# Patient Record
Sex: MALE | Race: WHITE | NOT HISPANIC OR LATINO | Employment: OTHER | URBAN - METROPOLITAN AREA
[De-identification: names, ages, dates, MRNs, and addresses within clinical notes are randomized per-mention and may not be internally consistent; named-entity substitution may affect disease eponyms.]

---

## 2017-02-03 ENCOUNTER — TELEPHONE (OUTPATIENT)
Dept: UROLOGY | Facility: CLINIC | Age: 82
End: 2017-02-03

## 2017-02-03 NOTE — TELEPHONE ENCOUNTER
Yosef is calling wanting to let know that he has not heard from [place] yet. He has gone ahead and booked a return appointment with you on 5-3-17 with a psa. He just wanted you to know that [place] has not called him withy an appointment.    CB # [phone]  Or cell # [phone]  Jessica Moore LPN

## 2017-05-09 ENCOUNTER — OFFICE VISIT (OUTPATIENT)
Dept: CARDIOLOGY | Facility: CLINIC | Age: 82
End: 2017-05-09
Attending: INTERNAL MEDICINE
Payer: MEDICARE

## 2017-05-09 VITALS
SYSTOLIC BLOOD PRESSURE: 122 MMHG | HEIGHT: 70 IN | DIASTOLIC BLOOD PRESSURE: 80 MMHG | BODY MASS INDEX: 22.48 KG/M2 | WEIGHT: 157 LBS | HEART RATE: 60 BPM

## 2017-05-09 DIAGNOSIS — I10 BENIGN ESSENTIAL HYPERTENSION: Primary | ICD-10-CM

## 2017-05-09 DIAGNOSIS — I48.21 PERMANENT ATRIAL FIBRILLATION (H): ICD-10-CM

## 2017-05-09 PROCEDURE — 99213 OFFICE O/P EST LOW 20 MIN: CPT | Performed by: INTERNAL MEDICINE

## 2017-05-09 RX ORDER — METOPROLOL SUCCINATE 25 MG/1
25 TABLET, EXTENDED RELEASE ORAL DAILY
COMMUNITY
End: 2017-07-26

## 2017-05-09 NOTE — MR AVS SNAPSHOT
"              After Visit Summary   5/9/2017    Yosef Murry    MRN: [id number]           Patient Information     Date Of Birth          1935        Visit Information        Provider Department      5/9/2017 3:45 PM Henna Murphy MD [facility] [place]        Today's Diagnoses     Benign essential hypertension    -  1    Permanent atrial fibrillation (H)           Follow-ups after your visit        Additional Services     Follow-Up with Cardiac Advanced Practice Provider                 Future tests that were ordered for you today     Open Future Orders        Priority Expected Expires Ordered    Follow-Up with Cardiac Advanced Practice Provider Routine 5/9/2019 9/21/2019 5/9/2017            Who to contact     If you have questions or need follow up information about today's clinic visit or your schedule please contact [facility] [place] directly at [phone].  Normal or non-critical lab and imaging results will be communicated to you by MyChart, letter or phone within 4 business days after the clinic has received the results. If you do not hear from us within 7 days, please contact the clinic through MyChart or phone. If you have a critical or abnormal lab result, we will notify you by phone as soon as possible.  Submit refill requests through [employer] or call your pharmacy and they will forward the refill request to us. Please allow 3 business days for your refill to be completed.          Additional Information About Your Visit        Care EveryWhere ID     This is your Care EveryWhere ID. This could be used by other organizations to access your [place] medical records  [id number]        Your Vitals Were     Pulse Height BMI (Body Mass Index)             60 1.778 m (5' 10\") 22.53 kg/m2          Blood Pressure from Last 3 Encounters:   05/09/17 122/80   11/16/16 116/88   10/11/16 136/88    Weight from Last 3 Encounters: "   05/09/17 71.2 kg (157 lb)   11/16/16 74.8 kg (165 lb)   10/11/16 75.2 kg (165 lb 11.2 oz)              We Performed the Following     Follow-Up with Electrophysiologist        Primary Care Provider Office Phone # Fax #    Roger Sommer [phone]       [facility] [address]  [facility] [address]        Thank you!     Thank you for choosing [facility] [place]  for your care. Our goal is always to provide you with excellent care. Hearing back from our patients is one way we can continue to improve our services. Please take a few minutes to complete the written survey that you may receive in the mail after your visit with us. Thank you!             Your Updated Medication List - Protect others around you: Learn how to safely use, store and throw away your medicines at www.disposemymeds.org.          This list is accurate as of: 5/9/17  4:13 PM.  Always use your most recent med list.                   Brand Name Dispense Instructions for use    amLODIPine 5 MG tablet    NORVASC    90 tablet    Take 1 tablet (5 mg) by mouth daily       CLARITIN 10 MG tablet   Generic drug:  loratadine     30 tablet    Take 1 tablet by mouth daily.       diphenhydrAMINE-acetaminophen  MG tablet    TYLENOL PM     Take 1 tablet by mouth nightly as needed for sleep       famotidine 20 MG tablet    PEPCID    60 tablet    Take 20 mg by mouth daily       losartan 50 MG tablet    COZAAR    30 tablet    Take 1 tablet (50 mg) by mouth daily       PRESERVISION AREDS 2 PO      Take 2 capsules by mouth daily       rivaroxaban ANTICOAGULANT 20 MG Tabs tablet    XARELTO    90 tablet    Take 1 tablet (20 mg) by mouth daily (with dinner)       TOPROL XL 25 MG 24 hr tablet   Generic drug:  metoprolol      Take 25 mg by mouth daily       TUMS ULTRA 1000 1000 MG Chew   Generic drug:  calcium carbonate antacid      Take  by mouth. 1-2 tablets per day       TYLENOL PO       Take 500 mg by mouth

## 2017-05-09 NOTE — LETTER
5/9/2017    Roger Sommer MD  [facility]   [address] [facility] [address]  [facility] [address]    RE: Yosef Murry       Dear Colleague,    It was my pleasure seeing Mr. Yosef Murry today.  He is a delightful 81-year-old gentleman with permanent atrial fibrillation treated with rate control (metoprolol) and anticoagulation (rivaroxaban).  He has history of previous TIA.  He also has hypertension.      When I last saw him in 10/2016, his blood pressure was high.  He confirmed that his blood pressure was high at home as well.  I added losartan.  Because of his blood pressure still being high after a few weeks, amlodipine 5 mg was added.  The patient requested this visit today to discuss his blood pressure.      He spent the winter in [place] and recently returned to the [facility].  He has no chest pain, orthopnea, PND, syncope or other symptoms.  He is unaware of the atrial fibrillation.      PHYSICAL EXAMINATION:   VITAL SIGNS:  Blood pressure 132/80, pulse 60 and irregular, weight 71 kg, height 177 cm.   GENERAL:  He is a pleasant gentleman, alert, oriented, no apparent distress.   NECK:  Supple without bruits.   LUNGS:  Clear.   CARDIOVASCULAR:  Normal JVP, irregularly irregular rhythm.  No gallop, murmur or rub.   EXTREMITIES:  No edema.     Outpatient Encounter Prescriptions as of 5/9/2017   Medication Sig Dispense Refill     metoprolol (TOPROL XL) 25 MG 24 hr tablet Take 25 mg by mouth daily       rivaroxaban ANTICOAGULANT (XARELTO) 20 MG TABS tablet Take 1 tablet (20 mg) by mouth daily (with dinner) 90 tablet 3     Acetaminophen (TYLENOL PO) Take 500 mg by mouth       amLODIPine (NORVASC) 5 MG tablet Take 1 tablet (5 mg) by mouth daily 90 tablet 3     losartan (COZAAR) 50 MG tablet Take 1 tablet (50 mg) by mouth daily 30 tablet 11     diphenhydrAMINE-acetaminophen (TYLENOL PM)  MG tablet Take 1 tablet by mouth nightly as needed for sleep       Multiple Vitamins-Minerals (PRESERVISION AREDS  2 PO) Take 2 capsules by mouth daily        famotidine (PEPCID) 20 MG tablet Take 20 mg by mouth daily  60 tablet 1     loratadine (CLARITIN) 10 MG tablet Take 1 tablet by mouth daily. 30 tablet 1     calcium carbonate antacid (TUMS ULTRA 1000) 1000 MG CHEW Take  by mouth. 1-2 tablets per day       [DISCONTINUED] metoprolol (TOPROL-XL) 25 MG 24 hr tablet Take 0.5 tablets (12.5 mg) by mouth daily (Patient taking differently: Take 25 mg by mouth daily ) 45 tablet 4     No facility-administered encounter medications on file as of 5/9/2017.       IMPRESSION:   1.  Permanent atrial fibrillation.  Continue metoprolol and rivaroxaban.  His [id number] score is 5.   2.  Hypertension.  Much improved on a combination of losartan, amlodipine and metoprolol.  No changes.      RECOMMENDATIONS:  See in follow-up in 2 years.     Again, thank you for allowing me to participate in the care of your patient.      Sincerely,    Henna Murphy MD     [facility]

## 2017-05-09 NOTE — PROGRESS NOTES
HPI and Plan:   See dictation    Orders Placed This Encounter   Procedures     Follow-Up with Cardiac Advanced Practice Provider       Orders Placed This Encounter   Medications     metoprolol (TOPROL XL) 25 MG 24 hr tablet     Sig: Take 25 mg by mouth daily       Medications Discontinued During This Encounter   Medication Reason     metoprolol (TOPROL-XL) 25 MG 24 hr tablet Stopped by Patient         Encounter Diagnoses   Name Primary?     Permanent atrial fibrillation (H)      Benign essential hypertension Yes       CURRENT MEDICATIONS:  Current Outpatient Prescriptions   Medication Sig Dispense Refill     metoprolol (TOPROL XL) 25 MG 24 hr tablet Take 25 mg by mouth daily       rivaroxaban ANTICOAGULANT (XARELTO) 20 MG TABS tablet Take 1 tablet (20 mg) by mouth daily (with dinner) 90 tablet 3     Acetaminophen (TYLENOL PO) Take 500 mg by mouth       amLODIPine (NORVASC) 5 MG tablet Take 1 tablet (5 mg) by mouth daily 90 tablet 3     losartan (COZAAR) 50 MG tablet Take 1 tablet (50 mg) by mouth daily 30 tablet 11     diphenhydrAMINE-acetaminophen (TYLENOL PM)  MG tablet Take 1 tablet by mouth nightly as needed for sleep       Multiple Vitamins-Minerals (PRESERVISION AREDS 2 PO) Take 2 capsules by mouth daily        famotidine (PEPCID) 20 MG tablet Take 20 mg by mouth daily  60 tablet 1     loratadine (CLARITIN) 10 MG tablet Take 1 tablet by mouth daily. 30 tablet 1     calcium carbonate antacid (TUMS ULTRA 1000) 1000 MG CHEW Take  by mouth. 1-2 tablets per day       [DISCONTINUED] metoprolol (TOPROL-XL) 25 MG 24 hr tablet Take 0.5 tablets (12.5 mg) by mouth daily (Patient taking differently: Take 25 mg by mouth daily ) 45 tablet 4       ALLERGIES     Allergies   Allergen Reactions     Other [Seasonal Allergies]        PAST MEDICAL HISTORY:  Past Medical History:   Diagnosis Date     Anal fistula      Atrial flutter (H) 2012     Heartburn      Inguinal hernia, left      Persistent atrial fibrillation (H)  8/21/12     Prostate cancer (H)      TIA (transient ischaemic attack)     2014       PAST SURGICAL HISTORY:  Past Surgical History:   Procedure Laterality Date     COLONOSCOPY       ENT SURGERY       EXAM UNDER ANESTHESIA, FISTULOTOMY RECTUM, COMBINED N/A 6/18/2015    Procedure: COMBINED EXAM UNDER ANESTHESIA, FISTULOTOMY RECTUM;  Surgeon: Candice Carty MD;  Location: [facility]     GENITOURINARY SURGERY      PROSTATECTOMY     HERNIORRHAPHY INGUINAL  7/25/2013    Procedure: HERNIORRHAPHY INGUINAL;  LEFT INGUINAL HERNIA REPAIR WITH MESH;  Surgeon: Filipe Fairchild MD;  Location: [facility]     ORTHOPEDIC SURGERY      WRIST SURGERY - LEFT       FAMILY HISTORY:  Family History   Problem Relation Age of Onset     Ovarian Cancer Mother      OSTEOPOROSIS Father      Unknown/Adopted Sister        SOCIAL HISTORY:  Social History     Social History     Marital status:      Spouse name: N/A     Number of children: N/A     Years of education: N/A     Social History Main Topics     Smoking status: Former Smoker     Years: 16.00     Types: Cigarettes     Quit date: 12/31/1968     Smokeless tobacco: Never Used     Alcohol use 0.5 oz/week     1 Cans of beer per week      Comment: SOCIALLY     Drug use: No     Sexual activity: Not Asked     Other Topics Concern     Caffeine Concern No     occ tea     Sleep Concern No     Stress Concern No     Weight Concern No     Special Diet No     Exercise Yes     walking 3 miles a day     Seat Belt Yes     Social History Narrative       Review of Systems:  Skin:  Negative       Eyes:  Positive for glasses    ENT:  Negative      Respiratory:  Positive for cough     Cardiovascular:  Negative      Gastroenterology: Negative      Genitourinary:  Positive for prostate problem HX of prostate CA   Musculoskeletal:  Negative      Neurologic:  Negative      Psychiatric:  Negative      Heme/Lymph/Imm:  Negative      Endocrine:  Negative        [id number]

## 2017-05-10 NOTE — PROGRESS NOTES
HISTORY OF PRESENT ILLNESS:    It was my pleasure seeing Mr. Yosef Costa today.  He is a delightful 81-year-old gentleman with permanent atrial fibrillation treated with rate control (metoprolol) and anticoagulation (rivaroxaban).  He has history of previous TIA.  He also has hypertension.      When I last saw him in 10/2016, his blood pressure was high.  He confirmed that his blood pressure was high at home as well.  I added losartan.  Because of his blood pressure still being high after a few weeks, amlodipine 5 mg was added.  The patient requested this visit today to discuss his blood pressure.      He spent the winter in [place] and recently returned to the [facility].  He has no chest pain, orthopnea, PND, syncope or other symptoms.  He is unaware of the atrial fibrillation.      PHYSICAL EXAMINATION:   VITAL SIGNS:  Blood pressure 132/80, pulse 60 and irregular, weight 71 kg, height 177 cm.   GENERAL:  He is a pleasant gentleman, alert, oriented, no apparent distress.   NECK:  Supple without bruits.   LUNGS:  Clear.   CARDIOVASCULAR:  Normal JVP, irregularly irregular rhythm.  No gallop, murmur or rub.   EXTREMITIES:  No edema.      IMPRESSION:   1.  Permanent atrial fibrillation.  Continue metoprolol and rivaroxaban.  His [id number] score is 5.   2.  Hypertension.  Much improved on a combination of losartan, amlodipine and metoprolol.  No changes.      RECOMMENDATIONS:  See in follow-up in 2 years.         MIRTA MELARA MD, [facility]         cc:   Roger Sommer MD   [facility]    [address] [place] [address]  [address]          D: 2017 16:12   T: 05/10/2017 13:17   MT: CANDI      Name:     YOSEF COSTA   MRN:      [id number]        Account:      [id number]   :      1935           Service Date: 2017      Document: [id number]

## 2017-07-26 DIAGNOSIS — I48.21 PERMANENT ATRIAL FIBRILLATION (H): Primary | ICD-10-CM

## 2017-07-26 RX ORDER — METOPROLOL SUCCINATE 25 MG/1
25 TABLET, EXTENDED RELEASE ORAL DAILY
Qty: 90 TABLET | Refills: 3 | Status: SHIPPED | OUTPATIENT
Start: 2017-07-26 | End: 2018-11-19

## 2017-07-26 NOTE — TELEPHONE ENCOUNTER
E script down- Rx for Metoprolol printed and faxed to pharmacy.([facility] in [place])  MAGDALENA Harris

## 2017-09-27 ENCOUNTER — TELEPHONE (OUTPATIENT)
Dept: CARDIOLOGY | Facility: CLINIC | Age: 82
End: 2017-09-27

## 2017-09-27 NOTE — TELEPHONE ENCOUNTER
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 Flo Jarvis RN.

## 2017-10-06 ENCOUNTER — TRANSFERRED RECORDS (OUTPATIENT)
Dept: HEALTH INFORMATION MANAGEMENT | Facility: CLINIC | Age: 82
End: 2017-10-06

## 2017-10-11 ENCOUNTER — TRANSFERRED RECORDS (OUTPATIENT)
Dept: HEALTH INFORMATION MANAGEMENT | Facility: CLINIC | Age: 82
End: 2017-10-11

## 2017-10-20 ENCOUNTER — ALLIED HEALTH/NURSE VISIT (OUTPATIENT)
Dept: UROLOGY | Facility: CLINIC | Age: 82
End: 2017-10-20
Payer: MEDICARE

## 2017-10-20 VITALS
WEIGHT: 161 LBS | BODY MASS INDEX: 23.05 KG/M2 | HEIGHT: 70 IN | DIASTOLIC BLOOD PRESSURE: 72 MMHG | SYSTOLIC BLOOD PRESSURE: 122 MMHG | HEART RATE: 66 BPM

## 2017-10-20 DIAGNOSIS — I10 BENIGN ESSENTIAL HYPERTENSION: ICD-10-CM

## 2017-10-20 DIAGNOSIS — C61 PROSTATE CANCER (H): Primary | ICD-10-CM

## 2017-10-20 DIAGNOSIS — I48.91 ATRIAL FIBRILLATION, UNSPECIFIED TYPE (H): ICD-10-CM

## 2017-10-20 PROCEDURE — 96402 CHEMO HORMON ANTINEOPL SQ/IM: CPT | Performed by: UROLOGY

## 2017-10-20 PROCEDURE — 99211 OFF/OP EST MAY X REQ PHY/QHP: CPT | Performed by: UROLOGY

## 2017-10-20 RX ORDER — AMLODIPINE BESYLATE 5 MG/1
5 TABLET ORAL DAILY
Qty: 90 TABLET | Refills: 3 | Status: SHIPPED | OUTPATIENT
Start: 2017-10-20 | End: 2018-10-15

## 2017-10-20 RX ORDER — LOSARTAN POTASSIUM 50 MG/1
50 TABLET ORAL DAILY
Qty: 90 TABLET | Refills: 3 | Status: SHIPPED | OUTPATIENT
Start: 2017-10-20 | End: 2018-10-23

## 2017-10-20 ASSESSMENT — PAIN SCALES - GENERAL: PAINLEVEL: NO PAIN (0)

## 2017-10-20 NOTE — NURSING NOTE
The following medication was given: Eligard    MEDICATION: Eligard 45 mg  ROUTE: SQ  SITE: RUQ - Abd.  DOSE: 45MG  LOT #: [id number]  :  Jc  EXPIRATION DATE:  02/2019  NDC#: 57034-987-50    Yosef Murry comes into clinic today at the request of Dr Senior and Dr Novak  Ordering Provider for Dr Miroslava Novak    This service provided today was under the supervising provider of the day Dr Senior  who was available if needed.    Reason for visit: Prostate Cancer and to get a Eligard injection for his treatment     Naty Duran MA

## 2017-10-20 NOTE — MR AVS SNAPSHOT
"              After Visit Summary   10/20/2017    Yosef Murry    MRN: [id number]           Patient Information     Date Of Birth          1935        Visit Information        Provider Department      10/20/2017 9:45 AM Reddy Senior MD [facility] [place]        Today's Diagnoses     Prostate cancer (H)    -  1       Follow-ups after your visit        Who to contact     If you have questions or need follow up information about today's clinic visit or your schedule please contact [facility] [place] directly at [phone].  Normal or non-critical lab and imaging results will be communicated to you by [employer], letter or phone within 4 business days after the clinic has received the results. If you do not hear from us within 7 days, please contact the clinic through [employer] or phone. If you have a critical or abnormal lab result, we will notify you by phone as soon as possible.  Submit refill requests through [employer] or call your pharmacy and they will forward the refill request to us. Please allow 3 business days for your refill to be completed.          Additional Information About Your Visit        MyChart Information     [employer] lets you send messages to your doctor, view your test results, renew your prescriptions, schedule appointments and more. To sign up, go to [email or web address] . Click on \"Log in\" on the left side of the screen, which will take you to the Welcome page. Then click on \"Sign up Now\" on the right side of the page.     You will be asked to enter the access code listed below, as well as some personal information. Please follow the directions to create your username and password.     Your access code is: [email or web address]  Expires: 2018 10:49 AM     Your access code will  in 90 days. If you need help or a new code, please call your [place] clinic or [phone].        Care EveryWhere ID     This is your Care " "EveryWhere ID. This could be used by other organizations to access your [place] medical records  [id number]        Your Vitals Were     Pulse Height BMI (Body Mass Index)             66 1.778 m (5' 10\") 23.1 kg/m2          Blood Pressure from Last 3 Encounters:   10/20/17 122/72   05/09/17 122/80   11/16/16 116/88    Weight from Last 3 Encounters:   10/20/17 73 kg (161 lb)   05/09/17 71.2 kg (157 lb)   11/16/16 74.8 kg (165 lb)              Today, you had the following     No orders found for display       Primary Care Provider Office Phone # Fax #    Roger Sommer [phone]       [facility] [address] [address]        Equal Access to Services     AMAURI ZARAGOZA : Hadii aad ku hadasho Soomaali, waaxda luqadaha, qaybta kaalmada adeegyada, pratibha scherer hayblaise ott . So [facility] [phone].    ATENCIÓN: Si habla español, tiene a craven disposición servicios gratuitos de asistencia lingüística. Tianna al [phone].    We comply with applicable federal civil rights laws and [place] laws. We do not discriminate on the basis of race, color, national origin, age, disability, sex, sexual orientation, or gender identity.            Thank you!     Thank you for choosing [facility] [place]  for your care. Our goal is always to provide you with excellent care. Hearing back from our patients is one way we can continue to improve our services. Please take a few minutes to complete the written survey that you may receive in the mail after your visit with us. Thank you!             Your Updated Medication List - Protect others around you: Learn how to safely use, store and throw away your medicines at www.disposemymeds.org.          This list is accurate as of: 10/20/17 10:49 AM.  Always use your most recent med list.                   Brand Name Dispense Instructions for use Diagnosis    amLODIPine 5 MG tablet    NORVASC "    90 tablet    Take 1 tablet (5 mg) by mouth daily    Benign essential hypertension       CLARITIN 10 MG tablet   Generic drug:  loratadine     30 tablet    Take 1 tablet by mouth daily.        diphenhydrAMINE-acetaminophen  MG tablet    TYLENOL PM     Take 1 tablet by mouth nightly as needed for sleep        famotidine 20 MG tablet    PEPCID    60 tablet    Take 20 mg by mouth daily        losartan 50 MG tablet    COZAAR    30 tablet    Take 1 tablet (50 mg) by mouth daily    Atrial fibrillation, unspecified type (H)       metoprolol 25 MG 24 hr tablet    TOPROL XL    90 tablet    Take 1 tablet (25 mg) by mouth daily    Permanent atrial fibrillation (H)       PRESERVISION AREDS 2 PO      Take 2 capsules by mouth daily        rivaroxaban ANTICOAGULANT 20 MG Tabs tablet    XARELTO    90 tablet    Take 1 tablet (20 mg) by mouth daily (with dinner)    Atrial fibrillation (H)       TUMS ULTRA 1000 1000 MG Chew   Generic drug:  calcium carbonate antacid      Take  by mouth. 1-2 tablets per day        TYLENOL PO      Take 500 mg by mouth

## 2017-10-20 NOTE — PROGRESS NOTES
Ysoef Murry is an 82-year-old male with a history of grade 3 adenocarcinoma of the prostate. At the time of his radical prostatectomy 20 years ago he had a single positive margin. He had a biochemical recurrence a few years ago and was treated with pelvic radiation. His PSA has become detectable and studies at the [facility] indicate residual disease in the prostatic fossa, no metastatic disease. He now presents for 6 months Eligard injection at the request of Peter Novak M.D.  He will return to see Dr. Novak in 6 months for further studies and may be a candidate for local cryotherapy  Review of systems-no difficulty voiding  Exam: Normal appearance, normal respirations, neuro grossly intact  Assessment: Recurrence of prostate cancer and prostatic fossa  Plan: 6 month Eligard injection today, follow up with Dr. Mccain is scheduled. Patient may require intermittent hormonal therapy in the future

## 2017-11-30 DIAGNOSIS — I48.91 ATRIAL FIBRILLATION (H): ICD-10-CM

## 2018-06-21 ENCOUNTER — OFFICE VISIT (OUTPATIENT)
Dept: FAMILY MEDICINE | Facility: CLINIC | Age: 83
End: 2018-06-21
Payer: MEDICARE

## 2018-06-21 VITALS
TEMPERATURE: 97.1 F | HEART RATE: 60 BPM | DIASTOLIC BLOOD PRESSURE: 95 MMHG | OXYGEN SATURATION: 99 % | BODY MASS INDEX: 23.77 KG/M2 | HEIGHT: 70 IN | SYSTOLIC BLOOD PRESSURE: 157 MMHG | WEIGHT: 166 LBS

## 2018-06-21 DIAGNOSIS — Z00.00 ENCOUNTER FOR ROUTINE ADULT HEALTH EXAMINATION WITHOUT ABNORMAL FINDINGS: Primary | ICD-10-CM

## 2018-06-21 DIAGNOSIS — I48.19 PERSISTENT ATRIAL FIBRILLATION (H): ICD-10-CM

## 2018-06-21 DIAGNOSIS — I48.3 TYPICAL ATRIAL FLUTTER (H): ICD-10-CM

## 2018-06-21 DIAGNOSIS — C61 PROSTATE CANCER (H): ICD-10-CM

## 2018-06-21 DIAGNOSIS — Z13.6 CARDIOVASCULAR SCREENING; LDL GOAL LESS THAN 130: ICD-10-CM

## 2018-06-21 DIAGNOSIS — E55.9 VITAMIN D DEFICIENCY: ICD-10-CM

## 2018-06-21 LAB
ALBUMIN SERPL-MCNC: 3.5 G/DL (ref 3.4–5)
ALBUMIN UR-MCNC: NEGATIVE MG/DL
ALP SERPL-CCNC: 100 U/L (ref 40–150)
ALT SERPL W P-5'-P-CCNC: 24 U/L (ref 0–70)
AMORPH CRY #/AREA URNS HPF: ABNORMAL /HPF
ANION GAP SERPL CALCULATED.3IONS-SCNC: 8 MMOL/L (ref 3–14)
APPEARANCE UR: ABNORMAL
AST SERPL W P-5'-P-CCNC: 23 U/L (ref 0–45)
BACTERIA #/AREA URNS HPF: ABNORMAL /HPF
BILIRUB SERPL-MCNC: 0.6 MG/DL (ref 0.2–1.3)
BILIRUB UR QL STRIP: NEGATIVE
BUN SERPL-MCNC: 17 MG/DL (ref 7–30)
CALCIUM SERPL-MCNC: 9.5 MG/DL (ref 8.5–10.1)
CHLORIDE SERPL-SCNC: 105 MMOL/L (ref 94–109)
CHOLEST SERPL-MCNC: 183 MG/DL
CO2 SERPL-SCNC: 29 MMOL/L (ref 20–32)
COLOR UR AUTO: YELLOW
CREAT SERPL-MCNC: 0.88 MG/DL (ref 0.66–1.25)
ERYTHROCYTE [DISTWIDTH] IN BLOOD BY AUTOMATED COUNT: 13.6 % (ref 10–15)
GFR SERPL CREATININE-BSD FRML MDRD: 84 ML/MIN/1.7M2
GLUCOSE SERPL-MCNC: 89 MG/DL (ref 70–99)
GLUCOSE UR STRIP-MCNC: NEGATIVE MG/DL
HCT VFR BLD AUTO: 50 % (ref 40–53)
HDLC SERPL-MCNC: 43 MG/DL
HGB BLD-MCNC: 16.4 G/DL (ref 13.3–17.7)
HGB UR QL STRIP: ABNORMAL
KETONES UR STRIP-MCNC: NEGATIVE MG/DL
LDLC SERPL CALC-MCNC: 114 MG/DL
LEUKOCYTE ESTERASE UR QL STRIP: NEGATIVE
MCH RBC QN AUTO: 31.5 PG (ref 26.5–33)
MCHC RBC AUTO-ENTMCNC: 32.8 G/DL (ref 31.5–36.5)
MCV RBC AUTO: 96 FL (ref 78–100)
NITRATE UR QL: NEGATIVE
NON-SQ EPI CELLS #/AREA URNS LPF: ABNORMAL /LPF
NONHDLC SERPL-MCNC: 140 MG/DL
PH UR STRIP: 7 PH (ref 5–7)
PLATELET # BLD AUTO: 214 10E9/L (ref 150–450)
POTASSIUM SERPL-SCNC: 4.4 MMOL/L (ref 3.4–5.3)
PROT SERPL-MCNC: 7.5 G/DL (ref 6.8–8.8)
RBC # BLD AUTO: 5.21 10E12/L (ref 4.4–5.9)
RBC #/AREA URNS AUTO: ABNORMAL /HPF
SODIUM SERPL-SCNC: 142 MMOL/L (ref 133–144)
SOURCE: ABNORMAL
SP GR UR STRIP: 1.02 (ref 1–1.03)
TRIGL SERPL-MCNC: 130 MG/DL
UROBILINOGEN UR STRIP-ACNC: 0.2 EU/DL (ref 0.2–1)
WBC # BLD AUTO: 6.5 10E9/L (ref 4–11)
WBC #/AREA URNS AUTO: ABNORMAL /HPF

## 2018-06-21 PROCEDURE — 36415 COLL VENOUS BLD VENIPUNCTURE: CPT | Performed by: INTERNAL MEDICINE

## 2018-06-21 PROCEDURE — G0439 PPPS, SUBSEQ VISIT: HCPCS | Performed by: INTERNAL MEDICINE

## 2018-06-21 PROCEDURE — 80061 LIPID PANEL: CPT | Performed by: INTERNAL MEDICINE

## 2018-06-21 PROCEDURE — 81001 URINALYSIS AUTO W/SCOPE: CPT | Performed by: INTERNAL MEDICINE

## 2018-06-21 PROCEDURE — 82306 VITAMIN D 25 HYDROXY: CPT | Performed by: INTERNAL MEDICINE

## 2018-06-21 PROCEDURE — 85027 COMPLETE CBC AUTOMATED: CPT | Performed by: INTERNAL MEDICINE

## 2018-06-21 PROCEDURE — 80053 COMPREHEN METABOLIC PANEL: CPT | Performed by: INTERNAL MEDICINE

## 2018-06-21 NOTE — MR AVS SNAPSHOT
After Visit Summary   6/21/2018    Yosef Murry    MRN: [id number]           Patient Information     Date Of Birth          1935        Visit Information        Provider Department      6/21/2018 8:30 AM Fred Connell MD [facility]        Today's Diagnoses     Encounter for routine adult health examination without abnormal findings    -  1    Prostate cancer (H)        Persistent atrial fibrillation (H)        Typical atrial flutter (H)        Vitamin D deficiency        CARDIOVASCULAR SCREENING; LDL GOAL LESS THAN 130          Care Instructions      Preventive Health Recommendations:       Male Ages 65 and over    Yearly exam:             See your health care provider every year in order to  o   Review health changes.   o   Discuss preventive care.    o   Review your medicines if your doctor has prescribed any.    Talk with your health care provider about whether you should have a test to screen for prostate cancer (PSA).    Every 3 years, have a diabetes test (fasting glucose). If you are at risk for diabetes, you should have this test more often.    Every 5 years, have a cholesterol test. Have this test more often if you are at risk for high cholesterol or heart disease.     Every 10 years, have a colonoscopy. Or, have a yearly FIT test (stool test). These exams will check for colon cancer.    Talk to with your health care provider about screening for Abdominal Aortic Aneurysm if you have a family history of AAA or have a history of smoking.  Shots:     Get a flu shot each year.     Get a tetanus shot every 10 years.     Talk to your doctor about your pneumonia vaccines. There are now two you should receive - Pneumovax (PPSV 23) and Prevnar (PCV 13).    Talk to your doctor about a shingles vaccine.     Talk to your doctor about the hepatitis B vaccine.  Nutrition:     Eat at least 5 servings of fruits and vegetables each day.     Eat whole-grain bread, whole-wheat pasta and brown  "rice instead of white grains and rice.     Talk to your doctor about Calcium and Vitamin D.   Lifestyle    Exercise for at least 150 minutes a week (30 minutes a day, 5 days a week). This will help you control your weight and prevent disease.     Limit alcohol to one drink per day.     No smoking.     Wear sunscreen to prevent skin cancer.     See your dentist every six months for an exam and cleaning.     See your eye doctor every 1 to 2 years to screen for conditions such as glaucoma, macular degeneration and cataracts.          Follow-ups after your visit        Who to contact     If you have questions or need follow up information about today's clinic visit or your schedule please contact [facility] directly at [phone].  Normal or non-critical lab and imaging results will be communicated to you by MyChart, letter or phone within 4 business days after the clinic has received the results. If you do not hear from us within 7 days, please contact the clinic through MyChart or phone. If you have a critical or abnormal lab result, we will notify you by phone as soon as possible.  Submit refill requests through [employer] or call your pharmacy and they will forward the refill request to us. Please allow 3 business days for your refill to be completed.          Additional Information About Your Visit        Care EveryWhere ID     This is your Care EveryWhere ID. This could be used by other organizations to access your [place] medical records  [id number]        Your Vitals Were     Pulse Temperature Height Pulse Oximetry BMI (Body Mass Index)       60 97.1  F (36.2  C) (Oral) 5' 10\" (1.778 m) 99% 23.82 kg/m2        Blood Pressure from Last 3 Encounters:   06/21/18 (!) 157/95   10/20/17 122/72   05/09/17 122/80    Weight from Last 3 Encounters:   06/21/18 166 lb (75.3 kg)   10/20/17 161 lb (73 kg)   05/09/17 157 lb (71.2 kg)              We Performed the Following     CBC with platelets     Comprehensive " metabolic panel     Lipid panel reflex to direct LDL Fasting     UA reflex to Microscopic and Culture     Urine Microscopic     Vitamin D Deficiency          Today's Medication Changes          These changes are accurate as of 6/21/18 11:59 PM.  If you have any questions, ask your nurse or doctor.               Stop taking these medicines if you haven't already. Please contact your care team if you have questions.     famotidine 20 MG tablet   Commonly known as:  PEPCID   Stopped by:  Fred Connell MD                    Primary Care Provider Office Phone # Fax #    Fred Connell [phone] [address]  [facility] [address]        Equal Access to Services     [facility]: Hadii aad ku hadasho Socatyali, waaxda luqadaha, qaybta kaalmada adebhumiyada, pratibha ott . So [facility] [phone].    ATENCIÓN: Si habla español, tiene a craven disposición servicios gratuitos de asistencia lingüística. Llame al [phone].    We comply with applicable federal civil rights laws and [place] laws. We do not discriminate on the basis of race, color, national origin, age, disability, sex, sexual orientation, or gender identity.            Thank you!     Thank you for choosing [facility]  for your care. Our goal is always to provide you with excellent care. Hearing back from our patients is one way we can continue to improve our services. Please take a few minutes to complete the written survey that you may receive in the mail after your visit with us. Thank you!             Your Updated Medication List - Protect others around you: Learn how to safely use, store and throw away your medicines at www.disposemymeds.org.          This list is accurate as of 6/21/18 11:59 PM.  Always use your most recent med list.                   Brand Name Dispense Instructions for use Diagnosis    amLODIPine 5 MG tablet    NORVASC    90 tablet    Take 1 tablet (5 mg) by mouth  daily    Benign essential hypertension       CLARITIN 10 MG tablet   Generic drug:  loratadine     30 tablet    Take 1 tablet by mouth daily.        diphenhydrAMINE-acetaminophen  MG tablet    TYLENOL PM     Take 1 tablet by mouth nightly as needed for sleep        losartan 50 MG tablet    COZAAR    90 tablet    Take 1 tablet (50 mg) by mouth daily    Atrial fibrillation, unspecified type (H)       metoprolol succinate 25 MG 24 hr tablet    TOPROL XL    90 tablet    Take 1 tablet (25 mg) by mouth daily    Permanent atrial fibrillation (H)       PRESERVISION AREDS 2 PO      Take 2 capsules by mouth daily        rivaroxaban ANTICOAGULANT 20 MG Tabs tablet    XARELTO    90 tablet    Take 1 tablet (20 mg) by mouth daily (with dinner)    Atrial fibrillation (H)       TUMS ULTRA 1000 1000 MG Chew   Generic drug:  calcium carbonate antacid      Take  by mouth. 1-2 tablets per day        TYLENOL PO      Take 500 mg by mouth

## 2018-06-21 NOTE — PROGRESS NOTES
SUBJECTIVE:   Yosef Murry is a 82 year old male who presents for Preventive Visit.    Are you in the first 12 months of your Medicare Part B coverage?  No    Healthy Habits:    Do you get at least three servings of calcium containing foods daily (dairy, green leafy vegetables, etc.)? yes    Amount of exercise or daily activities, outside of work: 5-7 day(s) per week    Problems taking medications regularly No    Medication side effects: No    Have you had an eye exam in the past two years? yes    Do you see a dentist twice per year? yes    Do you have sleep apnea, excessive snoring or daytime drowsiness?no      Ability to successfully perform activities of daily living: Yes, no assistance needed    Home safety:  none identified     Hearing impairment: No    Fall risk:  Fallen 2 or more times in the past year?: No  Any fall with injury in the past year?: No    COGNITIVE SCREEN  1) Repeat 3 items (Banana, Sunrise, Chair)    2) Clock draw: NORMAL  3) 3 item recall: Recalls 3 objects  Results: 3 items recalled: COGNITIVE IMPAIRMENT LESS LIKELY    Mini-CogTM Copyright MARIBEL Kwong. Licensed by the author for use in [place] [employer]; reprinted with permission ([email or web address]). All rights reserved.      The patient presents to the clinic for establishment of care and a wellness exam. The pt has a history of a prostatectomy in 5/1999 for Angélica 8 adenocarcinoma of the prostate. He is being followed by urology at the [facility] watching for recurrent or metastatic disease. His PSA is currently undetectable and choline PET scan revealed complete metabolic response to therapy. He is currently receiving Lupron injections every 6 months.     The patient has a history of atrial fibrillation which he reports staying in the irregular rate and rhythm regularly. He is anticoagulated on Xarelto and is taking metoprolol.     He denies vision changes, neck problems, back problems, headaches, sinus pressure, shortness of breath,  chest pain, chest discomfort, heart palpitations, stomach problems, indigestion, heartburn, hematochezia, diarrhea, constipation, urination problems, nocturia, skin changes, rashes, bone pain, muscle pain, joint pain, and weight changes.      Reviewed and updated as needed this visit by clinical staff  Tobacco  Allergies  Meds  Problems         Reviewed and updated as needed this visit by Provider        Social History   Substance Use Topics     Smoking status: Former Smoker     Years: 16.00     Types: Cigarettes     Quit date: 12/31/1968     Smokeless tobacco: Never Used     Alcohol use 0.5 oz/week     1 Cans of beer per week      Comment: SOCIALLY       If you drink alcohol do you typically have >3 drinks per day or >7 drinks per week? No                        Today's PHQ-2 Score:   PHQ-2 ( 1999 Pfizer) 6/21/2018   Q1: Little interest or pleasure in doing things 0   Q2: Feeling down, depressed or hopeless 0   PHQ-2 Score 0       Do you feel safe in your environment - Yes    Do you have a Health Care Directive?: Yes: Advance Directive has been received and scanned.    Current providers sharing in care for this patient include:   Patient Care Team:  Fred Connell MD as PCP - General (Internal Medicine)    The following health maintenance items are reviewed in Epic and correct as of today:  Health Maintenance   Topic Date Due     PHQ-2 Q1 YR  09/06/1947     TETANUS IMMUNIZATION (SYSTEM ASSIGNED)  09/06/1954     ADVANCE DIRECTIVE PLANNING Q5 YRS  09/06/1991     FALL RISK ASSESSMENT  09/06/2001     PNEUMOCOCCAL (1 of 2 - PCV13) 09/06/2001     INFLUENZA VACCINE (Season Ended) 09/01/2018     Current Outpatient Prescriptions   Medication Sig Dispense Refill     Acetaminophen (TYLENOL PO) Take 500 mg by mouth       amLODIPine (NORVASC) 5 MG tablet Take 1 tablet (5 mg) by mouth daily 90 tablet 3     calcium carbonate antacid (TUMS ULTRA 1000) 1000 MG CHEW Take  by mouth. 1-2 tablets per day        diphenhydrAMINE-acetaminophen (TYLENOL PM)  MG tablet Take 1 tablet by mouth nightly as needed for sleep       loratadine (CLARITIN) 10 MG tablet Take 1 tablet by mouth daily. 30 tablet 1     losartan (COZAAR) 50 MG tablet Take 1 tablet (50 mg) by mouth daily 90 tablet 3     metoprolol (TOPROL XL) 25 MG 24 hr tablet Take 1 tablet (25 mg) by mouth daily 90 tablet 3     Multiple Vitamins-Minerals (PRESERVISION AREDS 2 PO) Take 2 capsules by mouth daily        rivaroxaban ANTICOAGULANT (XARELTO) 20 MG TABS tablet Take 1 tablet (20 mg) by mouth daily (with dinner) 90 tablet 3       Pneumonia Vaccine:Adults age 65+ who have not received previous Pneumovax (PPSV23) or PCV13 as an adult: Should first be given PCV13 AND then should be given PPSV23 6-12 months after PCV13    ROS:  CONSTITUTIONAL: NEGATIVE for fever, chills, change in weight  INTEGUMENTARY/SKIN: NEGATIVE for worrisome rashes, moles or lesions  EYES: NEGATIVE for vision changes or irritation  ENT/MOUTH: NEGATIVE for ear, mouth and throat problems  RESP: NEGATIVE for significant cough or SOB  BREAST: NEGATIVE for masses, tenderness or discharge  CV: NEGATIVE for chest pain, palpitations or peripheral edema  GI: NEGATIVE for nausea, abdominal pain, heartburn, or change in bowel habits  : NEGATIVE for frequency, dysuria, or hematuria  MUSCULOSKELETAL: NEGATIVE for significant arthralgias or myalgia  NEURO: NEGATIVE for weakness, dizziness or paresthesias  ENDOCRINE: NEGATIVE for temperature intolerance, skin/hair changes  HEME: NEGATIVE for bleeding problems  PSYCHIATRIC: NEGATIVE for changes in mood or affect    This document serves as a record of the services and decisions personally performed and made by Fred Connell MD. It was created on his behalf by Aliyah Rowan, a trained medical scribe. The creation of this document is based the provider's statements to the medical scribe. 6/21/2018  OBJECTIVE:   BP (!) 157/95 (BP Location: Left arm,  "Patient Position: Sitting, Cuff Size: Adult Large)  Pulse 60  Temp 97.1  F (36.2  C) (Oral)  Ht 1.778 m (5' 10\")  Wt 75.3 kg (166 lb)  SpO2 99%  BMI 23.82 kg/m2 Estimated body mass index is 23.82 kg/(m^2) as calculated from the following:    Height as of this encounter: 1.778 m (5' 10\").    Weight as of this encounter: 75.3 kg (166 lb).  EXAM:   GENERAL: healthy, alert and no distress, mild to moderate kyphosis  EYES: Eyes grossly normal to inspection, PERRL and conjunctivae and sclerae normal  HENT: ear canals and TM's normal, nose and mouth without ulcers or lesions  NECK: no adenopathy, no asymmetry, masses, or scars and thyroid normal to palpation  RESP: lungs clear to auscultation - no rales, rhonchi or wheezes  BREAST: normal without masses, tenderness or nipple discharge and no palpable axillary masses or adenopathy  CV: regular rate and rhythm, normal S1 S2, no S3 or S4, no murmur, click or rub, no peripheral edema and peripheral pulses strong  ABDOMEN: soft, nontender, no hepatosplenomegaly, no masses and bowel sounds normal   (male): testicles were atrophic, normal male genitalia without lesions or urethral discharge, no hernia  MS: no gross musculoskeletal defects noted, 1+ pretibial edema bilaterally, minor chronic venous stasis changes   SKIN: no suspicious lesions or rashes except sheets of seborrheic keratoses   NEURO: Normal strength and tone, mentation intact and speech normal  PSYCH: mentation appears normal, affect normal/bright  LYMPH: no cervical, supraclavicular, axillary, or inguinal adenopathy    Diagnostic Results:  Pending  ASSESSMENT / PLAN:   1. Encounter for routine adult health examination without abnormal findings  Fasting labs pending.   - UA reflex to Microscopic and Culture  - CBC with platelets  - Comprehensive metabolic panel  - Lipid panel reflex to direct LDL Fasting  - Vitamin D Deficiency    2. Prostate cancer (H)  Followed by urology at [facility], receiving Lupron " "injections every 6 months.  - CBC with platelets  - Comprehensive metabolic panel    3. Persistent atrial fibrillation (H)  Controlled with xarelto and metoprolol.    4. Typical atrial flutter (H)  Controlled with xarelto and metoprolol.    5. Vitamin D deficiency  - Vitamin D Deficiency    6. CARDIOVASCULAR SCREENING; LDL GOAL LESS THAN 130  - Lipid panel reflex to direct LDL Fasting      Follow-up immunizations and other records when available.  End of Life Planning:  Patient currently has an advanced directive: Yes.  Practitioner is supportive of decision.    COUNSELING:  Reviewed preventive health counseling, as reflected in patient instructions       Regular exercise       Healthy diet/nutrition       Vision screening       Hearing screening       Dental care       Prostate cancer screening    BP Screening:   Last 3 BP Readings:    BP Readings from Last 3 Encounters:   06/21/18 (!) 157/95   10/20/17 122/72   05/09/17 122/80       The following was recommended to the patient:  Re-screen BP within a year and recommended lifestyle modifications    Estimated body mass index is 23.82 kg/(m^2) as calculated from the following:    Height as of this encounter: 1.778 m (5' 10\").    Weight as of this encounter: 75.3 kg (166 lb).   reports that he quit smoking about 49 years ago. His smoking use included Cigarettes. He quit after 16.00 years of use. He has never used smokeless tobacco.    Appropriate preventive services were discussed with this patient, including applicable screening as appropriate for cardiovascular disease, diabetes, osteopenia/osteoporosis, and glaucoma.  As appropriate for age/gender, discussed screening for colorectal cancer, prostate cancer, breast cancer, and cervical cancer. Checklist reviewing preventive services available has been given to the patient.    Reviewed patients plan of care and provided an AVS. The Basic Care Plan (routine screening as documented in Health Maintenance) for Yosef" meets the Care Plan requirement. This Care Plan has been established and reviewed with the Patient.    Counseling Resources:  ATP IV Guidelines  Pooled Cohorts Equation Calculator  Breast Cancer Risk Calculator  FRAX Risk Assessment  ICSI Preventive Guidelines  Dietary Guidelines for Americans, 2010  USDA's MyPlate  ASA Prophylaxis  Lung CA Screening    Fred Connell MD  [facility]    The information in this document, created by the medical scribe for me, accurately reflects the services I personally performed and the decisions made by me. I have reviewed and approved this document for accuracy prior to leaving the patient care area.  Fred Connell MD  9:04 AM, 06/21/18

## 2018-06-22 LAB — DEPRECATED CALCIDIOL+CALCIFEROL SERPL-MC: 29 UG/L (ref 20–75)

## 2018-07-19 ENCOUNTER — TELEPHONE (OUTPATIENT)
Dept: FAMILY MEDICINE | Facility: CLINIC | Age: 83
End: 2018-07-19

## 2018-07-19 NOTE — TELEPHONE ENCOUNTER
Reason for Call:  Request for results:    Name of test or procedure: lab tests    Date of test of procedure: 6/21    Location of the test or procedure: [facility]    OK to leave the result message on voice mail or with a family member? YES    Phone number Patient can be reached at:  Home number on file [phone] (home)    Additional comments: patient would like a call back to review results.    Please, also, send a copy of the results to patient at home address.    Call taken on 7/19/2018 at 9:19 AM by Marjan Willard.

## 2018-09-06 ENCOUNTER — TELEPHONE (OUTPATIENT)
Dept: CARDIOLOGY | Facility: CLINIC | Age: 83
End: 2018-09-06

## 2018-09-06 ENCOUNTER — ALLIED HEALTH/NURSE VISIT (OUTPATIENT)
Dept: NURSING | Facility: CLINIC | Age: 83
End: 2018-09-06
Payer: MEDICARE

## 2018-09-06 DIAGNOSIS — Z23 NEED FOR SHINGLES VACCINE: Primary | ICD-10-CM

## 2018-09-06 DIAGNOSIS — Z23 NEED FOR PROPHYLACTIC VACCINATION AND INOCULATION AGAINST INFLUENZA: ICD-10-CM

## 2018-09-06 PROCEDURE — 99207 ZZC NO CHARGE NURSE ONLY: CPT

## 2018-09-06 PROCEDURE — G0008 ADMIN INFLUENZA VIRUS VAC: HCPCS

## 2018-09-06 PROCEDURE — 90662 IIV NO PRSV INCREASED AG IM: CPT

## 2018-09-06 PROCEDURE — 90750 HZV VACC RECOMBINANT IM: CPT

## 2018-09-06 PROCEDURE — 90472 IMMUNIZATION ADMIN EACH ADD: CPT

## 2018-09-06 NOTE — MR AVS SNAPSHOT
After Visit Summary   9/6/2018    Yosef Murry    MRN: [id number]           Patient Information     Date Of Birth          1935        Visit Information        Provider Department      9/6/2018 9:00 AM [facility] [place] NURSE [facility]        Today's Diagnoses     Need for shingles vaccine    -  1    Need for prophylactic vaccination and inoculation against influenza           Follow-ups after your visit        Who to contact     If you have questions or need follow up information about today's clinic visit or your schedule please contact [facility] directly at [phone].  Normal or non-critical lab and imaging results will be communicated to you by MyChart, letter or phone within 4 business days after the clinic has received the results. If you do not hear from us within 7 days, please contact the clinic through MyChart or phone. If you have a critical or abnormal lab result, we will notify you by phone as soon as possible.  Submit refill requests through [employer] or call your pharmacy and they will forward the refill request to us. Please allow 3 business days for your refill to be completed.          Additional Information About Your Visit        Care EveryWhere ID     This is your Care EveryWhere ID. This could be used by other organizations to access your [place] medical records  [id number]         Blood Pressure from Last 3 Encounters:   06/21/18 (!) 157/95   10/20/17 122/72   05/09/17 122/80    Weight from Last 3 Encounters:   06/21/18 166 lb (75.3 kg)   10/20/17 161 lb (73 kg)   05/09/17 157 lb (71.2 kg)              We Performed the Following     FLU VACCINE, INCREASED ANTIGEN, PRESV FREE, AGE 65+ [73650]     Vaccine Administration, Each Additional [00010]     Vaccine Administration, Initial [52833]     ZOSTER VACCINE RECOMBINANT ADJUVANTED IM NJX        Primary Care Provider Office Phone # Fax #    Fred Connell [phone] [address]  [facility] [address]  [facility] [address]        Equal Access to Services     AMAURI ZARAGOZA : Hadii jean-pierre arana hadchloeterence Socatyali, waaxda luqadaha, qaybta kajimibella morrisseylillybella, pratibha wagnermerymorgan ott . So [facility] [phone].    ATENCIÓN: Si habla español, tiene a craven disposición servicios gratuitos de asistencia lingüística. Llame al [phone].    We comply with applicable federal civil rights laws and [place] laws. We do not discriminate on the basis of race, color, national origin, age, disability, sex, sexual orientation, or gender identity.            Thank you!     Thank you for choosing [facility]  for your care. Our goal is always to provide you with excellent care. Hearing back from our patients is one way we can continue to improve our services. Please take a few minutes to complete the written survey that you may receive in the mail after your visit with us. Thank you!             Your Updated Medication List - Protect others around you: Learn how to safely use, store and throw away your medicines at www.disposemymeds.org.          This list is accurate as of 9/6/18  9:21 AM.  Always use your most recent med list.                   Brand Name Dispense Instructions for use Diagnosis    amLODIPine 5 MG tablet    NORVASC    90 tablet    Take 1 tablet (5 mg) by mouth daily    Benign essential hypertension       CLARITIN 10 MG tablet   Generic drug:  loratadine     30 tablet    Take 1 tablet by mouth daily.        diphenhydrAMINE-acetaminophen  MG tablet    TYLENOL PM     Take 1 tablet by mouth nightly as needed for sleep        losartan 50 MG tablet    COZAAR    90 tablet    Take 1 tablet (50 mg) by mouth daily    Atrial fibrillation, unspecified type (H)       metoprolol succinate 25 MG 24 hr tablet    TOPROL XL    90 tablet    Take 1 tablet (25 mg) by mouth daily    Permanent atrial fibrillation (H)       PRESERVISION AREDS 2 PO      Take 2 capsules by mouth daily        rivaroxaban  ANTICOAGULANT 20 MG Tabs tablet    XARELTO    90 tablet    Take 1 tablet (20 mg) by mouth daily (with dinner)    Atrial fibrillation (H)       TUMS ULTRA 1000 1000 MG Chew   Generic drug:  calcium carbonate antacid      Take  by mouth. 1-2 tablets per day        TYLENOL PO      Take 500 mg by mouth

## 2018-09-06 NOTE — PROGRESS NOTES

## 2018-09-06 NOTE — TELEPHONE ENCOUNTER
Pt calling to see if he can hold xarelto 4 - 5 days prior to scheduled procedure. He is on xarelto for afib. His CHADS score is 5 ( TIA, age, and HTN). He may hold Xarelto 3 days prior to manny procedure to remove skin cancer on his nose. Instructed him to restart as soon as possible after procedure.

## 2018-10-15 DIAGNOSIS — I10 BENIGN ESSENTIAL HYPERTENSION: ICD-10-CM

## 2018-10-15 RX ORDER — AMLODIPINE BESYLATE 5 MG/1
5 TABLET ORAL DAILY
Qty: 90 TABLET | Refills: 3 | Status: ON HOLD | OUTPATIENT
Start: 2018-10-15 | End: 2019-08-06

## 2018-10-23 DIAGNOSIS — I48.91 ATRIAL FIBRILLATION, UNSPECIFIED TYPE (H): ICD-10-CM

## 2018-10-23 RX ORDER — LOSARTAN POTASSIUM 50 MG/1
50 TABLET ORAL DAILY
Qty: 90 TABLET | Refills: 3 | Status: ON HOLD | OUTPATIENT
Start: 2018-10-23 | End: 2019-08-06

## 2018-11-08 ENCOUNTER — ALLIED HEALTH/NURSE VISIT (OUTPATIENT)
Dept: NURSING | Facility: CLINIC | Age: 83
End: 2018-11-08
Payer: MEDICARE

## 2018-11-08 DIAGNOSIS — Z23 NEED FOR VACCINATION: Primary | ICD-10-CM

## 2018-11-08 PROCEDURE — 99207 ZZC NO CHARGE NURSE ONLY: CPT

## 2018-11-08 PROCEDURE — 90471 IMMUNIZATION ADMIN: CPT

## 2018-11-08 PROCEDURE — 90750 HZV VACC RECOMBINANT IM: CPT

## 2018-11-08 NOTE — MR AVS SNAPSHOT
After Visit Summary   11/8/2018    Yosef Murry    MRN: [id number]           Patient Information     Date Of Birth          1935        Visit Information        Provider Department      11/8/2018 1:30 PM CS NURSE [facility]        Today's Diagnoses     Need for vaccination    -  1       Follow-ups after your visit        Your next 10 appointments already scheduled     Nov 08, 2018  1:30 PM CST   Nurse Only with CS NURSE   [facility] ([facility])    [address] [place] [address] [phone]              Who to contact     If you have questions or need follow up information about today's clinic visit or your schedule please contact [facility] directly at [phone].  Normal or non-critical lab and imaging results will be communicated to you by MyChart, letter or phone within 4 business days after the clinic has received the results. If you do not hear from us within 7 days, please contact the clinic through MyChart or phone. If you have a critical or abnormal lab result, we will notify you by phone as soon as possible.  Submit refill requests through [employer] or call your pharmacy and they will forward the refill request to us. Please allow 3 business days for your refill to be completed.          Additional Information About Your Visit        Care EveryWhere ID     This is your Care EveryWhere ID. This could be used by other organizations to access your [facility] medical records  [id number]         Blood Pressure from Last 3 Encounters:   06/21/18 (!) 157/95   10/20/17 122/72   05/09/17 122/80    Weight from Last 3 Encounters:   06/21/18 166 lb (75.3 kg)   10/20/17 161 lb (73 kg)   05/09/17 157 lb (71.2 kg)              We Performed the Following     1st  Administration  [21990]     SHINGRIX [[id number]]        Primary Care Provider Office Phone # Fax #    Fred Connell [phone] [address]  [place]  [address]        Equal Access to Services     [facility]: Hadii jean-pierre arana kentrell Billali, wairmada luqalvaha, qaybta kaalmada arminda, pratibha mcdermott. So [facility] [phone].    ATENCIÓN: Si habla español, tiene a craven disposición servicios gratuitos de asistencia lingüística. Tianna al [phone].    We comply with applicable federal civil rights laws and [place] laws. We do not discriminate on the basis of race, color, national origin, age, disability, sex, sexual orientation, or gender identity.            Thank you!     Thank you for choosing [facility]  for your care. Our goal is always to provide you with excellent care. Hearing back from our patients is one way we can continue to improve our services. Please take a few minutes to complete the written survey that you may receive in the mail after your visit with us. Thank you!             Your Updated Medication List - Protect others around you: Learn how to safely use, store and throw away your medicines at www.disposemymeds.org.          This list is accurate as of 11/8/18  1:19 PM.  Always use your most recent med list.                   Brand Name Dispense Instructions for use Diagnosis    amLODIPine 5 MG tablet    NORVASC    90 tablet    Take 1 tablet (5 mg) by mouth daily    Benign essential hypertension       CLARITIN 10 MG tablet   Generic drug:  loratadine     30 tablet    Take 1 tablet by mouth daily.        diphenhydrAMINE-acetaminophen  MG tablet    TYLENOL PM     Take 1 tablet by mouth nightly as needed for sleep        losartan 50 MG tablet    COZAAR    90 tablet    Take 1 tablet (50 mg) by mouth daily    Atrial fibrillation, unspecified type (H)       metoprolol succinate 25 MG 24 hr tablet    TOPROL XL    90 tablet    Take 1 tablet (25 mg) by mouth daily    Permanent atrial fibrillation (H)       PRESERVISION AREDS 2 PO      Take 2 capsules by mouth daily        rivaroxaban ANTICOAGULANT 20 MG Tabs  tablet    XARELTO    90 tablet    Take 1 tablet (20 mg) by mouth daily (with dinner)    Atrial fibrillation (H)       TUMS ULTRA 1000 1000 MG Chew   Generic drug:  calcium carbonate antacid      Take  by mouth. 1-2 tablets per day        TYLENOL PO      Take 500 mg by mouth

## 2018-11-08 NOTE — NURSING NOTE
Prior to injection verified patient identity using patient's name and date of birth.  Due to injection administration, patient instructed to remain in clinic for 15 minutes  afterwards, and to report any adverse reaction to me immediately.    Screening Questionnaire for Adult Immunization    Are you sick today?   No   Do you have allergies to medications, food, a vaccine component or latex?   No   Have you ever had a serious reaction after receiving a vaccination?   No   Do you have a long-term health problem with heart disease, lung disease, asthma, kidney disease, metabolic disease (e.g. diabetes), anemia, or other blood disorder?   No   Do you have cancer, leukemia, HIV/AIDS, or any other immune system problem?   No   In the past 3 months, have you taken medications that affect  your immune system, such as prednisone, other steroids, or anticancer drugs; drugs for the treatment of rheumatoid arthritis, Crohn s disease, or psoriasis; or have you had radiation treatments?   No   Have you had a seizure, or a brain or other nervous system problem?   No   During the past year, have you received a transfusion of blood or blood     products, or been given immune (gamma) globulin or antiviral drug?   No   For women: Are you pregnant or is there a chance you could become        pregnant during the next month?   No   Have you received any vaccinations in the past 4 weeks?   No     Immunization questionnaire answers were all negative.        Per orders of Dr. Connell, injection of Shingrix (2nd dose) given by Zander Benítez. Patient instructed to remain in clinic for 15 minutes afterwards, and to report any adverse reaction to me immediately.       Screening performed by Zander Benítez on 11/8/2018 at 1:20 PM.

## 2018-11-19 DIAGNOSIS — I48.21 PERMANENT ATRIAL FIBRILLATION (H): ICD-10-CM

## 2018-11-19 RX ORDER — METOPROLOL SUCCINATE 25 MG/1
25 TABLET, EXTENDED RELEASE ORAL DAILY
Qty: 90 TABLET | Refills: 2 | Status: SHIPPED | OUTPATIENT
Start: 2018-11-19 | End: 2019-08-12

## 2018-12-31 DIAGNOSIS — I48.91 ATRIAL FIBRILLATION (H): ICD-10-CM

## 2019-05-01 ENCOUNTER — OFFICE VISIT (OUTPATIENT)
Dept: SURGERY | Facility: CLINIC | Age: 84
End: 2019-05-01
Payer: MEDICARE

## 2019-05-01 ENCOUNTER — TELEPHONE (OUTPATIENT)
Dept: SURGERY | Facility: CLINIC | Age: 84
End: 2019-05-01

## 2019-05-01 VITALS
SYSTOLIC BLOOD PRESSURE: 130 MMHG | HEIGHT: 70 IN | BODY MASS INDEX: 23.48 KG/M2 | WEIGHT: 164 LBS | HEART RATE: 71 BPM | DIASTOLIC BLOOD PRESSURE: 70 MMHG

## 2019-05-01 DIAGNOSIS — K40.90 NON-RECURRENT INGUINAL HERNIA OF RIGHT SIDE WITHOUT OBSTRUCTION OR GANGRENE: Primary | ICD-10-CM

## 2019-05-01 PROCEDURE — 99203 OFFICE O/P NEW LOW 30 MIN: CPT | Performed by: SURGERY

## 2019-05-01 ASSESSMENT — MIFFLIN-ST. JEOR: SCORE: 1450.15

## 2019-05-01 NOTE — TELEPHONE ENCOUNTER
Type of surgery: open right inguinal hernia repair with mesh  Location of surgery: [facility]  Date and time of surgery: 06/13/19 1:05pm  Surgeon: Dr Fairchild  Pre-Op Appt Date: pt to schedule  Post-Op Appt Date: pt to schedule   Packet sent out: Yes  Pre-cert/Authorization completed:  Not Applicable  Date: 05/01/19    MAC anesthesia

## 2019-05-01 NOTE — PROGRESS NOTES
HPI: We are asked by Dr. Connell to see Mr. Murry in consultation concerning a groin bulge.  Yosef is a 83 year old male who presents for evaluation of right groin bulge.  Symptoms began  3 months  ago.  This is described as aching. The pain occurs with prolonged standing.  Associated symptoms include none. The patient has noticed a bulge. The patient has not had a previous herniorrhaphy in this location. Employment does not require heavy lifting.    Trauma: no  Cough: No, except with seasonal allergies      Past Medical History:   has a past medical history of Anal fistula, Atrial flutter (H) (), Heartburn, Inguinal hernia, left, Persistent atrial fibrillation (H) (12), Prostate cancer (H), and TIA (transient ischaemic attack).    Past Surgical History:  Past Surgical History:   Procedure Laterality Date     COLONOSCOPY       ENT SURGERY       EXAM UNDER ANESTHESIA, FISTULOTOMY RECTUM, COMBINED N/A 2015    Procedure: COMBINED EXAM UNDER ANESTHESIA, FISTULOTOMY RECTUM;  Surgeon: Candice Carty MD;  Location: [facility]     GENITOURINARY SURGERY      PROSTATECTOMY     HERNIORRHAPHY INGUINAL  2013    Procedure: HERNIORRHAPHY INGUINAL;  LEFT INGUINAL HERNIA REPAIR WITH MESH;  Surgeon: Filipe Fairchild MD;  Location: [facility]     ORTHOPEDIC SURGERY      WRIST SURGERY - LEFT          Social History:  Social History     Socioeconomic History     Marital status:      Spouse name: Not on file     Number of children: Not on file     Years of education: Not on file     Highest education level: Not on file   Occupational History     Not on file   Social Needs     Financial resource strain: Not on file     Food insecurity:     Worry: Not on file     Inability: Not on file     Transportation needs:     Medical: Not on file     Non-medical: Not on file   Tobacco Use     Smoking status: Former Smoker     Years: 16.00     Types: Cigarettes     Last attempt to quit: 1968     Years since quittin.3      Smokeless tobacco: Never Used   Substance and Sexual Activity     Alcohol use: Yes     Alcohol/week: 0.5 oz     Types: 1 Cans of beer per week     Comment: SOCIALLY     Drug use: No     Sexual activity: Not on file   Lifestyle     Physical activity:     Days per week: Not on file     Minutes per session: Not on file     Stress: Not on file   Relationships     Social connections:     Talks on phone: Not on file     Gets together: Not on file     Attends Sabianist service: Not on file     Active member of club or organization: Not on file     Attends meetings of clubs or organizations: Not on file     Relationship status: Not on file     Intimate partner violence:     Fear of current or ex partner: Not on file     Emotionally abused: Not on file     Physically abused: Not on file     Forced sexual activity: Not on file   Other Topics Concern     Parent/sibling w/ CABG, MI or angioplasty before 65F 55M? Not Asked      Service Not Asked     Blood Transfusions Not Asked     Caffeine Concern No     Comment: occ tea     Occupational Exposure Not Asked     Hobby Hazards Not Asked     Sleep Concern No     Stress Concern No     Weight Concern No     Special Diet No     Back Care Not Asked     Exercise Yes     Comment: walking 3 miles a day     Bike Helmet Not Asked     Seat Belt Yes     Self-Exams Not Asked   Social History Narrative     Not on file        Family History:  Family History   Problem Relation Age of Onset     Ovarian Cancer Mother      Osteoporosis Father      Unknown/Adopted Sister          ROS:  The 10 point review of systems is negative other than noted in the HPI and above.    PE:    General- Well-developed, well-nourished, patient able to stand without difficulty.  HEENT- Normocephalic and atraumatic. Pupils equal and round.  Mucous membranes moist.  Sclera are nonicteric.   Respirations- are regular and non labored  Abdomen is abdomen is soft without significant tenderness, masses, organomegaly  or guarding     Umbilicus is non-tender  Hernia- Left inguinal hernia is not present with valsalva              Right inguinal hernia is present with valsalva              The hernia is reducible              Testicles are normal   No inguinal lymphadenopathy is present  Neurologic- I find no inguinal neuropathy                  Assesment: Primary, reducible right inguinal hernia.    Plan:    We have discussed the laparoscopic and open options for hernia repair, the choice of observation, reduction techniques, incarceration and strangulation signs, symptoms and importance as well as need to seek emergency treatment.    He would be better served by an open approach.   We have discussed surgery in detail, including risk, benefits, complications, mesh, infection, nerve and cord damage and their sequelae including chronic pain and testicular loss, lifting and activity limits after surgery. He has been given literature to review. We will schedule surgery at patient's convenience.      Filipe Fairchild MD    Please route or send letter to:  Primary Care Provider (PCP)

## 2019-05-08 ENCOUNTER — OFFICE VISIT (OUTPATIENT)
Dept: CARDIOLOGY | Facility: CLINIC | Age: 84
End: 2019-05-08
Attending: INTERNAL MEDICINE
Payer: MEDICARE

## 2019-05-08 VITALS
BODY MASS INDEX: 23.53 KG/M2 | DIASTOLIC BLOOD PRESSURE: 84 MMHG | SYSTOLIC BLOOD PRESSURE: 138 MMHG | WEIGHT: 164 LBS | HEART RATE: 77 BPM

## 2019-05-08 DIAGNOSIS — I48.21 PERMANENT ATRIAL FIBRILLATION (H): Primary | ICD-10-CM

## 2019-05-08 DIAGNOSIS — I10 BENIGN ESSENTIAL HYPERTENSION: ICD-10-CM

## 2019-05-08 PROCEDURE — 99213 OFFICE O/P EST LOW 20 MIN: CPT | Performed by: NURSE PRACTITIONER

## 2019-05-08 PROCEDURE — 93000 ELECTROCARDIOGRAM COMPLETE: CPT | Performed by: NURSE PRACTITIONER

## 2019-05-08 NOTE — PROGRESS NOTES
Service Date: 05/08/2019      HISTORY OF PRESENT ILLNESS:  Mr. Murry is a delightful 83-year-old gentleman that I am having the pleasure of seeing today in followup.  He is an established patient of Dr. Murphy.      PAST MEDICAL HISTORY:   1.  Permanent atrial fibrillation treated with rate control and Xarelto therapy.   2.  Previous TIA.   3.  Hypertension.   4.  Umbilical hernia, scheduled to be repaired next month.      Last visit with Mr. Murry was back in 2017.  At that time he was doing well.  He spends the winter in [place] and returns to the [facility] in April.  He is unaware of his atrial fibrillation.  He denies chest discomfort, shortness of breath, lightheadedness, dizziness or peripheral edema.      Unfortunately, last year his wife passed away.      A 12-lead EKG today shows atrial fibrillation at 64 beats per minute.  No acute changes are noted.  All other review of systems and past medical history are noted below.      On 05/13/2016, the patient underwent a stress echo that was normal without evidence of stress-induced ischemia.  Ejection fraction was 55%-60%.      ASSESSMENT AND PLAN:  Mr. Murry is a delightful 83-year-old gentleman here today for followup:   1.  Permanent atrial fibrillation.  CHADS-VASc score is 5.  He is on metoprolol and Xarelto.  EKG is stable.  He is scheduled to have a hernia repair next month.  He has been asked to hold his Xarelto 2-3 days prior to the procedure.  There is always an increased risk of stroke or TIA when holding anticoagulation.  However, holding the Xarelto will minimize his bleeding risks as well for the procedure.   2.  Hypertension.  He is normotensive on amlodipine 5 mg daily.   3.  Losartan 50 mg daily.   4.  Metoprolol ER 25 mg daily.      He will follow up with Dr. Murphy in 2 years.  If there are questions or concerns in the interim, he will contact us.  As always, thank you for including us in his care.         KEVIN REYNOSO NP             D:  2019   T: 2019   MT: CANDI      Name:     OLE COSTA   MRN:      [id number]        Account:      [id number]   :      1935           Service Date: 2019      Document: [id number]

## 2019-05-08 NOTE — LETTER
5/8/2019    Fred Connell MD  [address]  [facility] [address]    RE: Yosef Murry       Dear Colleague,    I had the pleasure of seeing Yosef Murry in the [facility].    HPI and Plan: #[id number]  See dictation    Orders Placed This Encounter   Procedures     Follow-Up with Electrophysiologist     EKG 12-lead complete w/read - Clinics (performed today)       No orders of the defined types were placed in this encounter.      There are no discontinued medications.      Encounter Diagnosis   Name Primary?     Permanent atrial fibrillation (H)        CURRENT MEDICATIONS:  Current Outpatient Medications   Medication Sig Dispense Refill     Acetaminophen (TYLENOL PO) Take 500 mg by mouth       amLODIPine (NORVASC) 5 MG tablet Take 1 tablet (5 mg) by mouth daily 90 tablet 3     calcium carbonate antacid (TUMS ULTRA 1000) 1000 MG CHEW Take  by mouth. 1-2 tablets per day       loratadine (CLARITIN) 10 MG tablet Take 1 tablet by mouth daily. 30 tablet 1     losartan (COZAAR) 50 MG tablet Take 1 tablet (50 mg) by mouth daily 90 tablet 3     metoprolol succinate (TOPROL XL) 25 MG 24 hr tablet Take 1 tablet (25 mg) by mouth daily 90 tablet 2     rivaroxaban ANTICOAGULANT (XARELTO) 20 MG TABS tablet Take 1 tablet (20 mg) by mouth daily (with dinner) 90 tablet 1     Multiple Vitamins-Minerals (PRESERVISION AREDS 2 PO) Take 2 capsules by mouth daily          ALLERGIES     Allergies   Allergen Reactions     Other [Seasonal Allergies]        PAST MEDICAL HISTORY:  Past Medical History:   Diagnosis Date     Anal fistula      Atrial flutter (H) 2012     Heartburn      Inguinal hernia, left      Persistent atrial fibrillation (H) 8/21/12     Prostate cancer (H)      TIA (transient ischaemic attack)     2014       PAST SURGICAL HISTORY:  Past Surgical History:   Procedure Laterality Date     COLONOSCOPY       ENT SURGERY       EXAM UNDER ANESTHESIA, FISTULOTOMY RECTUM, COMBINED N/A  2015    Procedure: COMBINED EXAM UNDER ANESTHESIA, FISTULOTOMY RECTUM;  Surgeon: Candice Carty MD;  Location: [facility]     GENITOURINARY SURGERY      PROSTATECTOMY     HERNIORRHAPHY INGUINAL  2013    Procedure: HERNIORRHAPHY INGUINAL;  LEFT INGUINAL HERNIA REPAIR WITH MESH;  Surgeon: Filipe Faicrhild MD;  Location: [facility]     ORTHOPEDIC SURGERY      WRIST SURGERY - LEFT       FAMILY HISTORY:  Family History   Problem Relation Age of Onset     Ovarian Cancer Mother      Osteoporosis Father      Unknown/Adopted Sister        SOCIAL HISTORY:  Social History     Socioeconomic History     Marital status:      Spouse name: None     Number of children: None     Years of education: None     Highest education level: None   Occupational History     None   Social Needs     Financial resource strain: None     Food insecurity:     Worry: None     Inability: None     Transportation needs:     Medical: None     Non-medical: None   Tobacco Use     Smoking status: Former Smoker     Years: 16.00     Types: Cigarettes     Last attempt to quit: 1968     Years since quittin.3     Smokeless tobacco: Never Used   Substance and Sexual Activity     Alcohol use: Yes     Alcohol/week: 0.5 oz     Types: 1 Cans of beer per week     Comment: SOCIALLY     Drug use: No     Sexual activity: None   Lifestyle     Physical activity:     Days per week: None     Minutes per session: None     Stress: None   Relationships     Social connections:     Talks on phone: None     Gets together: None     Attends [employer] service: None     Active member of club or organization: None     Attends meetings of clubs or organizations: None     Relationship status: None     Intimate partner violence:     Fear of current or ex partner: None     Emotionally abused: None     Physically abused: None     Forced sexual activity: None   Other Topics Concern     Parent/sibling w/ CABG, MI or angioplasty before 65F 55M? Not Asked       Service Not Asked     Blood Transfusions Not Asked     Caffeine Concern No     Comment: occ tea     Occupational Exposure Not Asked     Hobby Hazards Not Asked     Sleep Concern No     Stress Concern No     Weight Concern No     Special Diet No     Back Care Not Asked     Exercise Yes     Comment: walking 3 miles a day     Bike Helmet Not Asked     Seat Belt Yes     Self-Exams Not Asked   Social History Narrative     None       Review of Systems:  Skin:  Negative rash;bruising     Eyes:  Positive for glasses    ENT:  Negative      Respiratory:  Positive for cough dry cough   Cardiovascular:  Negative Positive for;palpitations;chest pain;dizziness due to Afib.Occ CP  Gastroenterology: Negative      Genitourinary:  Positive for prostate problem HX of prostate CA   Musculoskeletal:  Negative      Neurologic:  Negative headaches Intermitent HA  Psychiatric:  Negative      Heme/Lymph/Imm:  Negative      Endocrine:  Negative        Physical Exam:  Vitals: /84   Pulse 77   Wt 74.4 kg (164 lb)   BMI 23.53 kg/m       Constitutional:  cooperative, alert and oriented, well developed, well nourished, in no acute distress        Skin:  warm and dry to the touch          Head:  normocephalic        Eyes:  pupils equal and round;conjunctivae and lids unremarkable        Lymph:      ENT:  not assessed this visit        Neck:  no carotid bruit;JVP normal        Respiratory:  clear to auscultation;normal symmetry         Cardiac: no murmurs, gallops or rubs detected;no S3 or S4 irregular rhythm              not assessed this visit                                        GI:  not assessed this visit        Extremities and Muscular Skeletal:  no edema              Neurological:  no gross motor deficits;affect appropriate        Psych:  Alert and Oriented x 3;affect appropriate, oriented to time, person and place        ERICA [facility] MD  [address] [facility] [address]  [place] [id number]                Thank you for  allowing me to participate in the care of your patient.      Sincerely,     Elvi Mckeon, MARLEE, APRN CNP     [facility] [place]    cc:   Henna [facility] MD  [address]  [place] [id number]

## 2019-05-08 NOTE — PROGRESS NOTES
HPI and Plan: #[id number]  See dictation    Orders Placed This Encounter   Procedures     Follow-Up with Electrophysiologist     EKG 12-lead complete w/read - Clinics (performed today)       No orders of the defined types were placed in this encounter.      There are no discontinued medications.      Encounter Diagnosis   Name Primary?     Permanent atrial fibrillation (H)        CURRENT MEDICATIONS:  Current Outpatient Medications   Medication Sig Dispense Refill     Acetaminophen (TYLENOL PO) Take 500 mg by mouth       amLODIPine (NORVASC) 5 MG tablet Take 1 tablet (5 mg) by mouth daily 90 tablet 3     calcium carbonate antacid (TUMS ULTRA 1000) 1000 MG CHEW Take  by mouth. 1-2 tablets per day       loratadine (CLARITIN) 10 MG tablet Take 1 tablet by mouth daily. 30 tablet 1     losartan (COZAAR) 50 MG tablet Take 1 tablet (50 mg) by mouth daily 90 tablet 3     metoprolol succinate (TOPROL XL) 25 MG 24 hr tablet Take 1 tablet (25 mg) by mouth daily 90 tablet 2     rivaroxaban ANTICOAGULANT (XARELTO) 20 MG TABS tablet Take 1 tablet (20 mg) by mouth daily (with dinner) 90 tablet 1     Multiple Vitamins-Minerals (PRESERVISION AREDS 2 PO) Take 2 capsules by mouth daily          ALLERGIES     Allergies   Allergen Reactions     Other [Seasonal Allergies]        PAST MEDICAL HISTORY:  Past Medical History:   Diagnosis Date     Anal fistula      Atrial flutter (H) 2012     Heartburn      Inguinal hernia, left      Persistent atrial fibrillation (H) 8/21/12     Prostate cancer (H)      TIA (transient ischaemic attack)     2014       PAST SURGICAL HISTORY:  Past Surgical History:   Procedure Laterality Date     COLONOSCOPY       ENT SURGERY       EXAM UNDER ANESTHESIA, FISTULOTOMY RECTUM, COMBINED N/A 6/18/2015    Procedure: COMBINED EXAM UNDER ANESTHESIA, FISTULOTOMY RECTUM;  Surgeon: Candice Carty MD;  Location: [facility]     GENITOURINARY SURGERY      PROSTATECTOMY     HERNIORRHAPHY INGUINAL  7/25/2013    Procedure:  HERNIORRHAPHY INGUINAL;  LEFT INGUINAL HERNIA REPAIR WITH MESH;  Surgeon: Filipe Fairchild MD;  Location: [facility]     ORTHOPEDIC SURGERY      WRIST SURGERY - LEFT       FAMILY HISTORY:  Family History   Problem Relation Age of Onset     Ovarian Cancer Mother      Osteoporosis Father      Unknown/Adopted Sister        SOCIAL HISTORY:  Social History     Socioeconomic History     Marital status:      Spouse name: None     Number of children: None     Years of education: None     Highest education level: None   Occupational History     None   Social Needs     Financial resource strain: None     Food insecurity:     Worry: None     Inability: None     Transportation needs:     Medical: None     Non-medical: None   Tobacco Use     Smoking status: Former Smoker     Years: 16.00     Types: Cigarettes     Last attempt to quit: 1968     Years since quittin.3     Smokeless tobacco: Never Used   Substance and Sexual Activity     Alcohol use: Yes     Alcohol/week: 0.5 oz     Types: 1 Cans of beer per week     Comment: SOCIALLY     Drug use: No     Sexual activity: None   Lifestyle     Physical activity:     Days per week: None     Minutes per session: None     Stress: None   Relationships     Social connections:     Talks on phone: None     Gets together: None     Attends [employer] service: None     Active member of club or organization: None     Attends meetings of clubs or organizations: None     Relationship status: None     Intimate partner violence:     Fear of current or ex partner: None     Emotionally abused: None     Physically abused: None     Forced sexual activity: None   Other Topics Concern     Parent/sibling w/ CABG, MI or angioplasty before 65F 55M? Not Asked      Service Not Asked     Blood Transfusions Not Asked     Caffeine Concern No     Comment: occ tea     Occupational Exposure Not Asked     Hobby Hazards Not Asked     Sleep Concern No     Stress Concern No     Weight Concern No      Special Diet No     Back Care Not Asked     Exercise Yes     Comment: walking 3 miles a day     Bike Helmet Not Asked     Seat Belt Yes     Self-Exams Not Asked   Social History Narrative     None       Review of Systems:  Skin:  Negative rash;bruising     Eyes:  Positive for glasses    ENT:  Negative      Respiratory:  Positive for cough dry cough   Cardiovascular:  Negative Positive for;palpitations;chest pain;dizziness due to Afib.Occ CP  Gastroenterology: Negative      Genitourinary:  Positive for prostate problem HX of prostate CA   Musculoskeletal:  Negative      Neurologic:  Negative headaches Intermitent HA  Psychiatric:  Negative      Heme/Lymph/Imm:  Negative      Endocrine:  Negative        Physical Exam:  Vitals: /84   Pulse 77   Wt 74.4 kg (164 lb)   BMI 23.53 kg/m      Constitutional:  cooperative, alert and oriented, well developed, well nourished, in no acute distress        Skin:  warm and dry to the touch          Head:  normocephalic        Eyes:  pupils equal and round;conjunctivae and lids unremarkable        Lymph:      ENT:  not assessed this visit        Neck:  no carotid bruit;JVP normal        Respiratory:  clear to auscultation;normal symmetry         Cardiac: no murmurs, gallops or rubs detected;no S3 or S4 irregular rhythm              not assessed this visit                                        GI:  not assessed this visit        Extremities and Muscular Skeletal:  no edema              Neurological:  no gross motor deficits;affect appropriate        Psych:  Alert and Oriented x 3;affect appropriate, oriented to time, person and place        ERICA Murphy MD  [address]  [place] [id number]

## 2019-05-08 NOTE — LETTER
5/8/2019      Fred Connell MD  [address]  [facility] [address]      RE: Yosef Murry       Dear Colleague,    I had the pleasure of seeing Yosef Murry in the [facility].    Service Date: 05/08/2019      HISTORY OF PRESENT ILLNESS:  Mr. Murry is a delightful 83-year-old gentleman that I am having the pleasure of seeing today in followup.  He is an established patient of Dr. Murphy.      PAST MEDICAL HISTORY:   1.  Permanent atrial fibrillation treated with rate control and Xarelto therapy.   2.  Previous TIA.   3.  Hypertension.   4.  Umbilical hernia, scheduled to be repaired next month.      Last visit with Mr. Murry was back in 2017.  At that time he was doing well.  He spends the winter in [place] and returns to the [facility] in April.  He is unaware of his atrial fibrillation.  He denies chest discomfort, shortness of breath, lightheadedness, dizziness or peripheral edema.      Unfortunately, last year his wife passed away.      A 12-lead EKG today shows atrial fibrillation at 64 beats per minute.  No acute changes are noted.  All other review of systems and past medical history are noted below.      On 05/13/2016, the patient underwent a stress echo that was normal without evidence of stress-induced ischemia.  Ejection fraction was 55%-60%.      ASSESSMENT AND PLAN:  Mr. Murry is a delightful 83-year-old gentleman here today for followup:   1.  Permanent atrial fibrillation.  CHADS-VASc score is 5.  He is on metoprolol and Xarelto.  EKG is stable.  He is scheduled to have a hernia repair next month.  He has been asked to hold his Xarelto 2-3 days prior to the procedure.  There is always an increased risk of stroke or TIA when holding anticoagulation.  However, holding the Xarelto will minimize his bleeding risks as well for the procedure.   2.  Hypertension.  He is normotensive on amlodipine 5 mg daily.   3.  Losartan 50 mg daily.   4.  Metoprolol ER 25 mg  daily.      He will follow up with Dr. Murphy in 2 years.  If there are questions or concerns in the interim, he will contact us.  As always, thank you for including us in his care.         KEVIN MCKEON NP         D: 2019   T: 2019   MT: CANDI      Name:     OLE COSTA   MRN:      [id number]        Account:      [id number]   :      1935           Service Date: 2019      Document: [id number]         Outpatient Encounter Medications as of 2019   Medication Sig Dispense Refill     Acetaminophen (TYLENOL PO) Take 500 mg by mouth       amLODIPine (NORVASC) 5 MG tablet Take 1 tablet (5 mg) by mouth daily 90 tablet 3     calcium carbonate antacid (TUMS ULTRA 1000) 1000 MG CHEW Take  by mouth. 1-2 tablets per day       loratadine (CLARITIN) 10 MG tablet Take 1 tablet by mouth daily. 30 tablet 1     losartan (COZAAR) 50 MG tablet Take 1 tablet (50 mg) by mouth daily 90 tablet 3     metoprolol succinate (TOPROL XL) 25 MG 24 hr tablet Take 1 tablet (25 mg) by mouth daily 90 tablet 2     rivaroxaban ANTICOAGULANT (XARELTO) 20 MG TABS tablet Take 1 tablet (20 mg) by mouth daily (with dinner) 90 tablet 1     Multiple Vitamins-Minerals (PRESERVISION AREDS 2 PO) Take 2 capsules by mouth daily        No facility-administered encounter medications on file as of 2019.      Again, thank you for allowing me to participate in the care of your patient.      Sincerely,    Kevin Mckeon NP, APRN [facility]

## 2019-05-08 NOTE — PATIENT INSTRUCTIONS
Call my nurse with any questions or concerns:  [phone]  *If you have concerns after hours, please call [phone], option 2 to speak with on call Cardiologist.    1. Medication changes from today:  None    See  in 2021

## 2019-06-07 NOTE — PROGRESS NOTES
[place] [address] [facility] [address]  [phone]  Dept: [phone]    PRE-OP EVALUATION:  Today's date: 6/10/2019    Yosef Murry (: 1935) presents for pre-operative evaluation assessment as requested by Dr. Fairchild.  He requires evaluation and anesthesia risk assessment prior to undergoing surgery/procedure for treatment of Inguinal hernia.    Proposed Surgery/ Procedure: OPEN RIGHT INGUINA HERNIA REPAIR WITH MESH  Date of Surgery/ Procedure: 2019  Time of Surgery/ Procedure: 1:15PM  Hospital/Surgical Facility:  OR  Fax number for surgical facility: [phone]  Primary Physician: Fred Connell  Type of Anesthesia Anticipated: General    Patient has a Health Care Directive or Living Will:  YES In EPIC    1. NO - Do you have a history of heart attack, stroke, stent, bypass or surgery on an artery in the head, neck, heart or legs?  2. YES - DO YOU EVER HAVE ANY PAIN OR DISCOMFORT IN YOUR CHEST? X 3 years ago had an episode of chest pain and was hospitalized, since then rarely has occassionally pain/discomfort.  3. NO - Do you have a history of  Heart Failure?  4. NO - Are you troubled by shortness of breath when: walking on the level, up a slight hill or at night?  5. NO - Do you currently have a cold, bronchitis or other respiratory infection?  6. NO - Do you have a cough, shortness of breath or wheezing?  7. NO - Do you sometimes get pains in the calves of your legs when you walk?  8. NO - Do you or anyone in your family have previous history of blood clots?  9. NO - Do you or does anyone in your family have a serious bleeding problem such as prolonged bleeding following surgeries or cuts?  10. NO - Have you ever had problems with anemia or been told to take iron pills?  11. NO - Have you had any abnormal blood loss such as black, tarry or bloody stools, or abnormal vaginal bleeding?  12. NO - Have you ever had a blood transfusion?  13. NO - Have you or any of  your relatives ever had problems with anesthesia?  14. YES - DO YOU HAVE SLEEP APNEA, EXCESSIVE SNORING OR DAYTIME DROWSINESS? Daytime drowsiness- Unsure if has sleep apnea.   15. NO - Do you have any prosthetic heart valves?  16. NO - Do you have prosthetic joints?  17. NO - Is there any chance that you may be pregnant?      HPI:     HPI related to upcoming procedure:   83-year-old white male scheduled for a right inguinal hernia repair.  Past medical history is pertinent for prostate cancer persistent atrial fibrillation and otherwise good health.  See problem list for active medical problems.  Problems all longstanding and stable, except as noted/documented.  See ROS for pertinent symptoms related to these conditions.    A-FIB - Patient has a longstanding history of chronic A-fib currently on rate control. Current treatment regimen includes Rivaroxaban for stroke prevention and denies significant symptoms of lightheadedness, palpitations or dyspnea.       MEDICAL HISTORY:     Patient Active Problem List    Diagnosis Date Noted     CARDIOVASCULAR SCREENING; LDL GOAL LESS THAN 130 06/21/2018     Priority: Medium     Prostate cancer (H) 10/20/2017     Priority: Medium     Persistent atrial fibrillation (H)      Priority: Medium     Arrhythmia      Priority: Medium     Atrial fibrillation (H)      Priority: Medium     Atrial flutter (H)      Priority: Medium      Past Medical History:   Diagnosis Date     Anal fistula      Atrial flutter (H) 2012     Heartburn      Inguinal hernia, left      Persistent atrial fibrillation (H) 8/21/12     Prostate cancer (H)      TIA (transient ischaemic attack)     2014     Past Surgical History:   Procedure Laterality Date     COLONOSCOPY       ENT SURGERY       EXAM UNDER ANESTHESIA, FISTULOTOMY RECTUM, COMBINED N/A 6/18/2015    Procedure: COMBINED EXAM UNDER ANESTHESIA, FISTULOTOMY RECTUM;  Surgeon: Candice Carty MD;  Location: [facility]     GENITOURINARY SURGERY       PROSTATECTOMY     HERNIORRHAPHY INGUINAL  2013    Procedure: HERNIORRHAPHY INGUINAL;  LEFT INGUINAL HERNIA REPAIR WITH MESH;  Surgeon: Filipe Fairchild MD;  Location: [facility]     ORTHOPEDIC SURGERY      WRIST SURGERY - LEFT     Current Outpatient Medications   Medication Sig Dispense Refill     Acetaminophen (TYLENOL PO) Take 500 mg by mouth       amLODIPine (NORVASC) 5 MG tablet Take 1 tablet (5 mg) by mouth daily 90 tablet 3     calcium carbonate antacid (TUMS ULTRA 1000) 1000 MG CHEW Take  by mouth. 1-2 tablets per day       loratadine (CLARITIN) 10 MG tablet Take 1 tablet by mouth daily. 30 tablet 1     losartan (COZAAR) 50 MG tablet Take 1 tablet (50 mg) by mouth daily 90 tablet 3     metoprolol succinate (TOPROL XL) 25 MG 24 hr tablet Take 1 tablet (25 mg) by mouth daily 90 tablet 2     Multiple Vitamins-Minerals (PRESERVISION AREDS 2 PO) Take 2 capsules by mouth daily        rivaroxaban ANTICOAGULANT (XARELTO) 20 MG TABS tablet Take 1 tablet (20 mg) by mouth daily (with dinner) 90 tablet 1     OTC products: metamucil  Allergies   Allergen Reactions     Other [Seasonal Allergies]      Pollen Extract Other (See Comments)     Sneezing      Latex Allergy: NO    Social History     Tobacco Use     Smoking status: Former Smoker     Years: 16.00     Types: Cigarettes     Last attempt to quit: 1968     Years since quittin.4     Smokeless tobacco: Never Used   Substance Use Topics     Alcohol use: Yes     Alcohol/week: 0.5 oz     Types: 1 Cans of beer per week     Frequency: 2-3 times a week     Drinks per session: 1 or 2     Binge frequency: Never     Comment: SOCIALLY     History   Drug Use No       REVIEW OF SYSTEMS:   CONSTITUTIONAL: NEGATIVE for fever, chills, change in weight  INTEGUMENTARY/SKIN: NEGATIVE for worrisome rashes, moles or lesions  EYES: NEGATIVE for vision changes or irritation  ENT/MOUTH: NEGATIVE for ear, mouth and throat problems  RESP: NEGATIVE for significant cough or SOB  CV:  "non-exertional, fleeting Chest pains  GI: NEGATIVE for nausea, abdominal pain, heartburn, or change in bowel habits  : NEGATIVE for frequency, dysuria, or hematuria  MUSCULOSKELETAL: NEGATIVE for significant arthralgias or myalgia  NEURO: NEGATIVE for weakness, dizziness or paresthesias  ENDOCRINE: NEGATIVE for temperature intolerance, skin/hair changes  HEME: NEGATIVE for bleeding problems  PSYCHIATRIC: NEGATIVE for changes in mood or affect    This document serves as a record of the services and decisions personally performed and made by Fred Connell MD. It was created on his behalf by Damián Schneider, a trained medical scribe. The creation of this document is based on the provider's statements to the medical scribe.  Damián Schneider Afshan 10, 2019 9:15 AM      EXAM:   /90 (BP Location: Right arm, Patient Position: Sitting, Cuff Size: Adult Regular)   Pulse 72   Temp 97.2  F (36.2  C) (Oral)   Ht 1.778 m (5' 10\")   Wt 77.1 kg (170 lb)   SpO2 98%   BMI 24.39 kg/m      Neck was supple without adenopathy or thyromegaly his carotids were normal without bruits  Chest clear to auscultation and percussion  Cardiovascular S1 and S2 are irregularly irregular with well controlled ventricular response  are physiologic without murmurs or gallops  Abdomen bowel sounds were normal.  There is no palpable mass or organomegaly  Extremities non tender with 1-2+ pretibial, venostasis bilaterally   Pulses pedal pulses are as described otherwise his pulses are bilaterally symmetrical throughout without bruits  Skin without significant abnormality      DIAGNOSTICS:   No EKG, was done by cardiology on 5/8/19.    Recent Labs     CBC,CMP   IMPRESSION:      Diagnosis/reason for consult: assess preoperative risk     The proposed surgical procedure is considered INTERMEDIATE risk.    REVISED CARDIAC RISK INDEX  The patient has the following serious cardiovascular risks for perioperative complications such as (MI, PE, VFib and 3  AV " Block):  No serious cardiac risks  INTERPRETATION: 1 risks: Class II (low risk - 0.9% complication rate)    The patient has the following additional risks for perioperative complications:  No identified additional risks  The ASCVD Risk score (Mian DC Jr., et al., 2013) failed to calculate for the following reasons:    The 2013 ASCVD risk score is only valid for ages 40 to 79    The patient has a prior MI or stroke diagnosis      ICD-10-CM    1. Preop general physical exam Z01.818 Basic metabolic panel     UA reflex to Microscopic and Culture     CBC with platelets     Comprehensive metabolic panel     Lipid panel reflex to direct LDL Fasting     TSH with free T4 reflex     CANCELED: CBC with platelets   2. Prostate cancer (H) C61    3. Persistent atrial fibrillation (H) I48.1    4. Screening for hypothyroidism Z13.29 TSH with free T4 reflex   5. CARDIOVASCULAR SCREENING; LDL GOAL LESS THAN 130 Z13.6 Lipid panel reflex to direct LDL Fasting   6. Fatigue, unspecified type R53.83 TSH with free T4 reflex       RECOMMENDATIONS:       Cardiovascular Risk        --Patient is to take all scheduled medications on the day of surgery EXCEPT for modifications listed below.    Stopping Xarelato 3 days prior to surgery    Taking metoprolol and 1/2 tablet of amlodipine on day of surgery    All other medications he should hold on day of surgery    APPROVAL GIVEN to proceed with proposed procedure, without further diagnostic evaluation     The information in this document, created by the medical scribe for me, accurately reflects the services I personally performed and the decisions made by me. I have reviewed and approved this document for accuracy prior to leaving the patient care area.  Afshan 10, 2019 9:05 AM      Signed Electronically by: Fred Connell MD    Copy of this evaluation report is provided to requesting physician.    [facility] Preop Guidelines    Revised Cardiac Risk Index

## 2019-06-10 ENCOUNTER — OFFICE VISIT (OUTPATIENT)
Dept: FAMILY MEDICINE | Facility: CLINIC | Age: 84
End: 2019-06-10
Payer: MEDICARE

## 2019-06-10 VITALS
HEART RATE: 72 BPM | HEIGHT: 70 IN | WEIGHT: 170 LBS | TEMPERATURE: 97.2 F | DIASTOLIC BLOOD PRESSURE: 80 MMHG | SYSTOLIC BLOOD PRESSURE: 134 MMHG | BODY MASS INDEX: 24.34 KG/M2 | OXYGEN SATURATION: 98 %

## 2019-06-10 DIAGNOSIS — C61 PROSTATE CANCER (H): ICD-10-CM

## 2019-06-10 DIAGNOSIS — I48.19 PERSISTENT ATRIAL FIBRILLATION (H): ICD-10-CM

## 2019-06-10 DIAGNOSIS — Z01.818 PREOP GENERAL PHYSICAL EXAM: Primary | ICD-10-CM

## 2019-06-10 DIAGNOSIS — Z13.6 CARDIOVASCULAR SCREENING; LDL GOAL LESS THAN 130: ICD-10-CM

## 2019-06-10 DIAGNOSIS — Z13.29 SCREENING FOR HYPOTHYROIDISM: ICD-10-CM

## 2019-06-10 DIAGNOSIS — R53.83 FATIGUE, UNSPECIFIED TYPE: ICD-10-CM

## 2019-06-10 DIAGNOSIS — G47.00 PERSISTENT DISORDER OF INITIATING OR MAINTAINING SLEEP: ICD-10-CM

## 2019-06-10 LAB
ALBUMIN SERPL-MCNC: 3.3 G/DL (ref 3.4–5)
ALBUMIN UR-MCNC: NEGATIVE MG/DL
ALP SERPL-CCNC: 103 U/L (ref 40–150)
ALT SERPL W P-5'-P-CCNC: 16 U/L (ref 0–70)
ANION GAP SERPL CALCULATED.3IONS-SCNC: 6 MMOL/L (ref 3–14)
ANION GAP SERPL CALCULATED.3IONS-SCNC: 7 MMOL/L (ref 3–14)
APPEARANCE UR: CLEAR
AST SERPL W P-5'-P-CCNC: 17 U/L (ref 0–45)
BILIRUB SERPL-MCNC: 0.5 MG/DL (ref 0.2–1.3)
BILIRUB UR QL STRIP: NEGATIVE
BUN SERPL-MCNC: 13 MG/DL (ref 7–30)
BUN SERPL-MCNC: 14 MG/DL (ref 7–30)
CALCIUM SERPL-MCNC: 8.7 MG/DL (ref 8.5–10.1)
CALCIUM SERPL-MCNC: 9.2 MG/DL (ref 8.5–10.1)
CHLORIDE SERPL-SCNC: 106 MMOL/L (ref 94–109)
CHLORIDE SERPL-SCNC: 107 MMOL/L (ref 94–109)
CHOLEST SERPL-MCNC: 162 MG/DL
CO2 SERPL-SCNC: 27 MMOL/L (ref 20–32)
CO2 SERPL-SCNC: 29 MMOL/L (ref 20–32)
COLOR UR AUTO: YELLOW
CREAT SERPL-MCNC: 0.92 MG/DL (ref 0.66–1.25)
CREAT SERPL-MCNC: 0.95 MG/DL (ref 0.66–1.25)
ERYTHROCYTE [DISTWIDTH] IN BLOOD BY AUTOMATED COUNT: 13.9 % (ref 10–15)
GFR SERPL CREATININE-BSD FRML MDRD: 74 ML/MIN/{1.73_M2}
GFR SERPL CREATININE-BSD FRML MDRD: 77 ML/MIN/{1.73_M2}
GLUCOSE SERPL-MCNC: 86 MG/DL (ref 70–99)
GLUCOSE SERPL-MCNC: 88 MG/DL (ref 70–99)
GLUCOSE UR STRIP-MCNC: NEGATIVE MG/DL
HCT VFR BLD AUTO: 48.1 % (ref 40–53)
HDLC SERPL-MCNC: 34 MG/DL
HGB BLD-MCNC: 16.4 G/DL (ref 13.3–17.7)
HGB UR QL STRIP: NEGATIVE
KETONES UR STRIP-MCNC: NEGATIVE MG/DL
LDLC SERPL CALC-MCNC: 97 MG/DL
LEUKOCYTE ESTERASE UR QL STRIP: NEGATIVE
MCH RBC QN AUTO: 32.3 PG (ref 26.5–33)
MCHC RBC AUTO-ENTMCNC: 34.1 G/DL (ref 31.5–36.5)
MCV RBC AUTO: 95 FL (ref 78–100)
NITRATE UR QL: NEGATIVE
NONHDLC SERPL-MCNC: 128 MG/DL
PH UR STRIP: 7 PH (ref 5–7)
PLATELET # BLD AUTO: 209 10E9/L (ref 150–450)
POTASSIUM SERPL-SCNC: 4.4 MMOL/L (ref 3.4–5.3)
POTASSIUM SERPL-SCNC: 4.8 MMOL/L (ref 3.4–5.3)
PROT SERPL-MCNC: 7.3 G/DL (ref 6.8–8.8)
RBC # BLD AUTO: 5.07 10E12/L (ref 4.4–5.9)
SODIUM SERPL-SCNC: 140 MMOL/L (ref 133–144)
SODIUM SERPL-SCNC: 142 MMOL/L (ref 133–144)
SOURCE: NORMAL
SP GR UR STRIP: 1.02 (ref 1–1.03)
T4 FREE SERPL-MCNC: 0.91 NG/DL (ref 0.76–1.46)
TRIGL SERPL-MCNC: 157 MG/DL
TSH SERPL DL<=0.005 MIU/L-ACNC: 4.51 MU/L (ref 0.4–4)
UROBILINOGEN UR STRIP-ACNC: 0.2 EU/DL (ref 0.2–1)
WBC # BLD AUTO: 6.2 10E9/L (ref 4–11)

## 2019-06-10 PROCEDURE — 85027 COMPLETE CBC AUTOMATED: CPT | Performed by: INTERNAL MEDICINE

## 2019-06-10 PROCEDURE — 80061 LIPID PANEL: CPT | Performed by: INTERNAL MEDICINE

## 2019-06-10 PROCEDURE — 81003 URINALYSIS AUTO W/O SCOPE: CPT | Performed by: INTERNAL MEDICINE

## 2019-06-10 PROCEDURE — 84439 ASSAY OF FREE THYROXINE: CPT | Performed by: INTERNAL MEDICINE

## 2019-06-10 PROCEDURE — 80053 COMPREHEN METABOLIC PANEL: CPT | Performed by: INTERNAL MEDICINE

## 2019-06-10 PROCEDURE — 99214 OFFICE O/P EST MOD 30 MIN: CPT | Performed by: INTERNAL MEDICINE

## 2019-06-10 PROCEDURE — 84443 ASSAY THYROID STIM HORMONE: CPT | Performed by: INTERNAL MEDICINE

## 2019-06-10 PROCEDURE — 36415 COLL VENOUS BLD VENIPUNCTURE: CPT | Performed by: INTERNAL MEDICINE

## 2019-06-10 RX ORDER — TRAZODONE HYDROCHLORIDE 100 MG/1
100 TABLET ORAL AT BEDTIME
Qty: 30 TABLET | Refills: 1 | Status: ON HOLD | OUTPATIENT
Start: 2019-06-10 | End: 2019-08-06

## 2019-06-10 SDOH — HEALTH STABILITY: MENTAL HEALTH: HOW MANY STANDARD DRINKS CONTAINING ALCOHOL DO YOU HAVE ON A TYPICAL DAY?: 1 OR 2

## 2019-06-10 SDOH — HEALTH STABILITY: MENTAL HEALTH: HOW OFTEN DO YOU HAVE 6 OR MORE DRINKS ON ONE OCCASION?: NEVER

## 2019-06-10 SDOH — HEALTH STABILITY: MENTAL HEALTH: HOW OFTEN DO YOU HAVE A DRINK CONTAINING ALCOHOL?: 2-3 TIMES A WEEK

## 2019-06-10 ASSESSMENT — MIFFLIN-ST. JEOR: SCORE: 1477.36

## 2019-06-10 NOTE — LETTER
[place] [address] [facility]  [address]  [place]  [address]  Tel: [phone]    July 15, 2019    Yosef Murry  [address] [place] [address]        Dear  Murry,    Enclosed your lab reports from your recent wellness exam with associated comments.    The TSH is a sensitive indicator of thyroid function and as we reviewed the TSH and the free T4 it suggest that your thyroid function tests are normal but in need of serial follow-up.  I recommend rechecking this in September.    The comprehensive metabolic panel showed that your minerals and electrolytes, kidney function and liver function tests were all normal.    The lipid panel showed that your total cholesterol is 162, well within the normal range but it depends on what your good and bad cholesterol reports are.  The triglycerides which are intimately related to inherited tendencies and carbohydrates in your diet was just slightly higher than normal suggesting that you should cut back on the carbohydrates in your diet.  The HDL or good cholesterol was 34, slightly lower than the normal range of 40 and this value was 43 a year ago.  The HDL cholesterol is intimately related to inherited tendencies and regular aerobic activity.  This suggests that you may not be  as active as you were a year ago.  The most important factor is the LDL or bad cholesterol which was 97, better than 114 a year ago and anything less than 100 is good.    Your urinalysis was normal.  The CBC showed that there is no sign of low blood or anemia and all of your blood cell counts were normal.    All in all things are looking good and is a minute of I recommend having you come back to see me in September and we will write function tests.  If you have any questions regarding these reports please let me know.    Sincerely,    Fred Connell MD/SUNDAY          Enclosure: Lab Results  Results for orders placed or performed in visit on 06/10/19   Basic metabolic panel    Result Value Ref Range    Sodium 142 133 - 144 mmol/L    Potassium 4.4 3.4 - 5.3 mmol/L    Chloride 107 94 - 109 mmol/L    Carbon Dioxide 29 20 - 32 mmol/L    Anion Gap 6 3 - 14 mmol/L    Glucose 86 70 - 99 mg/dL    Urea Nitrogen 14 7 - 30 mg/dL    Creatinine 0.92 0.66 - 1.25 mg/dL    GFR Estimate 77 >60 mL/min/[1.73_m2]    GFR Estimate If Black 89 >60 mL/min/[1.73_m2]    Calcium 9.2 8.5 - 10.1 mg/dL   UA reflex to Microscopic and Culture   Result Value Ref Range    Color Urine Yellow     Appearance Urine Clear     Glucose Urine Negative NEG^Negative mg/dL    Bilirubin Urine Negative NEG^Negative    Ketones Urine Negative NEG^Negative mg/dL    Specific Gravity Urine 1.020 1.003 - 1.035    Blood Urine Negative NEG^Negative    pH Urine 7.0 5.0 - 7.0 pH    Protein Albumin Urine Negative NEG^Negative mg/dL    Urobilinogen Urine 0.2 0.2 - 1.0 EU/dL    Nitrite Urine Negative NEG^Negative    Leukocyte Esterase Urine Negative NEG^Negative    Source Midstream Urine    CBC with platelets   Result Value Ref Range    WBC 6.2 4.0 - 11.0 10e9/L    RBC Count 5.07 4.4 - 5.9 10e12/L    Hemoglobin 16.4 13.3 - 17.7 g/dL    Hematocrit 48.1 40.0 - 53.0 %    MCV 95 78 - 100 fl    MCH 32.3 26.5 - 33.0 pg    MCHC 34.1 31.5 - 36.5 g/dL    RDW 13.9 10.0 - 15.0 %    Platelet Count 209 150 - 450 10e9/L   Comprehensive metabolic panel   Result Value Ref Range    Sodium 140 133 - 144 mmol/L    Potassium 4.8 3.4 - 5.3 mmol/L    Chloride 106 94 - 109 mmol/L    Carbon Dioxide 27 20 - 32 mmol/L    Anion Gap 7 3 - 14 mmol/L    Glucose 88 70 - 99 mg/dL    Urea Nitrogen 13 7 - 30 mg/dL    Creatinine 0.95 0.66 - 1.25 mg/dL    GFR Estimate 74 >60 mL/min/[1.73_m2]    GFR Estimate If Black 86 >60 mL/min/[1.73_m2]    Calcium 8.7 8.5 - 10.1 mg/dL    Bilirubin Total 0.5 0.2 - 1.3 mg/dL    Albumin 3.3 (L) 3.4 - 5.0 g/dL    Protein Total 7.3 6.8 - 8.8 g/dL    Alkaline Phosphatase 103 40 - 150 U/L    ALT 16 0 - 70 U/L    AST 17 0 - 45 U/L   Lipid panel reflex  to direct LDL Fasting   Result Value Ref Range    Cholesterol 162 <200 mg/dL    Triglycerides 157 (H) <150 mg/dL    HDL Cholesterol 34 (L) >39 mg/dL    LDL Cholesterol Calculated 97 <100 mg/dL    Non HDL Cholesterol 128 <130 mg/dL   TSH with free T4 reflex   Result Value Ref Range    TSH 4.51 (H) 0.40 - 4.00 mU/L   T4 free   Result Value Ref Range    T4 Free 0.91 0.76 - 1.46 ng/dL

## 2019-06-13 ENCOUNTER — APPOINTMENT (OUTPATIENT)
Dept: SURGERY | Facility: PHYSICIAN GROUP | Age: 84
End: 2019-06-13
Payer: MEDICARE

## 2019-06-13 ENCOUNTER — HOSPITAL ENCOUNTER (OUTPATIENT)
Facility: CLINIC | Age: 84
Discharge: HOME | End: 2019-06-13
Attending: SURGERY | Admitting: SURGERY
Payer: MEDICARE

## 2019-06-13 ENCOUNTER — ANESTHESIA EVENT (OUTPATIENT)
Dept: SURGERY | Facility: CLINIC | Age: 84
End: 2019-06-13
Payer: MEDICARE

## 2019-06-13 ENCOUNTER — ANESTHESIA (OUTPATIENT)
Dept: SURGERY | Facility: CLINIC | Age: 84
End: 2019-06-13
Payer: MEDICARE

## 2019-06-13 VITALS
DIASTOLIC BLOOD PRESSURE: 98 MMHG | BODY MASS INDEX: 24.75 KG/M2 | WEIGHT: 172.9 LBS | HEART RATE: 57 BPM | HEIGHT: 70 IN | TEMPERATURE: 96 F | OXYGEN SATURATION: 98 % | RESPIRATION RATE: 16 BRPM | SYSTOLIC BLOOD PRESSURE: 153 MMHG

## 2019-06-13 DIAGNOSIS — K40.90 RIGHT INGUINAL HERNIA: Primary | ICD-10-CM

## 2019-06-13 PROCEDURE — 36000050 ZZH SURGERY LEVEL 2 1ST 30 MIN: Performed by: SURGERY

## 2019-06-13 PROCEDURE — 37000009 ZZH ANESTHESIA TECHNICAL FEE, EACH ADDTL 15 MIN: Performed by: SURGERY

## 2019-06-13 PROCEDURE — 25800030 ZZH RX IP 258 OP 636: Performed by: NURSE ANESTHETIST, CERTIFIED REGISTERED

## 2019-06-13 PROCEDURE — 25000128 H RX IP 250 OP 636: Performed by: NURSE ANESTHETIST, CERTIFIED REGISTERED

## 2019-06-13 PROCEDURE — 40000170 ZZH STATISTIC PRE-PROCEDURE ASSESSMENT II: Performed by: SURGERY

## 2019-06-13 PROCEDURE — 49505 PRP I/HERN INIT REDUC >5 YR: CPT | Performed by: SURGERY

## 2019-06-13 PROCEDURE — 71000027 ZZH RECOVERY PHASE 2 EACH 15 MINS: Performed by: SURGERY

## 2019-06-13 PROCEDURE — 49505 PRP I/HERN INIT REDUC >5 YR: CPT | Mod: AS | Performed by: PHYSICIAN ASSISTANT

## 2019-06-13 PROCEDURE — 36000052 ZZH SURGERY LEVEL 2 EA 15 ADDTL MIN: Performed by: SURGERY

## 2019-06-13 PROCEDURE — 27210794 ZZH OR GENERAL SUPPLY STERILE: Performed by: SURGERY

## 2019-06-13 PROCEDURE — 25000128 H RX IP 250 OP 636: Performed by: SURGERY

## 2019-06-13 PROCEDURE — 25000125 ZZHC RX 250: Performed by: NURSE ANESTHETIST, CERTIFIED REGISTERED

## 2019-06-13 PROCEDURE — 25000125 ZZHC RX 250: Performed by: SURGERY

## 2019-06-13 PROCEDURE — 71000012 ZZH RECOVERY PHASE 1 LEVEL 1 FIRST HR: Performed by: SURGERY

## 2019-06-13 PROCEDURE — 37000008 ZZH ANESTHESIA TECHNICAL FEE, 1ST 30 MIN: Performed by: SURGERY

## 2019-06-13 PROCEDURE — C1781 MESH (IMPLANTABLE): HCPCS | Performed by: SURGERY

## 2019-06-13 DEVICE — MESH ULTRAPRO 03X06": Type: IMPLANTABLE DEVICE | Site: GROIN | Status: FUNCTIONAL

## 2019-06-13 RX ORDER — LABETALOL 20 MG/4 ML (5 MG/ML) INTRAVENOUS SYRINGE
10
Status: DISCONTINUED | OUTPATIENT
Start: 2019-06-13 | End: 2019-06-13 | Stop reason: HOSPADM

## 2019-06-13 RX ORDER — LIDOCAINE HYDROCHLORIDE 20 MG/ML
INJECTION, SOLUTION INFILTRATION; PERINEURAL PRN
Status: DISCONTINUED | OUTPATIENT
Start: 2019-06-13 | End: 2019-06-13

## 2019-06-13 RX ORDER — FENTANYL CITRATE 50 UG/ML
25-50 INJECTION, SOLUTION INTRAMUSCULAR; INTRAVENOUS
Status: DISCONTINUED | OUTPATIENT
Start: 2019-06-13 | End: 2019-06-13 | Stop reason: HOSPADM

## 2019-06-13 RX ORDER — DEXAMETHASONE SODIUM PHOSPHATE 4 MG/ML
INJECTION, SOLUTION INTRA-ARTICULAR; INTRALESIONAL; INTRAMUSCULAR; INTRAVENOUS; SOFT TISSUE PRN
Status: DISCONTINUED | OUTPATIENT
Start: 2019-06-13 | End: 2019-06-13

## 2019-06-13 RX ORDER — MEPERIDINE HYDROCHLORIDE 25 MG/ML
12.5 INJECTION INTRAMUSCULAR; INTRAVENOUS; SUBCUTANEOUS
Status: DISCONTINUED | OUTPATIENT
Start: 2019-06-13 | End: 2019-06-13 | Stop reason: HOSPADM

## 2019-06-13 RX ORDER — CEFAZOLIN SODIUM 1 G/3ML
1 INJECTION, POWDER, FOR SOLUTION INTRAMUSCULAR; INTRAVENOUS SEE ADMIN INSTRUCTIONS
Status: DISCONTINUED | OUTPATIENT
Start: 2019-06-13 | End: 2019-06-13 | Stop reason: HOSPADM

## 2019-06-13 RX ORDER — NALOXONE HYDROCHLORIDE 0.4 MG/ML
.1-.4 INJECTION, SOLUTION INTRAMUSCULAR; INTRAVENOUS; SUBCUTANEOUS
Status: DISCONTINUED | OUTPATIENT
Start: 2019-06-13 | End: 2019-06-13 | Stop reason: HOSPADM

## 2019-06-13 RX ORDER — FENTANYL CITRATE 50 UG/ML
25-50 INJECTION, SOLUTION INTRAMUSCULAR; INTRAVENOUS EVERY 5 MIN PRN
Status: DISCONTINUED | OUTPATIENT
Start: 2019-06-13 | End: 2019-06-13 | Stop reason: HOSPADM

## 2019-06-13 RX ORDER — MAGNESIUM HYDROXIDE 1200 MG/15ML
LIQUID ORAL PRN
Status: DISCONTINUED | OUTPATIENT
Start: 2019-06-13 | End: 2019-06-13 | Stop reason: HOSPADM

## 2019-06-13 RX ORDER — PROPOFOL 10 MG/ML
INJECTION, EMULSION INTRAVENOUS CONTINUOUS PRN
Status: DISCONTINUED | OUTPATIENT
Start: 2019-06-13 | End: 2019-06-13

## 2019-06-13 RX ORDER — ONDANSETRON 4 MG/1
4 TABLET, ORALLY DISINTEGRATING ORAL EVERY 30 MIN PRN
Status: DISCONTINUED | OUTPATIENT
Start: 2019-06-13 | End: 2019-06-13 | Stop reason: HOSPADM

## 2019-06-13 RX ORDER — SODIUM CHLORIDE, SODIUM LACTATE, POTASSIUM CHLORIDE, CALCIUM CHLORIDE 600; 310; 30; 20 MG/100ML; MG/100ML; MG/100ML; MG/100ML
INJECTION, SOLUTION INTRAVENOUS CONTINUOUS
Status: DISCONTINUED | OUTPATIENT
Start: 2019-06-13 | End: 2019-06-13 | Stop reason: HOSPADM

## 2019-06-13 RX ORDER — LIDOCAINE HYDROCHLORIDE 10 MG/ML
INJECTION, SOLUTION EPIDURAL; INFILTRATION; INTRACAUDAL; PERINEURAL
Status: DISCONTINUED
Start: 2019-06-13 | End: 2019-06-13 | Stop reason: HOSPADM

## 2019-06-13 RX ORDER — ONDANSETRON 2 MG/ML
INJECTION INTRAMUSCULAR; INTRAVENOUS PRN
Status: DISCONTINUED | OUTPATIENT
Start: 2019-06-13 | End: 2019-06-13

## 2019-06-13 RX ORDER — ONDANSETRON 2 MG/ML
4 INJECTION INTRAMUSCULAR; INTRAVENOUS EVERY 30 MIN PRN
Status: DISCONTINUED | OUTPATIENT
Start: 2019-06-13 | End: 2019-06-13 | Stop reason: HOSPADM

## 2019-06-13 RX ORDER — SODIUM CHLORIDE, SODIUM LACTATE, POTASSIUM CHLORIDE, CALCIUM CHLORIDE 600; 310; 30; 20 MG/100ML; MG/100ML; MG/100ML; MG/100ML
INJECTION, SOLUTION INTRAVENOUS CONTINUOUS PRN
Status: DISCONTINUED | OUTPATIENT
Start: 2019-06-13 | End: 2019-06-13

## 2019-06-13 RX ORDER — CEFAZOLIN SODIUM 2 G/100ML
2 INJECTION, SOLUTION INTRAVENOUS
Status: COMPLETED | OUTPATIENT
Start: 2019-06-13 | End: 2019-06-13

## 2019-06-13 RX ORDER — LEUPROLIDE ACETATE 1 MG/0.2ML
KIT SUBCUTANEOUS
COMMUNITY

## 2019-06-13 RX ORDER — BUPIVACAINE HYDROCHLORIDE AND EPINEPHRINE 5; 5 MG/ML; UG/ML
INJECTION, SOLUTION EPIDURAL; INTRACAUDAL; PERINEURAL
Status: DISCONTINUED
Start: 2019-06-13 | End: 2019-06-13 | Stop reason: HOSPADM

## 2019-06-13 RX ORDER — HYDROCODONE BITARTRATE AND ACETAMINOPHEN 5; 325 MG/1; MG/1
1-2 TABLET ORAL EVERY 4 HOURS PRN
Qty: 15 TABLET | Refills: 0 | Status: SHIPPED | OUTPATIENT
Start: 2019-06-13 | End: 2019-08-21

## 2019-06-13 RX ORDER — HYDROMORPHONE HYDROCHLORIDE 1 MG/ML
.3-.5 INJECTION, SOLUTION INTRAMUSCULAR; INTRAVENOUS; SUBCUTANEOUS EVERY 10 MIN PRN
Status: DISCONTINUED | OUTPATIENT
Start: 2019-06-13 | End: 2019-06-13 | Stop reason: HOSPADM

## 2019-06-13 RX ORDER — HYDROCODONE BITARTRATE AND ACETAMINOPHEN 5; 325 MG/1; MG/1
1 TABLET ORAL
Status: DISCONTINUED | OUTPATIENT
Start: 2019-06-13 | End: 2019-06-13 | Stop reason: HOSPADM

## 2019-06-13 RX ADMIN — SODIUM CHLORIDE, POTASSIUM CHLORIDE, SODIUM LACTATE AND CALCIUM CHLORIDE: 600; 310; 30; 20 INJECTION, SOLUTION INTRAVENOUS at 12:09

## 2019-06-13 RX ADMIN — CEFAZOLIN SODIUM 2 G: 2 INJECTION, SOLUTION INTRAVENOUS at 12:17

## 2019-06-13 RX ADMIN — PHENYLEPHRINE HYDROCHLORIDE 100 MCG: 10 INJECTION INTRAVENOUS at 12:54

## 2019-06-13 RX ADMIN — PHENYLEPHRINE HYDROCHLORIDE 100 MCG: 10 INJECTION INTRAVENOUS at 12:56

## 2019-06-13 RX ADMIN — PHENYLEPHRINE HYDROCHLORIDE 100 MCG: 10 INJECTION INTRAVENOUS at 12:51

## 2019-06-13 RX ADMIN — LIDOCAINE HYDROCHLORIDE 40 MG: 20 INJECTION, SOLUTION INFILTRATION; PERINEURAL at 12:13

## 2019-06-13 RX ADMIN — ONDANSETRON 4 MG: 2 INJECTION INTRAMUSCULAR; INTRAVENOUS at 12:16

## 2019-06-13 RX ADMIN — PROPOFOL 100 MCG/KG/MIN: 10 INJECTION, EMULSION INTRAVENOUS at 12:13

## 2019-06-13 RX ADMIN — DEXAMETHASONE SODIUM PHOSPHATE 4 MG: 4 INJECTION, SOLUTION INTRA-ARTICULAR; INTRALESIONAL; INTRAMUSCULAR; INTRAVENOUS; SOFT TISSUE at 12:16

## 2019-06-13 ASSESSMENT — ENCOUNTER SYMPTOMS: DYSRHYTHMIAS: 1

## 2019-06-13 ASSESSMENT — MIFFLIN-ST. JEOR: SCORE: 1490.52

## 2019-06-13 NOTE — ANESTHESIA POSTPROCEDURE EVALUATION
Patient: Yosef Murry    Procedure(s):  OPEN RIGHT INGUINA HERNIA REPAIR WITH MESH    Diagnosis:RIGHT INGUINAL HERNIA  Diagnosis Additional Information: No value filed.    Anesthesia Type:  MAC    Note:  Anesthesia Post Evaluation    Patient location during evaluation: PACU  Patient participation: Able to fully participate in evaluation  Level of consciousness: awake  Pain management: adequate  Airway patency: patent  Cardiovascular status: acceptable  Respiratory status: acceptable  Hydration status: acceptable  PONV: none     Anesthetic complications: None          Last vitals:  Vitals:    06/13/19 1345 06/13/19 1400 06/13/19 1440   BP: 142/88 (!) 133/93 (!) 153/98   Pulse: 61 57    Resp: 11 16 16   Temp:      SpO2: 95% 96% 98%         Electronically Signed By: SANDIE SNEED MD  June 13, 2019  3:36 PM

## 2019-06-13 NOTE — OP NOTE
General Surgery Operative Note    PREOPERATIVE DIAGNOSIS:   INGUINAL HERNIA, right    POSTOPERATIVE DIAGNOSIS:  Same    PROCEDURE:   Procedure(s): Right  HERNIORRHAPHY INGUINAL, Open with mesh    ANESTHESIA:  General.      SURGEON:  Filipe Fairchild MD    ASSISTANT:  Terrance Mcdonough PA-C. The physician assistant was medically necessary for their expertise in , suctioning, and retraction    INDICATIONS:   inguinal hernia    PROCEDURE:  The patient was taken to the operating suite.  The operative area was prepped and draped in a sterile fashion.  Surgeon initiated timeout was acknowledged.  Under intravenous sedation local anesthetic was instilled. A transverse incision was made and each layer was anesthetized and divided. The external oblique was split in the direction of its fibers. The cord was encircled. A large indirect sac was dissected free and reduced. A 7x15cm piece of ultrapro mesh was cut and secured inferiorly with running 2-0 Novofil and superiorly with interrupted 2-0 PDS and Novofil . The mesh was slit laterally and sutured to itself to give a new ring that would not admit a fingertip. Sponge count was correct. External oblique was re-approximated with running 2-0 vicryl. Subcutaneous fascia was closed with 3-0 vicryl.  The skin edges were reapproximated with 4-0 Vicryl and Steri-Strips.  The patient was  awakened and taken to the PACU in stable condition.  At the conclusion of the case, all counts were correct.        INTRAOPERATIVE FINDINGS:  Indirect inguinal hernia    Filipe Fairchild

## 2019-06-13 NOTE — BRIEF OP NOTE
[facility]    Brief Operative Note    Pre-operative diagnosis: RIGHT INGUINAL HERNIA  Post-operative diagnosis Right Inguinal hernia  Procedure: Procedure(s):  OPEN RIGHT INGUINA HERNIA REPAIR WITH MESH  Surgeon: Surgeon(s) and Role:     * Filipe Fairchild MD - Primary     * Marcelo Mcdonough PA-C - Assisting  Anesthesia: Monitor Anesthesia Care   Estimated blood loss: 5 mL  Drains: None  Specimens: * No specimens in log *  Findings:   None.  Complications: None.  Implants:    Implant Name Type Inv. Item Serial No.  Lot No. LRB No. Used   MESH ULTRAPRO [id number]&quot; Mesh MESH ULTRAPRO [id number]&quot;  J&amp;Identia [facility]-   [id number] Right 1      See Operative Report for full details.  No complications noted.

## 2019-06-13 NOTE — ANESTHESIA CARE TRANSFER NOTE
Patient: Yosef Murry    Procedure(s):  OPEN RIGHT INGUINA HERNIA REPAIR WITH MESH    Diagnosis: RIGHT INGUINAL HERNIA  Diagnosis Additional Information: No value filed.    Anesthesia Type:   MAC     Note:  Airway :Face Mask  Patient transferred to:PACU  Comments: At end of procedure, spontaneous respirations, patient alert to voice. Oxygen via facemask at 8 liters per minute to PACU. Oxygen tubing connected to wall O2 in PACU, SpO2, NiBP, and EKG monitors and alarms on and functioning, Osbaldo Hugger warmer connected to patient gown, report on patient's clinical status given to PACU RN, RN questions answered.Handoff Report: Identifed the Patient, Identified the Reponsible Provider, Reviewed the pertinent medical history, Discussed the surgical course, Reviewed Intra-OP anesthesia mangement and issues during anesthesia, Set expectations for post-procedure period and Allowed opportunity for questions and acknowledgement of understanding      Vitals: (Last set prior to Anesthesia Care Transfer)    CRNA VITALS  6/13/2019 1248 - 6/13/2019 1323      6/13/2019             Pulse:  64    SpO2:  99 %    Resp Rate (set):  10                Electronically Signed By: JOHNSON Wilson CRNA  June 13, 2019  1:23 PM

## 2019-06-13 NOTE — DISCHARGE INSTRUCTIONS
Restart Xarelto TOMORROW.    [facility]  Discharge Instructions: Post-Operative Open Inguinal Hernia    ACTIVITY    Take frequent, short walks and increase your activity gradually.      Avoid strenuous physical activity or heavy lifting greater than 15 lbs. for 3-4 weeks.  You may climb stairs.    You may drive without restrictions when you are not using any prescription pain medication and feel comfortable in a car.    You may return to work/school when you are comfortable without any prescription pain medication.    WOUND CARE    You may remove your bandage and shower 48 hours after the surgery.  Pat your incisions dry and leave it open to air.  Re-apply dressing (Band-Aids or gauze/tape) as needed for comfort or drainage.    You may have steri-strips (looks like white tape) on your incisions.  You may peel off the steri-strips 2 weeks after your surgery if they have not peeled off on their own.     Do not soak your incisions in a tub or pool for 2 weeks.     Do not apply any lotions, creams, or ointments to your incisions.    A ridge under your incisions is normal and will gradually resolve.    DIET    Start with liquids, then gradually resume your regular diet as tolerated.     Drink plenty of fluids to stay hydrated.    PAIN    Expect some tenderness and discomfort at the incision site(s).  Use the prescribed pain medication at your discretion.  Expect gradual resolution of your pain over several days.    You may take ibuprofen with food (unless you have been told not to) instead of or in addition to your prescribed pain medication.  If you are taking Norco or Percocet, do not take any additional acetaminophen/APAP/Tylenol.    Do not drink alcohol or drive while you are taking pain medications.    You may apply ice to your incisions in 20 minute intervals as needed for the next 48 hours.  After that time, consider switching to heat if you prefer.    EXPECTATIONS    You  can expect some swelling and bruising involving the scrotum and/or penis as well as numbness.  These symptoms are expected and should gradually resolve in the next few days.  You may use ice to help with the swelling and try placing a rolled hand towel below your scrotum to help alleviate scrotal discomfort.  If you develop significant testicular or penile pain, please call our office and speak with a nurse.    Pain medications can cause constipation.  Limit use when possible.  Take over the counter stool softener/stimulant, such as Colace or Senna, 1-2 times a day with plenty of water.  You may take a mild over the counter laxative, such as Miralax or a suppository, as needed.  You may take 1 oz. (2 tablespoons) Milk of Magnesia the evening following surgery to encourage bowel movement.  You may discontinue these medications once you are having regular bowel movements and/or are no longer taking your narcotic pain medication    FOLLOW UP    Our office will contact you approximately 2 weeks to check on your progress and answer any questions you may have.  If you are doing well, you will not need to return for a follow up appointment.  If any concerns are identified over the phone, we will help you make an appointment to see a provider.     If you have not received a phone call, have any questions or concerns, or would like to be seen, please call us at [phone] and ask to speak with our nurse.  We are located at [address] [place] [address].    CALL OUR OFFICE -[phone] IF YOU HAVE:     Chills or fever above 101 F.    Increased redness, warmth, or drainage at your incisions.    Significant bleeding.    Pain not relieved by your pain medication or rest.    Increasing pain after the first 48 hours.    Any other concerns or questions.    Revised January 2018    Same Day Surgery Discharge Instructions for  Sedation and General Anesthesia       It's not unusual to feel dizzy, light-headed  or faint for up to 24 hours after surgery or while taking pain medication.  If you have these symptoms: sit for a few minutes before standing and have someone assist you when you get up to walk or use the bathroom.      You should rest and relax for the next 24 hours. We recommend you make arrangements to have an adult stay with you for at least 24 hours after your discharge.  Avoid hazardous and strenuous activity.      DO NOT DRIVE any vehicle or operate mechanical equipment for 24 hours following the end of your surgery.  Even though you may feel normal, your reactions may be affected by the medication you have received.      Do not drink alcoholic beverages for 24 hours following surgery.       Slowly progress to your regular diet as you feel able. It's not unusual to feel nauseated and/or vomit after receiving anesthesia.  If you develop these symptoms, drink clear liquids (apple juice, ginger ale, broth, 7-up, etc. ) until you feel better.  If your nausea and vomiting persists for 24 hours, please notify your surgeon.        All narcotic pain medications, along with inactivity and anesthesia, can cause constipation. Drinking plenty of liquids and increasing fiber intake will help.      For any questions of a medical nature, call your surgeon.      Do not make important decisions for 24 hours.      If you had general anesthesia, you may have a sore throat for a couple of days related to the breathing tube used during surgery.  You may use Cepacol lozenges to help with this discomfort.  If it worsens or if you develop a fever, contact your surgeon.       If you feel your pain is not well managed with the pain medications prescribed by your surgeon, please contact your surgeon's office to let them know so they can address your concerns.       **If you have questions or concerns about your procedure,   call Dr Fairchild at  [phone]**

## 2019-06-13 NOTE — ANESTHESIA PREPROCEDURE EVALUATION
Anesthesia Pre-Procedure Evaluation    Patient: Yosef Murry   MRN: [id number] : 1935          Preoperative Diagnosis: RIGHT INGUINAL HERNIA    Procedure(s):  OPEN RIGHT INGUINA HERNIA REPAIR WITH MESH    Past Medical History:   Diagnosis Date     Anal fistula      Antiplatelet or antithrombotic long-term use      Arrhythmia      Atrial flutter (H)      Heartburn      Inguinal hernia, left      Persistent atrial fibrillation (H) 12     Prostate cancer (H)      TIA (transient ischaemic attack)          Past Surgical History:   Procedure Laterality Date     COLONOSCOPY       ENT SURGERY      tonsllectomy     EXAM UNDER ANESTHESIA, FISTULOTOMY RECTUM, COMBINED N/A 2015    Procedure: COMBINED EXAM UNDER ANESTHESIA, FISTULOTOMY RECTUM;  Surgeon: Candice Carty MD;  Location: [facility]     GENITOURINARY SURGERY      PROSTATECTOMY     HERNIORRHAPHY INGUINAL  2013    Procedure: HERNIORRHAPHY INGUINAL;  LEFT INGUINAL HERNIA REPAIR WITH MESH;  Surgeon: Filipe Fairchild MD;  Location: [facility]     ORTHOPEDIC SURGERY      WRIST SURGERY - LEFT       Anesthesia Evaluation     .             ROS/MED HX    ENT/Pulmonary:      (-) sleep apnea   Neurologic:     (+)TIA date:      Cardiovascular:     (+) ----. Taking blood thinners : . . . :. dysrhythmias a-fib and a-flutter, .       METS/Exercise Tolerance:     Hematologic:         Musculoskeletal:         GI/Hepatic:     (+) GERD       Renal/Genitourinary:         Endo:         Psychiatric:         Infectious Disease:         Malignancy:   (+) Malignancy History of Prostate          Other:                          Physical Exam  Normal systems: cardiovascular, pulmonary and dental    Airway   Mallampati: II  TM distance: >3 FB  Neck ROM: full    Dental     Cardiovascular       Pulmonary             Lab Results   Component Value Date    WBC 6.2 06/10/2019    HGB 16.4 06/10/2019    HCT 48.1 06/10/2019     06/10/2019     06/10/2019     " 06/10/2019    POTASSIUM 4.4 06/10/2019    POTASSIUM 4.8 06/10/2019    CHLORIDE 107 06/10/2019    CHLORIDE 106 06/10/2019    CO2 29 06/10/2019    CO2 27 06/10/2019    BUN 14 06/10/2019    BUN 13 06/10/2019    CR 0.92 06/10/2019    CR 0.95 06/10/2019    GLC 86 06/10/2019    GLC 88 06/10/2019    BELLO 9.2 06/10/2019    BELLO 8.7 06/10/2019    ALBUMIN 3.3 (L) 06/10/2019    PROTTOTAL 7.3 06/10/2019    ALT 16 06/10/2019    AST 17 06/10/2019    ALKPHOS 103 06/10/2019    BILITOTAL 0.5 06/10/2019    TSH 4.51 (H) 06/10/2019    T4 0.91 06/10/2019       Preop Vitals  BP Readings from Last 3 Encounters:   06/10/19 134/80   05/08/19 138/84   05/01/19 130/70    Pulse Readings from Last 3 Encounters:   06/10/19 72   05/08/19 77   05/01/19 71      Resp Readings from Last 3 Encounters:   05/14/16 16   06/18/15 16   07/25/13 16    SpO2 Readings from Last 3 Encounters:   06/10/19 98%   06/21/18 99%   05/14/16 94%      Temp Readings from Last 1 Encounters:   06/10/19 36.2  C (97.2  F) (Oral)    Ht Readings from Last 1 Encounters:   06/10/19 1.778 m (5' 10\")      Wt Readings from Last 1 Encounters:   06/10/19 77.1 kg (170 lb)    Estimated body mass index is 24.39 kg/m  as calculated from the following:    Height as of 6/10/19: 1.778 m (5' 10\").    Weight as of 6/10/19: 77.1 kg (170 lb).       Anesthesia Plan      History & Physical Review  History and physical reviewed and following examination; no interval change.    ASA Status:  3 .    NPO Status:  > 8 hours    Plan for MAC   PONV prophylaxis:  Ondansetron (or other 5HT-3) and Dexamethasone or Solumedrol       Postoperative Care      Consents  Anesthetic plan, risks, benefits and alternatives discussed with:  Patient..                 SANDIE SNEED MD  "

## 2019-06-27 ENCOUNTER — OFFICE VISIT (OUTPATIENT)
Dept: FAMILY MEDICINE | Facility: CLINIC | Age: 84
End: 2019-06-27
Payer: MEDICARE

## 2019-06-27 ENCOUNTER — TELEPHONE (OUTPATIENT)
Dept: SURGERY | Facility: CLINIC | Age: 84
End: 2019-06-27

## 2019-06-27 DIAGNOSIS — G47.00 PERSISTENT DISORDER OF INITIATING OR MAINTAINING SLEEP: ICD-10-CM

## 2019-06-27 DIAGNOSIS — Z13.29 SCREENING FOR HYPOTHYROIDISM: ICD-10-CM

## 2019-06-27 DIAGNOSIS — E55.9 VITAMIN D DEFICIENCY: ICD-10-CM

## 2019-06-27 DIAGNOSIS — Z00.00 ENCOUNTER FOR ROUTINE ADULT HEALTH EXAMINATION WITHOUT ABNORMAL FINDINGS: Primary | ICD-10-CM

## 2019-06-27 DIAGNOSIS — Z13.6 CARDIOVASCULAR SCREENING; LDL GOAL LESS THAN 130: ICD-10-CM

## 2019-06-27 DIAGNOSIS — C61 PROSTATE CANCER (H): ICD-10-CM

## 2019-06-27 DIAGNOSIS — I48.19 PERSISTENT ATRIAL FIBRILLATION (H): ICD-10-CM

## 2019-06-27 PROCEDURE — G0439 PPPS, SUBSEQ VISIT: HCPCS | Performed by: INTERNAL MEDICINE

## 2019-06-27 ASSESSMENT — ACTIVITIES OF DAILY LIVING (ADL): CURRENT_FUNCTION: NO ASSISTANCE NEEDED

## 2019-06-27 ASSESSMENT — MIFFLIN-ST. JEOR: SCORE: 1502.77

## 2019-06-27 NOTE — PROGRESS NOTES
"SUBJECTIVE:   Yosef Murry is a 83 year old male who presents for Preventive Visit.      Are you in the first 12 months of your Medicare coverage?  No    Healthy Habits:    In general, how would you rate your overall health?  Good    Frequency of exercise:  4-5 days/week    Duration of exercise:  45-60 minutes    Do you usually eat at least 4 servings of fruit and vegetables a day, include whole grains    & fiber and avoid regularly eating high fat or \"junk\" foods?  Yes    Taking medications regularly:  Yes    Barriers to taking medications:  None    Medication side effects:  None    Ability to successfully perform activities of daily living:  No assistance needed    Home Safety:  No safety concerns identified    Hearing Impairment:  No hearing concerns    In the past 6 months, have you been bothered by leaking of urine? Yes    In general, how would you rate your overall mental or emotional health?  Good      PHQ-2 Total Score:    Additional concerns today:  No    Do you feel safe in your environment? Yes    Do you have a Health Care Directive? Yes: Patient states has Advance Directive and will bring in a copy to clinic.      Fall risk       Cognitive Screening   1) Repeat 3 items (Leader, Season, Table)    2) Clock draw: NORMAL  3) 3 item recall: Recalls 3 objects  Results: 3 items recalled: COGNITIVE IMPAIRMENT LESS LIKELY    Mini-CogTM Copyright MARIBEL Kwong. Licensed by the author for use in [facility]; reprinted with permission (soblaze@.[facility]). All rights reserved.      Do you have sleep apnea, excessive snoring or daytime drowsiness?: no    Reviewed and updated as needed this visit by clinical staff         Reviewed and updated as needed this visit by Provider        Social History     Tobacco Use     Smoking status: Former Smoker     Years: 16.00     Types: Cigarettes     Last attempt to quit: 1968     Years since quittin.5     Smokeless tobacco: Never Used   Substance Use Topics     " Alcohol use: Yes     Alcohol/week: 0.5 oz     Types: 1 Cans of beer per week     Frequency: 2-3 times a week     Drinks per session: 1 or 2     Binge frequency: Never     Comment: SOCIALLY         No flowsheet data found.            Current providers sharing in care for this patient include:   Patient Care Team:  Fred Connell MD as PCP - General (Internal Medicine)  Fred Connell MD as Assigned PCP    The following health maintenance items are reviewed in Epic and correct as of today:  Health Maintenance   Topic Date Due     ADVANCE CARE PLANNING  1935     DTAP/TDAP/TD IMMUNIZATION (1 - Tdap) 09/06/1961     FALL RISK ASSESSMENT  06/21/2019     MEDICARE ANNUAL WELLNESS VISIT  06/21/2019     PHQ-2  Completed     INFLUENZA VACCINE  Completed     ZOSTER IMMUNIZATION  Completed     IPV IMMUNIZATION  Aged Out     MENINGITIS IMMUNIZATION  Aged Out     Current Outpatient Medications   Medication Sig Dispense Refill     amLODIPine (NORVASC) 5 MG tablet Take 1 tablet (5 mg) by mouth daily 90 tablet 3     calcium carbonate antacid (TUMS ULTRA 1000) 1000 MG CHEW Take  by mouth. 1-2 tablets per day       diphenhydrAMINE-acetaminophen (TYLENOL PM)  MG tablet Take 2 tablets by mouth nightly as needed for sleep       HYDROcodone-acetaminophen (NORCO) 5-325 MG tablet Take 1-2 tablets by mouth every 4 hours as needed for moderate to severe pain 15 tablet 0     leuprolide acetate (LUPRON) 1 MG/0.2ML kit Inject Subcutaneous daily Unknown dose.  Takes every 6 months.       loratadine (CLARITIN) 10 MG tablet Take 1 tablet by mouth daily. 30 tablet 1     losartan (COZAAR) 50 MG tablet Take 1 tablet (50 mg) by mouth daily 90 tablet 3     metoprolol succinate (TOPROL XL) 25 MG 24 hr tablet Take 1 tablet (25 mg) by mouth daily 90 tablet 2     Multiple Vitamins-Minerals (PRESERVISION AREDS 2 PO) Take 2 capsules by mouth daily        rivaroxaban ANTICOAGULANT (XARELTO) 20 MG TABS tablet Take 1 tablet (20 mg) by mouth daily  "(with dinner) 90 tablet 1     traZODone (DESYREL) 100 MG tablet Take 1 tablet (100 mg) by mouth At Bedtime 30 tablet 1     Allergies   Allergen Reactions     Other [Seasonal Allergies]          Review of Systems  CONSTITUTIONAL: NEGATIVE for fever, chills, change in weight  INTEGUMENTARY/SKIN: NEGATIVE for worrisome rashes, moles or lesions  EYES: NEGATIVE for vision changes or irritation  ENT/MOUTH: NEGATIVE for ear, mouth and throat problems  Spring allergies  RESP: NEGATIVE for significant cough or SOB    CV: NEGATIVE for chest pain, palpitations or peripheral edema  Walking approximately 7-1/2 miles daily  GI: NEGATIVE for nausea, abdominal pain, heartburn, or change in bowel habits  : NEGATIVE for frequency, dysuria, or hematuria  Nocturia x3  MUSCULOSKELETAL: NEGATIVE for significant arthralgias or myalgias except in his right wrist  NEURO: NEGATIVE for weakness, dizziness or paresthesias  ENDOCRINE: NEGATIVE for temperature intolerance, skin/hair changes  HEME: NEGATIVE for bleeding problems  PSYCHIATRIC: NEGATIVE for changes in mood or affect  Back history of degenerative changes  OBJECTIVE:   There were no vitals taken for this visit. Estimated body mass index is 24.81 kg/m  as calculated from the following:    Height as of 6/13/19: 1.778 m (5' 10\").    Weight as of 6/13/19: 78.4 kg (172 lb 14.4 oz).  Physical Exam BP (!) 134/93 (BP Location: Left arm, Patient Position: Sitting, Cuff Size: Adult Regular)   Pulse 61   Temp 97.5  F (36.4  C) (Tympanic)   Ht 1.778 m (5' 10\")   Wt 79.7 kg (175 lb 9.6 oz)   SpO2 94%   BMI 25.20 kg/m      GENERAL: healthy, alert and no distress  EYES: Eyes grossly normal to inspection, PERRL and conjunctivae and sclerae normal  HENT: ear canals and TM's normal, nose and mouth without ulcers or lesions  NECK: no adenopathy, no asymmetry, masses, or scars and thyroid normal to palpation  RESP: lungs clear to auscultation - no rales, rhonchi or wheezes  CV:  irregularly " "irregular rhythm, normal S1 S2, no S3 or S4, no murmur, click or rub, no peripheral edema and peripheral pulses strong  ABDOMEN: soft, nontender, no hepatosplenomegaly, no masses and bowel sounds normal   no inguinal adenopathy  postoperative changes and associated swelling from his herniorrhaphy, testicles are atrophic bilaterally  Rectal exam deferred  MS: no gross musculoskeletal defects noted, no edema  SKIN: no suspicious lesions or rashes  NEURO: Normal strength and tone, mentation intact and speech normal  PSYCH: mentation appears normal, affect normal/bright    Laboratory reports reviewed    ASSESSMENT / PLAN:   1. Encounter for routine adult health examination without abnormal findings  Follow-up laboratory studies    2. Persistent atrial fibrillation (H)  Continue with same medications  3. Prostate cancer (H)  On anti-hormonal    4. CARDIOVASCULAR SCREENING; LDL GOAL LESS THAN 130      5. Vitamin D deficiency      6. Persistent disorder of initiating or maintaining sleep      7. Screening for hypothyroidism        End of Life Planning:  Patient currently has an advanced directive:     COUNSELING:      Estimated body mass index is 24.81 kg/m  as calculated from the following:    Height as of 6/13/19: 1.778 m (5' 10\").    Weight as of 6/13/19: 78.4 kg (172 lb 14.4 oz).         reports that he quit smoking about 50 years ago. His smoking use included cigarettes. He quit after 16.00 years of use. He has never used smokeless tobacco.      Appropriate preventive services were discussed with this patient, including applicable screening as appropriate for cardiovascular disease, diabetes, osteopenia/osteoporosis, and glaucoma.  As appropriate for age/gender, discussed screening for colorectal cancer, prostate cancer, breast cancer, and cervical cancer. Checklist reviewing preventive services available has been given to the patient.    Reviewed patients plan of care and provided an AVS. The  nithin Navas meets the " Care Plan requirement. This Care Plan has been established and reviewed with the .    Counseling Resources:  ATP IV Guidelines  Pooled Cohorts Equation Calculator  Breast Cancer Risk Calculator  FRAX Risk Assessment  ICSI Preventive Guidelines  Dietary Guidelines for Americans, 2010  USDA's MyPlate  ASA Prophylaxis  Lung CA Screening    Fred Connell MD  [facility]    Identified Health Risks:

## 2019-06-27 NOTE — TELEPHONE ENCOUNTER
[facility]  Post op call note     Yosef Murry was called for an update regarding his recovery.  He underwent a Right Inguinal Hernia Repair with mesh by Dr. Fairchild on June / 13 / 2019.  Today he tells me he is doing well and denies any complaints.  He is eating a normal diet and his bowels are regular.  He states his wounds are healing well and denies any erythema or drainage at his wounds.       He was instructed to gradually resume all normal activities. The patient states all of his questions were answered and he agrees to follow up in clinic as needed.    Terrance Mcdonough PA-C        Please route or send letter to:  Primary Care Provider (PCP)

## 2019-06-30 VITALS
SYSTOLIC BLOOD PRESSURE: 130 MMHG | TEMPERATURE: 97.5 F | OXYGEN SATURATION: 94 % | HEIGHT: 70 IN | WEIGHT: 175.6 LBS | DIASTOLIC BLOOD PRESSURE: 86 MMHG | BODY MASS INDEX: 25.14 KG/M2 | HEART RATE: 61 BPM

## 2019-07-02 DIAGNOSIS — G47.00 PERSISTENT DISORDER OF INITIATING OR MAINTAINING SLEEP: ICD-10-CM

## 2019-07-02 NOTE — TELEPHONE ENCOUNTER
"Last Written Prescription Date:  6/10/19  Last Fill Quantity: 30 tablet,  # refills: 1   Last office visit: 6/27/2019 with prescribing provider:  Becki   Future Office Visit:      Requested Prescriptions   Pending Prescriptions Disp Refills     traZODone (DESYREL) 100 MG tablet [Pharmacy Med Name: TRAZODONE 100 MG TABLET] 30 tablet 1     Sig: TAKE 1 TABLET BY MOUTH AT BEDTIME       Serotonin Modulators Passed - 7/2/2019 11:37 AM        Passed - Recent (12 mo) or future (30 days) visit within the authorizing provider's specialty     Patient had office visit in the last 12 months or has a visit in the next 30 days with authorizing provider or within the authorizing provider's specialty.  See \"Patient Info\" tab in inbasket, or \"Choose Columns\" in Meds & Orders section of the refill encounter.              Passed - Medication is active on med list        Passed - Patient is age 18 or older          "

## 2019-07-03 RX ORDER — TRAZODONE HYDROCHLORIDE 100 MG/1
TABLET ORAL
Qty: 30 TABLET | Refills: 1 | OUTPATIENT
Start: 2019-07-03

## 2019-07-03 NOTE — TELEPHONE ENCOUNTER
Rx was sent 6/10/19 #30 with 1 refill - should have refills good through August     Rx refused. Duplicate/early request. Pharmacy notified.  Priti JC RN

## 2019-07-29 ENCOUNTER — APPOINTMENT (OUTPATIENT)
Dept: GENERAL RADIOLOGY | Facility: CLINIC | Age: 84
DRG: 864 | End: 2019-07-29
Attending: EMERGENCY MEDICINE
Payer: MEDICARE

## 2019-07-29 ENCOUNTER — HOSPITAL ENCOUNTER (INPATIENT)
Facility: CLINIC | Age: 84
LOS: 6 days | Discharge: HOME HEALTH | DRG: 864 | End: 2019-08-06
Attending: EMERGENCY MEDICINE | Admitting: STUDENT IN AN ORGANIZED HEALTH CARE EDUCATION/TRAINING PROGRAM
Payer: MEDICARE

## 2019-07-29 ENCOUNTER — APPOINTMENT (OUTPATIENT)
Dept: ULTRASOUND IMAGING | Facility: CLINIC | Age: 84
DRG: 864 | End: 2019-07-29
Attending: EMERGENCY MEDICINE
Payer: MEDICARE

## 2019-07-29 DIAGNOSIS — R22.43 LOCALIZED SWELLING OF BOTH LOWER LEGS: ICD-10-CM

## 2019-07-29 DIAGNOSIS — M54.2 NECK PAIN: ICD-10-CM

## 2019-07-29 DIAGNOSIS — I48.19 PERSISTENT ATRIAL FIBRILLATION (H): ICD-10-CM

## 2019-07-29 DIAGNOSIS — R53.1 GENERALIZED WEAKNESS: ICD-10-CM

## 2019-07-29 DIAGNOSIS — M79.89 SWELLING OF RIGHT UPPER EXTREMITY: ICD-10-CM

## 2019-07-29 DIAGNOSIS — R50.9 FEVER OF UNKNOWN ORIGIN: ICD-10-CM

## 2019-07-29 DIAGNOSIS — I63.9 INFARCTION OF RIGHT BASAL GANGLIA (H): Primary | ICD-10-CM

## 2019-07-29 DIAGNOSIS — R53.1 WEAKNESS: ICD-10-CM

## 2019-07-29 LAB
ALBUMIN SERPL-MCNC: 3.4 G/DL (ref 3.4–5)
ALBUMIN UR-MCNC: 30 MG/DL
ALP SERPL-CCNC: 100 U/L (ref 40–150)
ALT SERPL W P-5'-P-CCNC: 14 U/L (ref 0–70)
ANION GAP SERPL CALCULATED.3IONS-SCNC: 5 MMOL/L (ref 3–14)
APPEARANCE UR: CLEAR
AST SERPL W P-5'-P-CCNC: 13 U/L (ref 0–45)
BASOPHILS # BLD AUTO: 0 10E9/L (ref 0–0.2)
BASOPHILS NFR BLD AUTO: 0.2 %
BILIRUB SERPL-MCNC: 1 MG/DL (ref 0.2–1.3)
BILIRUB UR QL STRIP: NEGATIVE
BUN SERPL-MCNC: 18 MG/DL (ref 7–30)
CALCIUM SERPL-MCNC: 9.1 MG/DL (ref 8.5–10.1)
CHLORIDE SERPL-SCNC: 103 MMOL/L (ref 94–109)
CO2 SERPL-SCNC: 29 MMOL/L (ref 20–32)
COLOR UR AUTO: YELLOW
CREAT SERPL-MCNC: 0.94 MG/DL (ref 0.66–1.25)
CRP SERPL-MCNC: 61.9 MG/L (ref 0–8)
DIFFERENTIAL METHOD BLD: NORMAL
EOSINOPHIL # BLD AUTO: 0 10E9/L (ref 0–0.7)
EOSINOPHIL NFR BLD AUTO: 0.1 %
ERYTHROCYTE [DISTWIDTH] IN BLOOD BY AUTOMATED COUNT: 13.7 % (ref 10–15)
ERYTHROCYTE [SEDIMENTATION RATE] IN BLOOD BY WESTERGREN METHOD: 11 MM/H (ref 0–20)
GFR SERPL CREATININE-BSD FRML MDRD: 75 ML/MIN/{1.73_M2}
GLUCOSE SERPL-MCNC: 115 MG/DL (ref 70–99)
GLUCOSE UR STRIP-MCNC: NEGATIVE MG/DL
HCT VFR BLD AUTO: 47.9 % (ref 40–53)
HGB BLD-MCNC: 16.1 G/DL (ref 13.3–17.7)
HGB UR QL STRIP: ABNORMAL
IMM GRANULOCYTES # BLD: 0 10E9/L (ref 0–0.4)
IMM GRANULOCYTES NFR BLD: 0.2 %
KETONES UR STRIP-MCNC: NEGATIVE MG/DL
LEUKOCYTE ESTERASE UR QL STRIP: NEGATIVE
LYMPHOCYTES # BLD AUTO: 1.6 10E9/L (ref 0.8–5.3)
LYMPHOCYTES NFR BLD AUTO: 14.9 %
MCH RBC QN AUTO: 31.5 PG (ref 26.5–33)
MCHC RBC AUTO-ENTMCNC: 33.6 G/DL (ref 31.5–36.5)
MCV RBC AUTO: 94 FL (ref 78–100)
MONOCYTES # BLD AUTO: 1.1 10E9/L (ref 0–1.3)
MONOCYTES NFR BLD AUTO: 9.8 %
MUCOUS THREADS #/AREA URNS LPF: PRESENT /LPF
NEUTROPHILS # BLD AUTO: 8.2 10E9/L (ref 1.6–8.3)
NEUTROPHILS NFR BLD AUTO: 74.8 %
NITRATE UR QL: NEGATIVE
NRBC # BLD AUTO: 0 10*3/UL
NRBC BLD AUTO-RTO: 0 /100
NT-PROBNP SERPL-MCNC: 2063 PG/ML (ref 0–1800)
PH UR STRIP: 5.5 PH (ref 5–7)
PLATELET # BLD AUTO: 243 10E9/L (ref 150–450)
POTASSIUM SERPL-SCNC: 4.1 MMOL/L (ref 3.4–5.3)
PROT SERPL-MCNC: 8 G/DL (ref 6.8–8.8)
RBC # BLD AUTO: 5.11 10E12/L (ref 4.4–5.9)
RBC #/AREA URNS AUTO: 10 /HPF (ref 0–2)
SODIUM SERPL-SCNC: 137 MMOL/L (ref 133–144)
SOURCE: ABNORMAL
SP GR UR STRIP: 1.02 (ref 1–1.03)
SQUAMOUS #/AREA URNS AUTO: <1 /HPF (ref 0–1)
T4 FREE SERPL-MCNC: 0.94 NG/DL (ref 0.76–1.46)
TROPONIN I SERPL-MCNC: 0.02 UG/L (ref 0–0.04)
TSH SERPL DL<=0.005 MIU/L-ACNC: 4.68 MU/L (ref 0.4–4)
URATE SERPL-MCNC: 3.2 MG/DL (ref 3.5–7.2)
UROBILINOGEN UR STRIP-MCNC: NORMAL MG/DL (ref 0–2)
WBC # BLD AUTO: 11 10E9/L (ref 4–11)
WBC #/AREA URNS AUTO: 1 /HPF (ref 0–5)

## 2019-07-29 PROCEDURE — 99285 EMERGENCY DEPT VISIT HI MDM: CPT | Mod: 25

## 2019-07-29 PROCEDURE — 99220 ZZC INITIAL OBSERVATION CARE,LEVL III: CPT | Performed by: STUDENT IN AN ORGANIZED HEALTH CARE EDUCATION/TRAINING PROGRAM

## 2019-07-29 PROCEDURE — 84550 ASSAY OF BLOOD/URIC ACID: CPT | Performed by: EMERGENCY MEDICINE

## 2019-07-29 PROCEDURE — 84443 ASSAY THYROID STIM HORMONE: CPT | Performed by: EMERGENCY MEDICINE

## 2019-07-29 PROCEDURE — 93971 EXTREMITY STUDY: CPT | Mod: RT

## 2019-07-29 PROCEDURE — 25000132 ZZH RX MED GY IP 250 OP 250 PS 637: Mod: GY

## 2019-07-29 PROCEDURE — 86140 C-REACTIVE PROTEIN: CPT | Performed by: EMERGENCY MEDICINE

## 2019-07-29 PROCEDURE — 71046 X-RAY EXAM CHEST 2 VIEWS: CPT

## 2019-07-29 PROCEDURE — 85652 RBC SED RATE AUTOMATED: CPT | Performed by: EMERGENCY MEDICINE

## 2019-07-29 PROCEDURE — 84484 ASSAY OF TROPONIN QUANT: CPT | Performed by: EMERGENCY MEDICINE

## 2019-07-29 PROCEDURE — 84439 ASSAY OF FREE THYROXINE: CPT | Performed by: EMERGENCY MEDICINE

## 2019-07-29 PROCEDURE — 80053 COMPREHEN METABOLIC PANEL: CPT | Performed by: EMERGENCY MEDICINE

## 2019-07-29 PROCEDURE — 85025 COMPLETE CBC W/AUTO DIFF WBC: CPT | Performed by: EMERGENCY MEDICINE

## 2019-07-29 PROCEDURE — 83880 ASSAY OF NATRIURETIC PEPTIDE: CPT | Performed by: EMERGENCY MEDICINE

## 2019-07-29 PROCEDURE — 81001 URINALYSIS AUTO W/SCOPE: CPT | Performed by: EMERGENCY MEDICINE

## 2019-07-29 PROCEDURE — G0378 HOSPITAL OBSERVATION PER HR: HCPCS

## 2019-07-29 PROCEDURE — 93005 ELECTROCARDIOGRAM TRACING: CPT

## 2019-07-29 RX ORDER — ACETAMINOPHEN 325 MG/1
TABLET ORAL
Status: COMPLETED
Start: 2019-07-29 | End: 2019-07-29

## 2019-07-29 RX ORDER — CALCIUM CARBONATE 500 MG/1
1 TABLET, CHEWABLE ORAL
COMMUNITY

## 2019-07-29 RX ORDER — ONDANSETRON 2 MG/ML
4 INJECTION INTRAMUSCULAR; INTRAVENOUS EVERY 6 HOURS PRN
Status: DISCONTINUED | OUTPATIENT
Start: 2019-07-29 | End: 2019-08-06 | Stop reason: HOSPADM

## 2019-07-29 RX ORDER — OXYCODONE HYDROCHLORIDE 5 MG/1
5 TABLET ORAL EVERY 4 HOURS PRN
Status: DISCONTINUED | OUTPATIENT
Start: 2019-07-29 | End: 2019-08-06 | Stop reason: HOSPADM

## 2019-07-29 RX ORDER — LOSARTAN POTASSIUM 50 MG/1
50 TABLET ORAL DAILY
Status: DISCONTINUED | OUTPATIENT
Start: 2019-07-30 | End: 2019-07-31

## 2019-07-29 RX ORDER — METOPROLOL SUCCINATE 25 MG/1
25 TABLET, EXTENDED RELEASE ORAL DAILY
Status: DISCONTINUED | OUTPATIENT
Start: 2019-07-30 | End: 2019-07-31

## 2019-07-29 RX ORDER — TRAZODONE HYDROCHLORIDE 50 MG/1
100 TABLET, FILM COATED ORAL AT BEDTIME
Status: DISCONTINUED | OUTPATIENT
Start: 2019-07-30 | End: 2019-07-29

## 2019-07-29 RX ORDER — ACETAMINOPHEN 325 MG/1
650 TABLET ORAL EVERY 4 HOURS PRN
Status: DISCONTINUED | OUTPATIENT
Start: 2019-07-29 | End: 2019-08-06 | Stop reason: HOSPADM

## 2019-07-29 RX ORDER — AMLODIPINE BESYLATE 5 MG/1
5 TABLET ORAL DAILY
Status: DISCONTINUED | OUTPATIENT
Start: 2019-07-30 | End: 2019-07-31

## 2019-07-29 RX ORDER — ONDANSETRON 4 MG/1
4 TABLET, ORALLY DISINTEGRATING ORAL EVERY 6 HOURS PRN
Status: DISCONTINUED | OUTPATIENT
Start: 2019-07-29 | End: 2019-08-06 | Stop reason: HOSPADM

## 2019-07-29 RX ORDER — LIDOCAINE 40 MG/G
CREAM TOPICAL
Status: DISCONTINUED | OUTPATIENT
Start: 2019-07-29 | End: 2019-08-06 | Stop reason: HOSPADM

## 2019-07-29 RX ORDER — NALOXONE HYDROCHLORIDE 0.4 MG/ML
.1-.4 INJECTION, SOLUTION INTRAMUSCULAR; INTRAVENOUS; SUBCUTANEOUS
Status: DISCONTINUED | OUTPATIENT
Start: 2019-07-29 | End: 2019-08-06 | Stop reason: HOSPADM

## 2019-07-29 RX ADMIN — ACETAMINOPHEN 650 MG: 325 TABLET, FILM COATED ORAL at 22:32

## 2019-07-29 ASSESSMENT — ENCOUNTER SYMPTOMS
ACTIVITY CHANGE: 1
TREMORS: 1
CONFUSION: 0
NAUSEA: 0
VOMITING: 0
NECK PAIN: 1
NECK STIFFNESS: 1
JOINT SWELLING: 1
ABDOMINAL PAIN: 0
FEVER: 0

## 2019-07-29 ASSESSMENT — MIFFLIN-ST. JEOR
SCORE: 1468.29
SCORE: 1492.33

## 2019-07-30 ENCOUNTER — APPOINTMENT (OUTPATIENT)
Dept: GENERAL RADIOLOGY | Facility: CLINIC | Age: 84
DRG: 864 | End: 2019-07-30
Attending: ORTHOPAEDIC SURGERY
Payer: MEDICARE

## 2019-07-30 ENCOUNTER — APPOINTMENT (OUTPATIENT)
Dept: CARDIOLOGY | Facility: CLINIC | Age: 84
DRG: 864 | End: 2019-07-30
Attending: STUDENT IN AN ORGANIZED HEALTH CARE EDUCATION/TRAINING PROGRAM
Payer: MEDICARE

## 2019-07-30 ENCOUNTER — APPOINTMENT (OUTPATIENT)
Dept: GENERAL RADIOLOGY | Facility: CLINIC | Age: 84
DRG: 864 | End: 2019-07-30
Attending: PHYSICIAN ASSISTANT
Payer: MEDICARE

## 2019-07-30 ENCOUNTER — APPOINTMENT (OUTPATIENT)
Dept: CT IMAGING | Facility: CLINIC | Age: 84
DRG: 864 | End: 2019-07-30
Attending: STUDENT IN AN ORGANIZED HEALTH CARE EDUCATION/TRAINING PROGRAM
Payer: MEDICARE

## 2019-07-30 LAB
ANION GAP SERPL CALCULATED.3IONS-SCNC: 3 MMOL/L (ref 3–14)
BUN SERPL-MCNC: 19 MG/DL (ref 7–30)
CALCIUM SERPL-MCNC: 8.6 MG/DL (ref 8.5–10.1)
CHLORIDE SERPL-SCNC: 107 MMOL/L (ref 94–109)
CO2 SERPL-SCNC: 30 MMOL/L (ref 20–32)
CREAT SERPL-MCNC: 0.86 MG/DL (ref 0.66–1.25)
CRYSTALS SNV MICRO: NORMAL
ERYTHROCYTE [DISTWIDTH] IN BLOOD BY AUTOMATED COUNT: 13.7 % (ref 10–15)
GFR SERPL CREATININE-BSD FRML MDRD: 80 ML/MIN/{1.73_M2}
GLUCOSE SERPL-MCNC: 98 MG/DL (ref 70–99)
HCT VFR BLD AUTO: 42.5 % (ref 40–53)
HGB BLD-MCNC: 14.3 G/DL (ref 13.3–17.7)
MCH RBC QN AUTO: 31.4 PG (ref 26.5–33)
MCHC RBC AUTO-ENTMCNC: 33.6 G/DL (ref 31.5–36.5)
MCV RBC AUTO: 93 FL (ref 78–100)
PLATELET # BLD AUTO: 211 10E9/L (ref 150–450)
POTASSIUM SERPL-SCNC: 4.3 MMOL/L (ref 3.4–5.3)
RBC # BLD AUTO: 4.56 10E12/L (ref 4.4–5.9)
SODIUM SERPL-SCNC: 140 MMOL/L (ref 133–144)
SPECIMEN SOURCE SNV: NORMAL
WBC # BLD AUTO: 8.7 10E9/L (ref 4–11)

## 2019-07-30 PROCEDURE — 0R9N3ZX DRAINAGE OF RIGHT WRIST JOINT, PERCUTANEOUS APPROACH, DIAGNOSTIC: ICD-10-PCS | Performed by: PHYSICIAN ASSISTANT

## 2019-07-30 PROCEDURE — 93306 TTE W/DOPPLER COMPLETE: CPT

## 2019-07-30 PROCEDURE — G0378 HOSPITAL OBSERVATION PER HR: HCPCS

## 2019-07-30 PROCEDURE — 89060 EXAM SYNOVIAL FLUID CRYSTALS: CPT | Performed by: PHYSICIAN ASSISTANT

## 2019-07-30 PROCEDURE — 72125 CT NECK SPINE W/O DYE: CPT

## 2019-07-30 PROCEDURE — 99232 SBSQ HOSP IP/OBS MODERATE 35: CPT | Performed by: STUDENT IN AN ORGANIZED HEALTH CARE EDUCATION/TRAINING PROGRAM

## 2019-07-30 PROCEDURE — 73110 X-RAY EXAM OF WRIST: CPT | Mod: RT

## 2019-07-30 PROCEDURE — 36415 COLL VENOUS BLD VENIPUNCTURE: CPT | Performed by: STUDENT IN AN ORGANIZED HEALTH CARE EDUCATION/TRAINING PROGRAM

## 2019-07-30 PROCEDURE — 87205 SMEAR GRAM STAIN: CPT | Performed by: PHYSICIAN ASSISTANT

## 2019-07-30 PROCEDURE — 25000125 ZZHC RX 250: Performed by: STUDENT IN AN ORGANIZED HEALTH CARE EDUCATION/TRAINING PROGRAM

## 2019-07-30 PROCEDURE — 85027 COMPLETE CBC AUTOMATED: CPT | Performed by: STUDENT IN AN ORGANIZED HEALTH CARE EDUCATION/TRAINING PROGRAM

## 2019-07-30 PROCEDURE — 20605 DRAIN/INJ JOINT/BURSA W/O US: CPT | Mod: RT

## 2019-07-30 PROCEDURE — 80048 BASIC METABOLIC PNL TOTAL CA: CPT | Performed by: STUDENT IN AN ORGANIZED HEALTH CARE EDUCATION/TRAINING PROGRAM

## 2019-07-30 PROCEDURE — 87070 CULTURE OTHR SPECIMN AEROBIC: CPT | Performed by: PHYSICIAN ASSISTANT

## 2019-07-30 PROCEDURE — 25000132 ZZH RX MED GY IP 250 OP 250 PS 637: Mod: GY | Performed by: STUDENT IN AN ORGANIZED HEALTH CARE EDUCATION/TRAINING PROGRAM

## 2019-07-30 PROCEDURE — 87040 BLOOD CULTURE FOR BACTERIA: CPT | Performed by: STUDENT IN AN ORGANIZED HEALTH CARE EDUCATION/TRAINING PROGRAM

## 2019-07-30 PROCEDURE — 93306 TTE W/DOPPLER COMPLETE: CPT | Mod: 26 | Performed by: INTERNAL MEDICINE

## 2019-07-30 PROCEDURE — 25000132 ZZH RX MED GY IP 250 OP 250 PS 637: Mod: GY

## 2019-07-30 RX ORDER — LIDOCAINE HYDROCHLORIDE 10 MG/ML
30 INJECTION, SOLUTION EPIDURAL; INFILTRATION; INTRACAUDAL; PERINEURAL ONCE
Status: COMPLETED | OUTPATIENT
Start: 2019-07-30 | End: 2019-07-30

## 2019-07-30 RX ADMIN — OXYCODONE HYDROCHLORIDE 5 MG: 5 TABLET ORAL at 23:36

## 2019-07-30 RX ADMIN — ACETAMINOPHEN 650 MG: 325 TABLET, FILM COATED ORAL at 23:35

## 2019-07-30 RX ADMIN — METOPROLOL SUCCINATE 25 MG: 25 TABLET, EXTENDED RELEASE ORAL at 08:21

## 2019-07-30 RX ADMIN — ACETAMINOPHEN 650 MG: 325 TABLET, FILM COATED ORAL at 13:55

## 2019-07-30 RX ADMIN — LOSARTAN POTASSIUM 50 MG: 50 TABLET ORAL at 08:21

## 2019-07-30 RX ADMIN — ACETAMINOPHEN 650 MG: 325 TABLET, FILM COATED ORAL at 05:12

## 2019-07-30 RX ADMIN — OXYCODONE HYDROCHLORIDE 5 MG: 5 TABLET ORAL at 08:24

## 2019-07-30 RX ADMIN — AMLODIPINE BESYLATE 5 MG: 5 TABLET ORAL at 08:21

## 2019-07-30 RX ADMIN — LIDOCAINE HYDROCHLORIDE 3 ML: 10 INJECTION, SOLUTION EPIDURAL; INFILTRATION; INTRACAUDAL; PERINEURAL at 14:37

## 2019-07-30 RX ADMIN — OXYCODONE HYDROCHLORIDE 5 MG: 5 TABLET ORAL at 00:00

## 2019-07-30 RX ADMIN — OXYCODONE HYDROCHLORIDE 5 MG: 5 TABLET ORAL at 13:55

## 2019-07-30 RX ADMIN — RIVAROXABAN 20 MG: 20 TABLET, FILM COATED ORAL at 17:05

## 2019-07-30 NOTE — PLAN OF CARE
PRIMARY DIAGNOSIS: GENERALIZED WEAKNESS    OUTPATIENT/OBSERVATION GOALS TO BE MET BEFORE DISCHARGE  1. Orthostatic performed: N/A    2. Tolerating PO medications: Yes    3. Return to near baseline physical activity: Yes    4. Cleared for discharge by consultants (if involved): No, ortho consult    5.  Echo: Not complete    Discharge Planner Nurse   Safe discharge environment identified: No  Barriers to discharge: No       Entered by: Freda Banks 07/30/2019 12:01 PM     Please review provider order for any additional goals.   Nurse to notify provider when observation goals have been met and patient is ready for discharge.

## 2019-07-30 NOTE — H&P
[facility]    History and Physical - Hospitalist Service       Date of Admission:  7/29/2019    Assessment & Plan      Yosef Murry is a 83 year old male admitted on 7/29/2019. He presents with generalized weakness.     Generalized Weakness  Right wrist pain/swelling  Assessment: Presents with 3 to 4-day history of progressive generalized weakness.  At this point unclear etiology, work-up is been largely unrevealing.  WBC is within normal limits, patient is afebrile.  However CRP is elevated at 61.9.  Physical exam is pertinent for right wrist pain/swelling/warmth, and in setting of systemic symptoms of weakness and elevated CRP, certainly does raise suspicion for possible septic joint.  UA otherwise is not suggestive of infection, chest x-ray showed no acute airspace consolidation of pneumonia.  Otherwise mild CHF?  Plan:  -Admit to observation  -Orthopedic surgery consult to evaluate wrist  -Follow vitals/temp  -Check echo      Atrial fibrillation (H)    Persistent atrial fibrillation (H)    Assessment/Plan: Resume Xarelto tomorrow, will hold in case procedure needed for wrist in a.m.         Diet: Cardiac Diet  DVT Prophylaxis: Xarelto  [place]wood Catheter: not present  Code Status: DNR/DNI    Disposition Plan   Expected discharge: Tomorrow, recommended to prior living arrangement once work-up completed.  Entered: Caleb Rodriguez MD 07/29/2019, 9:45 PM     The patient's care was discussed with the Patient, Patient's Family and ED Provider, Dr. Mata.    Caleb Rodriguez MD  [facility]    ______________________________________________________________________    Chief Complaint     Generalized Weakness    History is obtained from the patient    History of Present Illness   Yosef Murry is a 83 year old male with past medical history of atrial fibrillation on Xarelto presents for evaluation of generalized weakness.    Patient son-in-law reports that patient was recently started on trazodone 2  weeks prior to admission,  with subsequent loss of urinary control, this was stopped.  This medication was recently restarted 3 to 4 days prior to admission.  Patient was of reports at baseline he is able to walk 3 to 4 miles without any dyspnea/chest pain.  However the past 3 to 4 days he is noticed progressive generalized weakness, bilateral neck pain, and worsening right wrist swelling/pain.  He is also noticed more tremors in his right upper extremity.  He also endorses neck stiffness, but no radiation of neck pain or stiffness.  He endorses that his baseline knee pain from arthritis is unchanged.  He denies any nausea/vomiting or abdominal pain.  He denies any slurred speech, fevers, confusion.  He denies any recent trauma to his hand/wrist.  He has not had previous history of gout, carpal tunnel or arthritis in his wrist.  He denies any chest pain or shortness breath.  He reports that he does have lower extremity edema bilaterally at baseline, though it may have worsened slightly over the past 3 days as well.  He otherwise reports that his p.o. intake has been at baseline.  Denies any recent lacerations, rashes.  He otherwise denies any other complaints at this time.    Review of Systems    The 10 point Review of Systems is negative other than noted in the HPI or here.     Past Medical History    I have reviewed this patient's medical history and updated it with pertinent information if needed.   Past Medical History:   Diagnosis Date     Anal fistula      Antiplatelet or antithrombotic long-term use      Arrhythmia      Atrial flutter (H) 2012     Cerebral infarction (H)     TIA     Heartburn      Hypertension      Inguinal hernia, left      Persistent atrial fibrillation (H) 8/21/12     Prostate cancer (H)      TIA (transient ischaemic attack)     2014       Past Surgical History   I have reviewed this patient's surgical history and updated it with pertinent information if needed.  Past Surgical History:    Procedure Laterality Date     COLONOSCOPY       ENT SURGERY      tonsllectomy     EXAM UNDER ANESTHESIA, FISTULOTOMY RECTUM, COMBINED N/A 2015    Procedure: COMBINED EXAM UNDER ANESTHESIA, FISTULOTOMY RECTUM;  Surgeon: Candice Carty MD;  Location: [facility]     GENITOURINARY SURGERY      PROSTATECTOMY     HERNIORRHAPHY INGUINAL  2013    Procedure: HERNIORRHAPHY INGUINAL;  LEFT INGUINAL HERNIA REPAIR WITH MESH;  Surgeon: Filipe Fairchild MD;  Location: [facility]     HERNIORRHAPHY INGUINAL Right 2019    Procedure: OPEN RIGHT INGUINA HERNIA REPAIR WITH MESH;  Surgeon: Filipe Fairchild MD;  Location:  OR     ORTHOPEDIC SURGERY      WRIST SURGERY - LEFT       Social History   I have reviewed this patient's social history and updated it with pertinent information if needed.  Social History     Tobacco Use     Smoking status: Former Smoker     Years: 16.00     Types: Cigarettes     Last attempt to quit: 1968     Years since quittin.6     Smokeless tobacco: Never Used   Substance Use Topics     Alcohol use: Yes     Alcohol/week: 0.5 oz     Types: 1 Cans of beer per week     Frequency: 2-3 times a week     Drinks per session: 1 or 2     Binge frequency: Never     Comment: SOCIALLY     Drug use: No       Family History   I have reviewed this patient's family history and updated it with pertinent information if needed.   Family History   Problem Relation Age of Onset     Ovarian Cancer Mother      Osteoporosis Father      Unknown/Adopted Sister      Prior to Admission Medications   Prior to Admission Medications   Prescriptions Last Dose Informant Patient Reported? Taking?   HYDROcodone-acetaminophen (NORCO) 5-325 MG tablet   No No   Sig: Take 1-2 tablets by mouth every 4 hours as needed for moderate to severe pain   Multiple Vitamins-Minerals (PRESERVISION AREDS 2 PO)   Yes No   Sig: Take 2 capsules by mouth daily    amLODIPine (NORVASC) 5 MG tablet   No No   Sig: Take 1 tablet (5 mg) by  mouth daily   calcium carbonate antacid (TUMS ULTRA 1000) 1000 MG CHEW   Yes No   Sig: Take  by mouth. 1-2 tablets per day   diphenhydrAMINE-acetaminophen (TYLENOL PM)  MG tablet   Yes No   Sig: Take 2 tablets by mouth nightly as needed for sleep   leuprolide acetate (LUPRON) 1 MG/0.2ML kit   Yes No   Sig: Inject Subcutaneous daily Unknown dose.  Takes every 6 months.   loratadine (CLARITIN) 10 MG tablet   Yes No   Sig: Take 1 tablet by mouth daily.   losartan (COZAAR) 50 MG tablet   No No   Sig: Take 1 tablet (50 mg) by mouth daily   metoprolol succinate (TOPROL XL) 25 MG 24 hr tablet   No No   Sig: Take 1 tablet (25 mg) by mouth daily   rivaroxaban ANTICOAGULANT (XARELTO) 20 MG TABS tablet   No No   Sig: Take 1 tablet (20 mg) by mouth daily (with dinner)   traZODone (DESYREL) 100 MG tablet   No No   Sig: Take 1 tablet (100 mg) by mouth At Bedtime      Facility-Administered Medications: None     Allergies   Allergies   Allergen Reactions     Other [Seasonal Allergies]        Physical Exam   Vital Signs: Temp: 98.4  F (36.9  C) Temp src: Oral BP: (!) 163/93   Heart Rate: 90 Resp: 20 SpO2: 94 % O2 Device: None (Room air)    Weight: 168 lbs 0 oz    Constitutional: no apparent distress  Eyes: Lids and lashes normal, pupils equal, round and reactive to light, extra ocular muscles intact, sclera clear, conjunctiva normal  ENT: Normocephalic, without obvious abnormality, atraumatic, sinuses nontender on palpation, external ears without lesions  Neck: There is paraspinal muscle tenderness bilaterally.  Neck range of motion is slightly diminished, but no obvious signs of meningismus.  Hematologic / Lymphatic: no cervical lymphadenopathy  Respiratory: CTABL  Cardiovascular: RRR with no m/r/g  GI: NT, ND, BS+  Skin: normal skin color, texture, turgor  Musculoskeletal: There is tenderness to palpation of the patient's right wrist with concomitant swelling/warmth.  Radial pulses palpable, otherwise full range of motion  noted.  Motor strength is 5 out of 5 all extremities bilaterally.  Tone is normal.  Neurologic: Awake, alert, oriented to name, place and time.  Cranial nerves II-XII are grossly intact.  Motor is 5 out of 5 bilaterally.   Sensory is intact.   Neuropsychiatric: normal mood and affect    Data   Data reviewed today: I reviewed all medications, new labs and imaging results over the last 24 hours. I personally reviewed the EKG tracing showing Afib with CVR and the chest x-ray image(s) showing no acute airspace disease.    Most Recent 3 CBC's:  Recent Labs   Lab Test 07/29/19  1952 06/10/19  0949 06/21/18  0941   WBC 11.0 6.2 6.5   HGB 16.1 16.4 16.4   MCV 94 95 96    209 214     Most Recent 3 BMP's:  Recent Labs   Lab Test 07/29/19  1952 06/10/19  0949 06/21/18  0941    140  142 142   POTASSIUM 4.1 4.8  4.4 4.4   CHLORIDE 103 106  107 105   CO2 29 27  29 29   BUN 18 13  14 17   CR 0.94 0.95  0.92 0.88   ANIONGAP 5 7  6 8   BELLO 9.1 8.7  9.2 9.5   * 88  86 89     Most Recent Urinalysis:  Recent Labs   Lab Test 06/10/19  0949 06/21/18  0940   COLOR Yellow Yellow   APPEARANCE Clear Slightly Cloudy   URINEGLC Negative Negative   URINEBILI Negative Negative   URINEKETONE Negative Negative   SG 1.020 1.020   UBLD Negative Trace*   URINEPH 7.0 7.0   PROTEIN Negative Negative   UROBILINOGEN 0.2 0.2   NITRITE Negative Negative   LEUKEST Negative Negative   RBCU  --  2-5*   WBCU  --  0 - 5     Recent Results (from the past 24 hour(s))   US Upper Extremity Venous Duplex Right    Narrative    US UPPER EXTREMITY VENOUS DUPLEX RIGHT 7/29/2019 8:52 PM     HISTORY: right upper extremity swelling    TECHNIQUE: Venous Doppler US of the upper extremity.? Color flow and  spectral Doppler with waveform analysis performed.    COMPARISON: None.    FINDINGS: Ultrasound of the right upper extremity demonstrates no deep  vein thrombus in the subclavian, axillary or brachial veins. No  thrombus seen in the cephalic or  basilic veins or visualized portions  or internal jugular vein.      Impression    IMPRESSION: No DVT identified right upper extremity.    SUNDAR MANN MD   XR Chest 2 Views    Narrative    XR CHEST 2 VW   7/29/2019 9:18 PM     HISTORY: weakness, concern for right upper lung mass with new RUE  swelling    COMPARISON: Film dated 5/13/2016    FINDINGS: The heart is negative.  The lungs are clear. The pulmonary  vasculature is normal.  Healed left rib fractures are again noted..      Impression    IMPRESSION: No active infiltrates. No right upper lobe mass is  identified.        TIARRA AUSTIN MD

## 2019-07-30 NOTE — CONSULTS
[facility]    Orthopedic Consultation    Yosef Murry MRN# [id number]   Age: 83 year old YOB: 1935     Date of Admission: 7/29/2019    Reason for consult: Right wrist pain/swelling       Requesting physician: Caleb Rodriguez MD       Level of consult: Consult, follow and place orders           Assessment and Plan:   Assessment:   Right wrist pain/swelling, possible gouty flare        Plan:   Discussed plan of care with Dr Carey.    IR aspirate of right wrist will be ordered to evaluate for crystals/organisms.  XR negative for fracture  Elevate RUE above chest height when at rest  Wrist splint for comfort  If not appearing septic, low dose steroids would be beneficial  ROM, WBAT RUE           Chief Complaint:   Right wrist and neck pain         History of Present Illness:   Yosef Murry is a 83 year old male with history of TIA, prostate cancer, A-fib, and arrhythmia who presented to the ED last evening with generalized weakness. The patient's son-in-law states the patient started trazodone 2 weeks prior, resulting in loss of urinary control, subsequently leading to stopping the medication. The patient's daughter states the patient started the medication back up 3-4 days ago and has resulted in generalized weakness, bilateral neck pain, right hand swelling, and tremors. The patient further admits to neck stiffness.    Orthopedics consulted to evaluate right wrist.  Patient denies history of injury or gout. His wrist pain is slightly improved vs yesterday but remains painful and swollen.            Past Medical History:     Past Medical History:   Diagnosis Date     Anal fistula      Antiplatelet or antithrombotic long-term use      Arrhythmia      Atrial flutter (H) 2012     Cerebral infarction (H)     TIA     Heartburn      Hypertension      Inguinal hernia, left      Persistent atrial fibrillation (H) 8/21/12     Prostate cancer (H)      TIA (transient ischaemic attack)     2014              Past Surgical History:     Past Surgical History:   Procedure Laterality Date     COLONOSCOPY       ENT SURGERY      tonsllectomy     EXAM UNDER ANESTHESIA, FISTULOTOMY RECTUM, COMBINED N/A 2015    Procedure: COMBINED EXAM UNDER ANESTHESIA, FISTULOTOMY RECTUM;  Surgeon: Candice Carty MD;  Location: [facility]     GENITOURINARY SURGERY      PROSTATECTOMY     HERNIORRHAPHY INGUINAL  2013    Procedure: HERNIORRHAPHY INGUINAL;  LEFT INGUINAL HERNIA REPAIR WITH MESH;  Surgeon: Filipe Fairchild MD;  Location: [facility]     HERNIORRHAPHY INGUINAL Right 2019    Procedure: OPEN RIGHT INGUINA HERNIA REPAIR WITH MESH;  Surgeon: Filipe Fairchild MD;  Location:  OR     ORTHOPEDIC SURGERY      WRIST SURGERY - LEFT             Social History:     Social History     Tobacco Use     Smoking status: Former Smoker     Years: 16.00     Types: Cigarettes     Last attempt to quit: 1968     Years since quittin.6     Smokeless tobacco: Never Used   Substance Use Topics     Alcohol use: Yes     Alcohol/week: 0.5 oz     Types: 1 Cans of beer per week     Frequency: 2-3 times a week     Drinks per session: 1 or 2     Binge frequency: Never     Comment: SOCIALLY             Family History:     Family History   Problem Relation Age of Onset     Ovarian Cancer Mother      Osteoporosis Father      Unknown/Adopted Sister              Immunizations:     VACCINE/DOSE   Diptheria   DPT   DTAP   HBIG   Hepatitis A   Hepatitis B   HIB   Influenza   Measles   Meningococcal   MMR   Mumps   Pneumococcal   Polio   Rubella   Small Pox   TDAP   Varicella   Zoster             Allergies:     Allergies   Allergen Reactions     Other [Seasonal Allergies]              Medications:     Current Facility-Administered Medications   Medication     acetaminophen (TYLENOL) tablet 650 mg     amLODIPine (NORVASC) tablet 5 mg     lidocaine (LMX4) cream     lidocaine 1 % 0.1-1 mL     losartan (COZAAR) tablet 50 mg     melatonin tablet 1  "mg     metoprolol succinate ER (TOPROL-XL) 24 hr tablet 25 mg     naloxone (NARCAN) injection 0.1-0.4 mg     ondansetron (ZOFRAN-ODT) ODT tab 4 mg    Or     ondansetron (ZOFRAN) injection 4 mg     oxyCODONE (ROXICODONE) tablet 5 mg     rivaroxaban ANTICOAGULANT (XARELTO) tablet 20 mg     sodium chloride (PF) 0.9% PF flush 3 mL     sodium chloride (PF) 0.9% PF flush 3 mL             Review of Systems:   CV: NEGATIVE for chest pain, palpitations or peripheral edema  C: NEGATIVE for fever, chills, change in weight  E/M: NEGATIVE for ear, mouth and throat problems  R: NEGATIVE for significant cough or SOB          Physical Exam:   All vitals have been reviewed  Patient Vitals for the past 24 hrs:   BP Temp Temp src Heart Rate Resp SpO2 Height Weight   07/30/19 0855 -- -- -- -- 16 -- -- --   07/30/19 0824 -- -- -- -- 18 -- -- --   07/30/19 0750 115/70 98.2  F (36.8  C) Oral 75 16 96 % -- --   07/30/19 0500 129/89 99.8  F (37.7  C) Oral 64 16 95 % -- --   07/30/19 0003 117/68 97.8  F (36.6  C) Oral -- -- -- -- --   07/29/19 2222 (!) 160/97 101.7  F (38.7  C) Oral 91 18 94 % 1.778 m (5' 10\") 78.6 kg (173 lb 4.8 oz)   07/29/19 2207 (!) 157/99 -- -- 83 18 96 % -- --   07/29/19 1935 (!) 163/93 98.4  F (36.9  C) Oral 90 20 94 % 1.778 m (5' 10\") 76.2 kg (168 lb)       Intake/Output Summary (Last 24 hours) at 7/30/2019 0914  Last data filed at 7/30/2019 0700  Gross per 24 hour   Intake 280 ml   Output --   Net 280 ml     On exam:  Right wrist has obvious effusion.  Global tenderness to palpation surrounding the wrist.  Decreased AROM in all planes due to pain and swelling.  NVI with equal pulses and sensation.  Denies pain with elbow ROM.  Able to resist right wrist extension and flexion.           Data:   All laboratory data reviewed  Results for orders placed or performed during the hospital encounter of 07/29/19   US Upper Extremity Venous Duplex Right    Narrative    US UPPER EXTREMITY VENOUS DUPLEX RIGHT 7/29/2019 8:52 PM "     HISTORY: right upper extremity swelling    TECHNIQUE: Venous Doppler US of the upper extremity.? Color flow and  spectral Doppler with waveform analysis performed.    COMPARISON: None.    FINDINGS: Ultrasound of the right upper extremity demonstrates no deep  vein thrombus in the subclavian, axillary or brachial veins. No  thrombus seen in the cephalic or basilic veins or visualized portions  or internal jugular vein.      Impression    IMPRESSION: No DVT identified right upper extremity.    SUNDAR MANN MD   XR Chest 2 Views    Narrative    XR CHEST 2 VW   7/29/2019 9:18 PM     HISTORY: weakness, concern for right upper lung mass with new RUE  swelling    COMPARISON: Film dated 5/13/2016    FINDINGS: The heart is negative.  The lungs are clear. The pulmonary  vasculature is normal.  Healed left rib fractures are again noted..      Impression    IMPRESSION: No active infiltrates. No right upper lobe mass is  identified.        TIARRA AUSTIN MD   CBC with platelets differential   Result Value Ref Range    WBC 11.0 4.0 - 11.0 10e9/L    RBC Count 5.11 4.4 - 5.9 10e12/L    Hemoglobin 16.1 13.3 - 17.7 g/dL    Hematocrit 47.9 40.0 - 53.0 %    MCV 94 78 - 100 fl    MCH 31.5 26.5 - 33.0 pg    MCHC 33.6 31.5 - 36.5 g/dL    RDW 13.7 10.0 - 15.0 %    Platelet Count 243 150 - 450 10e9/L    Diff Method Automated Method     % Neutrophils 74.8 %    % Lymphocytes 14.9 %    % Monocytes 9.8 %    % Eosinophils 0.1 %    % Basophils 0.2 %    % Immature Granulocytes 0.2 %    Nucleated RBCs 0 0 /100    Absolute Neutrophil 8.2 1.6 - 8.3 10e9/L    Absolute Lymphocytes 1.6 0.8 - 5.3 10e9/L    Absolute Monocytes 1.1 0.0 - 1.3 10e9/L    Absolute Eosinophils 0.0 0.0 - 0.7 10e9/L    Absolute Basophils 0.0 0.0 - 0.2 10e9/L    Abs Immature Granulocytes 0.0 0 - 0.4 10e9/L    Absolute Nucleated RBC 0.0    Comprehensive metabolic panel   Result Value Ref Range    Sodium 137 133 - 144 mmol/L    Potassium 4.1 3.4 - 5.3 mmol/L    Chloride 103 94 -  109 mmol/L    Carbon Dioxide 29 20 - 32 mmol/L    Anion Gap 5 3 - 14 mmol/L    Glucose 115 (H) 70 - 99 mg/dL    Urea Nitrogen 18 7 - 30 mg/dL    Creatinine 0.94 0.66 - 1.25 mg/dL    GFR Estimate 75 >60 mL/min/[1.73_m2]    GFR Estimate If Black 87 >60 mL/min/[1.73_m2]    Calcium 9.1 8.5 - 10.1 mg/dL    Bilirubin Total 1.0 0.2 - 1.3 mg/dL    Albumin 3.4 3.4 - 5.0 g/dL    Protein Total 8.0 6.8 - 8.8 g/dL    Alkaline Phosphatase 100 40 - 150 U/L    ALT 14 0 - 70 U/L    AST 13 0 - 45 U/L   Troponin I   Result Value Ref Range    Troponin I ES 0.022 0.000 - 0.045 ug/L   TSH with free T4 reflex   Result Value Ref Range    TSH 4.68 (H) 0.40 - 4.00 mU/L   UA with Microscopic   Result Value Ref Range    Color Urine Yellow     Appearance Urine Clear     Glucose Urine Negative NEG^Negative mg/dL    Bilirubin Urine Negative NEG^Negative    Ketones Urine Negative NEG^Negative mg/dL    Specific Gravity Urine 1.021 1.003 - 1.035    Blood Urine Trace (A) NEG^Negative    pH Urine 5.5 5.0 - 7.0 pH    Protein Albumin Urine 30 (A) NEG^Negative mg/dL    Urobilinogen mg/dL Normal 0.0 - 2.0 mg/dL    Nitrite Urine Negative NEG^Negative    Leukocyte Esterase Urine Negative NEG^Negative    Source Midstream Urine     WBC Urine 1 0 - 5 /HPF    RBC Urine 10 (H) 0 - 2 /HPF    Squamous Epithelial /HPF Urine <1 0 - 1 /HPF    Mucous Urine Present (A) NEG^Negative /LPF   Nt probnp inpatient (BNP)   Result Value Ref Range    N-Terminal Pro BNP Inpatient 2,063 (H) 0 - 1,800 pg/mL   T4 free   Result Value Ref Range    T4 Free 0.94 0.76 - 1.46 ng/dL   CRP inflammation   Result Value Ref Range    CRP Inflammation 61.9 (H) 0.0 - 8.0 mg/L   Uric acid   Result Value Ref Range    Uric Acid 3.2 (L) 3.5 - 7.2 mg/dL   Erythrocyte sedimentation rate auto   Result Value Ref Range    Sed Rate 11 0 - 20 mm/h   Basic metabolic panel   Result Value Ref Range    Sodium 140 133 - 144 mmol/L    Potassium 4.3 3.4 - 5.3 mmol/L    Chloride 107 94 - 109 mmol/L    Carbon  Dioxide 30 20 - 32 mmol/L    Anion Gap 3 3 - 14 mmol/L    Glucose 98 70 - 99 mg/dL    Urea Nitrogen 19 7 - 30 mg/dL    Creatinine 0.86 0.66 - 1.25 mg/dL    GFR Estimate 80 >60 mL/min/[1.73_m2]    GFR Estimate If Black >90 >60 mL/min/[1.73_m2]    Calcium 8.6 8.5 - 10.1 mg/dL   CBC with platelets   Result Value Ref Range    WBC 8.7 4.0 - 11.0 10e9/L    RBC Count 4.56 4.4 - 5.9 10e12/L    Hemoglobin 14.3 13.3 - 17.7 g/dL    Hematocrit 42.5 40.0 - 53.0 %    MCV 93 78 - 100 fl    MCH 31.4 26.5 - 33.0 pg    MCHC 33.6 31.5 - 36.5 g/dL    RDW 13.7 10.0 - 15.0 %    Platelet Count 211 150 - 450 10e9/L          Attestation:  I have reviewed today's vital signs, notes, medications, labs and imaging with Dr. Carey.  Amount of time performed on this consult: 30 minutes.    Nimo Wilder PA-C

## 2019-07-30 NOTE — ED PROVIDER NOTES
"  History     Chief Complaint:  Generalized Weakness    HPI   Yosef Murry is a 83 year old male with history of TIA, prostate cancer, A-fib, and arrhythmia who presents with generalized weakness. The patient's son-in-law states the patient started trazodone 2 weeks prior, resulting in loss of urinary control, subsequently leading to stopping the medication. The patient's daughter states the patient started the medication back up 3-4 days ago and has resulted in generalized weakness, bilateral neck pain, right hand swelling, and tremors. The patient further admits to neck stiffness. The patient denies nausea, vomiting, confusion, and fever.    Allergies:  No known drug allergies    Medications:    Norvasc  Norco  Lupron  Cozaar  Toprol  Xarelto  Desyrel  Trazodone    Past Medical History:    Anal fistula  Antiplatelet  Arrhythmia  Atrial flutter  Cerebral infarction  Heartburn  HTN  Inguinal hernia -left  Atrial fibrillation  Prostate cancer  TIA    Past Surgical History:    Tonsillectomy  Fistulotomy rectum  Prostatectomy  Herniorrhaphy inguinal - bilateral  Wrist surgery - left    Family History:    Ovarian cancer  Osteoporosis    Social History:  Smoking status: Former - 12/31/1968  Alcohol use: Yes  The patient presents to the emergency department with family.  Marital Status:   [2]    Review of Systems   Constitutional: Positive for activity change. Negative for fever.   Gastrointestinal: Negative for abdominal pain, nausea and vomiting.   Musculoskeletal: Positive for joint swelling, neck pain and neck stiffness.   Neurological: Positive for tremors.   Psychiatric/Behavioral: Negative for confusion.   All other systems reviewed and are negative.    Physical Exam   Patient Vitals for the past 24 hrs:   BP Temp Temp src Heart Rate Resp SpO2 Height Weight   07/29/19 1935 (!) 163/93 98.4  F (36.9  C) Oral 90 20 94 % 1.778 m (5' 10\") 76.2 kg (168 lb)     Physical Exam  General: Alert, appears elderly, " otherwise well-developed and well-nourished. Cooperative.     In mild distress  HEENT:  Head:  Atraumatic  Ears:  External ears are normal  Mouth/Throat:  Oropharynx is without erythema or exudate and mucous membranes are moist.   Eyes:   Conjunctivae normal and EOM are normal. No scleral icterus.    Pupils are equal, round, and reactive to light.   Neck:   Decreased range of motion due to paraspinal mm tenderness.  No midline C spine tenderness. Neck supple.  CV:  Irregular rate, irregular rhythm, normal heart sounds and radial pulses are 2+ and symmetric.  No murmur.  Resp:  Breath sounds are clear bilaterally    Non-labored, no retractions or accessory muscle use  GI:  Abdomen is soft, no distension, no tenderness. No rebound or guarding.  No CVA tenderness bilaterally  :  Well healed incision to pubis and right groin. Normal age appropriate genitalia.   MS:  Normal range of motion. RUE with trace non pitting edema.  BLE with 1+ pitting edema.    Mild tenderness to flexion/extension of right knee and right wrist.     Back atraumatic.    No midline cervical, thoracic, or lumbar tenderness  Skin:  Warm and dry.  No rash or lesions noted.  Neuro: Alert. Globally weak.  Sensation intact in all 4 extremities. GCS: 15    Cranial nerves 2-12 intact.  Psych:  Normal mood and affect.  Lymph: No anterior or posterior cervical lymphadenopathy noted.    Emergency Department Course   ECG (20:08:36):  Rate 85 bpm. KS interval *. QRS duration 92. QT/QTc 366/435. P-R-T axes * -17 14. Atrial fibrillation. RSR in V1, non diagnostic. Abnormal ECG. No significant change compared to EKG dated 05/08/19. Interpreted at 2015 by Jordin Mata MD.    Imaging:  Radiographic findings were communicated with the patient who voiced understanding of the findings.    US Upper Extremity Venous Duplex Right  IMPRESSION: No DVT identified right upper extremity.    SUNDAR MANN MD  As read by Radiology.    XR Chest 2 Views  IMPRESSION: No  active infiltrates. No right upper lobe mass is  Identified.    As read by Radiology.    Laboratory:  BNP 2068 (H)  TSH 4.68 (H)  1952 Troponin I 0.022  TSH 4.68 (H)    CMP: Glucose 115 (H), o/w WNL (Creatinine 0.94)  CBC: WNL (WBC 11.0, HGB 16.1, )    Emergency Department Course:  Past medical records, nursing notes, and vitals reviewed.  2010: I performed an exam of the patient and obtained history, as documented above.    IV inserted and blood drawn.    The patient was sent for a upper extremity US and chest x-ray while in the emergency department, findings above.    2123: I rechecked the patient. Explained findings to patient.    Findings and plan explained to the Patient who consents to admission.     2129: Discussed the patient with Dr. Ryan, who will admit the patient to a observation bed for further monitoring, evaluation, and treatment.     Impression & Plan    Medical Decision Making:  Patient is 83-year-old male with a history of TIA, prostate cancer, atrial fibrillation on chronic anticoagulation who presents with generalized weakness last 24 hours.  Patient has had a recent medication change with trazodone approximately 2 weeks ago.  Patient ultimately stopped the medication but restarted it in the last 3 to 4 days, concern this may be relating to the increasing weakness today.  Patient does have some neck stiffness but the tenderness appears to be on the paraspinal muscles of the cervical spine and no midline tenderness.  Ultimately patient is afebrile here blood work is reassuring.  He has no focal neurologic deficits and low concern for stroke because of his presentation.  Urinalysis has not been able to be obtained at this time but certainly UTI could potentially be the patient's weakness presentation.  Patient's BNP is mildly elevated and question of whether this may represent a potential heart failure presentation.  He does have swelling to the lower extremities as well as nonpitting  edema to the right upper extremity of unclear significance.  Reassuringly ultrasound of the right upper extremity shows no evidence of DVT.  Chest x-ray unremarkable and no significant mass found of the right upper lobe.  Patient's TSH is elevated although free T4 within normal limits.  EKG shows atrial fibrillation consistent with prior EKGs and no concerning ischemic changes.  Troponin within reference range and low concern for ACS as the cause of his presentation with no active chest pain.  Unclear to the exact etiology of presentation, may be early infectious presentation, spoke with Dr. Wheeler of the hospitalist service who agreed to observation admission for further evaluation and work-up of the patient's generalized weakness.    Diagnosis:    ICD-10-CM    1. Weakness R53.1 UA with Microscopic     CRP inflammation     CRP inflammation     Uric acid     Uric acid     Erythrocyte sedimentation rate auto     Erythrocyte sedimentation rate auto     CANCELED: Erythrocyte sedimentation rate auto     CANCELED: CRP inflammation     CANCELED: Uric acid   2. Swelling of right upper extremity M79.89    3. Localized swelling of both lower legs R22.43      Disposition:  Admitted to observation.     Lyla Oconnor  7/29/2019    EMERGENCY DEPARTMENT  Scribe Disclosure:  I, Lyla Oconnor, am serving as a scribe at 8:10 PM on 7/29/2019 to document services personally performed by Jordin Mata MD based on my observations and the provider's statements to me.        Jordin Mata MD  07/29/19 3645

## 2019-07-30 NOTE — PROGRESS NOTES
PRIMARY DIAGNOSIS: GENERALIZED WEAKNESS    OUTPATIENT/OBSERVATION GOALS TO BE MET BEFORE DISCHARGE  1. Orthostatic performed: No    2. Tolerating PO medications: Yes    3. Return to near baseline physical activity: Yes    4. Cleared for discharge by consultants (if involved): No    Discharge Planner Nurse   Safe discharge environment identified: Yes  Barriers to discharge: No       Entered by: Milind Figueroa 07/30/2019 5:18 AM     Please review provider order for any additional goals.   Nurse to notify provider when observation goals have been met and patient is ready for discharge.

## 2019-07-30 NOTE — PROGRESS NOTES
PRIMARY DIAGNOSIS: GENERALIZED WEAKNESS    OUTPATIENT/OBSERVATION GOALS TO BE MET BEFORE DISCHARGE  1. Orthostatic performed: No    2. Tolerating PO medications: Yes    3. Return to near baseline physical activity: Yes    4. Cleared for discharge by consultants (if involved): No    Discharge Planner Nurse   Safe discharge environment identified: Yes  Barriers to discharge: No       Entered by: Milind Figueroa 07/30/2019 5:19 AM     Please review provider order for any additional goals.   Nurse to notify provider when observation goals have been met and patient is ready for discharge.

## 2019-07-30 NOTE — PLAN OF CARE
PRIMARY DIAGNOSIS: GENERALIZED WEAKNESS    OUTPATIENT/OBSERVATION GOALS TO BE MET BEFORE DISCHARGE  1. Orthostatic performed: N/A    2. Tolerating PO medications: Yes    3. Return to near baseline physical activity: Yes    4. Cleared for discharge by consultants (if involved): No, ortho consult    5.  Echo: Not complete    Discharge Planner Nurse   Safe discharge environment identified: No  Barriers to discharge: No       Entered by: Freda Banks 07/30/2019 8:54 AM     Please review provider order for any additional goals.   Nurse to notify provider when observation goals have been met and patient is ready for discharge.

## 2019-07-30 NOTE — PROGRESS NOTES
RECEIVING UNIT ED HANDOFF REVIEW    ED Nurse Handoff Report was reviewed by: Milind Figueroa on July 29, 2019 at 10:02 PM

## 2019-07-30 NOTE — ED TRIAGE NOTES
Pt started trazadone 2 weeks ago. Pt reports feeling shaky, and weak. Family noted he gait is unsteady. Family thinks pt is confused. Pt A/O x4

## 2019-07-30 NOTE — PLAN OF CARE
Pt A&O x4. VSS on RA, ex fever, Tylenol administered. Pt complaining of neck, R arm, and R leg pain, Oxycodone 5 mg available. Up with SBA in room. Voiding adequately. Tolerating regular diet. Plan for echo and ortho surgery consult. Continue to monitor.

## 2019-07-30 NOTE — PROGRESS NOTES
RADIOLOGY PROCEDURE NOTE  Patient name: Yosef Murry  MRN: [id number]  : 1935    Pre-procedure diagnosis: right wrist pain and swelling, rule out gout vs septic arthritis  Post-procedure diagnosis: Same    Procedure Date/Time: 2019  3:14 PM  Procedure: right wrist aspiration   Estimated blood loss: None  Specimen(s) collected with description: 0.5mL clear pink fluid  The patient tolerated the procedure well with no immediate complications.  Significant findings: none    See imaging dictation for procedural details.    Provider name: Myriam Fernandes  Assistant(s):None

## 2019-07-30 NOTE — PHARMACY-ADMISSION MEDICATION HISTORY
Admission medication history interview status for the 7/29/2019  admission is complete. See EPIC admission navigator for prior to admission medications     Medication history source reliability:Good    Actions taken by pharmacist (provider contacted, etc):None     Additional medication history information not noted on PTA med list :None    Medication reconciliation/reorder completed by provider prior to medication history? No    Time spent in this activity: 15 minutes     Prior to Admission medications    Medication Sig Last Dose Taking? Auth Provider   amLODIPine (NORVASC) 5 MG tablet Take 1 tablet (5 mg) by mouth daily 7/29/2019 at noon Yes Henna Murphy MD   calcium carbonate (TUMS) 500 MG chewable tablet Take 1 chew tab by mouth At Bedtime 7/28/2019 at pm Yes Unknown, Entered By History   HYDROcodone-acetaminophen (NORCO) 5-325 MG tablet Take 1-2 tablets by mouth every 4 hours as needed for moderate to severe pain 7/28/2019 at pm Yes Marcelo Mcdonough PA-C   loratadine (CLARITIN) 10 MG tablet Take 1 tablet by mouth daily. 7/28/2019 at pm Yes Jordin Calix DPM   losartan (COZAAR) 50 MG tablet Take 1 tablet (50 mg) by mouth daily 7/28/2019 at noon Yes Henna Murphy MD   metoprolol succinate (TOPROL XL) 25 MG 24 hr tablet Take 1 tablet (25 mg) by mouth daily 7/29/2019 at am Yes Henna Murphy MD   Multiple Vitamins-Minerals (PRESERVISION AREDS 2 PO) Take 1 capsule by mouth 2 times daily  7/28/2019 at pm Yes Reported, Patient   rivaroxaban ANTICOAGULANT (XARELTO) 20 MG TABS tablet Take 1 tablet (20 mg) by mouth daily (with dinner) 7/28/2019 at pm Yes Henna Murphy MD   traZODone (DESYREL) 100 MG tablet Take 1 tablet (100 mg) by mouth At Bedtime.  7/28/2019 at pm (50 mg) Yes Fred Connell MD   diphenhydrAMINE-acetaminophen (TYLENOL PM)  MG tablet Take 2 tablets by mouth nightly as needed for sleep 7/25/2019 at pm  Reported, Patient    leuprolide acetate (LUPRON) 1 MG/0.2ML kit Inject Subcutaneous every 6 months He gets this at [facility]. Last given on 4/2019. More than a month at Unknown time  Reported, Patient

## 2019-07-30 NOTE — PROGRESS NOTES
A/O. VSS except low grade fever. C/O pain in neck and right wrist. Improved with PRN Tylenol, Oxycodone and wrist splint. Imaging complete. Waiting for labs from right wrist aspiration. Up SBA. RUE elevated

## 2019-07-30 NOTE — PROVIDER NOTIFICATION
MD Notification    Notified Person: MD    Notified Person Name: Dr. Rodriguez    Notification Date/Time: 7/30 @ 4645    Notification Interaction: Web text page    Purpose of Notification: CT, Echo and aspiration of wrist complete. Temp 100.5    Orders Received: Pending    Comments:

## 2019-07-30 NOTE — PROGRESS NOTES
[facility]    Medicine Progress Note - Hospitalist Service       Date of Admission:  7/29/2019  Date of Service: 07/30/2019    Assessment & Plan       Yosef Murry is a 83 year old male admitted on 7/29/2019. He presents with generalized weakness.     Generalized Weakness  Right wrist pain/swelling  Assessment: Presents with 3 to 4-day history of progressive generalized weakness.  At this point unclear etiology, work-up is been largely unrevealing.  WBC is within normal limits, patient is afebrile.  However CRP is elevated at 61.9.  Physical exam is pertinent for right wrist pain/swelling/warmth, and in setting of systemic symptoms of weakness and elevated CRP, certainly does raise suspicion for possible septic joint. Otherwise could be gout affecting wrist.  UA otherwise is not suggestive of infection, chest x-ray showed no acute airspace consolidation of pneumonia.   Plan:  -Orthopedic surgery would like IR to aspirate wrist.  -Follow vitals/temp  -Echo pending    Neck Pain  Assessment: unclear etiology exam certainly appears to be of MSK etiology, there is no radicular sxs. But pain quite debilitating for patient.   Plan:  - CT Cervical WO  - Treat supportively otherwise      Atrial fibrillation (H)    Persistent atrial fibrillation (H)    Assessment/Plan: Resume Xarelto          Diet: Regular Diet Adult    DVT Prophylaxis: Ambulate every shift  [place] Catheter: not present  Code Status: DNR/DNI      Disposition Plan   Expected discharge: Tomorrow, recommended to prior living arrangement once adequate pain management/ tolerating PO medications.  Entered: Caleb Rodriguez MD 07/30/2019, 2:05 PM       The patient's care was discussed with the Bedside Nurse and Patient.    Caleb Rodriguez MD  Hospitalist Service  [facility]    ______________________________________________________________________    Interval History     No acute events overnight  Wrist pain/swelling better  But neck pain  still very significant, no new weakness/numbness of extremities.   No fevers or chills  No nausea/vomiting or abdominal pain  No new complaints otherwies    Data reviewed today: I reviewed all medications, new labs and imaging results over the last 24 hours. I personally reviewed no images or EKG's today.    Physical Exam   Vital Signs: Temp: 97.6  F (36.4  C) Temp src: Oral BP: (!) 121/94   Heart Rate: 68 Resp: 20 SpO2: 95 % O2 Device: None (Room air)    Weight: 173 lbs 4.8 oz    Constitutional: no apparent distress  Neck: There is paraspinal muscle tenderness bilaterally.  Neck range of motion is reduced, but no obvious signs of meningismus.  Respiratory: CTABL  Cardiovascular: RRR with no m/r/g  GI: NT, ND, BS+  Musculoskeletal: There is tenderness to palpation of the patient's right wrist with concomitant swelling/warmth that has improved.   Neurologi: A/Ox3. Cranial nerves II-XII are grossly intact.  Motor is 5 out of 5 bilaterally.   Sensory is intact.   Neuropsychiatric: normal mood and affect    Data   Recent Labs   Lab 07/30/19  0615 07/29/19 1952   WBC 8.7 11.0   HGB 14.3 16.1   MCV 93 94    243    137   POTASSIUM 4.3 4.1   CHLORIDE 107 103   CO2 30 29   BUN 19 18   CR 0.86 0.94   ANIONGAP 3 5   BELLO 8.6 9.1   GLC 98 115*   ALBUMIN  --  3.4   PROTTOTAL  --  8.0   BILITOTAL  --  1.0   ALKPHOS  --  100   ALT  --  14   AST  --  13   TROPI  --  0.022     Recent Results (from the past 24 hour(s))   US Upper Extremity Venous Duplex Right    Narrative    US UPPER EXTREMITY VENOUS DUPLEX RIGHT 7/29/2019 8:52 PM     HISTORY: right upper extremity swelling    TECHNIQUE: Venous Doppler US of the upper extremity.? Color flow and  spectral Doppler with waveform analysis performed.    COMPARISON: None.    FINDINGS: Ultrasound of the right upper extremity demonstrates no deep  vein thrombus in the subclavian, axillary or brachial veins. No  thrombus seen in the cephalic or basilic veins or visualized  portions  or internal jugular vein.      Impression    IMPRESSION: No DVT identified right upper extremity.    SUNDAR MANN MD   XR Chest 2 Views    Narrative    XR CHEST 2 VW   7/29/2019 9:18 PM     HISTORY: weakness, concern for right upper lung mass with new RUE  swelling    COMPARISON: Film dated 5/13/2016    FINDINGS: The heart is negative.  The lungs are clear. The pulmonary  vasculature is normal.  Healed left rib fractures are again noted..      Impression    IMPRESSION: No active infiltrates. No right upper lobe mass is  identified.        TIARRA AUSTIN MD   XR Wrist Right G/E 3 Views    Narrative    RIGHT WRIST THREE VIEWS July 30, 2019 8:53 AM    HISTORY: Right wrist pain.    COMPARISON: None.      Impression    IMPRESSION: No fracture or osseous lesion is demonstrated. There are  mild degenerative changes of the first carpometacarpal joint. There  are also mild degenerative changes of the distal radioulnar joint.  Both of these spaces demonstrate mild joint space narrowing and  marginal osteophyte formation. The remaining joint spaces appear to be  well-preserved. No soft tissue pathology is demonstrated.     EVA JUAREZ MD     Medications       amLODIPine  5 mg Oral Daily     lidocaine (PF)  30 mL EPIDURAL Once     losartan  50 mg Oral Daily     metoprolol succinate ER  25 mg Oral Daily     rivaroxaban ANTICOAGULANT  20 mg Oral Daily with supper     sodium chloride (PF)  20 mL EPIDURAL Once     sodium chloride (PF)  3 mL Intracatheter Q8H

## 2019-07-30 NOTE — ED NOTES
"[facility]  ED Nurse Handoff Report    ED Chief complaint: Generalized Weakness      ED Diagnosis:   Final diagnoses:   Weakness   Swelling of right upper extremity   Localized swelling of both lower legs   Acute congestive heart failure, unspecified heart failure type (H)       Code Status: Full Code    Allergies:   Allergies   Allergen Reactions     Other [Seasonal Allergies]        Activity level - Baseline/Home:  Independent  Activity Level - Current:   Stand with Assist    Patient's Preferred language: english   Needed?: No    Isolation: No  Infection: Not Applicable  Bariatric?: No    Vital Signs:   Vitals:    07/29/19 1935   BP: (!) 163/93   Resp: 20   Temp: 98.4  F (36.9  C)   TempSrc: Oral   SpO2: 94%   Weight: 76.2 kg (168 lb)   Height: 1.778 m (5' 10\")       Cardiac Rhythm: ,   Cardiac  Cardiac Rhythm: Atrial fibrillation    Pain level: 0-10 Pain Scale: 8    Is this patient confused?: No   Does this patient have a guardian?  No         If yes, is there guardianship documents in the Epic \"Code/ACP\" activity?  N/A         Guardian Notified?  N/A  [place] - Suicide Severity Rating Scale Completed?  Yes  If yes, what color did the patient score?  White    Patient Report: Initial Complaint: Generalized weakness  Focused Assessment: Pt presents to the ED with increasing weakness over the past few days. Per family, they went to dinner 2 nights ago and was able to join them but \"seemed off\". Today family noted that patient was having a hard time dressing himself and getting around. Normally independent. Pt notes unexplained swelling in right hand and bilateral neck pain, worse with movement. Denies CP/SOB. A&Ox4.  Tests Performed: Labs, EKG, US RUE, CXR  Abnormal Results:   Labs Ordered and Resulted from Time of ED Arrival Up to the Time of Departure from the ED   COMPREHENSIVE METABOLIC PANEL - Abnormal; Notable for the following components:       Result Value    Glucose 115 (*)     " All other components within normal limits   TSH WITH FREE T4 REFLEX - Abnormal; Notable for the following components:    TSH 4.68 (*)     All other components within normal limits   NT PROBNP INPATIENT - Abnormal; Notable for the following components:    N-Terminal Pro BNP Inpatient 2,063 (*)     All other components within normal limits   CBC WITH PLATELETS DIFFERENTIAL   TROPONIN I   T4 FREE   T4 FREE   ROUTINE UA WITH MICROSCOPIC   CARDIAC CONTINUOUS MONITORING       Treatments provided: n/a    Family Comments: daughter and son in law at bedside.    OBS brochure/video discussed/provided to patient/family: Yes              Name of person given brochure if not patient: n/a              Relationship to patient: n/a    ED Medications: Medications - No data to display    Drips infusing?:  No    For the majority of the shift this patient was Green.   Interventions performed were n/a.    Severe Sepsis OR Septic Shock Diagnosis Present: No    To be done/followed up on inpatient unit:  Collect urine for UA if unable to go in ED.    ED NURSE PHONE NUMBER: *[phone]

## 2019-07-30 NOTE — PLAN OF CARE
PRIMARY DIAGNOSIS: GENERALIZED WEAKNESS    OUTPATIENT/OBSERVATION GOALS TO BE MET BEFORE DISCHARGE  1. Orthostatic performed: N/A    2. Tolerating PO medications: Yes    3. Return to near baseline physical activity: Yes    4. Cleared for discharge by consultants (if involved): Yes    5.  Echo: Complete    Discharge Planner Nurse   Safe discharge environment identified: No  Barriers to discharge: No       Entered by: Freda Banks 07/30/2019 4:00 PM     Please review provider order for any additional goals.   Nurse to notify provider when observation goals have been met and patient is ready for discharge.

## 2019-07-31 ENCOUNTER — APPOINTMENT (OUTPATIENT)
Dept: MRI IMAGING | Facility: CLINIC | Age: 84
DRG: 864 | End: 2019-07-31
Attending: NURSE PRACTITIONER
Payer: MEDICARE

## 2019-07-31 ENCOUNTER — APPOINTMENT (OUTPATIENT)
Dept: CT IMAGING | Facility: CLINIC | Age: 84
DRG: 864 | End: 2019-07-31
Attending: NURSE PRACTITIONER
Payer: MEDICARE

## 2019-07-31 PROBLEM — R50.9 FEVER OF UNKNOWN ORIGIN: Status: ACTIVE | Noted: 2019-07-31

## 2019-07-31 LAB
CHOLEST SERPL-MCNC: 116 MG/DL
GLUCOSE BLDC GLUCOMTR-MCNC: 120 MG/DL (ref 70–99)
GRAM STN SPEC: NORMAL
HBA1C MFR BLD: 5.6 % (ref 0–5.6)
HDLC SERPL-MCNC: 37 MG/DL
LDLC SERPL CALC-MCNC: 62 MG/DL
NONHDLC SERPL-MCNC: 79 MG/DL
SPECIMEN SOURCE: NORMAL
TRIGL SERPL-MCNC: 84 MG/DL
TROPONIN I SERPL-MCNC: <0.015 UG/L (ref 0–0.04)

## 2019-07-31 PROCEDURE — 99225 ZZC SUBSEQUENT OBSERVATION CARE,LEVEL II: CPT | Performed by: PHYSICIAN ASSISTANT

## 2019-07-31 PROCEDURE — 12000000 ZZH R&B MED SURG/OB

## 2019-07-31 PROCEDURE — 80061 LIPID PANEL: CPT | Performed by: NURSE PRACTITIONER

## 2019-07-31 PROCEDURE — 70553 MRI BRAIN STEM W/O & W/DYE: CPT

## 2019-07-31 PROCEDURE — A9585 GADOBUTROL INJECTION: HCPCS | Performed by: STUDENT IN AN ORGANIZED HEALTH CARE EDUCATION/TRAINING PROGRAM

## 2019-07-31 PROCEDURE — 84484 ASSAY OF TROPONIN QUANT: CPT | Performed by: NURSE PRACTITIONER

## 2019-07-31 PROCEDURE — 99291 CRITICAL CARE FIRST HOUR: CPT | Performed by: NURSE PRACTITIONER

## 2019-07-31 PROCEDURE — 83036 HEMOGLOBIN GLYCOSYLATED A1C: CPT | Performed by: NURSE PRACTITIONER

## 2019-07-31 PROCEDURE — 36415 COLL VENOUS BLD VENIPUNCTURE: CPT | Performed by: NURSE PRACTITIONER

## 2019-07-31 PROCEDURE — 25500064 ZZH RX 255 OP 636: Performed by: STUDENT IN AN ORGANIZED HEALTH CARE EDUCATION/TRAINING PROGRAM

## 2019-07-31 PROCEDURE — 25000132 ZZH RX MED GY IP 250 OP 250 PS 637: Mod: GY | Performed by: STUDENT IN AN ORGANIZED HEALTH CARE EDUCATION/TRAINING PROGRAM

## 2019-07-31 PROCEDURE — 00000146 ZZHCL STATISTIC GLUCOSE BY METER IP

## 2019-07-31 PROCEDURE — 70450 CT HEAD/BRAIN W/O DYE: CPT

## 2019-07-31 PROCEDURE — G0378 HOSPITAL OBSERVATION PER HR: HCPCS

## 2019-07-31 RX ORDER — GADOBUTROL 604.72 MG/ML
7 INJECTION INTRAVENOUS ONCE
Status: COMPLETED | OUTPATIENT
Start: 2019-07-31 | End: 2019-07-31

## 2019-07-31 RX ADMIN — LOSARTAN POTASSIUM 50 MG: 50 TABLET ORAL at 07:58

## 2019-07-31 RX ADMIN — OXYCODONE HYDROCHLORIDE 5 MG: 5 TABLET ORAL at 18:22

## 2019-07-31 RX ADMIN — GADOBUTROL 7 ML: 604.72 INJECTION INTRAVENOUS at 21:39

## 2019-07-31 RX ADMIN — RIVAROXABAN 20 MG: 20 TABLET, FILM COATED ORAL at 18:22

## 2019-07-31 RX ADMIN — OXYCODONE HYDROCHLORIDE 5 MG: 5 TABLET ORAL at 23:35

## 2019-07-31 RX ADMIN — ACETAMINOPHEN 650 MG: 325 TABLET, FILM COATED ORAL at 21:12

## 2019-07-31 RX ADMIN — OXYCODONE HYDROCHLORIDE 5 MG: 5 TABLET ORAL at 10:42

## 2019-07-31 RX ADMIN — AMLODIPINE BESYLATE 5 MG: 5 TABLET ORAL at 07:58

## 2019-07-31 RX ADMIN — METOPROLOL SUCCINATE 25 MG: 25 TABLET, EXTENDED RELEASE ORAL at 07:58

## 2019-07-31 RX ADMIN — OXYCODONE HYDROCHLORIDE 5 MG: 5 TABLET ORAL at 06:01

## 2019-07-31 ASSESSMENT — ACTIVITIES OF DAILY LIVING (ADL): ADLS_ACUITY_SCORE: 13

## 2019-07-31 NOTE — PLAN OF CARE
PRIMARY DIAGNOSIS: ACUTE PAIN  OUTPATIENT/OBSERVATION GOALS TO BE MET BEFORE DISCHARGE:  1. Pain Status: Improved-controlled with oral pain medications.    2. Return to near baseline physical activity: Yes    3. Cleared for discharge by consultants (if involved): Yes, waiting for family to arrive to discuss plan    Discharge Planner Nurse   Safe discharge environment identified: Yes  Barriers to discharge: No       Entered by: Freda Banks 07/31/2019 12:03 PM     Please review provider order for any additional goals.   Nurse to notify provider when observation goals have been met and patient is ready for discharge.

## 2019-07-31 NOTE — PROGRESS NOTES
Went into patient room to discharge patient home with daughter.  Patient struggling to stand. Now up with heavy assist of 2. See provider notification note.

## 2019-07-31 NOTE — PROGRESS NOTES
Ortho hand  HD#2 wrist swelling  Better this morning.  Over 50% improved from admission  Some neck pain only    Exam:  Full digital ROM  Minimal wrist swelling  Non tender volar wrist, mild dorsal tenderness  ROM 50/50 flexion/extension without pain    Labs CRP elevated on admission, normal WBC  Aspirate:  Clear fluid, Few WBCs , no crystals. Cultures pending    Impression:  Resolving wrist synovitis, etiology unclear    Plan.   With improvement, could discharge from ortho perspective.    Wrist splint at night and for activities for 2-4 wks until symptoms fully resolve  F/u [facility] office as needed.  Call [facility]  for f/u appt if needed.    Jeanine Carey  [phone]

## 2019-07-31 NOTE — PROGRESS NOTES
Care Coordination:    Noted pt had hospital followup scheduled with Patricia Pacheco APRN CNP for 8/1/19, however discharge 7/31/19 was cancelled.    Rescheduled appointment with Patricia Pacheco APRN CNP for 8/2/19 at 1:00PM.      Pt's regular PCP, Dr. Connell, not available for >1 week.     Priti Isabel RN, BSN  Critical access hospital Care Coordinator   Mobile Phone: [phone]

## 2019-07-31 NOTE — PLAN OF CARE
PRIMARY DIAGNOSIS: ACUTE PAIN  OUTPATIENT/OBSERVATION GOALS TO BE MET BEFORE DISCHARGE:  1. Pain Status: Improved-controlled with oral pain medications.    2. Return to near baseline physical activity: Yes    3. Cleared for discharge by consultants (if involved): No, ID consult    Discharge Planner Nurse   Safe discharge environment identified: Yes  Barriers to discharge: Yes, still spiking fevers       Entered by: Freda Banks 07/31/2019 8:52 AM     Please review provider order for any additional goals.   Nurse to notify provider when observation goals have been met and patient is ready for discharge.

## 2019-07-31 NOTE — PROGRESS NOTES
[facility]    Medicine Progress Note - Hospitalist Service       Date of Admission:  7/29/2019  Assessment & Plan     Right wrist pain/swelling: Presented with generalized weakness and wrist pain and additionally developed FOU  - Appreciate Ortho consult  - Under wrist aspirate 7/30. Gram stain negative. No crystals  - Cleared by Ortho and wrist pain improving    Fever of Unknown Origin: No localizing symptoms, other than wrist pain. No leucocytosis. CXR and UA clear. CRP elevated  - Persistent fever with max past 24 hrs 101  - BC pending from 7/30 at 2200 and negative to date  - Appreciate ID consult. Recommend observing off antibiotics and continue hospitalization until cultures negative for 24 hrs  - Repeat CBC in am  - Follow fever curve. If recurrent fever or worsening mental status would have low threshold to consider LP to rule out meninginitis.     Generalized Weakness: C/o of generalized weakness on admission but was ambulatory without assistance but increasing generalized weakness today requiring assist of 2  - Suspect secondary to underlying illness. Treat FOU as above  - Supportive Cares  - PT and SW     Neck Pain: Exam certainly appears to be of MSK etiology, there is no radicular sxs. Has diminished ROM but no obvious meningeal symptoms.  Plan:  - CT Cervical WO negative  - Treat supportively      Atrial fibrillation (H)    Persistent atrial fibrillation (H)    Assessment/Plan: Resume Xarelto       Diet: Regular Diet Adult  Diet    DVT Prophylaxis: Xarelto  [place]wood Catheter: not present  Code Status: DNR/DNI      Disposition Plan   Expected discharge: Had actually planned for discharge today despite ID recs for ongoing observation. His daughter and JONATHAN are both MDs and were going to watch him closely but when getting ready for discharge was requiring assist of 2 from nursing when he was independent this am. D/c cancelled. Continue work-up for FUO  Entered: Grace Araiza PA-C  07/31/2019, 1:43 PM       The patient's care was discussed with the Patient, Patient's Family and ID Consultant.    Grace Araiza PA-C  Hospitalist Service  [facility]    ______________________________________________________________________    Interval History   Evaluated this am and was feeling well. Wrist pain resolved. Continued to have some mild neck pain. Had actually planned for discharge but now with worsening weakness.    Data reviewed today: I reviewed all medications, new labs and imaging results over the last 24 hours. I personally reviewed no images or EKG's today.    Physical Exam   Vital Signs: Temp: 95.9  F (35.5  C) Temp src: Oral BP: 133/72   Heart Rate: 95 Resp: 16 SpO2: 95 % O2 Device: None (Room air)    Weight: 173 lbs 4.8 oz  Constitutional: Alert, resting comfortably in NAD  HEENT No nuchal rigidity   Respiratory: Normal effort, symmetric expansion, no crackles or wheezing  Cardiovascular: RRR no murmurs   GI: Non distended, normal bowels sounds, no tenderness or guarding  MSK: LE without edema. Dorsalis pedis pulse palpated bilaterally. Mild swelling and warmth to right wrist. Tender to palpation but patient notes it has improved  Skin/Integumen: Clear  Neuro: CN II-XII grossly intact  Psych:  Alert and oriented x 3. Normal affect      Data   Recent Labs   Lab 07/30/19  0615 07/29/19 1952   WBC 8.7 11.0   HGB 14.3 16.1   MCV 93 94    243    137   POTASSIUM 4.3 4.1   CHLORIDE 107 103   CO2 30 29   BUN 19 18   CR 0.86 0.94   ANIONGAP 3 5   BELLO 8.6 9.1   GLC 98 115*   ALBUMIN  --  3.4   PROTTOTAL  --  8.0   BILITOTAL  --  1.0   ALKPHOS  --  100   ALT  --  14   AST  --  13   TROPI  --  0.022     Recent Results (from the past 24 hour(s))   XR Joint Aspiration Intermed Right    Narrative    EXAM: XR JOINT ASPIRATION INTERMED RIGHT  7/30/2019 3:22 PM       History:  right wrist pain/swelling. Rule out gout versus septic  arthritis     PROCEDURE: The risks  (including bleeding, infection, and allergy to  contrast and medications) and benefits of the procedure were explained  to the patient and consent was obtained.  The skin was prepped and  draped in the usual sterile fashion and 1% Lidocaine was used for  local anesthesia. Using sterile technique and fluoroscopic guidance, a  #20 gauge needle was placed into the right wrist joint using a dorsal  approach. Approximately  0.5 mL clear pink fluid was obtained and sent  to the lab for analysis. Even after repositioning the needle multiple  times, no additional fluid was able to be obtained. The patient  tolerated the procedure well and there were no immediate  complications.      Fluoroscopy time: 0.9 minutes  Number of Images: 2  Medications used: 3 mL Local Lidocaine, less than 1 mL of synovial  fluid collected from right wrist      Impression    IMPRESSION:  Technically successful right wrist joint  aspiration.    JAMIL SNIDER PA-C   CT Cervical Spine w/o Contrast    Narrative    CT OF THE CERVICAL SPINE WITHOUT CONTRAST   7/30/2019 3:33 PM     COMPARISON: None.    HISTORY: Neck pain, initial exam.     TECHNIQUE: Axial images of the cervical spine were acquired without  intravenous contrast. Multiplanar reformations were created.      FINDINGS: There is normal alignment of the cervical vertebrae.  Vertebral body heights of the cervical spine are normal.  Craniocervical alignment is normal. There are no fractures of the  cervical spine. There is degenerative endplate spurring at the C5-C6  and C6-C7 levels. There is mild-moderate facet arthropathy in the  right at the C2-C3, C3-C4, C4-C5 and C6-C7 levels. There is mild facet  arthropathy on the left at the C4-C5, C5-C6 and C6-C7 levels. There is  a minimal posterior disc bulge at the C3-C4 level. There is no  significant degenerative spinal canal narrowing of the cervical spine.  There is mild-moderate bony neural foraminal narrowing on the right at  the C3-C4 level,  on the right at the C4-C5 level and bilaterally at  the C5-C6 level.      Impression    IMPRESSION: Degenerative changes of the cervical spine as described  above. No evidence for fracture or traumatic malalignment.      Radiation dose for this scan was reduced using automated exposure  control, adjustment of the mA and/or kV according to patient size, or  iterative reconstruction technique    EMERY BERNAL MD

## 2019-07-31 NOTE — PROVIDER NOTIFICATION
Provider Notification    Notified Person: PA    Notified Person Name: DEDRA Neal    Notification Date/Time: 7/31 @ 1300    Notification Interaction: Telephone    Purpose of Notification: Patient now up with a heavy assist of 2. Patient had been getting up with SBA earlier today. Not safe for patient to discharge. Need PA to speak with patient and convince him to stay tonight.    Comments: Grace to come speak with patient

## 2019-07-31 NOTE — CONSULTS
Consult Date:  07/31/2019      INFECTIOUS DISEASE CONSULTATION      ATTENDING PHYSICIAN:  Hospitalist Service.      REASON FOR CONSULTATION:  I was asked to assess fevers.      IMPRESSION:   1. An 83-year-old male with history of carcinoma of the prostate.   2. Atrial fibrillation.   3. Admitted on this occasion with 3-4 day history of progressive generalized weakness accompanied by some increase in chronic neck pain as well as new right wrist pain.  The patient was unaware of a fever, but has been febrile to over 101 degrees since his hospital admission.  A CRP was elevated to 61.9 degrees.  The source of the patient's fever is not clear.  White blood count is normal.  Urinalysis is benign.  Chest x-ray showed no acute infiltrate.  CT scan of the cervical spine showed significant degenerative changes but no evidence of infection.  Right wrist aspiration was negative for crystals and is culture negative at 1 day.  Blood cultures are negative at 1 day.  It is possible that the patient may have a viral syndrome as he has also noted some sore throat and myalgias.      PAST MEDICAL HISTORY:   1. Carcinoma of the prostate.   2. Hypertension.   3. Atrial fibrillation/flutter.   4. Left inguinal hernia.   5. History of TIA.   6. History of rectal fistula.   7. Tonsillectomy.   8. Prostatectomy.   9. Left inguinal herniorrhaphy.      FAMILY HISTORY:  Mother with history of ovarian cancer.      ALLERGIES:  NONE KNOWN TO MEDICATIONS.      REVIEW OF SYSTEMS:  No foreign travel or pet exposure.  No recent travel out of the [facility] area.  No known tick bites.      PHYSICAL EXAMINATION:   VITAL SIGNS:  Temperature 97.5, blood pressure 117/75, heart rate 68 and irregularly irregular.   GENERAL:  Well-developed, well-nourished, alert and oriented, does not look acutely ill.   SKIN:  No rashes or nodules.   HEENT:  Eyes:  No subconjunctival hemorrhages or scleral icterus.  Oropharynx without erythema or exudate.   NECK:  The  patient is not able to flex his neck.  No cervical or axillary lymphadenopathy.   LUNGS:  Clear to auscultation, no use of accessory muscles of respiration.   COR:  S1, S2 irregularly irregular.  No S3, S4 or murmur.   ABDOMEN:  Soft, nontender, no mass or hepatosplenomegaly.   EXTREMITIES:  No significant right wrist swelling and no erythema, no other obvious swelling or erythema of joints.  No calf tenderness or pedal edema.      RECOMMENDATIONS:   1. Would hold on antibiotic therapy unless and until a more clear evidence of bacterial infection.   2. Await further results of blood and wrist cultures.   3. I told the patient that I would favor monitoring his fever further in the hospital, off Tylenol, until we are convinced that his fever is resolving or is otherwise explained.  He is eager to go home.  His daughter and son-in-law are physicians and thus, he would likely have close monitoring at home if it was elected to discharge him.      For further details of the patient's history, please refer to the chart.  Thank you very much for this consultation.      Added addendum #[id number]  19            MANOLO MERINO MD             D: 2019   T: 2019   MT: CORIE      Name:     OLE COSTA   MRN:      [id number]        Account:       [id number]   :      1935           Consult Date:  2019      Document: [id number]       cc: Fred Connell MD

## 2019-07-31 NOTE — PROGRESS NOTES
Observation Goals:  -diagnostic tests and consults completed and resulted-Met   -vital signs normal or at patient baseline-Partially Met -Temp slightly elevated  -returns to baseline functional status-Met   -safe disposition plan has been identified- Met  Nurse to notify provider when observation goals have been met and patient is ready for discharge.

## 2019-07-31 NOTE — PROGRESS NOTES
VSS. No fevers on day shift. More confused throughout day. Was up with SBA earlier in day but when attempting to get patient into wheelchair for discharge patient was unable to transfer (see provider notification note). PT consulted. Oxycodone PRN for neck/right wrist pain. Right wrist brace for comfort/night.

## 2019-07-31 NOTE — PLAN OF CARE
Observation Goals:  -diagnostic tests and consults completed and resulted-Met   -vital signs normal or at patient baseline-Partially Met -Temp slightly elevated  -returns to baseline functional status-Met   -safe disposition plan has been identified- Met  Nurse to notify provider when observation goals have been met and patient is ready for discharge.    Pt is A&Ox4. VSS except low grade fever (101.0) provider aware.  Complaint of pain in neck and right wrist. Pain controlled with  PRN Tylenol, and Oxycodone and wrist splint. RUE elevated. Imaging completed results have been negative. Waiting for labs from right wrist aspiration. Up SBA in room. Voiding adequately. Tolerating regular diet. Discharge plan pending pt progress. Possible infection control consult depending on labs (wrist aspiration). Will continue to observe. Infection Control Consult place.

## 2019-07-31 NOTE — PROVIDER NOTIFICATION
Provider Notification    Notified Person: PA    Notified Person Name: Grace DEDRA Araiza    Notification Date/Time: 7/31 @ 6561    Notification Interaction: Web text page    Purpose of Notification: Patient more confused. Unable to follow commands like this morning. Attempting to get patient out of wheelchair into bed and was not able to move legs whether this is physical vs mental. Strong 2 assist with pivot to bed.    Orders Received: pending

## 2019-08-01 ENCOUNTER — APPOINTMENT (OUTPATIENT)
Dept: OCCUPATIONAL THERAPY | Facility: CLINIC | Age: 84
DRG: 864 | End: 2019-08-01
Attending: NURSE PRACTITIONER
Payer: MEDICARE

## 2019-08-01 ENCOUNTER — APPOINTMENT (OUTPATIENT)
Dept: PHYSICAL THERAPY | Facility: CLINIC | Age: 84
DRG: 864 | End: 2019-08-01
Attending: PHYSICIAN ASSISTANT
Payer: MEDICARE

## 2019-08-01 LAB
ERYTHROCYTE [DISTWIDTH] IN BLOOD BY AUTOMATED COUNT: 13.8 % (ref 10–15)
GLUCOSE BLDC GLUCOMTR-MCNC: 103 MG/DL (ref 70–99)
GLUCOSE BLDC GLUCOMTR-MCNC: 142 MG/DL (ref 70–99)
HCT VFR BLD AUTO: 43 % (ref 40–53)
HGB BLD-MCNC: 14.6 G/DL (ref 13.3–17.7)
INTERPRETATION ECG - MUSE: NORMAL
MCH RBC QN AUTO: 31.8 PG (ref 26.5–33)
MCHC RBC AUTO-ENTMCNC: 34 G/DL (ref 31.5–36.5)
MCV RBC AUTO: 94 FL (ref 78–100)
PLATELET # BLD AUTO: 208 10E9/L (ref 150–450)
PROCALCITONIN SERPL-MCNC: 0.08 NG/ML
RBC # BLD AUTO: 4.59 10E12/L (ref 4.4–5.9)
TROPONIN I SERPL-MCNC: 0.02 UG/L (ref 0–0.04)
WBC # BLD AUTO: 8.2 10E9/L (ref 4–11)

## 2019-08-01 PROCEDURE — 00000146 ZZHCL STATISTIC GLUCOSE BY METER IP

## 2019-08-01 PROCEDURE — 97530 THERAPEUTIC ACTIVITIES: CPT | Mod: GO

## 2019-08-01 PROCEDURE — 93005 ELECTROCARDIOGRAM TRACING: CPT

## 2019-08-01 PROCEDURE — 99233 SBSQ HOSP IP/OBS HIGH 50: CPT | Performed by: HOSPITALIST

## 2019-08-01 PROCEDURE — 25000132 ZZH RX MED GY IP 250 OP 250 PS 637: Mod: GY | Performed by: STUDENT IN AN ORGANIZED HEALTH CARE EDUCATION/TRAINING PROGRAM

## 2019-08-01 PROCEDURE — 36415 COLL VENOUS BLD VENIPUNCTURE: CPT | Performed by: PHYSICIAN ASSISTANT

## 2019-08-01 PROCEDURE — 97162 PT EVAL MOD COMPLEX 30 MIN: CPT | Mod: GP

## 2019-08-01 PROCEDURE — 97535 SELF CARE MNGMENT TRAINING: CPT | Mod: GO

## 2019-08-01 PROCEDURE — 95819 EEG AWAKE AND ASLEEP: CPT

## 2019-08-01 PROCEDURE — 85027 COMPLETE CBC AUTOMATED: CPT | Performed by: PHYSICIAN ASSISTANT

## 2019-08-01 PROCEDURE — 36415 COLL VENOUS BLD VENIPUNCTURE: CPT | Performed by: NURSE PRACTITIONER

## 2019-08-01 PROCEDURE — 97165 OT EVAL LOW COMPLEX 30 MIN: CPT | Mod: GO

## 2019-08-01 PROCEDURE — 40000061 ZZH STATISTIC EEG TIME EA 10 MIN

## 2019-08-01 PROCEDURE — 36415 COLL VENOUS BLD VENIPUNCTURE: CPT | Performed by: INTERNAL MEDICINE

## 2019-08-01 PROCEDURE — 84484 ASSAY OF TROPONIN QUANT: CPT | Performed by: NURSE PRACTITIONER

## 2019-08-01 PROCEDURE — 93010 ELECTROCARDIOGRAM REPORT: CPT | Performed by: INTERNAL MEDICINE

## 2019-08-01 PROCEDURE — 25000132 ZZH RX MED GY IP 250 OP 250 PS 637: Mod: GY | Performed by: NURSE PRACTITIONER

## 2019-08-01 PROCEDURE — 97530 THERAPEUTIC ACTIVITIES: CPT | Mod: GP

## 2019-08-01 PROCEDURE — 84145 PROCALCITONIN (PCT): CPT | Performed by: INTERNAL MEDICINE

## 2019-08-01 PROCEDURE — 12000000 ZZH R&B MED SURG/OB

## 2019-08-01 RX ORDER — ATORVASTATIN CALCIUM 10 MG/1
10 TABLET, FILM COATED ORAL EVERY EVENING
Status: DISCONTINUED | OUTPATIENT
Start: 2019-08-01 | End: 2019-08-06 | Stop reason: HOSPADM

## 2019-08-01 RX ADMIN — OXYCODONE HYDROCHLORIDE 5 MG: 5 TABLET ORAL at 17:52

## 2019-08-01 RX ADMIN — OXYCODONE HYDROCHLORIDE 5 MG: 5 TABLET ORAL at 21:49

## 2019-08-01 RX ADMIN — RIVAROXABAN 20 MG: 20 TABLET, FILM COATED ORAL at 17:36

## 2019-08-01 RX ADMIN — OXYCODONE HYDROCHLORIDE 5 MG: 5 TABLET ORAL at 04:27

## 2019-08-01 RX ADMIN — OXYCODONE HYDROCHLORIDE 5 MG: 5 TABLET ORAL at 10:47

## 2019-08-01 RX ADMIN — ATORVASTATIN CALCIUM 10 MG: 10 TABLET, FILM COATED ORAL at 21:28

## 2019-08-01 ASSESSMENT — ACTIVITIES OF DAILY LIVING (ADL)
ADLS_ACUITY_SCORE: 14
PREVIOUS_RESPONSIBILITIES: MEAL PREP;HOUSEKEEPING;LAUNDRY;SHOPPING;MEDICATION MANAGEMENT;FINANCES;DRIVING

## 2019-08-01 NOTE — PLAN OF CARE
OT: Evaluation and treatment initiated. Pt lives independently in a [facility] home with a lower level. Prior pt independent in all I/ADLs.   Discharge Planner OT   Patient plan for discharge: Home  Current status: Pt went from supine to sit EOB With moderate assist of 2. Pt ambulated to the bathroom with walker and minimum A. Pt transferred to/from the toilet with minimum A. Pt required minimum A for clothing management during toileting tasks. During session, observed difficulty attending to the left side. Pt at times with difficulty with attention to task at hand.   Barriers to return to prior living situation: lives alone, current level of A, decreased independence for I/ADLs  Recommendations for discharge: At this time recommend ARU  Rationale for recommendations: Pt is below baseline for I/ADLs and functional mobility. Pt will benefit from skilled therapy to address safety and independence in I/ADLs. Anticipate pt will tolerate 3 hours of therapy.        Entered by: Carmelina España 08/01/2019 9:49 AM

## 2019-08-01 NOTE — PLAN OF CARE
Patient alert x 4, forgetful, slight left droop is baseline per family, has always had crooked smile. BLE strength 4/5, mod hand grasp bilateral, up with 1-2 assist GB/W, tele afib/cvr, patient will have DANNY tomorrow at 0945, npo after midnight, still needs CTA and CT chest, abd,pelvis today. Patient has neck pain and is being treated with oxycodone. Discharge plan pending.

## 2019-08-01 NOTE — PLAN OF CARE
Discharge Planner SLP   Patient plan for discharge: Did not discuss  Current status: Per chart review and conversation with RN pt has passed swallow screen, previously documented facial droop is baseline per family. RN denies any changes from baseline regarding speech/language and cognition.     Will defer evaluation at this time, please reconsult if needed      Barriers to return to prior living situation: None per SLP  Recommendations for discharge: Defer to medical team   Rationale for recommendations: No SLP evaluation warranted, no services upon discharge         Entered by: Ailin Garrido 08/01/2019 1:46 PM

## 2019-08-01 NOTE — CONSULTS
[facility]    Stroke Consult Note    Reason for Consult:  Stroke     Chief Complaint: Generalized Weakness       HPI  Yosef Murry is a 83 year old male who presented on 7/29/19 with generalized weakness. He had right wrist pain/swelling and there was concern for septic joint. He developed fever of unknown origin, and has had negative infectious workup so far. In the last 48 hours has had episodes of unresponsiveness. RRT was called the evening of 7/31/19 due to one of these episodes. CT head was obtained and identified age indeterminate lacunar infarction in right basal ganglia and internal capsule. MRI brain obtained, infarct is late subacute.  On exam today, patient is nearly back to baseline. Strength is improved. His only complaint currently is neck pain. He reports his wrist pain is resolved. No source of infection has been found related to fevers. Old cortical strokes on MRI, patient was unaware of these. Further evaluation pending. Discussed with patient and family.    Impression  Ischemic Stroke due to undetermined etiology      Recommendations  Acute Ischemic Stroke (without tPA) Recommendations  - Statin: start atorvastatin 10 mg daily, recommend recheck in 6 weeks due to LDL 61  - Telemetry, EKG  - Bedside Glucose Monitoring  - PT/OT/SLP  - Stroke Education  - Euthermia, Euglycemia  - continue xarelto for now  ----- may benefit from switch to eliquis  - CTA today for further evaluation; previous strokes all on the right side as well  - EEG for possible seizures related to cortical infarcts  - DANNY due to fevers and stroke, evaluate for evidence of endocarditis    Patient Follow-up    - final recommendation pending work-up    Thank you for this consult. We will continue to follow.     Text Page    Milena Maldonado, MSN, FNP-BC, RN CNRN SCRN    _____________________________________________________    Past Medical History   Past Medical History:   Diagnosis Date     Anal fistula       Antiplatelet or antithrombotic long-term use      Arrhythmia      Atrial flutter (H) 2012     Cerebral infarction (H)     TIA     Heartburn      Hypertension      Inguinal hernia, left      Persistent atrial fibrillation (H) 8/21/12     Prostate cancer (H)      TIA (transient ischaemic attack)     2014     Past Surgical History   Past Surgical History:   Procedure Laterality Date     COLONOSCOPY       ENT SURGERY      tonsllectomy     EXAM UNDER ANESTHESIA, FISTULOTOMY RECTUM, COMBINED N/A 6/18/2015    Procedure: COMBINED EXAM UNDER ANESTHESIA, FISTULOTOMY RECTUM;  Surgeon: Candice Carty MD;  Location: [facility]     GENITOURINARY SURGERY  1999    PROSTATECTOMY     HERNIORRHAPHY INGUINAL  7/25/2013    Procedure: HERNIORRHAPHY INGUINAL;  LEFT INGUINAL HERNIA REPAIR WITH MESH;  Surgeon: Filipe Fairchild MD;  Location: [facility]     HERNIORRHAPHY INGUINAL Right 6/13/2019    Procedure: OPEN RIGHT INGUINA HERNIA REPAIR WITH MESH;  Surgeon: Filipe Fairchild MD;  Location:  OR     ORTHOPEDIC SURGERY      WRIST SURGERY - LEFT     Medications   Home Meds  Prior to Admission medications    Medication Sig Start Date End Date Taking? Authorizing Provider   amLODIPine (NORVASC) 5 MG tablet Take 1 tablet (5 mg) by mouth daily 10/15/18  Yes Henna Murphy MD   calcium carbonate (TUMS) 500 MG chewable tablet Take 1 chew tab by mouth At Bedtime   Yes Unknown, Entered By History   HYDROcodone-acetaminophen (NORCO) 5-325 MG tablet Take 1-2 tablets by mouth every 4 hours as needed for moderate to severe pain 6/13/19  Yes Marcelo Mcdonough PA-C   loratadine (CLARITIN) 10 MG tablet Take 1 tablet by mouth daily. 7/17/13  Yes Jordin Calix DPM   losartan (COZAAR) 50 MG tablet Take 1 tablet (50 mg) by mouth daily 10/23/18  Yes Henna Murphy MD   metoprolol succinate (TOPROL XL) 25 MG 24 hr tablet Take 1 tablet (25 mg) by mouth daily 11/19/18  Yes Henna Murphy MD   Multiple  Vitamins-Minerals (PRESERVISION AREDS 2 PO) Take 1 capsule by mouth 2 times daily    Yes Reported, Patient   rivaroxaban ANTICOAGULANT (XARELTO) 20 MG TABS tablet Take 1 tablet (20 mg) by mouth daily (with dinner) 18  Yes Henna Murphy MD   traZODone (DESYREL) 100 MG tablet Take 1 tablet (100 mg) by mouth At Bedtime 6/10/19  Yes Fred Connell MD   diphenhydrAMINE-acetaminophen (TYLENOL PM)  MG tablet Take 2 tablets by mouth nightly as needed for sleep    Reported, Patient   leuprolide acetate (LUPRON) 1 MG/0.2ML kit Inject Subcutaneous every 6 months He gets this at [facility]. Last given on 2019.    Reported, Patient       Scheduled Meds    rivaroxaban ANTICOAGULANT  20 mg Oral Daily with supper     sodium chloride (PF)  20 mL EPIDURAL Once       Infusion Meds    - MEDICATION INSTRUCTIONS -       - MEDICATION INSTRUCTIONS -         PRN Meds  acetaminophen, lidocaine 4%, lidocaine (buffered or not buffered), - MEDICATION INSTRUCTIONS -, melatonin, naloxone, ondansetron **OR** ondansetron, oxyCODONE, - MEDICATION INSTRUCTIONS -    Allergies   Allergies   Allergen Reactions     Other [Seasonal Allergies]      Family History   Family History   Problem Relation Age of Onset     Ovarian Cancer Mother      Osteoporosis Father      Unknown/Adopted Sister      Social History   Social History     Tobacco Use     Smoking status: Former Smoker     Years: 16.00     Types: Cigarettes     Last attempt to quit: 1968     Years since quittin.6     Smokeless tobacco: Never Used   Substance Use Topics     Alcohol use: Yes     Alcohol/week: 0.5 oz     Types: 1 Cans of beer per week     Frequency: 2-3 times a week     Drinks per session: 1 or 2     Binge frequency: Never     Comment: SOCIALLY     Drug use: No       Review of Systems   The 10 point Review of Systems is negative other than noted in the HPI or here.        PHYSICAL EXAMINATION   Temp:  [95.9  F (35.5  C)-101.6  F (38.7  C)] 99.2   F (37.3  C)  Heart Rate:  [54-99] 68  Resp:  [14-20] 16  BP: (104-153)/(62-88) 119/70  SpO2:  [93 %-100 %] 98 %    Neuro:       Mental Status Exam:   Awake, alert, oriented X3. Speech and language are intact. Mental status is normal       Cranial Nerves:  Pupils 3 mm, reactive. EOMI. Face sensation is normal. Mild left facial droop (baseline). Tongue and uvula are midline. Other CN are normal           Motor:  5/5 X 4. Tone and bulk are normal           Reflexes:  Normal DTR. Toes downgoing.        Sensory:  Normal to light touch               Coordination:   Intact finger-to-nose        Gait:  No significant difficulties    Dysphagia Screen  Passed screening, no dysarthria - Regular Diet with thin liquids  7/31/19     Stroke Scales  National Institutes of Health Stroke Scale  Exam Interval: 24 hours post onset of symptom +/- 20 minutes   Score    Level of consciousness: (0)   Alert, keenly responsive    LOC questions: (0)   Answers both questions correctly    LOC commands: (0)   Performs both tasks correctly    Best gaze: (0)   Normal    Visual: (0)   No visual loss    Facial palsy: (1)   Minor paralysis (flat nasolabial fold, smile asymmetry)    Motor arm (left): (0)   No drift    Motor arm (right): (0)   No drift    Motor leg (left): (0)   No drift    Motor leg (right): (0)   No drift    Limb ataxia: (0)   Absent    Sensory: (0)   Normal- no sensory loss    Best language: (0)   Normal- no aphasia    Dysarthria: (0)   Normal    Extinction and inattention: (0)   No abnormality        Total Score:  1         Imaging  I personally reviewed all imaging; relevant findings per HPI.    CT head 7/31/19:  1. Age indeterminate lacunar infarction in the basal ganglia and internal capsule on the right side. MRI may be beneficial to evaluate for acuity.  2. Chronic small cortical infarctions in the right frontal lobe and small chronic lacunar infarctions in the cerebellum on the left.    MRI brain 7/31/19:  Diffuse cerebral  volume loss and cerebral white matter changes consistent with chronic small vessel ischemic disease. Probable small chronic ischemic infarcts at the lateral and posterolateral aspects of the right frontal lobe. Probable late subacute ischemic infarct in the right basal ganglia. No evidence for acute intracranial pathology.    Labs Data   CBC  Recent Labs   Lab 08/01/19 0428 07/30/19 0615 07/29/19 1952   WBC 8.2 8.7 11.0   RBC 4.59 4.56 5.11   HGB 14.6 14.3 16.1   HCT 43.0 42.5 47.9    211 243     Basic Metabolic Panel   Recent Labs   Lab 07/30/19 0615 07/29/19 1952    137   POTASSIUM 4.3 4.1   CHLORIDE 107 103   CO2 30 29   BUN 19 18   CR 0.86 0.94   GLC 98 115*   BELLO 8.6 9.1     Liver Panel  Recent Labs   Lab Test 07/29/19  1952 06/10/19  0949 06/21/18  0941   PROTTOTAL 8.0 7.3 7.5   ALBUMIN 3.4 3.3* 3.5   BILITOTAL 1.0 0.5 0.6   ALKPHOS 100 103 100   AST 13 17 23   ALT 14 16 24     Lipid Profile  Recent Labs   Lab Test 07/31/19  2106 06/10/19  0949 06/21/18  0941   CHOL 116 162 183   HDL 37* 34* 43   LDL 62 97 114*   TRIG 84 157* 130     A1C  Recent Labs   Lab Test 07/31/19 2106   A1C 5.6     Troponin I  Recent Labs   Lab 08/01/19 0428 07/31/19 2106 07/29/19 1952   TROPI 0.023 <0.015 0.022          Stroke Code / Stroke Consult Data Data This was a non-emergent, non-tele stroke consult.    I have personally spent a total of 60 minutes providing care and consulting with this patient's medical providers today, with more than 50% of this time spent in consultation, coordination of care, and discussion with the patient and/or family regarding diagnostic results, prognosis, symptom management, risks and benefits of management options, and development of plan of care.

## 2019-08-01 NOTE — CODE/RAPID RESPONSE
"[facility]    House KRISS RRT Note  7/31/2019   Time Called: 1912    RRT called for: Delayed responsiveness     Assessment & Plan     Receptive aphasia 2/2 indeterminate lacunar infarction of basal ganglia and internal capsule on R, chronic small cortical infarctions in R frontal lobe and small chronic lacunar infarctions in cerebellum on L in setting of persistent a-fib on chronic anticoagulation.  Fever of unknown origin.  Differential diagnoses considered meningitis, endocarditis, acute CVA   - Upon arrival, pt sitting up in bed, awake, alert, in no apparent distress.  Per nursing and pt's family present at bedside (Dr. Jimenez' father-in-law), pt has been having episodes of \"unresponsiveness\".  Nursing notes pt recently arrived from observation and while nursing was asking pt questions, pt was initially answering questions appropriately and would acutely stop responding without loosing consciousness.  These episodes would last for ~ 45 sec or less.  Pt would appear to be \"starring\" at these times.  NIHSS 6 (unable to hold BLE off bed, aphasia, mild L facial droop).  Pt received Xarelto tonight at ~ 1830.  Pt's VS noting a-fib HR 80s-90s, SBP low 100s-120s, RR 10s, O2 sats > 92% on RA, febrile.      INTERVENTIONS:  - BG - 120  - Neuro stroke discussion with Dr. Conway; given that family reports pt has been having these \"episodes\" of delayed response for > 48 hours, pt not in the window for tPA.  Pt also received Xarelto this evening.  Does not appear that pt has a LVO CVA at this time.  Will not activate CODE STROKE at this time; however, will order stat CT head without  - Dr. Conway called re: pt results.  Given subacute CVAs noted on imaging, will proceed with MRI brain with/without  - Will formally consult neuro stroke - greatly appreciate Dr. Conway's expertise and recommendations  - Acute CVA orders placed including telemetry, Q4H VS and neuro checks (confirmed with Dr. Conway)  - Pt had TTE " completed this admission, will defer to neuro stroke if DANNY or a limited bubble study would be of assistance  - Will discontinue PTA norvasc, losartan and metoprolol at this time to allow for permissive hypertension.  Will defer to rounding hospitalist/neuro stroke if ok to resume in AM  - Pt remains on Xarelto at this time    At the end of the RRT pt remains hemodynamically stable, MRI pending, to be transferred to neuro floor    Discussed with and defer further cares to nursing and Dr. Conway, neuro stroke on call.    Interval History     Yosef Murry is a 83 year old male who was admitted on 7/29/2019 for generalized weakness.    Medical history significant for: Persistent a-fib on chronic anticoagulation, TIAs, HTN, prostate cancer    Code Status: DNR/DNI    Allergies   Allergies   Allergen Reactions     Other [Seasonal Allergies]      Physical Exam   Vital Signs with Ranges:  Temp:  [95.9  F (35.5  C)-101  F (38.3  C)] 99  F (37.2  C)  Heart Rate:  [73-97] 97  Resp:  [16-20] 20  BP: (104-147)/(64-93) 104/64  SpO2:  [94 %-98 %] 94 %  I/O last 3 completed shifts:  In: 480 [P.O.:480]  Out: -     Constitutional: Pt lying in bed, awake, alert, in no apparent distress  Neck: Stiff, minimal ROM noted  Pulmonary: In no apparent respiratory distress  Cardiovascular: Appears well perfused  GI: Soft  Skin/Integumen: Warm, dry  Neuro: Awake, alert, clear speech, PERRL, RUE grasp 4/5, no BUE drift noted, appears to have L sided mild facial droop at corner of mouth (family reports baseline)  Psych:  Calm  Extremities: Moves all extremities, BLE weakness noted    Data     Troponin:    Recent Labs   Lab Test 07/31/19  2106   TROPI <0.015     IMAGING: (X-ray/CT/MRI)   Recent Results (from the past 24 hour(s))   CT Head w/o Contrast    Narrative    CT HEAD WITHOUT CONTRAST 7/31/2019 8:06 PM    CLINICAL HISTORY: Altered level of consciousness (LOC), unexplained.  On Xarelto, worsening receptive aphasia.    TECHNIQUE: Noncontrast  axial CT images of the head were obtained. This  CT Scan used dose modulation, iterative reconstruction, and/or weight  based dosing when appropriate to reduce radiation dose to as low as  reasonably achievable.    COMPARISON:  None.     FINDINGS: There is focal decreased attenuation in the putamen and  internal capsule on the right side. This is compatible with an age  indeterminate lacunar infarction. There is chronic cortical infarction  in the mid right frontal lobe and in the posterior right frontal lobe.  There are small chronic appearing lacunar infarcts in the cerebellum  on the left. No intracranial hemorrhage. No mass lesion is identified.  The paranasal sinuses and mastoid air cells are clear.      Impression    IMPRESSION:  1. Age indeterminate lacunar infarction in the basal ganglia and  internal capsule on the right side. MRI may be beneficial to evaluate  for acuity.  2. Chronic small cortical infarctions in the right frontal lobe and  small chronic lacunar infarctions in the cerebellum on the left.    ZACARIAS HAHN MD     Time Spent on this Encounter   I spent 30 minutes of critical care time on the unit/floor managing the care of Yosef Murry. Upon evaluation, this patient had a high probability of imminent or life-threatening deterioration due to altered mental status, which required my direct attention, intervention, and personal management. 100% of my time was spent at the bedside counseling the patient and/or coordinating care regarding services listed in this note.    JOHNSON Friedman [facility] KR[facility]

## 2019-08-01 NOTE — PROGRESS NOTES
[facility]    Hospitalist Progress Note    Assessment & Plan   Yosef Murry is a 83 year old male who was admitted on 7/29/2019. He presented with generalized weakness and pain/swellingi in his right wrist. After admission, he developed a fever without a clear etiology. He also had brief episodes of 'unresponsiveness.' An MRI revealed a subacute right basal ganglia infarct.    Subacute ischemic stroke of the right basal ganglia  Had an RRT on the evening of 7/31/19 due to episodes of 'unresponsiveness.' There was concern for a stroke. Neurology was consulted. CT head showed an age indeterminate lacunar infarction in the right basal ganglia and internal capsule. MRI brain revealed a subacute ischemic infarction of the right basal ganglia.  - Outside window for tPA.  - Neurology consulted, appreciate their assistance.  - PT/OT/SLP consulted.  - TTE results reviewed. Neurology ordered DANNY for tomorrow morning.  - EEG obtained today, results pending.  - CTA head/neck ordered.  - Continue Xarelto and atorvastatin.    Fever of Unknown Origin  No localizing symptoms, other than wrist pain. No leucocytosis. CXR and UA clear. CRP elevated.  - Tmax 101.6 in past 24 hours.  - WBC within normal limits. Procalcitonin 0.08 this morning.  - Blood cultures from 7/30/19 are negative to date.  - ID consulted and assisting with management.  - Continue to observe off of antibiotics.  - CT chest/abdomen/pelvis ordered today per ID recommendations.  - Repeat labs in AM. Will also add on WESLEY, RF, anti-CCP, LDH.    Right wrist pain/swelling  Presented with generalized weakness and wrist pain and additionally developed FUO.  - Etiology unclear.  - Orthopedics consulted, appreciate their assistance.  - IR aspirated wrist on 7/30. Gram stain negative. No crystals seen. Culture negative to date.  - Cleared by Ortho and wrist pain improving.     Generalized Weakness  Complained of generalized weakness on admission but was  ambulatory without assistance. Then while in hospital developed increasing generalized weakness requiring assist of 2.  - Suspect secondary to underlying illness. Treat FUO as above.  - Supportive Cares  - PT/OT consulted.     Neck Pain  - Appears to be musculoskeletal in nature.  - CT cervical spine on 7/30/19 showed chronic degenerative changes, no acute findings.  - Continue symptomatic management.     Persistent atrial fibrillation (H)  - Continue PTA Xarelto.  - Not on any chronic medications for rate control. Heart rate OK.    DVT Prophylaxis: Xarelto  Code Status: DNR/DNI  Expected discharge: 2 - 3 days, recommended to unclear pending progress with therapies once subacute stroke appropriately treated and fever of unknown origin sufficiently evaluated.    Ayush Chand MD  Text Page  (7am - 6pm, M-F)    Interval History   Yosef Murry had an RRT last night due to brief periods of 'unresponsiveness.' Imaging was consistent with an acute ischemic stroke involving the right basal ganglia. This morning, he feels OK. Complains of some neck pain, not able to tell me if it is better or worse than yesterday or when it started. Denies chest pain, shortness of breath, nausea, abdominal pain. His son was in the room with him. Plan of care discussed. Questions invited and answered. Offered to come back later when other family returns.    -Data reviewed today: I reviewed all new labs and imaging results over the last 24 hours. I personally reviewed the EKG tracing showing atrial fibrillation.    Physical Exam   Temp: 98.7  F (37.1  C) Temp src: Oral BP: 122/65   Heart Rate: 65 Resp: 16 SpO2: 97 % O2 Device: None (Room air)    Vitals:    07/29/19 1935 07/29/19 2222   Weight: 76.2 kg (168 lb) 78.6 kg (173 lb 4.8 oz)     Vital Signs with Ranges  Temp:  [98.1  F (36.7  C)-101.6  F (38.7  C)] 98.7  F (37.1  C)  Heart Rate:  [54-99] 65  Resp:  [14-20] 16  BP: (104-153)/(62-88) 122/65  SpO2:  [93 %-100 %] 97 %  I/O last 3  completed shifts:  In: 480 [P.O.:480]  Out: -     Constitutional: Awake, alert, cooperative, no apparent distress, sitting up in a chair, had just finished eating lunch  Respiratory: Clear to auscultation bilaterally, no crackles or wheezing  Cardiovascular: Normal rate, irregular rhythm, normal S1 and S2, and no murmur noted  GI: Normal bowel sounds, soft, non-distended, non-tender  Skin/Integument: No rashes, no cyanosis, no edema  Neuro: alert, oriented to person, knew the name of the state but couldn't name the city or hospital, thought the year was 2015, knew that Darryl Lawson is president; mild left facial droop, strength 5/5 in bilateral upper and lower extremities    Medications     - MEDICATION INSTRUCTIONS -       - MEDICATION INSTRUCTIONS -         atorvastatin  10 mg Oral QPM     rivaroxaban ANTICOAGULANT  20 mg Oral Daily with supper     sodium chloride (PF)  20 mL EPIDURAL Once     Data   Recent Labs   Lab 08/01/19  0428 07/31/19  2106 07/30/19  0615 07/29/19  1952   WBC 8.2  --  8.7 11.0   HGB 14.6  --  14.3 16.1   MCV 94  --  93 94     --  211 243   NA  --   --  140 137   POTASSIUM  --   --  4.3 4.1   CHLORIDE  --   --  107 103   CO2  --   --  30 29   BUN  --   --  19 18   CR  --   --  0.86 0.94   ANIONGAP  --   --  3 5   BELLO  --   --  8.6 9.1   GLC  --   --  98 115*   ALBUMIN  --   --   --  3.4   PROTTOTAL  --   --   --  8.0   BILITOTAL  --   --   --  1.0   ALKPHOS  --   --   --  100   ALT  --   --   --  14   AST  --   --   --  13   TROPI 0.023 <0.015  --  0.022      Recent Results (from the past 24 hour(s))   CT Head w/o Contrast    Narrative    CT HEAD WITHOUT CONTRAST 7/31/2019 8:06 PM    CLINICAL HISTORY: Altered level of consciousness (LOC), unexplained.  On Xarelto, worsening receptive aphasia.    TECHNIQUE: Noncontrast axial CT images of the head were obtained. This  CT Scan used dose modulation, iterative reconstruction, and/or weight  based dosing when appropriate to reduce radiation  dose to as low as  reasonably achievable.    COMPARISON:  None.     FINDINGS: There is focal decreased attenuation in the putamen and  internal capsule on the right side. This is compatible with an age  indeterminate lacunar infarction. There is chronic cortical infarction  in the mid right frontal lobe and in the posterior right frontal lobe.  There are small chronic appearing lacunar infarcts in the cerebellum  on the left. No intracranial hemorrhage. No mass lesion is identified.  The paranasal sinuses and mastoid air cells are clear.      Impression    IMPRESSION:  1. Age indeterminate lacunar infarction in the basal ganglia and  internal capsule on the right side. MRI may be beneficial to evaluate  for acuity.  2. Chronic small cortical infarctions in the right frontal lobe and  small chronic lacunar infarctions in the cerebellum on the left.    ZACARIAS HAHN MD   MR Brain w/o & w Contrast    Narrative    MRI OF THE BRAIN WITHOUT AND WITH CONTRAST July 31, 2019 10:00 PM     HISTORY: Focal neuro deficit greater than six hours, stroke suspected.  Altered level of consciousness (LOC), unexplained. Receptive aphasia.     TECHNIQUE: Axial diffusion-weighted with ADC map, axial T2-weighted  with fat saturation, axial T1-weighted, axial turboFLAIR and coronal  T1-weighted images of the brain were acquired without intravenous  contrast. Following intravenous administration of gadolinium (7 mL  Gadavist), axial T1-weighted images of the brain were acquired.     COMPARISON: Head CT 7/31/2019.    FINDINGS: There is mild diffuse cerebral volume loss. There are  numerous scattered focal and patchy periventricular areas of abnormal  T2 signal hyperintensity in the cerebral white matter bilaterally that  are consistent with sequela of chronic small vessel ischemic disease.  There are small areas of chronic encephalomalacia involving the cortex  at the lateral and posterolateral aspects of the right frontal lobe  consistent with  chronic ischemic infarct. There is a more recent  appearing ischemic infarct in the right basal ganglia involving the  right caudate head, anterior limb of the internal capsule and anterior  aspect of the right putamen that is likely late subacute in age that  demonstrates T2 signal hyperintensity and patchy abnormal contrast  enhancement. There is no other abnormal contrast enhancement in the  brain or its coverings.    The ventricles and basal cisterns are within normal limits in  configuration given the degree of cerebral volume loss. There is no  midline shift.  There are no extra-axial fluid collections. There is  no evidence for acute intracranial hemorrhage.     There is no sinusitis or mastoiditis.      Impression    IMPRESSION: Diffuse cerebral volume loss and cerebral white matter  changes consistent with chronic small vessel ischemic disease.  Probable small chronic ischemic infarcts at the lateral and  posterolateral aspects of the right frontal lobe. Probable late  subacute ischemic infarct in the right basal ganglia. No evidence for  acute intracranial pathology.

## 2019-08-01 NOTE — PROGRESS NOTES
08/01/19 0842   Quick Adds   Type of Visit Initial Occupational Therapy Evaluation   Living Environment   Lives With alone   Living Arrangements house  (one level town home with a lower level )   Home Accessibility stairs to enter home;stairs within home   Transportation Anticipated family or friend will provide  (Pt typically drives )   Living Environment Comment Daughter and Son can assist as needed. Daughter is retired.    Self-Care   Usual Activity Tolerance good   Current Activity Tolerance moderate   Regular Exercise Yes   Activity/Exercise Type walking   Exercise Amount/Frequency daily  (2-3 miles )   Equipment Currently Used at Home none   Functional Level   Ambulation 0-->independent   Transferring 0-->independent   Toileting 0-->independent   Bathing 0-->independent   Dressing 0-->independent   Eating 0-->independent   Fall history within last six months no   General Information   Onset of Illness/Injury or Date of Surgery - Date 07/29/19   Referring Physician Cabrera Gomez APRN CNP   Patient/Family Goals Statement Home    Additional Occupational Profile Info/Pertinent History of Current Problem Per chart: Yosef Murry is a 83 year old male admitted on 7/29/2019. He presents with generalized weakness. 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   Precautions/Limitations fall precautions   Weight-Bearing Status - RUE weight-bearing as tolerated  (Wrist splint for comfort per ortho note 7/30)   Cognitive Status Examination   Orientation orientation to person, place and time   Level of Consciousness alert   Visual Perception   Visual Perception Wears glasses  (for distances)   Visual Perception Comments Pt demonstrated left side inattention   Sensory Examination   Sensory Quick Adds  No deficits were identified   Pain Assessment   Patient Currently in Pain Yes, see Vital Sign flowsheet  (Pt reporting neck pain )   Range of Motion (ROM)   ROM Quick Adds Shoulder, Left;Shoulder, Right;Elbow/Forearm, Left;Elbow/Forearm, Right;Wrist, Left;Wrist, Right   Left Shoulder Flexion ROM   (Degrees) - Left Shoulder Flexion AROM 91   Left Shoulder ABduction ROM   (Degrees) - Left Shoulder ABduction AROM, 91   Right Shoulder Flexion ROM   (Degrees) - Right Shoulder Flexion AROM 87   Right Shoulder ABduction ROM   (Degrees) - Right Shoulder ABduction AROM, 85   Strength   Manual Muscle Testing Quick Adds MMT: Shoulder;MMT: Elbow/Forewarm;MMT: Wrist   MMT: Shoulder   Left Flexion (2+/5) poor plus, left   Left ABduction (2+/5) poor plus, left   Right Flexion (2+/5) poor plus, right   Right ABduction (2+/5) poor plus, right   Mobility   Bed Mobility Bed mobility skill: Sit to supine;Bed mobility skill: Supine to sit   Bed Mobility Skill: Supine to Sit   Level of [place]: Supine/Sit moderate assist (50% patients effort)   Physical Assist/Nonphysical Assist: Supine/Sit 2 persons   Transfer Skills   Transfer Transfer Skill: Stand to Sit;Transfer Safety Analysis Bed/Chair;Transfer Safety Analysis Sit/Stand   Transfer Skill: Bed to Chair/Chair to Bed   Level of [place]: Bed to Chair minimum assist (75% patients effort)   Physical Assist/Nonphysical Assist: Bed to Chair 1 person assist   Transfer Skill: Sit to Stand   Level of [place]: Sit/Stand minimum assist (75% patients effort)   Physical Assist/Nonphysical Assist: Sit/Stand 1 person assist   Toilet Transfer   Toilet Transfer Toilet Transfer Skill;Toilet Transfer Safety Analysis   Transfer Skill: Toilet Transfer   Level of [place]: Toilet minimum assist (75% patients effort)   Physical Assist/Nonphysical Assist: Toilet 1 person assist   Instrumental Activities of Daily Living (IADL)   Previous Responsibilities meal  "prep;housekeeping;laundry;shopping;medication management;finances;driving   General Therapy Interventions   Planned Therapy Interventions ADL retraining;IADL retraining;cognition;transfer training   Clinical Impression   Criteria for Skilled Therapeutic Interventions Met yes, treatment indicated   OT Diagnosis Decreased independnece for I/ADLs   Influenced by the following impairments Decreased independnece for I/ADLs   Assessment of Occupational Performance 1-3 Performance Deficits   Identified Performance Deficits Decreased independnece for I/ADLs (dressing, bathing, toileting)   Clinical Decision Making (Complexity) Low complexity   Therapy Frequency Daily   Predicted Duration of Therapy Intervention (days/wks) 6 days   Anticipated Discharge Disposition Acute Rehabilitation Facility   Risks and Benefits of Treatment have been explained. Yes   Patient, Family & other staff in agreement with plan of care Yes   [facility] TM \"6 Clicks\"   2016, Trustees of [facility], under license to [employer].  All rights reserved.   6 Clicks Short Forms Daily Activity Inpatient Short Form   [facility]  \"6 Clicks\" Daily Activity Inpatient Short Form   1. Putting on and taking off regular lower body clothing? 2 - A Lot   2. Bathing (including washing, rinsing, drying)? 2 - A Lot   3. Toileting, which includes using toilet, bedpan or urinal? 2 - A Lot   4. Putting on and taking off regular upper body clothing? 3 - A Little   5. Taking care of personal grooming such as brushing teeth? 3 - A Little   6. Eating meals? 4 - None   Daily Activity Raw Score (Score out of 24.Lower scores equate to lower levels of function) 16   Total Evaluation Time   Total Evaluation Time (Minutes) 8     "

## 2019-08-01 NOTE — PROGRESS NOTES
"   08/01/19 1500   Quick Adds   Type of Visit Initial PT Evaluation   Living Environment   Lives With alone   Living Arrangements house  (townhouse)   Home Accessibility stairs to enter home;stairs within home   Number of Stairs, Main Entrance 2  (2 front entrance or ramp back entrance)   Stair Railings, Main Entrance   (no rail front steps, R railing back ramp (wall L))   Number of Stairs, Within Home, Primary   (flight to  (has treadmill in basement))   Stair Railings, Within Home, Primary   (1 railing )   Transportation Anticipated family or friend will provide  (normally drives self)   Living Environment Comment Son present, assists with info, son lives 26 miles away   Self-Care   Usual Activity Tolerance good   Regular Exercise Yes   Activity/Exercise Type walking  (outside and inside)   Exercise Amount/Frequency daily  (about 3 miles)   Functional Level Prior   Ambulation 0-->independent   Transferring 0-->independent   Toileting 0-->independent   Bathing 0-->independent   Fall history within last six months no   Which of the above functional risks had a recent onset or change? ambulation;transferring;toileting;bathing   General Information   Onset of Illness/Injury or Date of Surgery - Date 07/29/19   Referring Physician Cabrera Gomez, JOHNSON CNP   Patient/Family Goals Statement did not state   Pertinent History of Current Problem (include personal factors and/or comorbidities that impact the POC) Pt is 83 y.o. M presented with generalized weakness, during stay pt with episodes of delayed responsiveness, imaging revealed subacute lacunar infarct in R basal ganglia and internal capsule.   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   Strength   Strength Comments Strength not formally assessed secondary to impaired command following. Pt demonstrates functional LE weakness with transfers and bed mobility   Bed Mobility   Bed Mobility Comments Sit>supine with ModA x2   Transfer Skills   Transfer Comments Attempted sit>stand with MaxA and FWW- not able to clear seat of chair.   Gait   Gait Comments Able to lift L LE, not able to lift R LE to clear floor standing with FWW and Boom x2 support. Not able to ambulate at this time.   Balance   Balance Comments Fair minus balance with FWW   Sensory Examination   Sensory Perception Comments Denies numbness/ tingling   Coordination   Coordination Comments Difficulty assessing coordination due to impaired command following; finger-nose appears intact, pt appears to have some inattention to L throughout session   Modality Interventions   Planned Modality Interventions Cryotherapy   General Therapy Interventions   Planned Therapy Interventions balance training;bed mobility training;gait training;neuromuscular re-education;ROM;strengthening;stretching;transfer training;home program guidelines;progressive activity/exercise   Clinical Impression   Criteria for Skilled Therapeutic Intervention yes, treatment indicated   PT Diagnosis difficulty ambulating   Influenced by the following impairments pain, decreased strength, confusion, decreased activity tolerance   Functional limitations due to impairments difficulty with ed mobility, transfers, ambulation and stairs   Clinical Presentation Evolving/Changing  (changing functional mobility and cognitive status)   Clinical Presentation Rationale clinical judgement  "  Clinical Decision Making (Complexity) Moderate complexity  (changing status, lives alone)   Therapy Frequency Daily   Predicted Duration of Therapy Intervention (days/wks) 4 days   Anticipated Equipment Needs at Discharge   (none if dc to rehab)   Anticipated Discharge Disposition Acute Rehabilitation Facility   Risk & Benefits of therapy have been explained Yes   Patient, Family & other staff in agreement with plan of care Yes   Clinical Impression Comments Patient previously independent with mobility and quite active, ambulating 3 miles per day. Currently, pt needing Ax2 with mobility. Anticipate patient will tolerate 3 hrs of therapy per day.   [facility] [employer] TM \"6 Clicks\"   2016, Trustees of [facility], under license to [employer].  All rights reserved.   6 Clicks Short Forms Basic Mobility Inpatient Short Form   [facility]  \"6 Clicks\" V.2 Basic Mobility Inpatient Short Form   1. Turning from your back to your side while in a flat bed without using bedrails? 2 - A Lot   2. Moving from lying on your back to sitting on the side of a flat bed without using bedrails? 2 - A Lot   3. Moving to and from a bed to a chair (including a wheelchair)? 2 - A Lot   4. Standing up from a chair using your arms (e.g., wheelchair, or bedside chair)? 2 - A Lot   5. To walk in hospital room? 1 - Total   6. Climbing 3-5 steps with a railing? 1 - Total   Basic Mobility Raw Score (Score out of 24.Lower scores equate to lower levels of function) 10   Total Evaluation Time   Total Evaluation Time (Minutes) 12     "

## 2019-08-01 NOTE — PLAN OF CARE
Pt here with episodes of unresponsiveness. A&Ox4. Neuros intact except for slight L facial droop. VSS. Tele A-fib CVR. Regular diet, thin liquids. Takes pills whole with water. Up with A2/GB/W. Voiding adequately. Complains of 4/10 neck pain decreased with oxycodone. Waiting on MRI result. Continue to monitor.

## 2019-08-01 NOTE — PLAN OF CARE
"Discharge Planner PT   Patient plan for discharge: \"I don't know\"  Current status: PT orders received, eval completed, treatment initiated. Pt is 83 y.o. M presented with generalized weakness, during stay pt with episodes of delayed responsiveness, imaging revealed subacute lacunar infarct in R basal ganglia and internal capsule. Pt lives alone in 1 level house with 2 steps vs ramp to enter, flight of stairs to LL -all needs met main level. Pt previously ind with mobility and walks 3 miles/day.   Currently, pt received seated in chair in room, agreeable to PT. Pt's son present for session and supportive. 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 Pt with delayed response/ impaired command following throughout session, often responding \"sure, sure\" but not following through with command. Attempted sit>stand x2 reps from chair with MaxA, not able to clear seat. Performed weight shifting forward and back, and laterally in chair- pt not able to shift weight L despite mirroring, verbal and tactile cues. Scooted forward in chair with MaxAx1 and weight shifting strategy. Sit>stand with ModAx2 and FWW. Pt with B knee flexion standing. MinAx2 to maintain standing. MaxA x2 to turn toward bed, needing Max step-by-step verbal and tactile cues to progress LEs R and L foot minimally. Pt with difficulty backing up, bed moved close to patient for stand>sit at bed. Pt reluctant to sit at bed, needing ModAx2 to slowly sit at EOB. ModAx2 for sit>supine. Pt supine in bedupon departure of PT, all needs in reach. RN updated on pt's decreased functional mobility and delayed responses this session.  Barriers to return to prior living situation: lives alone, currently Ax2, confusion  Recommendations for discharge: consider ARU  Rationale for recommendations: Patient previously independent with mobility and " quite active, ambulating 3 miles per day. Currently, pt needing Ax2 with mobility. Anticipate patient will tolerate 3 hrs of therapy per day.        Entered by: Johanna Blanc 08/01/2019 3:47 PM

## 2019-08-01 NOTE — PROGRESS NOTES
"[facility]  Infectious Disease Progress Note          Assessment and Plan:   IMPRESSION:   1. An 83-year-old male with history of carcinoma of the prostate.   2. Atrial fibrillation.   3. Admitted on this occasion with 3-4 day history of progressive generalized weakness accompanied by some increase in chronic neck pain as well as new right wrist pain.  The patient was unaware of a fever, but has been febrile to over 101 degrees since his hospital admission.  A CRP was elevated to 61.9 degrees.  The source of the patient's fever is not clear.  White blood count is normal.  Urinalysis is benign.  Chest x-ray showed no acute infiltrate.  CT scan of the cervical spine showed significant degenerative changes but no evidence of infection.    4. Ongoing fever, still no clear explanation.  Blood and wrist cxs neg.  MRI brain without acute changes.    RECOMMENDATIONS:   1. Would hold on antibiotic therapy unless and until a more clear evidence of bacterial infection.   2. Would begin to approach this as FUO.  Would start by recommending CT scans of chest, abd/pelvis today.  3. Discussed with Dr. Jimenez (pt's son-in-law)        Interval History:   Fever to 101.6 last night.  Reportedly episode of unresponsiveness and facial droop.  MRI brain w/o acute changes.  Blood and wrist cxs neg.  No new c/o.  Lucid this am.              Medications:       rivaroxaban ANTICOAGULANT  20 mg Oral Daily with supper     sodium chloride (PF)  20 mL EPIDURAL Once                  Physical Exam:   Blood pressure 139/78, temperature 99.2  F (37.3  C), temperature source Oral, resp. rate 16, height 1.778 m (5' 10\"), weight 78.6 kg (173 lb 4.8 oz), SpO2 98 %.  [unfilled]  Vital Signs with Ranges  Temp:  [95.9  F (35.5  C)-101.6  F (38.7  C)] 99.2  F (37.3  C)  Heart Rate:  [54-99] 92  Resp:  [14-20] 16  BP: (104-153)/(62-88) 139/78  SpO2:  [93 %-100 %] 98 %    Constitutional: Awake, alert, cooperative, no apparent distress "   Lungs: Clear to auscultation bilaterally, no crackles or wheezing   Cardiovascular: Regular rate and rhythm, afib, and no murmur noted   Abdomen: Normal bowel sounds, soft, non-distended, non-tender   Skin: No rashes, no cyanosis, no edema   Other:           Data:   All microbiology laboratory data reviewed.  Recent Labs   Lab Test 08/01/19  0428 07/30/19  0615 07/29/19 1952   WBC 8.2 8.7 11.0   HGB 14.6 14.3 16.1   HCT 43.0 42.5 47.9   MCV 94 93 94    211 243     Recent Labs   Lab Test 07/30/19  0615 07/29/19  1952 06/10/19  0949   CR 0.86 0.94 0.95  0.92     Recent Labs   Lab Test 07/29/19 1952   SED 11

## 2019-08-01 NOTE — PROCEDURES
Procedure Date: 2019        EEG #:  [id number].          TYPE OF STUDY:  Inpatient portable EEG.          SOURCE FILE DURATION:  20 minutes, 11 seconds.         HISTORY:  Inpatient portable EEG performed on Yosef Costa, an 83-year-old who presents with spells of altered mental status.  He has a history of multiple medical problems.  He is being treated with multiple medications including Norco.        FINDINGS:  While clearly awake, interacting with staff, etc., a 9 Hz posterior dominant rhythm is seen.  Irregular diffuse theta is noted, more than normal.  There are a few irregular delta waves as well.  The patient is awake for only several minutes in this recording.  The patient is asleep during the majority of the study.  Vertex waves and sleep spindles are seen.  There is a moderate persistence of alpha in the range of 6 Hz.  There is a persistence of diffuse theta and some alpha, more often than is typically seen in sleep.      Hyperventilation and photic stimulation were not performed      OTHER INTERICTAL ABNORMALITIES:  No epileptiform discharges.      ICTAL ABNORMALITIES:  No electrographic seizures.      IMPRESSION:  Mildly abnormal because of an excess of irregular theta activity while clearly awake.  This is consistent with a mild diffuse encephalopathy.  These findings are nonspecific and can be seen in a variety of etiologies including toxic, metabolic and degenerative among others.  Epileptiform discharges or seizures were not seen in this study.         SALMA KELLY MD             D: 2019   T: 2019   MT:       Name:     YOSEF COSTA   MRN:      [id number]        Account:        [id number]   :      1935           Procedure Date: 2019      Document: [id number]

## 2019-08-01 NOTE — PLAN OF CARE
"DATE & TIME: 07/31/2019, 18:00-23:30   Cognitive Concerns/ Orientation :  A & O times four.   BEHAVIOR & AGGRESSION TOOL COLOR: Green  CIWA SCORE: N/A  ABNL VS/O2:  Fever 101.6 orally, after PRN Tylenol 100 oral  MOBILITY: Assist of two  PAIN MANAGMENT: Neck pain, oxycodone   DIET: regular  BOWEL/BLADDER: continent  ABNL LAB/BG:   DRAIN/DEVICES: N/A  TELEMETRY RHYTHM: A-fib with CVR  SKIN: intact, ex  TESTS/PROCEDURES: CT, MRI  D/C DAY/GOALS/PLACE: Pending based on pt's status  OTHER IMPORTANT INFO: He transferred from Observation unit at 1800. At first assessment, he was A & O times four, follow all command, and able to answer all profile questions appropriately. However, while doing further assessment noticed that some delayed response to question and not following command for brief period of time intermittently. Neuro check was performed, RRT was called. He has left facial droop, but per family report this might be baseline. Generalized weakness, and report neck pain. VSS, except he had a temp of 101.6 orally. Bedside swallow pass. PRN tylenol was given. After PRN tylenol temp 100 orally. Tele a-fib with CVR. He has bilateral lower ext edema. CMS WDL. Both CT scan and MRI was performed. CT scan result \"Age indeterminate lacunar infarction in the basal ganglia and internal capsule on the right side.\" MRI result is pending. Report was given to station 73 RN, he will be transferring there. Continue to monitor.       "

## 2019-08-01 NOTE — PROGRESS NOTES
Routine EEG  completed at patient's bedside. Procedure explained to patient prior to testing.   Ordered by Milena Counters

## 2019-08-01 NOTE — PROGRESS NOTES
EEG CLINICAL NEUROPHYSIOLOGY PRELIMINARY REPORT    Portable EEG obtained earlier this afternoon reviewed. Findings consistent with mild diffuse encephalopathy. This is a nonspecific finding and can be associated with multiple etiologies including toxic, metabolic, and degenerative among others. No focal features, epileptiform discharges or seizures noted. Full report to follow.    Dann Alexander MD  Pager [phone]

## 2019-08-02 ENCOUNTER — APPOINTMENT (OUTPATIENT)
Dept: OCCUPATIONAL THERAPY | Facility: CLINIC | Age: 84
DRG: 864 | End: 2019-08-02
Payer: MEDICARE

## 2019-08-02 ENCOUNTER — ANESTHESIA EVENT (OUTPATIENT)
Dept: GASTROENTEROLOGY | Facility: CLINIC | Age: 84
DRG: 864 | End: 2019-08-02
Payer: MEDICARE

## 2019-08-02 ENCOUNTER — APPOINTMENT (OUTPATIENT)
Dept: CT IMAGING | Facility: CLINIC | Age: 84
DRG: 864 | End: 2019-08-02
Attending: HOSPITALIST
Payer: MEDICARE

## 2019-08-02 ENCOUNTER — ANESTHESIA (OUTPATIENT)
Dept: GASTROENTEROLOGY | Facility: CLINIC | Age: 84
DRG: 864 | End: 2019-08-02
Payer: MEDICARE

## 2019-08-02 ENCOUNTER — APPOINTMENT (OUTPATIENT)
Dept: CARDIOLOGY | Facility: CLINIC | Age: 84
DRG: 864 | End: 2019-08-02
Attending: NURSE PRACTITIONER
Payer: MEDICARE

## 2019-08-02 LAB
ANION GAP SERPL CALCULATED.3IONS-SCNC: 5 MMOL/L (ref 3–14)
BUN SERPL-MCNC: 25 MG/DL (ref 7–30)
CALCIUM SERPL-MCNC: 9 MG/DL (ref 8.5–10.1)
CHLORIDE SERPL-SCNC: 105 MMOL/L (ref 94–109)
CO2 SERPL-SCNC: 28 MMOL/L (ref 20–32)
CREAT SERPL-MCNC: 0.9 MG/DL (ref 0.66–1.25)
ERYTHROCYTE [DISTWIDTH] IN BLOOD BY AUTOMATED COUNT: 14 % (ref 10–15)
GFR SERPL CREATININE-BSD FRML MDRD: 78 ML/MIN/{1.73_M2}
GLUCOSE SERPL-MCNC: 115 MG/DL (ref 70–99)
HCT VFR BLD AUTO: 43.6 % (ref 40–53)
HGB BLD-MCNC: 14.3 G/DL (ref 13.3–17.7)
INTERPRETATION ECG - MUSE: NORMAL
LDH SERPL L TO P-CCNC: 155 U/L (ref 85–227)
MCH RBC QN AUTO: 31 PG (ref 26.5–33)
MCHC RBC AUTO-ENTMCNC: 32.8 G/DL (ref 31.5–36.5)
MCV RBC AUTO: 95 FL (ref 78–100)
PLATELET # BLD AUTO: 246 10E9/L (ref 150–450)
POTASSIUM SERPL-SCNC: 4.8 MMOL/L (ref 3.4–5.3)
RBC # BLD AUTO: 4.61 10E12/L (ref 4.4–5.9)
RHEUMATOID FACT SER NEPH-ACNC: <20 IU/ML (ref 0–20)
SODIUM SERPL-SCNC: 138 MMOL/L (ref 133–144)
UPPER GI ENDOSCOPY: NORMAL
WBC # BLD AUTO: 10 10E9/L (ref 4–11)

## 2019-08-02 PROCEDURE — 25000128 H RX IP 250 OP 636: Performed by: NURSE ANESTHETIST, CERTIFIED REGISTERED

## 2019-08-02 PROCEDURE — 80048 BASIC METABOLIC PNL TOTAL CA: CPT | Performed by: HOSPITALIST

## 2019-08-02 PROCEDURE — 12000000 ZZH R&B MED SURG/OB

## 2019-08-02 PROCEDURE — 37000009 ZZH ANESTHESIA TECHNICAL FEE, EACH ADDTL 15 MIN: Performed by: INTERNAL MEDICINE

## 2019-08-02 PROCEDURE — 86200 CCP ANTIBODY: CPT | Performed by: HOSPITALIST

## 2019-08-02 PROCEDURE — 85027 COMPLETE CBC AUTOMATED: CPT | Performed by: HOSPITALIST

## 2019-08-02 PROCEDURE — 25000132 ZZH RX MED GY IP 250 OP 250 PS 637: Mod: GY | Performed by: NURSE PRACTITIONER

## 2019-08-02 PROCEDURE — 99232 SBSQ HOSP IP/OBS MODERATE 35: CPT | Performed by: PSYCHIATRY & NEUROLOGY

## 2019-08-02 PROCEDURE — 74177 CT ABD & PELVIS W/CONTRAST: CPT

## 2019-08-02 PROCEDURE — 0DJ08ZZ INSPECTION OF UPPER INTESTINAL TRACT, VIA NATURAL OR ARTIFICIAL OPENING ENDOSCOPIC: ICD-10-PCS | Performed by: INTERNAL MEDICINE

## 2019-08-02 PROCEDURE — 25000125 ZZHC RX 250: Performed by: NURSE ANESTHETIST, CERTIFIED REGISTERED

## 2019-08-02 PROCEDURE — 93325 DOPPLER ECHO COLOR FLOW MAPG: CPT

## 2019-08-02 PROCEDURE — 25800030 ZZH RX IP 258 OP 636: Performed by: INTERNAL MEDICINE

## 2019-08-02 PROCEDURE — 25000125 ZZHC RX 250: Performed by: INTERNAL MEDICINE

## 2019-08-02 PROCEDURE — 71260 CT THORAX DX C+: CPT

## 2019-08-02 PROCEDURE — 99232 SBSQ HOSP IP/OBS MODERATE 35: CPT | Performed by: HOSPITALIST

## 2019-08-02 PROCEDURE — 37000008 ZZH ANESTHESIA TECHNICAL FEE, 1ST 30 MIN: Performed by: INTERNAL MEDICINE

## 2019-08-02 PROCEDURE — 25000132 ZZH RX MED GY IP 250 OP 250 PS 637: Mod: GY | Performed by: STUDENT IN AN ORGANIZED HEALTH CARE EDUCATION/TRAINING PROGRAM

## 2019-08-02 PROCEDURE — 86431 RHEUMATOID FACTOR QUANT: CPT | Performed by: HOSPITALIST

## 2019-08-02 PROCEDURE — 83615 LACTATE (LD) (LDH) ENZYME: CPT | Performed by: HOSPITALIST

## 2019-08-02 PROCEDURE — 40000235 ZZH STATISTIC TELEMETRY

## 2019-08-02 PROCEDURE — 25800030 ZZH RX IP 258 OP 636: Performed by: NURSE ANESTHETIST, CERTIFIED REGISTERED

## 2019-08-02 PROCEDURE — 97535 SELF CARE MNGMENT TRAINING: CPT | Mod: GO | Performed by: OCCUPATIONAL THERAPIST

## 2019-08-02 PROCEDURE — 40000010 ZZH STATISTIC ANES STAT CODE-CRNA PER MINUTE: Performed by: INTERNAL MEDICINE

## 2019-08-02 PROCEDURE — 43248 EGD GUIDE WIRE INSERTION: CPT | Performed by: INTERNAL MEDICINE

## 2019-08-02 PROCEDURE — 86038 ANTINUCLEAR ANTIBODIES: CPT | Performed by: HOSPITALIST

## 2019-08-02 PROCEDURE — 36415 COLL VENOUS BLD VENIPUNCTURE: CPT | Performed by: HOSPITALIST

## 2019-08-02 PROCEDURE — 25000128 H RX IP 250 OP 636: Performed by: STUDENT IN AN ORGANIZED HEALTH CARE EDUCATION/TRAINING PROGRAM

## 2019-08-02 PROCEDURE — 25000125 ZZHC RX 250: Performed by: STUDENT IN AN ORGANIZED HEALTH CARE EDUCATION/TRAINING PROGRAM

## 2019-08-02 PROCEDURE — 40000857 ZZH STATISTIC TEE INCLUDES SEDATION

## 2019-08-02 PROCEDURE — 25000128 H RX IP 250 OP 636: Performed by: INTERNAL MEDICINE

## 2019-08-02 PROCEDURE — 70498 CT ANGIOGRAPHY NECK: CPT

## 2019-08-02 RX ORDER — PROPOFOL 10 MG/ML
INJECTION, EMULSION INTRAVENOUS PRN
Status: DISCONTINUED | OUTPATIENT
Start: 2019-08-02 | End: 2019-08-02

## 2019-08-02 RX ORDER — FENTANYL CITRATE 50 UG/ML
25 INJECTION, SOLUTION INTRAMUSCULAR; INTRAVENOUS
Status: DISCONTINUED | OUTPATIENT
Start: 2019-08-02 | End: 2019-08-02

## 2019-08-02 RX ORDER — SODIUM CHLORIDE, SODIUM LACTATE, POTASSIUM CHLORIDE, CALCIUM CHLORIDE 600; 310; 30; 20 MG/100ML; MG/100ML; MG/100ML; MG/100ML
INJECTION, SOLUTION INTRAVENOUS CONTINUOUS
Status: CANCELLED | OUTPATIENT
Start: 2019-08-02

## 2019-08-02 RX ORDER — ONDANSETRON 4 MG/1
4 TABLET, ORALLY DISINTEGRATING ORAL EVERY 30 MIN PRN
Status: CANCELLED | OUTPATIENT
Start: 2019-08-02

## 2019-08-02 RX ORDER — PROPOFOL 10 MG/ML
INJECTION, EMULSION INTRAVENOUS CONTINUOUS PRN
Status: DISCONTINUED | OUTPATIENT
Start: 2019-08-02 | End: 2019-08-02

## 2019-08-02 RX ORDER — FENTANYL CITRATE 50 UG/ML
INJECTION, SOLUTION INTRAMUSCULAR; INTRAVENOUS PRN
Status: DISCONTINUED | OUTPATIENT
Start: 2019-08-02 | End: 2019-08-02

## 2019-08-02 RX ORDER — ONDANSETRON 2 MG/ML
4 INJECTION INTRAMUSCULAR; INTRAVENOUS EVERY 30 MIN PRN
Status: CANCELLED | OUTPATIENT
Start: 2019-08-02

## 2019-08-02 RX ORDER — HYDROMORPHONE HYDROCHLORIDE 1 MG/ML
.3-.5 INJECTION, SOLUTION INTRAMUSCULAR; INTRAVENOUS; SUBCUTANEOUS EVERY 10 MIN PRN
Status: CANCELLED | OUTPATIENT
Start: 2019-08-02

## 2019-08-02 RX ORDER — LIDOCAINE 40 MG/G
CREAM TOPICAL
Status: CANCELLED | OUTPATIENT
Start: 2019-08-02

## 2019-08-02 RX ORDER — SODIUM CHLORIDE 9 MG/ML
INJECTION, SOLUTION INTRAVENOUS CONTINUOUS PRN
Status: DISCONTINUED | OUTPATIENT
Start: 2019-08-02 | End: 2019-08-02

## 2019-08-02 RX ORDER — MEPERIDINE HYDROCHLORIDE 25 MG/ML
12.5 INJECTION INTRAMUSCULAR; INTRAVENOUS; SUBCUTANEOUS
Status: CANCELLED | OUTPATIENT
Start: 2019-08-02

## 2019-08-02 RX ORDER — NALOXONE HYDROCHLORIDE 0.4 MG/ML
.1-.4 INJECTION, SOLUTION INTRAMUSCULAR; INTRAVENOUS; SUBCUTANEOUS
Status: DISCONTINUED | OUTPATIENT
Start: 2019-08-02 | End: 2019-08-02 | Stop reason: HOSPADM

## 2019-08-02 RX ORDER — FENTANYL CITRATE 50 UG/ML
25-50 INJECTION, SOLUTION INTRAMUSCULAR; INTRAVENOUS
Status: CANCELLED | OUTPATIENT
Start: 2019-08-02

## 2019-08-02 RX ORDER — GLYCOPYRROLATE 0.2 MG/ML
0.1 INJECTION, SOLUTION INTRAMUSCULAR; INTRAVENOUS ONCE
Status: COMPLETED | OUTPATIENT
Start: 2019-08-02 | End: 2019-08-02

## 2019-08-02 RX ORDER — LIDOCAINE HYDROCHLORIDE 20 MG/ML
INJECTION, SOLUTION INFILTRATION; PERINEURAL PRN
Status: DISCONTINUED | OUTPATIENT
Start: 2019-08-02 | End: 2019-08-02

## 2019-08-02 RX ORDER — LIDOCAINE HYDROCHLORIDE 40 MG/ML
1.5 SOLUTION TOPICAL ONCE
Status: COMPLETED | OUTPATIENT
Start: 2019-08-02 | End: 2019-08-02

## 2019-08-02 RX ORDER — IOPAMIDOL 755 MG/ML
85 INJECTION, SOLUTION INTRAVASCULAR ONCE
Status: COMPLETED | OUTPATIENT
Start: 2019-08-02 | End: 2019-08-02

## 2019-08-02 RX ORDER — FENTANYL CITRATE 50 UG/ML
25 INJECTION, SOLUTION INTRAMUSCULAR; INTRAVENOUS ONCE
Status: DISCONTINUED | OUTPATIENT
Start: 2019-08-02 | End: 2019-08-02

## 2019-08-02 RX ORDER — LIDOCAINE 40 MG/G
CREAM TOPICAL
Status: DISCONTINUED | OUTPATIENT
Start: 2019-08-02 | End: 2019-08-02

## 2019-08-02 RX ORDER — METHOCARBAMOL 500 MG/1
500 TABLET, FILM COATED ORAL 4 TIMES DAILY PRN
Status: DISCONTINUED | OUTPATIENT
Start: 2019-08-02 | End: 2019-08-06 | Stop reason: HOSPADM

## 2019-08-02 RX ORDER — NALOXONE HYDROCHLORIDE 0.4 MG/ML
.1-.4 INJECTION, SOLUTION INTRAMUSCULAR; INTRAVENOUS; SUBCUTANEOUS
Status: CANCELLED | OUTPATIENT
Start: 2019-08-02 | End: 2019-08-03

## 2019-08-02 RX ORDER — SODIUM CHLORIDE, SODIUM LACTATE, POTASSIUM CHLORIDE, CALCIUM CHLORIDE 600; 310; 30; 20 MG/100ML; MG/100ML; MG/100ML; MG/100ML
INJECTION, SOLUTION INTRAVENOUS CONTINUOUS PRN
Status: DISCONTINUED | OUTPATIENT
Start: 2019-08-02 | End: 2019-08-02

## 2019-08-02 RX ORDER — FLUMAZENIL 0.1 MG/ML
0.2 INJECTION, SOLUTION INTRAVENOUS
Status: DISCONTINUED | OUTPATIENT
Start: 2019-08-02 | End: 2019-08-02 | Stop reason: HOSPADM

## 2019-08-02 RX ADMIN — FENTANYL CITRATE 25 MCG: 50 INJECTION INTRAMUSCULAR; INTRAVENOUS at 09:57

## 2019-08-02 RX ADMIN — PROPOFOL 20 MG: 10 INJECTION, EMULSION INTRAVENOUS at 14:29

## 2019-08-02 RX ADMIN — SODIUM CHLORIDE 90 ML: 9 INJECTION, SOLUTION INTRAVENOUS at 17:13

## 2019-08-02 RX ADMIN — OXYCODONE HYDROCHLORIDE 5 MG: 5 TABLET ORAL at 16:03

## 2019-08-02 RX ADMIN — FENTANYL CITRATE 25 MCG: 50 INJECTION, SOLUTION INTRAMUSCULAR; INTRAVENOUS at 14:40

## 2019-08-02 RX ADMIN — MIDAZOLAM HYDROCHLORIDE 0.5 MG: 1 INJECTION, SOLUTION INTRAMUSCULAR; INTRAVENOUS at 09:54

## 2019-08-02 RX ADMIN — METHOCARBAMOL 500 MG: 500 TABLET, FILM COATED ORAL at 17:43

## 2019-08-02 RX ADMIN — FENTANYL CITRATE 25 MCG: 50 INJECTION, SOLUTION INTRAMUSCULAR; INTRAVENOUS at 14:31

## 2019-08-02 RX ADMIN — PROPOFOL 10 MG: 10 INJECTION, EMULSION INTRAVENOUS at 14:32

## 2019-08-02 RX ADMIN — IOPAMIDOL 85 ML: 755 INJECTION, SOLUTION INTRAVENOUS at 17:13

## 2019-08-02 RX ADMIN — DEXMEDETOMIDINE HYDROCHLORIDE 8 MCG: 100 INJECTION, SOLUTION INTRAVENOUS at 14:20

## 2019-08-02 RX ADMIN — FENTANYL CITRATE 25 MCG: 50 INJECTION INTRAMUSCULAR; INTRAVENOUS at 09:55

## 2019-08-02 RX ADMIN — OXYCODONE HYDROCHLORIDE 5 MG: 5 TABLET ORAL at 20:22

## 2019-08-02 RX ADMIN — ATORVASTATIN CALCIUM 10 MG: 10 TABLET, FILM COATED ORAL at 20:22

## 2019-08-02 RX ADMIN — SODIUM CHLORIDE: 9 INJECTION, SOLUTION INTRAVENOUS at 09:22

## 2019-08-02 RX ADMIN — LIDOCAINE HYDROCHLORIDE 1.5 ML: 40 SOLUTION TOPICAL at 09:25

## 2019-08-02 RX ADMIN — DEXMEDETOMIDINE HYDROCHLORIDE 8 MCG: 100 INJECTION, SOLUTION INTRAVENOUS at 14:28

## 2019-08-02 RX ADMIN — MIDAZOLAM HYDROCHLORIDE 0.5 MG: 1 INJECTION, SOLUTION INTRAMUSCULAR; INTRAVENOUS at 09:59

## 2019-08-02 RX ADMIN — GLYCOPYRROLATE 0.1 MG: 0.2 INJECTION, SOLUTION INTRAMUSCULAR; INTRAVENOUS at 09:23

## 2019-08-02 RX ADMIN — SODIUM CHLORIDE, POTASSIUM CHLORIDE, SODIUM LACTATE AND CALCIUM CHLORIDE: 600; 310; 30; 20 INJECTION, SOLUTION INTRAVENOUS at 14:19

## 2019-08-02 RX ADMIN — APIXABAN 5 MG: 5 TABLET, FILM COATED ORAL at 16:03

## 2019-08-02 RX ADMIN — FENTANYL CITRATE 50 MCG: 50 INJECTION, SOLUTION INTRAMUSCULAR; INTRAVENOUS at 14:25

## 2019-08-02 RX ADMIN — PROPOFOL 20 MG: 10 INJECTION, EMULSION INTRAVENOUS at 14:40

## 2019-08-02 RX ADMIN — PROPOFOL 100 MCG/KG/MIN: 10 INJECTION, EMULSION INTRAVENOUS at 14:29

## 2019-08-02 RX ADMIN — LIDOCAINE HYDROCHLORIDE 50 MG: 20 INJECTION, SOLUTION INFILTRATION; PERINEURAL at 14:23

## 2019-08-02 RX ADMIN — TOPICAL ANESTHETIC 0.5 ML: 200 SPRAY DENTAL; PERIODONTAL at 09:47

## 2019-08-02 RX ADMIN — DEXMEDETOMIDINE HYDROCHLORIDE 4 MCG: 100 INJECTION, SOLUTION INTRAVENOUS at 14:34

## 2019-08-02 RX ADMIN — MIDAZOLAM HYDROCHLORIDE 1 MG: 1 INJECTION, SOLUTION INTRAMUSCULAR; INTRAVENOUS at 09:57

## 2019-08-02 ASSESSMENT — LIFESTYLE VARIABLES: TOBACCO_USE: 1

## 2019-08-02 ASSESSMENT — ACTIVITIES OF DAILY LIVING (ADL)
ADLS_ACUITY_SCORE: 14
ADLS_ACUITY_SCORE: 15
ADLS_ACUITY_SCORE: 14

## 2019-08-02 ASSESSMENT — ENCOUNTER SYMPTOMS: DYSRHYTHMIAS: 1

## 2019-08-02 NOTE — SEDATION DOCUMENTATION
Pt awake and denies pain. VSS. coughin some. o2 still at 2l/min. Report called to trae rn floor rn. Pt will go back to floor with NA on cart back to 713.

## 2019-08-02 NOTE — PROCEDURES
[facility]    Procedure: Transesophageal Echocardiogram  Date/Time: 8/2/2019 10:14 AM  Performed by: Omkar Jaime MD  Authorized by: Milena Maldonado APRN [facility]     UNIVERSAL PROTOCOL   Site Marked: NA  Prior Images Obtained and Reviewed:  Yes  Required items: Required blood products, implants, devices and special equipment available    Patient identity confirmed:  Verbally with patient, arm band and provided demographic data  Patient was reevaluated immediately before administering moderate or deep sedation or anesthesia  Confirmation Checklist:  Patient's identity using two indicators, relevant allergies, procedure was appropriate and matched the consent or emergent situation and correct equipment/implants were available  Time out: Immediately prior to the procedure a time out was called      SEDATION    Patient Sedated: Yes    Sedation Type:  Moderate (conscious) sedation  Sedation:  Fentanyl and midazolam  Vital signs: Vital signs monitored during sedation    PROCEDURE   Patient Tolerance:  Patient tolerated the procedure well with no immediate complications  Describe Procedure: DANNY probe could not passed, resistance felt at 18-20 cm and probe was not pushed further.   No complication. To be noted patient also gives prior h/o dysphagia to solids like meat.  Recommend further GI evaluation for dysphagia.  Time of Sedation in Minutes by Physician:  10

## 2019-08-02 NOTE — PRE-PROCEDURE
GENERAL PRE-PROCEDURE:     Risks and benefits: Risks, benefits and alternatives were discussed    Consent given by:  Patient  Patient states understanding of procedure being performed: Yes    Patient's understanding of procedure matches consent: Yes    Procedure consent matches procedure scheduled: Yes    Expected level of sedation:  Moderate  Appropriately NPO:  Yes  ASA Class:  Class 3- Severe systemic disease, definite functional limitations  Mallampati  :  Grade 3- soft palate visible, posterior pharyngeal wall not visible  Lungs:  Lungs clear with good breath sounds bilaterally  Heart:  Normal heart sounds and rate  History & Physical reviewed:  History and physical reviewed and no updates needed  Statement of review:  I have reviewed the lab findings, diagnostic data, medications, and the plan for sedation

## 2019-08-02 NOTE — ANESTHESIA PREPROCEDURE EVALUATION
Anesthesia Pre-Procedure Evaluation    Patient: Yosef Murry   MRN: [id number] : 1935          Preoperative Diagnosis: Dysphagia    Procedure(s):  ESOPHAGOGASTRODUODENOSCOPY (EGD)    Past Medical History:   Diagnosis Date     Anal fistula      Antiplatelet or antithrombotic long-term use      Arrhythmia      Atrial flutter (H)      Cerebral infarction (H)     TIA     Heartburn      Hypertension      Inguinal hernia, left      Persistent atrial fibrillation (H) 12     Prostate cancer (H)      TIA (transient ischaemic attack)          Past Surgical History:   Procedure Laterality Date     COLONOSCOPY       ENT SURGERY      tonsllectomy     EXAM UNDER ANESTHESIA, FISTULOTOMY RECTUM, COMBINED N/A 2015    Procedure: COMBINED EXAM UNDER ANESTHESIA, FISTULOTOMY RECTUM;  Surgeon: Candice Carty MD;  Location: [facility]     GENITOURINARY SURGERY      PROSTATECTOMY     HERNIORRHAPHY INGUINAL  2013    Procedure: HERNIORRHAPHY INGUINAL;  LEFT INGUINAL HERNIA REPAIR WITH MESH;  Surgeon: Filipe Fairchild MD;  Location: [facility]     HERNIORRHAPHY INGUINAL Right 2019    Procedure: OPEN RIGHT INGUINA HERNIA REPAIR WITH MESH;  Surgeon: Filipe Fairchild MD;  Location:  OR     ORTHOPEDIC SURGERY      WRIST SURGERY - LEFT       Anesthesia Evaluation     . Pt has had prior anesthetic. Type: General    No history of anesthetic complications          ROS/MED HX    ENT/Pulmonary:     (+)allergic rhinitis, tobacco use, Past use , . .   (-) sleep apnea   Neurologic:     (+)CVA TIA date:      Cardiovascular:     (+) Dyslipidemia, hypertension----. Taking blood thinners : . . . :. dysrhythmias a-fib and a-flutter, . Previous cardiac testing Echodate:19results:1. Normal left ventricular size and systolic function. LVEF 60-65%.  2. No regional wall motion abnormalities.  3. Normal right ventricular size and systolic function.  4. No hemodynamically significant valve disease.date: results:ECG  "reviewed date:8/1/19 results:A-fib w/ RBBB date: results:          METS/Exercise Tolerance:  3 - Able to walk 1-2 blocks without stopping   Hematologic:         Musculoskeletal:         GI/Hepatic:     (+) GERD       Renal/Genitourinary:         Endo:         Psychiatric:         Infectious Disease:   (+) Recent Fever,       Malignancy:   (+) Malignancy History of Prostate          Other:                          Physical Exam  Normal systems: cardiovascular, pulmonary and dental    Airway   Mallampati: II  TM distance: >3 FB  Neck ROM: full    Dental     Cardiovascular       Pulmonary             Lab Results   Component Value Date    WBC 10.0 08/02/2019    HGB 14.3 08/02/2019    HCT 43.6 08/02/2019     08/02/2019    CRP 61.9 (H) 07/29/2019    SED 11 07/29/2019     08/02/2019    POTASSIUM 4.8 08/02/2019    CHLORIDE 105 08/02/2019    CO2 28 08/02/2019    BUN 25 08/02/2019    CR 0.90 08/02/2019     (H) 08/02/2019    BELLO 9.0 08/02/2019    ALBUMIN 3.4 07/29/2019    PROTTOTAL 8.0 07/29/2019    ALT 14 07/29/2019    AST 13 07/29/2019    ALKPHOS 100 07/29/2019    BILITOTAL 1.0 07/29/2019    TSH 4.68 (H) 07/29/2019    T4 0.94 07/29/2019       Preop Vitals  BP Readings from Last 3 Encounters:   08/02/19 (!) 138/90   06/27/19 130/86   06/13/19 (!) 153/98    Pulse Readings from Last 3 Encounters:   06/27/19 61   06/13/19 57   06/10/19 72      Resp Readings from Last 3 Encounters:   08/02/19 18   06/13/19 16   05/14/16 16    SpO2 Readings from Last 3 Encounters:   08/02/19 99%   06/27/19 94%   06/13/19 98%      Temp Readings from Last 1 Encounters:   08/02/19 37.1  C (98.7  F) (Oral)    Ht Readings from Last 1 Encounters:   07/29/19 1.778 m (5' 10\")      Wt Readings from Last 1 Encounters:   07/29/19 78.6 kg (173 lb 4.8 oz)    Estimated body mass index is 24.87 kg/m  as calculated from the following:    Height as of this encounter: 1.778 m (5' 10\").    Weight as of this encounter: 78.6 kg (173 lb 4.8 oz). "       Anesthesia Plan      History & Physical Review  History and physical reviewed and following examination; no interval change.    ASA Status:  3 .    NPO Status:  > 8 hours    Plan for MAC Reason for MAC:  Deep or markedly invasive procedure (G8)         Postoperative Care  Postoperative pain management:  IV analgesics, Oral pain medications and Multi-modal analgesia.      Consents  Anesthetic plan, risks, benefits and alternatives discussed with:  Patient..                 Joseph Lowry MD

## 2019-08-02 NOTE — PROGRESS NOTES
GI Brief Note    EGD completed.    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  Difficult to intubate the scope into the esophagus due to this and the large tongue.  Accomplished with Anesthesia staff providing a forward jaw thrust on the patient  - Esophagus was mostly normal, partial Schatkzi's ring at the GE junction but this was widely patent, 17mm (51 Fr) savary dilator passed with no resistance    A/P:  I think it was the Zenker's and large tongue that made passage of the DANNY scope difficult.  His esophagus is otherwise normal, he does have a non-obstructing Schatzki's ring which was dilated. However, this is 38cm from the incisors and likely lower than the DANNY scope would go.  Very likely the Zenker's causing dysphagia as well.    The area of the Zenkers could likely be better visualized by ENT if guidance needed.    We will not actively follow, please call if GI questions arise and we would be happy to see the patient again.    Patricia Kumar MD  [facility]  Cell/Pager: [phone]  After 5pm please call [phone]

## 2019-08-02 NOTE — SEDATION DOCUMENTATION
Pt was given total of 2 mg versed and 50 mcg fentanyl iv for sedation for jacklyn as directed by dr gallegos. Dr gallegos unable to pass probe. Procedure cancelled by dr gallegos. Pt sleepy, VSS. O2 2l/min.

## 2019-08-02 NOTE — PROGRESS NOTES
"[facility]  Infectious Disease Progress Note          Assessment and Plan:   IMPRESSION:   1. An 83-year-old male with history of carcinoma of the prostate.   2. Atrial fibrillation.   3. Admitted on this occasion with 3-4 day history of progressive generalized weakness accompanied by some increase in chronic neck pain as well as new right wrist pain.  The patient was unaware of a fever, but has been febrile to over 101 degrees since his hospital admission.  A CRP was elevated to 61.9 degrees.  The source of the patient's fever is not clear.  White blood count is normal.  Urinalysis is benign.  Chest x-ray showed no acute infiltrate.  CT scan of the cervical spine showed significant degenerative changes but no evidence of infection.    4. Ongoing fever, still no clear explanation.  Blood and wrist cxs neg.  MRI brain without acute changes.    RECOMMENDATIONS:   1. Would hold on antibiotic therapy unless and until a more clear evidence of bacterial infection.   2. As part of FUO w/u will get CT scans of chest/abd/pelvis and also scheduled for DANNY.  3.  Temp ? Trending downward.  Cont to monitor.        Interval History:   Feels better in general.  Neck pain better.  Temp lower, Tmax 99.7.  Blood cx neg.  procalcitonin 0.08.              Medications:       atorvastatin  10 mg Oral QPM     rivaroxaban ANTICOAGULANT  20 mg Oral Daily with supper     sodium chloride (PF)  20 mL EPIDURAL Once     sodium chloride (PF)  3 mL Intravenous Q8H     sodium chloride (PF)  3 mL Intracatheter Q8H                  Physical Exam:   Blood pressure 107/67, temperature 98.4  F (36.9  C), temperature source Oral, resp. rate 16, height 1.778 m (5' 10\"), weight 78.6 kg (173 lb 4.8 oz), SpO2 97 %.  [unfilled]  Vital Signs with Ranges  Temp:  [97.5  F (36.4  C)-99.9  F (37.7  C)] 98.4  F (36.9  C)  Heart Rate:  [] 102  Resp:  [14-16] 16  BP: (107-130)/(65-77) 107/67  SpO2:  [89 %-98 %] 97 %    Constitutional: " Awake, alert, cooperative, no apparent distress   Lungs: Clear to auscultation bilaterally, no crackles or wheezing   Cardiovascular: Regular rate and rhythm, afib, and no murmur noted   Abdomen: Normal bowel sounds, soft, non-distended, non-tender   Skin: No rashes, no cyanosis, no edema   Other:           Data:   All microbiology laboratory data reviewed.  Recent Labs   Lab Test 08/01/19  0428 07/30/19  0615 07/29/19 1952   WBC 8.2 8.7 11.0   HGB 14.6 14.3 16.1   HCT 43.0 42.5 47.9   MCV 94 93 94    211 243     Recent Labs   Lab Test 07/30/19  0615 07/29/19  1952 06/10/19  0949   CR 0.86 0.94 0.95  0.92     Recent Labs   Lab Test 07/29/19 1952   SED 11

## 2019-08-02 NOTE — PROCEDURES
PRE-PROCEDURE NOTE    CHIEF COMPLAINT / REASON FOR PROCEDURE:  Cardiologist unable to pass DANNY scope    History and Physical Reviewed:  yes    PRE-SEDATION ASSESSMENT:    Lung Exam:  normal  Heart Exam:  normal  Mallampati Airway Classification:  3   Previous reaction to anesthesia/sedation:   no  Sedation plan based on assessment:  MAC  Comment(s):  Risks explained, consent from daughter Mela  ASA Classification:  3    IMPRESSION:  Rule out esophageal obstruction    PLAN:  egd    Patricia Kumar MD

## 2019-08-02 NOTE — ANESTHESIA CARE TRANSFER NOTE
Patient: Yosef Murry    Procedure(s):  ESOPHAGOGASTRODUODENOSCOPY, WITH DILATION USING OCTAVIO-RICKI DILATOR OVER GUIDEWIRE    Diagnosis: Dysphagia  Diagnosis Additional Information: No value filed.    Anesthesia Type:   MAC     Note:  Airway :Face Mask  Patient transferred to:PACU  Comments: Pt exhibits spont resps, all monitors and alarms on in pacu, report given to RN, vss.Handoff Report: Identifed the Patient, Identified the Reponsible Provider, Reviewed the pertinent medical history, Discussed the surgical course, Reviewed Intra-OP anesthesia mangement and issues during anesthesia, Set expectations for post-procedure period and Allowed opportunity for questions and acknowledgement of understanding      Vitals: (Last set prior to Anesthesia Care Transfer)    CRNA VITALS  8/2/2019 1424 - 8/2/2019 1454      8/2/2019             Resp Rate (set):  10                Electronically Signed By: JOHNSON Torres CRNA  August 2, 2019  2:54 PM

## 2019-08-02 NOTE — PLAN OF CARE
Discharge Planner OT   Patient plan for discharge: home  Current status: OT: Pt up in chair, willing to participate.  Pt states he wears glasses for distance, able to see all forms at table top level.  Did not have glasses here.  Went through visual screening test.  Inconsistent tracking an object, especially to his left and lower left.  Also needed some recueing at times as he seemed to stop mid activity at times.  Had difficulty with figure ground subtests and double letter cancellation subtest.  Educated on the importance of turning his head and scanning to avoid falls and bumping into things.  Barriers to return to prior living situation: lives alone, current level of A, decreased independence for I/ADLs  Recommendations for discharge: ARU  Rationale for recommendations: Pt is below baseline for I/ADLs and functional mobility, and vision skills. Pt will benefit from skilled therapy to address safety and independence in I/ADLs. Anticipate pt will tolerate 3 hours of therapy.        Entered by: Neville Park 08/02/2019 8:23 AM

## 2019-08-02 NOTE — CONSULTS
GASTROENTEROLOGY CONSULTATION     Yosef Murry   [address] [place] [address]   83 year old male   Admission Date/Time: 7/29/2019   Encounter Date: 8/2/2019  Primary Care Provider: Fred Connell     Referring / Attending Physician: Milena Maldonado NP   We were asked to see the patient in consultation by Milena Maldonado NP for evaluation of dysphagia.     HPI: Yosef Murry is a 83 year old male with a past medical history significant for Afib on Xarelto who presented to the ED 5 days ago with weakness. He was found to have a subacute stroke on admission. Cardiology was consulted. Echocardiogram showed a moderately dilated left atrium so DANNY was planned today to evaluate for endocarditis but the scope could not be passed. GI was asked to see the pt for further evaluation.  The patient states that he has had trouble swallowing intermittently for several years. Usually solid food feels like it could become stuck in the middle of his neck. Usually he can cough or clear his throat and it will improve. He denies any odynophagia. He denies loss or appetite or weight loss.     Past Medical History  Past Medical History:   Diagnosis Date     Anal fistula      Antiplatelet or antithrombotic long-term use      Arrhythmia      Atrial flutter (H) 2012     Cerebral infarction (H)     TIA     Heartburn      Hypertension      Inguinal hernia, left      Persistent atrial fibrillation (H) 8/21/12     Prostate cancer (H)      TIA (transient ischaemic attack)     2014       Past Surgical History  Past Surgical History:   Procedure Laterality Date     COLONOSCOPY       ENT SURGERY      tonsllectomy     EXAM UNDER ANESTHESIA, FISTULOTOMY RECTUM, COMBINED N/A 6/18/2015    Procedure: COMBINED EXAM UNDER ANESTHESIA, FISTULOTOMY RECTUM;  Surgeon: Candice Carty MD;  Location: [facility]     GENITOURINARY SURGERY  1999    PROSTATECTOMY     HERNIORRHAPHY INGUINAL  7/25/2013    Procedure: HERNIORRHAPHY INGUINAL;  LEFT  INGUINAL HERNIA REPAIR WITH MESH;  Surgeon: Filipe Fairchild MD;  [facility]     HERNIORRHAPHY INGUINAL Right 2019    Procedure: OPEN RIGHT INGUINA HERNIA REPAIR WITH MESH;  Surgeon: Filipe Fairchild MD;  Location:  [facility]     ORTHOPEDIC SURGERY      WRIST SURGERY - LEFT       Family History  Family History   Problem Relation Age of Onset     Ovarian Cancer Mother      Osteoporosis Father      Unknown/Adopted Sister        Social History  Social History     Socioeconomic History     Marital status:      Spouse name: Not on file     Number of children: Not on file     Years of education: Not on file     Highest education level: Not on file   Occupational History     Not on file   Social Needs     Financial resource strain: Not on file     Food insecurity:     Worry: Not on file     Inability: Not on file     Transportation needs:     Medical: Not on file     Non-medical: Not on file   Tobacco Use     Smoking status: Former Smoker     Years: 16.00     Types: Cigarettes     Last attempt to quit: 1968     Years since quittin.6     Smokeless tobacco: Never Used   Substance and Sexual Activity     Alcohol use: Yes     Alcohol/week: 0.5 oz     Types: 1 Cans of beer per week     Frequency: 2-3 times a week     Drinks per session: 1 or 2     Binge frequency: Never     Comment: SOCIALLY     Drug use: No     Sexual activity: Not Currently     Partners: Female   Lifestyle     Physical activity:     Days per week: Not on file     Minutes per session: Not on file     Stress: Not on file   Relationships     Social connections:     Talks on phone: Not on file     Gets together: Not on file     Attends [employer] service: Not on file     Active member of club or organization: Not on file     Attends meetings of clubs or organizations: Not on file     Relationship status: Not on file     Intimate partner violence:     Fear of current or ex partner: Not on file     Emotionally abused: Not on file      Physically abused: Not on file     Forced sexual activity: Not on file   Other Topics Concern     Parent/sibling w/ CABG, MI or angioplasty before 65F 55M? Not Asked      Service Not Asked     Blood Transfusions Not Asked     Caffeine Concern No     Comment: occ tea     Occupational Exposure Not Asked     Hobby Hazards Not Asked     Sleep Concern No     Stress Concern No     Weight Concern No     Special Diet No     Back Care Not Asked     Exercise Yes     Comment: walking 3 miles a day     Bike Helmet Not Asked     Seat Belt Yes     Self-Exams Not Asked   Social History Narrative     Not on file       Medications  Prior to Admission medications    Medication Sig Start Date End Date Taking? Authorizing Provider   amLODIPine (NORVASC) 5 MG tablet Take 1 tablet (5 mg) by mouth daily 10/15/18  Yes Henna Murphy MD   calcium carbonate (TUMS) 500 MG chewable tablet Take 1 chew tab by mouth At Bedtime   Yes Unknown, Entered By History   HYDROcodone-acetaminophen (NORCO) 5-325 MG tablet Take 1-2 tablets by mouth every 4 hours as needed for moderate to severe pain 6/13/19  Yes Marcelo Mcdonough PA-C   loratadine (CLARITIN) 10 MG tablet Take 1 tablet by mouth daily. 7/17/13  Yes Jordin Calix DPM   losartan (COZAAR) 50 MG tablet Take 1 tablet (50 mg) by mouth daily 10/23/18  Yes Henna Murphy MD   metoprolol succinate (TOPROL XL) 25 MG 24 hr tablet Take 1 tablet (25 mg) by mouth daily 11/19/18  Yes Henna Murphy MD   Multiple Vitamins-Minerals (PRESERVISION AREDS 2 PO) Take 1 capsule by mouth 2 times daily    Yes Reported, Patient   rivaroxaban ANTICOAGULANT (XARELTO) 20 MG TABS tablet Take 1 tablet (20 mg) by mouth daily (with dinner) 12/31/18  Yes Henna Murphy MD   traZODone (DESYREL) 100 MG tablet Take 1 tablet (100 mg) by mouth At Bedtime 6/10/19  Yes Fred Connell MD   diphenhydrAMINE-acetaminophen (TYLENOL PM)  MG tablet Take 2  "tablets by mouth nightly as needed for sleep    Reported, Patient   leuprolide acetate (LUPRON) 1 MG/0.2ML kit Inject Subcutaneous every 6 months [facility]. Last given on 4/2019.    Reported, Patient       Allergies:  Other [seasonal allergies]    ROS: A ten point review of systems was obtained and negative other than the symptoms noted above in the HPI.     Physical Exam:   BP (!) 138/90 (BP Location: Left arm)   Temp 98.7  F (37.1  C) (Oral)   Resp 18   Ht 1.778 m (5' 10\")   Wt 78.6 kg (173 lb 4.8 oz)   SpO2 99%   BMI 24.87 kg/m     Constitutional: age appropriate, alert, no acute distress  Cardiovascular: regular rate and rhythm, no murmurs,rubs or gallops  Respiratory: clear to auscultation bilaterally  Psychiatric: normal pleasant affect  Head: atraumatic, normocephalic  Neck: supple, no thyromegaly  ENT: mucous membranes are moist, no oral lesions are noted  Abdomen: soft, non-tender, non-distended, normally active bowel sound. No masses or hepatosplenomegaly is appreciated. No rebound tenderness or guarding  Neuro: Neurologically intact grossly  Skin: warm, dry, no rashes are noted    ADDITIONAL COMMENTS:   I reviewed the patient's new clinical lab test results.   Recent Labs   Lab Test 08/02/19  0839 08/01/19  0428 07/30/19  0615   WBC 10.0 8.2 8.7   HGB 14.3 14.6 14.3   MCV 95 94 93    208 211      Recent Labs   Lab Test 08/02/19  0839 07/30/19  0615 07/29/19 1952    140 137   POTASSIUM 4.8 4.3 4.1   CHLORIDE 105 107 103   CO2 28 30 29   BUN 25 19 18   CR 0.90 0.86 0.94   ANIONGAP 5 3 5   BLELO 9.0 8.6 9.1      Recent Labs   Lab Test 07/29/19  2203 07/29/19  1952 06/10/19  0949 06/21/18  0941 06/21/18  0940   ALBUMIN  --  3.4 3.3* 3.5  --    BILITOTAL  --  1.0 0.5 0.6  --    ALT  --  14 16 24  --    AST  --  13 17 23  --    ALKPHOS  --  100 103 100  --    PROTEIN 30*  --  Negative  --  Negative      I reviewed the patient's new imaging results.     Assessment:  83 year " old male currently admitted with a subacute stroke noted to have dysphagia and difficulty passing a DANNY scope today. Differential includes esophageal stricture, web, GERD, eosinophilic esophagitis and mass. Dysphagia could be oropharyngeal in nature as well    Plan:   -NPO  -EGD today with MAC  -May need swallow study depending on EGD  -Further recommendations to follow    I discussed the patient's findings and plan with Dr. Patricia Kumar who will also independently see and examine the patient.     Miguelangel Saucedo PA-C  [facility]  Cell:  [phone] Monday through Friday [id number]  Office: [phone]

## 2019-08-02 NOTE — PROGRESS NOTES
[facility]  Neurology Daily Note      Admission Date:7/29/2019   Date of service: 08/02/2019   Hospital Day: 5      Assessment & Plan   _______________________________  #. (I63.9) Infarction of right basal ganglia (H)  --subacute right basal ganglia stroke on MRI  --CTA pending  --echo with moderately enlarge left atrium, known Afib  ------attempted DANNY today to evaluate for possible endocarditis - could not pass scope  ---------GI & speech path consulted for evaluation  --EEG negative for evidence of seizures  #. (I48.1) Persistent atrial fibrillation (H)  --on xarelto PTA  ----will switch to eliquis due to stroke on xarelto  #. (R50.9) Fever of unknown origin  --infectious workup negative thus far  #. PT/OT/Speech  --continue evaluations  #. Nutrition  --Per speech therapy evaluation   #. DVT Prophylaxis  --Mechanical + anticoagulation     Code Status: DNR/DNI    Disposition: pending     Interval History   _______________________________  Patient presented on 7/29/19 with generalized weakness. He had right wrist pain/swelling and there was concern for septic joint. He developed fever of unknown origin, and has had negative infectious workup so far. He had spells of decreased responsiveness and RRT was called. CT head identified age indeterminate lacunar infarction in right basal ganglia and internal capsule. MRI brain obtained, infarct is late subacute. On exam 8/1/19 he was nearly back to baseline. Fevers still with unknown cause. Further workup pending.  Unable to pass scope for DANNY. Further evaluation for dysphagia ordered.    Review of Systems   _______________________________  The Review of Systems is negative other than noted in the HPI  Physical Exam   _______________________________  Vitals: Temp: 98.4  F (36.9  C) Temp src: Oral BP: (!) 145/94   Heart Rate: 102 Resp: 18 SpO2: 98 % O2 Device: None (Room air) Oxygen Delivery: 3 LPM  Vital Signs with Ranges: Temp:  [97.5  F (36.4  C)-99.9  F  (37.7  C)] 98.4  F (36.9  C)  Heart Rate:  [] 102  Resp:  [14-18] 18  BP: (107-145)/(65-94) 145/94  SpO2:  [89 %-100 %] 98 %    General Appearance:  No acute distress  Neuro:       Mental Status Exam:   Awake, alert, oriented X3. Speech and language are intact. Mental status is normal       Cranial Nerves:  Pupils 3 mm, reactive. EOMI. Face sensation is normal. Mild left facial droop (baseline). Tongue and uvula are midline. Other CN are normal           Motor:  5/5 X 4. Tone and bulk are normal. Mild BUE resting tremor noted on exam, no rigidity noted.          Reflexes:  Normal DTR. Toes downgoing.        Sensory:  Normal to light touch               Coordination:   Intact finger-to-nose        Gait:  No significant difficulties  Abdomen: Soft, not tender, not distended  Extremities: No clubbing, no cyanosis, no edema    Medications   _______________________________    - MEDICATION INSTRUCTIONS -       - MEDICATION INSTRUCTIONS -       - MEDICATION INSTRUCTIONS -       - MEDICATION INSTRUCTIONS -       sodium chloride 30 mL/hr at 08/02/19 0922       atorvastatin  10 mg Oral QPM     benzocaine 20%  1-4 spray Mouth/Throat Once     fentaNYL  25 mcg Intravenous Once     midazolam  0.5 mg Intravenous Once     rivaroxaban ANTICOAGULANT  20 mg Oral Daily with supper     sodium chloride (PF)  20 mL EPIDURAL Once     sodium chloride (PF)  3 mL Intracatheter Q8H     sodium chloride (PF)  3 mL Intravenous Q8H     sodium chloride (PF)  3 mL Intracatheter Q8H       Data   _______________________________      Lab Data:   All data was reviewed by me personally  CBC RESULTS:  Recent Labs   Lab Test 08/02/19  0839 08/01/19  0428 07/30/19  0615   WBC 10.0 8.2 8.7   RBC 4.61 4.59 4.56   HGB 14.3 14.6 14.3   HCT 43.6 43.0 42.5    208 211     Basic Metabolic Panel:  Recent Labs   Lab Test 08/02/19  0839 07/30/19  0615 07/29/19  1952    140 137   POTASSIUM 4.8 4.3 4.1   CHLORIDE 105 107 103   CO2 28 30 29   BUN 25 19  18   CR 0.90 0.86 0.94   * 98 115*   BELLO 9.0 8.6 9.1     Liver panel:  Recent Labs   Lab Test 07/29/19  1952 06/10/19  0949 06/21/18  0941   PROTTOTAL 8.0 7.3 7.5   ALBUMIN 3.4 3.3* 3.5   BILITOTAL 1.0 0.5 0.6   ALKPHOS 100 103 100   AST 13 17 23   ALT 14 16 24     Lipid Profile:  Recent Labs   Lab Test 07/31/19  2106 06/10/19  0949   CHOL 116 162   HDL 37* 34*   LDL 62 97   TRIG 84 157*     Thyroid Panel:  Recent Labs   Lab Test 07/29/19  1952 06/10/19  0949   TSH 4.68* 4.51*   T4 0.94 0.91      Vitamin D level:   Recent Labs   Lab Test 06/21/18  0941   VITDT 29     A1C:   Recent Labs   Lab Test 07/31/19 2106   A1C 5.6     Troponin I:   Recent Labs   Lab Test 08/01/19  0428 07/31/19 2106 07/29/19 1952 05/14/16  0510 05/13/16  2330 05/13/16  1915   TROPI 0.023 <0.015 0.022 0.020 0.015 0.020     CRP inflammation:   Recent Labs   Lab Test 07/29/19 1952   CRP 61.9*     ESR:   Recent Labs   Lab Test 07/29/19 1952   SED 11       UA Results:  Recent Labs   Lab Test 07/29/19  2203 06/10/19  0949   COLOR Yellow Yellow   APPEARANCE Clear Clear   URINEGLC Negative Negative   URINEBILI Negative Negative   URINEKETONE Negative Negative   SG 1.021 1.020   UBLD Trace* Negative   URINEPH 5.5 7.0   PROTEIN 30* Negative   UROBILINOGEN  --  0.2   NITRITE Negative Negative   LEUKEST Negative Negative   RBCU 10*  --    WBCU 1  --         Cardiac US:   TTE 7/30/19:  1. Normal left ventricular size and systolic function. LVEF 60-65%.  2. No regional wall motion abnormalities.  3. Normal right ventricular size and systolic function.  4. No hemodynamically significant valve disease.  5. The left atrium is moderately dilated.     DANNY:  Pending       Neurophysiology:   EEG 8/1/19:  Mildly abnormal because of an excess of irregular theta activity while clearly awake.  This is consistent with a mild diffuse encephalopathy.  These findings are nonspecific and can be seen in a variety of etiologies including toxic, metabolic and  degenerative among others.  Epileptiform discharges or seizures were not seen in this study.        Imaging:   All imaging studies were reviewed personally  CT head 7/31/19:  1. Age indeterminate lacunar infarction in the basal ganglia and internal capsule on the right side. MRI may be beneficial to evaluate for acuity.  2. Chronic small cortical infarctions in the right frontal lobe and small chronic lacunar infarctions in the cerebellum on the left.     MRI brain 7/31/19:  Diffuse cerebral volume loss and cerebral white matter changes consistent with chronic small vessel ischemic disease. Probable small chronic ischemic infarcts at the lateral and posterolateral aspects of the right frontal lobe. Probable late subacute ischemic infarct in the right basal ganglia. No evidence for acute intracranial pathology.      Text Page    Milena Maldonado, MSN, FNP-BC, RN CNRN SCRN

## 2019-08-02 NOTE — PLAN OF CARE
Pt here with subacute CVA. A&O x4. Baseline L side droop, and 4/5 weakness to BLE. Able to follow commands, but requires repetitive prompting by saying name. Other neuros intact. Tmax= 99.4, other VSS. Tele A.Fib w/ RVR. Regular diet, NPO since midnight. Up with assist  w/ GBW. Reports pain to posterior neck, decrease with PRN oxycodone. Plan for DANNY at 0945, CT and CTA. Discharge plan pending, continue monitoring.

## 2019-08-02 NOTE — PROGRESS NOTES
[facility]    Hospitalist Progress Note    Assessment & Plan   Yosef Murry is a 83 year old male who was admitted on 7/29/2019. He presented with generalized weakness and pain/swellingi in his right wrist. After admission, he developed a fever without a clear etiology. He also had brief episodes of 'unresponsiveness.' An MRI revealed a subacute right basal ganglia infarct.    Subacute ischemic stroke of the right basal ganglia  Had an RRT on the evening of 7/31/19 due to episodes of 'unresponsiveness.' There was concern for a stroke. Neurology was consulted. CT head showed an age indeterminate lacunar infarction in the right basal ganglia and internal capsule. MRI brain revealed a subacute ischemic infarction of the right basal ganglia.  - Outside window for tPA.  - Neurology consulted, appreciate their assistance.  - PT/OT/SLP consulted.  - TTE results reviewed. Neurology ordered DANNY, planned for this morning.  - EEG obtained on 8/1/19, consistent with mild diffuse encephalopathy, no epileptiform discharges.  - CTA head/neck ordered, will be done this morning.  - Continue Xarelto and atorvastatin.    Fever of Unknown Origin  No localizing symptoms, other than wrist pain. No leucocytosis. CXR and UA clear. CRP elevated.  - Tmax 99.9 in past 24 hours.  - WBC within normal limits. Procalcitonin 0.08 on 8/1/19.  - Blood cultures from 7/30/19 are negative to date.  - ID consulted and assisting with management.  - Continue to observe off of antibiotics.  - CT chest/abdomen/pelvis ordered per ID recommendations, will be done this morning.  - AM labs pending, follow-up on results. WESLEY, RF, anti-CCP, LDH ordered.    Right wrist pain/swelling  Presented with generalized weakness and wrist pain and additionally developed FUO.  - Etiology unclear.  - Orthopedics consulted, appreciate their assistance.  - IR aspirated wrist on 7/30. Gram stain negative. No crystals seen. Culture negative to date.  - Cleared  by Ortho and wrist pain continues to improve.     Generalized Weakness  Complained of generalized weakness on admission but was ambulatory without assistance. Then while in hospital developed increasing generalized weakness requiring assist of 2.  - Suspect secondary to underlying illness. Treat FUO as above.  - Supportive Cares  - PT/OT consulted.     Neck Pain  - Appears to be musculoskeletal in nature.  - CT cervical spine on 7/30/19 showed chronic degenerative changes, no acute findings.  - Continue symptomatic management.  - Much improved today.     Persistent atrial fibrillation (H)  - Continue PTA Xarelto.  - Not on any chronic medications for rate control. Heart rate OK.    DVT Prophylaxis: Xarelto  Code Status: DNR/DNI  Expected discharge: 1-2 days, recommended to acute rehab once results of DANNY and CT scans available and appropriate discharge plan in place.    Ayush Chand MD  Text Page  (7am - 6pm, M-F)    Interval History   Yosef Murry feels better this morning. States his neck pain has improved. No other complaints/concerns. Slept well last night. Denies any fevers/chills/sweats. Denies chest pain, shortness of breath, nausea, abdominal pain. Having some loose stools. No family in the room, offered to come back later when family arrives.    -Data reviewed today: I reviewed all new labs and imaging results over the last 24 hours. I personally reviewed no images or EKG's today.    Physical Exam   Temp: 98.4  F (36.9  C) Temp src: Oral BP: 107/67   Heart Rate: 102 Resp: 16 SpO2: 97 % O2 Device: None (Room air) Oxygen Delivery: 3 LPM  Vitals:    07/29/19 1935 07/29/19 2222   Weight: 76.2 kg (168 lb) 78.6 kg (173 lb 4.8 oz)     Vital Signs with Ranges  Temp:  [97.5  F (36.4  C)-99.9  F (37.7  C)] 98.4  F (36.9  C)  Heart Rate:  [] 102  Resp:  [14-16] 16  BP: (107-130)/(65-77) 107/67  SpO2:  [89 %-98 %] 97 %  I/O last 3 completed shifts:  In: 420 [P.O.:420]  Out: -     Constitutional: Awake,  alert, cooperative, no apparent distress, sitting up in a chair  Respiratory: Clear to auscultation bilaterally, no crackles or wheezing  Cardiovascular: Normal rate, irregular rhythm, normal S1 and S2, no murmur noted, 1-2+ bilateral lower extremity edema  GI: Normal bowel sounds, soft, non-distended, non-tender  Skin/Integument: No rashes, no cyanosis  Neuro: Alert, oriented to person, place and time this morning, ?mild left facial droop, strength 5/5 in bilateral upper and lower extremities    Medications     - MEDICATION INSTRUCTIONS -       - MEDICATION INSTRUCTIONS -       - MEDICATION INSTRUCTIONS -       - MEDICATION INSTRUCTIONS -       sodium chloride         atorvastatin  10 mg Oral QPM     benzocaine 20%  1-4 spray Mouth/Throat Once     fentaNYL  25 mcg Intravenous Once     glycopyrrolate  0.1 mg Intravenous Once     lidocaine  1.5 mL Topical Once     midazolam  0.5 mg Intravenous Once     rivaroxaban ANTICOAGULANT  20 mg Oral Daily with supper     sodium chloride (PF)  20 mL EPIDURAL Once     sodium chloride (PF)  3 mL Intracatheter Q8H     sodium chloride (PF)  3 mL Intravenous Q8H     sodium chloride (PF)  3 mL Intracatheter Q8H     Data   Recent Labs   Lab 08/01/19  0428 07/31/19  2106 07/30/19  0615 07/29/19  1952   WBC 8.2  --  8.7 11.0   HGB 14.6  --  14.3 16.1   MCV 94  --  93 94     --  211 243   NA  --   --  140 137   POTASSIUM  --   --  4.3 4.1   CHLORIDE  --   --  107 103   CO2  --   --  30 29   BUN  --   --  19 18   CR  --   --  0.86 0.94   ANIONGAP  --   --  3 5   BELLO  --   --  8.6 9.1   GLC  --   --  98 115*   ALBUMIN  --   --   --  3.4   PROTTOTAL  --   --   --  8.0   BILITOTAL  --   --   --  1.0   ALKPHOS  --   --   --  100   ALT  --   --   --  14   AST  --   --   --  13   TROPI 0.023 <0.015  --  0.022      No results found for this or any previous visit (from the past 24 hour(s)).

## 2019-08-02 NOTE — ANESTHESIA POSTPROCEDURE EVALUATION
Patient: Yosef Murry    Procedure(s):  ESOPHAGOGASTRODUODENOSCOPY, WITH DILATION USING OCTAVIO-RICKI DILATOR OVER GUIDEWIRE    Diagnosis:Dysphagia  Diagnosis Additional Information: No value filed.    Anesthesia Type:  MAC    Note:  Anesthesia Post Evaluation    Patient location during evaluation: Endoscopy Recovery  Patient participation: Able to fully participate in evaluation  Level of consciousness: awake  Pain management: adequate  Airway patency: patent  Cardiovascular status: acceptable  Respiratory status: acceptable  Hydration status: acceptable  PONV: none             Last vitals:  Vitals:    08/02/19 1500 08/02/19 1510 08/02/19 1536   BP: 109/78 116/77 132/83   Pulse: 111 86    Resp: 17 10 14   Temp:   37.1  C (98.8  F)   SpO2: 96% 98% 98%         Electronically Signed By: Joseph Lowry MD  August 2, 2019  3:37 PM

## 2019-08-02 NOTE — PLAN OF CARE
SLP: ST orders received.Chart reviewed and discussed with RN. Pt getting EGD currently. Will hold SLP evaluation pending GI recommendations.

## 2019-08-02 NOTE — SEDATION DOCUMENTATION
Benedicto explained. Vss. Heart tones normal. Lungs clear. Pain in neck at 4. Does have some trouble swallowing meat. No loose teeth or dentures. Npo since last night. No sleep apnea. etoh only socially. A fib.

## 2019-08-03 ENCOUNTER — APPOINTMENT (OUTPATIENT)
Dept: PHYSICAL THERAPY | Facility: CLINIC | Age: 84
DRG: 864 | End: 2019-08-03
Payer: MEDICARE

## 2019-08-03 ENCOUNTER — APPOINTMENT (OUTPATIENT)
Dept: OCCUPATIONAL THERAPY | Facility: CLINIC | Age: 84
DRG: 864 | End: 2019-08-03
Payer: MEDICARE

## 2019-08-03 ENCOUNTER — APPOINTMENT (OUTPATIENT)
Dept: SPEECH THERAPY | Facility: CLINIC | Age: 84
DRG: 864 | End: 2019-08-03
Attending: NURSE PRACTITIONER
Payer: MEDICARE

## 2019-08-03 PROCEDURE — 25000132 ZZH RX MED GY IP 250 OP 250 PS 637: Mod: GY | Performed by: NURSE PRACTITIONER

## 2019-08-03 PROCEDURE — 99232 SBSQ HOSP IP/OBS MODERATE 35: CPT | Performed by: PSYCHIATRY & NEUROLOGY

## 2019-08-03 PROCEDURE — 97530 THERAPEUTIC ACTIVITIES: CPT | Mod: GO | Performed by: OCCUPATIONAL THERAPIST

## 2019-08-03 PROCEDURE — 25000125 ZZHC RX 250: Performed by: HOSPITALIST

## 2019-08-03 PROCEDURE — 92526 ORAL FUNCTION THERAPY: CPT | Mod: GN

## 2019-08-03 PROCEDURE — 86618 LYME DISEASE ANTIBODY: CPT | Performed by: INTERNAL MEDICINE

## 2019-08-03 PROCEDURE — 92610 EVALUATE SWALLOWING FUNCTION: CPT | Mod: GN

## 2019-08-03 PROCEDURE — 36415 COLL VENOUS BLD VENIPUNCTURE: CPT | Performed by: INTERNAL MEDICINE

## 2019-08-03 PROCEDURE — 97110 THERAPEUTIC EXERCISES: CPT | Mod: GO | Performed by: OCCUPATIONAL THERAPIST

## 2019-08-03 PROCEDURE — 99232 SBSQ HOSP IP/OBS MODERATE 35: CPT | Performed by: HOSPITALIST

## 2019-08-03 PROCEDURE — 97530 THERAPEUTIC ACTIVITIES: CPT | Mod: GP

## 2019-08-03 PROCEDURE — 25000132 ZZH RX MED GY IP 250 OP 250 PS 637: Mod: GY | Performed by: STUDENT IN AN ORGANIZED HEALTH CARE EDUCATION/TRAINING PROGRAM

## 2019-08-03 PROCEDURE — 12000000 ZZH R&B MED SURG/OB

## 2019-08-03 RX ORDER — METOPROLOL TARTRATE 1 MG/ML
2.5 INJECTION, SOLUTION INTRAVENOUS EVERY 4 HOURS PRN
Status: DISCONTINUED | OUTPATIENT
Start: 2019-08-03 | End: 2019-08-06 | Stop reason: HOSPADM

## 2019-08-03 RX ADMIN — OXYCODONE HYDROCHLORIDE 5 MG: 5 TABLET ORAL at 04:54

## 2019-08-03 RX ADMIN — METHOCARBAMOL 500 MG: 500 TABLET, FILM COATED ORAL at 08:31

## 2019-08-03 RX ADMIN — METOPROLOL TARTRATE 2.5 MG: 5 INJECTION, SOLUTION INTRAVENOUS at 16:47

## 2019-08-03 RX ADMIN — ATORVASTATIN CALCIUM 10 MG: 10 TABLET, FILM COATED ORAL at 20:12

## 2019-08-03 RX ADMIN — METHOCARBAMOL 500 MG: 500 TABLET, FILM COATED ORAL at 00:16

## 2019-08-03 RX ADMIN — APIXABAN 5 MG: 5 TABLET, FILM COATED ORAL at 20:12

## 2019-08-03 RX ADMIN — METHOCARBAMOL 500 MG: 500 TABLET, FILM COATED ORAL at 20:17

## 2019-08-03 RX ADMIN — APIXABAN 5 MG: 5 TABLET, FILM COATED ORAL at 08:31

## 2019-08-03 ASSESSMENT — ACTIVITIES OF DAILY LIVING (ADL)
ADLS_ACUITY_SCORE: 15
ADLS_ACUITY_SCORE: 15
ADLS_ACUITY_SCORE: 14
ADLS_ACUITY_SCORE: 15

## 2019-08-03 NOTE — PROGRESS NOTES
"BEDSIDE SWALLOW EVAL      08/03/19 0912   General Information   Onset Date 07/29/19   Start of Care Date 08/03/19   Referring Physician Dr. Jagruti MAHAJAN    Patient Profile Review/OT: Additional Occupational Profile Info See Profile for full history and prior level of function   Patient/Family Goals Statement   (Pt wanting to order breakfast )   Swallowing Evaluation Bedside swallow evaluation   Behaviorial Observations WFL (within functional limits)   Mode of current nutrition Oral diet   Type of oral diet Regular;Thin liquid   Respiratory Status Room air   Comments Per MD:\"Yosef Murry is a 83 year old male who was admitted on 7/29/2019. He presented with generalized weakness and pain/swellingi in his right wrist. After admission, he developed a fever without a clear etiology. He also had brief episodes of 'unresponsiveness.' An MRI revealed a subacute right basal ganglia infarct\"   Clinical Swallow Evaluation   Oral Musculature generally intact  (generalizexd weakness with oral motor coordinatin )   Structural Abnormalities none present   Dentition present and adequate   Mucosal Quality good   Mandibular Strength and Mobility intact   Oral Labial Strength and Mobility impaired pursing;impaired coordination   Lingual Strength and Mobility WFL   Velar Elevation intact   Buccal Strength and Mobility intact   Laryngeal Function Cough;Throat clear;Swallow;Voicing initiated;Dry swallow palpated   Additional Documentation Yes   Clinical Swallow Eval: Thin Liquid Texture Trial   Mode of Presentation, Thin Liquids cup;straw;self-fed   Volume of Liquid or Food Presented 5 oz    Oral Phase of Swallow WFL   Pharyngeal Phase of Swallow intact   Diagnostic Statement No overt s/sx of aspiration, changes in vocal quality nor respiration noted    Clinical Swallow Eval: Puree Solid Texture Trial   Mode of Presentation, Puree spoon;self-fed   Volume of Puree Presented 4 oz    Oral Phase, Puree WFL   Pharyngeal Phase, Puree intact "   Diagnostic Statement No overt s/sx of aspiration, changes in vocal quality nor respiration noted    Clinical Swallow Eval: Solid Food Texture Trial   Mode of Presentation, Solid self-fed   Volume of Solid Food Presented 2 guy crackers    Oral Phase, Solid WFL;Poor AP movement;other (see comments)  (slightly prolonged oral manipulation)   Pharyngeal Phase, Solid intact   Diagnostic Statement No overt s/sx of aspiration, changes in vocal quality nor respiration noted    Esophageal Phase of Swallow   Patient reports or presents with symptoms of esophageal dysphagia Other (Comments)  (Per GI, suspect large Zenker's diverticulum )   General Therapy Interventions   Planned Therapy Interventions Dysphagia Treatment   Dysphagia treatment Modified diet education;Instruction of safe swallow strategies   Swallow Eval: Clinical Impressions   Skilled Criteria for Therapy Intervention Skilled criteria met.  Treatment indicated.   Functional Assessment Scale (FAS) 5   Treatment Diagnosis mild oropharyngeal dysphagia    Diet texture recommendations Regular diet;Thin liquids   Recommended Feeding/Eating Techniques alternate between small bites and sips of food/liquid;maintain upright posture during/after eating for 30 mins;small sips/bites   Demonstrates Need for Referral to Another Service occupational therapy;physical therapy   Therapy Frequency 3x/week   Predicted Duration of Therapy Intervention (days/wks) 1 week    Risks and Benefits of Treatment have been explained. Yes   Patient, family and/or staff in agreement with Plan of Care Yes   Clinical Impression Comments Bedside swallow eval completed. Per GI, pt likely presents with a large Zenker's diverticulum impacting swallow function. Pt reported intermittent, minimal difficulty with swallowing of meds with no hx of modified diet. Pt presents with mild oropharyngeal dysphagia characterized by reduced oral motor coordination, A/P movement and slightly prolonged oral  manipulation. Pt tolerated pureed and regular solids with thin liquids by cup/straw with no overt s/sx of aspiration, changes in vocal quality nor respiratory status. Pt able to self-feed given sufficient time. Recommend continuation of regular solids with thin liquids at this time. Recommend pt sitting upright, small bites/sips, alternate liquids/solids, liquid wash to ensure clearance. SLP discussed potential impact of Zenker's on swallow function and provided strategies. Pt verbalized agreement and understanding.    Total Evaluation Time   Total Evaluation Time (Minutes) 30

## 2019-08-03 NOTE — PLAN OF CARE
Pt alert and oriented x4. VSS on RA. Tele afib with RVR/CVR. Neuros with slight L facial droop, otherwise intact. +2/3 edema in BLEs. Generalized weakness. Ambulating with asst of 1 GB and walker. Had sedation for DANNY, but unable to complete exam. Endoscopy completed later in the afternoon. Regular diet. C/o neck pain, decreased slightly with prn oxycodone. Reports pain improved after receiving prn Robaxin. CT results pending.

## 2019-08-03 NOTE — PLAN OF CARE
Pt alert and oriented x4. VSS. Max T 99.7. Tele afib with CVR. Neuros with baseline L facial droop. C/o of L wrist pain today - wrist appears red and a little swollen on medial aspect. Pt having more difficulty today initiating getting out of chair or bed, able to ambulate with asst of 1 GB and walker, but requiring asst of 2 to get up. Used randall steady for some transfers. Complaining of neck and L wrist pain. Neck pain improved some with prn Robaxin, declined oxycodone. Family at the bedside, supportive of pt. Additional labs ordered, plan for possible LP on 8/5.

## 2019-08-03 NOTE — PLAN OF CARE
"Discharge Planner OT   Patient plan for discharge: Not discussed  Current status: When OT arrived, pt with a pillow mostly covering his face-it appeared that it had fallen from behind his head to then cover his face. He did not initiate moving it away and needed therapist to do so. Alert and oriented x 4. He is answering questions, joking appropriately, but has extremely slow processing/response time and not initiating movements when prompted. He required much increased time for all activity. Max A supine to EOB. Unable to stand with A of 1. Needed Max A of 2 and Randall Steady to stand. Once standing in Randall Steady, stood with CGA. Then needed >1 min to come to seated position due to problems initiating movement. Pt completed minimal AROM of BUE, c/o pain with any and all BUE movement, especially at L wrist, R elbow, neck. Slow and deliberate finger, wrist, elbow, shoulder flexion and extension, OT removed tight watch on L wrist and placed in shoe in pt belongings bag. Up in chair at end of session with call light and chair alarm. See vitals flow sheet. Alerted NA he would like to use commode; recommended use of pt lift or randall steady.  Barriers to return to prior living situation: Medical status, joint pain \"all over\", level of assist with mobility and ADL, impaired processing/rigidity with movement. Not safe for discharge home at this time.  Recommendations for discharge: ARU  Rationale for recommendations: Pt is far from his baseline and would benefit from daily, intensive inpatient rehab. Anticipate he will tolerate 3+ hours of therapy daily, as he is very motivated and agreeable to participate.        Entered by: Sherry Acosta 08/03/2019 10:40 AM       "

## 2019-08-03 NOTE — PLAN OF CARE
Pt here due to neck pain and R CVA. A&O x4. Neuros: L droop and intermittently slow to follow commands. Speed with ambulatory transitions improving. VSS. Tele A. Fib w/ CVR. Regular diet. Up with assist x2 w/ GBW at start of shift, improved to assist x1 w/ GB, becoming more stable on feet. Reports neck pain, improving with alternating PRN oxycodone and Robaxin. Discharge plan pending results of chest CT. Neuro suggesting LP if negative d/t fever/stroke. Continue with current POC

## 2019-08-03 NOTE — PLAN OF CARE
Discharge Planner PT   Patient plan for discharge: not stated  Current status: Pt received supine in bed, agreeable to PT, reports neck pain and R wrist pain. Roll R with Mod A. Sidelying>sit with ModA with HOB elevated. Able to sit EOB with SBA. Sit>stand with ModA and FWW from bed on second attempt, needs MaxA to stand from chair. Able to take a few steps to turn and back up for bed>chair and chair<>commode with frequent cues for stepping and Boom with FWW. Pt very slow moving with ModA Stand>sit, taking 2-3 full minutes to complete stand>sit with facilitation at hips, needing Hopi for arm placement and for safety due to excessive forward lean. Pt sitting up in chair upon departure of PT, all needs in reach, alarm on.    Barriers to return to prior living situation: lives alone, currently Ax2, confusion  Recommendations for discharge: consider ARU  Rationale for recommendations: Patient previously independent with mobility and quite active, ambulating 3 miles per day. Currently, pt needing Ax2 with mobility. Anticipate patient will tolerate 3 hrs of therapy per day.        Entered by: Johanna Blanc 08/03/2019 3:17 PM

## 2019-08-03 NOTE — PROGRESS NOTES
[facility]    Hospitalist Progress Note    Assessment & Plan   Yosef Murry is a 83 year old male who was admitted on 7/29/2019. He presented with generalized weakness and pain/swellingi in his right wrist. After admission, he developed a fever without a clear etiology. He also had brief episodes of 'unresponsiveness.' An MRI revealed a subacute right basal ganglia infarct.    Subacute ischemic stroke of the right basal ganglia  Had an RRT on the evening of 7/31/19 due to episodes of 'unresponsiveness.' There was concern for a stroke. Neurology was consulted. CT head showed an age indeterminate lacunar infarction in the right basal ganglia and internal capsule. MRI brain revealed a subacute ischemic infarction of the right basal ganglia.  - Outside window for tPA.  - Neurology consulted, appreciate their assistance.  - PT/OT/SLP consulted.  - TTE results reviewed. Neurology ordered DANNY, unable to be done due to difficulty passing the scope.  - EEG obtained on 8/1/19, consistent with mild diffuse encephalopathy, no epileptiform discharges.  - CTA head/neck results reviewed, no significant abnormalities.  - Continue Xarelto and atorvastatin.    Fever of Unknown Origin  No localizing symptoms, other than wrist pain. No leucocytosis. CXR and UA clear. CRP elevated.  - Tmax 99.9 in past 24 hours.  - WBC within normal limits. Procalcitonin 0.08 on 8/1/19.  - Blood cultures from 7/30/19 are negative to date.  - ID consulted and assisting with management.  - Continue to observe off of antibiotics.  - CT chest/abdomen/pelvis did not reveal an obvious source of fever.  - WESLEY, RF, anti-CCP, LDH ordered.  - Lyme panel ordered.    Dysphagia  Zenker's diverticulum  Schatzki's ring  - GI consulted due to difficulty passing the DANNY scope.  - Had EGD on 8/2/19 revealing a Zenker's diverticulum.  - Had a Schatzki's ring which was dilated during the EGD.  - SLP following.    Right wrist pain/swelling  Left wrist  pain  Presented with generalized weakness and wrist pain and additionally developed FUO.  - Etiology unclear.  - Orthopedics consulted, appreciate their assistance.  - IR aspirated wrist on 7/30. Gram stain negative. No crystals seen. Culture negative to date.  - Cleared by Ortho and wrist pain continues to improve.  - Has developed left wrist pain now.  - Continue symptomatic management.     Generalized Weakness  Complained of generalized weakness on admission but was ambulatory without assistance. Then while in hospital developed increasing generalized weakness requiring assist of 2.  - Suspect secondary to underlying illness. Treat FUO as above.  - Supportive Cares  - PT/OT consulted.     Neck Pain  - Appears to be musculoskeletal in nature.  - CT cervical spine on 7/30/19 showed chronic degenerative changes, no acute findings.  - Continue symptomatic management.  - Improved overall.     Persistent atrial fibrillation (H)  - Continue PTA Xarelto.  - Not on any chronic medications for rate control. Heart rate OK.    DVT Prophylaxis: Xarelto  Code Status: DNR/DNI  Expected discharge: 1-2 days, recommended to acute rehab once results of DANNY and CT scans available and appropriate discharge plan in place.    Ayush Chand MD  Text Page  (7am - 6pm, M-F)    Interval History   Yosef Murry was seen this afternoon. Continues to have neck pain, fairly well controlled with current medications. Feels tired and fatigued. Fever improved. Denies chest pain, shortness of breath, nausea, abdominal pain. Briefly discussed plan of care with his family.    -Data reviewed today: I reviewed all new labs and imaging results over the last 24 hours. I personally reviewed no images or EKG's today.    Physical Exam   Temp: 99.9  F (37.7  C) Temp src: Oral BP: 104/70 Pulse: 103 Heart Rate: 62 Resp: 14 SpO2: 97 % O2 Device: None (Room air) Oxygen Delivery: 1 LPM  Vitals:    07/29/19 1935 07/29/19 2222   Weight: 76.2 kg (168 lb) 78.6  kg (173 lb 4.8 oz)     Vital Signs with Ranges  Temp:  [98.2  F (36.8  C)-99.9  F (37.7  C)] 99.9  F (37.7  C)  Pulse:  [103] 103  Heart Rate:  [] 62  Resp:  [14-16] 14  BP: (104-135)/(70-89) 104/70  SpO2:  [94 %-99 %] 97 %  No intake/output data recorded.    Constitutional: Awake, alert, cooperative, no apparent distress, sitting up in a chair  Respiratory: Clear to auscultation bilaterally, no crackles or wheezing  Cardiovascular: Normal rate, irregular rhythm, normal S1 and S2, no murmur noted, 1-2+ bilateral lower extremity edema  GI: Normal bowel sounds, soft, non-distended, non-tender  Skin/Integument: No rashes, no cyanosis  Neuro: Alert, oriented    Medications     - MEDICATION INSTRUCTIONS -       - MEDICATION INSTRUCTIONS -         apixaban ANTICOAGULANT  5 mg Oral BID     atorvastatin  10 mg Oral QPM     sodium chloride (PF)  20 mL EPIDURAL Once     sodium chloride (PF)  3 mL Intracatheter Q8H     Data   Recent Labs   Lab 08/02/19  0839 08/01/19  0428 07/31/19  2106 07/30/19  0615 07/29/19  1952   WBC 10.0 8.2  --  8.7 11.0   HGB 14.3 14.6  --  14.3 16.1   MCV 95 94  --  93 94    208  --  211 243     --   --  140 137   POTASSIUM 4.8  --   --  4.3 4.1   CHLORIDE 105  --   --  107 103   CO2 28  --   --  30 29   BUN 25  --   --  19 18   CR 0.90  --   --  0.86 0.94   ANIONGAP 5  --   --  3 5   BELLO 9.0  --   --  8.6 9.1   *  --   --  98 115*   ALBUMIN  --   --   --   --  3.4   PROTTOTAL  --   --   --   --  8.0   BILITOTAL  --   --   --   --  1.0   ALKPHOS  --   --   --   --  100   ALT  --   --   --   --  14   AST  --   --   --   --  13   TROPI  --  0.023 <0.015  --  0.022      Recent Results (from the past 24 hour(s))   CT Chest/Abdomen/Pelvis w Contrast    Narrative    CT CHEST/ABDOMEN/PELVIS WITH CONTRAST August 2, 2019 5:26 PM    HISTORY: Fever of unknown origin, blood cultures negative, ID  recommending CT chest/abdomen/pelvis.    TECHNIQUE: CT scan obtained of the chest, abdomen,  and pelvis with  oral and IV contrast. 85 mL Isovue-370 IV injected. Radiation dose for  this scan was reduced using automated exposure control, adjustment of  the mA and/or kV according to patient size, or iterative  reconstruction technique.    COMPARISON: None available.    FINDINGS:  Chest: The visualized portions of the thyroid gland are unremarkable.    No supraclavicular, axillary, mediastinal or hilar lymphadenopathy is  seen.    The heart is enlarged. No pericardial effusion is seen. Multivessel  coronary artery calcifications are present. Scattered atherosclerotic  calcifications seen in the thoracic aorta. Pulmonary trunk is not  enlarged.    Biapical fibrotic changes seen in the lungs. Scarring/subsegmental  atelectasis is seen in the dependent portions of the bilateral lower  lobes. No focal consolidation is present. Several small bilateral  pulmonary nodules are present, measuring up to 4 mm in the right upper  lobe (series 10, image 84).    Abdomen/pelvis: No focal hepatic lesion is seen. No intrahepatic or  extrahepatic biliary ductal dilatation is present. Small stones are  noted within the gallbladder.    This spleen and right adrenal gland are unremarkable. Mild thickening  with surrounding fat stranding noted in the left adrenal gland.  Pancreas is unremarkable. Kidneys enhance symmetrically. A 2.8 cm cyst  is noted along the inferior pole of the left kidney. No hydronephrosis  is seen in the kidneys. Mild bilateral, nonspecific perinephric  stranding noted, left greater than right.    The stomach is underdistended. Small and large bowel loops are  nondilated. Colonic diverticulosis without evidence of diverticulitis.    Scattered atherosclerotic calcifications seen in the abdominal aorta.  IVC is of normal course and caliber. No retroperitoneal or mesenteric  lymphadenopathy seen.    Urinary bladder is moderately distended. Uterus is surgically absent.    Diffuse osteopenia is seen in the bones.  Multilevel degenerative  changes are present in the spine.      Impression    IMPRESSION: No focal fluid collections identified in the chest,  abdomen or pelvis.  1.  Mild, nonspecific thickening with surrounding fat stranding seen  in the left adrenal gland with minimal, nonspecific left greater than  right perinephric stranding. No wedge-shaped areas of reduced  enhancement seen in the kidneys to suggest pyelonephritis. Correlate  with urinalysis.  2.  Cardiomegaly with multivessel coronary artery calcifications.  3.  Multiple pulmonary nodules seen bilaterally measuring up to 4 mm  in the right upper lobe.     4.  Recommendations for one or multiple incidental lung nodules < 6mm  :    Low risk patients: No routine follow-up.    High risk patients: Optional follow-up CT at 12 months; if  unchanged, no further follow-up.    *Low Risk: Minimal or absent history of smoking or other known risk  factors.  *Nonsolid (ground glass) or partly solid nodules may require longer  follow-up to exclude indolent adenocarcinoma.  *Recommendations based on Guidelines for the Management of Incidental  Pulmonary Nodules Detected at CT: From the Fleischner Society 2017,  Radiology 2017.    WILLIAM HUDSON MD   CTA Head Neck with Contrast    Narrative    CTA  HEAD NECK WITH CONTRAST  8/2/2019 6:15 PM     HISTORY: stroke    TECHNIQUE:  Precontrast localizing scans were followed by CT  angiography with an injection of 85 mL Isovue-370 IV contrast with  scans through the head and neck.  Images were transferred to a  separate 3-D workstation where multiplanar reformations and 3-D images  were created.  Estimates of carotid stenoses are made relative to the  distal internal carotid artery diameters except as noted. Radiation  dose for this scan was reduced using automated exposure control,  adjustment of the mA and/or kV according to patient size, or iterative  reconstruction technique.    COMPARISON: None.    FINDINGS: Estimates of  stenosis at the carotid bifurcations are  relative to the distal internal carotids.    ARCH: Calcified atherosclerotic plaque is seen in the arch of the  aorta.    NECK CTA:  Right Carotid:  The right common carotid artery is normal.  The right  carotid bifurcation appears normal.  The right internal carotid artery  is negative.     Left Carotid:  The left common carotid artery is unremarkable.   Atherosclerotic plaque is seen at the left carotid bifurcation. There  is a shallow ulceration of the atherosclerotic plaque. No stenosis..   The left internal carotid is negative.      Vertebrals:  The vertebral arteries are normal.    HEAD CTA:  Anterior Circulation: No occluded vessels are seen. .    Posterior Circulation:  The basilar artery and its branches appear  normal. There is a fetal origin of the left posterior cerebral. There  are areas of mild-moderate stenosis in the left posterior cerebral.    MISC:: Fibrotic changes seen in both lungs.      Impression    IMPRESSION:   1. No significant stenosis is seen at either carotid bifurcation.  There is a shallow ulceration of the atherosclerotic plaque at the  left carotid bifurcation  2. No arterial dissection is identified.  3. No high-grade intracranial vascular stenosis is identified.  Mild-moderate atherosclerotic plaque is seen in the left posterior  cerebral artery.  4. Venous sinuses appear patent  5. Fibrotic changes are seen in both lungs.        TIARRA AUSTIN MD

## 2019-08-03 NOTE — PLAN OF CARE
Discharge Planner SLP   Patient plan for discharge: Not stated this date.   Current status: Bedside swallow eval completed. Per GI, pt likely presents with a large Zenker's diverticulum impacting swallow function. Pt reported intermittent, minimal difficulty with swallowing of meds with no hx of modified diet. Pt presents with mild oropharyngeal dysphagia characterized by reduced oral motor coordination, A/P movement and slightly prolonged oral manipulation. Pt tolerated pureed and regular solids with thin liquids by cup/straw with no overt s/sx of aspiration, changes in vocal quality nor respiratory status. Pt able to self-feed given sufficient time. Recommend continuation of regular solids with thin liquids at this time. Recommend pt sitting upright, small bites/sips, alternate liquids/solids, liquid wash to ensure clearance. SLP discussed potential impact of Zenker's on swallow function and provided strategies. Pt verbalized agreement and understanding.   Barriers to return to prior living situation: weakness   Recommendations for discharge: Defer to PT/OT  Rationale for recommendations: Anticipate pt will meet swallow goals as IP. Recommend follow up with ENT re: suspected Zenker's as indicated.        Entered by: Shameka Falk 08/03/2019 9:07 AM

## 2019-08-03 NOTE — PROGRESS NOTES
"[facility]  Infectious Disease Progress Note          Assessment and Plan:   IMPRESSION:   1. An 83-year-old male with history of carcinoma of the prostate.   2. Atrial fibrillation.   3. Admitted on this occasion with 3-4 day history of progressive generalized weakness accompanied by some increase in chronic neck pain as well as new right wrist pain and few days into hospitalization left wrist pain. The patient was unaware of a fever, but has been febrile to over 101 degrees since his hospital admission.  A CRP was elevated to 61.9 degrees.  The source of the patient's fever is not clear.  White blood count is normal.  Urinalysis is benign.  Chest x-ray showed no acute infiltrate.  CT scan of the cervical spine showed significant degenerative changes but no evidence of infection.    4. Ongoing fever, still no clear explanation.  Blood and wrist cxs neg.  MRI brain without acute changes.  5. Last visit to a tick endemic area was 15 years ago.     RECOMMENDATIONS:   1. Would hold on antibiotic therapy unless and until a more clear evidence of bacterial infection.   2. Now the left wrist is painful, red and swollen.   3. Will check Lyme serology.         Interval History:   Feels better in general.  Neck pain better.  Temp lower, Tmax 99.7.  Blood cx neg.  procalcitonin 0.08. The left wrist is now swollen red and painful               Medications:       apixaban ANTICOAGULANT  5 mg Oral BID     atorvastatin  10 mg Oral QPM     sodium chloride (PF)  20 mL EPIDURAL Once     sodium chloride (PF)  3 mL Intracatheter Q8H                  Physical Exam:   Blood pressure 126/77, pulse 103, temperature 98.2  F (36.8  C), temperature source Oral, resp. rate 16, height 1.778 m (5' 10\"), weight 78.6 kg (173 lb 4.8 oz), SpO2 99 %.  [unfilled]  Vital Signs with Ranges  Temp:  [98.2  F (36.8  C)-99.7  F (37.6  C)] 98.2  F (36.8  C)  Pulse:  [] 103  Heart Rate:  [] 62  Resp:  [10-17] 16  BP: " ()/(57-91) 126/77  SpO2:  [94 %-99 %] 99 %    Constitutional: Awake, alert, cooperative, no apparent distress   Lungs: Clear to auscultation bilaterally, no crackles or wheezing   Cardiovascular: Regular rate and rhythm, afib, and no murmur noted   Abdomen: Normal bowel sounds, soft, non-distended, non-tender   Skin: No rashes, no cyanosis, no edema   Other:           Data:   All microbiology laboratory data reviewed.  Recent Labs   Lab Test 08/02/19  0839 08/01/19  0428 07/30/19  0615   WBC 10.0 8.2 8.7   HGB 14.3 14.6 14.3   HCT 43.6 43.0 42.5   MCV 95 94 93    208 211     Recent Labs   Lab Test 08/02/19  0839 07/30/19  0615 07/29/19 1952   CR 0.90 0.86 0.94     Recent Labs   Lab Test 07/29/19 1952   SED 11

## 2019-08-04 ENCOUNTER — APPOINTMENT (OUTPATIENT)
Dept: PHYSICAL THERAPY | Facility: CLINIC | Age: 84
DRG: 864 | End: 2019-08-04
Payer: MEDICARE

## 2019-08-04 ENCOUNTER — APPOINTMENT (OUTPATIENT)
Dept: SPEECH THERAPY | Facility: CLINIC | Age: 84
DRG: 864 | End: 2019-08-04
Payer: MEDICARE

## 2019-08-04 ENCOUNTER — APPOINTMENT (OUTPATIENT)
Dept: OCCUPATIONAL THERAPY | Facility: CLINIC | Age: 84
DRG: 864 | End: 2019-08-04
Payer: MEDICARE

## 2019-08-04 LAB
ANION GAP SERPL CALCULATED.3IONS-SCNC: 5 MMOL/L (ref 3–14)
BACTERIA SPEC CULT: NO GROWTH
BUN SERPL-MCNC: 29 MG/DL (ref 7–30)
CALCIUM SERPL-MCNC: 9.3 MG/DL (ref 8.5–10.1)
CHLORIDE SERPL-SCNC: 105 MMOL/L (ref 94–109)
CO2 SERPL-SCNC: 28 MMOL/L (ref 20–32)
CREAT SERPL-MCNC: 0.83 MG/DL (ref 0.66–1.25)
ERYTHROCYTE [DISTWIDTH] IN BLOOD BY AUTOMATED COUNT: 13.6 % (ref 10–15)
GFR SERPL CREATININE-BSD FRML MDRD: 81 ML/MIN/{1.73_M2}
GLUCOSE SERPL-MCNC: 108 MG/DL (ref 70–99)
HCT VFR BLD AUTO: 42.5 % (ref 40–53)
HGB BLD-MCNC: 14.1 G/DL (ref 13.3–17.7)
MAGNESIUM SERPL-MCNC: 2.5 MG/DL (ref 1.6–2.3)
MCH RBC QN AUTO: 31.3 PG (ref 26.5–33)
MCHC RBC AUTO-ENTMCNC: 33.2 G/DL (ref 31.5–36.5)
MCV RBC AUTO: 94 FL (ref 78–100)
PLATELET # BLD AUTO: 284 10E9/L (ref 150–450)
POTASSIUM SERPL-SCNC: 4 MMOL/L (ref 3.4–5.3)
RBC # BLD AUTO: 4.51 10E12/L (ref 4.4–5.9)
SODIUM SERPL-SCNC: 138 MMOL/L (ref 133–144)
SPECIMEN SOURCE: NORMAL
WBC # BLD AUTO: 9.2 10E9/L (ref 4–11)

## 2019-08-04 PROCEDURE — 36415 COLL VENOUS BLD VENIPUNCTURE: CPT | Performed by: HOSPITALIST

## 2019-08-04 PROCEDURE — 80048 BASIC METABOLIC PNL TOTAL CA: CPT | Performed by: HOSPITALIST

## 2019-08-04 PROCEDURE — 25000132 ZZH RX MED GY IP 250 OP 250 PS 637: Mod: GY | Performed by: STUDENT IN AN ORGANIZED HEALTH CARE EDUCATION/TRAINING PROGRAM

## 2019-08-04 PROCEDURE — 25000132 ZZH RX MED GY IP 250 OP 250 PS 637: Mod: GY | Performed by: NURSE PRACTITIONER

## 2019-08-04 PROCEDURE — 97530 THERAPEUTIC ACTIVITIES: CPT | Mod: GP

## 2019-08-04 PROCEDURE — 25000132 ZZH RX MED GY IP 250 OP 250 PS 637: Mod: GY | Performed by: HOSPITALIST

## 2019-08-04 PROCEDURE — 12000000 ZZH R&B MED SURG/OB

## 2019-08-04 PROCEDURE — 99231 SBSQ HOSP IP/OBS SF/LOW 25: CPT | Performed by: PSYCHIATRY & NEUROLOGY

## 2019-08-04 PROCEDURE — 97116 GAIT TRAINING THERAPY: CPT | Mod: GP

## 2019-08-04 PROCEDURE — 85027 COMPLETE CBC AUTOMATED: CPT | Performed by: HOSPITALIST

## 2019-08-04 PROCEDURE — 92526 ORAL FUNCTION THERAPY: CPT | Mod: GN

## 2019-08-04 PROCEDURE — 97535 SELF CARE MNGMENT TRAINING: CPT | Mod: GO | Performed by: OCCUPATIONAL THERAPIST

## 2019-08-04 PROCEDURE — 83735 ASSAY OF MAGNESIUM: CPT | Performed by: HOSPITALIST

## 2019-08-04 PROCEDURE — 97110 THERAPEUTIC EXERCISES: CPT | Mod: GP

## 2019-08-04 PROCEDURE — 99232 SBSQ HOSP IP/OBS MODERATE 35: CPT | Performed by: HOSPITALIST

## 2019-08-04 RX ORDER — AMOXICILLIN 250 MG
2 CAPSULE ORAL 2 TIMES DAILY PRN
Status: DISCONTINUED | OUTPATIENT
Start: 2019-08-04 | End: 2019-08-06 | Stop reason: HOSPADM

## 2019-08-04 RX ORDER — POLYETHYLENE GLYCOL 3350 17 G/17G
17 POWDER, FOR SOLUTION ORAL DAILY PRN
Status: DISCONTINUED | OUTPATIENT
Start: 2019-08-04 | End: 2019-08-06 | Stop reason: HOSPADM

## 2019-08-04 RX ADMIN — SENNOSIDES AND DOCUSATE SODIUM 2 TABLET: 8.6; 5 TABLET ORAL at 12:34

## 2019-08-04 RX ADMIN — METHOCARBAMOL 500 MG: 500 TABLET, FILM COATED ORAL at 03:15

## 2019-08-04 RX ADMIN — SENNOSIDES AND DOCUSATE SODIUM 2 TABLET: 8.6; 5 TABLET ORAL at 21:33

## 2019-08-04 RX ADMIN — ATORVASTATIN CALCIUM 10 MG: 10 TABLET, FILM COATED ORAL at 20:23

## 2019-08-04 RX ADMIN — OXYCODONE HYDROCHLORIDE 5 MG: 5 TABLET ORAL at 04:29

## 2019-08-04 RX ADMIN — ACETAMINOPHEN 650 MG: 325 TABLET, FILM COATED ORAL at 00:41

## 2019-08-04 RX ADMIN — OXYCODONE HYDROCHLORIDE 5 MG: 5 TABLET ORAL at 12:34

## 2019-08-04 RX ADMIN — OXYCODONE HYDROCHLORIDE 5 MG: 5 TABLET ORAL at 21:33

## 2019-08-04 ASSESSMENT — ACTIVITIES OF DAILY LIVING (ADL)
ADLS_ACUITY_SCORE: 14
ADLS_ACUITY_SCORE: 14
ADLS_ACUITY_SCORE: 16
ADLS_ACUITY_SCORE: 14
ADLS_ACUITY_SCORE: 17
ADLS_ACUITY_SCORE: 15

## 2019-08-04 NOTE — PLAN OF CARE
Alert, oriented x2-3, reorients well. Reports bilateral wrist pain, more so on the left and left wrist is swollen, took robaxin x1. VSS aside from tachycardia, PRN metoprolol given with good results. Tele afib with cvr after metoprolol.  Neuros with left facial droop, otherwise intact, generalized weakness. Ax2/GB/W.  Discharge plan pending

## 2019-08-04 NOTE — PROGRESS NOTES
Vascular Neurology Progress Note    ____________________________________________________________    Admission Summary:  Yosef Murry is a 83 year old male admitted for fever and confusion or 1 week. Improved but not back to himself.    Exam shows slow cognition but no focal findings. Neck stiffness and pain with any movement but not tender.     MRI brain shows right caudate ischemic stroke. CTA H/N some atherosclerosis but no etiology.     Impression:    Right caudate ischemics stroke ?etiology  Fever  Afib    Recommendations:     -Will hold AC for LP next week    Stroke Education provided including signs/symptoms of a stroke and the importance of timely treatment.    Please contact the stroke service with any questions: Feel free to call my cellphone.    Abdiel Prieto MD  Vascular Neurology  August 3, 2019    I personally reviewed all relevant labs and neuroimaging.  I spent 35 minutes reviewing labs, diagnostic studies, neuroimaging, and evaluating the patient.    ____________________________________________________________________        Medications:      Current Facility-Administered Medications:      acetaminophen (TYLENOL) tablet 650 mg, 650 mg, Oral, Q4H PRN, Caleb Rodriguez MD, 650 mg at 08/04/19 0041     apixaban ANTICOAGULANT (ELIQUIS) tablet 5 mg, 5 mg, Oral, BID, Counters, JOHNSON Meier CNP, 5 mg at 08/03/19 2012     atorvastatin (LIPITOR) tablet 10 mg, 10 mg, Oral, QPM, Counters, JOHNSON Meier CNP, 10 mg at 08/03/19 2012     lidocaine (LMX4) cream, , Topical, Q1H PRN, Caleb Rodriguez MD     lidocaine 1 % 0.1-1 mL, 0.1-1 mL, Other, Q1H PRN, Caleb Rodriguez MD     Medication Instruction, , Does not apply, Continuous PRN, Cabrera Gomez APRN CNP     melatonin tablet 1 mg, 1 mg, Oral, At Bedtime PRN, Caleb Rodriguez MD     methocarbamol (ROBAXIN) tablet 500 mg, 500 mg, Oral, 4x Daily PRN, Counters, JOHNSON Meier CNP, 500 mg at 08/03/19 2017     metoprolol (LOPRESSOR) injection 2.5 mg, 2.5 mg, Intravenous, Q4H PRN,  "Ayush Chand MD, 2.5 mg at 08/03/19 1647     naloxone (NARCAN) injection 0.1-0.4 mg, 0.1-0.4 mg, Intravenous, Q2 Min PRN, Caleb Rodriguez MD     ondansetron (ZOFRAN-ODT) ODT tab 4 mg, 4 mg, Oral, Q6H PRN **OR** ondansetron (ZOFRAN) injection 4 mg, 4 mg, Intravenous, Q6H PRN, Caleb Rodriguez MD     oxyCODONE (ROXICODONE) tablet 5 mg, 5 mg, Oral, Q4H PRN, Caleb Rodriguez MD, 5 mg at 08/03/19 0454     Patient is already receiving anticoagulation with heparin, enoxaparin (LOVENOX), warfarin (COUMADIN)  or other anticoagulant medication, , Does not apply, Continuous PRN, Cabrera Gomez, APRN CNP     sodium chloride (PF) 0.9% PF flush 20 mL, 20 mL, EPIDURAL, Once, Caleb Rodriguez MD     sodium chloride (PF) 0.9% PF flush 3 mL, 3 mL, Intracatheter, q1 min prn, Ayush Chand MD     sodium chloride (PF) 0.9% PF flush 3 mL, 3 mL, Intracatheter, Q8H, Ayush Chand MD, 3 mL at 08/03/19 2012    Vital Signs:  B/P: 137/76, T: 98.9, P: 103, R: 16    /76 (BP Location: Left arm)   Pulse 103   Temp 98.9  F (37.2  C) (Oral)   Resp 16   Ht 1.778 m (5' 10\")   Wt 78.6 kg (173 lb 4.8 oz)   SpO2 96%   BMI 24.87 kg/m    General Appearance:  No acute distress  Neuro:       Mental Status Exam:   Awake, alert, oriented X3. Speech and language are intact. Mental status is normal       Cranial Nerves:  Pupils 3 mm, reactive. EOMI. Face sensation is normal. Mild left facial droop (baseline). Tongue and uvula are midline. Other CN are normal           Motor:  5/5 X 4. Tone and bulk are normal. Mild BUE resting tremor noted on exam, no rigidity noted.          Reflexes:  Normal DTR. Toes downgoing.        Sensory:  Normal to light touch               Coordination:   Intact finger-to-nose        Gait:  No significant difficulties  Abdomen: Soft, not tender, not distended  Extremities: No clubbing, no cyanosis, no edema    Labs/Studies:  CBC:     Recent Labs   Lab 08/02/19  0839 08/01/19  0428 07/30/19  0615   WBC " 10.0 8.2 8.7   RBC 4.61 4.59 4.56   HGB 14.3 14.6 14.3   HCT 43.6 43.0 42.5    208 211     Basic Metabolic Panel:   Recent Labs   Lab Test 08/02/19  0839 07/30/19  0615 07/29/19 1952    140 137   POTASSIUM 4.8 4.3 4.1   CHLORIDE 105 107 103   CO2 28 30 29   BUN 25 19 18   CR 0.90 0.86 0.94   * 98 115*   BELLO 9.0 8.6 9.1     Liver panel:  Recent Labs   Lab Test 07/29/19  1952 06/10/19  0949 06/21/18  0941   PROTTOTAL 8.0 7.3 7.5   ALBUMIN 3.4 3.3* 3.5   BILITOTAL 1.0 0.5 0.6   ALKPHOS 100 103 100   AST 13 17 23   ALT 14 16 24     INR:No lab results found.   Lipid Profile:  Recent Labs   Lab Test 07/31/19  2106 06/10/19  0949 06/21/18  0941   CHOL 116 162 183   HDL 37* 34* 43   LDL 62 97 114*   TRIG 84 157* 130     A1C:   Recent Labs   Lab Test 07/31/19 2106   A1C 5.6     Troponin I:   Recent Labs   Lab Test 08/01/19  0428 07/31/19 2106 07/29/19 1952 05/14/16  0510 05/13/16  2330 05/13/16  1915   TROPI 0.023 <0.015 0.022 0.020 0.015 0.020         Imaging:  Relevant findings as per the Impression above.

## 2019-08-04 NOTE — PLAN OF CARE
Discharge Planner SLP   Patient plan for discharge: Not discussed this date.   Current status: Swallow Tx provided during lunch meal with family members x2 present. Pt tolerated regular solids (sandwich) and thin liquids by cup/straw with no overt s/sx of aspiration, changes in respiration nor vocal quality. Prolonged oral manipulation and transit noted during consumption of regular solids. SLP provided verbal education re: small bites and use of liquid wash after each bite to ensure oral clearance. Pt demonstrated use of safe swallow strategies given mod verbal reminders. Recommend continuation of regular solids with thin liquids. Recommend pt sitting upright, small single bites/sips, alternate liquids/solids, liquid wash after each bite to ensure oral clearance.   Barriers to return to prior living situation: weakness   Recommendations for discharge: Defer to PT/OT/Medical Team  Rationale for recommendations: Anticipate pt will meet Swallow goals as IP. Recommend follow-up with ENT as indicated per GI findings of Zenker's Diverticulum.        Entered by: Shameka Falk 08/04/2019 2:15 PM

## 2019-08-04 NOTE — PLAN OF CARE
Pt here with Neck pain and subacute stroke. A&O x3-4, forgetful. Neuros L droop at baseline, LE edema. VSS. C/o 5/10 neck pain managed with Robaxin, x1, Tylenol x1, Oxycodone x1. Rash noted on buttocks, back of thighs and groin area. Tele A-fib/A-flutter CVR. Tolerating regular diet, thin liquids. Takes pills whole. Up with Ax2. Discharge pending, continue to monitor.

## 2019-08-04 NOTE — PLAN OF CARE
Discharge Planner PT   Patient plan for discharge: not stated  Current status:  PT demonstrates significantly improved mobility today. Performed supine>sit with log roll technique and Boom with increased neck pain, Sit>stand with FWW and Boom for stability initially, progressing to SBA with cues for arm placement. Ambulated 220' with FWW and SBA with good pace, improved step length; cues for upright posture. Ambulated 20' with no AD with CGA with some unsteadiness. Engaged in standing exercises with FWW for support and SBA-CGA. Pt seated in chair upon departure of PT, all needs in reach.  Barriers to return to prior living situation: lives alone, current level of assist, confusion  Recommendations for discharge: consider ARU  Rationale for recommendations: Patient previously independent with mobility and quite active, ambulating 3 miles per day. Currently, pt needing assist and use of walker with mobility. Patient will benefit from therapy in intensive interdisciplinary environment to progress in order to return to independent and active lifestyle.  Anticipate patient will tolerate 3 hrs of therapy per day.       Entered by: Johanna Blanc 08/04/2019 11:26 AM

## 2019-08-04 NOTE — PLAN OF CARE
Discharge Planner OT   Patient plan for discharge: Rehab vs home  Current status: Pt doing remarkably better today. Some c/o pain at L wrist, but able to transfer from chair with CGA, SBA from high commode. Stood at sink for standing hygienes x 5 min with SBA, no LOB. Completed toilet task with commode overlay with SBA. Fed self (I)ly, wrote out check for a bill and balanced checkbook (I)ly with increased time. No overt cognitive issues noted today other than still needing some increased processing time.   Barriers to return to prior living situation: falls risk, medical status  Recommendations for discharge: TCU. Pt doing remarkably better today with overall SBA-CGA for basic ADL such as dressing, toileting, hygienes.   Rationale for recommendations: May be able to discharge home with home cares and family assist pending further improvement. Will continue to assess. Previous recommendations were for ARU, but pt is doing so much better that his length of stay at ARU likely would be quite short. Again, will continue to assess, as his functional status was drastically different yesterday.       Entered by: Sherry Acosta 08/04/2019 2:54 PM

## 2019-08-04 NOTE — PLAN OF CARE
Pt alert and oriented x4. Afebrile, VS within ordered parameters. Tele afib with CVR, RVR with activity. Neuros unchanged with baseline L facial droop. Continues to c/o of neck and L wrist pain, decreased with prn oxycodone. L wrist red and swollen. Lung sounds clear. +BS and passing flatus, bowel meds given for constipation. Tolerating regular diet. Red, irregular rash present on buttock, hips, groin and diffuse areas on head and chest. Ambulating better today, up with 1 GB and walker. Plan for LP tomorrow.    1900 update: no change.

## 2019-08-04 NOTE — PROGRESS NOTES
"[facility]  Infectious Disease Progress Note          Assessment and Plan:   IMPRESSION:   1. An 83-year-old male with history of carcinoma of the prostate.   2. Atrial fibrillation.   3. Admitted on this occasion with 3-4 day history of progressive generalized weakness accompanied by some increase in chronic neck pain as well as new right wrist pain and few days into hospitalization left wrist pain. The patient was unaware of a fever, but has been febrile to over 101 degrees since his hospital admission.  A CRP was elevated to 61.9 degrees.  The source of the patient's fever is not clear.  White blood count is normal.  Urinalysis is benign.  Chest x-ray showed no acute infiltrate.  CT scan of the cervical spine showed significant degenerative changes but no evidence of infection.    4. Ongoing fever, still no clear explanation.  Blood and wrist cxs neg.  MRI brain without acute changes.  5. Last visit to a tick endemic area was 15 years ago.     RECOMMENDATIONS:   1. Would hold on antibiotic therapy unless and until a more clear evidence of bacterial infection.   2. Now the left wrist is painful, red and swollen.   3. Pending Lyme serology.         Interval History:   Feels better in general.  Neck pain better.  Temp lower, Tmax 99.7.  Blood cx neg.  procalcitonin 0.08. The left wrist is now swollen red and painful A little more alert and awake today               Medications:       atorvastatin  10 mg Oral QPM     sodium chloride (PF)  20 mL EPIDURAL Once     sodium chloride (PF)  3 mL Intracatheter Q8H                  Physical Exam:   Blood pressure 112/74, pulse 101, temperature 97.3  F (36.3  C), temperature source Oral, resp. rate 14, height 1.778 m (5' 10\"), weight 78.6 kg (173 lb 4.8 oz), SpO2 95 %.  [unfilled]  Vital Signs with Ranges  Temp:  [97.3  F (36.3  C)-99.9  F (37.7  C)] 97.3  F (36.3  C)  Pulse:  [101] 101  Heart Rate:  [] 91  Resp:  [14-16] 14  BP: (104-137)/() " 112/74  SpO2:  [95 %-97 %] 95 %    Constitutional: Awake, alert, cooperative, no apparent distress   Lungs: Clear to auscultation bilaterally, no crackles or wheezing   Cardiovascular: Regular rate and rhythm, afib, and no murmur noted   Abdomen: Normal bowel sounds, soft, non-distended, non-tender   Skin: No rashes, no cyanosis, no edema   Other:           Data:   All microbiology laboratory data reviewed.  Recent Labs   Lab Test 08/04/19  0808 08/02/19  0839 08/01/19  0428   WBC 9.2 10.0 8.2   HGB 14.1 14.3 14.6   HCT 42.5 43.6 43.0   MCV 94 95 94    246 208     Recent Labs   Lab Test 08/04/19  0808 08/02/19  0839 07/30/19  0615   CR 0.83 0.90 0.86     Recent Labs   Lab Test 07/29/19  1952   SED 11

## 2019-08-04 NOTE — PROGRESS NOTES
[facility]    Hospitalist Progress Note    Assessment & Plan   Yosef Murry is a 83 year old male who was admitted on 7/29/2019. He presented with generalized weakness and pain/swellingi in his right wrist. After admission, he developed a fever without a clear etiology. He also had brief episodes of 'unresponsiveness.' An MRI revealed a subacute right basal ganglia infarct.    Subacute ischemic stroke of the right basal ganglia  Had an RRT on the evening of 7/31/19 due to episodes of 'unresponsiveness.' There was concern for a stroke. Neurology was consulted. CT head showed an age indeterminate lacunar infarction in the right basal ganglia and internal capsule. MRI brain revealed a subacute ischemic infarction of the right basal ganglia.  - Outside window for tPA.  - Neurology consulted, appreciate their assistance.  - PT/OT/SLP consulted.  - TTE results reviewed. Neurology ordered DANNY, unable to be done due to difficulty passing the scope.  - EEG obtained on 8/1/19, consistent with mild diffuse encephalopathy, no epileptiform discharges.  - CTA head/neck results reviewed, no significant abnormalities.  - Continue atorvastatin.  - Transitioned from PTA Xarelto to Eliquis per Neurology recommendations. Eliquis on hold now per neurology recommendations in anticipation of lumber puncture tomorrow.    Fever of Unknown Origin  No localizing symptoms, other than wrist pain. No leucocytosis. CXR and UA clear. CRP elevated.  - Tmax 99.9 in past 24 hours.  - WBC within normal limits. Procalcitonin 0.08 on 8/1/19.  - Blood cultures from 7/30/19 are negative to date.  - ID consulted and assisting with management.  - Continue to observe off of antibiotics.  - CT chest/abdomen/pelvis did not reveal an obvious source of fever.  - WESLEY and anti-CCP pending.  - RF and LDH within normal limits.  - Lyme panel ordered.  - Neurology planning lumbar puncture tomorrow, anticoagulation on hold for  now.    Dysphagia  Zenker's diverticulum  Schatzki's ring  - GI consulted due to difficulty passing the DANNY scope.  - Had EGD on 8/2/19 revealing a Zenker's diverticulum.  - Had a Schatzki's ring which was dilated during the EGD.  - SLP following.    Right wrist pain/swelling  Left wrist pain/swelling  Presented with generalized weakness and wrist pain and additionally developed FUO.  - Etiology unclear.  - Orthopedics consulted, appreciate their assistance.  - IR aspirated right wrist on 7/30. Gram stain negative. No crystals seen. Culture negative to date.  - Cleared by Ortho and right wrist pain has resolved.  - Developed left wrist pain/swelling on 8/3/19. Pain fairly well controlled with current medications.  - Continue symptomatic management.     Generalized Weakness  Complained of generalized weakness on admission but was ambulatory without assistance. Then while in hospital developed increasing generalized weakness requiring assist of 2.  - Suspect secondary to underlying illness. Treat FUO as above.  - Supportive Cares.  - PT/OT consulted.     Neck Pain  - Appears to be musculoskeletal in nature.  - CT cervical spine on 7/30/19 showed chronic degenerative changes, no acute findings.  - Continue symptomatic management.  - Much improved.     Persistent atrial fibrillation (H)  - PTA Xarelto discontinued ands Eliquis started as noted above.  - Not on any chronic medications for rate control.  - Had elevated heart rate yesterday, received a PRN dose of IV metoprolol with improvement in heart rate. Heart rate OK this morning.    DVT Prophylaxis: Xarelto  Code Status: DNR/DNI  Expected discharge: 1-2 days, recommended to acute rehab once further work-up of FUO.    Ayush Chand MD  Text Page  (7am - 6pm, M-F)    Interval History   Yosef Murry feels OK this morning. Neck pain improved. Has swelling/pain in his left wrist now. No symptoms in his right wrist. Denies fevers, chest pain, shortness of breath,  nausea, abdominal pain. Has developed a rash on his buttocks. No family present this morning.    -Data reviewed today: I reviewed all new labs and imaging results over the last 24 hours. I personally reviewed no images or EKG's today.    Physical Exam   Temp: 97.9  F (36.6  C) Temp src: Oral BP: 113/74   Heart Rate: 91 Resp: 16 SpO2: 97 % O2 Device: None (Room air)    Vitals:    07/29/19 1935 07/29/19 2222   Weight: 76.2 kg (168 lb) 78.6 kg (173 lb 4.8 oz)     Vital Signs with Ranges  Temp:  [97.6  F (36.4  C)-99.9  F (37.7  C)] 97.9  F (36.6  C)  Heart Rate:  [] 91  Resp:  [14-16] 16  BP: (104-137)/() 113/74  SpO2:  [96 %-99 %] 97 %  I/O last 3 completed shifts:  In: 420 [P.O.:420]  Out: -     Constitutional: Awake, alert, cooperative, no apparent distress, sitting up in a chair  Respiratory: Clear to auscultation bilaterally, no crackles or wheezing  Cardiovascular: Normal rate, irregular rhythm, normal S1 and S2, no murmur noted, 1-2+ bilateral lower extremity edema  GI: Normal bowel sounds, soft, non-distended, non-tender  Skin/Integument: No rashes, no cyanosis  Neuro: Alert, oriented    Medications     - MEDICATION INSTRUCTIONS -       - MEDICATION INSTRUCTIONS -         atorvastatin  10 mg Oral QPM     sodium chloride (PF)  20 mL EPIDURAL Once     sodium chloride (PF)  3 mL Intracatheter Q8H     Data   Recent Labs   Lab 08/04/19  0808 08/02/19  0839 08/01/19  0428 07/31/19  2106 07/30/19  0615 07/29/19  1952   WBC 9.2 10.0 8.2  --  8.7 11.0   HGB 14.1 14.3 14.6  --  14.3 16.1   MCV 94 95 94  --  93 94    246 208  --  211 243    138  --   --  140 137   POTASSIUM 4.0 4.8  --   --  4.3 4.1   CHLORIDE 105 105  --   --  107 103   CO2 28 28  --   --  30 29   BUN 29 25  --   --  19 18   CR 0.83 0.90  --   --  0.86 0.94   ANIONGAP 5 5  --   --  3 5   BELLO 9.3 9.0  --   --  8.6 9.1   * 115*  --   --  98 115*   ALBUMIN  --   --   --   --   --  3.4   PROTTOTAL  --   --   --   --   --  8.0    BILITOTAL  --   --   --   --   --  1.0   ALKPHOS  --   --   --   --   --  100   ALT  --   --   --   --   --  14   AST  --   --   --   --   --  13   TROPI  --   --  0.023 <0.015  --  0.022      No results found for this or any previous visit (from the past 24 hour(s)).

## 2019-08-05 ENCOUNTER — APPOINTMENT (OUTPATIENT)
Dept: PHYSICAL THERAPY | Facility: CLINIC | Age: 84
DRG: 864 | End: 2019-08-05
Payer: MEDICARE

## 2019-08-05 ENCOUNTER — HOSPITAL ENCOUNTER (INPATIENT)
Facility: CLINIC | Age: 84
End: 2019-08-05
Payer: MEDICARE

## 2019-08-05 ENCOUNTER — APPOINTMENT (OUTPATIENT)
Dept: OCCUPATIONAL THERAPY | Facility: CLINIC | Age: 84
DRG: 864 | End: 2019-08-05
Payer: MEDICARE

## 2019-08-05 LAB
ANA SER QL IF: NEGATIVE
APPEARANCE CSF: CLEAR
B BURGDOR IGG+IGM SER QL: 0.28 (ref 0–0.89)
CCP AB SER IA-ACNC: 1 U/ML
COLOR CSF: COLORLESS
CREAT SERPL-MCNC: 0.74 MG/DL (ref 0.66–1.25)
GFR SERPL CREATININE-BSD FRML MDRD: 86 ML/MIN/{1.73_M2}
GLUCOSE CSF-MCNC: 57 MG/DL (ref 40–70)
PROT CSF-MCNC: 36 MG/DL (ref 15–60)
RBC # CSF MANUAL: 0 /UL (ref 0–2)
RBC # CSF MANUAL: NORMAL /UL (ref 0–2)
TUBE # CSF: 4 #
WBC # CSF MANUAL: 0 /UL (ref 0–5)
WBC # CSF MANUAL: NORMAL /UL (ref 0–5)

## 2019-08-05 PROCEDURE — 86788 WEST NILE VIRUS AB IGM: CPT | Performed by: PSYCHIATRY & NEUROLOGY

## 2019-08-05 PROCEDURE — 36415 COLL VENOUS BLD VENIPUNCTURE: CPT | Performed by: STUDENT IN AN ORGANIZED HEALTH CARE EDUCATION/TRAINING PROGRAM

## 2019-08-05 PROCEDURE — 97116 GAIT TRAINING THERAPY: CPT | Mod: GP

## 2019-08-05 PROCEDURE — 99024 POSTOP FOLLOW-UP VISIT: CPT | Performed by: PSYCHIATRY & NEUROLOGY

## 2019-08-05 PROCEDURE — 82565 ASSAY OF CREATININE: CPT | Performed by: STUDENT IN AN ORGANIZED HEALTH CARE EDUCATION/TRAINING PROGRAM

## 2019-08-05 PROCEDURE — 87529 HSV DNA AMP PROBE: CPT | Performed by: PSYCHIATRY & NEUROLOGY

## 2019-08-05 PROCEDURE — 87999 UNLISTED MICROBIOLOGY PX: CPT | Performed by: PSYCHIATRY & NEUROLOGY

## 2019-08-05 PROCEDURE — 86592 SYPHILIS TEST NON-TREP QUAL: CPT | Performed by: PSYCHIATRY & NEUROLOGY

## 2019-08-05 PROCEDURE — 86618 LYME DISEASE ANTIBODY: CPT | Performed by: PSYCHIATRY & NEUROLOGY

## 2019-08-05 PROCEDURE — 99207 ZZC CDG-MDM COMPONENT: MEETS MODERATE - UP CODED: CPT | Performed by: HOSPITALIST

## 2019-08-05 PROCEDURE — 86789 WEST NILE VIRUS ANTIBODY: CPT | Performed by: PSYCHIATRY & NEUROLOGY

## 2019-08-05 PROCEDURE — 86617 LYME DISEASE ANTIBODY: CPT | Performed by: PSYCHIATRY & NEUROLOGY

## 2019-08-05 PROCEDURE — 25000132 ZZH RX MED GY IP 250 OP 250 PS 637: Mod: GY | Performed by: NURSE PRACTITIONER

## 2019-08-05 PROCEDURE — 87798 DETECT AGENT NOS DNA AMP: CPT | Performed by: PSYCHIATRY & NEUROLOGY

## 2019-08-05 PROCEDURE — 009U3ZX DRAINAGE OF SPINAL CANAL, PERCUTANEOUS APPROACH, DIAGNOSTIC: ICD-10-PCS | Performed by: PSYCHIATRY & NEUROLOGY

## 2019-08-05 PROCEDURE — 89050 BODY FLUID CELL COUNT: CPT | Performed by: PSYCHIATRY & NEUROLOGY

## 2019-08-05 PROCEDURE — 25000132 ZZH RX MED GY IP 250 OP 250 PS 637: Mod: GY | Performed by: PSYCHIATRY & NEUROLOGY

## 2019-08-05 PROCEDURE — 97530 THERAPEUTIC ACTIVITIES: CPT | Mod: GP

## 2019-08-05 PROCEDURE — 25000132 ZZH RX MED GY IP 250 OP 250 PS 637: Mod: GY | Performed by: STUDENT IN AN ORGANIZED HEALTH CARE EDUCATION/TRAINING PROGRAM

## 2019-08-05 PROCEDURE — 12000000 ZZH R&B MED SURG/OB

## 2019-08-05 PROCEDURE — 84157 ASSAY OF PROTEIN OTHER: CPT | Performed by: PSYCHIATRY & NEUROLOGY

## 2019-08-05 PROCEDURE — 99232 SBSQ HOSP IP/OBS MODERATE 35: CPT | Performed by: HOSPITALIST

## 2019-08-05 PROCEDURE — 82945 GLUCOSE OTHER FLUID: CPT | Performed by: PSYCHIATRY & NEUROLOGY

## 2019-08-05 PROCEDURE — 97535 SELF CARE MNGMENT TRAINING: CPT | Mod: GO | Performed by: OCCUPATIONAL THERAPIST

## 2019-08-05 PROCEDURE — 25000132 ZZH RX MED GY IP 250 OP 250 PS 637: Mod: GY | Performed by: INTERNAL MEDICINE

## 2019-08-05 PROCEDURE — 62270 DX LMBR SPI PNXR: CPT | Performed by: PSYCHIATRY & NEUROLOGY

## 2019-08-05 RX ORDER — MICONAZOLE NITRATE 20 MG/G
CREAM TOPICAL
Status: DISCONTINUED | OUTPATIENT
Start: 2019-08-05 | End: 2019-08-06 | Stop reason: HOSPADM

## 2019-08-05 RX ORDER — DIAZEPAM 5 MG
5 TABLET ORAL
Status: CANCELLED | OUTPATIENT
Start: 2019-08-05

## 2019-08-05 RX ADMIN — OXYCODONE HYDROCHLORIDE 5 MG: 5 TABLET ORAL at 11:12

## 2019-08-05 RX ADMIN — MICONAZOLE NITRATE: 20 CREAM TOPICAL at 23:44

## 2019-08-05 RX ADMIN — ACETAMINOPHEN 650 MG: 325 TABLET, FILM COATED ORAL at 21:31

## 2019-08-05 RX ADMIN — OXYCODONE HYDROCHLORIDE 5 MG: 5 TABLET ORAL at 17:24

## 2019-08-05 RX ADMIN — ATORVASTATIN CALCIUM 10 MG: 10 TABLET, FILM COATED ORAL at 20:02

## 2019-08-05 RX ADMIN — ACETAMINOPHEN 650 MG: 325 TABLET, FILM COATED ORAL at 17:24

## 2019-08-05 RX ADMIN — APIXABAN 5 MG: 5 TABLET, FILM COATED ORAL at 21:31

## 2019-08-05 RX ADMIN — OXYCODONE HYDROCHLORIDE 5 MG: 5 TABLET ORAL at 03:55

## 2019-08-05 RX ADMIN — OXYCODONE HYDROCHLORIDE 5 MG: 5 TABLET ORAL at 21:31

## 2019-08-05 RX ADMIN — ACETAMINOPHEN 650 MG: 325 TABLET, FILM COATED ORAL at 11:12

## 2019-08-05 RX ADMIN — METHOCARBAMOL 500 MG: 500 TABLET, FILM COATED ORAL at 00:08

## 2019-08-05 ASSESSMENT — ACTIVITIES OF DAILY LIVING (ADL)
ADLS_ACUITY_SCORE: 13

## 2019-08-05 ASSESSMENT — MIFFLIN-ST. JEOR: SCORE: 1474.25

## 2019-08-05 NOTE — PLAN OF CARE
Discharge Planner OT   Patient plan for discharge: Home   Current status: Pt up in chair, fed self breakfast, using smartphone (I)ly. Completed SLUMS with score of 25/30, indicating mild cognitive impairment (27 and above is a normal score). Needed increased processing time. Able to recall 4/5 words, able to do addition/subtraction word problem, recalled 3/4 details from a short story. Had difficulty saying 4 digits backwards. Interesting he farhad the R side of his clock perfectly, but struggled with the L. Initially, he squished all his numbers together on the L (7, 8, 9, 10, 11) but then recognized his error and was able to space them out a bit. Transfers from chair, EOB, high toilet with SBA/cues only. Completed toilet task with SBA. If patient goes home, would need raised toilet seat with handles, shower chair, walker and assist from family at home.  Barriers to return to prior living situation: medical status, mild cognitive impairment, falls risk  Recommendations for discharge: TCU unless he has family staying with him at home. If he goes home recommend home PT/OT as it would be a taxing effort to leave the home.  Rationale for recommendations: Pt is progressing well with ADL and mobility and cognition improving; reports daughter would be able to stay with him (not yet confirmed).       Entered by: Sherry Acosta 08/05/2019 8:14 AM

## 2019-08-05 NOTE — PLAN OF CARE
"Nursing note  Pt a/o x 4, up with assist of 1/walker/GB to the BR. Pt denies any dizziness or lightheadedness. Pt denies any n/v. Pt continue to c/o neck and left UE pain rating it 7/10. Pt taking prn tylenol,robaxin, and oxycodone for it. BLE's/UE 2 + edema. Pt has generalized rash. Tele is a-flutter. VSS./73 (BP Location: Left arm)   Pulse 77   Temp 98.4  F (36.9  C) (Oral)   Resp 16   Ht 1.778 m (5' 10\")   Wt 76.8 kg (169 lb 5 oz)   SpO2 97%   BMI 24.29 kg/m    Pt will be going down for cardiac MRI and LP today. Neuro's are intact except baseline right sided facial droop. Will continue to monitor.  "

## 2019-08-05 NOTE — PROGRESS NOTES
[facility]    Hospitalist Progress Note    Assessment & Plan   Yosef Murry is a 83 year old male who was admitted on 7/29/2019. He presented with generalized weakness and pain/swellingi in his right wrist. After admission, he developed a fever without a clear etiology. He also had brief episodes of 'unresponsiveness.' An MRI revealed a subacute right basal ganglia infarct.    Subacute ischemic stroke of the right basal ganglia  Had an RRT on the evening of 7/31/19 due to episodes of 'unresponsiveness.' There was concern for a stroke. Neurology was consulted. CT head showed an age indeterminate lacunar infarction in the right basal ganglia and internal capsule. MRI brain revealed a subacute ischemic infarction of the right basal ganglia.  - Outside window for tPA.  - Neurology consulted, appreciate their assistance.  - PT/OT/SLP consulted.  - TTE results reviewed. Neurology ordered DANNY, unable to be done due to difficulty passing the scope.   - He does not want to try the DANNY again (cardiology could do it, but would recommend that it be done under anesthesia).   - Cardiac MRI ordered by neurology today.  - EEG obtained on 8/1/19, consistent with mild diffuse encephalopathy, no epileptiform discharges.  - CTA head/neck results reviewed, no significant abnormalities.  - Continue atorvastatin.  - Transitioned from PTA Xarelto to Eliquis per Neurology recommendations. Eliquis on hold now per neurology recommendations in anticipation of lumbar puncture today.    Fever of Unknown Origin  No localizing symptoms, other than wrist pain. No leucocytosis. CXR and UA clear. CRP elevated.  - Tmax 98.7 in past 24 hours.  - WBC within normal limits. Procalcitonin 0.08 on 8/1/19.  - Blood cultures from 7/30/19 are negative to date.  - ID consulted and assisting with management.  - Continue to observe off of antibiotics.  - CT chest/abdomen/pelvis did not reveal an obvious source of fever.  - WESLEY, anti-CCP, RF, and  LDH within normal limits.  - Lyme antibody negative.  - Lumbar puncture planned for today.    Dysphagia  Zenker's diverticulum  Schatzki's ring  - GI consulted due to difficulty passing the DANNY scope.  - Had EGD on 8/2/19 revealing a Zenker's diverticulum.  - Had a Schatzki's ring which was dilated during the EGD.  - SLP following.    Right wrist pain/swelling  Left wrist pain/swelling  Presented with generalized weakness and wrist pain and additionally developed FUO.  - Etiology unclear.  - Orthopedics consulted, appreciate their assistance.  - IR aspirated right wrist on 7/30. Gram stain negative. No crystals seen. Culture negative to date.  - Cleared by Ortho and right wrist pain has resolved.  - Developed left wrist pain/swelling on 8/3/19. Pain fairly well controlled with current medications.  - Continue symptomatic management.     Generalized Weakness  Complained of generalized weakness on admission but was ambulatory without assistance. Then while in hospital developed increasing generalized weakness requiring assist of 2.  - Suspect secondary to underlying illness.  - Supportive Cares.  - PT/OT consulted.     Neck Pain  - Appears to be musculoskeletal in nature.  - CT cervical spine on 7/30/19 showed chronic degenerative changes, no acute findings.  - Continue symptomatic management.  - Much improved overall, but still persists.     Persistent atrial fibrillation (H)  - PTA Xarelto discontinued and Eliquis started as noted above (currently on hold for LP).  - Not on any chronic medications for rate control.  - Had elevated heart rate on 8/3/19, received a PRN dose of IV metoprolol with improvement in heart rate.    DVT Prophylaxis: Xarelto  Code Status: DNR/DNI  Expected discharge: 1-2 days, recommended to acute rehab once further work-up of FUO and OK with consulting providers.    Ayush Chand MD  Text Page  (7am - 6pm, M-F)    Interval History   Yosef Murry feels better today. Continues to have  neck and left wrist pain, both are maybe a little better than yesterday. Denies fevers, chills, sweats, chest pain, shortness of breath, nausea, abdominal pain. Had two loose stools today.    -Data reviewed today: I reviewed all new labs and imaging results over the last 24 hours. I personally reviewed no images or EKG's today.    Physical Exam   Temp: 98.4  F (36.9  C) Temp src: Oral BP: 115/73 Pulse: 77 Heart Rate: 97 Resp: 16 SpO2: 97 % O2 Device: None (Room air)    Vitals:    07/29/19 1935 07/29/19 2222 08/05/19 0500   Weight: 76.2 kg (168 lb) 78.6 kg (173 lb 4.8 oz) 76.8 kg (169 lb 5 oz)     Vital Signs with Ranges  Temp:  [98.3  F (36.8  C)-98.7  F (37.1  C)] 98.4  F (36.9  C)  Pulse:  [77] 77  Heart Rate:  [] 97  Resp:  [16-18] 16  BP: (105-136)/(67-73) 115/73  SpO2:  [95 %-100 %] 97 %  I/O last 3 completed shifts:  In: 200 [P.O.:200]  Out: -     Constitutional: Awake, alert, cooperative, no apparent distress, sitting up in a chair  Respiratory: Clear to auscultation bilaterally, no crackles or wheezing  Cardiovascular: Normal rate, irregular rhythm, normal S1 and S2, no murmur noted, 1-2+ bilateral lower extremity edema  GI: Normal bowel sounds, soft, non-distended, non-tender  Skin/Integument: No rashes, no cyanosis  Neuro: Alert, oriented  Musculoskeletal: Left wrist with some swelling and mild erythema    Medications     - MEDICATION INSTRUCTIONS -       - MEDICATION INSTRUCTIONS -         atorvastatin  10 mg Oral QPM     sodium chloride (PF)  20 mL EPIDURAL Once     sodium chloride (PF)  3 mL Intracatheter Q8H     Data   Recent Labs   Lab 08/05/19  0817 08/04/19  0808 08/02/19  0839 08/01/19  0428 07/31/19  2106 07/30/19  0615 07/29/19  1952   WBC  --  9.2 10.0 8.2  --  8.7 11.0   HGB  --  14.1 14.3 14.6  --  14.3 16.1   MCV  --  94 95 94  --  93 94   PLT  --  284 246 208  --  211 243   NA  --  138 138  --   --  140 137   POTASSIUM  --  4.0 4.8  --   --  4.3 4.1   CHLORIDE  --  105 105  --   --   107 103   CO2  --  28 28  --   --  30 29   BUN  --  29 25  --   --  19 18   CR 0.74 0.83 0.90  --   --  0.86 0.94   ANIONGAP  --  5 5  --   --  3 5   BELLO  --  9.3 9.0  --   --  8.6 9.1   GLC  --  108* 115*  --   --  98 115*   ALBUMIN  --   --   --   --   --   --  3.4   PROTTOTAL  --   --   --   --   --   --  8.0   BILITOTAL  --   --   --   --   --   --  1.0   ALKPHOS  --   --   --   --   --   --  100   ALT  --   --   --   --   --   --  14   AST  --   --   --   --   --   --  13   TROPI  --   --   --  0.023 <0.015  --  0.022      No results found for this or any previous visit (from the past 24 hour(s)).

## 2019-08-05 NOTE — PROGRESS NOTES
[facility]  Neurology Daily Note      Admission Date:7/29/2019   Date of service: 08/05/2019   Hospital Day: 8      Assessment & Plan   _______________________________  #. (I63.9) Infarction of right basal ganglia (H)  --subacute right basal ganglia stroke on MRI  --CTA with mild plaque in left carotid, not related to stroke  --echo with moderately enlarge left atrium, known Afib  ------attempted DANNY 8/2 to evaluate for possible endocarditis - could not pass scope  ---------GI consulted for evaluation  -----------large Zenker's diverticulum, able to pass EGD scope with jaw thrust - consider repeating DANNY with same maneuver  ----------discussed with patient, he would prefer not to try DANNY again due to troubles with his jaw; will order cardiac MRI instead for further evaluation   --EEG negative for evidence of seizures  #. (M54.2) Neck pain  --no clear meningeal signs, but given FUO will obtain LP for further evaluation  #. (I48.1) Persistent atrial fibrillation (H)  --on xarelto PTA  ----switched to eliquis due to stroke on xarelto  -----held over the weekend for LP today  --------restart eliquis when ok after LP  #. (R50.9) Fever of unknown origin  --infectious workup negative thus far  ----not on antibiotics  #. PT/OT/Speech  --continue evaluations  #. Nutrition  --Per speech therapy evaluation   #. DVT Prophylaxis  --Mechanical + anticoagulation     Code Status: DNR/DNI    Disposition: pending     Interval History   _______________________________  Patient presented on 7/29/19 with generalized weakness. He had right wrist pain/swelling and there was concern for septic joint. He developed fever of unknown origin, and has had negative infectious workup so far. He had spells of decreased responsiveness and RRT was called. CT head identified age indeterminate lacunar infarction in right basal ganglia and internal capsule. MRI brain obtained, infarct is late subacute. On exam 8/1/19 he was nearly back  to baseline. Fevers still with unknown cause.  Unable to pass scope for DANNY on 8/2. GI evaluated with EGD, identified large Zenker's diverticulum and large tongue likely cause of DANNY issues, could repeat DANNY with jaw thrust maneuver as was used for EGD. Patient still with neck stiffness over the weekend and now left wrist stiffness/pain. Eliquis held over the weekend for LP due to fever and neck pain.    Review of Systems   _______________________________  The Review of Systems is negative other than noted in the HPI  Physical Exam   _______________________________  Vitals: Temp: 98.4  F (36.9  C) Temp src: Oral BP: 115/73 Pulse: 77 Heart Rate: 97 Resp: 18 SpO2: 97 % O2 Device: None (Room air)    Vital Signs with Ranges: Temp:  [97.3  F (36.3  C)-98.7  F (37.1  C)] 98.4  F (36.9  C)  Pulse:  [] 77  Heart Rate:  [] 97  Resp:  [14-18] 18  BP: (105-136)/(67-74) 115/73  SpO2:  [95 %-100 %] 97 %    General Appearance:  No acute distress  Neuro:       Mental Status Exam:   Awake, alert, oriented X3. Speech and language are intact. Mental status is normal       Cranial Nerves:  Pupils 3 mm, reactive. EOMI. Face sensation is normal. Mild left facial droop (baseline). Tongue and uvula are midline. Other CN are normal           Motor:  5/5 X 4. Tone and bulk are normal. Mild BUE resting tremor noted on exam, no rigidity noted. Left wrist edema/redness/warmth.       Reflexes:  Normal DTR. Toes downgoing.        Sensory:  Normal to light touch               Coordination:   Intact finger-to-nose        Gait:  No significant difficulties  Abdomen: Soft, not tender, not distended  Extremities: No clubbing, no cyanosis, no edema    Medications   _______________________________    - MEDICATION INSTRUCTIONS -       - MEDICATION INSTRUCTIONS -         atorvastatin  10 mg Oral QPM     sodium chloride (PF)  20 mL EPIDURAL Once     sodium chloride (PF)  3 mL Intracatheter Q8H       Data   _______________________________       Lab Data:   All data was reviewed by me personally  CBC RESULTS:  Recent Labs   Lab Test 08/04/19  0808 08/02/19  0839 08/01/19  0428   WBC 9.2 10.0 8.2   RBC 4.51 4.61 4.59   HGB 14.1 14.3 14.6   HCT 42.5 43.6 43.0    246 208     Basic Metabolic Panel:  Recent Labs   Lab Test 08/05/19  0817 08/04/19  0808 08/02/19  0839 07/30/19  0615   NA  --  138 138 140   POTASSIUM  --  4.0 4.8 4.3   CHLORIDE  --  105 105 107   CO2  --  28 28 30   BUN  --  29 25 19   CR 0.74 0.83 0.90 0.86   GLC  --  108* 115* 98   BELLO  --  9.3 9.0 8.6     Liver panel:  Recent Labs   Lab Test 07/29/19  1952 06/10/19  0949 06/21/18  0941   PROTTOTAL 8.0 7.3 7.5   ALBUMIN 3.4 3.3* 3.5   BILITOTAL 1.0 0.5 0.6   ALKPHOS 100 103 100   AST 13 17 23   ALT 14 16 24     Lipid Profile:  Recent Labs   Lab Test 07/31/19  2106 06/10/19  0949   CHOL 116 162   HDL 37* 34*   LDL 62 97   TRIG 84 157*     Thyroid Panel:  Recent Labs   Lab Test 07/29/19  1952 06/10/19  0949   TSH 4.68* 4.51*   T4 0.94 0.91      Vitamin D level:   Recent Labs   Lab Test 06/21/18  0941   VITDT 29     A1C:   Recent Labs   Lab Test 07/31/19 2106   A1C 5.6     Troponin I:   Recent Labs   Lab Test 08/01/19 0428 07/31/19 2106 07/29/19 1952 05/14/16  0510 05/13/16  2330 05/13/16  1915   TROPI 0.023 <0.015 0.022 0.020 0.015 0.020     CRP inflammation:   Recent Labs   Lab Test 07/29/19 1952   CRP 61.9*     ESR:   Recent Labs   Lab Test 07/29/19 1952   SED 11       UA Results:  Recent Labs   Lab Test 07/29/19  2203 06/10/19  0949   COLOR Yellow Yellow   APPEARANCE Clear Clear   URINEGLC Negative Negative   URINEBILI Negative Negative   URINEKETONE Negative Negative   SG 1.021 1.020   UBLD Trace* Negative   URINEPH 5.5 7.0   PROTEIN 30* Negative   UROBILINOGEN  --  0.2   NITRITE Negative Negative   LEUKEST Negative Negative   RBCU 10*  --    WBCU 1  --         Cardiac US:   TTE 7/30/19:  1. Normal left ventricular size and systolic function. LVEF 60-65%.  2. No regional wall  motion abnormalities.  3. Normal right ventricular size and systolic function.  4. No hemodynamically significant valve disease.  5. The left atrium is moderately dilated.        Neurophysiology:   EEG 8/1/19:  Mildly abnormal because of an excess of irregular theta activity while clearly awake.  This is consistent with a mild diffuse encephalopathy.  These findings are nonspecific and can be seen in a variety of etiologies including toxic, metabolic and degenerative among others.  Epileptiform discharges or seizures were not seen in this study.        Imaging:   All imaging studies were reviewed personally  CT head 7/31/19:  1. Age indeterminate lacunar infarction in the basal ganglia and internal capsule on the right side. MRI may be beneficial to evaluate for acuity.  2. Chronic small cortical infarctions in the right frontal lobe and small chronic lacunar infarctions in the cerebellum on the left.     MRI brain 7/31/19:  Diffuse cerebral volume loss and cerebral white matter changes consistent with chronic small vessel ischemic disease. Probable small chronic ischemic infarcts at the lateral and posterolateral aspects of the right frontal lobe. Probable late subacute ischemic infarct in the right basal ganglia. No evidence for acute intracranial pathology.    CT chest/abd/pelvis 8/2/19:  1.  Mild, nonspecific thickening with surrounding fat stranding seen in the left adrenal gland with minimal, nonspecific left greater than right perinephric stranding. No wedge-shaped areas of reduced enhancement seen in the kidneys to suggest pyelonephritis. Correlate with urinalysis.  2.  Cardiomegaly with multivessel coronary artery calcifications.  3.  Multiple pulmonary nodules seen bilaterally measuring up to 4 mm in the right upper lobe.   4.  Recommendations for one or multiple incidental lung nodules < 6mm:    Low risk patients: No routine follow-up.    High risk patients: Optional follow-up CT at 12 months;  if  unchanged, no further follow-up.    CTA head/neck 8/2/19:  1. No significant stenosis is seen at either carotid bifurcation. There is a shallow ulceration of the atherosclerotic plaque at the left carotid bifurcation  2. No arterial dissection is identified.  3. No high-grade intracranial vascular stenosis is identified. Mild-moderate atherosclerotic plaque is seen in the left posterior cerebral artery.  4. Venous sinuses appear patent  5. Fibrotic changes are seen in both lungs.      Text Page    Milena Maldonado, LENCHO, FNP-BC, RN CNRN SCRN

## 2019-08-05 NOTE — PROGRESS NOTES
"BRIEF NUTRITION ASSESSMENT      REASON FOR ASSESSMENT:  Yosef Murry is a 83 year old male seen by Registered Dietitian for LOS      CURRENT DIET AND INTAKE:  Diet:  Regular              Visited with pt this morning  Just finished breakfast - Cheerios, milk, b,u,amor thomas, ZANDRA,  Has been eating ~75% meals  \"Some of the food is really good and some not so good!\"  Tells me that he follows a regular diet at home  Was eating normally PTA  Frustrated with not being as active while here and losing some energy    ANTHROPOMETRICS:  Height: 5' 10\"  Weight:(8/5) 76.8 kg /  169 lbs 5.01 oz  Body mass index is 24.29 kg/m .   Weight Status: Normal BMI  IBW:  75.4 kg  %IBW: 102%  Weight History: Pt states his usual wt is ~168# - \"I would really prefer to weigh ~164#, but my doctor said 170# is ok\"  Vitals:    07/29/19 1935 07/29/19 2222 08/05/19 0500   Weight: 76.2 kg (168 lb) 78.6 kg (173 lb 4.8 oz) 76.8 kg (169 lb 5 oz)     Wt Readings from Last 10 Encounters:   08/05/19 76.8 kg (169 lb 5 oz)   06/27/19 79.7 kg (175 lb 9.6 oz)   06/13/19 78.4 kg (172 lb 14.4 oz)   06/10/19 77.1 kg (170 lb)   05/08/19 74.4 kg (164 lb)   05/01/19 74.4 kg (164 lb)   06/21/18 75.3 kg (166 lb)   10/20/17 73 kg (161 lb)   05/09/17 71.2 kg (157 lb)   11/16/16 74.8 kg (165 lb)         LABS:  Labs noted    MALNUTRITION:  Patient does not meet two of the following criteria necessary for diagnosing malnutrition: significant weight loss, reduced intake, subcutaneous fat loss, muscle loss or fluid retention. Nutrition Focused Physical Assessment (NFPA) not appropriate at this time.    NUTRITION INTERVENTION:  Nutrition Diagnosis:  No nutrition diagnosis at this time.    Implementation:  Nutrition Education ---> Reviewed diet order and meal ordering process    FOLLOW UP/MONITORING:   Will re-evaluate in 7 - 10 days, or sooner, if re-consulted.          "

## 2019-08-05 NOTE — PROGRESS NOTES
Reviewed asp labs - culture neg continues  Will continue to follow peripherally    Aileen Davis PAC

## 2019-08-05 NOTE — PLAN OF CARE
SLP: Dysphagia treatment session canceled. Pt talking with provider on time of attempt will continue per POC.

## 2019-08-05 NOTE — PROGRESS NOTES
Vascular Neurology Progress Note     ____________________________________________________________     Admission Summary:  Yosef Murry is a 83 year old male admitted for fever and confusion or 1 week. Improved but not back to himself. Today, feels little better.     Exam shows slow cognition but no focal findings. Neck stiffness and pain with any movement but not tender. Joint pain left wrist tender     MRI brain shows right caudate ischemic stroke. CTA H/N some atherosclerosis but no etiology.     Impression:    Right caudate ischemics stroke ?etiology  Fever  Afib     Recommendations:     -Will hold AC for LP next week (Monday afternoon); Resume afterwards     Stroke Education provided including signs/symptoms of a stroke and the importance of timely treatment.     Please contact the stroke service with any questions: Feel free to call my cellphone.     Abdiel Prieto MD  Vascular Neurology  August 4, 2019     I personally reviewed all relevant labs and neuroimaging.  I spent 35 minutes reviewing labs, diagnostic studies, neuroimaging, and evaluating the patient.     ____________________________________________________________________           Medications:        Current Facility-Administered Medications:      acetaminophen (TYLENOL) tablet 650 mg, 650 mg, Oral, Q4H PRN, Caleb Rodriguez MD, 650 mg at 08/04/19 0041     apixaban ANTICOAGULANT (ELIQUIS) tablet 5 mg, 5 mg, Oral, BID, Counters, Milena Bibiana, APRN CNP, 5 mg at 08/03/19 2012     atorvastatin (LIPITOR) tablet 10 mg, 10 mg, Oral, QPM, Counters, Milena Bibiana, APRN CNP, 10 mg at 08/03/19 2012     lidocaine (LMX4) cream, , Topical, Q1H PRN, Caleb Rodriguez MD     lidocaine 1 % 0.1-1 mL, 0.1-1 mL, Other, Q1H PRN, Caleb Rodriguez MD     Medication Instruction, , Does not apply, Continuous PRN, Cabrera Gomez APRN CNP     melatonin tablet 1 mg, 1 mg, Oral, At Bedtime PRN, Caleb Rodriguez MD     methocarbamol (ROBAXIN) tablet 500 mg, 500 mg, Oral, 4x Daily PRN, Counters, Milena Mccarty,  "APRN CNP, 500 mg at 08/03/19 2017     metoprolol (LOPRESSOR) injection 2.5 mg, 2.5 mg, Intravenous, Q4H PRN, Ayush Chand MD, 2.5 mg at 08/03/19 1647     naloxone (NARCAN) injection 0.1-0.4 mg, 0.1-0.4 mg, Intravenous, Q2 Min PRN, Caleb Rodriguez MD     ondansetron (ZOFRAN-ODT) ODT tab 4 mg, 4 mg, Oral, Q6H PRN **OR** ondansetron (ZOFRAN) injection 4 mg, 4 mg, Intravenous, Q6H PRN, Caleb Rodriguez MD     oxyCODONE (ROXICODONE) tablet 5 mg, 5 mg, Oral, Q4H PRN, Caleb Rodriguez MD, 5 mg at 08/03/19 0454     Patient is already receiving anticoagulation with heparin, enoxaparin (LOVENOX), warfarin (COUMADIN)  or other anticoagulant medication, , Does not apply, Continuous PRN, Cabrera Gomez, JOHNSON CNP     sodium chloride (PF) 0.9% PF flush 20 mL, 20 mL, EPIDURAL, Once, Caleb Rodriguez MD     sodium chloride (PF) 0.9% PF flush 3 mL, 3 mL, Intracatheter, q1 min prn, Ayush Chand MD     sodium chloride (PF) 0.9% PF flush 3 mL, 3 mL, Intracatheter, Q8H, Ayush Chand MD, 3 mL at 08/03/19 2012     Vital Signs:  /71 (BP Location: Left arm)   Pulse 77   Temp 98.6  F (37  C) (Oral)   Resp 16   Ht 1.778 m (5' 10\")   Wt 78.6 kg (173 lb 4.8 oz)   SpO2 96%   BMI 24.87 kg/m      General Appearance:  No acute distress  Neuro:       Mental Status Exam:   Awake, alert, oriented X3. Speech and language are intact. Mental status is normal       Cranial Nerves:  Pupils 3 mm, reactive. EOMI. Face sensation is normal. Mild left facial droop (baseline). Tongue and uvula are midline. Other CN are normal           Motor:  5/5 X 4. Tone and bulk are normal. Mild BUE resting tremor noted on exam, no rigidity noted.          Reflexes:  Normal DTR. Toes downgoing.        Sensory:  Normal to light touch               Coordination:   Intact finger-to-nose        Gait:  No significant difficulties  Abdomen: Soft, not tender, not distended  Extremities: No clubbing, no cyanosis, no edema     Labs/Studies:  Office " Visit on 06/10/2019   Component Date Value Ref Range Status     Sodium 06/10/2019 142  133 - 144 mmol/L Final     Potassium 06/10/2019 4.4  3.4 - 5.3 mmol/L Final     Chloride 06/10/2019 107  94 - 109 mmol/L Final     Carbon Dioxide 06/10/2019 29  20 - 32 mmol/L Final     Anion Gap 06/10/2019 6  3 - 14 mmol/L Final     Glucose 06/10/2019 86  70 - 99 mg/dL Final     Urea Nitrogen 06/10/2019 14  7 - 30 mg/dL Final     Creatinine 06/10/2019 0.92  0.66 - 1.25 mg/dL Final     GFR Estimate 06/10/2019 77  >60 mL/min/[1.73_m2] Final    Comment: Non  GFR Calc  Starting 12/18/2018, serum creatinine based estimated GFR (eGFR) will be   calculated using the Chronic Kidney Disease Epidemiology Collaboration   (CKD-EPI) equation.       GFR Estimate If Black 06/10/2019 89  >60 mL/min/[1.73_m2] Final    Comment:  GFR Calc  Starting 12/18/2018, serum creatinine based estimated GFR (eGFR) will be   calculated using the Chronic Kidney Disease Epidemiology Collaboration   (CKD-EPI) equation.       Calcium 06/10/2019 9.2  8.5 - 10.1 mg/dL Final     Color Urine 06/10/2019 Yellow   Final     Appearance Urine 06/10/2019 Clear   Final     Glucose Urine 06/10/2019 Negative  NEG^Negative mg/dL Final     Bilirubin Urine 06/10/2019 Negative  NEG^Negative Final     Ketones Urine 06/10/2019 Negative  NEG^Negative mg/dL Final     Specific Gravity Urine 06/10/2019 1.020  1.003 - 1.035 Final     Blood Urine 06/10/2019 Negative  NEG^Negative Final     pH Urine 06/10/2019 7.0  5.0 - 7.0 pH Final     Protein Albumin Urine 06/10/2019 Negative  NEG^Negative mg/dL Final     Urobilinogen Urine 06/10/2019 0.2  0.2 - 1.0 EU/dL Final     Nitrite Urine 06/10/2019 Negative  NEG^Negative Final     Leukocyte Esterase Urine 06/10/2019 Negative  NEG^Negative Final     Source 06/10/2019 Midstream Urine   Final     WBC 06/10/2019 6.2  4.0 - 11.0 10e9/L Final     RBC Count 06/10/2019 5.07  4.4 - 5.9 10e12/L Final     Hemoglobin  06/10/2019 16.4  13.3 - 17.7 g/dL Final     Hematocrit 06/10/2019 48.1  40.0 - 53.0 % Final     MCV 06/10/2019 95  78 - 100 fl Final     MCH 06/10/2019 32.3  26.5 - 33.0 pg Final     MCHC 06/10/2019 34.1  31.5 - 36.5 g/dL Final     RDW 06/10/2019 13.9  10.0 - 15.0 % Final     Platelet Count 06/10/2019 209  150 - 450 10e9/L Final     Sodium 06/10/2019 140  133 - 144 mmol/L Final     Potassium 06/10/2019 4.8  3.4 - 5.3 mmol/L Final     Chloride 06/10/2019 106  94 - 109 mmol/L Final     Carbon Dioxide 06/10/2019 27  20 - 32 mmol/L Final     Anion Gap 06/10/2019 7  3 - 14 mmol/L Final     Glucose 06/10/2019 88  70 - 99 mg/dL Final     Urea Nitrogen 06/10/2019 13  7 - 30 mg/dL Final     Creatinine 06/10/2019 0.95  0.66 - 1.25 mg/dL Final     GFR Estimate 06/10/2019 74  >60 mL/min/[1.73_m2] Final    Comment: Non  GFR Calc  Starting 12/18/2018, serum creatinine based estimated GFR (eGFR) will be   calculated using the Chronic Kidney Disease Epidemiology Collaboration   (CKD-EPI) equation.       GFR Estimate If Black 06/10/2019 86  >60 mL/min/[1.73_m2] Final    Comment:  GFR Calc  Starting 12/18/2018, serum creatinine based estimated GFR (eGFR) will be   calculated using the Chronic Kidney Disease Epidemiology Collaboration   (CKD-EPI) equation.       Calcium 06/10/2019 8.7  8.5 - 10.1 mg/dL Final     Bilirubin Total 06/10/2019 0.5  0.2 - 1.3 mg/dL Final     Albumin 06/10/2019 3.3* 3.4 - 5.0 g/dL Final     Protein Total 06/10/2019 7.3  6.8 - 8.8 g/dL Final     Alkaline Phosphatase 06/10/2019 103  40 - 150 U/L Final     ALT 06/10/2019 16  0 - 70 U/L Final     AST 06/10/2019 17  0 - 45 U/L Final     Cholesterol 06/10/2019 162  <200 mg/dL Final     Triglycerides 06/10/2019 157* <150 mg/dL Final    Comment: Borderline high:  150-199 mg/dl  High:             200-499 mg/dl  Very high:       >499 mg/dl       HDL Cholesterol 06/10/2019 34* >39 mg/dL Final     LDL Cholesterol Calculated 06/10/2019 97   <100 mg/dL Final    Desirable:       <100 mg/dl     Non HDL Cholesterol 06/10/2019 128  <130 mg/dL Final     TSH 06/10/2019 4.51* 0.40 - 4.00 mU/L Final     T4 Free 06/10/2019 0.91  0.76 - 1.46 ng/dL Final           Imaging:  Relevant findings as per the Impression above.

## 2019-08-05 NOTE — PROGRESS NOTES
"SPIRITUAL HEALTH SERVICES Progress Note  [facility]    Visit per LOS.  Pt was sleeping in chair when  arrived, and awoke to his name.  Pt said he is frustrated because he feels like \"I don't have any choice\" in what is happening with his health right now.  Pt said he attends [facility], and is well supported by his Bapt[employer].  Pt said he doesn't have any SH needs at this time.  SH available as needs arise, and upon request.  SH will follow.      Brielle Baird  Chaplain Resident  "

## 2019-08-05 NOTE — PLAN OF CARE
Discharge Planner PT   Patient plan for discharge: home  Current status: Pt demonstrates modified independence with bed mobility, transfers and gait x 250' with a FWW. Pt does not use a device at baseline. Pt requires supervision for transfers and gait x 250' without a device with mild lateral path deviation when asked to complete visual scanning tasks.  Barriers to return to prior living situation: lives alone, fall risk  Recommendations for discharge: home  Rationale for recommendations: Anticipate with continued IP PT, pt will progress to independence with mobility without a device.        Entered by: Rose Ch 08/05/2019 4:16 PM

## 2019-08-05 NOTE — PLAN OF CARE
Pt here with fevers, pain and a subacute stroke. A&Ox4. Frustrated. Neuros with baseline L droop and BUE weakness 4/5. Red swollen wrist and neck pain. Neck pain improved with sitting upright. VSS. Tele A fib CVR. Regular  diet, thin liquids. NPO after breakfast for LP is afternoon. Takes pills whole with water. Lung sounds clear. Active BS, + flatus, - BM: Senna given. Rash present on buttocks, posterior thighs, and low back. Up with A1 GBW. Pain reduced with oxycodone and tylenol. Plan for LP this afternoon. Plan pending progress.

## 2019-08-05 NOTE — PROGRESS NOTES
"[facility]  Infectious Disease Progress Note          Assessment and Plan:   IMPRESSION:   1. An 83-year-old male with history of carcinoma of the prostate.   2. Atrial fibrillation.   3. Admitted on this occasion with 3-4 day history of progressive generalized weakness accompanied by some increase in chronic neck pain as well as new right wrist pain and few days into hospitalization left wrist pain. The patient was unaware of a fever, but has been febrile to over 101 degrees since his hospital admission.  A CRP was elevated to 61.9 degrees.  The source of the patient's fever is not clear.  White blood count is normal.  Urinalysis is benign.  Chest x-ray showed no acute infiltrate.  CT scan of the cervical spine showed significant degenerative changes but no evidence of infection.    4. Ongoing fever, still no clear explanation.  Blood and wrist cxs neg.  MRI brain without acute changes.  5. Last visit to a tick endemic area was 15 years ago.     RECOMMENDATIONS:   1.Continue to hold on antibiotic therapy , no  clear evidence of bacterial infection.   2. Now the left wrist is painful, red and swollen Still unclear how to unify joint sxs/neck pain .   3. Pending Lyme serology.   4 LP planned,         Interval History:   Feels better in general.  Neck pain better.  Temp lower, Tmax under 99.  Blood cx neg.  procalcitonin 0.08. The left wrist is now swollen red and painful ,  more alert and awake today near baseline mentation but not movement wise              Medications:       atorvastatin  10 mg Oral QPM     sodium chloride (PF)  20 mL EPIDURAL Once     sodium chloride (PF)  3 mL Intracatheter Q8H                  Physical Exam:   Blood pressure 116/71, pulse 77, temperature 98.7  F (37.1  C), temperature source Tympanic, resp. rate 16, height 1.778 m (5' 10\"), weight 76.8 kg (169 lb 5 oz), SpO2 97 %.  [unfilled]  Vital Signs with Ranges  Temp:  [97.3  F (36.3  C)-98.7  F (37.1  C)] 98.7  F " (37.1  C)  Pulse:  [] 77  Heart Rate:  [] 96  Resp:  [14-16] 16  BP: (105-136)/(67-74) 116/71  SpO2:  [95 %-100 %] 97 %    Constitutional: Awake, alert, cooperative, no apparent distress   Lungs: Clear to auscultation bilaterally, no crackles or wheezing   Cardiovascular: Regular rate and rhythm, afib, and no murmur noted   Abdomen: Normal bowel sounds, soft, non-distended, non-tender   Skin: No rashes, no cyanosis, no edema   Other: L wrist red and swollen similar to prior resolving R          Data:   All microbiology laboratory data reviewed.  Recent Labs   Lab Test 08/04/19  0808 08/02/19  0839 08/01/19  0428   WBC 9.2 10.0 8.2   HGB 14.1 14.3 14.6   HCT 42.5 43.6 43.0   MCV 94 95 94    246 208     Recent Labs   Lab Test 08/04/19  0808 08/02/19  0839 07/30/19  0615   CR 0.83 0.90 0.86     Recent Labs   Lab Test 07/29/19  1952   SED 11

## 2019-08-06 ENCOUNTER — APPOINTMENT (OUTPATIENT)
Dept: OCCUPATIONAL THERAPY | Facility: CLINIC | Age: 84
DRG: 864 | End: 2019-08-06
Payer: MEDICARE

## 2019-08-06 ENCOUNTER — APPOINTMENT (OUTPATIENT)
Dept: PHYSICAL THERAPY | Facility: CLINIC | Age: 84
DRG: 864 | End: 2019-08-06
Payer: MEDICARE

## 2019-08-06 ENCOUNTER — APPOINTMENT (OUTPATIENT)
Dept: SPEECH THERAPY | Facility: CLINIC | Age: 84
DRG: 864 | End: 2019-08-06
Payer: MEDICARE

## 2019-08-06 VITALS
OXYGEN SATURATION: 98 % | BODY MASS INDEX: 24.24 KG/M2 | RESPIRATION RATE: 16 BRPM | TEMPERATURE: 97.2 F | SYSTOLIC BLOOD PRESSURE: 124 MMHG | DIASTOLIC BLOOD PRESSURE: 78 MMHG | HEIGHT: 70 IN | WEIGHT: 169.31 LBS | HEART RATE: 77 BPM

## 2019-08-06 LAB
BACTERIA SPEC CULT: NO GROWTH
BACTERIA SPEC CULT: NO GROWTH
HSV1 DNA CSF QL NAA+PROBE: NOT DETECTED
HSV2 DNA CSF QL NAA+PROBE: NOT DETECTED
Lab: NORMAL
Lab: NORMAL
MICROBIOLOGIST REVIEW: NORMAL
SPECIMEN SOURCE: NORMAL
SPECIMEN SOURCE: NORMAL
SPECIMEN TYPE: NORMAL
VARICELLA ZOSTER DNA PCR COMMENT: NORMAL
VZV DNA SPEC QL NAA+PROBE: NORMAL

## 2019-08-06 PROCEDURE — 97530 THERAPEUTIC ACTIVITIES: CPT | Mod: GP

## 2019-08-06 PROCEDURE — 97535 SELF CARE MNGMENT TRAINING: CPT | Mod: GO | Performed by: OCCUPATIONAL THERAPIST

## 2019-08-06 PROCEDURE — 99239 HOSP IP/OBS DSCHRG MGMT >30: CPT | Performed by: HOSPITALIST

## 2019-08-06 PROCEDURE — 25000132 ZZH RX MED GY IP 250 OP 250 PS 637: Mod: GY | Performed by: STUDENT IN AN ORGANIZED HEALTH CARE EDUCATION/TRAINING PROGRAM

## 2019-08-06 PROCEDURE — 25000132 ZZH RX MED GY IP 250 OP 250 PS 637: Mod: GY | Performed by: PSYCHIATRY & NEUROLOGY

## 2019-08-06 PROCEDURE — 92526 ORAL FUNCTION THERAPY: CPT | Mod: GN | Performed by: SPEECH-LANGUAGE PATHOLOGIST

## 2019-08-06 RX ORDER — ATORVASTATIN CALCIUM 10 MG/1
10 TABLET, FILM COATED ORAL EVERY EVENING
Qty: 30 TABLET | Refills: 0 | Status: SHIPPED | OUTPATIENT
Start: 2019-08-06 | End: 2019-08-12

## 2019-08-06 RX ADMIN — OXYCODONE HYDROCHLORIDE 5 MG: 5 TABLET ORAL at 05:30

## 2019-08-06 RX ADMIN — APIXABAN 5 MG: 5 TABLET, FILM COATED ORAL at 09:34

## 2019-08-06 RX ADMIN — MICONAZOLE NITRATE: 20 CREAM TOPICAL at 05:38

## 2019-08-06 RX ADMIN — ACETAMINOPHEN 650 MG: 325 TABLET, FILM COATED ORAL at 01:30

## 2019-08-06 RX ADMIN — ACETAMINOPHEN 650 MG: 325 TABLET, FILM COATED ORAL at 05:30

## 2019-08-06 RX ADMIN — OXYCODONE HYDROCHLORIDE 5 MG: 5 TABLET ORAL at 01:30

## 2019-08-06 ASSESSMENT — ACTIVITIES OF DAILY LIVING (ADL)
ADLS_ACUITY_SCORE: 13

## 2019-08-06 NOTE — PROVIDER NOTIFICATION
Page sent to Dr. Pireto  Pt had LP this evening, ok to restart eliquis?  Order placed for eliquis.

## 2019-08-06 NOTE — CONSULTS
Met with patient. Anticipate discharge with Home care RN/PT/OT/SLP. He wishes to use [facility]. Referral sent  Family is able to transport  Per Speech- SLP also ordered- can do with [facility]

## 2019-08-06 NOTE — PLAN OF CARE
Pt is A&O x4.  Regular diet. Tele a-fib. MRI was pushed back to Thursday. Pt to discharge home today and do MRI outpatient. Takes pills whole. LP site CDI. Up SBA. C/o slight neck pain but no prn given. Has baseline L droop. +2 edema to L wrist and BLE. Tachy at times, other VSS.    3.23

## 2019-08-06 NOTE — PROGRESS NOTES
"[facility]  Infectious Disease Progress Note          Assessment and Plan:   IMPRESSION:   1. An 83-year-old male with history of carcinoma of the prostate.   2. Atrial fibrillation.   3. Admitted on this occasion with 3-4 day history of progressive generalized weakness accompanied by some increase in chronic neck pain as well as new right wrist pain and few days into hospitalization left wrist pain. The patient was unaware of a fever, but has been febrile to over 101 degrees since his hospital admission.  A CRP was elevated to 61.9 degrees.  The source of the patient's fever is not clear.  White blood count is normal.  Urinalysis is benign.  Chest x-ray showed no acute infiltrate.  CT scan of the cervical spine showed significant degenerative changes but no evidence of infection.    4. Ongoing fever, still no clear explanation.  Blood and wrist cxs neg.  MRI brain without acute changes.  5. Last visit to a tick endemic area was 15 years ago.     RECOMMENDATIONS:   1.No indication for  antibiotic therapy , no  clear evidence of bacterial infection.   2. Both  Wrists now improved, neck pain worse again, Still unclear how to unify joint sxs/neck pain .   3. Neg Lyme serology.   4 LP normal, fever resolved  5 Will sign off         Interval History:   Feels better in general.  Neck pain better.  Temp lower, Tmax under 99.  Blood cx neg.  procalcitonin 0.08. The left wrist is now swollen red and painful ,   alert and awake today near baseline mentation(pt well known to me outside hospital ) but not movement wise victoriano neck pain              Medications:       apixaban ANTICOAGULANT  5 mg Oral BID     atorvastatin  10 mg Oral QPM     sodium chloride (PF)  20 mL EPIDURAL Once     sodium chloride (PF)  3 mL Intracatheter Q8H                  Physical Exam:   Blood pressure 130/65, pulse 77, temperature 98.1  F (36.7  C), temperature source Oral, resp. rate 16, height 1.778 m (5' 10\"), weight 76.8 kg " (169 lb 5 oz), SpO2 97 %.  [unfilled]  Vital Signs with Ranges  Temp:  [97.5  F (36.4  C)-98.4  F (36.9  C)] 98.1  F (36.7  C)  Heart Rate:  [67-97] 90  Resp:  [14-18] 16  BP: (109-130)/(64-87) 130/65  SpO2:  [96 %-98 %] 97 %    Constitutional: Awake, alert, cooperative, no apparent distress   Lungs: Clear to auscultation bilaterally, no crackles or wheezing   Cardiovascular: Regular rate and rhythm, afib, and no murmur noted   Abdomen: Normal bowel sounds, soft, non-distended, non-tender   Skin: No rashes, no cyanosis, no edema   Other: L wrist red and swollen similar to prior resolving R          Data:   All microbiology laboratory data reviewed.  Recent Labs   Lab Test 08/04/19  0808 08/02/19  0839 08/01/19  0428   WBC 9.2 10.0 8.2   HGB 14.1 14.3 14.6   HCT 42.5 43.6 43.0   MCV 94 95 94    246 208     Recent Labs   Lab Test 08/05/19  0817 08/04/19  0808 08/02/19  0839   CR 0.74 0.83 0.90     Recent Labs   Lab Test 07/29/19  1952   SED 11

## 2019-08-06 NOTE — DISCHARGE INSTRUCTIONS
You will need to have a cardiac MRI as an outpatient, Dr Prieto will call you Friday and set up further neurology follow up with you.    Please arrive for your cardiac MRI at 7:30 am on Thursday, 8/8/19.  Wrist splint at night and for activities for 2-4 wks until symptoms fully resolve  Follow up with [facility] [place] office as needed.  Call Mercedez .    Your risk factors for stroke or TIA (transient ischemic attack):    Your Risk Factors Your Results Normal Ranges   High blood pressure BP Readings from Last 1 Encounters:   08/06/19 124/78    Less than 120/80   Cholesterol              Total Lab Results   Component Value Date    CHOL 116 07/31/2019      Less than 150    Triglycerides   Lab Results   Component Value Date    TRIG 84 07/31/2019    Less than 150   LDL Lab Results   Component Value Date    LDL 62 07/31/2019       Less than 70   HDL Lab Results   Component Value Date    HDL 37 07/31/2019            Greater than 40 (men)  Greater than 50 (women)   Diabetes Recent Labs   Lab 08/04/19  0808   *    Fasting blood glucose    Smoking/tobacco use N/A Quit smoking and tobacco   Overweight N/A Lose 1-2 pounds a week   Lack of exercise Start with walking, follow your primary physician's advice for adding more strenuous exercise. 30 minutes moderate activity each day   Other risk factors include carotid (neck) artery disease, atrial fibrillation and stress. You may be on new medicine to treat high blood pressure, cholesterol, diabetes or atrial fibrillation.    Understanding Stroke Booklet given to patient. Please refer to booklet for further information.    Stroke warning signs and symptoms - CALL 911 right away for:  - Sudden numbness or weakness in the face, arm or leg (often on one side of the body).  - Sudden confusion or trouble understanding what is going on.  - Sudden blurred or decreased vision in one or both eyes.  - Sudden trouble speaking, loss of balance, dizziness or  problems with coordination.  - Sudden, severe headache for no reason.  - Fainting or seizures.  - Symptoms may go away then come back suddenly.

## 2019-08-06 NOTE — PLAN OF CARE
Discharge Planner OT   Patient plan for discharge: home  Current status: toilet transfer to commode overlay Gagandeep.  Mild LOB x2 while ambulating in room without assistive device, self-corrected.  Pt completed FB dressing with difficulty for pants, but otherwise independent.  Daughter present for session, asking for DME to be issued from the hospital (RTS with arms, shower chair, walker).  Barriers to return to prior living situation: balance, falls risk, mild cognitive impairment  Recommendations for discharge: home with intermittent supervision from family, and HOME OT/PT  Rationale for recommendations: patient below baseline, and would benefit from continued therapy to increase safety and independence with ADLs, and to increase safety of home environment.  Recommend Home therapies as leaving the home would be a taxing effort.        Entered by: KEILA FANG 08/06/2019 12:40 PM       Occupational Therapy Discharge Summary    Reason for therapy discharge:    Discharged to home with home therapy.    Progress towards therapy goal(s). See goals on Care Plan in [facility] electronic health record for goal details.  Goals partially met.  Barriers to achieving goals:   discharge from facility.    Therapy recommendation(s):    Continued therapy is recommended.  Rationale/Recommendations:  maximize safety and independence with ADLs.

## 2019-08-06 NOTE — PLAN OF CARE
Resumed care at 1500, pt ready for discharge, discharge instructions given to pt and daughter, stroke risk factors and sign and symptoms verbalized by pt. Plan for pt to have MRI on Thursday morning, Dr. Prieto to call with results. All questions answered.

## 2019-08-06 NOTE — PLAN OF CARE
A&O x4. Has baseline L droop. +2 edema to L wrist and BLE. Tachy at times, other VSS. Tele A fib RVR. Regular diet, thin liquids. NPO since 5:30am. Takes pills whole. LP site CDI. Up with 1 assist GB/walker. Prn oxycodone and tylenol given for neck pain. Plan for cardiac MRI and possible discharge home.

## 2019-08-06 NOTE — PROGRESS NOTES
Plan on discharge. Orders are in for [facility]. Cardiac MRI has been scheduled for 8/8- info on AVS. Neuro will follow up with him after the test completed

## 2019-08-06 NOTE — PLAN OF CARE
Discharge Planner PT   Patient plan for discharge: home today  Current status: Pt dressed and ready for discharge. Independent with bed mobility, transfers and gait in the room without a device. Pt and daughter had several questions about continued exercise, use of the FWW pt requested. Questions answered. Also provided education on managing fatigue, pacing as he recovers. Encouraged pt to have a family member take him to a parking lot to practice driving once cleared to return to driving and off narcotics.  Barriers to return to prior living situation: none  Recommendations for discharge: home  Rationale for recommendations: Goals met. FWW for community mobility as needed per pt request.       Entered by: Rose Ch 08/06/2019 4:04 PM     Physical Therapy Discharge Summary    Reason for therapy discharge:    Discharged to home.    Progress towards therapy goal(s). See goals on Care Plan in [facility] electronic health record for goal details.  Goals met    Therapy recommendation(s):    Continue home exercise program. Ambulation for exercise

## 2019-08-06 NOTE — PLAN OF CARE
Speech Language Therapy Discharge Summary    Reason for therapy discharge:    Discharged to home.    Progress towards therapy goal(s). See goals on Care Plan in [facility] electronic health record for goal details.  Goals met Patient tolerating a regular diet and thin liquids at time of discharge.    Therapy recommendation(s):    No further therapy is recommended. OP video as indicated for the Zenker's diverticulum if swallow concerns arise.

## 2019-08-07 ENCOUNTER — TELEPHONE (OUTPATIENT)
Dept: FAMILY MEDICINE | Facility: CLINIC | Age: 84
End: 2019-08-07

## 2019-08-07 LAB
B BURGDOR AB CSF IA-ACNC: 0.11 LIV
VDRL CSF QL: NON REACTIVE
WNV IGG CSF-ACNC: 0.05 IV
WNV IGM CSF-ACNC: 0.02 IV

## 2019-08-07 NOTE — TELEPHONE ENCOUNTER
"ED / Discharge Outreach Protocol    Patient Contact    Attempt # 2    Was call answered?  Yes.  \"May I please speak with <patient name>\"  Is patient available?   Yes    Hospital/TCU/ED for chronic condition Discharge Protocol    \"Hi, my name is Carlito Cardenas, a registered nurse, and I am calling from [facility].  I am calling to follow up and see how things are going for you after your recent emergency visit/hospital/TCU stay.\"    Tell me how you are doing now that you are home?\" \"Doing ok\"       Discharge Instructions    \"Let's review your discharge instructions.  What is/are the follow-up recommendations?  Pt. Response: \"homecare and see Dr. Connell\"    \"Has an appointment with your primary care provider been scheduled?\"   Yes. (confirm)    \"When you see the provider, I would recommend that you bring your medications with you.\"    Medications    \"Tell me what changed about your medicines when you discharged?\"    Changes to chronic meds?    2 or more - Epic MTM referral needed    Eliquis, lipitor, amlodpine   \"What questions do you have about your medications?\"    None     New diagnoses of heart failure, COPD, diabetes, or MI?    No      Post Discharge Medication Reconciliation Status: discharge medications reconciled and changed, per note/orders (see AVS).    Was MTM referral placed (*Make sure to put transitions as reason for referral)?   No    Call Summary    \"What questions or concerns do you have about your recent visit and your follow-up care?\"     none    \"If you have questions or things don't continue to improve, we encourage you contact us through the main clinic number (give number).  Even if the clinic is not open, triage nurses are available 24/7 to help you.     We would like you to know that our clinic has extended hours (provide information).  We also have urgent care (provide details on closest location and hours/contact info)\"      \"Thank you for your time and take care!\"       Vik SANTANA, " RN

## 2019-08-07 NOTE — TELEPHONE ENCOUNTER
Chief Complaint: Weakness, Infarction Of Right Basal Ganglia (H),TUE 06-AUG-2019,ed/ip  0 / 2    [phone] (home)

## 2019-08-08 ENCOUNTER — HOSPITAL ENCOUNTER (OUTPATIENT)
Dept: CARDIOLOGY | Facility: CLINIC | Age: 84
Discharge: HOME | End: 2019-08-08
Attending: HOSPITALIST | Admitting: HOSPITALIST
Payer: MEDICARE

## 2019-08-08 DIAGNOSIS — I63.9 INFARCTION OF RIGHT BASAL GANGLIA (H): ICD-10-CM

## 2019-08-08 LAB
B BURGDOR IGG CSF QL IB: NEGATIVE
B BURGDOR IGM CSF QL IB: NEGATIVE
LAB SCANNED RESULT: NORMAL

## 2019-08-08 PROCEDURE — 75561 CARDIAC MRI FOR MORPH W/DYE: CPT | Mod: 26 | Performed by: INTERNAL MEDICINE

## 2019-08-08 PROCEDURE — 75561 CARDIAC MRI FOR MORPH W/DYE: CPT

## 2019-08-08 PROCEDURE — 25500064 ZZH RX 255 OP 636: Performed by: HOSPITALIST

## 2019-08-08 PROCEDURE — A9585 GADOBUTROL INJECTION: HCPCS | Performed by: HOSPITALIST

## 2019-08-08 RX ORDER — DIPHENHYDRAMINE HYDROCHLORIDE 50 MG/ML
25-50 INJECTION INTRAMUSCULAR; INTRAVENOUS
Status: DISCONTINUED | OUTPATIENT
Start: 2019-08-08 | End: 2019-08-09 | Stop reason: HOSPADM

## 2019-08-08 RX ORDER — DIAZEPAM 5 MG
5 TABLET ORAL EVERY 30 MIN PRN
Status: DISCONTINUED | OUTPATIENT
Start: 2019-08-08 | End: 2019-08-09 | Stop reason: HOSPADM

## 2019-08-08 RX ORDER — ONDANSETRON 2 MG/ML
4 INJECTION INTRAMUSCULAR; INTRAVENOUS
Status: DISCONTINUED | OUTPATIENT
Start: 2019-08-08 | End: 2019-08-09 | Stop reason: HOSPADM

## 2019-08-08 RX ORDER — METHYLPREDNISOLONE SODIUM SUCCINATE 125 MG/2ML
125 INJECTION, POWDER, LYOPHILIZED, FOR SOLUTION INTRAMUSCULAR; INTRAVENOUS
Status: DISCONTINUED | OUTPATIENT
Start: 2019-08-08 | End: 2019-08-09 | Stop reason: HOSPADM

## 2019-08-08 RX ORDER — GADOBUTROL 604.72 MG/ML
5-65 INJECTION INTRAVENOUS ONCE
Status: COMPLETED | OUTPATIENT
Start: 2019-08-08 | End: 2019-08-08

## 2019-08-08 RX ORDER — ACYCLOVIR 200 MG/1
0-1 CAPSULE ORAL
Status: DISCONTINUED | OUTPATIENT
Start: 2019-08-08 | End: 2019-08-09 | Stop reason: HOSPADM

## 2019-08-08 RX ORDER — DIPHENHYDRAMINE HCL 25 MG
25 CAPSULE ORAL
Status: DISCONTINUED | OUTPATIENT
Start: 2019-08-08 | End: 2019-08-09 | Stop reason: HOSPADM

## 2019-08-08 RX ADMIN — GADOBUTROL 10 ML: 604.72 INJECTION INTRAVENOUS at 09:55

## 2019-08-09 NOTE — PROGRESS NOTES
Kevin Guy MD  P Cv Mri Tech             CORE, looking for LA/LV thrombus    Contrast administered per weight based protocol.

## 2019-08-09 NOTE — PROCEDURES
[facility] Procedure Note          Lumbar Puncture:      Time: 4:45 PM  Performed by: Abdiel Prieto  Authorized by: Abdiel Prieto    Indications: fever, ischemic stroke    Consent given by: Patient who states understanding of the procedure being performed after discussing the risks, benefits and alternatives.    Prior to the start of the procedure and with procedural staff participation, I verbally confirmed the patient s identity using two indicators, relevant allergies, that the procedure was appropriate and matched the consent or emergent situation, and that the correct equipment/implants were available. Immediately prior to starting the procedure I conducted the Time Out with the procedural staff and re-confirmed the patient s name, procedure, and site/side. (The Joint Commission universal protocol was followed.) Yes    Under sterile conditions the patient was positioned R Lateral decubitus with knees drawn up. Betadine solution and sterile drapes were utilized.    Local anesthetic at the site: 2 ml of lidocaine 1% without epinephrine from the LP tray    A 21 G  spinal needle was inserted at the L 3-4 interspace.    Opening Pressure 9 cm H2O pressure.    A total of 10 mL of clear and colorless spinal fluid was obtained and sent to the laboratory.     After the needle was removed, a bandaid and pressure were applied and the patient was instructed to stay horizontal until the results were back.      Complications:  None    Patient tolerance: Patient tolerated the procedure well with no immediate complications.

## 2019-08-12 ENCOUNTER — OFFICE VISIT (OUTPATIENT)
Dept: CARDIOLOGY | Facility: CLINIC | Age: 84
End: 2019-08-12
Payer: MEDICARE

## 2019-08-12 VITALS
DIASTOLIC BLOOD PRESSURE: 79 MMHG | BODY MASS INDEX: 25.05 KG/M2 | SYSTOLIC BLOOD PRESSURE: 144 MMHG | WEIGHT: 175 LBS | HEIGHT: 70 IN | HEART RATE: 67 BPM

## 2019-08-12 DIAGNOSIS — R94.31 ABNORMAL ELECTROCARDIOGRAM: ICD-10-CM

## 2019-08-12 DIAGNOSIS — I63.9 INFARCTION OF RIGHT BASAL GANGLIA (H): ICD-10-CM

## 2019-08-12 DIAGNOSIS — I48.21 PERMANENT ATRIAL FIBRILLATION (H): Primary | ICD-10-CM

## 2019-08-12 PROCEDURE — 93000 ELECTROCARDIOGRAM COMPLETE: CPT | Performed by: INTERNAL MEDICINE

## 2019-08-12 PROCEDURE — 99214 OFFICE O/P EST MOD 30 MIN: CPT | Mod: 24 | Performed by: INTERNAL MEDICINE

## 2019-08-12 RX ORDER — METOPROLOL SUCCINATE 25 MG/1
25 TABLET, EXTENDED RELEASE ORAL DAILY
Qty: 90 TABLET | Refills: 3 | Status: SHIPPED | OUTPATIENT
Start: 2019-08-12 | End: 2020-09-15

## 2019-08-12 RX ORDER — ATORVASTATIN CALCIUM 10 MG/1
10 TABLET, FILM COATED ORAL EVERY EVENING
Qty: 90 TABLET | Refills: 3 | Status: SHIPPED | OUTPATIENT
Start: 2019-08-12 | End: 2020-08-10

## 2019-08-12 ASSESSMENT — MIFFLIN-ST. JEOR: SCORE: 1500.04

## 2019-08-12 NOTE — PATIENT INSTRUCTIONS
1.  Restart losartan today and amlodipine in 1 week.  2.  Compression stockings, knee-high, medium pressure.  3.  Nuclear stress test.

## 2019-08-12 NOTE — LETTER
8/12/2019    Fred Connell MD  [address]  [place] [address]    RE: Yosef Murry       Dear Colleague,    I had the pleasure of seeing Yosef Murry in the [facility].    HPI and Plan:   See dictation    Orders Placed This Encounter   Procedures     NM Lexiscan stress test (nuc card)     EKG 12-lead complete w/read - Clinics (performed today)       Orders Placed This Encounter   Medications     apixaban ANTICOAGULANT (ELIQUIS) 5 MG tablet     Sig: Take 1 tablet (5 mg) by mouth 2 times daily     Dispense:  180 tablet     Refill:  3     atorvastatin (LIPITOR) 10 MG tablet     Sig: Take 1 tablet (10 mg) by mouth every evening     Dispense:  90 tablet     Refill:  3     metoprolol succinate ER (TOPROL XL) 25 MG 24 hr tablet     Sig: Take 1 tablet (25 mg) by mouth daily     Dispense:  90 tablet     Refill:  3       Medications Discontinued During This Encounter   Medication Reason     apixaban ANTICOAGULANT (ELIQUIS) 5 MG tablet Reorder     atorvastatin (LIPITOR) 10 MG tablet Reorder     metoprolol succinate (TOPROL XL) 25 MG 24 hr tablet Reorder         Encounter Diagnoses   Name Primary?     Permanent atrial fibrillation (H) Yes     Infarction of right basal ganglia (H)      Abnormal electrocardiogram        CURRENT MEDICATIONS:  Current Outpatient Medications   Medication Sig Dispense Refill     apixaban ANTICOAGULANT (ELIQUIS) 5 MG tablet Take 1 tablet (5 mg) by mouth 2 times daily 180 tablet 3     atorvastatin (LIPITOR) 10 MG tablet Take 1 tablet (10 mg) by mouth every evening 90 tablet 3     calcium carbonate (TUMS) 500 MG chewable tablet Take 1 chew tab by mouth At Bedtime       diphenhydrAMINE-acetaminophen (TYLENOL PM)  MG tablet Take 2 tablets by mouth nightly as needed for sleep       HYDROcodone-acetaminophen (NORCO) 5-325 MG tablet Take 1-2 tablets by mouth every 4 hours as needed for moderate to severe pain 15 tablet 0     leuprolide acetate (LUPRON)  1 MG/0.2ML kit Inject Subcutaneous every 6 months He gets this at [facility]. Last given on 4/2019.       loratadine (CLARITIN) 10 MG tablet Take 1 tablet by mouth daily. 30 tablet 1     metoprolol succinate ER (TOPROL XL) 25 MG 24 hr tablet Take 1 tablet (25 mg) by mouth daily 90 tablet 3     Multiple Vitamins-Minerals (PRESERVISION AREDS 2 PO) Take 1 capsule by mouth 2 times daily        order for DME Equipment being ordered: Walker Wheels () and Walker ()  Treatment Diagnosis: Impaired gait 1 each 0       ALLERGIES     Allergies   Allergen Reactions     Other [Seasonal Allergies]        PAST MEDICAL HISTORY:  Past Medical History:   Diagnosis Date     Anal fistula      Antiplatelet or antithrombotic long-term use      Arrhythmia      Atrial flutter (H) 2012     Cerebral infarction (H)     TIA     Heartburn      Hypertension      Inguinal hernia, left      Persistent atrial fibrillation (H) 8/21/12     Prostate cancer (H)      TIA (transient ischaemic attack)     2014       PAST SURGICAL HISTORY:  Past Surgical History:   Procedure Laterality Date     COLONOSCOPY       ENT SURGERY      tonsllectomy     EXAM UNDER ANESTHESIA, FISTULOTOMY RECTUM, COMBINED N/A 6/18/2015    Procedure: COMBINED EXAM UNDER ANESTHESIA, FISTULOTOMY RECTUM;  Surgeon: Candice Carty MD;  Location: [facility]     GENITOURINARY SURGERY  1999    PROSTATECTOMY     HERNIORRHAPHY INGUINAL  7/25/2013    Procedure: HERNIORRHAPHY INGUINAL;  LEFT INGUINAL HERNIA REPAIR WITH MESH;  Surgeon: Filipe Fairchild MD;  Location: [facility]     HERNIORRHAPHY INGUINAL Right 6/13/2019    Procedure: OPEN RIGHT INGUINA HERNIA REPAIR WITH MESH;  Surgeon: Filipe Fairchild MD;  Location:  OR     ORTHOPEDIC SURGERY      WRIST SURGERY - LEFT       FAMILY HISTORY:  Family History   Problem Relation Age of Onset     Ovarian Cancer Mother      Osteoporosis Father      Unknown/Adopted Sister        SOCIAL HISTORY:  Social History     Socioeconomic History      Marital status:      Spouse name: None     Number of children: None     Years of education: None     Highest education level: None   Occupational History     None   Social Needs     Financial resource strain: None     Food insecurity:     Worry: None     Inability: None     Transportation needs:     Medical: None     Non-medical: None   Tobacco Use     Smoking status: Former Smoker     Years: 16.00     Types: Cigarettes     Last attempt to quit: 1968     Years since quittin.6     Smokeless tobacco: Never Used   Substance and Sexual Activity     Alcohol use: Yes     Alcohol/week: 0.5 oz     Types: 1 Cans of beer per week     Frequency: 2-3 times a week     Drinks per session: 1 or 2     Binge frequency: Never     Comment: SOCIALLY     Drug use: No     Sexual activity: Not Currently     Partners: Female   Lifestyle     Physical activity:     Days per week: None     Minutes per session: None     Stress: None   Relationships     Social connections:     Talks on phone: None     Gets together: None     Attends Confucianism service: None     Active member of club or organization: None     Attends meetings of clubs or organizations: None     Relationship status: None     Intimate partner violence:     Fear of current or ex partner: None     Emotionally abused: None     Physically abused: None     Forced sexual activity: None   Other Topics Concern     Parent/sibling w/ CABG, MI or angioplasty before 65F 55M? Not Asked      Service Not Asked     Blood Transfusions Not Asked     Caffeine Concern No     Comment: occ tea     Occupational Exposure Not Asked     Hobby Hazards Not Asked     Sleep Concern No     Stress Concern No     Weight Concern No     Special Diet No     Back Care Not Asked     Exercise Yes     Comment: walking 3 miles a day     Bike Helmet Not Asked     Seat Belt Yes     Self-Exams Not Asked   Social History Narrative     None       Review of Systems:  Skin:  Negative rash;bruising      Eyes:  Positive for glasses    ENT:  Negative      Respiratory:  Positive for cough dry cough   Cardiovascular:  Negative Positive for;palpitations;chest pain;dizziness due to Afib.Occ CP  Gastroenterology: Negative      Genitourinary:  Positive for prostate problem HX of prostate CA   Musculoskeletal:  Negative      Neurologic:  Negative stroke has had a couple of breakthrough strokes while on xarelto  Psychiatric:  Negative      Heme/Lymph/Imm:  Negative      Endocrine:  Negative        [id number]          Thank you for allowing me to participate in the care of your patient.      Sincerely,     Henna Murphy MD     [facility]    cc:   Fred Connell MD  [address] [address]

## 2019-08-12 NOTE — PROGRESS NOTES
Service Date: 08/12/2019      HISTORY OF PRESENT ILLNESS:    It is my pleasure seeing Mr. Héctor Murry, a delightful 83-year-old gentleman, in follow-up of recent hospitalization with stroke.      Héctor has the following chronic medical issues:   A.  Permanent atrial fibrillation.  Treated with rate control (metoprolol) and anticoagulation (rivaroxaban, though recently switched to apixaban).   B.  Hypertension.   C.  Recent hospitalization with basal ganglia stroke, see details below.      Héctor came into clinic with his daughter, Cecy, who is a retired medical examiner.  In 06/2019, he underwent hernia repair.  Xarelto was held for 3-4 days before surgery.  On 07/29/2019 the patient presented to the emergency room with generalized weakness.  During the hospital stay, 07/31/2019, he had an episode of unresponsiveness which created concern about an acute neurologic event.  He underwent emergent CT which showed probable infarction in the right basal ganglia and internal capsule.  A subsequent brain MRI showed a subacute ischemic infarction of the right basal ganglia and additional lesions (chronic infarcts) in 2 areas of the right frontal lobe.  He was outside the tPA window.  His hospitalization was complicated by fever of unknown etiology.      During hospitalization, a DANNY was requested by Neurology to look for intracardiac thrombus.  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  A cardiac MRI was ordered in place of a DANNY.  This did not show intracardiac thrombus, but did show subendocardial scar in the circumflex distribution.  There was moderate to severe biatrial enlargement and normal LV function with EF of 60% and no regional wall motion abnormalities.      Héctor was discharged home 6 days ago.  Prior to admission, rivaroxaban was switched to apixaban.  His losartan and amlodipine were held to allow his blood pressure to run  higher after his acute stroke.  He tells me he is feeling better, but his balance is not normal yet.  He has no chest pain and he confirmed that he did not have chest pain even while walking 3 miles per day before his hospitalization.  He has no orthopnea or PND, but he has developed lower extremity edema lately.  It is unclear when this started.        PHYSICAL EXAMINATION:   VITAL SIGNS:  Blood pressure 151/81.  It was rechecked and it was 144/79.  Pulse 60 and irregular. Weight 79 kg, height 178 cm.   GENERAL:  He is a delightful elderly male in no distress.   HEENT:  Normocephalic.   NECK:  Supple without carotid bruits, 2+ carotid pulses, normal JVP.   LUNGS:  Completely clear bilaterally.   CARDIOVASCULAR:  Irregular rhythm in the 60s or 70s.  No apparent gallop or murmur.   ABDOMEN:  Soft, nontender.  Negative HJR.   EXTREMITIES:  1-2+ edema bilaterally, pitting.         DIAGNOSTIC STUDIES:    I reviewed all his recent diagnostic studies, several of them are outlined in the HPI.      His 12-lead ECG today showed atrial fibrillation with controlled ventricular rate in the 70s.      For results of his recent cardiac MRI, see HPI.        IMPRESSION:   1.  Recent basal ganglia stroke, but brain MRI also showed chronic infarcts in the frontal lobe.  Basal ganglia stroke is unlikely related to a cardioembolic event, though his brain MRI suggests previous events in other vascular distributions that may have been cardioembolic.  The patient was off anticoagulation 3 or 4 days in June for hernia surgery.  Whether this is related or not to his recent presentation is unclear.  During the hospitalization, rivaroxaban was switched to apixaban.  At this point, his atrial fibrillation is well rate controlled, as always.  I have nothing to add to his care of atrial fibrillation.   2.  Subendocardial scar (circumflex distribution) on recent cMRI.  He does not have angina, but given cMRI findings I ordered a Zipdial  stress test.  His stress echocardiogram in 2016 was negative for ischemia.   3.  Lower extremity edema.  I recommended a trial of knee-high support stockings with medium pressure.  The edema might be partly related to amlodipine, though the patient has been off amlodipine for the past 10 days (it was stopped during his hospitalization).   4.  Hypertension.  His blood pressure is running a little high.  He is off losartan and amlodipine, both being stopped at the hospital because of stroke.  I asked him to restart losartan today and amlodipine next week.  Keep an eye on his lower extremity edema.      RECOMMENDATIONS:   A.  Lexiscan nuclear stress test in 2 weeks.   B.  A trial of knee-high compression stockings.   C.  Restart losartan today and amlodipine in 1 week.  Monitor BP.      We plan to see Héctor again in the clinic in a few months.  It has been my pleasure being involved in his care.  I will point out that should anticoagulation need to be stopped in the future (for an elective procedure), he will need bridging with enoxaparin.         MIRTA MELARA MD, [facility]         cc:   [place] [address]          D: 2019   T: 2019   MT: CANDI      Name:     OLE COSTA   MRN:      [id number]        Account:      [id number]   :      1935           Service Date: 2019      Document: [id number]

## 2019-08-12 NOTE — LETTER
8/12/2019      Fred Connell MD  [address]  [facility] [id number]      RE: Yosef Murry       Dear Colleague,    I had the pleasure of seeing Yosef Murry in the [facility].    Service Date: 08/12/2019      HISTORY OF PRESENT ILLNESS:    It is my pleasure seeing Mr. Héctor Murry, a delightful 83-year-old gentleman, in follow-up of recent hospitalization with stroke.      Héctor has the following chronic medical issues:   A.  Permanent atrial fibrillation.  Treated with rate control (metoprolol) and anticoagulation (rivaroxaban, though recently switched to apixaban).   B.  Hypertension.   C.  Recent hospitalization with basal ganglia stroke, see details below.      Héctor came into clinic with his daughter, Cecy, who is a retired medical examiner.  In 06/2019, he underwent hernia repair.  Xarelto was held for 3-4 days before surgery.  On 07/29/2019 the patient presented to the emergency room with generalized weakness.  During the hospital stay, 07/31/2019, he had an episode of unresponsiveness which created concern about an acute neurologic event.  He underwent emergent CT which showed probable infarction in the right basal ganglia and internal capsule.  A subsequent brain MRI showed a subacute ischemic infarction of the right basal ganglia and additional lesions (chronic infarcts) in 2 areas of the right frontal lobe.  He was outside the tPA window.  His hospitalization was complicated by fever of unknown etiology.      During hospitalization, a DANNY was requested by Neurology to look for intracardiac thrombus.  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  A cardiac MRI was ordered in place of a DANNY.  This did not show intracardiac thrombus, but did show subendocardial scar in the circumflex distribution.  There was moderate to severe biatrial enlargement and normal LV function with EF  of 60% and no regional wall motion abnormalities.      Héctor was discharged home 6 days ago.  Prior to admission, rivaroxaban was switched to apixaban.  His losartan and amlodipine were held to allow his blood pressure to run higher after his acute stroke.  He tells me he is feeling better, but his balance is not normal yet.  He has no chest pain and he confirmed that he did not have chest pain even while walking 3 miles per day before his hospitalization.  He has no orthopnea or PND, but he has developed lower extremity edema lately.  It is unclear when this started.        PHYSICAL EXAMINATION:   VITAL SIGNS:  Blood pressure 151/81.  It was rechecked and it was 144/79.  Pulse 60 and irregular. Weight 79 kg, height 178 cm.   GENERAL:  He is a delightful elderly male in no distress.   HEENT:  Normocephalic.   NECK:  Supple without carotid bruits, 2+ carotid pulses, normal JVP.   LUNGS:  Completely clear bilaterally.   CARDIOVASCULAR:  Irregular rhythm in the 60s or 70s.  No apparent gallop or murmur.   ABDOMEN:  Soft, nontender.  Negative HJR.   EXTREMITIES:  1-2+ edema bilaterally, pitting.         DIAGNOSTIC STUDIES:    I reviewed all his recent diagnostic studies, several of them are outlined in the HPI.      His 12-lead ECG today showed atrial fibrillation with controlled ventricular rate in the 70s.      For results of his recent cardiac MRI, see HPI.        IMPRESSION:   1.  Recent basal ganglia stroke, but brain MRI also showed chronic infarcts in the frontal lobe.  Basal ganglia stroke is unlikely related to a cardioembolic event, though his brain MRI suggests previous events in other vascular distributions that may have been cardioembolic.  The patient was off anticoagulation 3 or 4 days in June for hernia surgery.  Whether this is related or not to his recent presentation is unclear.  During the hospitalization, rivaroxaban was switched to apixaban.  At this point, his atrial fibrillation is well rate  controlled, as always.  I have nothing to add to his care of atrial fibrillation.   2.  Subendocardial scar (circumflex distribution) on recent cMRI.  He does not have angina, but given cMRI findings I ordered a Lexiscan nuclear stress test.  His stress echocardiogram in 2016 was negative for ischemia.   3.  Lower extremity edema.  I recommended a trial of knee-high support stockings with medium pressure.  The edema might be partly related to amlodipine, though the patient has been off amlodipine for the past 10 days (it was stopped during his hospitalization).   4.  Hypertension.  His blood pressure is running a little high.  He is off losartan and amlodipine, both being stopped at the hospital because of stroke.  I asked him to restart losartan today and amlodipine next week.  Keep an eye on his lower extremity edema.      RECOMMENDATIONS:   A.  Lexiscan nuclear stress test in 2 weeks.   B.  A trial of knee-high compression stockings.   C.  Restart losartan today and amlodipine in 1 week.  Monitor BP.      We plan to see Héctor again in the clinic in a few months.  It has been my pleasure being involved in his care.  I will point out that should anticoagulation need to be stopped in the future (for an elective procedure), he will need bridging with enoxaparin.         MIRTA MELARA MD, FACC         cc:   [place] [address]          D: 2019   T: 2019   MT: CANDI      Name:     OLE COSTA   MRN:      [id number]        Account:      [id number]   :      1935           Service Date: 2019      Document: [id number]           Outpatient Encounter Medications as of 2019   Medication Sig Dispense Refill     apixaban ANTICOAGULANT (ELIQUIS) 5 MG tablet Take 1 tablet (5 mg) by mouth 2 times daily 180 tablet 3     atorvastatin (LIPITOR) 10 MG tablet Take 1 tablet (10 mg) by mouth every evening 90 tablet 3      calcium carbonate (TUMS) 500 MG chewable tablet Take 1 chew tab by mouth At Bedtime       diphenhydrAMINE-acetaminophen (TYLENOL PM)  MG tablet Take 2 tablets by mouth nightly as needed for sleep       HYDROcodone-acetaminophen (NORCO) 5-325 MG tablet Take 1-2 tablets by mouth every 4 hours as needed for moderate to severe pain 15 tablet 0     leuprolide acetate (LUPRON) 1 MG/0.2ML kit Inject Subcutaneous every 6 months He gets this at [facility]. Last given on 4/2019.       loratadine (CLARITIN) 10 MG tablet Take 1 tablet by mouth daily. 30 tablet 1     metoprolol succinate ER (TOPROL XL) 25 MG 24 hr tablet Take 1 tablet (25 mg) by mouth daily 90 tablet 3     Multiple Vitamins-Minerals (PRESERVISION AREDS 2 PO) Take 1 capsule by mouth 2 times daily        order for DME Equipment being ordered: Walker Wheels () and Walker ()  Treatment Diagnosis: Impaired gait 1 each 0     [DISCONTINUED] apixaban ANTICOAGULANT (ELIQUIS) 5 MG tablet Take 1 tablet (5 mg) by mouth 2 times daily 60 tablet 0     [DISCONTINUED] atorvastatin (LIPITOR) 10 MG tablet Take 1 tablet (10 mg) by mouth every evening 30 tablet 0     [DISCONTINUED] metoprolol succinate (TOPROL XL) 25 MG 24 hr tablet Take 1 tablet (25 mg) by mouth daily 90 tablet 2     No facility-administered encounter medications on file as of 8/12/2019.        Again, thank you for allowing me to participate in the care of your patient.      Sincerely,    Henna Murphy MD     [facility]

## 2019-08-12 NOTE — PROGRESS NOTES
HPI and Plan:   See dictation    Orders Placed This Encounter   Procedures     NM Lexiscan stress test (nuc card)     EKG 12-lead complete w/read - Clinics (performed today)       Orders Placed This Encounter   Medications     apixaban ANTICOAGULANT (ELIQUIS) 5 MG tablet     Sig: Take 1 tablet (5 mg) by mouth 2 times daily     Dispense:  180 tablet     Refill:  3     atorvastatin (LIPITOR) 10 MG tablet     Sig: Take 1 tablet (10 mg) by mouth every evening     Dispense:  90 tablet     Refill:  3     metoprolol succinate ER (TOPROL XL) 25 MG 24 hr tablet     Sig: Take 1 tablet (25 mg) by mouth daily     Dispense:  90 tablet     Refill:  3       Medications Discontinued During This Encounter   Medication Reason     apixaban ANTICOAGULANT (ELIQUIS) 5 MG tablet Reorder     atorvastatin (LIPITOR) 10 MG tablet Reorder     metoprolol succinate (TOPROL XL) 25 MG 24 hr tablet Reorder         Encounter Diagnoses   Name Primary?     Permanent atrial fibrillation (H) Yes     Infarction of right basal ganglia (H)      Abnormal electrocardiogram        CURRENT MEDICATIONS:  Current Outpatient Medications   Medication Sig Dispense Refill     apixaban ANTICOAGULANT (ELIQUIS) 5 MG tablet Take 1 tablet (5 mg) by mouth 2 times daily 180 tablet 3     atorvastatin (LIPITOR) 10 MG tablet Take 1 tablet (10 mg) by mouth every evening 90 tablet 3     calcium carbonate (TUMS) 500 MG chewable tablet Take 1 chew tab by mouth At Bedtime       diphenhydrAMINE-acetaminophen (TYLENOL PM)  MG tablet Take 2 tablets by mouth nightly as needed for sleep       HYDROcodone-acetaminophen (NORCO) 5-325 MG tablet Take 1-2 tablets by mouth every 4 hours as needed for moderate to severe pain 15 tablet 0     leuprolide acetate (LUPRON) 1 MG/0.2ML kit Inject Subcutaneous every 6 months He gets this at [facility]. Last given on 4/2019.       loratadine (CLARITIN) 10 MG tablet Take 1 tablet by mouth daily. 30 tablet 1     metoprolol succinate ER (TOPROL  XL) 25 MG 24 hr tablet Take 1 tablet (25 mg) by mouth daily 90 tablet 3     Multiple Vitamins-Minerals (PRESERVISION AREDS 2 PO) Take 1 capsule by mouth 2 times daily        order for DME Equipment being ordered: Walker Wheels () and Walker ()  Treatment Diagnosis: Impaired gait 1 each 0       ALLERGIES     Allergies   Allergen Reactions     Other [Seasonal Allergies]        PAST MEDICAL HISTORY:  Past Medical History:   Diagnosis Date     Anal fistula      Antiplatelet or antithrombotic long-term use      Arrhythmia      Atrial flutter (H) 2012     Cerebral infarction (H)     TIA     Heartburn      Hypertension      Inguinal hernia, left      Persistent atrial fibrillation (H) 8/21/12     Prostate cancer (H)      TIA (transient ischaemic attack)     2014       PAST SURGICAL HISTORY:  Past Surgical History:   Procedure Laterality Date     COLONOSCOPY       ENT SURGERY      tonsllectomy     EXAM UNDER ANESTHESIA, FISTULOTOMY RECTUM, COMBINED N/A 6/18/2015    Procedure: COMBINED EXAM UNDER ANESTHESIA, FISTULOTOMY RECTUM;  Surgeon: Candice Carty MD;  Location: [facility]     GENITOURINARY SURGERY  1999    PROSTATECTOMY     HERNIORRHAPHY INGUINAL  7/25/2013    Procedure: HERNIORRHAPHY INGUINAL;  LEFT INGUINAL HERNIA REPAIR WITH MESH;  Surgeon: Filipe Fairchild MD;  Location: [facility]     HERNIORRHAPHY INGUINAL Right 6/13/2019    Procedure: OPEN RIGHT INGUINA HERNIA REPAIR WITH MESH;  Surgeon: Filipe Fairchild MD;  Location:  OR     ORTHOPEDIC SURGERY      WRIST SURGERY - LEFT       FAMILY HISTORY:  Family History   Problem Relation Age of Onset     Ovarian Cancer Mother      Osteoporosis Father      Unknown/Adopted Sister        SOCIAL HISTORY:  Social History     Socioeconomic History     Marital status:      Spouse name: None     Number of children: None     Years of education: None     Highest education level: None   Occupational History     None   Social Needs     Financial resource strain:  None     Food insecurity:     Worry: None     Inability: None     Transportation needs:     Medical: None     Non-medical: None   Tobacco Use     Smoking status: Former Smoker     Years: 16.00     Types: Cigarettes     Last attempt to quit: 1968     Years since quittin.6     Smokeless tobacco: Never Used   Substance and Sexual Activity     Alcohol use: Yes     Alcohol/week: 0.5 oz     Types: 1 Cans of beer per week     Frequency: 2-3 times a week     Drinks per session: 1 or 2     Binge frequency: Never     Comment: SOCIALLY     Drug use: No     Sexual activity: Not Currently     Partners: Female   Lifestyle     Physical activity:     Days per week: None     Minutes per session: None     Stress: None   Relationships     Social connections:     Talks on phone: None     Gets together: None     Attends [employer] service: None     Active member of club or organization: None     Attends meetings of clubs or organizations: None     Relationship status: None     Intimate partner violence:     Fear of current or ex partner: None     Emotionally abused: None     Physically abused: None     Forced sexual activity: None   Other Topics Concern     Parent/sibling w/ CABG, MI or angioplasty before 65F 55M? Not Asked      Service Not Asked     Blood Transfusions Not Asked     Caffeine Concern No     Comment: occ tea     Occupational Exposure Not Asked     Hobby Hazards Not Asked     Sleep Concern No     Stress Concern No     Weight Concern No     Special Diet No     Back Care Not Asked     Exercise Yes     Comment: walking 3 miles a day     Bike Helmet Not Asked     Seat Belt Yes     Self-Exams Not Asked   Social History Narrative     None       Review of Systems:  Skin:  Negative rash;bruising     Eyes:  Positive for glasses    ENT:  Negative      Respiratory:  Positive for cough dry cough   Cardiovascular:  Negative Positive for;palpitations;chest pain;dizziness due to Afib.Occ CP  Gastroenterology: Negative       Genitourinary:  Positive for prostate problem HX of prostate CA   Musculoskeletal:  Negative      Neurologic:  Negative stroke has had a couple of breakthrough strokes while on xarelto  Psychiatric:  Negative      Heme/Lymph/Imm:  Negative      Endocrine:  Negative        [id number]

## 2019-08-13 ENCOUNTER — DOCUMENTATION ONLY (OUTPATIENT)
Dept: FAMILY MEDICINE | Facility: CLINIC | Age: 84
End: 2019-08-13

## 2019-08-13 NOTE — PROGRESS NOTES
[facility] now requests orders and shares plan of care/discharge summaries for some patients through [employer].  Please REPLY TO THIS MESSAGE OR ROUTE BACK TO THE AUTHOR in order to give authorization for orders when needed.  This is considered a verbal order, you will still receive a faxed copy of orders for signature.  Thank you for your assistance in improving collaboration for our patients.    ORDER  OT POC for 1w1, 2w2 for BUE HEP and ADL safety assess and educate.

## 2019-08-21 ENCOUNTER — OFFICE VISIT (OUTPATIENT)
Dept: FAMILY MEDICINE | Facility: CLINIC | Age: 84
End: 2019-08-21
Payer: MEDICARE

## 2019-08-21 VITALS
WEIGHT: 162.5 LBS | TEMPERATURE: 97.5 F | BODY MASS INDEX: 23.32 KG/M2 | OXYGEN SATURATION: 97 % | HEART RATE: 66 BPM | DIASTOLIC BLOOD PRESSURE: 84 MMHG | SYSTOLIC BLOOD PRESSURE: 154 MMHG

## 2019-08-21 DIAGNOSIS — I63.9 CEREBROVASCULAR ACCIDENT (CVA), UNSPECIFIED MECHANISM (H): Primary | ICD-10-CM

## 2019-08-21 PROCEDURE — 99213 OFFICE O/P EST LOW 20 MIN: CPT | Performed by: INTERNAL MEDICINE

## 2019-08-21 NOTE — PROGRESS NOTES
Subjective     Yosef Murry is a 83 year old male who presents to clinic today for the following health issues:    HPI     Pt presents to the clinic for hospital f/u which occurred on 07/29/2019. He was admitted for generalized weakness, fever, pain/swelling in his right wrist. An MRI revealed a subacute right basal ganglia infarct. The pt's wrist was aspirated. Gram stain negative, no crystals, culture negative.     The pt states that during his hospital visit his left wrist was painful and swollen and now is right wrist is swollen.     Overall the pt feels in better health today. He presents to the clinic with his daughter. His daughter stated that before he was hospitalized he was very confused. He was have nonsensical speech which was accompanied by lack of balance.     The pt has lost weight since his LOV. He started eating through [employer].     Pt was recently switched to eliquis.     Hospital Follow-up Visit:    Hospital/Nursing Home/IP Rehab Facility: [facility]  Date of Admission: 07/29/19  Date of Discharge: 08/06/19  Reason(s) for Admission: stroke              Problems taking medications regularly:  None       Medication changes since discharge: None       Problems adhering to non-medication therapy:  None  Lauren Chahal MA    Summary of hospitalization:  [facility] discharge summary reviewed  Diagnostic Tests/Treatments reviewed.  Follow up needed:   Other Healthcare Providers Involved in Patient s Care:         None  Update since discharge: improved.     Post Discharge Medication Reconciliation: discharge medications reconciled, continue medications without change.  Plan of care communicated with patient and family     Coding guidelines for this visit:  Type of Medical   Decision Making Face-to-Face Visit       within 7 Days of discharge Face-to-Face Visit        within 14 days of discharge   Moderate Complexity [id number]   High Complexity [id number]                 Patient Active Problem List   Diagnosis     Arrhythmia     Atrial fibrillation (H)     Atrial flutter (H)     Persistent atrial fibrillation (H)     Prostate cancer (H)     CARDIOVASCULAR SCREENING; LDL GOAL LESS THAN 130     Generalized weakness     Fever of unknown origin     Past Surgical History:   Procedure Laterality Date     COLONOSCOPY       ENT SURGERY      tonsllectomy     EXAM UNDER ANESTHESIA, FISTULOTOMY RECTUM, COMBINED N/A 2015    Procedure: COMBINED EXAM UNDER ANESTHESIA, FISTULOTOMY RECTUM;  Surgeon: Candice Carty MD;  Location: [facility]     GENITOURINARY SURGERY      PROSTATECTOMY     HERNIORRHAPHY INGUINAL  2013    Procedure: HERNIORRHAPHY INGUINAL;  LEFT INGUINAL HERNIA REPAIR WITH MESH;  Surgeon: Filipe Fairchild MD;  Location: [facility]     HERNIORRHAPHY INGUINAL Right 2019    Procedure: OPEN RIGHT INGUINA HERNIA REPAIR WITH MESH;  Surgeon: Filipe Fairchild MD;  Location:  OR     ORTHOPEDIC SURGERY      WRIST SURGERY - LEFT       Social History     Tobacco Use     Smoking status: Former Smoker     Years: 16.00     Types: Cigarettes     Last attempt to quit: 1968     Years since quittin.6     Smokeless tobacco: Never Used   Substance Use Topics     Alcohol use: Yes     Alcohol/week: 0.5 oz     Types: 1 Cans of beer per week     Frequency: 2-3 times a week     Drinks per session: 1 or 2     Binge frequency: Never     Comment: SOCIALLY     Family History   Problem Relation Age of Onset     Ovarian Cancer Mother      Osteoporosis Father      Unknown/Adopted Sister          Current Outpatient Medications   Medication Sig Dispense Refill     apixaban ANTICOAGULANT (ELIQUIS) 5 MG tablet Take 1 tablet (5 mg) by mouth 2 times daily 180 tablet 3     atorvastatin (LIPITOR) 10 MG tablet Take 1 tablet (10 mg) by mouth every evening 90 tablet 3     calcium carbonate (TUMS) 500 MG chewable tablet Take 1 chew tab by mouth At Bedtime        diphenhydrAMINE-acetaminophen (TYLENOL PM)  MG tablet Take 2 tablets by mouth nightly as needed for sleep       leuprolide acetate (LUPRON) 1 MG/0.2ML kit Inject Subcutaneous every 6 months He gets this at [facility]. Last given on 4/2019.       loratadine (CLARITIN) 10 MG tablet Take 1 tablet by mouth daily. 30 tablet 1     metoprolol succinate ER (TOPROL XL) 25 MG 24 hr tablet Take 1 tablet (25 mg) by mouth daily 90 tablet 3     Multiple Vitamins-Minerals (PRESERVISION AREDS 2 PO) Take 1 capsule by mouth 2 times daily        order for DME Equipment being ordered: [employer] Wheels () and Walker ()  Treatment Diagnosis: Impaired gait 1 each 0     Allergies   Allergen Reactions     Other [Seasonal Allergies]        Reviewed and updated as needed this visit by Provider         Review of Systems   ROS COMP: Constitutional, HEENT, cardiovascular, pulmonary, gi and gu systems are negative, except as otherwise noted.    This document serves as a record of the services and decisions personally performed and made by Fred Connell MD. It was created on his behalf by Ralph Jackson, a trained medical scribe. The creation of this document is based on the provider's statements to the medical scribe.  Ralph Jackson August 21, 2019 4:04 PM          Objective    BP (!) 154/84 (BP Location: Right arm, Cuff Size: Adult Regular)   Pulse 66   Temp 97.5  F (36.4  C) (Tympanic)   Wt 73.7 kg (162 lb 8 oz)   SpO2 97%   BMI 23.32 kg/m    Body mass index is 23.32 kg/m .     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  There is no palpable mass or  "organomegaly  Extremities nontender with 2+ pretibial edema  Pulses pedal pulses are as described otherwise his pulses are bilaterally symmetrical throughout without bruits  Skin without significant abnormality  Neuro alert and oriented x3, motor and sensory intact, romberg was negative    Diagnostic Test Results:  Labs reviewed in Epic  none         Assessment & Plan     Cerebrovascular accident (CVA), unspecified mechanism (H)  In retro the pt has had multiple CVA's and the most recent was recent, but age indeterminate. Responding well to therapy and is ready to progress to resumption of usual activities. Cardiology is evaluating for other complications that may explain his recurrent CVA's while on anticoagulation.       Pt is using Losartan and will reevaluate to see if he should start amlodipine again. This is to avoid hypotension.  Allowing the pt to start golfing, advised him to start at the driving range and putting green. The pt should start driving in an open parking lot to allow for re-familiarity of the vehicle before the pt drives on the road.            BMI:   Estimated body mass index is 23.32 kg/m  as calculated from the following:    Height as of 8/12/19: 1.778 m (5' 10\").    Weight as of this encounter: 73.7 kg (162 lb 8 oz).           FUTURE APPOINTMENTS:       - Follow-up visit in 1 month    Return in about 1 month (around 9/21/2019) for Follow Up.    The information in this document, created by the medical scribe for me, accurately reflects the services I personally performed and the decisions made by me. I have reviewed and approved this document for accuracy prior to leaving the patient care area.  August 21, 2019     Fred Connell MD  [facility]    "

## 2019-08-27 ENCOUNTER — HOSPITAL ENCOUNTER (OUTPATIENT)
Dept: CARDIOLOGY | Facility: CLINIC | Age: 84
Discharge: HOME | End: 2019-08-27
Attending: INTERNAL MEDICINE | Admitting: INTERNAL MEDICINE
Payer: MEDICARE

## 2019-08-27 VITALS — DIASTOLIC BLOOD PRESSURE: 84 MMHG | SYSTOLIC BLOOD PRESSURE: 144 MMHG

## 2019-08-27 DIAGNOSIS — R94.31 ABNORMAL ELECTROCARDIOGRAM: ICD-10-CM

## 2019-08-27 PROCEDURE — 93016 CV STRESS TEST SUPVJ ONLY: CPT | Performed by: INTERNAL MEDICINE

## 2019-08-27 PROCEDURE — 93018 CV STRESS TEST I&R ONLY: CPT | Performed by: INTERNAL MEDICINE

## 2019-08-27 PROCEDURE — 25000128 H RX IP 250 OP 636: Performed by: INTERNAL MEDICINE

## 2019-08-27 PROCEDURE — 78452 HT MUSCLE IMAGE SPECT MULT: CPT | Mod: 26 | Performed by: INTERNAL MEDICINE

## 2019-08-27 PROCEDURE — 34300033 ZZH RX 343: Performed by: INTERNAL MEDICINE

## 2019-08-27 PROCEDURE — A9502 TC99M TETROFOSMIN: HCPCS | Performed by: INTERNAL MEDICINE

## 2019-08-27 PROCEDURE — 93017 CV STRESS TEST TRACING ONLY: CPT

## 2019-08-27 RX ORDER — AMINOPHYLLINE 25 MG/ML
50-100 INJECTION, SOLUTION INTRAVENOUS
Status: DISCONTINUED | OUTPATIENT
Start: 2019-08-27 | End: 2019-08-28 | Stop reason: HOSPADM

## 2019-08-27 RX ORDER — ALBUTEROL SULFATE 90 UG/1
2 AEROSOL, METERED RESPIRATORY (INHALATION) EVERY 5 MIN PRN
Status: DISCONTINUED | OUTPATIENT
Start: 2019-08-27 | End: 2019-08-28 | Stop reason: HOSPADM

## 2019-08-27 RX ORDER — REGADENOSON 0.08 MG/ML
0.4 INJECTION, SOLUTION INTRAVENOUS ONCE
Status: COMPLETED | OUTPATIENT
Start: 2019-08-27 | End: 2019-08-27

## 2019-08-27 RX ORDER — ACYCLOVIR 200 MG/1
0-1 CAPSULE ORAL
Status: DISCONTINUED | OUTPATIENT
Start: 2019-08-27 | End: 2019-08-28 | Stop reason: HOSPADM

## 2019-08-27 RX ADMIN — TETROFOSMIN 3.44 MCI.: 1.38 INJECTION, POWDER, LYOPHILIZED, FOR SOLUTION INTRAVENOUS at 08:36

## 2019-08-27 RX ADMIN — REGADENOSON 0.4 MG: 0.08 INJECTION, SOLUTION INTRAVENOUS at 09:47

## 2019-08-27 RX ADMIN — TETROFOSMIN 9.23 MCI.: 1.38 INJECTION, POWDER, LYOPHILIZED, FOR SOLUTION INTRAVENOUS at 09:56

## 2019-08-28 ENCOUNTER — TELEPHONE (OUTPATIENT)
Dept: CARDIOLOGY | Facility: CLINIC | Age: 84
End: 2019-08-28

## 2019-08-28 NOTE — TELEPHONE ENCOUNTER
Spoke with patient regarding the results below per Dr. Murphy. He verbalized understanding.     ----- Message from Henna Murphy MD sent at 8/27/2019  3:49 PM CDT -----  No ischemia on this nuc.  He has some scar (also seen on his cMRI).  Please call him, basically this is good news.  DI

## 2019-09-19 ENCOUNTER — OFFICE VISIT (OUTPATIENT)
Dept: FAMILY MEDICINE | Facility: CLINIC | Age: 84
End: 2019-09-19
Payer: MEDICARE

## 2019-09-19 VITALS
SYSTOLIC BLOOD PRESSURE: 124 MMHG | DIASTOLIC BLOOD PRESSURE: 82 MMHG | OXYGEN SATURATION: 97 % | HEIGHT: 70 IN | BODY MASS INDEX: 23.91 KG/M2 | TEMPERATURE: 96.8 F | WEIGHT: 167 LBS | HEART RATE: 63 BPM

## 2019-09-19 DIAGNOSIS — I63.9 CEREBROVASCULAR ACCIDENT (CVA), UNSPECIFIED MECHANISM (H): ICD-10-CM

## 2019-09-19 DIAGNOSIS — C61 PROSTATE CANCER (H): ICD-10-CM

## 2019-09-19 DIAGNOSIS — I10 BENIGN ESSENTIAL HYPERTENSION: ICD-10-CM

## 2019-09-19 DIAGNOSIS — I48.19 PERSISTENT ATRIAL FIBRILLATION (H): Primary | ICD-10-CM

## 2019-09-19 DIAGNOSIS — Z23 NEED FOR INFLUENZA VACCINATION: ICD-10-CM

## 2019-09-19 LAB
ERYTHROCYTE [DISTWIDTH] IN BLOOD BY AUTOMATED COUNT: 14.7 % (ref 10–15)
HCT VFR BLD AUTO: 49.9 % (ref 40–53)
HGB BLD-MCNC: 16.3 G/DL (ref 13.3–17.7)
MCH RBC QN AUTO: 30.9 PG (ref 26.5–33)
MCHC RBC AUTO-ENTMCNC: 32.7 G/DL (ref 31.5–36.5)
MCV RBC AUTO: 95 FL (ref 78–100)
PLATELET # BLD AUTO: 225 10E9/L (ref 150–450)
RBC # BLD AUTO: 5.27 10E12/L (ref 4.4–5.9)
WBC # BLD AUTO: 6.8 10E9/L (ref 4–11)

## 2019-09-19 PROCEDURE — 85027 COMPLETE CBC AUTOMATED: CPT | Performed by: INTERNAL MEDICINE

## 2019-09-19 PROCEDURE — 90662 IIV NO PRSV INCREASED AG IM: CPT | Performed by: INTERNAL MEDICINE

## 2019-09-19 PROCEDURE — 36415 COLL VENOUS BLD VENIPUNCTURE: CPT | Performed by: INTERNAL MEDICINE

## 2019-09-19 PROCEDURE — 99213 OFFICE O/P EST LOW 20 MIN: CPT | Mod: 25 | Performed by: INTERNAL MEDICINE

## 2019-09-19 PROCEDURE — 80048 BASIC METABOLIC PNL TOTAL CA: CPT | Performed by: INTERNAL MEDICINE

## 2019-09-19 PROCEDURE — G0008 ADMIN INFLUENZA VIRUS VAC: HCPCS | Performed by: INTERNAL MEDICINE

## 2019-09-19 RX ORDER — AMLODIPINE BESYLATE 5 MG/1
5 TABLET ORAL DAILY
Refills: 0 | COMMUNITY
Start: 2019-07-07 | End: 2019-10-31

## 2019-09-19 ASSESSMENT — MIFFLIN-ST. JEOR: SCORE: 1458.76

## 2019-09-19 NOTE — NURSING NOTE
Prior to immunization administration, verified patients identity using patient s name and date of birth. Please see Immunization Activity for additional information.     Screening Questionnaire for Adult Immunization    Are you sick today?   No   Do you have allergies to medications, food, a vaccine component or latex?   No   Have you ever had a serious reaction after receiving a vaccination?   No   Do you have a long-term health problem with heart disease, lung disease, asthma, kidney disease, metabolic disease (e.g. diabetes), anemia, or other blood disorder?   No   Do you have cancer, leukemia, HIV/AIDS, or any other immune system problem?   No   In the past 3 months, have you taken medications that affect  your immune system, such as prednisone, other steroids, or anticancer drugs; drugs for the treatment of rheumatoid arthritis, Crohn s disease, or psoriasis; or have you had radiation treatments?   No   Have you had a seizure, or a brain or other nervous system problem?   No   During the past year, have you received a transfusion of blood or blood     products, or been given immune (gamma) globulin or antiviral drug?   No   For women: Are you pregnant or is there a chance you could become        pregnant during the next month?   No   Have you received any vaccinations in the past 4 weeks?   No     Immunization questionnaire answers were all negative.        Per orders of Dr. Connell, injection of Flu HD given by Kiran Dallas CMA. Patient instructed to remain in clinic for 15 minutes afterwards, and to report any adverse reaction to me immediately.     Kiran Dallas CMA on 9/19/2019 at 11:35 AM    Screening performed by Kiran Dallas CMA on 9/19/2019 at 11:35 AM.

## 2019-09-19 NOTE — PROGRESS NOTES
Subjective     Yosef Murry is a 84 year old male who presents to clinic today for the following health issues:    HPI   The pt presents to the clinic for a f/u exam. The pt has a hx of CVA. Notes that he has less drive/energy since his CVA. He still goes for walks and notes it takes him 20 minutes to walk a mile. Notes that his balance has decreased.    Pt has been doing [employer] Whe[employer] and states that these a nutritious meals and they don't over feed their customers. Notes that his weight fluctuates between 161 and 164 lbs.     Notes that he takes Tylenol PM and melatonin to help him sleep. He will get up to urinate 2-3x per night.     Patient denies any headaches, vision changes, back or neck pain, dyspnea, chest pain, palpitations, heartburn, acid indigestion, bowel changes, hematochezia, urinary issues, musculoskeletal issues, or skin changes.    Patient Active Problem List   Diagnosis     Arrhythmia     Atrial fibrillation (H)     Atrial flutter (H)     Persistent atrial fibrillation (H)     Prostate cancer (H)     CARDIOVASCULAR SCREENING; LDL GOAL LESS THAN 130     Generalized weakness     Fever of unknown origin     Past Surgical History:   Procedure Laterality Date     COLONOSCOPY       ENT SURGERY      tonsllectomy     EXAM UNDER ANESTHESIA, FISTULOTOMY RECTUM, COMBINED N/A 6/18/2015    Procedure: COMBINED EXAM UNDER ANESTHESIA, FISTULOTOMY RECTUM;  Surgeon: Candice Carty MD;  Location: [facility]     GENITOURINARY SURGERY  1999    PROSTATECTOMY     HERNIORRHAPHY INGUINAL  7/25/2013    Procedure: HERNIORRHAPHY INGUINAL;  LEFT INGUINAL HERNIA REPAIR WITH MESH;  Surgeon: Filipe Fairchild MD;  Location: [facility]     HERNIORRHAPHY INGUINAL Right 6/13/2019    Procedure: OPEN RIGHT INGUINA HERNIA REPAIR WITH MESH;  Surgeon: Filipe Fairchild MD;  Location:  OR     ORTHOPEDIC SURGERY      WRIST SURGERY - LEFT       Social History     Tobacco Use     Smoking status: Former Smoker     Years: 16.00     Types:  Cigarettes     Last attempt to quit: 1968     Years since quittin.7     Smokeless tobacco: Never Used   Substance Use Topics     Alcohol use: Yes     Alcohol/week: 0.5 oz     Types: 1 Cans of beer per week     Frequency: 2-3 times a week     Drinks per session: 1 or 2     Binge frequency: Never     Comment: SOCIALLY     Family History   Problem Relation Age of Onset     Ovarian Cancer Mother      Osteoporosis Father      Unknown/Adopted Sister          Current Outpatient Medications   Medication Sig Dispense Refill     amLODIPine (NORVASC) 5 MG tablet Take 5 mg by mouth daily  0     apixaban ANTICOAGULANT (ELIQUIS) 5 MG tablet Take 1 tablet (5 mg) by mouth 2 times daily 180 tablet 3     atorvastatin (LIPITOR) 10 MG tablet Take 1 tablet (10 mg) by mouth every evening 90 tablet 3     calcium carbonate (TUMS) 500 MG chewable tablet Take 1 chew tab by mouth At Bedtime       diphenhydrAMINE-acetaminophen (TYLENOL PM)  MG tablet Take 2 tablets by mouth nightly as needed for sleep       leuprolide acetate (LUPRON) 1 MG/0.2ML kit Inject Subcutaneous every 6 months He gets this at [facility]. Last given on 2019.       loratadine (CLARITIN) 10 MG tablet Take 1 tablet by mouth daily. 30 tablet 1     melatonin 5 MG tablet Take 5 mg by mouth nightly as needed for sleep (Take 1-2 tablets PRN at bedtime)       metoprolol succinate ER (TOPROL XL) 25 MG 24 hr tablet Take 1 tablet (25 mg) by mouth daily 90 tablet 3     Multiple Vitamins-Minerals (PRESERVISION AREDS 2 PO) Take 1 capsule by mouth 2 times daily        order for DME Equipment being ordered: [employer] Whe[employer] () and Walker ()  Treatment Diagnosis: Impaired gait 1 each 0     traZODone (DESYREL) 100 MG tablet Take 100 mg by mouth At Bedtime  1     Allergies   Allergen Reactions     Other [Seasonal Allergies]        Reviewed and updated as needed this visit by Provider         Review of Systems   ROS COMP: Constitutional, HEENT, cardiovascular,  "pulmonary, gi and gu systems are negative, except as otherwise noted.    This document serves as a record of the services and decisions personally performed and made by Fred Connell MD. It was created on his behalf by Ralph Jackson, a trained medical scribe. The creation of this document is based on the provider's statements to the medical scribe.  Ralph Jackson September 19, 2019 10:59 AM          Objective    /82 (BP Location: Left arm, Patient Position: Sitting, Cuff Size: Adult Regular)   Pulse 63   Temp 96.8  F (36  C) (Tympanic)   Ht 1.778 m (5' 10\")   Wt 75.8 kg (167 lb)   SpO2 97%   BMI 23.96 kg/m    Body mass index is 23.96 kg/m .     Physical Exam   HENT known chronic nasal labial fold changes  Neck was supple without adenopathy or thyromegaly his carotids were normal without bruits  Chest clear to auscultation and percussion  Cardiovascular S1 and S2 are irregularly irregular without murmurs or gallops  Abdomen bowel sounds were normal.  There is no palpable mass or organomegaly  Extremities nontender with 2+ pretibial edema bilaterally   Pulses pedal pulses are as described otherwise his pulses are bilaterally symmetrical throughout without bruits  Skin without significant abnormality  Neuro motor: negligible left sided weakness, gait slight asymmetry question related to weakness or OA of his knee, Romberg showed very slight pronation of his left hand    Diagnostic Test Results:  Labs reviewed in Epic  No results found for this or any previous visit (from the past 24 hour(s)).        Assessment & Plan      Recommended that the pt practice heal-to-toe walking and other exercises to improve balance.     Persistent atrial fibrillation (H)      Cerebrovascular accident (CVA), unspecified mechanism (H)  Improving, Recommended balance exercises.     Prostate cancer (H)      Benign essential hypertension  Well controlled with current therapies   - FLU VACCINE, INCREASED ANTIGEN, PRESV FREE  - CBC " with platelets  - Basic metabolic panel             FUTURE APPOINTMENTS:       - Follow-up visit in 2 months     No follow-ups on file.     The information in this document, created by the medical scribe for me, accurately reflects the services I personally performed and the decisions made by me. I have reviewed and approved this document for accuracy prior to leaving the patient care area.  September 19, 2019 11:05 AM  Sign of the time spent during this encounter with greater than 50% the time spent in coordinating care counts Fred Connell MD  [facility]

## 2019-09-20 LAB
ANION GAP SERPL CALCULATED.3IONS-SCNC: 5 MMOL/L (ref 3–14)
BUN SERPL-MCNC: 18 MG/DL (ref 7–30)
CALCIUM SERPL-MCNC: 9.4 MG/DL (ref 8.5–10.1)
CHLORIDE SERPL-SCNC: 106 MMOL/L (ref 94–109)
CO2 SERPL-SCNC: 28 MMOL/L (ref 20–32)
CREAT SERPL-MCNC: 0.84 MG/DL (ref 0.66–1.25)
GFR SERPL CREATININE-BSD FRML MDRD: 81 ML/MIN/{1.73_M2}
GLUCOSE SERPL-MCNC: 84 MG/DL (ref 70–99)
POTASSIUM SERPL-SCNC: 4.6 MMOL/L (ref 3.4–5.3)
SODIUM SERPL-SCNC: 139 MMOL/L (ref 133–144)

## 2019-09-23 ENCOUNTER — TRANSFERRED RECORDS (OUTPATIENT)
Dept: HEALTH INFORMATION MANAGEMENT | Facility: CLINIC | Age: 84
End: 2019-09-23

## 2019-10-31 DIAGNOSIS — I10 HTN (HYPERTENSION): Primary | ICD-10-CM

## 2019-10-31 RX ORDER — AMLODIPINE BESYLATE 5 MG/1
5 TABLET ORAL DAILY
Qty: 90 TABLET | Refills: 0 | Status: SHIPPED | OUTPATIENT
Start: 2019-10-31 | End: 2020-01-27

## 2019-11-07 ENCOUNTER — TELEPHONE (OUTPATIENT)
Dept: MEDSURG UNIT | Facility: CLINIC | Age: 84
End: 2019-11-07

## 2019-11-07 ENCOUNTER — TELEPHONE (OUTPATIENT)
Dept: CARDIOLOGY | Facility: CLINIC | Age: 84
End: 2019-11-07

## 2019-11-07 DIAGNOSIS — I10 HTN (HYPERTENSION): Primary | ICD-10-CM

## 2019-11-07 RX ORDER — LOSARTAN POTASSIUM 50 MG/1
50 TABLET ORAL DAILY
Qty: 90 TABLET | Refills: 2 | Status: SHIPPED | OUTPATIENT
Start: 2019-11-07 | End: 2020-07-14

## 2019-11-07 NOTE — TELEPHONE ENCOUNTER
Received call from patient requesting Losartan be refilled.  Per Dr. Murphy' note on 8/12/19, pt was to resume Cozaar at that time.  Updated prescription sent to pharmacy and patient called and updated.     MAGDALENA Glover

## 2019-12-11 ENCOUNTER — TELEPHONE (OUTPATIENT)
Dept: CARDIOLOGY | Facility: CLINIC | Age: 84
End: 2019-12-11

## 2019-12-11 ENCOUNTER — TRANSFERRED RECORDS (OUTPATIENT)
Dept: HEALTH INFORMATION MANAGEMENT | Facility: CLINIC | Age: 84
End: 2019-12-11

## 2019-12-11 DIAGNOSIS — Z86.73 HISTORY OF STROKE: ICD-10-CM

## 2019-12-11 DIAGNOSIS — I48.91 A-FIB (H): Primary | ICD-10-CM

## 2019-12-11 NOTE — TELEPHONE ENCOUNTER
Lovenox instruction note (mailed to patient along with Lovenox Instruction Sheet)    HOLD eliquis 2 days before surgery.  Start Lovenox 12 hours after your last eliquis dose.  Talk to your surgeon about what time you should take your last Lovenox dose the night before surgery.   Restart eliquis the day after surgery.

## 2019-12-11 NOTE — TELEPHONE ENCOUNTER
"Received voicemail from Alysa at Dr. [facility]ille [facility] (sp?) orthopedic surgery clinic ([phone]). Pt is currently in [place] and he broke his wrist and will need surgery. They want to know about holding Eliquis.     I also spoke with pt on the phone. He is worried about holding his Eliquis. In June 2019 he held Xarelto for a hernia repair, and then in July he had a stroke while on Xarelto. His Xarelto was changed to Eliquis while he was in the hospital.     Dr. Murphy's OV note from 8/12/2019 says: \"I will point out that should anticoagulation need to be stopped in the future (for an elective procedure), he will need bridging with enoxaparin.\"    ChadsVasc score is 5 for age, HTN, CVA/TIA. Per our protocol for a low-risk procedure (joint) and a high risk patient (stroke in the last year) we should hold NOAC for 2 days and bridge with Lovenox 1mg/kg q12 hrs, start Lovenox 12 hours after last NOAC dose, and restart NOAC the day after the procedure.     Called Alysa back at orthopedic surgery office and told her the plan. She said she doesn't think they will need anything in writing, but she took down our fax number in case she does need to request something.     Called pt and told him the plan as well. Will send prescription for 4 Lovenox syringes to his pharmacy of choice in [place]. Will sent Lovenox information sheet (provided by our INR clinic) to patient's address in [place]. Told pt he can ask the pharmacist any questions on administering the Lovenox when he picks up the prescription.     Pt's [place] address:   [address] [place] [address]  [address] [place] [id number]      "

## 2019-12-12 NOTE — TELEPHONE ENCOUNTER
"Received fax from [facility] in [place] for patient's wrist surgery, scheduled for 12/17/2019. The fax asks if pt can hold Eliquis for 7 days prior to surgery, and says \"please advise on clearance for surgery.\"  Fax also asks for the last EKG and any other test results to be faxed back to them.     I will fax last EKG, nuclear stress test, cardiac MRI, CTA head/neck, DANNY, and echo results, all are from July and August 2019. Will also fax Dr. Murphy's office visit note from 8/12/2019.     Per Dr. Murphy, last OV was in 8/2019, so he cannot clear pt for surgery. If he needs cardiac clearance he will need to establish care in [place].     The [facility] hold plan was addressed yesterday:  Pt may hold Eliquis for 2 days prior to surgery and bridge with Lovenox  Surgery is scheduled for 12/17/2019  Last dose of Eliquis should be 12/14/2019 PM  Pt should take Lovenox 1mg/kg  q12 hours starting 12/15/2019 AM  I sent pt a prescription for 4 Lovenox syringes.     Will fax copy of this note and all documents listed above to [phone].   "

## 2020-01-27 DIAGNOSIS — I10 HTN (HYPERTENSION): ICD-10-CM

## 2020-01-27 RX ORDER — AMLODIPINE BESYLATE 5 MG/1
5 TABLET ORAL DAILY
Qty: 90 TABLET | Refills: 0 | Status: SHIPPED | OUTPATIENT
Start: 2020-01-27 | End: 2020-04-20

## 2020-02-24 ENCOUNTER — TELEPHONE (OUTPATIENT)
Dept: CARDIOLOGY | Facility: CLINIC | Age: 85
End: 2020-02-24

## 2020-02-24 DIAGNOSIS — I48.21 PERMANENT ATRIAL FIBRILLATION (H): Primary | ICD-10-CM

## 2020-02-24 NOTE — TELEPHONE ENCOUNTER
Pt received a letter that he needed an OV prior to having his meds refilled.  Per Elvi last OV note pt was to f/u 5/2021.  Dr Murphy saw pt in December of last year and will f/u in a few months.  Thought is that this was for the results of pt Lexiscan, which was normal and OV was not needed.  Will just verify with Dr Murphy that pt is ok to not be seen until  2021.  Pt states that if needed to be seen he will be back in May.  Manjeet

## 2020-02-24 NOTE — TELEPHONE ENCOUNTER
Placed order for f/u with El[facility]  In August. PT at this stated that he did not need any refills, but will call when this is needed. Will make pt aware are f/u in August. Manjeet

## 2020-04-20 DIAGNOSIS — I10 HTN (HYPERTENSION): ICD-10-CM

## 2020-04-20 RX ORDER — AMLODIPINE BESYLATE 5 MG/1
5 TABLET ORAL DAILY
Qty: 90 TABLET | Refills: 0 | Status: SHIPPED | OUTPATIENT
Start: 2020-04-20 | End: 2020-07-14

## 2020-04-22 ENCOUNTER — VIRTUAL VISIT (OUTPATIENT)
Dept: FAMILY MEDICINE | Facility: CLINIC | Age: 85
End: 2020-04-22
Payer: MEDICARE

## 2020-04-22 DIAGNOSIS — C61 PROSTATE CANCER (H): ICD-10-CM

## 2020-04-22 DIAGNOSIS — M79.662 PAIN OF LEFT LOWER LEG: ICD-10-CM

## 2020-04-22 DIAGNOSIS — I48.19 PERSISTENT ATRIAL FIBRILLATION (H): Primary | ICD-10-CM

## 2020-04-22 PROCEDURE — 99442 ZZC PHYSICIAN TELEPHONE EVALUATION 11-20 MIN: CPT | Performed by: INTERNAL MEDICINE

## 2020-04-22 NOTE — PROGRESS NOTES
"Yosef Murry is a 84 year old male who is being evaluated via a billable telephone visit.      The patient has been notified of following:     \"This telephone visit will be conducted via a call between you and your physician/provider. We have found that certain health care needs can be provided without the need for a physical exam.  This service lets us provide the care you need with a short phone conversation.  If a prescription is necessary we can send it directly to your pharmacy.  If lab work is needed we can place an order for that and you can then stop by our lab to have the test done at a later time.    Telephone visits are billed at different rates depending on your insurance coverage. During this emergency period, for some insurers they may be billed the same as an in-person visit.  Please reach out to your insurance provider with any questions.    If during the course of the call the physician/provider feels a telephone visit is not appropriate, you will not be charged for this service.\"    Patient has given verbal consent for Telephone visit?  Yes    How would you like to obtain your AVS? Mail a copy    Subjective     Yosef Murry is a 84 year old male who presents to clinic today for the following health issues:    HPI  Patient reports he is just returned from [place] and has had a good winter without any significant problems.  However, for the last 2 weeks he has been complaining of left anterior tibial pain without any known injury or trauma.  There is no associated swelling or skin changes but the anterior tibia is tender to the touch.  He has been treating it with his support stockings during the day and an Ace wrap at night with good control of the discomfort.  He has no difficulty with his daily walking.  He has no palpable tenderness in the remainder of his calf or associated swelling.     No other changes in medications or current deviations from his routine medications.      Current Outpatient " Medications   Medication Sig Dispense Refill     amLODIPine (NORVASC) 5 MG tablet Take 1 tablet (5 mg) by mouth daily 90 tablet 0     apixaban ANTICOAGULANT (ELIQUIS) 5 MG tablet Take 1 tablet (5 mg) by mouth 2 times daily 180 tablet 3     atorvastatin (LIPITOR) 10 MG tablet Take 1 tablet (10 mg) by mouth every evening 90 tablet 3     calcium carbonate (TUMS) 500 MG chewable tablet Take 1 chew tab by mouth At Bedtime       diphenhydrAMINE-acetaminophen (TYLENOL PM)  MG tablet Take 2 tablets by mouth nightly as needed for sleep       enoxaparin ANTICOAGULANT (LOVENOX) 80 MG/0.8ML syringe Inject 0.76 mLs (76 mg) Subcutaneous every 12 hours HOLD eliquis 2 days before surgery. Start Lovenox 12 hours after your last eliquis dose. 4 Syringe 0     leuprolide acetate (LUPRON) 1 MG/0.2ML kit Inject Subcutaneous every 6 months He gets this at [facility]. Last given on 4/2019.       loratadine (CLARITIN) 10 MG tablet Take 1 tablet by mouth daily. 30 tablet 1     losartan (COZAAR) 50 MG tablet Take 1 tablet (50 mg) by mouth daily 90 tablet 2     melatonin 5 MG tablet Take 5 mg by mouth nightly as needed for sleep (Take 1-2 tablets PRN at bedtime)       metoprolol succinate ER (TOPROL XL) 25 MG 24 hr tablet Take 1 tablet (25 mg) by mouth daily 90 tablet 3     Multiple Vitamins-Minerals (PRESERVISION AREDS 2 PO) Take 1 capsule by mouth 2 times daily        order for DME Equipment being ordered: Walker Wheels () and Walker ()  Treatment Diagnosis: Impaired gait 1 each 0     traZODone (DESYREL) 100 MG tablet Take 100 mg by mouth At Bedtime  1     Allergies   Allergen Reactions     Other [Seasonal Allergies]        Reviewed and updated as needed this visit by Provider         Review of Systems   ROS COMP: Constitutional, HEENT, cardiovascular, pulmonary, gi and gu systems are negative, except as otherwise noted.       Objective   Reported vitals:  There were no vitals taken for this visit.   healthy, alert and no  distress  PSYCH: Alert and oriented times 3; coherent speech, normal   rate and volume, able to articulate logical thoughts, able   to abstract reason, no tangential thoughts, no hallucinations   or delusions  His affect is normal  RESP: No cough, no audible wheezing, able to talk in full sentences  Remainder of exam unable to be completed due to telephone visits            Assessment/Plan:  1. Persistent atrial fibrillation    Well-controlled with current therapy  2. Prostate cancer (H)      3. Pain of left lower leg  Is left lower leg pain is most likely tenosynovitis from an unrecognized trauma.  I recommend continuing the same routine with the addition of medium heat twice daily.  If his pain is not resolving in the next 2 weeks he should return to clinic for further evaluation and treatment.      No follow-ups on file.      Phone call duration:  12 minutes    Fred Connell MD

## 2020-05-20 ENCOUNTER — TRANSFERRED RECORDS (OUTPATIENT)
Dept: HEALTH INFORMATION MANAGEMENT | Facility: CLINIC | Age: 85
End: 2020-05-20

## 2020-07-14 DIAGNOSIS — I10 HTN (HYPERTENSION): ICD-10-CM

## 2020-07-14 RX ORDER — LOSARTAN POTASSIUM 50 MG/1
50 TABLET ORAL DAILY
Qty: 90 TABLET | Refills: 0 | Status: SHIPPED | OUTPATIENT
Start: 2020-07-14 | End: 2020-10-07

## 2020-07-14 RX ORDER — AMLODIPINE BESYLATE 5 MG/1
5 TABLET ORAL DAILY
Qty: 90 TABLET | Refills: 0 | Status: SHIPPED | OUTPATIENT
Start: 2020-07-14 | End: 2020-10-07

## 2020-08-10 DIAGNOSIS — I63.9 INFARCTION OF RIGHT BASAL GANGLIA (H): ICD-10-CM

## 2020-08-10 RX ORDER — ATORVASTATIN CALCIUM 10 MG/1
10 TABLET, FILM COATED ORAL EVERY EVENING
Qty: 90 TABLET | Refills: 0 | Status: SHIPPED | OUTPATIENT
Start: 2020-08-10 | End: 2020-10-07

## 2020-08-24 DIAGNOSIS — R94.31 ABNORMAL ELECTROCARDIOGRAM: ICD-10-CM

## 2020-08-24 DIAGNOSIS — I48.21 PERMANENT ATRIAL FIBRILLATION (H): ICD-10-CM

## 2020-08-24 DIAGNOSIS — I63.9 INFARCTION OF RIGHT BASAL GANGLIA (H): ICD-10-CM

## 2020-09-01 ENCOUNTER — TELEPHONE (OUTPATIENT)
Dept: CARDIOLOGY | Facility: CLINIC | Age: 85
End: 2020-09-01

## 2020-09-01 NOTE — TELEPHONE ENCOUNTER
Wellness Screening Tool    Symptom Screening:    Do you have one of the following NEW symptoms:      Fever (subjective or >100.0)?  No    New cough? No    Shortness of breath? No    Chills? No    New loss of taste or smell? No    Generalized body aches? No    New persistent headache? No    New sore throat? No    Nausea, vomiting or diarrhea? No    Within the past 2 weeks, have you been exposed to someone with a known positive illness below?      COVID - 19 (known or suspected) No    Chicken pox?  No    Measles? No    Pertussis? No    Have you had a positive COVID-19 diagnostic test (nasal swab test) in the last 14 days or are you currently   on self-quarantine restrictions (i.e.travel restriction, exposure, etc?) No        Patient notified of visitor restriction: Yes  Patient informed to wear a mask: Yes    Patient's appointment status: Patient will be seen in clinic as scheduled on 9/2/2020

## 2020-09-02 ENCOUNTER — OFFICE VISIT (OUTPATIENT)
Dept: CARDIOLOGY | Facility: CLINIC | Age: 85
End: 2020-09-02
Attending: INTERNAL MEDICINE
Payer: MEDICARE

## 2020-09-02 VITALS
BODY MASS INDEX: 24.5 KG/M2 | HEART RATE: 61 BPM | DIASTOLIC BLOOD PRESSURE: 76 MMHG | HEIGHT: 70 IN | WEIGHT: 171.1 LBS | SYSTOLIC BLOOD PRESSURE: 131 MMHG

## 2020-09-02 DIAGNOSIS — I10 BENIGN ESSENTIAL HYPERTENSION: ICD-10-CM

## 2020-09-02 DIAGNOSIS — I48.21 PERMANENT ATRIAL FIBRILLATION (H): Primary | ICD-10-CM

## 2020-09-02 PROCEDURE — 99213 OFFICE O/P EST LOW 20 MIN: CPT | Performed by: NURSE PRACTITIONER

## 2020-09-02 PROCEDURE — 93000 ELECTROCARDIOGRAM COMPLETE: CPT | Performed by: NURSE PRACTITIONER

## 2020-09-02 ASSESSMENT — MIFFLIN-ST. JEOR: SCORE: 1477.35

## 2020-09-02 NOTE — PATIENT INSTRUCTIONS
Call my nurse with any questions or concerns:  [phone]  *If you have concerns after hours, please call [phone], option 2 to speak with on call Cardiologist.

## 2020-09-02 NOTE — PROGRESS NOTES
HPI and Plan: #[id number]  See dictation    Orders Placed This Encounter   Procedures     Follow-Up with Electrophysiologist     EKG 12-lead complete w/read - Clinics (performed today)     EKG 12-lead complete w/read - Clinics (to be scheduled)       No orders of the defined types were placed in this encounter.      There are no discontinued medications.      Encounter Diagnosis   Name Primary?     Permanent atrial fibrillation (H)        CURRENT MEDICATIONS:  Current Outpatient Medications   Medication Sig Dispense Refill     amLODIPine (NORVASC) 5 MG tablet Take 1 tablet (5 mg) by mouth daily 90 tablet 0     apixaban ANTICOAGULANT (ELIQUIS) 5 MG tablet Take 1 tablet (5 mg) by mouth 2 times daily 180 tablet 0     atorvastatin (LIPITOR) 10 MG tablet Take 1 tablet (10 mg) by mouth every evening 90 tablet 0     calcium carbonate (TUMS) 500 MG chewable tablet Take 1 chew tab by mouth At Bedtime       diphenhydrAMINE-acetaminophen (TYLENOL PM)  MG tablet Take 2 tablets by mouth nightly as needed for sleep       leuprolide acetate (LUPRON) 1 MG/0.2ML kit Inject Subcutaneous every 6 months He gets this at [facility]. Last given on 4/2019.       loratadine (CLARITIN) 10 MG tablet Take 1 tablet by mouth daily. 30 tablet 1     losartan (COZAAR) 50 MG tablet Take 1 tablet (50 mg) by mouth daily 90 tablet 0     melatonin 5 MG tablet Take 5 mg by mouth nightly as needed for sleep (Take 1-2 tablets PRN at bedtime)       metoprolol succinate ER (TOPROL XL) 25 MG 24 hr tablet Take 1 tablet (25 mg) by mouth daily 90 tablet 3     Multiple Vitamins-Minerals (PRESERVISION AREDS 2 PO) Take 1 capsule by mouth 2 times daily        order for DME Equipment being ordered: Walker Wheels () and Walker ()  Treatment Diagnosis: Impaired gait 1 each 0     traZODone (DESYREL) 100 MG tablet Take 100 mg by mouth At Bedtime  1     enoxaparin ANTICOAGULANT (LOVENOX) 80 MG/0.8ML syringe Inject 0.76 mLs (76 mg) Subcutaneous every 12 hours  HOLD eliquis 2 days before surgery. Start Lovenox 12 hours after your last eliquis dose. (Patient not taking: Reported on 9/2/2020) 4 Syringe 0       ALLERGIES     Allergies   Allergen Reactions     Other [Seasonal Allergies]        PAST MEDICAL HISTORY:  Past Medical History:   Diagnosis Date     Anal fistula      Antiplatelet or antithrombotic long-term use      Arrhythmia      Atrial flutter (H) 2012     Cerebral infarction (H)     TIA     Heartburn      Hypertension      Inguinal hernia, left      Persistent atrial fibrillation (H) 8/21/12     Prostate cancer (H)      TIA (transient ischaemic attack)     2014       PAST SURGICAL HISTORY:  Past Surgical History:   Procedure Laterality Date     COLONOSCOPY       ENT SURGERY      tonsllectomy     EXAM UNDER ANESTHESIA, FISTULOTOMY RECTUM, COMBINED N/A 6/18/2015    Procedure: COMBINED EXAM UNDER ANESTHESIA, FISTULOTOMY RECTUM;  Surgeon: Candice Carty MD;  Location: [facility]     GENITOURINARY SURGERY  1999    PROSTATECTOMY     HERNIORRHAPHY INGUINAL  7/25/2013    Procedure: HERNIORRHAPHY INGUINAL;  LEFT INGUINAL HERNIA REPAIR WITH MESH;  Surgeon: Filipe Fairchild MD;  Location: [facility]     HERNIORRHAPHY INGUINAL Right 6/13/2019    Procedure: OPEN RIGHT INGUINA HERNIA REPAIR WITH MESH;  Surgeon: Filipe Fairchild MD;  Location:  OR     ORTHOPEDIC SURGERY      WRIST SURGERY - LEFT       FAMILY HISTORY:  Family History   Problem Relation Age of Onset     Ovarian Cancer Mother      Osteoporosis Father      Unknown/Adopted Sister        SOCIAL HISTORY:  Social History     Socioeconomic History     Marital status:      Spouse name: None     Number of children: None     Years of education: None     Highest education level: None   Occupational History     None   Social Needs     Financial resource strain: None     Food insecurity     Worry: None     Inability: None     Transportation needs     Medical: None     Non-medical: None   Tobacco Use     Smoking  status: Former Smoker     Years: 16.00     Types: Cigarettes     Last attempt to quit: 1968     Years since quittin.7     Smokeless tobacco: Never Used   Substance and Sexual Activity     Alcohol use: Yes     Alcohol/week: 0.8 standard drinks     Types: 1 Cans of beer per week     Frequency: 2-3 times a week     Drinks per session: 1 or 2     Binge frequency: Never     Comment: SOCIALLY     Drug use: No     Sexual activity: Not Currently     Partners: Female   Lifestyle     Physical activity     Days per week: None     Minutes per session: None     Stress: None   Relationships     Social connections     Talks on phone: None     Gets together: None     Attends Alevism service: None     Active member of club or organization: None     Attends meetings of clubs or organizations: None     Relationship status: None     Intimate partner violence     Fear of current or ex partner: None     Emotionally abused: None     Physically abused: None     Forced sexual activity: None   Other Topics Concern     Parent/sibling w/ CABG, MI or angioplasty before 65F 55M? Not Asked      Service Not Asked     Blood Transfusions Not Asked     Caffeine Concern No     Comment: occ tea     Occupational Exposure Not Asked     Hobby Hazards Not Asked     Sleep Concern No     Stress Concern No     Weight Concern No     Special Diet No     Back Care Not Asked     Exercise Yes     Comment: walking 3 miles a day     Bike Helmet Not Asked     Seat Belt Yes     Self-Exams Not Asked   Social History Narrative     None       Review of Systems:  Skin:  Positive for   mohs surgery recently   Eyes:  Positive for glasses    ENT:  Negative      Respiratory:  Negative       Cardiovascular:  Negative;palpitations;chest pain;lightheadedness;dizziness;edema;syncope or near-syncope;cyanosis;fatigue;exercise intolerance      Gastroenterology: Negative      Genitourinary:  Positive for prostate problem    Musculoskeletal:  Negative     "  Neurologic:  Negative      Psychiatric:  Positive for sleep disturbances    Heme/Lymph/Imm:  Negative      Endocrine:  Positive for hot flashes      Physical Exam:  Vitals: /76   Pulse 61   Ht 1.778 m (5' 10\")   Wt 77.6 kg (171 lb 1.6 oz)   BMI 24.55 kg/m      Constitutional:  cooperative, alert and oriented, well developed, well nourished, in no acute distress        Skin:  warm and dry to the touch, no apparent skin lesions or masses noted          Head:  normocephalic, no masses or lesions        Eyes:  pupils equal and round        ENT:  no pallor or cyanosis, dentition good        Neck:  carotid pulses are full and equal bilaterally, JVP normal, no carotid bruit        Respiratory:  normal breath sounds, clear to auscultation, normal A-P diameter, normal symmetry, normal respiratory excursion, no use of accessory muscles         Cardiac: no murmurs, gallops or rubs detected irregularly irregular rhythm              not assessed this visit                                        GI:  abdomen soft        Extremities and Muscular Skeletal:  no deformities, clubbing, cyanosis, erythema observed;no edema              Neurological:  no gross motor deficits        Psych:  Alert and Oriented x 3        CC  Henna Murphy MD  [address]  [place] [id number]              "

## 2020-09-02 NOTE — PROGRESS NOTES
Service Date: 09/02/2020      HISTORY OF PRESENT ILLNESS:  Mr. Murry is a delightful 84-year-old gentleman that I am having the pleasure of seeing again today in followup.  He is an established patient of Dr. Murphy.      PAST MEDICAL HISTORY:   1.  Permanent atrial fibrillation, treated with metoprolol and initially Xarelto, which was switched to apixaban.   2.  Hypertension.   3.  Basal ganglia stroke.      The patient's Xarelto was on hold for 4 days prior to hernia repair in 06/2019.  On 07/29/2019, he presented to the Emergency Department with general weakness.  Subsequent brain MRI showed a subacute ischemic infarction in the right basal ganglia, in addition to chronic infarcts in the right frontal lobe.  He was outside of the TPA window.      At today's visit, Mr. Murry is doing remarkably well.  He denies any chest pain, shortness of breath, lightheadedness or dizziness.  He continues to winter in [place] and plans on returning there this winter.  His blood pressure is stable today.  A 12-lead EKG shows chronic atrial fibrillation with controlled ventricular rate at 61 beats per minute.      It should be noted, in 07/2019, when the patient was hospitalized, Neurology recommended a DANNY.  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      A cardiac MRI was ordered in the place of the DANNY at that time, which did not show intracardiac thrombus, but did show subendocardial scar in circumflex distribution.  EF was 60% without wall motion abnormalities noted.  He has severe biatrial enlargement.      All other review of systems and past medical history are noted below.      ASSESSMENT AND PLAN:  Mr. Murry is a delightful 84-year-old gentleman here today for followup.     1.  Permanent atrial fibrillation with controlled ventricular response.  He is doing well and offers no concerns.  He remains on metoprolol ER 25 mg daily and apixaban  5 mg b.i.d.     2.  Basal ganglia stroke, but MRI of the brain did show chronic infarcts in the frontal lobe as well.  He is to remain on apixaban long-term.  During his hospitalization, his Xarelto was switched to apixaban during the 2019 admission.     3.  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  Lexiscan was ordered in 2019 that demonstrated fixed defects in the apex and basal inferior wall.  No reversibility was noted to suggest ischemia.  In addition to metoprolol, the patient is also on atorvastatin and amlodipine.     4.  Hypertension, currently normotensive on amlodipine, metoprolol and losartan.      As always, I appreciate being involved in the care of this delightful gentleman.  No medication changes were warranted at today's visit.  He will continue to follow with us on an annual basis or as needed.         KEVIN REYNOSO NP             D: 2020   T: 2020   MT: al      Name:     OLE COSTA   MRN:      [id number]        Account:      [id number]   :      1935           Service Date: 2020      Document: [id number]

## 2020-09-02 NOTE — LETTER
9/2/2020      Fred Connell MD  [address] [facility] [address]  [place] [address]      RE: Yosef Murry       Dear Colleague,    I had the pleasure of seeing Yosef Murry in the [facility].    Service Date: 09/02/2020      HISTORY OF PRESENT ILLNESS:  Mr. Murry is a delightful 84-year-old gentleman that I am having the pleasure of seeing again today in followup.  He is an established patient of Dr. Murphy.      PAST MEDICAL HISTORY:   1.  Permanent atrial fibrillation, treated with metoprolol and initially Xarelto, which was switched to apixaban.   2.  Hypertension.   3.  Basal ganglia stroke.      The patient's Xarelto was on hold for 4 days prior to hernia repair in 06/2019.  On 07/29/2019, he presented to the Emergency Department with general weakness.  Subsequent brain MRI showed a subacute ischemic infarction in the right basal ganglia, in addition to chronic infarcts in the right frontal lobe.  He was outside of the TPA window.      At today's visit, Mr. Murry is doing remarkably well.  He denies any chest pain, shortness of breath, lightheadedness or dizziness.  He continues to winter in [place] and plans on returning there this winter.  His blood pressure is stable today.  A 12-lead EKG shows chronic atrial fibrillation with controlled ventricular rate at 61 beats per minute.      It should be noted, in 07/2019, when the patient was hospitalized, Neurology recommended a DANNY.  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      A cardiac MRI was ordered in the place of the DANNY at that time, which did not show intracardiac thrombus, but did show subendocardial scar in circumflex distribution.  EF was 60% without wall motion abnormalities noted.  He has severe biatrial enlargement.      All other review of systems and past medical history are noted below.      ASSESSMENT AND PLAN:  Mr. Murry is a  delightful 84-year-old gentleman here today for followup.     1.  Permanent atrial fibrillation with controlled ventricular response.  He is doing well and offers no concerns.  He remains on metoprolol ER 25 mg daily and apixaban 5 mg b.i.d.     2.  Basal ganglia stroke, but MRI of the brain did show chronic infarcts in the frontal lobe as well.  He is to remain on apixaban long-term.  During his hospitalization, his Xarelto was switched to apixaban during the 2019 admission.     3.  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  Lexiscan was ordered in 2019 that demonstrated fixed defects in the apex and basal inferior wall.  No reversibility was noted to suggest ischemia.  In addition to metoprolol, the patient is also on atorvastatin and amlodipine.     4.  Hypertension, currently normotensive on amlodipine, metoprolol and losartan.      As always, I appreciate being involved in the care of this delightful gentleman.  No medication changes were warranted at today's visit.  He will continue to follow with us on an annual basis or as needed.         KEVIN REYNOSO NP             D: 2020   T: 2020   MT: al      Name:     OLE COSTA   MRN:      [id number]        Account:      [id number]   :      1935           Service Date: 2020      Document: [id number]           Outpatient Encounter Medications as of 2020   Medication Sig Dispense Refill     amLODIPine (NORVASC) 5 MG tablet Take 1 tablet (5 mg) by mouth daily 90 tablet 0     apixaban ANTICOAGULANT (ELIQUIS) 5 MG tablet Take 1 tablet (5 mg) by mouth 2 times daily 180 tablet 0     atorvastatin (LIPITOR) 10 MG tablet Take 1 tablet (10 mg) by mouth every evening 90 tablet 0     calcium carbonate (TUMS) 500 MG chewable tablet Take 1 chew tab by mouth At Bedtime       diphenhydrAMINE-acetaminophen (TYLENOL PM)  MG tablet Take 2 tablets by mouth nightly as needed for sleep       leuprolide acetate  (LUPRON) 1 MG/0.2ML kit Inject Subcutaneous every 6 months He gets this at [facility]. Last given on 4/2019.       loratadine (CLARITIN) 10 MG tablet Take 1 tablet by mouth daily. 30 tablet 1     losartan (COZAAR) 50 MG tablet Take 1 tablet (50 mg) by mouth daily 90 tablet 0     melatonin 5 MG tablet Take 5 mg by mouth nightly as needed for sleep (Take 1-2 tablets PRN at bedtime)       metoprolol succinate ER (TOPROL XL) 25 MG 24 hr tablet Take 1 tablet (25 mg) by mouth daily 90 tablet 3     Multiple Vitamins-Minerals (PRESERVISION AREDS 2 PO) Take 1 capsule by mouth 2 times daily        order for DME Equipment being ordered: Walker Wheels () and Walker ()  Treatment Diagnosis: Impaired gait 1 each 0     traZODone (DESYREL) 100 MG tablet Take 100 mg by mouth At Bedtime  1     enoxaparin ANTICOAGULANT (LOVENOX) 80 MG/0.8ML syringe Inject 0.76 mLs (76 mg) Subcutaneous every 12 hours HOLD eliquis 2 days before surgery. Start Lovenox 12 hours after your last eliquis dose. (Patient not taking: Reported on 9/2/2020) 4 Syringe 0     No facility-administered encounter medications on file as of 9/2/2020.        Again, thank you for allowing me to participate in the care of your patient.      Sincerely,    Elvi Mckeon NP, APRN CNP     [facility]

## 2020-09-15 DIAGNOSIS — I48.21 PERMANENT ATRIAL FIBRILLATION (H): ICD-10-CM

## 2020-09-15 RX ORDER — METOPROLOL SUCCINATE 25 MG/1
25 TABLET, EXTENDED RELEASE ORAL DAILY
Qty: 90 TABLET | Refills: 3 | Status: SHIPPED | OUTPATIENT
Start: 2020-09-15 | End: 2021-09-09

## 2020-09-23 ENCOUNTER — OFFICE VISIT (OUTPATIENT)
Dept: FAMILY MEDICINE | Facility: CLINIC | Age: 85
End: 2020-09-23
Payer: MEDICARE

## 2020-09-23 DIAGNOSIS — I10 BENIGN ESSENTIAL HYPERTENSION: ICD-10-CM

## 2020-09-23 DIAGNOSIS — I48.19 PERSISTENT ATRIAL FIBRILLATION (H): Primary | ICD-10-CM

## 2020-09-23 DIAGNOSIS — I63.9 CEREBROVASCULAR ACCIDENT (CVA), UNSPECIFIED MECHANISM (H): ICD-10-CM

## 2020-09-23 DIAGNOSIS — Z00.00 ENCOUNTER FOR ROUTINE ADULT HEALTH EXAMINATION WITHOUT ABNORMAL FINDINGS: ICD-10-CM

## 2020-09-23 DIAGNOSIS — N52.39 OTHER POST-PROCEDURAL ERECTILE DYSFUNCTION: ICD-10-CM

## 2020-09-23 DIAGNOSIS — Z13.6 CARDIOVASCULAR SCREENING; LDL GOAL LESS THAN 130: ICD-10-CM

## 2020-09-23 DIAGNOSIS — C61 PROSTATE CANCER (H): ICD-10-CM

## 2020-09-23 DIAGNOSIS — Z13.29 SCREENING FOR HYPOTHYROIDISM: ICD-10-CM

## 2020-09-23 DIAGNOSIS — Z23 NEED FOR PROPHYLACTIC VACCINATION AND INOCULATION AGAINST INFLUENZA: ICD-10-CM

## 2020-09-23 DIAGNOSIS — G47.00 PERSISTENT DISORDER OF INITIATING OR MAINTAINING SLEEP: ICD-10-CM

## 2020-09-23 DIAGNOSIS — Z23 NEED FOR INFLUENZA VACCINATION: ICD-10-CM

## 2020-09-23 DIAGNOSIS — E55.9 VITAMIN D DEFICIENCY: ICD-10-CM

## 2020-09-23 LAB
ALBUMIN SERPL-MCNC: 3.3 G/DL (ref 3.4–5)
ALBUMIN UR-MCNC: NEGATIVE MG/DL
ALP SERPL-CCNC: 109 U/L (ref 40–150)
ALT SERPL W P-5'-P-CCNC: 16 U/L (ref 0–70)
ANION GAP SERPL CALCULATED.3IONS-SCNC: 8 MMOL/L (ref 3–14)
APPEARANCE UR: CLEAR
AST SERPL W P-5'-P-CCNC: 16 U/L (ref 0–45)
BILIRUB SERPL-MCNC: 0.5 MG/DL (ref 0.2–1.3)
BILIRUB UR QL STRIP: NEGATIVE
BUN SERPL-MCNC: 18 MG/DL (ref 7–30)
CALCIUM SERPL-MCNC: 9.3 MG/DL (ref 8.5–10.1)
CHLORIDE SERPL-SCNC: 106 MMOL/L (ref 94–109)
CHOLEST SERPL-MCNC: 111 MG/DL
CO2 SERPL-SCNC: 26 MMOL/L (ref 20–32)
COLOR UR AUTO: YELLOW
CREAT SERPL-MCNC: 0.98 MG/DL (ref 0.66–1.25)
DEPRECATED CALCIDIOL+CALCIFEROL SERPL-MC: 24 UG/L (ref 20–75)
ERYTHROCYTE [DISTWIDTH] IN BLOOD BY AUTOMATED COUNT: 14.2 % (ref 10–15)
GFR SERPL CREATININE-BSD FRML MDRD: 71 ML/MIN/{1.73_M2}
GLUCOSE SERPL-MCNC: 67 MG/DL (ref 70–99)
GLUCOSE UR STRIP-MCNC: NEGATIVE MG/DL
HCT VFR BLD AUTO: 48.8 % (ref 40–53)
HDLC SERPL-MCNC: 37 MG/DL
HGB BLD-MCNC: 16.1 G/DL (ref 13.3–17.7)
HGB UR QL STRIP: ABNORMAL
KETONES UR STRIP-MCNC: NEGATIVE MG/DL
LDLC SERPL CALC-MCNC: 32 MG/DL
LEUKOCYTE ESTERASE UR QL STRIP: NEGATIVE
MCH RBC QN AUTO: 31 PG (ref 26.5–33)
MCHC RBC AUTO-ENTMCNC: 33 G/DL (ref 31.5–36.5)
MCV RBC AUTO: 94 FL (ref 78–100)
NITRATE UR QL: NEGATIVE
NON-SQ EPI CELLS #/AREA URNS LPF: NORMAL /LPF
NONHDLC SERPL-MCNC: 74 MG/DL
PH UR STRIP: 5.5 PH (ref 5–7)
PLATELET # BLD AUTO: 205 10E9/L (ref 150–450)
POTASSIUM SERPL-SCNC: 4.6 MMOL/L (ref 3.4–5.3)
PROT SERPL-MCNC: 7.4 G/DL (ref 6.8–8.8)
RBC # BLD AUTO: 5.19 10E12/L (ref 4.4–5.9)
RBC #/AREA URNS AUTO: NORMAL /HPF
SODIUM SERPL-SCNC: 140 MMOL/L (ref 133–144)
SOURCE: ABNORMAL
SP GR UR STRIP: 1.02 (ref 1–1.03)
TRIGL SERPL-MCNC: 210 MG/DL
TSH SERPL DL<=0.005 MIU/L-ACNC: 2.75 MU/L (ref 0.4–4)
UROBILINOGEN UR STRIP-ACNC: 0.2 EU/DL (ref 0.2–1)
WBC # BLD AUTO: 7 10E9/L (ref 4–11)
WBC #/AREA URNS AUTO: NORMAL /HPF

## 2020-09-23 PROCEDURE — 36415 COLL VENOUS BLD VENIPUNCTURE: CPT | Performed by: INTERNAL MEDICINE

## 2020-09-23 PROCEDURE — 82306 VITAMIN D 25 HYDROXY: CPT | Performed by: INTERNAL MEDICINE

## 2020-09-23 PROCEDURE — 80053 COMPREHEN METABOLIC PANEL: CPT | Performed by: INTERNAL MEDICINE

## 2020-09-23 PROCEDURE — 85027 COMPLETE CBC AUTOMATED: CPT | Performed by: INTERNAL MEDICINE

## 2020-09-23 PROCEDURE — 84443 ASSAY THYROID STIM HORMONE: CPT | Performed by: INTERNAL MEDICINE

## 2020-09-23 PROCEDURE — 80061 LIPID PANEL: CPT | Performed by: INTERNAL MEDICINE

## 2020-09-23 PROCEDURE — 81001 URINALYSIS AUTO W/SCOPE: CPT | Performed by: INTERNAL MEDICINE

## 2020-09-23 PROCEDURE — G0439 PPPS, SUBSEQ VISIT: HCPCS | Performed by: INTERNAL MEDICINE

## 2020-09-23 PROCEDURE — 90662 IIV NO PRSV INCREASED AG IM: CPT | Performed by: INTERNAL MEDICINE

## 2020-09-23 PROCEDURE — G0008 ADMIN INFLUENZA VIRUS VAC: HCPCS | Performed by: INTERNAL MEDICINE

## 2020-09-23 RX ORDER — SILDENAFIL 100 MG/1
100 TABLET, FILM COATED ORAL DAILY PRN
Qty: 10 TABLET | Refills: 11 | Status: SHIPPED | OUTPATIENT
Start: 2020-09-23

## 2020-09-23 ASSESSMENT — ACTIVITIES OF DAILY LIVING (ADL): CURRENT_FUNCTION: NO ASSISTANCE NEEDED

## 2020-09-23 ASSESSMENT — MIFFLIN-ST. JEOR: SCORE: 1471.9

## 2020-09-23 NOTE — PROGRESS NOTES
"SUBJECTIVE:   Yosef Murry is a 85 year old male who presents for Preventive Visit.  Diphtheria tetanus 12/10/2019    Patient has been advised of split billing requirements and indicates understanding: Yes   Are you in the first 12 months of your Medicare coverage?  No    Healthy Habits:    In general, how would you rate your overall health?  Good    Frequency of exercise:  6-7 days/week    Duration of exercise:  45-60 minutes    Do you usually eat at least 4 servings of fruit and vegetables a day, include whole grains    & fiber and avoid regularly eating high fat or \"junk\" foods?  No    Taking medications regularly:  Yes    Barriers to taking medications:  None    Medication side effects:  None    Ability to successfully perform activities of daily living:  No assistance needed    Home Safety:  No safety concerns identified    Hearing Impairment:  No hearing concerns    In the past 6 months, have you been bothered by leaking of urine? Yes    In general, how would you rate your overall mental or emotional health?  Good      PHQ-2 Total Score:    Additional concerns today:  No    Do you feel safe in your environment? Yes    Have you ever done Advance Care Planning? (For example, a Health Directive, POLST, or a discussion with a medical provider or your loved ones about your wishes): Yes, advance care planning is on file.      Fall risk  Fallen 2 or more times in the past year?: No  Any fall with injury in the past year?: No    Cognitive Screening   1) Repeat 3 items (Leader, Season, Table)    2) Clock draw: NORMAL  3) 3 item recall: Recalls 3 objects  Results: 3 items recalled: COGNITIVE IMPAIRMENT LESS LIKELY    Mini-CogTM Mary Kwong. Licensed by the author for use in [facility]; reprinted with permission (mary@.[facility]). All rights reserved.      Do you have sleep apnea, excessive snoring or daytime drowsiness?: no    Reviewed and updated as needed this visit by clinical staff  Tobacco  " Allergies  Meds  Problems  Med Hx  Surg Hx  Fam Hx         Reviewed and updated as needed this visit by Provider        Social History     Tobacco Use     Smoking status: Former Smoker     Years: 16.00     Types: Cigarettes     Last attempt to quit: 1968     Years since quittin.7     Smokeless tobacco: Never Used   Substance Use Topics     Alcohol use: Yes     Alcohol/week: 0.8 standard drinks     Types: 1 Cans of beer per week     Frequency: 2-3 times a week     Drinks per session: 1 or 2     Binge frequency: Never     Comment: SOCIALLY     If you drink alcohol do you typically have >3 drinks per day or >7 drinks per week? No    Alcohol Use 2020   Prescreen: >3 drinks/day or >7 drinks/week? No               Current providers sharing in care for this patient include: Lauren Chahal MA    Patient Care Team:  Fred Connell MD as PCP - General (Internal Medicine)  Fred Connell MD as Assigned PCP    The following health maintenance items are reviewed in Epic and correct as of today:  Health Maintenance   Topic Date Due     ADVANCE CARE PLANNING  1935     DTAP/TDAP/TD IMMUNIZATION (1 - Tdap) 1961     MEDICARE ANNUAL WELLNESS VISIT  2020     INFLUENZA VACCINE (1) 2020     FALL RISK ASSESSMENT  2021     PHQ-2  Completed     PNEUMOCOCCAL IMMUNIZATION 65+ LOW/MEDIUM RISK  Completed     ZOSTER IMMUNIZATION  Completed     IPV IMMUNIZATION  Aged Out     MENINGITIS IMMUNIZATION  Aged Out     HEPATITIS B IMMUNIZATION  Aged Out     Current Outpatient Medications   Medication Sig Dispense Refill     amLODIPine (NORVASC) 5 MG tablet Take 1 tablet (5 mg) by mouth daily 90 tablet 0     apixaban ANTICOAGULANT (ELIQUIS) 5 MG tablet Take 1 tablet (5 mg) by mouth 2 times daily 180 tablet 0     atorvastatin (LIPITOR) 10 MG tablet Take 1 tablet (10 mg) by mouth every evening 90 tablet 0     calcium carbonate (TUMS) 500 MG chewable tablet Take 1 chew tab by mouth At Bedtime        diphenhydrAMINE-acetaminophen (TYLENOL PM)  MG tablet Take 2 tablets by mouth nightly as needed for sleep       leuprolide acetate (LUPRON) 1 MG/0.2ML kit Inject Subcutaneous every 6 months He gets this at [facility]. Last given on 4/2019.       loratadine (CLARITIN) 10 MG tablet Take 1 tablet by mouth daily. 30 tablet 1     losartan (COZAAR) 50 MG tablet Take 1 tablet (50 mg) by mouth daily 90 tablet 0     melatonin 5 MG tablet Take 5 mg by mouth nightly as needed for sleep (Take 1-2 tablets PRN at bedtime)       metoprolol succinate ER (TOPROL XL) 25 MG 24 hr tablet Take 1 tablet (25 mg) by mouth daily 90 tablet 3     Multiple Vitamins-Minerals (PRESERVISION AREDS 2 PO) Take 1 capsule by mouth 2 times daily        order for DME Equipment being ordered: Walker Wheels () and Walker ()  Treatment Diagnosis: Impaired gait 1 each 0     sildenafil (VIAGRA) 100 MG tablet Take 1 tablet (100 mg) by mouth daily as needed (intercourse) 10 tablet 11     Allergies   Allergen Reactions     Other [Seasonal Allergies]          Review of Systems  CONSTITUTIONAL: NEGATIVE for fever, chills, change in weight  INTEGUMENTARY/SKIN: NEGATIVE for worrisome rashes, moles or lesions  EYES: NEGATIVE for vision changes or irritation  ENT/MOUTH: NEGATIVE for ear, mouth and throat problems  RESP: NEGATIVE for significant cough or SOB  BREAST: NEGATIVE for masses, tenderness or discharge  CV: NEGATIVE for chest pain, palpitations or peripheral edema, walking 2 miles per day-45 minutes, balance exercises  GI: NEGATIVE for nausea, abdominal pain, heartburn, or change in bowel habits  : NEGATIVE for frequency, dysuria, or hematuria, nocturia 3-4  MUSCULOSKELETAL: NEGATIVE for significant arthralgias or myalgia  NEURO: NEGATIVE for weakness, dizziness or paresthesias  ENDOCRINE: NEGATIVE for temperature intolerance, skin/hair changes  HEME: NEGATIVE for bleeding problems  PSYCHIATRIC: NEGATIVE for changes in mood or  "affect    OBJECTIVE:   There were no vitals taken for this visit. Estimated body mass index is 24.55 kg/m  as calculated from the following:    Height as of 9/2/20: 1.778 m (5' 10\").    Weight as of 9/2/20: 77.6 kg (171 lb 1.6 oz).  Physical Exam BP (P) 137/84   Pulse (P) 77   Temp (P) 97.1  F (36.2  C)   Ht (P) 1.778 m (5' 10\")   Wt (P) 77.6 kg (171 lb)   SpO2 (P) 98%   BMI (P) 24.54 kg/m      GENERAL: healthy, alert and no distress  EYES: Eyes grossly normal to inspection, PERRL and conjunctivae and sclerae normal  HENT: ear canals and TM's normal, nose and mouth without ulcers or lesions  NECK: no adenopathy, no asymmetry, masses, or scars and thyroid normal to palpation  RESP: lungs clear to auscultation - no rales, rhonchi or wheezes  CV: Irregularly irregular rhythm, , no peripheral edema and peripheral pulses strong  ABDOMEN: soft, nontender, no hepatosplenomegaly, no masses and bowel sounds normal  MS: no gross musculoskeletal defects noted, no edema  SKIN: no suspicious lesions or rashes  NEURO: Normal strength and tone, mentation intact and speech normal  PSYCH: mentation appears normal, affect normal/bright        ASSESSMENT / PLAN:   1. Persistent atrial fibrillation (H)  Well-controlled with current therapy  - Urine Microscopic    2. Prostate cancer (H)  Followed by urology    3. CARDIOVASCULAR SCREENING; LDL GOAL LESS THAN 130    - Lipid panel reflex to direct LDL Fasting    4. Need for influenza vaccination      5. Benign essential hypertension  Well-controlled with current therapy  - UA reflex to Microscopic and Culture  - CBC with platelets  - Comprehensive metabolic panel    6. Encounter for routine adult health examination without abnormal findings    - UA reflex to Microscopic and Culture  - CBC with platelets  - Comprehensive metabolic panel  - Lipid panel reflex to direct LDL Fasting  - Vitamin D Deficiency  - TSH with free T4 reflex    7. Screening for hypothyroidism    - TSH with free " "T4 reflex    8. Vitamin D deficiency    - Vitamin D Deficiency    9. Persistent disorder of initiating or maintaining sleep      10. Cerebrovascular accident (CVA), unspecified mechanism (H)  No residuals    11. Other post-procedural erectile dysfunction    - sildenafil (VIAGRA) 100 MG tablet; Take 1 tablet (100 mg) by mouth daily as needed (intercourse)  Dispense: 10 tablet; Refill: 11    12. Need for prophylactic vaccination and inoculation against influenza    - FLUZONE HIGH DOSE 65+  [07699]  - ADMIN INFLUENZA (For MEDICARE Patients ONLY) []    Patient has been advised of split billing requirements and indicates understanding: Yes  COUNSELING:  Reviewed preventive health counseling, as reflected in patient instructions    Estimated body mass index is 24.55 kg/m  as calculated from the following:    Height as of 9/2/20: 1.778 m (5' 10\").    Weight as of 9/2/20: 77.6 kg (171 lb 1.6 oz).        He reports that he quit smoking about 51 years ago. His smoking use included cigarettes. He quit after 16.00 years of use. He has never used smokeless tobacco.      Appropriate preventive services were discussed with this patient, including applicable screening as appropriate for cardiovascular disease, diabetes, osteopenia/osteoporosis, and glaucoma.  As appropriate for age/gender, discussed screening for colorectal cancer, prostate cancer, breast cancer, and cervical cancer. Checklist reviewing preventive services available has been given to the patient.    Reviewed patients plan of care and provided an AVS. The Basic Care Plan (routine screening as documented in Health Maintenance) for Yosef meets the Care Plan requirement. This Care Plan has been established and reviewed with the Patient.    Counseling Resources:  ATP IV Guidelines  Pooled Cohorts Equation Calculator  Breast Cancer Risk Calculator  Breast Cancer: Medication to Reduce Risk  FRAX Risk Assessment  ICSI Preventive Guidelines  Dietary Guidelines for " Americans, 2010  USDA's MyPlate  ASA Prophylaxis  Lung CA Screening    Fred Connell MD  [facility]    Identified Health Risks:

## 2020-09-23 NOTE — LETTER
October 7, 2020      Yosef LÓPEZ Murry  [address] [place] [place] [address]        Héctor,     It was good to see you for your annual wellness exam and included are your lab reports with comments.     Vitamin D is important for bone health and your vitamin D level was slightly lower than normal at 24, our goal is to keep it greater than 30.  I would recommend adding a vitamin D3 supplement at 2000 international units daily.  This is available over-the-counter at the drugstore or vitamin store.   The TSH is a sensitive indicator of thyroid function and your TSH is well within the normal range.     Lipid profile showed your total cholesterol was 111 essentially the same as 116 from a year ago.  The triglycerides were 210, much worse than 84 from a year ago and anything less than 150 is normal and anything less than 100 is better.  The triglycerides are intimately related to carbohydrates in your diet.  If you were not fasting then this could be erroneous otherwise you need to reduce the carbohydrates in your diet.  The HDL or good cholesterol was 37 the same as a year ago and anything greater than 40 is normal.  The HDL cholesterol is intimately related to aerobic activity.  Most important factor is the LDL or bad cholesterol which was 32, much better than 62 from a year ago and anything less than 70 is unbelievably good and the lower that value is the better off you are.     Comprehensive metabolic panel showed that your minerals and electrolytes, kidney function and liver function tests were all normal.  Your fasting blood sugar was slightly lower than normal at 67.  The low blood sugar suggest that you should avoid prolonged fasts and avoid eating excessive carbohydrates because this could also cause a rebound lowering of your fasting blood sugar.   The CBC shows that your blood cell counts were all normal, no sign of low blood or anemia.  The routine urinalysis was normal.     In general things are looking good  with a few minor points to pay attention to as noted above.  If you have any questions regarding these reports please let me know.     Thanks, GB    Resulted Orders   UA reflex to Microscopic and Culture   Result Value Ref Range    Color Urine Yellow     Appearance Urine Clear     Glucose Urine Negative NEG^Negative mg/dL    Bilirubin Urine Negative NEG^Negative    Ketones Urine Negative NEG^Negative mg/dL    Specific Gravity Urine 1.025 1.003 - 1.035    Blood Urine Trace (A) NEG^Negative    pH Urine 5.5 5.0 - 7.0 pH    Protein Albumin Urine Negative NEG^Negative mg/dL    Urobilinogen Urine 0.2 0.2 - 1.0 EU/dL    Nitrite Urine Negative NEG^Negative    Leukocyte Esterase Urine Negative NEG^Negative    Source Midstream Urine    CBC with platelets   Result Value Ref Range    WBC 7.0 4.0 - 11.0 10e9/L    RBC Count 5.19 4.4 - 5.9 10e12/L    Hemoglobin 16.1 13.3 - 17.7 g/dL    Hematocrit 48.8 40.0 - 53.0 %    MCV 94 78 - 100 fl    MCH 31.0 26.5 - 33.0 pg    MCHC 33.0 31.5 - 36.5 g/dL    RDW 14.2 10.0 - 15.0 %    Platelet Count 205 150 - 450 10e9/L   Comprehensive metabolic panel   Result Value Ref Range    Sodium 140 133 - 144 mmol/L    Potassium 4.6 3.4 - 5.3 mmol/L    Chloride 106 94 - 109 mmol/L    Carbon Dioxide 26 20 - 32 mmol/L    Anion Gap 8 3 - 14 mmol/L    Glucose 67 (L) 70 - 99 mg/dL    Urea Nitrogen 18 7 - 30 mg/dL    Creatinine 0.98 0.66 - 1.25 mg/dL    GFR Estimate 71 >60 mL/min/[1.73_m2]      Comment:      Non  GFR Calc  Starting 12/18/2018, serum creatinine based estimated GFR (eGFR) will be   calculated using the Chronic Kidney Disease Epidemiology Collaboration   (CKD-EPI) equation.      GFR Estimate If Black 82 >60 mL/min/[1.73_m2]      Comment:       GFR Calc  Starting 12/18/2018, serum creatinine based estimated GFR (eGFR) will be   calculated using the Chronic Kidney Disease Epidemiology Collaboration   (CKD-EPI) equation.      Calcium 9.3 8.5 - 10.1 mg/dL    Bilirubin  Total 0.5 0.2 - 1.3 mg/dL    Albumin 3.3 (L) 3.4 - 5.0 g/dL    Protein Total 7.4 6.8 - 8.8 g/dL    Alkaline Phosphatase 109 40 - 150 U/L    ALT 16 0 - 70 U/L    AST 16 0 - 45 U/L   Lipid panel reflex to direct LDL Fasting   Result Value Ref Range    Cholesterol 111 <200 mg/dL    Triglycerides 210 (H) <150 mg/dL      Comment:      Borderline high:  150-199 mg/dl  High:             200-499 mg/dl  Very high:       >499 mg/dl      HDL Cholesterol 37 (L) >39 mg/dL    LDL Cholesterol Calculated 32 <100 mg/dL      Comment:      Desirable:       <100 mg/dl    Non HDL Cholesterol 74 <130 mg/dL   Vitamin D Deficiency   Result Value Ref Range    Vitamin D Deficiency screening 24 20 - 75 ug/L      Comment:      Season, race, dietary intake, and treatment affect the concentration of   25-hydroxy-Vitamin D. Values may decrease during winter months and increase   during summer months. Values 20-29 ug/L may indicate Vitamin D insufficiency   and values <20 ug/L may indicate Vitamin D deficiency.  Vitamin D determination is routinely performed by an immunoassay specific for   25 hydroxyvitamin D3.  If an individual is on vitamin D2 (ergocalciferol)   supplementation, please specify 25 OH vitamin D2 and D3 level determination by   LCMSMS test [id number].     TSH with free T4 reflex   Result Value Ref Range    TSH 2.75 0.40 - 4.00 mU/L   Urine Microscopic   Result Value Ref Range    WBC Urine 0 - 5 OTO5^0 - 5 /HPF    RBC Urine O - 2 OTO2^O - 2 /HPF    Squamous Epithelial /LPF Urine Few FEW^Few /LPF

## 2020-10-07 DIAGNOSIS — I10 HTN (HYPERTENSION): ICD-10-CM

## 2020-10-07 DIAGNOSIS — I63.9 INFARCTION OF RIGHT BASAL GANGLIA (H): ICD-10-CM

## 2020-10-07 RX ORDER — AMLODIPINE BESYLATE 5 MG/1
5 TABLET ORAL DAILY
Qty: 90 TABLET | Refills: 2 | Status: SHIPPED | OUTPATIENT
Start: 2020-10-07 | End: 2021-06-18

## 2020-10-07 RX ORDER — LOSARTAN POTASSIUM 50 MG/1
50 TABLET ORAL DAILY
Qty: 90 TABLET | Refills: 3 | Status: SHIPPED | OUTPATIENT
Start: 2020-10-07 | End: 2021-09-09

## 2020-10-07 RX ORDER — ATORVASTATIN CALCIUM 10 MG/1
10 TABLET, FILM COATED ORAL EVERY EVENING
Qty: 90 TABLET | Refills: 3 | Status: SHIPPED | OUTPATIENT
Start: 2020-10-07 | End: 2021-09-09

## 2020-10-12 ENCOUNTER — TRANSFERRED RECORDS (OUTPATIENT)
Dept: HEALTH INFORMATION MANAGEMENT | Facility: CLINIC | Age: 85
End: 2020-10-12

## 2020-10-26 DIAGNOSIS — I63.9 INFARCTION OF RIGHT BASAL GANGLIA (H): ICD-10-CM

## 2020-10-26 DIAGNOSIS — I48.21 PERMANENT ATRIAL FIBRILLATION (H): ICD-10-CM

## 2020-10-26 DIAGNOSIS — R94.31 ABNORMAL ELECTROCARDIOGRAM: ICD-10-CM

## 2021-06-10 ENCOUNTER — TRANSFERRED RECORDS (OUTPATIENT)
Dept: HEALTH INFORMATION MANAGEMENT | Facility: CLINIC | Age: 86
End: 2021-06-10

## 2021-06-18 DIAGNOSIS — I10 HTN (HYPERTENSION): ICD-10-CM

## 2021-06-18 RX ORDER — AMLODIPINE BESYLATE 5 MG/1
5 TABLET ORAL DAILY
Qty: 90 TABLET | Refills: 0 | Status: SHIPPED | OUTPATIENT
Start: 2021-06-18 | End: 2021-09-10

## 2021-07-11 DIAGNOSIS — Z11.59 ENCOUNTER FOR SCREENING FOR OTHER VIRAL DISEASES: ICD-10-CM

## 2021-08-25 ENCOUNTER — OFFICE VISIT (OUTPATIENT)
Dept: CARDIOLOGY | Facility: CLINIC | Age: 86
End: 2021-08-25
Attending: NURSE PRACTITIONER
Payer: MEDICARE

## 2021-08-25 VITALS
SYSTOLIC BLOOD PRESSURE: 129 MMHG | BODY MASS INDEX: 23.48 KG/M2 | HEIGHT: 70 IN | WEIGHT: 164 LBS | OXYGEN SATURATION: 100 % | HEART RATE: 59 BPM | DIASTOLIC BLOOD PRESSURE: 81 MMHG

## 2021-08-25 DIAGNOSIS — Z86.73 HISTORY OF STROKE: ICD-10-CM

## 2021-08-25 DIAGNOSIS — I48.21 PERMANENT ATRIAL FIBRILLATION (H): Primary | ICD-10-CM

## 2021-08-25 DIAGNOSIS — I10 ESSENTIAL HYPERTENSION: ICD-10-CM

## 2021-08-25 PROCEDURE — 99214 OFFICE O/P EST MOD 30 MIN: CPT | Performed by: INTERNAL MEDICINE

## 2021-08-25 PROCEDURE — 93000 ELECTROCARDIOGRAM COMPLETE: CPT | Performed by: INTERNAL MEDICINE

## 2021-08-25 ASSESSMENT — MIFFLIN-ST. JEOR: SCORE: 1440.15

## 2021-08-25 NOTE — PROGRESS NOTES
HPI and Plan:   See dictation [id number]    Orders Placed This Encounter   Procedures     Follow-Up with Cardiac Advanced Practice Provider     EKG 12-lead complete w/read - Clinics (performed today)     Echocardiogram Complete       No orders of the defined types were placed in this encounter.      There are no discontinued medications.      Encounter Diagnosis   Name Primary?     Permanent atrial fibrillation (H) Yes       CURRENT MEDICATIONS:  Current Outpatient Medications   Medication Sig Dispense Refill     amLODIPine (NORVASC) 5 MG tablet Take 1 tablet (5 mg) by mouth daily 90 tablet 0     apixaban ANTICOAGULANT (ELIQUIS) 5 MG tablet Take 1 tablet (5 mg) by mouth 2 times daily 180 tablet 3     atorvastatin (LIPITOR) 10 MG tablet Take 1 tablet (10 mg) by mouth every evening 90 tablet 3     calcium carbonate (TUMS) 500 MG chewable tablet Take 1 chew tab by mouth At Bedtime       diphenhydrAMINE-acetaminophen (TYLENOL PM)  MG tablet Take 2 tablets by mouth nightly as needed for sleep       leuprolide acetate (LUPRON) 1 MG/0.2ML kit Inject Subcutaneous every 6 months He gets this at [facility]. Last given on 4/2019.       loratadine (CLARITIN) 10 MG tablet Take 1 tablet by mouth daily. 30 tablet 1     losartan (COZAAR) 50 MG tablet Take 1 tablet (50 mg) by mouth daily 90 tablet 3     melatonin 5 MG tablet Take 5 mg by mouth nightly as needed for sleep (Take 1-2 tablets PRN at bedtime)       metoprolol succinate ER (TOPROL XL) 25 MG 24 hr tablet Take 1 tablet (25 mg) by mouth daily 90 tablet 3     Multiple Vitamins-Minerals (PRESERVISION AREDS 2 PO) Take 1 capsule by mouth 2 times daily        order for DME Equipment being ordered: Walker Wheels () and Walker ()  Treatment Diagnosis: Impaired gait 1 each 0     sildenafil (VIAGRA) 100 MG tablet Take 1 tablet (100 mg) by mouth daily as needed (intercourse) 10 tablet 11       ALLERGIES     Allergies   Allergen Reactions     Other [Seasonal Allergies]         PAST MEDICAL HISTORY:  Past Medical History:   Diagnosis Date     Anal fistula      Antiplatelet or antithrombotic long-term use      Arrhythmia      Atrial flutter (H)      Cerebral infarction (H)     TIA     Heartburn      Hypertension      Inguinal hernia, left      Persistent atrial fibrillation (H) 12     Prostate cancer (H)      TIA (transient ischaemic attack)            PAST SURGICAL HISTORY:  Past Surgical History:   Procedure Laterality Date     COLONOSCOPY       ENT SURGERY      tonsllectomy     EXAM UNDER ANESTHESIA, FISTULOTOMY RECTUM, COMBINED N/A 2015    Procedure: COMBINED EXAM UNDER ANESTHESIA, FISTULOTOMY RECTUM;  Surgeon: Candice Carty MD;  Location: [facility]     GENITOURINARY SURGERY      PROSTATECTOMY     HERNIORRHAPHY INGUINAL  2013    Procedure: HERNIORRHAPHY INGUINAL;  LEFT INGUINAL HERNIA REPAIR WITH MESH;  Surgeon: Filipe Fairchild MD;  Location: [facility]     HERNIORRHAPHY INGUINAL Right 2019    Procedure: OPEN RIGHT INGUINA HERNIA REPAIR WITH MESH;  Surgeon: Filipe Fairchild MD;  Location:  OR     ORTHOPEDIC SURGERY      WRIST SURGERY - LEFT       FAMILY HISTORY:  Family History   Problem Relation Age of Onset     Ovarian Cancer Mother      Osteoporosis Father      Unknown/Adopted Sister        SOCIAL HISTORY:  Social History     Socioeconomic History     Marital status:      Spouse name: None     Number of children: None     Years of education: None     Highest education level: None   Occupational History     None   Tobacco Use     Smoking status: Former Smoker     Years: 16.00     Types: Cigarettes     Quit date: 1968     Years since quittin.6     Smokeless tobacco: Never Used   Substance and Sexual Activity     Alcohol use: Yes     Alcohol/week: 0.8 standard drinks     Types: 1 Cans of beer per week     Comment: SOCIALLY     Drug use: No     Sexual activity: Not Currently     Partners: Female   Other Topics Concern      Parent/sibling w/ CABG, MI or angioplasty before 65F 55M? Not Asked      Service Not Asked     Blood Transfusions Not Asked     Caffeine Concern No     Comment: occ tea     Occupational Exposure Not Asked     Hobby Hazards Not Asked     Sleep Concern No     Stress Concern No     Weight Concern No     Special Diet No     Back Care Not Asked     Exercise Yes     Comment: walking 3 miles a day     Bike Helmet Not Asked     Seat Belt Yes     Self-Exams Not Asked   Social History Narrative     None     Social Determinants of Health     Financial Resource Strain:      Difficulty of Paying Living Expenses:    Food Insecurity:      Worried About Running Out of Food in the Last Year:      Ran Out of Food in the Last Year:    Transportation Needs:      Lack of Transportation (Medical):      Lack of Transportation (Non-Medical):    Physical Activity:      Days of Exercise per Week:      Minutes of Exercise per Session:    Stress:      Feeling of Stress :    Social Connections:      Frequency of Communication with Friends and Family:      Frequency of Social Gatherings with Friends and Family:      Attends Muslim Services:      Active Member of Clubs or Organizations:      Attends Club or Organization Meetings:      Marital Status:    Intimate Partner Violence:      Fear of Current or Ex-Partner:      Emotionally Abused:      Physically Abused:      Sexually Abused:        Review of Systems:  Skin:  Negative       Eyes:  Positive for glasses;glaucoma macular degeneration  ENT:  Negative      Respiratory:  Positive for dyspnea on exertion;shortness of breath     Cardiovascular:  Negative      Gastroenterology: Negative      Genitourinary:  Negative      Musculoskeletal:  Negative      Neurologic:  Positive for stroke;incoordination 7/2019 CVA  Psychiatric:  Negative      Heme/Lymph/Imm:  Positive for allergies    Endocrine:  Negative

## 2021-08-25 NOTE — LETTER
8/25/2021    Fred Connell MD  [address]  [place] [address]    RE: Yosef Murry       Dear Colleague,    I had the pleasure of seeing Yosef Murry in the [facility].    HPI and Plan:   See dictation [id number]    Orders Placed This Encounter   Procedures     Follow-Up with Cardiac Advanced Practice Provider     EKG 12-lead complete w/read - Clinics (performed today)     Echocardiogram Complete       No orders of the defined types were placed in this encounter.      There are no discontinued medications.      Encounter Diagnosis   Name Primary?     Permanent atrial fibrillation (H) Yes       CURRENT MEDICATIONS:  Current Outpatient Medications   Medication Sig Dispense Refill     amLODIPine (NORVASC) 5 MG tablet Take 1 tablet (5 mg) by mouth daily 90 tablet 0     apixaban ANTICOAGULANT (ELIQUIS) 5 MG tablet Take 1 tablet (5 mg) by mouth 2 times daily 180 tablet 3     atorvastatin (LIPITOR) 10 MG tablet Take 1 tablet (10 mg) by mouth every evening 90 tablet 3     calcium carbonate (TUMS) 500 MG chewable tablet Take 1 chew tab by mouth At Bedtime       diphenhydrAMINE-acetaminophen (TYLENOL PM)  MG tablet Take 2 tablets by mouth nightly as needed for sleep       leuprolide acetate (LUPRON) 1 MG/0.2ML kit Inject Subcutaneous every 6 months He gets this at [facility]. Last given on 4/2019.       loratadine (CLARITIN) 10 MG tablet Take 1 tablet by mouth daily. 30 tablet 1     losartan (COZAAR) 50 MG tablet Take 1 tablet (50 mg) by mouth daily 90 tablet 3     melatonin 5 MG tablet Take 5 mg by mouth nightly as needed for sleep (Take 1-2 tablets PRN at bedtime)       metoprolol succinate ER (TOPROL XL) 25 MG 24 hr tablet Take 1 tablet (25 mg) by mouth daily 90 tablet 3     Multiple Vitamins-Minerals (PRESERVISION AREDS 2 PO) Take 1 capsule by mouth 2 times daily        order for DME Equipment being ordered: Walker Wheels () and Walker  ()  Treatment Diagnosis: Impaired gait 1 each 0     sildenafil (VIAGRA) 100 MG tablet Take 1 tablet (100 mg) by mouth daily as needed (intercourse) 10 tablet 11       ALLERGIES     Allergies   Allergen Reactions     Other [Seasonal Allergies]        PAST MEDICAL HISTORY:  Past Medical History:   Diagnosis Date     Anal fistula      Antiplatelet or antithrombotic long-term use      Arrhythmia      Atrial flutter (H)      Cerebral infarction (H)     TIA     Heartburn      Hypertension      Inguinal hernia, left      Persistent atrial fibrillation (H) 12     Prostate cancer (H)      TIA (transient ischaemic attack)            PAST SURGICAL HISTORY:  Past Surgical History:   Procedure Laterality Date     COLONOSCOPY       ENT SURGERY      tonsllectomy     EXAM UNDER ANESTHESIA, FISTULOTOMY RECTUM, COMBINED N/A 2015    Procedure: COMBINED EXAM UNDER ANESTHESIA, FISTULOTOMY RECTUM;  Surgeon: Candice Carty MD;  Location: [facility]     GENITOURINARY SURGERY      PROSTATECTOMY     HERNIORRHAPHY INGUINAL  2013    Procedure: HERNIORRHAPHY INGUINAL;  LEFT INGUINAL HERNIA REPAIR WITH MESH;  Surgeon: Filipe Fairchild MD;  Location: [facility]     HERNIORRHAPHY INGUINAL Right 2019    Procedure: OPEN RIGHT INGUINA HERNIA REPAIR WITH MESH;  Surgeon: Filipe Fairchild MD;  Location:  OR     ORTHOPEDIC SURGERY      WRIST SURGERY - LEFT       FAMILY HISTORY:  Family History   Problem Relation Age of Onset     Ovarian Cancer Mother      Osteoporosis Father      Unknown/Adopted Sister        SOCIAL HISTORY:  Social History     Socioeconomic History     Marital status:      Spouse name: None     Number of children: None     Years of education: None     Highest education level: None   Occupational History     None   Tobacco Use     Smoking status: Former Smoker     Years: 16.00     Types: Cigarettes     Quit date: 1968     Years since quittin.6     Smokeless tobacco: Never Used    Substance and Sexual Activity     Alcohol use: Yes     Alcohol/week: 0.8 standard drinks     Types: 1 Cans of beer per week     Comment: SOCIALLY     Drug use: No     Sexual activity: Not Currently     Partners: Female   Other Topics Concern     Parent/sibling w/ CABG, MI or angioplasty before 65F 55M? Not Asked      Service Not Asked     Blood Transfusions Not Asked     Caffeine Concern No     Comment: occ tea     Occupational Exposure Not Asked     Hobby Hazards Not Asked     Sleep Concern No     Stress Concern No     Weight Concern No     Special Diet No     Back Care Not Asked     Exercise Yes     Comment: walking 3 miles a day     Bike Helmet Not Asked     Seat Belt Yes     Self-Exams Not Asked   Social History Narrative     None     Social Determinants of Health     Financial Resource Strain:      Difficulty of Paying Living Expenses:    Food Insecurity:      Worried About Running Out of Food in the Last Year:      Ran Out of Food in the Last Year:    Transportation Needs:      Lack of Transportation (Medical):      Lack of Transportation (Non-Medical):    Physical Activity:      Days of Exercise per Week:      Minutes of Exercise per Session:    Stress:      Feeling of Stress :    Social Connections:      Frequency of Communication with Friends and Family:      Frequency of Social Gatherings with Friends and Family:      Attends Tenriism Services:      Active Member of Clubs or Organizations:      Attends Club or Organization Meetings:      Marital Status:    Intimate Partner Violence:      Fear of Current or Ex-Partner:      Emotionally Abused:      Physically Abused:      Sexually Abused:        Review of Systems:  Skin:  Negative       Eyes:  Positive for glasses;glaucoma macular degeneration  ENT:  Negative      Respiratory:  Positive for dyspnea on exertion;shortness of breath     Cardiovascular:  Negative      Gastroenterology: Negative      Genitourinary:  Negative      Musculoskeletal:   Negative      Neurologic:  Positive for stroke;incoordination 7/2019 CVA  Psychiatric:  Negative      Heme/Lymph/Imm:  Positive for allergies    Endocrine:  Negative                          Thank you for allowing me to participate in the care of your patient.      Sincerely,     Henna Murphy MD     [facility]  cc:   Elvi Mckeon, APRN CNP  [address]  [place] [address]

## 2021-08-25 NOTE — PROGRESS NOTES
Service Date: 08/25/2021    HISTORY OF PRESENT ILLNESS:    I had the pleasure of seeing Mr. Héctor Murry, a delightful 85-year-old gentleman, in followup of atrial fibrillation and previous stroke.    Héctor has the following chronic medical issues:  A.  Permanent atrial fibrillation.  Essentially asymptomatic.  Treated with rate control (metoprolol) and anticoagulation (apixaban).  [id number] is 5.    B.  Hypertension.  C.  Basal ganglia stroke in 2019.  Evidence of additional prior strokes by brain MRI.  He was off anticoagulation at the time.  D.  History of prostate cancer.  E.  Abnormal cardiac MRI, which demonstrated subendocardial scar in the circumflex distribution (2019).  Subsequent nuclear stress test showed no ischemia.    Héctor has been feeling reasonably well.  He has had balance issues after his stroke in 2019.  He lives alone in [place].  He is modestly active.  He does not have chest pain with exertion.  He has mild dyspnea if he walks uphill.  He tells me he tries to walk at least 1 mile per day.  No syncope, near-syncope, or other cardiac symptoms.      PHYSICAL EXAMINATION:    VITAL SIGNS:  Blood pressure 129/81, heart rate is 60 and irregular, weight 74 kg, height 177 cm.  GENERAL:  He is an extremely pleasant elderly gentleman in no distress.  HEENT: Head normocephalic.  NECK:  Supple without carotid bruits.  LUNGS:  Completely clear.  CARDIOVASCULAR:  Irregular rhythm with controlled rate.  No gallop or murmur.  ABDOMEN:  Soft, nontender.  EXTREMITIES:  No edema.      DIAGNOSTIC STUDIES:    - Labs: Sodium 140, potassium 4.6, creatinine 0.98.  TSH 2.75.  Cholesterol 111, HDL 37, LDL 32.  Hematocrit 49%.  - His 12-lead ECG today showed atrial fibrillation with controlled ventricular rate.        IMPRESSION:    1.  Permanent atrial fibrillation.  Virtually asymptomatic.  Continue metoprolol XL and apixaban.  Thankfully, he has not had neurologic events while on anticoagulation.       He informed me  that he is scheduled for colonoscopy on 2021.  He does not know whether he needs to hold apixaban.  If he does, he should be bridged with Lovenox as he had a previous stroke while off anticoagulation for an elective procedure in 2019.  Lovenox can be prescribed by us or his PCP.  I asked him to arrange for a Lovenox prescription if instructed to hold apixaban before his GI procedure.      2.  Hypertension, appears to be under good control.      RECOMMENDATIONS:   1.  He will need bridging before his GI procedure if Eliquis needs to be held, see above.  2.  Follow up with Elvi in 1 year.  I ordered an echocardiogram to be completed before the visit.      It was my pleasure seeing Héctor today.  Total time spent today, including time for chart review and documentation, was 30 minutes.      Henna Murphy MD, [facility]        D: 2021   T: 2021   MT: KIARA    Name:     OLE COSTA  MRN:      [id number]        Account:      [id number]   :      1935           Service Date: 2021       Document: [id number]

## 2021-09-08 ENCOUNTER — TELEPHONE (OUTPATIENT)
Dept: CARDIOLOGY | Facility: CLINIC | Age: 86
End: 2021-09-08

## 2021-09-08 DIAGNOSIS — I48.21 PERMANENT ATRIAL FIBRILLATION (H): Primary | ICD-10-CM

## 2021-09-08 NOTE — TELEPHONE ENCOUNTER
Received call from [facility] requesting hold orders for eliquis.  Patient is scheduled for colonoscopy on 9/24.  Recently seen by Dr Murphy on 8/25 per note:  Permanent atrial fibrillation.  Virtually asymptomatic.  Continue metoprolol XL and apixaban.  Thankfully, he has not had neurologic events while on anticoagulation.       He informed me that he is scheduled for colonoscopy on 09/24/2021.  He does not know whether he needs to hold apixaban.  If he does, he should be bridged with Lovenox as he had a previous stroke while off anticoagulation for an elective procedure in 2019.  Lovenox can be prescribed by us or his PCP.  I asked him to arrange for a Lovenox prescription if instructed to hold apixaban before his GI procedure.    Will reach out to Dr Murphy for specific orders.  MAGDALENA Hobson

## 2021-09-08 NOTE — TELEPHONE ENCOUNTER
Left detailed message on [facility] confidential line with hold and bridging orders.  Spoke to patient regarding hold and bridging orders.  Also spoke to Eloy (pharmaist) regarding order sent over to pharmacy.  She indicated she would be doing the teaching to the patient as lovenox does not come in exact dose of 70mg.    Orders per Dr Murphy:  Last Eliquis to be taken in the evening of 9/20.    Then, start Lovenox 1 mg/kg q12 hrs in the am of 09/21.  Last dose in the evening of 09/23.  Total 6 doses.   Sent script to pharmacy and spoke to terry (Eloy).     Resume Eliquis in the evening after colonoscopy if OK with his GI physician.      Patient provided verbal understanding regarding above.  MAGDALENA Hobson

## 2021-09-08 NOTE — TELEPHONE ENCOUNTER
Last Eliquis to be taken in the evening of 9/20.    Then, start Lovenox 1 mg/kg q12 hrs in the am of 09/21.  Last dose in the evening of 09/23.  Total 6 doses.     Resume Eliquis in the evening after colonoscopy if OK with his GI physician.      ROSEANN

## 2021-09-09 DIAGNOSIS — I48.21 PERMANENT ATRIAL FIBRILLATION (H): ICD-10-CM

## 2021-09-09 DIAGNOSIS — I10 HTN (HYPERTENSION): ICD-10-CM

## 2021-09-09 DIAGNOSIS — I63.9 INFARCTION OF RIGHT BASAL GANGLIA (H): ICD-10-CM

## 2021-09-09 RX ORDER — ATORVASTATIN CALCIUM 10 MG/1
10 TABLET, FILM COATED ORAL EVERY EVENING
Qty: 90 TABLET | Refills: 3 | Status: SHIPPED | OUTPATIENT
Start: 2021-09-09 | End: 2022-08-25

## 2021-09-09 RX ORDER — METOPROLOL SUCCINATE 25 MG/1
25 TABLET, EXTENDED RELEASE ORAL DAILY
Qty: 90 TABLET | Refills: 3 | Status: SHIPPED | OUTPATIENT
Start: 2021-09-09 | End: 2022-08-25

## 2021-09-09 RX ORDER — LOSARTAN POTASSIUM 50 MG/1
50 TABLET ORAL DAILY
Qty: 90 TABLET | Refills: 3 | Status: SHIPPED | OUTPATIENT
Start: 2021-09-09 | End: 2022-08-25

## 2021-09-10 DIAGNOSIS — I10 HTN (HYPERTENSION): ICD-10-CM

## 2021-09-10 RX ORDER — AMLODIPINE BESYLATE 5 MG/1
5 TABLET ORAL DAILY
Qty: 90 TABLET | Refills: 3 | Status: SHIPPED | OUTPATIENT
Start: 2021-09-10 | End: 2022-08-25

## 2021-09-14 ENCOUNTER — OFFICE VISIT (OUTPATIENT)
Dept: FAMILY MEDICINE | Facility: CLINIC | Age: 86
End: 2021-09-14
Payer: MEDICARE

## 2021-09-14 VITALS
HEART RATE: 62 BPM | HEIGHT: 70 IN | OXYGEN SATURATION: 98 % | TEMPERATURE: 97.1 F | DIASTOLIC BLOOD PRESSURE: 94 MMHG | BODY MASS INDEX: 24.62 KG/M2 | RESPIRATION RATE: 20 BRPM | SYSTOLIC BLOOD PRESSURE: 148 MMHG | WEIGHT: 172 LBS

## 2021-09-14 DIAGNOSIS — K63.9 COLON ABNORMALITY: ICD-10-CM

## 2021-09-14 DIAGNOSIS — Z01.818 PREOP GENERAL PHYSICAL EXAM: Primary | ICD-10-CM

## 2021-09-14 DIAGNOSIS — Z23 NEED FOR PROPHYLACTIC VACCINATION AND INOCULATION AGAINST INFLUENZA: ICD-10-CM

## 2021-09-14 LAB
BASOPHILS # BLD AUTO: 0 10E3/UL (ref 0–0.2)
BASOPHILS NFR BLD AUTO: 0 %
EOSINOPHIL # BLD AUTO: 0.1 10E3/UL (ref 0–0.7)
EOSINOPHIL NFR BLD AUTO: 1 %
ERYTHROCYTE [DISTWIDTH] IN BLOOD BY AUTOMATED COUNT: 14.4 % (ref 10–15)
HCT VFR BLD AUTO: 51.9 % (ref 40–53)
HGB BLD-MCNC: 17.6 G/DL (ref 13.3–17.7)
LYMPHOCYTES # BLD AUTO: 1.8 10E3/UL (ref 0.8–5.3)
LYMPHOCYTES NFR BLD AUTO: 25 %
MCH RBC QN AUTO: 31.4 PG (ref 26.5–33)
MCHC RBC AUTO-ENTMCNC: 33.9 G/DL (ref 31.5–36.5)
MCV RBC AUTO: 93 FL (ref 78–100)
MONOCYTES # BLD AUTO: 0.6 10E3/UL (ref 0–1.3)
MONOCYTES NFR BLD AUTO: 8 %
NEUTROPHILS # BLD AUTO: 4.6 10E3/UL (ref 1.6–8.3)
NEUTROPHILS NFR BLD AUTO: 65 %
PLATELET # BLD AUTO: 223 10E3/UL (ref 150–450)
RBC # BLD AUTO: 5.6 10E6/UL (ref 4.4–5.9)
WBC # BLD AUTO: 7.1 10E3/UL (ref 4–11)

## 2021-09-14 PROCEDURE — 36415 COLL VENOUS BLD VENIPUNCTURE: CPT | Performed by: INTERNAL MEDICINE

## 2021-09-14 PROCEDURE — 90662 IIV NO PRSV INCREASED AG IM: CPT | Performed by: INTERNAL MEDICINE

## 2021-09-14 PROCEDURE — G0008 ADMIN INFLUENZA VIRUS VAC: HCPCS | Performed by: INTERNAL MEDICINE

## 2021-09-14 PROCEDURE — 85025 COMPLETE CBC W/AUTO DIFF WBC: CPT | Performed by: INTERNAL MEDICINE

## 2021-09-14 PROCEDURE — 80048 BASIC METABOLIC PNL TOTAL CA: CPT | Performed by: INTERNAL MEDICINE

## 2021-09-14 PROCEDURE — 99213 OFFICE O/P EST LOW 20 MIN: CPT | Mod: 25 | Performed by: INTERNAL MEDICINE

## 2021-09-14 RX ORDER — CHOLECALCIFEROL (VITAMIN D3) 50 MCG
1 TABLET ORAL DAILY
COMMUNITY

## 2021-09-14 ASSESSMENT — MIFFLIN-ST. JEOR: SCORE: 1471.44

## 2021-09-14 NOTE — LETTER
September 15, 2021      Yosef Murry  [address] [place] [address]        Dear ,    We are writing to inform you of your test results.    Attached is your most recent labs.       Thanks,       Dr. Gill    Resulted Orders   Basic metabolic panel  (Ca, Cl, CO2, Creat, Gluc, K, Na, BUN)   Result Value Ref Range    Sodium 138 133 - 144 mmol/L    Potassium 4.3 3.4 - 5.3 mmol/L    Chloride 107 94 - 109 mmol/L    Carbon Dioxide (CO2) 29 20 - 32 mmol/L    Anion Gap 2 (L) 3 - 14 mmol/L    Urea Nitrogen 18 7 - 30 mg/dL    Creatinine 0.93 0.66 - 1.25 mg/dL    Calcium 9.0 8.5 - 10.1 mg/dL    Glucose 88 70 - 99 mg/dL    GFR Estimate 75 >60 mL/min/1.73m2      Comment:      As of July 11, 2021, eGFR is calculated by the CKD-EPI creatinine equation, without race adjustment. eGFR can be influenced by muscle mass, exercise, and diet. The reported eGFR is an estimation only and is only applicable if the renal function is stable.   CBC with platelets and differential   Result Value Ref Range    WBC Count 7.1 4.0 - 11.0 10e3/uL    RBC Count 5.60 4.40 - 5.90 10e6/uL    Hemoglobin 17.6 13.3 - 17.7 g/dL    Hematocrit 51.9 40.0 - 53.0 %    MCV 93 78 - 100 fL    MCH 31.4 26.5 - 33.0 pg    MCHC 33.9 31.5 - 36.5 g/dL    RDW 14.4 10.0 - 15.0 %    Platelet Count 223 150 - 450 10e3/uL    % Neutrophils 65 %    % Lymphocytes 25 %    % Monocytes 8 %    % Eosinophils 1 %    % Basophils 0 %    Absolute Neutrophils 4.6 1.6 - 8.3 10e3/uL    Absolute Lymphocytes 1.8 0.8 - 5.3 10e3/uL    Absolute Monocytes 0.6 0.0 - 1.3 10e3/uL    Absolute Eosinophils 0.1 0.0 - 0.7 10e3/uL    Absolute Basophils 0.0 0.0 - 0.2 10e3/uL

## 2021-09-14 NOTE — PATIENT INSTRUCTIONS
Landon Praveen;    I agree with your discontinuation of the apixaban and the utilization of Lovenox prior to the procedure.  Please follow your cardiologist recommendation in this instance.    I would like to obtain blood tests today.    We will administer an influenza clinic today.    Best regards  Bryan

## 2021-09-14 NOTE — H&P (VIEW-ONLY)
[address], [address]  [facility] [address]  Phone: [phone]  Primary Provider: Fred Connell  Pre-op Performing Provider: HEATHER LEE      PREOPERATIVE EVALUATION:  Today's date: 9/14/2021    Yosef Murry is a 86 year old male who presents for a preoperative evaluation.    Surgical Information:  Surgery/Procedure: COLONOSCOPY  Surgery Location:  GI  Surgeon: Jordin Rachel MD  Surgery Date: 9/24/2021  Time of Surgery: 11:15 AM  Where patient plans to recover: At home with family  Fax number for surgical facility: Note does not need to be faxed, will be available electronically in Epic.    Type of Anesthesia Anticipated: Monitor Anesthesia [facility]    Assessment & Plan     The proposed surgical procedure is considered LOW risk.    Need for prophylactic vaccination and inoculation against influenza    - ADMIN INFLUENZA (For MEDICARE Patients ONLY) []    Preop general physical exam  No contraindications noted.  Patient will utilize the moment.  Has a history of prostate cancer and A. fib.  - Basic metabolic panel  (Ca, Cl, CO2, Creat, Gluc, K, Na, BUN); Future  - CBC with platelets and differential; Future  - Basic metabolic panel  (Ca, Cl, CO2, Creat, Gluc, K, Na, BUN)  - CBC with platelets and differential    Colon abnormality  Abnormality noted during a PET scan.           Risks and Recommendations:  The patient has the following additional risks and recommendations for perioperative complications:   - No identified additional risk factors other than previously addressed    Medication Instructions:  We will hold morning medications the day of surgery.  Is holding apixaban 4 days prior utilizing Lovenox as a bridge.    RECOMMENDATION:  APPROVAL GIVEN to proceed with proposed procedure, without further diagnostic evaluation.        Subjective     HPI related to upcoming procedure: This is a patient who presents prior to colonoscopy.  He had a PET scan as a  part of follow-up on prostate cancer which revealed an abnormality in his colon.  This for this reason he is undergoing the procedure.    He has discussed with his cardiologist regarding his anticoagulation.  He will be using a Lovenox bridge given his history of atrial fibrillation and his personal history of prostate cancer.    Denies chest pain or shortness of breath.  No previous bleeding issues no anesthesia sensitivities.      Preop Questions 9/14/2021   1. Have you ever had a heart attack or stroke? YES -    2. Have you ever had surgery on your heart or blood vessels, such as a stent placement, a coronary artery bypass, or surgery on an artery in your head, neck, heart, or legs? No   3. Do you have chest pain with activity? No   4. Do you have a history of  heart failure? No   5. Do you currently have a cold, bronchitis or symptoms of other infection? No   6. Do you have a cough, shortness of breath, or wheezing? No   7. Do you or anyone in your family have previous history of blood clots? No   8. Do you or does anyone in your family have a serious bleeding problem such as prolonged bleeding following surgeries or cuts? No   9. Have you ever had problems with anemia or been told to take iron pills? No   10. Have you had any abnormal blood loss such as black, tarry or bloody stools? No   11. Have you ever had a blood transfusion? No   12. Are you willing to have a blood transfusion if it is medically needed before, during, or after your surgery? Yes   13. Have you or any of your relatives ever had problems with anesthesia? No   14. Do you have sleep apnea, excessive snoring or daytime drowsiness? No   15. Do you have any artifical heart valves or other implanted medical devices like a pacemaker, defibrillator, or continuous glucose monitor? No   16. Do you have artificial joints? No   17. Are you allergic to latex? No     Health Care Directive:  Patient has a Health Care Directive on file      Preoperative  Review of :  No concerns regarding           Review of Systems  CONSTITUTIONAL: NEGATIVE for fever, chills, change in weight  ENT/MOUTH: NEGATIVE for ear, mouth and throat problems  RESP: NEGATIVE for significant cough or SOB  CV: NEGATIVE for chest pain, palpitations or peripheral edema    Patient Active Problem List    Diagnosis Date Noted     Fever of unknown origin 07/31/2019     Priority: Medium     Generalized weakness 07/29/2019     Priority: Medium     CARDIOVASCULAR SCREENING; LDL GOAL LESS THAN 130 06/21/2018     Priority: Medium     Prostate cancer (H) 10/20/2017     Priority: Medium     Persistent atrial fibrillation (H)      Priority: Medium     Arrhythmia      Priority: Medium     Atrial fibrillation (H)      Priority: Medium     Atrial flutter (H)      Priority: Medium      Past Medical History:   Diagnosis Date     Anal fistula      Antiplatelet or antithrombotic long-term use      Arrhythmia      Atrial flutter (H) 2012     Cerebral infarction (H)     TIA     Heartburn      Hypertension      Inguinal hernia, left      Persistent atrial fibrillation (H) 8/21/12     Prostate cancer (H)      TIA (transient ischaemic attack)     2014     Past Surgical History:   Procedure Laterality Date     COLONOSCOPY       ENT SURGERY      tonsllectomy     EXAM UNDER ANESTHESIA, FISTULOTOMY RECTUM, COMBINED N/A 6/18/2015    Procedure: COMBINED EXAM UNDER ANESTHESIA, FISTULOTOMY RECTUM;  Surgeon: Candice Carty MD;  Location: [facility] [place]     GENITOURINARY SURGERY  1999    PROSTATECTOMY     HERNIORRHAPHY INGUINAL  7/25/2013    Procedure: HERNIORRHAPHY INGUINAL;  LEFT INGUINAL HERNIA REPAIR WITH MESH;  Surgeon: Filipe Fairchild MD;  Location: [facility] [place]     HERNIORRHAPHY INGUINAL Right 6/13/2019    Procedure: OPEN RIGHT INGUINA HERNIA REPAIR WITH MESH;  Surgeon: Filipe Fairchild MD;  Location:  OR     ORTHOPEDIC SURGERY      WRIST SURGERY - LEFT     Current Outpatient Medications   Medication Sig Dispense Refill      amLODIPine (NORVASC) 5 MG tablet Take 1 tablet (5 mg) by mouth daily 90 tablet 3     apixaban ANTICOAGULANT (ELIQUIS) 5 MG tablet Take 1 tablet (5 mg) by mouth 2 times daily 180 tablet 3     atorvastatin (LIPITOR) 10 MG tablet Take 1 tablet (10 mg) by mouth every evening 90 tablet 3     calcium carbonate (TUMS) 500 MG chewable tablet Take 1 chew tab by mouth At Bedtime       diphenhydrAMINE-acetaminophen (TYLENOL PM)  MG tablet Take 2 tablets by mouth nightly as needed for sleep       [START ON 2021] enoxaparin ANTICOAGULANT (LOVENOX) 100 MG/ML syringe Inject 0.7 mLs (70 mg) Subcutaneous every 12 hours for 3 days 6 mL 0     leuprolide acetate (LUPRON) 1 MG/0.2ML kit Inject Subcutaneous every 6 months He gets this at [facility]. Last given on 2019.       loratadine (CLARITIN) 10 MG tablet Take 1 tablet by mouth daily. 30 tablet 1     losartan (COZAAR) 50 MG tablet Take 1 tablet (50 mg) by mouth daily 90 tablet 3     melatonin 5 MG tablet Take 5 mg by mouth nightly as needed for sleep (Take 1-2 tablets PRN at bedtime)       metoprolol succinate ER (TOPROL XL) 25 MG 24 hr tablet Take 1 tablet (25 mg) by mouth daily 90 tablet 3     Multiple Vitamins-Minerals (PRESERVISION AREDS 2 PO) Take 1 capsule by mouth 2 times daily        order for DME Equipment being ordered: Walker Wheels () and Walker ()  Treatment Diagnosis: Impaired gait 1 each 0     sildenafil (VIAGRA) 100 MG tablet Take 1 tablet (100 mg) by mouth daily as needed (intercourse) 10 tablet 11       Allergies   Allergen Reactions     Other [Seasonal Allergies]         Social History     Tobacco Use     Smoking status: Former Smoker     Years: 16.00     Types: Cigarettes     Quit date: 1968     Years since quittin.7     Smokeless tobacco: Never Used   Substance Use Topics     Alcohol use: Yes     Alcohol/week: 0.8 standard drinks     Types: 1 Cans of beer per week     Comment: SOCIALLY     Family History   Problem Relation  "Age of Onset     Ovarian Cancer Mother      Osteoporosis Father      Unknown/Adopted Sister      History   Drug Use No         Objective     BP (!) 148/94 (BP Location: Left arm, Cuff Size: Adult Large)   Pulse 62   Temp 97.1  F (36.2  C) (Temporal)   Resp 20   Ht 1.778 m (5' 10\")   Wt 78 kg (172 lb)   SpO2 98%   BMI 24.68 kg/m      Physical Exam  GENERAL APPEARANCE: healthy, alert and no distress  HENT: ear canals and TM's normal and nose and mouth without ulcers or lesions  RESP: lungs clear to auscultation - no rales, rhonchi or wheezes  CV: regular rate and rhythm, normal S1 S2, no S3 or S4 and no murmur, click or rub   ABDOMEN: soft, nontender, no HSM or masses and bowel sounds normal  NEURO: Normal strength and tone, sensory exam grossly normal, mentation intact and speech normal    Recent Labs   Lab Test 09/23/20  1036 09/19/19  1108   HGB 16.1 16.3    225    139   POTASSIUM 4.6 4.6   CR 0.98 0.84        Diagnostics:  Labs pending at this time.  Results will be reviewed when available.   No EKG required for low risk surgery (cataract, skin procedure, breast biopsy, etc).    Revised Cardiac Risk Index (RCRI):  The patient has the following serious cardiovascular risks for perioperative complications:   - No serious cardiac risks = 0 points     RCRI Interpretation: 0 points: Class I (very low risk - 0.4% complication rate)           Signed Electronically by: Bryan Gill MD  Copy of this evaluation report is provided to requesting physician.      "

## 2021-09-14 NOTE — PROGRESS NOTES
[address], [address]  [facility] [address]  Phone: [phone]  Primary Provider: Fred Connell  Pre-op Performing Provider: HEATHER LEE      PREOPERATIVE EVALUATION:  Today's date: 9/14/2021    Yosef Murry is a 86 year old male who presents for a preoperative evaluation.    Surgical Information:  Surgery/Procedure: COLONOSCOPY  Surgery Location:  GI  Surgeon: Jordin Rachel MD  Surgery Date: 9/24/2021  Time of Surgery: 11:15 AM  Where patient plans to recover: At home with family  Fax number for surgical facility: Note does not need to be faxed, will be available electronically in Epic.    Type of Anesthesia Anticipated: Monitor Anesthesia Care    Assessment & Plan     The proposed surgical procedure is considered LOW risk.    Need for prophylactic vaccination and inoculation against influenza    - ADMIN INFLUENZA (For MEDICARE Patients ONLY) []    Preop general physical exam  No contraindications noted.  Patient will utilize the moment.  Has a history of prostate cancer and A. fib.  - Basic metabolic panel  (Ca, Cl, CO2, Creat, Gluc, K, Na, BUN); Future  - CBC with platelets and differential; Future  - Basic metabolic panel  (Ca, Cl, CO2, Creat, Gluc, K, Na, BUN)  - CBC with platelets and differential    Colon abnormality  Abnormality noted during a PET scan.           Risks and Recommendations:  The patient has the following additional risks and recommendations for perioperative complications:   - No identified additional risk factors other than previously addressed    Medication Instructions:  We will hold morning medications the day of surgery.  Is holding apixaban 4 days prior utilizing Lovenox as a bridge.    RECOMMENDATION:  APPROVAL GIVEN to proceed with proposed procedure, without further diagnostic evaluation.        Subjective     HPI related to upcoming procedure: This is a patient who presents prior to colonoscopy.  He had a PET scan as a  part of follow-up on prostate cancer which revealed an abnormality in his colon.  This for this reason he is undergoing the procedure.    He has discussed with his cardiologist regarding his anticoagulation.  He will be using a Lovenox bridge given his history of atrial fibrillation and his personal history of prostate cancer.    Denies chest pain or shortness of breath.  No previous bleeding issues no anesthesia sensitivities.      Preop Questions 9/14/2021   1. Have you ever had a heart attack or stroke? YES -    2. Have you ever had surgery on your heart or blood vessels, such as a stent placement, a coronary artery bypass, or surgery on an artery in your head, neck, heart, or legs? No   3. Do you have chest pain with activity? No   4. Do you have a history of  heart failure? No   5. Do you currently have a cold, bronchitis or symptoms of other infection? No   6. Do you have a cough, shortness of breath, or wheezing? No   7. Do you or anyone in your family have previous history of blood clots? No   8. Do you or does anyone in your family have a serious bleeding problem such as prolonged bleeding following surgeries or cuts? No   9. Have you ever had problems with anemia or been told to take iron pills? No   10. Have you had any abnormal blood loss such as black, tarry or bloody stools? No   11. Have you ever had a blood transfusion? No   12. Are you willing to have a blood transfusion if it is medically needed before, during, or after your surgery? Yes   13. Have you or any of your relatives ever had problems with anesthesia? No   14. Do you have sleep apnea, excessive snoring or daytime drowsiness? No   15. Do you have any artifical heart valves or other implanted medical devices like a pacemaker, defibrillator, or continuous glucose monitor? No   16. Do you have artificial joints? No   17. Are you allergic to latex? No     Health Care Directive:  Patient has a Health Care Directive on file      Preoperative  Review of :  No concerns regarding           Review of Systems  CONSTITUTIONAL: NEGATIVE for fever, chills, change in weight  ENT/MOUTH: NEGATIVE for ear, mouth and throat problems  RESP: NEGATIVE for significant cough or SOB  CV: NEGATIVE for chest pain, palpitations or peripheral edema    Patient Active Problem List    Diagnosis Date Noted     Fever of unknown origin 07/31/2019     Priority: Medium     Generalized weakness 07/29/2019     Priority: Medium     CARDIOVASCULAR SCREENING; LDL GOAL LESS THAN 130 06/21/2018     Priority: Medium     Prostate cancer (H) 10/20/2017     Priority: Medium     Persistent atrial fibrillation (H)      Priority: Medium     Arrhythmia      Priority: Medium     Atrial fibrillation (H)      Priority: Medium     Atrial flutter (H)      Priority: Medium      Past Medical History:   Diagnosis Date     Anal fistula      Antiplatelet or antithrombotic long-term use      Arrhythmia      Atrial flutter (H) 2012     Cerebral infarction (H)     TIA     Heartburn      Hypertension      Inguinal hernia, left      Persistent atrial fibrillation (H) 8/21/12     Prostate cancer (H)      TIA (transient ischaemic attack)     2014     Past Surgical History:   Procedure Laterality Date     COLONOSCOPY       ENT SURGERY      tonsllectomy     EXAM UNDER ANESTHESIA, FISTULOTOMY RECTUM, COMBINED N/A 6/18/2015    Procedure: COMBINED EXAM UNDER ANESTHESIA, FISTULOTOMY RECTUM;  Surgeon: Candice Carty MD;  Location: [facility]     GENITOURINARY SURGERY  1999    PROSTATECTOMY     HERNIORRHAPHY INGUINAL  7/25/2013    Procedure: HERNIORRHAPHY INGUINAL;  LEFT INGUINAL HERNIA REPAIR WITH MESH;  Surgeon: Filipe Fairchild MD;  Location: [facility]     HERNIORRHAPHY INGUINAL Right 6/13/2019    Procedure: OPEN RIGHT INGUINA HERNIA REPAIR WITH MESH;  Surgeon: Filipe Fairchild MD;  Location:  OR     ORTHOPEDIC SURGERY      WRIST SURGERY - LEFT     Current Outpatient Medications   Medication Sig Dispense Refill      amLODIPine (NORVASC) 5 MG tablet Take 1 tablet (5 mg) by mouth daily 90 tablet 3     apixaban ANTICOAGULANT (ELIQUIS) 5 MG tablet Take 1 tablet (5 mg) by mouth 2 times daily 180 tablet 3     atorvastatin (LIPITOR) 10 MG tablet Take 1 tablet (10 mg) by mouth every evening 90 tablet 3     calcium carbonate (TUMS) 500 MG chewable tablet Take 1 chew tab by mouth At Bedtime       diphenhydrAMINE-acetaminophen (TYLENOL PM)  MG tablet Take 2 tablets by mouth nightly as needed for sleep       [START ON 2021] enoxaparin ANTICOAGULANT (LOVENOX) 100 MG/ML syringe Inject 0.7 mLs (70 mg) Subcutaneous every 12 hours for 3 days 6 mL 0     leuprolide acetate (LUPRON) 1 MG/0.2ML kit Inject Subcutaneous every 6 months He gets this at [facility]. Last given on 2019.       loratadine (CLARITIN) 10 MG tablet Take 1 tablet by mouth daily. 30 tablet 1     losartan (COZAAR) 50 MG tablet Take 1 tablet (50 mg) by mouth daily 90 tablet 3     melatonin 5 MG tablet Take 5 mg by mouth nightly as needed for sleep (Take 1-2 tablets PRN at bedtime)       metoprolol succinate ER (TOPROL XL) 25 MG 24 hr tablet Take 1 tablet (25 mg) by mouth daily 90 tablet 3     Multiple Vitamins-Minerals (PRESERVISION AREDS 2 PO) Take 1 capsule by mouth 2 times daily        order for DME Equipment being ordered: Walker Wheels () and Walker ()  Treatment Diagnosis: Impaired gait 1 each 0     sildenafil (VIAGRA) 100 MG tablet Take 1 tablet (100 mg) by mouth daily as needed (intercourse) 10 tablet 11       Allergies   Allergen Reactions     Other [Seasonal Allergies]         Social History     Tobacco Use     Smoking status: Former Smoker     Years: 16.00     Types: Cigarettes     Quit date: 1968     Years since quittin.7     Smokeless tobacco: Never Used   Substance Use Topics     Alcohol use: Yes     Alcohol/week: 0.8 standard drinks     Types: 1 Cans of beer per week     Comment: SOCIALLY     Family History   Problem Relation  "Age of Onset     Ovarian Cancer Mother      Osteoporosis Father      Unknown/Adopted Sister      History   Drug Use No         Objective     BP (!) 148/94 (BP Location: Left arm, Cuff Size: Adult Large)   Pulse 62   Temp 97.1  F (36.2  C) (Temporal)   Resp 20   Ht 1.778 m (5' 10\")   Wt 78 kg (172 lb)   SpO2 98%   BMI 24.68 kg/m      Physical Exam  GENERAL APPEARANCE: healthy, alert and no distress  HENT: ear canals and TM's normal and nose and mouth without ulcers or lesions  RESP: lungs clear to auscultation - no rales, rhonchi or wheezes  CV: regular rate and rhythm, normal S1 S2, no S3 or S4 and no murmur, click or rub   ABDOMEN: soft, nontender, no HSM or masses and bowel sounds normal  NEURO: Normal strength and tone, sensory exam grossly normal, mentation intact and speech normal    Recent Labs   Lab Test 09/23/20  1036 09/19/19  1108   HGB 16.1 16.3    225    139   POTASSIUM 4.6 4.6   CR 0.98 0.84        Diagnostics:  Labs pending at this time.  Results will be reviewed when available.   No EKG required for low risk surgery (cataract, skin procedure, breast biopsy, etc).    Revised Cardiac Risk Index (RCRI):  The patient has the following serious cardiovascular risks for perioperative complications:   - No serious cardiac risks = 0 points     RCRI Interpretation: 0 points: Class I (very low risk - 0.4% complication rate)           Signed Electronically by: Bryan Gill MD  Copy of this evaluation report is provided to requesting physician.      "

## 2021-09-15 LAB
ANION GAP SERPL CALCULATED.3IONS-SCNC: 2 MMOL/L (ref 3–14)
BUN SERPL-MCNC: 18 MG/DL (ref 7–30)
CALCIUM SERPL-MCNC: 9 MG/DL (ref 8.5–10.1)
CHLORIDE BLD-SCNC: 107 MMOL/L (ref 94–109)
CO2 SERPL-SCNC: 29 MMOL/L (ref 20–32)
CREAT SERPL-MCNC: 0.93 MG/DL (ref 0.66–1.25)
GFR SERPL CREATININE-BSD FRML MDRD: 75 ML/MIN/1.73M2
GLUCOSE BLD-MCNC: 88 MG/DL (ref 70–99)
POTASSIUM BLD-SCNC: 4.3 MMOL/L (ref 3.4–5.3)
SODIUM SERPL-SCNC: 138 MMOL/L (ref 133–144)

## 2021-09-22 ENCOUNTER — LAB (OUTPATIENT)
Dept: URGENT CARE | Facility: URGENT CARE | Age: 86
End: 2021-09-22
Attending: INTERNAL MEDICINE
Payer: MEDICARE

## 2021-09-22 DIAGNOSIS — Z11.59 ENCOUNTER FOR SCREENING FOR OTHER VIRAL DISEASES: ICD-10-CM

## 2021-09-22 LAB — SARS-COV-2 RNA RESP QL NAA+PROBE: NEGATIVE

## 2021-09-22 PROCEDURE — U0003 INFECTIOUS AGENT DETECTION BY NUCLEIC ACID (DNA OR RNA); SEVERE ACUTE RESPIRATORY SYNDROME CORONAVIRUS 2 (SARS-COV-2) (CORONAVIRUS DISEASE [COVID-19]), AMPLIFIED PROBE TECHNIQUE, MAKING USE OF HIGH THROUGHPUT TECHNOLOGIES AS DESCRIBED BY CMS-2020-01-R: HCPCS

## 2021-09-22 PROCEDURE — U0005 INFEC AGEN DETEC AMPLI PROBE: HCPCS

## 2021-09-23 ENCOUNTER — ANESTHESIA EVENT (OUTPATIENT)
Dept: GASTROENTEROLOGY | Facility: CLINIC | Age: 86
End: 2021-09-23
Payer: MEDICARE

## 2021-09-23 ASSESSMENT — ENCOUNTER SYMPTOMS: DYSRHYTHMIAS: 1

## 2021-09-24 ENCOUNTER — ANESTHESIA (OUTPATIENT)
Dept: GASTROENTEROLOGY | Facility: CLINIC | Age: 86
End: 2021-09-24
Payer: MEDICARE

## 2021-09-24 ENCOUNTER — HOSPITAL ENCOUNTER (OUTPATIENT)
Facility: CLINIC | Age: 86
Discharge: HOME | End: 2021-09-24
Attending: INTERNAL MEDICINE | Admitting: INTERNAL MEDICINE
Payer: MEDICARE

## 2021-09-24 VITALS
WEIGHT: 172 LBS | SYSTOLIC BLOOD PRESSURE: 127 MMHG | BODY MASS INDEX: 24.62 KG/M2 | RESPIRATION RATE: 23 BRPM | DIASTOLIC BLOOD PRESSURE: 89 MMHG | HEART RATE: 56 BPM | OXYGEN SATURATION: 100 % | HEIGHT: 70 IN

## 2021-09-24 LAB — COLONOSCOPY: NORMAL

## 2021-09-24 PROCEDURE — 250N000009 HC RX 250: Performed by: NURSE ANESTHETIST, CERTIFIED REGISTERED

## 2021-09-24 PROCEDURE — 999N000010 HC STATISTIC ANES STAT CODE-CRNA PER MINUTE: Performed by: INTERNAL MEDICINE

## 2021-09-24 PROCEDURE — 45380 COLONOSCOPY AND BIOPSY: CPT | Mod: XS | Performed by: INTERNAL MEDICINE

## 2021-09-24 PROCEDURE — 258N000003 HC RX IP 258 OP 636: Performed by: NURSE ANESTHETIST, CERTIFIED REGISTERED

## 2021-09-24 PROCEDURE — 45385 COLONOSCOPY W/LESION REMOVAL: CPT | Performed by: INTERNAL MEDICINE

## 2021-09-24 PROCEDURE — 250N000011 HC RX IP 250 OP 636: Performed by: NURSE ANESTHETIST, CERTIFIED REGISTERED

## 2021-09-24 PROCEDURE — 370N000017 HC ANESTHESIA TECHNICAL FEE, PER MIN: Performed by: INTERNAL MEDICINE

## 2021-09-24 PROCEDURE — 88305 TISSUE EXAM BY PATHOLOGIST: CPT | Mod: TC | Performed by: INTERNAL MEDICINE

## 2021-09-24 RX ORDER — FLUMAZENIL 0.1 MG/ML
0.2 INJECTION, SOLUTION INTRAVENOUS
Status: DISCONTINUED | OUTPATIENT
Start: 2021-09-24 | End: 2021-09-24 | Stop reason: HOSPADM

## 2021-09-24 RX ORDER — PROPOFOL 10 MG/ML
INJECTION, EMULSION INTRAVENOUS CONTINUOUS PRN
Status: DISCONTINUED | OUTPATIENT
Start: 2021-09-24 | End: 2021-09-24

## 2021-09-24 RX ORDER — SODIUM CHLORIDE, SODIUM LACTATE, POTASSIUM CHLORIDE, CALCIUM CHLORIDE 600; 310; 30; 20 MG/100ML; MG/100ML; MG/100ML; MG/100ML
INJECTION, SOLUTION INTRAVENOUS CONTINUOUS
Status: CANCELLED | OUTPATIENT
Start: 2021-09-24

## 2021-09-24 RX ORDER — NALOXONE HYDROCHLORIDE 0.4 MG/ML
0.4 INJECTION, SOLUTION INTRAMUSCULAR; INTRAVENOUS; SUBCUTANEOUS
Status: DISCONTINUED | OUTPATIENT
Start: 2021-09-24 | End: 2021-09-24 | Stop reason: HOSPADM

## 2021-09-24 RX ORDER — LIDOCAINE HYDROCHLORIDE 20 MG/ML
INJECTION, SOLUTION INFILTRATION; PERINEURAL PRN
Status: DISCONTINUED | OUTPATIENT
Start: 2021-09-24 | End: 2021-09-24

## 2021-09-24 RX ORDER — OXYCODONE HYDROCHLORIDE 5 MG/1
5 TABLET ORAL EVERY 4 HOURS PRN
Status: CANCELLED | OUTPATIENT
Start: 2021-09-24

## 2021-09-24 RX ORDER — EPHEDRINE SULFATE 50 MG/ML
INJECTION, SOLUTION INTRAMUSCULAR; INTRAVENOUS; SUBCUTANEOUS PRN
Status: DISCONTINUED | OUTPATIENT
Start: 2021-09-24 | End: 2021-09-24

## 2021-09-24 RX ORDER — ONDANSETRON 2 MG/ML
4 INJECTION INTRAMUSCULAR; INTRAVENOUS EVERY 6 HOURS PRN
Status: DISCONTINUED | OUTPATIENT
Start: 2021-09-24 | End: 2021-09-24 | Stop reason: HOSPADM

## 2021-09-24 RX ORDER — NALOXONE HYDROCHLORIDE 0.4 MG/ML
0.2 INJECTION, SOLUTION INTRAMUSCULAR; INTRAVENOUS; SUBCUTANEOUS
Status: DISCONTINUED | OUTPATIENT
Start: 2021-09-24 | End: 2021-09-24 | Stop reason: HOSPADM

## 2021-09-24 RX ORDER — ONDANSETRON 4 MG/1
4 TABLET, ORALLY DISINTEGRATING ORAL EVERY 6 HOURS PRN
Status: DISCONTINUED | OUTPATIENT
Start: 2021-09-24 | End: 2021-09-24 | Stop reason: HOSPADM

## 2021-09-24 RX ORDER — PROPOFOL 10 MG/ML
INJECTION, EMULSION INTRAVENOUS PRN
Status: DISCONTINUED | OUTPATIENT
Start: 2021-09-24 | End: 2021-09-24

## 2021-09-24 RX ORDER — PROCHLORPERAZINE MALEATE 5 MG
5 TABLET ORAL EVERY 6 HOURS PRN
Status: DISCONTINUED | OUTPATIENT
Start: 2021-09-24 | End: 2021-09-24 | Stop reason: HOSPADM

## 2021-09-24 RX ORDER — SODIUM CHLORIDE, SODIUM LACTATE, POTASSIUM CHLORIDE, CALCIUM CHLORIDE 600; 310; 30; 20 MG/100ML; MG/100ML; MG/100ML; MG/100ML
INJECTION, SOLUTION INTRAVENOUS CONTINUOUS PRN
Status: DISCONTINUED | OUTPATIENT
Start: 2021-09-24 | End: 2021-09-24

## 2021-09-24 RX ORDER — LIDOCAINE 40 MG/G
CREAM TOPICAL
Status: CANCELLED | OUTPATIENT
Start: 2021-09-24

## 2021-09-24 RX ORDER — ONDANSETRON 2 MG/ML
INJECTION INTRAMUSCULAR; INTRAVENOUS PRN
Status: DISCONTINUED | OUTPATIENT
Start: 2021-09-24 | End: 2021-09-24

## 2021-09-24 RX ORDER — FENTANYL CITRATE 50 UG/ML
25 INJECTION, SOLUTION INTRAMUSCULAR; INTRAVENOUS EVERY 5 MIN PRN
Status: CANCELLED | OUTPATIENT
Start: 2021-09-24

## 2021-09-24 RX ORDER — ONDANSETRON 2 MG/ML
4 INJECTION INTRAMUSCULAR; INTRAVENOUS EVERY 30 MIN PRN
Status: CANCELLED | OUTPATIENT
Start: 2021-09-24

## 2021-09-24 RX ORDER — HYDROMORPHONE HYDROCHLORIDE 1 MG/ML
0.2 INJECTION, SOLUTION INTRAMUSCULAR; INTRAVENOUS; SUBCUTANEOUS EVERY 5 MIN PRN
Status: CANCELLED | OUTPATIENT
Start: 2021-09-24

## 2021-09-24 RX ORDER — MEPERIDINE HYDROCHLORIDE 25 MG/ML
12.5 INJECTION INTRAMUSCULAR; INTRAVENOUS; SUBCUTANEOUS
Status: CANCELLED | OUTPATIENT
Start: 2021-09-24

## 2021-09-24 RX ORDER — ONDANSETRON 2 MG/ML
4 INJECTION INTRAMUSCULAR; INTRAVENOUS
Status: CANCELLED | OUTPATIENT
Start: 2021-09-24

## 2021-09-24 RX ORDER — ONDANSETRON 4 MG/1
4 TABLET, ORALLY DISINTEGRATING ORAL EVERY 30 MIN PRN
Status: CANCELLED | OUTPATIENT
Start: 2021-09-24

## 2021-09-24 RX ADMIN — SODIUM CHLORIDE, POTASSIUM CHLORIDE, SODIUM LACTATE AND CALCIUM CHLORIDE: 600; 310; 30; 20 INJECTION, SOLUTION INTRAVENOUS at 11:31

## 2021-09-24 RX ADMIN — ONDANSETRON 4 MG: 2 INJECTION INTRAMUSCULAR; INTRAVENOUS at 11:32

## 2021-09-24 RX ADMIN — LIDOCAINE HYDROCHLORIDE 40 MG: 20 INJECTION, SOLUTION INFILTRATION; PERINEURAL at 11:32

## 2021-09-24 RX ADMIN — PROPOFOL 20 MG: 10 INJECTION, EMULSION INTRAVENOUS at 11:32

## 2021-09-24 RX ADMIN — Medication 5 MG: at 11:50

## 2021-09-24 RX ADMIN — PROPOFOL 100 MCG/KG/MIN: 10 INJECTION, EMULSION INTRAVENOUS at 11:32

## 2021-09-24 RX ADMIN — PHENYLEPHRINE HYDROCHLORIDE 100 MCG: 10 INJECTION INTRAVENOUS at 11:50

## 2021-09-24 ASSESSMENT — MIFFLIN-ST. JEOR: SCORE: 1471.44

## 2021-09-24 NOTE — ANESTHESIA PREPROCEDURE EVALUATION
Anesthesia Pre-Procedure Evaluation    Patient: Yosef Murry   MRN: [id number] : 1935        Preoperative Diagnosis: Personal history of colonic polyps [Z86.010]  Nonspecific abnormal results of function study [R94.8]   Procedure : Procedure(s):  COLONOSCOPY     Past Medical History:   Diagnosis Date     Anal fistula      Antiplatelet or antithrombotic long-term use      Arrhythmia      Atrial flutter (H)      Cerebral infarction (H)     TIA     Heartburn      Hypertension      Inguinal hernia, left      Persistent atrial fibrillation (H) 12     Prostate cancer (H)      TIA (transient ischaemic attack)           Past Surgical History:   Procedure Laterality Date     COLONOSCOPY       ENT SURGERY      tonsllectomy     EXAM UNDER ANESTHESIA, FISTULOTOMY RECTUM, COMBINED N/A 2015    Procedure: COMBINED EXAM UNDER ANESTHESIA, FISTULOTOMY RECTUM;  Surgeon: Candice Carty MD;  Location: [facility]     GENITOURINARY SURGERY      PROSTATECTOMY     HERNIORRHAPHY INGUINAL  2013    Procedure: HERNIORRHAPHY INGUINAL;  LEFT INGUINAL HERNIA REPAIR WITH MESH;  Surgeon: Filipe Fairchild MD;  Location: [facility]     HERNIORRHAPHY INGUINAL Right 2019    Procedure: OPEN RIGHT INGUINA HERNIA REPAIR WITH MESH;  Surgeon: Filipe Fairchild MD;  Location:  OR     ORTHOPEDIC SURGERY      WRIST SURGERY - LEFT      Allergies   Allergen Reactions     Other [Seasonal Allergies]       Social History     Tobacco Use     Smoking status: Former Smoker     Years: 16.00     Types: Cigarettes     Quit date: 1968     Years since quittin.7     Smokeless tobacco: Never Used   Substance Use Topics     Alcohol use: Yes     Alcohol/week: 0.8 standard drinks     Types: 1 Cans of beer per week     Comment: SOCIALLY      Wt Readings from Last 1 Encounters:   21 78 kg (172 lb)        Anesthesia Evaluation            ROS/MED HX  ENT/Pulmonary:  - neg pulmonary ROS     Neurologic:     (+) CVA, TIA,      Cardiovascular:     (+) hypertension-----Taking blood thinners dysrhythmias, a-fib and a-flutter,     METS/Exercise Tolerance:     Hematologic:       Musculoskeletal:       GI/Hepatic:     (+) GERD,     Renal/Genitourinary:  - neg Renal ROS     Endo:  - neg endo ROS     Psychiatric/Substance Use:       Infectious Disease:       Malignancy:   (+) Malignancy, History of Prostate.Prostate CA Remission status post.        Other:            Physical Exam    Airway        Mallampati: II   TM distance: > 3 FB   Neck ROM: full   Mouth opening: > 3 cm    Respiratory Devices and Support         Dental  no notable dental history         Cardiovascular   cardiovascular exam normal          Pulmonary   pulmonary exam normal                OUTSIDE LABS:  CBC:   Lab Results   Component Value Date    WBC 7.1 09/14/2021    WBC 7.0 09/23/2020    HGB 17.6 09/14/2021    HGB 16.1 09/23/2020    HCT 51.9 09/14/2021    HCT 48.8 09/23/2020     09/14/2021     09/23/2020     BMP:   Lab Results   Component Value Date     09/14/2021     09/23/2020    POTASSIUM 4.3 09/14/2021    POTASSIUM 4.6 09/23/2020    CHLORIDE 107 09/14/2021    CHLORIDE 106 09/23/2020    CO2 29 09/14/2021    CO2 26 09/23/2020    BUN 18 09/14/2021    BUN 18 09/23/2020    CR 0.93 09/14/2021    CR 0.98 09/23/2020    GLC 88 09/14/2021    GLC 67 (L) 09/23/2020     COAGS: No results found for: PTT, INR, FIBR  POC:   Lab Results   Component Value Date     (H) 08/01/2019     HEPATIC:   Lab Results   Component Value Date    ALBUMIN 3.3 (L) 09/23/2020    PROTTOTAL 7.4 09/23/2020    ALT 16 09/23/2020    AST 16 09/23/2020    ALKPHOS 109 09/23/2020    BILITOTAL 0.5 09/23/2020     OTHER:   Lab Results   Component Value Date    A1C 5.6 07/31/2019    BELLO 9.0 09/14/2021    MAG 2.5 (H) 08/04/2019    TSH 2.75 09/23/2020    T4 0.94 07/29/2019    CRP 61.9 (H) 07/29/2019    SED 11 07/29/2019       Anesthesia Plan    ASA Status:  3   NPO Status:  NPO Appropriate     Anesthesia Type: MAC.     - Reason for MAC: straight local not clinically adequate              Consents    Anesthesia Plan(s) and associated risks, benefits, and realistic alternatives discussed. Questions answered and patient/representative(s) expressed understanding.     - Discussed with:  Patient         Postoperative Care    Pain management: IV analgesics.   PONV prophylaxis: Ondansetron (or other 5HT-3)     Comments:                Michael Hernandez, DO, DO

## 2021-09-24 NOTE — ANESTHESIA CARE TRANSFER NOTE
Patient: Yosef Murry    Procedure(s):  COLONOSCOPY    Diagnosis: Personal history of colonic polyps [Z86.010]  Nonspecific abnormal results of function study [R94.8]  Diagnosis Additional Information: No value filed.    Anesthesia Type:   MAC     Note:    Oropharynx: oropharynx clear of all foreign objects  Level of Consciousness: drowsy  Oxygen Supplementation: face mask    Independent Airway: airway patency satisfactory and stable  Dentition: dentition unchanged  Vital Signs Stable: post-procedure vital signs reviewed and stable  Report to RN Given: handoff report given  Patient transferred to: PACU    Handoff Report: Identifed the Patient, Identified the Reponsible Provider, Reviewed the pertinent medical history, Discussed the surgical course, Reviewed Intra-OP anesthesia mangement and issues during anesthesia, Set expectations for post-procedure period and Allowed opportunity for questions and acknowledgement of understanding      Vitals:  Vitals Value Taken Time   BP     Temp     Pulse 78 09/24/21 1213   Resp 19 09/24/21 1213   SpO2 100 % 09/24/21 1213   Vitals shown include unvalidated device data.    Electronically Signed By: JOHNSON Torres CRNA  September 24, 2021  12:15 PM

## 2021-09-24 NOTE — ANESTHESIA POSTPROCEDURE EVALUATION
Patient: Yosef Murry    Procedure(s):  COLONOSCOPY    Diagnosis:Personal history of colonic polyps [Z86.010]  Nonspecific abnormal results of function study [R94.8]  Diagnosis Additional Information: No value filed.    Anesthesia Type:  MAC    Note:  Disposition: Outpatient   Postop Pain Control: Uneventful            Sign Out: Well controlled pain   PONV: No   Neuro/Psych: Uneventful            Sign Out: Acceptable/Baseline neuro status   Airway/Respiratory: Uneventful            Sign Out: Acceptable/Baseline resp. status   CV/Hemodynamics: Uneventful            Sign Out: Acceptable CV status; No obvious hypovolemia; No obvious fluid overload   Other NRE: NONE   DID A NON-ROUTINE EVENT OCCUR? No           Last vitals:  Vitals Value Taken Time   /89 09/24/21 1237   Temp     Pulse 57 09/24/21 1240   Resp 12 09/24/21 1241   SpO2 98 % 09/24/21 1241   Vitals shown include unvalidated device data.    Electronically Signed By: Michael Hernandez DO, DO  September 24, 2021  3:02 PM

## 2021-09-27 LAB
PATH REPORT.COMMENTS IMP SPEC: NORMAL
PATH REPORT.COMMENTS IMP SPEC: NORMAL
PATH REPORT.FINAL DX SPEC: NORMAL
PATH REPORT.GROSS SPEC: NORMAL
PATH REPORT.MICROSCOPIC SPEC OTHER STN: NORMAL
PATH REPORT.RELEVANT HX SPEC: NORMAL
PHOTO IMAGE: NORMAL

## 2021-09-27 PROCEDURE — 88305 TISSUE EXAM BY PATHOLOGIST: CPT | Mod: 26 | Performed by: PATHOLOGY

## 2021-09-30 ENCOUNTER — OFFICE VISIT (OUTPATIENT)
Dept: FAMILY MEDICINE | Facility: CLINIC | Age: 86
End: 2021-09-30
Payer: MEDICARE

## 2021-09-30 VITALS
HEART RATE: 66 BPM | TEMPERATURE: 96.7 F | RESPIRATION RATE: 17 BRPM | HEIGHT: 70 IN | DIASTOLIC BLOOD PRESSURE: 93 MMHG | SYSTOLIC BLOOD PRESSURE: 149 MMHG | BODY MASS INDEX: 23.77 KG/M2 | WEIGHT: 166 LBS | OXYGEN SATURATION: 97 %

## 2021-09-30 DIAGNOSIS — Z00.00 ENCOUNTER FOR PREVENTIVE HEALTH EXAMINATION: Primary | ICD-10-CM

## 2021-09-30 LAB
ALT SERPL W P-5'-P-CCNC: 25 U/L (ref 0–70)
CHOLEST SERPL-MCNC: 135 MG/DL
CK SERPL-CCNC: 69 U/L (ref 30–300)
FASTING STATUS PATIENT QL REPORTED: YES
FOLATE SERPL-MCNC: 13.1 NG/ML
HDLC SERPL-MCNC: 37 MG/DL
LDLC SERPL CALC-MCNC: 70 MG/DL
NONHDLC SERPL-MCNC: 98 MG/DL
TRIGL SERPL-MCNC: 142 MG/DL
TSH SERPL DL<=0.005 MIU/L-ACNC: 3.1 MU/L (ref 0.4–4)
VIT B12 SERPL-MCNC: 395 PG/ML (ref 193–986)

## 2021-09-30 PROCEDURE — 36415 COLL VENOUS BLD VENIPUNCTURE: CPT | Performed by: INTERNAL MEDICINE

## 2021-09-30 PROCEDURE — 99397 PER PM REEVAL EST PAT 65+ YR: CPT | Performed by: INTERNAL MEDICINE

## 2021-09-30 PROCEDURE — 82607 VITAMIN B-12: CPT | Performed by: INTERNAL MEDICINE

## 2021-09-30 PROCEDURE — 82746 ASSAY OF FOLIC ACID SERUM: CPT | Performed by: INTERNAL MEDICINE

## 2021-09-30 PROCEDURE — 82550 ASSAY OF CK (CPK): CPT | Performed by: INTERNAL MEDICINE

## 2021-09-30 PROCEDURE — 84443 ASSAY THYROID STIM HORMONE: CPT | Performed by: INTERNAL MEDICINE

## 2021-09-30 PROCEDURE — 80061 LIPID PANEL: CPT | Performed by: INTERNAL MEDICINE

## 2021-09-30 PROCEDURE — 84460 ALANINE AMINO (ALT) (SGPT): CPT | Performed by: INTERNAL MEDICINE

## 2021-09-30 ASSESSMENT — ENCOUNTER SYMPTOMS
JOINT SWELLING: 0
COUGH: 0
PARESTHESIAS: 0
DYSURIA: 0
NERVOUS/ANXIOUS: 0
HEADACHES: 0
FEVER: 0
PALPITATIONS: 0
HEMATURIA: 0
EYE PAIN: 0
DIARRHEA: 0
HEARTBURN: 0
CONSTIPATION: 0
DIZZINESS: 0
CHILLS: 0
SORE THROAT: 0
NAUSEA: 0
FREQUENCY: 0
HEMATOCHEZIA: 0
SHORTNESS OF BREATH: 0
WEAKNESS: 0
ARTHRALGIAS: 0
MYALGIAS: 0
ABDOMINAL PAIN: 0

## 2021-09-30 ASSESSMENT — ACTIVITIES OF DAILY LIVING (ADL): CURRENT_FUNCTION: NO ASSISTANCE NEEDED

## 2021-09-30 ASSESSMENT — MIFFLIN-ST. JEOR: SCORE: 1444.22

## 2021-09-30 NOTE — PATIENT INSTRUCTIONS
Héctor;  He appears to be doing well.  Please keep an eye on your blood pressure.  If your blood pressures are greater than 135/85 I would recommend you increase the amlodipine up to 7.5 mg daily.  If you make this change in dosage, continue the dose for 14 days and follow your blood pressures carefully.  Call me with the results I will consider changing your dose.    A couple additional labs will be obtained today.    Best regards  Bryan

## 2021-09-30 NOTE — LETTER
[address] [facility]  [address]  [place]  [address]  Tel: [phone]    October 4, 2021    Yosef Murry  [address] [place] [address]        Dear Mr. Murry,    The results of your recent laboratory studies were normal.    Your cholesterol levels continue to be good.  HDL cholesterol increases only with exercise.   If you have any further questions or problems, please contact our office.      Sincerely,    Bryan Gill MD/SUNDAY          Enclosure: Lab Results  Results for orders placed or performed in visit on 09/30/21   Lipid panel reflex to direct LDL Fasting     Status: Abnormal   Result Value Ref Range    Cholesterol 135 <200 mg/dL    Triglycerides 142 <150 mg/dL    Direct Measure HDL 37 (L) >=40 mg/dL    LDL Cholesterol Calculated 70 <=100 mg/dL    Non HDL Cholesterol 98 <130 mg/dL    Patient Fasting > 8hrs? Yes     Narrative    Cholesterol  Desirable:  <200 mg/dL    Triglycerides  Normal:  Less than 150 mg/dL  Borderline High:  150-199 mg/dL  High:  200-499 mg/dL  Very High:  Greater than or equal to 500 mg/dL    Direct Measure HDL  Female:  Greater than or equal to 50 mg/dL   Male:  Greater than or equal to 40 mg/dL    LDL Cholesterol  Desirable:  <100mg/dL  Above Desirable:  100-129 mg/dL   Borderline High:  130-159 mg/dL   High:  160-189 mg/dL   Very High:  >= 190 mg/dL    Non HDL Cholesterol  Desirable:  130 mg/dL  Above Desirable:  130-159 mg/dL  Borderline High:  160-189 mg/dL  High:  190-219 mg/dL  Very High:  Greater than or equal to 220 mg/dL   CK total     Status: Normal   Result Value Ref Range    CK 69 30 - 300 U/L   ALT     Status: Normal   Result Value Ref Range    ALT 25 0 - 70 U/L   TSH with free T4 reflex     Status: Normal   Result Value Ref Range    TSH 3.10 0.40 - 4.00 mU/L   Vitamin B12     Status: Normal   Result Value Ref Range    Vitamin B12 395 193 - 986 pg/mL   Folate     Status: Normal   Result Value Ref Range    Folic Acid 13.1  >=5.4 ng/mL

## 2021-09-30 NOTE — PROGRESS NOTES
"SUBJECTIVE:   Yosef Murry is a 86 year old male who presents for Preventive Visit.    Overall the patient is doing well. Recently had a PET scan which showed some activity at the level of the colon. Because of this he underwent colonoscopy. Colonoscopy revealed six flat polyps. The largest polyp was 6 mm in size. All of the pathology has come back as tubular adenoma.    He denies any new symptoms no chest pain or shortness of breath no new endocrine hematologic or allergic symptoms. His blood pressure today is 148/93. His blood pressure at home is much better than this.    I have instructed the patient to increase his amlodipine should his systolic pressures be greater than 135 or his diastolic pressures greater than 85.          Patient has been advised of split billing requirements and indicates understanding: Yes   Are you in the first 12 months of your Medicare coverage?  No    Healthy Habits:     In general, how would you rate your overall health?  Good    Frequency of exercise:  4-5 days/week    Duration of exercise:  30-45 minutes    Do you usually eat at least 4 servings of fruit and vegetables a day, include whole grains    & fiber and avoid regularly eating high fat or \"junk\" foods?  No    Taking medications regularly:  No    Medication side effects:  Not applicable    Ability to successfully perform activities of daily living:  No assistance needed    Home Safety:  No safety concerns identified    Hearing Impairment:  Find that men's voices are easier to understand than woman's    In the past 6 months, have you been bothered by leaking of urine? Yes    In general, how would you rate your overall mental or emotional health?  Good      PHQ-2 Total Score: 0    Additional concerns today:  No    Do you feel safe in your environment? Yes    Have you ever done Advance Care Planning? (For example, a Health Directive, POLST, or a discussion with a medical provider or your loved ones about your wishes): Yes, " advance care planning is on file.       Fall risk  Fallen 2 or more times in the past year?: Yes  Any fall with injury in the past year?: Yes (has walker & cane to use)  Timed Up and Go Test (>13.5 is fall risk; contact physician) : 11    Cognitive Screening   1) Repeat 3 items (Leader, Season, Table)    2) Clock draw: NORMAL  3) 3 item recall: Recalls 2 objects   Results: NORMAL clock, 1-2 items recalled: COGNITIVE IMPAIRMENT LESS LIKELY    Mini-CogTM Copyright S Varghese. Licensed by the author for use in [facility]; reprinted with permission (mary@[facility]). All rights reserved.      Do you have sleep apnea, excessive snoring or daytime drowsiness?: no    Reviewed and updated as needed this visit by clinical staff                 Reviewed and updated as needed this visit by Provider                Social History     Tobacco Use     Smoking status: Former Smoker     Years: 16.00     Types: Cigarettes     Quit date: 1968     Years since quittin.7     Smokeless tobacco: Never Used   Substance Use Topics     Alcohol use: Yes     Alcohol/week: 0.8 standard drinks     Types: 1 Cans of beer per week     Comment: SOCIALLY     If you drink alcohol do you typically have >3 drinks per day or >7 drinks per week? No    Alcohol Use 2021   Prescreen: >3 drinks/day or >7 drinks/week? No   Prescreen: >3 drinks/day or >7 drinks/week? -               Current providers sharing in care for this patient include:   Patient Care Team:  Bryan Gill MD as PCP - General (Internal Medicine)  Fred Connell MD as Assigned PCP  Henna Murphy MD as Assigned Heart and Vascular Provider    The following health maintenance items are reviewed in Epic and correct as of today:  Health Maintenance Due   Topic Date Due     ANNUAL REVIEW OF HM ORDERS  Never done     COVID-19 Vaccine (1) Never done     MEDICARE ANNUAL WELLNESS VISIT  2021     Lab work is in process          Review of  "Systems  Constitutional, HEENT, cardiovascular, pulmonary, gi and gu systems are negative, except as otherwise noted.    OBJECTIVE:   There were no vitals taken for this visit. Estimated body mass index is 24.68 kg/m  as calculated from the following:    Height as of 9/24/21: 1.778 m (5' 10\").    Weight as of 9/24/21: 78 kg (172 lb).  Physical Exam  GENERAL: healthy, alert and no distress  EYES: Eyes grossly normal to inspection, PERRL and conjunctivae and sclerae normal  HENT: ear canals and TM's normal, nose and mouth without ulcers or lesions  NECK: no adenopathy, no asymmetry, masses, or scars and thyroid normal to palpation  RESP: lungs clear to auscultation - no rales, rhonchi or wheezes  CV: regular rate and rhythm, normal S1 S2, no S3 or S4, no murmur, click or rub, no peripheral edema and peripheral pulses strong  ABDOMEN: soft, nontender, no hepatosplenomegaly, no masses and bowel sounds normal  MS: no gross musculoskeletal defects noted, no edema  SKIN: no suspicious lesions or rashes  NEURO: Normal strength and tone, mentation intact and speech normal  PSYCH: mentation appears normal, affect normal/bright    Diagnostic Test Results:  Labs reviewed in Epic    ASSESSMENT / PLAN:       ICD-10-CM    1. Encounter for preventive health examination  Z00.00 Lipid panel reflex to direct LDL Fasting     CK total     ALT     TSH with free T4 reflex     Vitamin B12     Folate     Lipid panel reflex to direct LDL Fasting     CK total     ALT     TSH with free T4 reflex     Vitamin B12     Folate       Patient has been advised of split billing requirements and indicates understanding: Yes  COUNSELING:  Reviewed preventive health counseling, as reflected in patient instructions    Estimated body mass index is 24.68 kg/m  as calculated from the following:    Height as of 9/24/21: 1.778 m (5' 10\").    Weight as of 9/24/21: 78 kg (172 lb).        He reports that he quit smoking about 52 years ago. His smoking use included " cigarettes. He quit after 16.00 years of use. He has never used smokeless tobacco.      Appropriate preventive services were discussed with this patient, including applicable screening as appropriate for cardiovascular disease, diabetes, osteopenia/osteoporosis, and glaucoma.  As appropriate for age/gender, discussed screening for colorectal cancer, prostate cancer, breast cancer, and cervical cancer. Checklist reviewing preventive services available has been given to the patient.    Reviewed patients plan of care and provided an AVS. The Basic Care Plan (routine screening as documented in Health Maintenance) for Yosef meets the Care Plan requirement. This Care Plan has been established and reviewed with the Patient.    Counseling Resources:  ATP IV Guidelines  Pooled Cohorts Equation Calculator  Breast Cancer Risk Calculator  Breast Cancer: Medication to Reduce Risk  FRAX Risk Assessment  ICSI Preventive Guidelines  Dietary Guidelines for Americans, 2010  USDA's MyPlate  ASA Prophylaxis  Lung CA Screening    Bryan Gill MD  [facility]    Identified Health Risks:

## 2021-10-07 ENCOUNTER — TRANSFERRED RECORDS (OUTPATIENT)
Dept: HEALTH INFORMATION MANAGEMENT | Facility: CLINIC | Age: 86
End: 2021-10-07

## 2022-01-12 DIAGNOSIS — I63.9 INFARCTION OF RIGHT BASAL GANGLIA (H): ICD-10-CM

## 2022-01-12 DIAGNOSIS — I48.21 PERMANENT ATRIAL FIBRILLATION (H): ICD-10-CM

## 2022-01-12 DIAGNOSIS — R94.31 ABNORMAL ELECTROCARDIOGRAM: ICD-10-CM

## 2022-01-12 NOTE — TELEPHONE ENCOUNTER
Received refill request for:  Eliquis  Last OV was: 8/2021 with Dr. Murphy  Labs/EKG: N/a  F/U scheduled: Orders for 8/2022  New script sent to: [facility]

## 2022-03-24 ENCOUNTER — TRANSFERRED RECORDS (OUTPATIENT)
Dept: HEALTH INFORMATION MANAGEMENT | Facility: CLINIC | Age: 87
End: 2022-03-24

## 2022-04-08 NOTE — DISCHARGE SUMMARY
Multiple PACs versus atrial fibrillation  Cardiology is holding off anticoagulation till complete assessment   [facility]    Discharge Summary  Hospitalist    Date of Admission:  7/29/2019  Date of Discharge:  8/6/2019  Discharging Provider: Ayush Chand MD  Date of Service (when I saw the patient): 8/6/19    Discharge Diagnoses   Subacute ischemic stroke of the right basal ganglia  Fever of Unknown Origin  Dysphagia  Zenker's diverticulum  Schatzki's ring  Right wrist pain/swelling  Left wrist pain/swelling  Neck Pain  Generalized Weakness  Persistent atrial fibrillation (H)    History of Present Illness   Yosef Murry is a 83 year old male who was admitted on 7/29/2019. He presented with generalized weakness and pain/swellingi in his right wrist. After admission, he developed a fever without a clear etiology. He also had brief episodes of 'unresponsiveness.' An MRI revealed a subacute right basal ganglia infarct.    Hospital Course   Yosef Murry was admitted on 7/29/2019.  The following problems were addressed during his hospitalization:    Subacute ischemic stroke of the right basal ganglia  Had an RRT on the evening of 7/31/19 due to episodes of 'unresponsiveness.' There was concern for a stroke. Neurology was consulted. CT head showed an age indeterminate lacunar infarction in the right basal ganglia and internal capsule. MRI brain revealed a subacute ischemic infarction of the right basal ganglia.  - Outside window for tPA.  - Neurology was consulted and assisted with management.  - PT/OT/SLP consulted.  - TTE on 7/30/19 was relatively normal, see summary below. Neurology ordered DANNY, unable to be done due to difficulty passing the scope.              - He did not want to try the DANNY again (cardiology could do it, but would recommend that it be done under anesthesia).              - Cardiac MRI ordered by neurology, this will be done as an outpatient on Thursday and the neurologist will follow-up with him regarding the results.  - EEG obtained on 8/1/19, consistent with mild diffuse  encephalopathy, no epileptiform discharges.  - CTA head/neck results reviewed, no significant abnormalities.  - Started on atorvastatin, continue the same upon discharge..  - Transitioned from PTA Xarelto to Eliquis per Neurology recommendations.  - Neurology follow-up will be arranged after results of cardiac MRI are available.     Fever of Unknown Origin  No localizing symptoms, other than wrist pain. No leucocytosis. CXR and UA clear. CRP elevated.  - Resolved. Temperature has been less than 99.0 since the afternoon of 8/3/19.  - WBC within normal limits. Procalcitonin 0.08 on 8/1/19.  - Blood cultures from 7/30/19 were negative.  - ID was consulted and assisted with management.  - He was observed off of antibiotics throughout the hospitalization.  - CT chest/abdomen/pelvis did not reveal an obvious source of fever.  - WESLEY, anti-CCP, RF, and LDH within normal limits.  - Lyme antibody negative.  - Lumbar puncture on 8/5/19 did not show any evidence of meningitis.  - Follow-up with PCP in one week.     Dysphagia  Zenker's diverticulum  Schatzki's ring  - GI consulted due to difficulty passing the DANNY scope.  - Had EGD on 8/2/19 revealing a Zenker's diverticulum.  - Had a Schatzki's ring which was dilated during the EGD.  - SLP following, met SLP goals prior to discharge and does not need any further treatment. Consider outpatient video swallow study or ENT referral if he develops swallowing issues in the future.     Right wrist pain/swelling  Left wrist pain/swelling  Presented with generalized weakness and wrist pain and additionally developed FUO.  - Etiology unclear.  - Orthopedics consulted, appreciate their assistance.  - IR aspirated right wrist on 7/30. Gram stain negative. No crystals seen. Culture negative.  - Cleared by Ortho and right wrist pain has resolved.  - Developed left wrist pain/swelling on 8/3/19. This also improved over the next few days without any specific intervention.  - Continue  symptomatic management.    Neck Pain  - Appears to be musculoskeletal in nature.  - CT cervical spine on 7/30/19 showed chronic degenerative changes, no acute findings.  - Much improved overall, but still persists. Symptomatic management.    Generalized Weakness  Complained of generalized weakness on admission but was ambulatory without assistance PTA. Then while in hospital developed increasing generalized weakness requiring assist of 2.  - Suspect secondary to underlying illness.  - PT/OT consulted.  - Improved. Stable for discharge home with FWW.  - He will benefit from ongoing nursing/PT/OT care through home health after discharge.     Persistent atrial fibrillation (H)  - PTA Xarelto discontinued and Eliquis started as noted above.  - Not on any chronic medications for rate control.  - Had elevated heart rate on 8/3/19, received a PRN dose of IV metoprolol with improvement in heart rate, did not require any further rate control medications while in the hospital.    Ayush Chand MD    Significant Results and Procedures   EGD on 8/2/19.  See hospital course above.    Pending Results   These results will be followed up by PCP  Unresulted Labs Ordered in the Past 30 Days of this Admission     Date and Time Order Name Status Description    8/5/2019 1833 West Nile Virus IgG and IgM CSF: In process     8/5/2019 1833 Lyme IgG and IgM CSF Immunoblot: In process     8/5/2019 1833 Cytomegalovirus Quant PCR Non Blood Tube 3 In process     8/5/2019 1833 B Burgdorferi Moni CSF: In process     8/5/2019 1833 VDRL CSF: In process     7/29/2019 1952 T4 free In process         Code Status   DNR / DNI       Primary Care Physician   Fred Connell    Physical Exam   Temp: 97.2  F (36.2  C) Temp src: Oral BP: 124/78   Heart Rate: 97 Resp: 16 SpO2: 98 % O2 Device: None (Room air)    Vitals:    07/29/19 1935 07/29/19 2222 08/05/19 0500   Weight: 76.2 kg (168 lb) 78.6 kg (173 lb 4.8 oz) 76.8 kg (169 lb 5 oz)     Vital Signs with  Ranges  Temp:  [97.2  F (36.2  C)] 97.2  F (36.2  C)  Heart Rate:  [97] 97  Resp:  [16] 16  BP: (124)/(78) 124/78  SpO2:  [98 %] 98 %  I/O last 3 completed shifts:  In: 740 [P.O.:740]  Out: -     Constitutional: awake, alert, cooperative, no apparent distress, and appears stated age  Respiratory: No increased work of breathing, good air exchange, clear to auscultation bilaterally, no crackles or wheezing  Cardiovascular: regular rate, irregular rhythm, normal S1 and S2, no murmur noted and no edema  GI: normal bowel sounds, soft, non-distended and non-tender  Skin: normal skin color, texture, turgor  Neurologic: awake, alert, oriented to name, place and time  Musculoskeletal: left wrist redness and swelling improving    Discharge Disposition   Discharged to home with home health care  Condition at discharge: Stable    Consultations This Hospital Stay   ORTHOPEDIC SURGERY IP CONSULT  INFECTIOUS DISEASES IP CONSULT  PHYSICAL THERAPY ADULT IP CONSULT  NEUROLOGY IP CONSULT  PHYSICAL THERAPY ADULT IP CONSULT  OCCUPATIONAL THERAPY ADULT IP CONSULT  SPEECH LANGUAGE PATH ADULT IP CONSULT  PATIENT LEARNING CENTER IP CONSULT  SPEECH LANGUAGE PATH ADULT IP CONSULT  GASTROENTEROLOGY IP CONSULT  CARE TRANSITION RN/SW IP CONSULT    Time Spent on this Encounter   I, Ayush Chand, personally saw the patient today and spent greater than 30 minutes discharging this patient.    Discharge Orders      MRI Cardiac w/contrast    You have an appointment for cardiac MRI on     Home care nursing referral      Home Care PT Referral for Hospital Discharge      Home Care OT Referral for Hospital Discharge      Follow-up and recommended labs and tests     Follow up with primary care provider, Fred Connell, within 7 days for hospital follow- up.  No follow up labs or test are needed.  Please call and schedule this appointment: [phone]     Activity    Your activity upon discharge: activity as tolerated     Reason for your hospital  stay    You were admitted for wrist pain and fever of unknown origin. You were seen by ortho and your wrist aspirate showed no sign of infection. The cause of your fever is not entirely clear. Blood cultures were negative. You also had a stroke.     MD face to face encounter    Documentation of Face to Face and Certification for Home Health Services    I certify that patient: Yosef Murry is under my care and that I, or a nurse practitioner or physician's assistant working with me, had a face-to-face encounter that meets the physician face-to-face encounter requirements with this patient on: 8/6/2019.    This encounter with the patient was in whole, or in part, for the following medical condition, which is the primary reason for home health care: stroke, generalized weakness.    I certify that, based on my findings, the following services are medically necessary home health services: Nursing, Occupational Therapy and Physical Therapy.    My clinical findings support the need for the above services because: Nurse is needed: To provide assessment and oversight required in the home to assure adherence to the medical plan due to: recent stroke, multiple medication changes, fever of unknown origin. Occupational Therapy Services are needed to assess and treat cognitive ability and address ADL safety due to recent stroke and fall risk. and Physical Therapy Services are needed to assess and treat the following functional impairments: recent stroke, fall risk, generalized weakness.    Further, I certify that my clinical findings support that this patient is homebound (i.e. absences from home require considerable and taxing effort and are for medical reasons or [employer] services or infrequently or of short duration when for other reasons) because: Requires assistance of another person or specialized equipment to access medical services because patient: had a stroke and has generalized weakness and is a fall risk.    Based  on the above findings. I certify that this patient is confined to the home and needs intermittent skilled nursing care, physical therapy and/or speech therapy.  The patient is under my care, and I have initiated the establishment of the plan of care.  This patient will be followed by a physician who will periodically review the plan of care.  Physician/Provider to provide follow up care: Fred Connell    Attending hospital physician (the Medicare certified [place] provider): Ayush Chand  Physician Signature: See electronic signature associated with these discharge orders.  Date: 8/6/2019     DNR/DNI     Diet    Follow this diet upon discharge: Orders Placed This Encounter      Regular Diet Adult     Discharge Medications   Current Discharge Medication List      START taking these medications    Details   apixaban ANTICOAGULANT (ELIQUIS) 5 MG tablet Take 1 tablet (5 mg) by mouth 2 times daily  Qty: 60 tablet, Refills: 0    Associated Diagnoses: Persistent atrial fibrillation (H)      atorvastatin (LIPITOR) 10 MG tablet Take 1 tablet (10 mg) by mouth every evening  Qty: 30 tablet, Refills: 0    Associated Diagnoses: Infarction of right basal ganglia (H)      order for DME Equipment being ordered: Walker Wheels () and Walker ()  Treatment Diagnosis: Impaired gait  Qty: 1 each, Refills: 0    Associated Diagnoses: Generalized weakness         CONTINUE these medications which have NOT CHANGED    Details   calcium carbonate (TUMS) 500 MG chewable tablet Take 1 chew tab by mouth At Bedtime      HYDROcodone-acetaminophen (NORCO) 5-325 MG tablet Take 1-2 tablets by mouth every 4 hours as needed for moderate to severe pain  Qty: 15 tablet, Refills: 0    Associated Diagnoses: Right inguinal hernia      loratadine (CLARITIN) 10 MG tablet Take 1 tablet by mouth daily.  Qty: 30 tablet, Refills: 1      metoprolol succinate (TOPROL XL) 25 MG 24 hr tablet Take 1 tablet (25 mg) by mouth daily  Qty: 90 tablet, Refills:  2    Associated Diagnoses: Permanent atrial fibrillation (H)      Multiple Vitamins-Minerals (PRESERVISION AREDS 2 PO) Take 1 capsule by mouth 2 times daily       diphenhydrAMINE-acetaminophen (TYLENOL PM)  MG tablet Take 2 tablets by mouth nightly as needed for sleep      leuprolide acetate (LUPRON) 1 MG/0.2ML kit Inject Subcutaneous every 6 months He gets this at [facility]. Last given on 4/2019.         STOP taking these medications       amLODIPine (NORVASC) 5 MG tablet Comments:   Reason for Stopping:         losartan (COZAAR) 50 MG tablet Comments:   Reason for Stopping:         rivaroxaban ANTICOAGULANT (XARELTO) 20 MG TABS tablet Comments:   Reason for Stopping:         traZODone (DESYREL) 100 MG tablet Comments:   Reason for Stopping:             Allergies   Allergies   Allergen Reactions     Other [Seasonal Allergies]      Data   Most Recent 3 CBC's:  Recent Labs   Lab Test 08/04/19 0808 08/02/19 0839 08/01/19 0428   WBC 9.2 10.0 8.2   HGB 14.1 14.3 14.6   MCV 94 95 94    246 208      Most Recent 3 BMP's:  Recent Labs   Lab Test 08/05/19  0817 08/04/19  0808 08/02/19  0839 07/30/19  0615   NA  --  138 138 140   POTASSIUM  --  4.0 4.8 4.3   CHLORIDE  --  105 105 107   CO2  --  28 28 30   BUN  --  29 25 19   CR 0.74 0.83 0.90 0.86   ANIONGAP  --  5 5 3   BELLO  --  9.3 9.0 8.6   GLC  --  108* 115* 98     Most Recent 2 LFT's:  Recent Labs   Lab Test 07/29/19  1952 06/10/19  0949   AST 13 17   ALT 14 16   ALKPHOS 100 103   BILITOTAL 1.0 0.5     Most Recent 3 Troponin's:  Recent Labs   Lab Test 08/01/19  0428 07/31/19 2106 07/29/19 1952   TROPI 0.023 <0.015 0.022     Most Recent Cholesterol Panel:  Recent Labs   Lab Test 07/31/19 2106   CHOL 116   LDL 62   HDL 37*   TRIG 84     Most Recent 6 Bacteria Isolates From Any Culture (See EPIC Reports for Culture Details):  Recent Labs   Lab Test 07/30/19 2229 07/30/19  1500   CULT No growth  No growth No growth     Most Recent TSH, T4 and A1c  Labs:  Recent Labs   Lab Test 07/31/19 2106 07/29/19 1952   TSH  --  4.68*   T4  --  0.94   A1C 5.6  --      Results for orders placed or performed during the hospital encounter of 07/29/19   US Upper Extremity Venous Duplex Right    Narrative    US UPPER EXTREMITY VENOUS DUPLEX RIGHT 7/29/2019 8:52 PM     HISTORY: right upper extremity swelling    TECHNIQUE: Venous Doppler US of the upper extremity.? Color flow and  spectral Doppler with waveform analysis performed.    COMPARISON: None.    FINDINGS: Ultrasound of the right upper extremity demonstrates no deep  vein thrombus in the subclavian, axillary or brachial veins. No  thrombus seen in the cephalic or basilic veins or visualized portions  or internal jugular vein.      Impression    IMPRESSION: No DVT identified right upper extremity.    SUNDAR MANN MD   XR Chest 2 Views    Narrative    XR CHEST 2 VW   7/29/2019 9:18 PM     HISTORY: weakness, concern for right upper lung mass with new RUE  swelling    COMPARISON: Film dated 5/13/2016    FINDINGS: The heart is negative.  The lungs are clear. The pulmonary  vasculature is normal.  Healed left rib fractures are again noted..      Impression    IMPRESSION: No active infiltrates. No right upper lobe mass is  identified.        TIARRA AUSTIN MD   XR Wrist Right G/E 3 Views    Narrative    RIGHT WRIST THREE VIEWS July 30, 2019 8:53 AM    HISTORY: Right wrist pain.    COMPARISON: None.      Impression    IMPRESSION: No fracture or osseous lesion is demonstrated. There are  mild degenerative changes of the first carpometacarpal joint. There  are also mild degenerative changes of the distal radioulnar joint.  Both of these spaces demonstrate mild joint space narrowing and  marginal osteophyte formation. The remaining joint spaces appear to be  well-preserved. No soft tissue pathology is demonstrated.     EVA JUAREZ MD   XR Joint Aspiration Intermed Right    Narrative    EXAM: XR JOINT ASPIRATION INTERMED  RIGHT  7/30/2019 3:22 PM       History:  right wrist pain/swelling. Rule out gout versus septic  arthritis     PROCEDURE: The risks (including bleeding, infection, and allergy to  contrast and medications) and benefits of the procedure were explained  to the patient and consent was obtained.  The skin was prepped and  draped in the usual sterile fashion and 1% Lidocaine was used for  local anesthesia. Using sterile technique and fluoroscopic guidance, a  #20 gauge needle was placed into the right wrist joint using a dorsal  approach. Approximately  0.5 mL clear pink fluid was obtained and sent  to the lab for analysis. Even after repositioning the needle multiple  times, no additional fluid was able to be obtained. The patient  tolerated the procedure well and there were no immediate  complications.      Fluoroscopy time: 0.9 minutes  Number of Images: 2  Medications used: 3 mL Local Lidocaine, less than 1 mL of synovial  fluid collected from right wrist      Impression    IMPRESSION:  Technically successful right wrist joint  aspiration.    JAMIL SNIDER PA-C   CT Cervical Spine w/o Contrast    Narrative    CT OF THE CERVICAL SPINE WITHOUT CONTRAST   7/30/2019 3:33 PM     COMPARISON: None.    HISTORY: Neck pain, initial exam.     TECHNIQUE: Axial images of the cervical spine were acquired without  intravenous contrast. Multiplanar reformations were created.      FINDINGS: There is normal alignment of the cervical vertebrae.  Vertebral body heights of the cervical spine are normal.  Craniocervical alignment is normal. There are no fractures of the  cervical spine. There is degenerative endplate spurring at the C5-C6  and C6-C7 levels. There is mild-moderate facet arthropathy in the  right at the C2-C3, C3-C4, C4-C5 and C6-C7 levels. There is mild facet  arthropathy on the left at the C4-C5, C5-C6 and C6-C7 levels. There is  a minimal posterior disc bulge at the C3-C4 level. There is no  significant degenerative  spinal canal narrowing of the cervical spine.  There is mild-moderate bony neural foraminal narrowing on the right at  the C3-C4 level, on the right at the C4-C5 level and bilaterally at  the C5-C6 level.      Impression    IMPRESSION: Degenerative changes of the cervical spine as described  above. No evidence for fracture or traumatic malalignment.      Radiation dose for this scan was reduced using automated exposure  control, adjustment of the mA and/or kV according to patient size, or  iterative reconstruction technique    EMERY BERNAL MD   CT Head w/o Contrast    Narrative    CT HEAD WITHOUT CONTRAST 7/31/2019 8:06 PM    CLINICAL HISTORY: Altered level of consciousness (LOC), unexplained.  On Xarelto, worsening receptive aphasia.    TECHNIQUE: Noncontrast axial CT images of the head were obtained. This  CT Scan used dose modulation, iterative reconstruction, and/or weight  based dosing when appropriate to reduce radiation dose to as low as  reasonably achievable.    COMPARISON:  None.     FINDINGS: There is focal decreased attenuation in the putamen and  internal capsule on the right side. This is compatible with an age  indeterminate lacunar infarction. There is chronic cortical infarction  in the mid right frontal lobe and in the posterior right frontal lobe.  There are small chronic appearing lacunar infarcts in the cerebellum  on the left. No intracranial hemorrhage. No mass lesion is identified.  The paranasal sinuses and mastoid air cells are clear.      Impression    IMPRESSION:  1. Age indeterminate lacunar infarction in the basal ganglia and  internal capsule on the right side. MRI may be beneficial to evaluate  for acuity.  2. Chronic small cortical infarctions in the right frontal lobe and  small chronic lacunar infarctions in the cerebellum on the left.    ZACARIAS HAHN MD   MR Brain w/o & w Contrast    Narrative    MRI OF THE BRAIN WITHOUT AND WITH CONTRAST July 31, 2019 10:00 PM     HISTORY: Focal  neuro deficit greater than six hours, stroke suspected.  Altered level of consciousness (LOC), unexplained. Receptive aphasia.     TECHNIQUE: Axial diffusion-weighted with ADC map, axial T2-weighted  with fat saturation, axial T1-weighted, axial turboFLAIR and coronal  T1-weighted images of the brain were acquired without intravenous  contrast. Following intravenous administration of gadolinium (7 mL  Gadavist), axial T1-weighted images of the brain were acquired.     COMPARISON: Head CT 7/31/2019.    FINDINGS: There is mild diffuse cerebral volume loss. There are  numerous scattered focal and patchy periventricular areas of abnormal  T2 signal hyperintensity in the cerebral white matter bilaterally that  are consistent with sequela of chronic small vessel ischemic disease.  There are small areas of chronic encephalomalacia involving the cortex  at the lateral and posterolateral aspects of the right frontal lobe  consistent with chronic ischemic infarct. There is a more recent  appearing ischemic infarct in the right basal ganglia involving the  right caudate head, anterior limb of the internal capsule and anterior  aspect of the right putamen that is likely late subacute in age that  demonstrates T2 signal hyperintensity and patchy abnormal contrast  enhancement. There is no other abnormal contrast enhancement in the  brain or its coverings.    The ventricles and basal cisterns are within normal limits in  configuration given the degree of cerebral volume loss. There is no  midline shift.  There are no extra-axial fluid collections. There is  no evidence for acute intracranial hemorrhage.     There is no sinusitis or mastoiditis.      Impression    IMPRESSION: Diffuse cerebral volume loss and cerebral white matter  changes consistent with chronic small vessel ischemic disease.  Probable small chronic ischemic infarcts at the lateral and  posterolateral aspects of the right frontal lobe. Probable late  subacute  ischemic infarct in the right basal ganglia. No evidence for  acute intracranial pathology.    EMERY BERNAL MD   CTA Head Neck with Contrast    Narrative    CTA  HEAD NECK WITH CONTRAST  8/2/2019 6:15 PM     HISTORY: stroke    TECHNIQUE:  Precontrast localizing scans were followed by CT  angiography with an injection of 85 mL [id number]-370 IV contrast with  scans through the head and neck.  Images were transferred to a  separate 3-D workstation where multiplanar reformations and 3-D images  were created.  Estimates of carotid stenoses are made relative to the  distal internal carotid artery diameters except as noted. Radiation  dose for this scan was reduced using automated exposure control,  adjustment of the mA and/or kV according to patient size, or iterative  reconstruction technique.    COMPARISON: None.    FINDINGS: Estimates of stenosis at the carotid bifurcations are  relative to the distal internal carotids.    ARCH: Calcified atherosclerotic plaque is seen in the arch of the  aorta.    NECK CTA:  Right Carotid:  The right common carotid artery is normal.  The right  carotid bifurcation appears normal.  The right internal carotid artery  is negative.     Left Carotid:  The left common carotid artery is unremarkable.   Atherosclerotic plaque is seen at the left carotid bifurcation. There  is a shallow ulceration of the atherosclerotic plaque. No stenosis..   The left internal carotid is negative.      Vertebrals:  The vertebral arteries are normal.    HEAD CTA:  Anterior Circulation: No occluded vessels are seen. .    Posterior Circulation:  The basilar artery and its branches appear  normal. There is a fetal origin of the left posterior cerebral. There  are areas of mild-moderate stenosis in the left posterior cerebral.    MISC:: Fibrotic changes seen in both lungs.      Impression    IMPRESSION:   1. No significant stenosis is seen at either carotid bifurcation.  There is a shallow ulceration of the  atherosclerotic plaque at the  left carotid bifurcation  2. No arterial dissection is identified.  3. No high-grade intracranial vascular stenosis is identified.  Mild-moderate atherosclerotic plaque is seen in the left posterior  cerebral artery.  4. Venous sinuses appear patent  5. Fibrotic changes are seen in both lungs.        TIARRA AUSTIN MD   CT Chest/Abdomen/Pelvis w Contrast    Narrative    CT CHEST/ABDOMEN/PELVIS WITH CONTRAST August 2, 2019 5:26 PM    HISTORY: Fever of unknown origin, blood cultures negative, ID  recommending CT chest/abdomen/pelvis.    TECHNIQUE: CT scan obtained of the chest, abdomen, and pelvis with  oral and IV contrast. 85 mL Isovue-370 IV injected. Radiation dose for  this scan was reduced using automated exposure control, adjustment of  the mA and/or kV according to patient size, or iterative  reconstruction technique.    COMPARISON: None available.    FINDINGS:  Chest: The visualized portions of the thyroid gland are unremarkable.    No supraclavicular, axillary, mediastinal or hilar lymphadenopathy is  seen.    The heart is enlarged. No pericardial effusion is seen. Multivessel  coronary artery calcifications are present. Scattered atherosclerotic  calcifications seen in the thoracic aorta. Pulmonary trunk is not  enlarged.    Biapical fibrotic changes seen in the lungs. Scarring/subsegmental  atelectasis is seen in the dependent portions of the bilateral lower  lobes. No focal consolidation is present. Several small bilateral  pulmonary nodules are present, measuring up to 4 mm in the right upper  lobe (series 10, image 84).    Abdomen/pelvis: No focal hepatic lesion is seen. No intrahepatic or  extrahepatic biliary ductal dilatation is present. Small stones are  noted within the gallbladder.    This spleen and right adrenal gland are unremarkable. Mild thickening  with surrounding fat stranding noted in the left adrenal gland.  Pancreas is unremarkable. Kidneys enhance  symmetrically. A 2.8 cm cyst  is noted along the inferior pole of the left kidney. No hydronephrosis  is seen in the kidneys. Mild bilateral, nonspecific perinephric  stranding noted, left greater than right.    The stomach is underdistended. Small and large bowel loops are  nondilated. Colonic diverticulosis without evidence of diverticulitis.    Scattered atherosclerotic calcifications seen in the abdominal aorta.  IVC is of normal course and caliber. No retroperitoneal or mesenteric  lymphadenopathy seen.    Urinary bladder is moderately distended. Uterus is surgically absent.    Diffuse osteopenia is seen in the bones. Multilevel degenerative  changes are present in the spine.      Impression    IMPRESSION: No focal fluid collections identified in the chest,  abdomen or pelvis.  1.  Mild, nonspecific thickening with surrounding fat stranding seen  in the left adrenal gland with minimal, nonspecific left greater than  right perinephric stranding. No wedge-shaped areas of reduced  enhancement seen in the kidneys to suggest pyelonephritis. Correlate  with urinalysis.  2.  Cardiomegaly with multivessel coronary artery calcifications.  3.  Multiple pulmonary nodules seen bilaterally measuring up to 4 mm  in the right upper lobe.     4.  Recommendations for one or multiple incidental lung nodules < 6mm  :    Low risk patients: No routine follow-up.    High risk patients: Optional follow-up CT at 12 months; if  unchanged, no further follow-up.    *Low Risk: Minimal or absent history of smoking or other known risk  factors.  *Nonsolid (ground glass) or partly solid nodules may require longer  follow-up to exclude indolent adenocarcinoma.  *Recommendations based on Guidelines for the Management of Incidental  Pulmonary Nodules Detected at CT: From the Fleischner Society 2017,  Radiology 2017.    WILLIAM HUDSON MD     Transthoracic Echocardiogram Interpretation Summary (7/30/19)  1. Normal left ventricular size and  systolic function. LVEF 60-65%.  2. No regional wall motion abnormalities.  3. Normal right ventricular size and systolic function.  4. No hemodynamically significant valve disease.  No significant changes compared to the study dated 5/14/2016.

## 2022-05-26 ENCOUNTER — TRANSFERRED RECORDS (OUTPATIENT)
Dept: HEALTH INFORMATION MANAGEMENT | Facility: CLINIC | Age: 87
End: 2022-05-26
Payer: MEDICARE

## 2022-08-25 DIAGNOSIS — I48.21 PERMANENT ATRIAL FIBRILLATION (H): ICD-10-CM

## 2022-08-25 DIAGNOSIS — I63.9 INFARCTION OF RIGHT BASAL GANGLIA (H): ICD-10-CM

## 2022-08-25 DIAGNOSIS — I10 HTN (HYPERTENSION): ICD-10-CM

## 2022-08-25 RX ORDER — METOPROLOL SUCCINATE 25 MG/1
25 TABLET, EXTENDED RELEASE ORAL DAILY
Qty: 90 TABLET | Refills: 0 | Status: SHIPPED | OUTPATIENT
Start: 2022-08-25 | End: 2022-09-28

## 2022-08-25 RX ORDER — LOSARTAN POTASSIUM 50 MG/1
50 TABLET ORAL DAILY
Qty: 90 TABLET | Refills: 0 | Status: SHIPPED | OUTPATIENT
Start: 2022-08-25 | End: 2022-09-28

## 2022-08-25 RX ORDER — ATORVASTATIN CALCIUM 10 MG/1
10 TABLET, FILM COATED ORAL EVERY EVENING
Qty: 90 TABLET | Refills: 0 | Status: SHIPPED | OUTPATIENT
Start: 2022-08-25 | End: 2022-09-28

## 2022-08-25 RX ORDER — AMLODIPINE BESYLATE 5 MG/1
5 TABLET ORAL DAILY
Qty: 90 TABLET | Refills: 0 | Status: SHIPPED | OUTPATIENT
Start: 2022-08-25 | End: 2022-09-28

## 2022-08-25 NOTE — PROGRESS NOTES
[facility] Refill Guideline reviewed.  Medication meets criteria for refill. DINA Ayala rN

## 2022-09-05 ENCOUNTER — HOSPITAL ENCOUNTER (EMERGENCY)
Facility: CLINIC | Age: 87
Discharge: HOME | End: 2022-09-06
Attending: EMERGENCY MEDICINE | Admitting: EMERGENCY MEDICINE
Payer: MEDICARE

## 2022-09-05 ENCOUNTER — APPOINTMENT (OUTPATIENT)
Dept: CT IMAGING | Facility: CLINIC | Age: 87
End: 2022-09-05
Attending: EMERGENCY MEDICINE
Payer: MEDICARE

## 2022-09-05 DIAGNOSIS — S20.219A CONTUSION OF CHEST WALL, UNSPECIFIED LATERALITY, INITIAL ENCOUNTER: ICD-10-CM

## 2022-09-05 LAB
ANION GAP SERPL CALCULATED.3IONS-SCNC: 4 MMOL/L (ref 3–14)
BASOPHILS # BLD AUTO: 0 10E3/UL (ref 0–0.2)
BASOPHILS NFR BLD AUTO: 0 %
BUN SERPL-MCNC: 21 MG/DL (ref 7–30)
CALCIUM SERPL-MCNC: 9.6 MG/DL (ref 8.5–10.1)
CHLORIDE BLD-SCNC: 102 MMOL/L (ref 94–109)
CO2 SERPL-SCNC: 30 MMOL/L (ref 20–32)
CREAT SERPL-MCNC: 0.9 MG/DL (ref 0.66–1.25)
EOSINOPHIL # BLD AUTO: 0.1 10E3/UL (ref 0–0.7)
EOSINOPHIL NFR BLD AUTO: 1 %
ERYTHROCYTE [DISTWIDTH] IN BLOOD BY AUTOMATED COUNT: 13.8 % (ref 10–15)
GFR SERPL CREATININE-BSD FRML MDRD: 84 ML/MIN/1.73M2
GLUCOSE BLD-MCNC: 117 MG/DL (ref 70–99)
HCT VFR BLD AUTO: 51.7 % (ref 40–53)
HGB BLD-MCNC: 16.7 G/DL (ref 13.3–17.7)
IMM GRANULOCYTES # BLD: 0 10E3/UL
IMM GRANULOCYTES NFR BLD: 1 %
LYMPHOCYTES # BLD AUTO: 1.6 10E3/UL (ref 0.8–5.3)
LYMPHOCYTES NFR BLD AUTO: 19 %
MCH RBC QN AUTO: 30.2 PG (ref 26.5–33)
MCHC RBC AUTO-ENTMCNC: 32.3 G/DL (ref 31.5–36.5)
MCV RBC AUTO: 94 FL (ref 78–100)
MONOCYTES # BLD AUTO: 0.7 10E3/UL (ref 0–1.3)
MONOCYTES NFR BLD AUTO: 8 %
NEUTROPHILS # BLD AUTO: 6.4 10E3/UL (ref 1.6–8.3)
NEUTROPHILS NFR BLD AUTO: 71 %
NRBC # BLD AUTO: 0 10E3/UL
NRBC BLD AUTO-RTO: 0 /100
PLATELET # BLD AUTO: 275 10E3/UL (ref 150–450)
POTASSIUM BLD-SCNC: 4.6 MMOL/L (ref 3.4–5.3)
RBC # BLD AUTO: 5.53 10E6/UL (ref 4.4–5.9)
SODIUM SERPL-SCNC: 136 MMOL/L (ref 133–144)
WBC # BLD AUTO: 8.8 10E3/UL (ref 4–11)

## 2022-09-05 PROCEDURE — 96374 THER/PROPH/DIAG INJ IV PUSH: CPT | Mod: 59

## 2022-09-05 PROCEDURE — 250N000011 HC RX IP 250 OP 636: Performed by: EMERGENCY MEDICINE

## 2022-09-05 PROCEDURE — 99285 EMERGENCY DEPT VISIT HI MDM: CPT | Mod: 25

## 2022-09-05 PROCEDURE — 36415 COLL VENOUS BLD VENIPUNCTURE: CPT | Performed by: EMERGENCY MEDICINE

## 2022-09-05 PROCEDURE — 80048 BASIC METABOLIC PNL TOTAL CA: CPT | Performed by: EMERGENCY MEDICINE

## 2022-09-05 PROCEDURE — G1010 CDSM STANSON: HCPCS

## 2022-09-05 PROCEDURE — 85025 COMPLETE CBC W/AUTO DIFF WBC: CPT | Performed by: EMERGENCY MEDICINE

## 2022-09-05 PROCEDURE — 250N000009 HC RX 250: Performed by: EMERGENCY MEDICINE

## 2022-09-05 RX ORDER — IOPAMIDOL 755 MG/ML
80 INJECTION, SOLUTION INTRAVASCULAR ONCE
Status: COMPLETED | OUTPATIENT
Start: 2022-09-05 | End: 2022-09-05

## 2022-09-05 RX ORDER — IOPAMIDOL 612 MG/ML
80 INJECTION, SOLUTION INTRAVASCULAR ONCE
Status: DISCONTINUED | OUTPATIENT
Start: 2022-09-05 | End: 2022-09-05

## 2022-09-05 RX ORDER — MORPHINE SULFATE 2 MG/ML
2 INJECTION, SOLUTION INTRAMUSCULAR; INTRAVENOUS
Status: DISCONTINUED | OUTPATIENT
Start: 2022-09-05 | End: 2022-09-06 | Stop reason: HOSPADM

## 2022-09-05 RX ADMIN — MORPHINE SULFATE 2 MG: 2 INJECTION, SOLUTION INTRAMUSCULAR; INTRAVENOUS at 23:30

## 2022-09-05 RX ADMIN — SODIUM CHLORIDE 60 ML: 900 INJECTION INTRAVENOUS at 23:39

## 2022-09-05 RX ADMIN — IOPAMIDOL 80 ML: 755 INJECTION, SOLUTION INTRAVENOUS at 23:39

## 2022-09-05 ASSESSMENT — ENCOUNTER SYMPTOMS
BACK PAIN: 1
ARTHRALGIAS: 1
MYALGIAS: 1
HEADACHES: 0

## 2022-09-05 ASSESSMENT — ACTIVITIES OF DAILY LIVING (ADL): ADLS_ACUITY_SCORE: 35

## 2022-09-06 VITALS
BODY MASS INDEX: 23.34 KG/M2 | RESPIRATION RATE: 18 BRPM | SYSTOLIC BLOOD PRESSURE: 169 MMHG | HEIGHT: 70 IN | TEMPERATURE: 97.9 F | DIASTOLIC BLOOD PRESSURE: 105 MMHG | WEIGHT: 163 LBS | HEART RATE: 72 BPM | OXYGEN SATURATION: 94 %

## 2022-09-06 ASSESSMENT — ACTIVITIES OF DAILY LIVING (ADL): ADLS_ACUITY_SCORE: 35

## 2022-09-06 NOTE — ED PROVIDER NOTES
"  History   Chief Complaint:  Rib Pain       HPI   Yosef Murry is a 86 year old male with history of atrial fibrillation and stroke, on Eliquis, who presents with rib pain after falling yesterday morning with no head trauma or loss of consciousness. He reports he was getting up and lost his balance, falling backward into his walker. He now complains of worsening right-sided posterior chest wall pain that wraps around the side, rating it 8/10 on pain scale. Family also reports some concern for shortness of breath but patient denies this. Reports taking tylenol and hydrocodone, last dose at 7:30 tonight, which he reports alleviated pain for a short period of time.     Of note, family member reports he has history of strokes that affect his balance.     Review of Systems   Musculoskeletal: Positive for arthralgias, back pain, gait problem and myalgias.   Neurological: Negative for syncope and headaches.   All other systems reviewed and are negative.    Allergies:  Seasonal Allergies    Medications:  Norvasc   Eliquis  Lipitor  Toprol  Cozaar  Viagra  Claritin    Past Medical History:     Arrhythmia   A fib   Atrial flutter  Prostate cancer     Past Surgical History:    Colonoscopy   Tonsillectomy  Prostatectomy  Hernia repair  Orthopedic surgery   Wrist surger     Family History:    Mother - ovarian cancer  Father - osteoporosis    Social History:  The patient presents to the ED with family members.  PCP: Melissa Torres     Physical Exam     Patient Vitals for the past 24 hrs:   BP Temp Temp src Pulse Resp SpO2 Height Weight   09/06/22 0033 -- -- -- -- -- 94 % -- --   09/05/22 2354 -- -- -- -- -- 97 % -- --   09/05/22 2231 (!) 169/105 97.9  F (36.6  C) Temporal 72 18 96 % 1.778 m (5' 10\") 73.9 kg (163 lb)       Physical Exam  General/Appearance: appears stated age, well-groomed, appears comfortable  Eyes: EOMI, no scleral injection, no icterus  ENT: MMM  Neck: supple, nl ROM, no stiffness  Cardiovascular: RRR, nl " S1S2, no m/r/g, 2+ pulses in all 4 extremities, cap refill <2sec  Respiratory: CTAB, good air movement throughout, no wheezes/rhonchi/rales, no increased WOB, no retractions  Back: no lesions  GI: abd soft, non-distended, nttp,  no HSM, no rebound, no guarding, nl BS  MSK: RONDON, good tone, no bony abnormality, reproducible ttp to R posterolateral chest wall  Skin: warm and well-perfused, no rash, no edema, no ecchymosis, nl turgor  Neuro: GCS 15, alert and oriented, no gross focal neuro deficits  Psych: interacts appropriately  Heme: no petechia, no purpura, no active bleeding        Emergency Department Course     Imaging:  Chest CT w IV contrast only, TRAUMA / DISSECTION   Final Result   IMPRESSION:   1.  No acute posttraumatic abnormality of the chest.   2.  Calcified extramedullary lesion of the upper thoracic spinal canal, favoring a calcified meningioma. This does appear to have some mass effect upon the adjacent thoracic cord. If clinically indicated, this could be further evaluated with nonemergent    MR follow-up.   3.  Mild-to-moderate cardiomegaly and asymmetric right heart enlargement.   4.  Cholelithiasis.              Report per radiology    Laboratory:  Labs Ordered and Resulted from Time of ED Arrival to Time of ED Departure   BASIC METABOLIC PANEL - Abnormal       Result Value    Sodium 136      Potassium 4.6      Chloride 102      Carbon Dioxide (CO2) 30      Anion Gap 4      Urea Nitrogen 21      Creatinine 0.90      Calcium 9.6      Glucose 117 (*)     GFR Estimate 84     CBC WITH PLATELETS AND DIFFERENTIAL    WBC Count 8.8      RBC Count 5.53      Hemoglobin 16.7      Hematocrit 51.7      MCV 94      MCH 30.2      MCHC 32.3      RDW 13.8      Platelet Count 275      % Neutrophils 71      % Lymphocytes 19      % Monocytes 8      % Eosinophils 1      % Basophils 0      % Immature Granulocytes 1      NRBCs per 100 WBC 0      Absolute Neutrophils 6.4      Absolute Lymphocytes 1.6      Absolute  Monocytes 0.7      Absolute Eosinophils 0.1      Absolute Basophils 0.0      Absolute Immature Granulocytes 0.0      Absolute NRBCs 0.0            Emergency Department Course:       Reviewed:  I reviewed nursing notes, vitals, past medical history and Care Everywhere    Assessments:  2240 I obtained history and examined the patient as noted above.   0055 I rechecked the patient and explained findings.     Interventions:  Medications   morphine (PF) injection 2 mg (2 mg Intravenous Given 9/5/22 2330)   Saline flush (60 mLs Intravenous Given 9/5/22 2339)   iopamidol (ISOVUE-370) solution 80 mL (80 mLs Intravenous Given 9/5/22 2339)      Disposition:  The patient was discharged to home.     Impression & Plan     Medical Decision Making:   Yosef Murry is a 87 year old male presents for evaluation after fall.  Signs and symptoms are consistent with a chest wall contusion.  A broad differential was considered including pneumothorax, rib fracture, hemothorax, sprain, strain, other fracture, nerve impingement/compromise, referred pain. A chest xray and ultrasound are negative, the sensitivity for rib fracture on chest xray was discussed with patient and if symptoms continue or progress may need CT of chest; I do not feel with the patients risk/benefit ratio and clinical symptoms this is required at this time.     Supportive outpatient management is indicated.  The patients head to toe trauma exam is otherwise negative and reassuring; no further workup indicated.  Rest, ice, pain medicine treatment was discussed with the patient. Close follow-up with patient's primary care physician per discharge precautions. Chest wall contusion discharge instructions given for home.       Diagnosis:    ICD-10-CM    1. Contusion of chest wall, unspecified laterality, initial encounter  S20.219A        Discharge Medications:  New Prescriptions    No medications on file       Scribe Disclosure:  Rubi DIAZ, am serving as a scribe  at 10:36 PM on 9/5/2022 to document services personally performed by Aliyah Doan MD based on my observations and the provider's statements to me.        Aliyah Doan MD  09/06/22 0100

## 2022-09-06 NOTE — ED TRIAGE NOTES
Yesterday morning pt lost his balance and fell against his walker, injuring his right rib area. The pain is continuing so pt would like to be evaluated.      Triage Assessment     Row Name 09/05/22 2137       Triage Assessment (Adult)    Airway WDL WDL       Respiratory WDL    Respiratory WDL WDL       Skin Circulation/Temperature WDL    Skin Circulation/Temperature WDL WDL       Cardiac WDL    Cardiac WDL WDL       Peripheral/Neurovascular WDL    Peripheral Neurovascular WDL WDL       Cognitive/Neuro/Behavioral WDL    Cognitive/Neuro/Behavioral WDL WDL

## 2022-09-13 NOTE — PLAN OF CARE
Left message to have pt call me back to discuss appt that she has scheduled with DR Frankel. NO PCP note, referral or labs in her chart. I asked pt to have that information faxed to our office prior to her appt    PT: Pt OOR at time of attempt. Per communication with RN, pt seated for two procedures today and likely to be lethargic this PM .

## 2022-09-26 ENCOUNTER — HOSPITAL ENCOUNTER (OUTPATIENT)
Dept: CARDIOLOGY | Facility: CLINIC | Age: 87
Discharge: HOME | End: 2022-09-26
Attending: INTERNAL MEDICINE | Admitting: INTERNAL MEDICINE
Payer: MEDICARE

## 2022-09-26 DIAGNOSIS — I48.21 PERMANENT ATRIAL FIBRILLATION (H): ICD-10-CM

## 2022-09-26 LAB — LVEF ECHO: NORMAL

## 2022-09-26 PROCEDURE — 93306 TTE W/DOPPLER COMPLETE: CPT

## 2022-09-26 PROCEDURE — 93306 TTE W/DOPPLER COMPLETE: CPT | Mod: 26 | Performed by: INTERNAL MEDICINE

## 2022-09-28 ENCOUNTER — OFFICE VISIT (OUTPATIENT)
Dept: CARDIOLOGY | Facility: CLINIC | Age: 87
End: 2022-09-28
Payer: MEDICARE

## 2022-09-28 VITALS
HEART RATE: 65 BPM | DIASTOLIC BLOOD PRESSURE: 85 MMHG | HEIGHT: 70 IN | BODY MASS INDEX: 23.48 KG/M2 | SYSTOLIC BLOOD PRESSURE: 135 MMHG | OXYGEN SATURATION: 99 % | WEIGHT: 164 LBS

## 2022-09-28 DIAGNOSIS — I10 ESSENTIAL HYPERTENSION, BENIGN: ICD-10-CM

## 2022-09-28 DIAGNOSIS — I63.9 INFARCTION OF RIGHT BASAL GANGLIA (H): ICD-10-CM

## 2022-09-28 DIAGNOSIS — I48.21 PERMANENT ATRIAL FIBRILLATION (H): Primary | ICD-10-CM

## 2022-09-28 PROCEDURE — 93000 ELECTROCARDIOGRAM COMPLETE: CPT | Performed by: NURSE PRACTITIONER

## 2022-09-28 PROCEDURE — 99214 OFFICE O/P EST MOD 30 MIN: CPT | Performed by: NURSE PRACTITIONER

## 2022-09-28 RX ORDER — ATORVASTATIN CALCIUM 10 MG/1
10 TABLET, FILM COATED ORAL EVERY EVENING
Qty: 90 TABLET | Refills: 3 | Status: SHIPPED | OUTPATIENT
Start: 2022-09-28 | End: 2023-11-01

## 2022-09-28 RX ORDER — LOSARTAN POTASSIUM 50 MG/1
50 TABLET ORAL DAILY
Qty: 90 TABLET | Refills: 3 | Status: SHIPPED | OUTPATIENT
Start: 2022-09-28 | End: 2023-11-01

## 2022-09-28 RX ORDER — METOPROLOL SUCCINATE 25 MG/1
25 TABLET, EXTENDED RELEASE ORAL DAILY
Qty: 90 TABLET | Refills: 3 | Status: SHIPPED | OUTPATIENT
Start: 2022-09-28 | End: 2023-11-01

## 2022-09-28 RX ORDER — AMLODIPINE BESYLATE 5 MG/1
5 TABLET ORAL DAILY
Qty: 90 TABLET | Refills: 3 | Status: SHIPPED | OUTPATIENT
Start: 2022-09-28 | End: 2023-11-01

## 2022-09-28 NOTE — PROGRESS NOTES
"Service Date: 09/28/2022    HISTORY OF PRESENT ILLNESS:  Mr. Murry is a delightful, 87-year-old gentleman well known to me [facility].  He is an established patient of Dr. Murphy.      His past medical history includes:  1.  Permanent AFib, essentially asymptomatic.  Treated with rate control, low-dose metoprolol and Eliquis therapy.  His CHADS-VASc score is 5.     2.  Hypertension.     3.  Basal ganglia stroke in 2019.  Evidence of additional prior strokes by MRI scan.  He was off anticoagulation at that time.  He does have some balance issues with falls 2-3 times a year.    4.  History of prostate cancer.    5.  Abnormal cardiac MRI demonstrated a subendocardial scar in the circumflex distribution in 2019.  A nuclear scan showed no ischemia.    6.  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    The patient also had mild to moderate MR.    At today's visit, Héctor is doing well.  He tries to walk on a regular basis, but states that his balance has been poor.  He also does get exertional shortness of breath with any type of incline.  He is requesting a disability parking spot at today's visit.  He states that getting a parking spot further and further away from the store, especially during inclement weather, is very challenging for him.    A 12 lead EKG shows atrial fibrillation.  Rate is at 48 beats per minute.  However, on exam, his rate was in the mid 60s.  Also, on an echocardiogram 2 days ago, his heart rate was noted at 65.    He currently denies chest pain, orthopnea, PND, syncope, presyncope or peripheral edema.  He feels like his energy level is \"okay\" for 87 years of age.    Echocardiogram was reviewed with the patient today.  This showed mild concentric left ventricular hypertrophy with a normal ejection fraction.  He had proximal septal thickening, and echo findings are not consistent with left ventricular outflow obstruction.  He had moderate RV dysfunction.  He had 1 to 2+ " mitral regurgitation.  When compared to the previous echo, the right ventricular dysfunction and MR were new.    Lab work completed on 09/05 was done in the ED after a fall in his house.  Creatinine was normal.  Potassium was 4.6.  Hemoglobin was 16.7.    He is scheduled next month to meet his new primary, Ms. Melissa PA-C, at [facility].  He will be due for his annual labs at that time.    All other review of systems and past medical history are noted below.    ASSESSMENT AND PLAN:  Mr. Murry is a delightful, 87-year-old gentleman well known to me [facility].    1.  Permanent atrial fibrillation.    He is asymptomatic.  He is doing well on low-dose metoprolol.  His heart rate today is in the 60s.  He is on apixaban at 5 mg twice daily.  He has not had any further neurological issues or stroke since being on blood thinner.    We will need to continue to monitor him for falls.  Consideration for risks versus benefits will need to be considered.  He may be a candidate in the future for Watchman if the issue with falls increases.  Dr. Murphy has also recommended that he should be bridged with Lovenox since his history of previous stroke off anticoagulation for elective procedures, such as a colonoscopy.    2.  Hypertension, currently normotensive.    3.  RV dysfunction.    This is new on echo; however, his last echocardiogram was in 2019.  RV function was normal at that time.  RVSP is normal.  He does have 1 to 2+ mitral regurgitation.  I asked the patient that he continue to be mindful of his diet, watching his weight closely and to let us know if his shortness of breath increases.    I did give him an application to fill out for a parking permit.  With his history of stroke and fall issues as well as his RV dysfunction and advanced age, I think it is very reasonable.    Thank you for including us in his care.  Feel free to contact us if you have questions or concerns regarding today's assessment  and plan.    Elvi Alfaro NP, APRN CNP          D: 2022   T: 2022   MT: mo    Name:     OLE COSTALandon  MRN:      [id number]        Account:      [id number]   :      1935           Service Date: 2022       Document: [id number]

## 2022-09-28 NOTE — PATIENT INSTRUCTIONS
Call my nurse with any questions or concerns:  [phone]  *If you have concerns after hours, please call [phone], option 2 to speak with on call Cardiologist.    Continue to watch diet and salt intake  Call with concerns

## 2022-09-28 NOTE — PROGRESS NOTES
HPI and Plan: #[id number]  See dictation    Today's clinic visit entailed:  Review of the result(s) of each unique test - EKG and echo  Ordering of each unique test  Prescription drug management  No LOS data to display   Time spent doing chart review, history and exam, documentation and further activities per the note  Provider  Link to MDM Help Grid     The level of medical decision making during this visit was of moderate complexity.      Orders Placed This Encounter   Procedures     Follow-Up with Cardiology [facility]     EKG 12-lead complete w/read - Clinics (performed today)     EKG 12-lead complete w/read - Clinics (to be scheduled)       Orders Placed This Encounter   Medications     amLODIPine (NORVASC) 5 MG tablet     Sig: Take 1 tablet (5 mg) by mouth daily     Dispense:  90 tablet     Refill:  3     apixaban ANTICOAGULANT (ELIQUIS) 5 MG tablet     Sig: Take 1 tablet (5 mg) by mouth 2 times daily     Dispense:  180 tablet     Refill:  3     atorvastatin (LIPITOR) 10 MG tablet     Sig: Take 1 tablet (10 mg) by mouth every evening     Dispense:  90 tablet     Refill:  3     losartan (COZAAR) 50 MG tablet     Sig: Take 1 tablet (50 mg) by mouth daily     Dispense:  90 tablet     Refill:  3     metoprolol succinate ER (TOPROL XL) 25 MG 24 hr tablet     Sig: Take 1 tablet (25 mg) by mouth daily     Dispense:  90 tablet     Refill:  3       Medications Discontinued During This Encounter   Medication Reason     apixaban ANTICOAGULANT (ELIQUIS) 5 MG tablet Reorder     losartan (COZAAR) 50 MG tablet Reorder     amLODIPine (NORVASC) 5 MG tablet Reorder     atorvastatin (LIPITOR) 10 MG tablet Reorder     metoprolol succinate ER (TOPROL XL) 25 MG 24 hr tablet Reorder         Encounter Diagnoses   Name Primary?     Permanent atrial fibrillation (H) Yes     HTN (hypertension)      Infarction of right basal ganglia (H)      Abnormal electrocardiogram      Essential hypertension, benign        CURRENT MEDICATIONS:  Current  Outpatient Medications   Medication Sig Dispense Refill     amLODIPine (NORVASC) 5 MG tablet Take 1 tablet (5 mg) by mouth daily 90 tablet 3     apixaban ANTICOAGULANT (ELIQUIS) 5 MG tablet Take 1 tablet (5 mg) by mouth 2 times daily 180 tablet 3     atorvastatin (LIPITOR) 10 MG tablet Take 1 tablet (10 mg) by mouth every evening 90 tablet 3     calcium carbonate (TUMS) 500 MG chewable tablet Take 1 chew tab by mouth nightly as needed        diphenhydrAMINE-acetaminophen (TYLENOL PM)  MG tablet Take 2 tablets by mouth nightly as needed for sleep       leuprolide acetate (LUPRON) 1 MG/0.2ML kit Inject Subcutaneous every 6 months He gets this at [facility]. Last given on 4/2019.       loratadine (CLARITIN) 10 MG tablet Take 1 tablet by mouth daily. 30 tablet 1     losartan (COZAAR) 50 MG tablet Take 1 tablet (50 mg) by mouth daily 90 tablet 3     melatonin 5 MG tablet Take 5 mg by mouth nightly as needed for sleep        metoprolol succinate ER (TOPROL XL) 25 MG 24 hr tablet Take 1 tablet (25 mg) by mouth daily 90 tablet 3     Multiple Vitamins-Minerals (PRESERVISION AREDS 2 PO) Take 1 capsule by mouth 2 times daily AREDS 2       order for DME Equipment being ordered: Walker Wheels () and Walker ()  Treatment Diagnosis: Impaired gait 1 each 0     sildenafil (VIAGRA) 100 MG tablet Take 1 tablet (100 mg) by mouth daily as needed (intercourse) 10 tablet 11     vitamin D3 (CHOLECALCIFEROL) 50 mcg (2000 units) tablet Take 1 tablet by mouth daily         ALLERGIES     Allergies   Allergen Reactions     Other [Seasonal Allergies]        PAST MEDICAL HISTORY:  Past Medical History:   Diagnosis Date     Anal fistula      Antiplatelet or antithrombotic long-term use      Arrhythmia      Atrial flutter (H) 2012     Cerebral infarction (H)     TIA     Heartburn      Hypertension      Inguinal hernia, left      Persistent atrial fibrillation (H) 8/21/12     Prostate cancer (H)      TIA (transient ischaemic attack)             PAST SURGICAL HISTORY:  Past Surgical History:   Procedure Laterality Date     COLONOSCOPY       COLONOSCOPY N/A 2021    Procedure: Colonoscopy, With Polypectomy And Biopsy;  Surgeon: Jordin Rachel MD;  Location:  GI     ENT SURGERY      tonsllectomy     EXAM UNDER ANESTHESIA, FISTULOTOMY RECTUM, COMBINED N/A 2015    Procedure: COMBINED EXAM UNDER ANESTHESIA, FISTULOTOMY RECTUM;  Surgeon: Candice Carty MD;  Location: [facility]     GENITOURINARY SURGERY      PROSTATECTOMY     GI SURGERY      hernia repair x 2     HERNIORRHAPHY INGUINAL  2013    Procedure: HERNIORRHAPHY INGUINAL;  LEFT INGUINAL HERNIA REPAIR WITH MESH;  Surgeon: Filipe Fairchild MD;  Location: [facility]     HERNIORRHAPHY INGUINAL Right 2019    Procedure: OPEN RIGHT INGUINA HERNIA REPAIR WITH MESH;  Surgeon: Filipe Fairchild MD;  Location:  OR     ORTHOPEDIC SURGERY      WRIST SURGERY - LEFT       FAMILY HISTORY:  Family History   Problem Relation Age of Onset     Ovarian Cancer Mother      Osteoporosis Father      Unknown/Adopted Sister        SOCIAL HISTORY:  Social History     Socioeconomic History     Marital status:      Spouse name: None     Number of children: None     Years of education: None     Highest education level: None   Tobacco Use     Smoking status: Former Smoker     Years: 16.00     Types: Cigarettes     Quit date: 1968     Years since quittin.7     Smokeless tobacco: Never Used   Substance and Sexual Activity     Alcohol use: Yes     Alcohol/week: 0.8 standard drinks     Types: 1 Cans of beer per week     Comment: SOCIALLY     Drug use: No     Sexual activity: Not Currently     Partners: Female   Other Topics Concern     Caffeine Concern No     Comment: occ tea     Sleep Concern No     Stress Concern No     Weight Concern No     Special Diet No     Exercise Yes     Comment: walking 3 miles a day     Seat Belt Yes       Review of Systems:  Skin:  not assessed      "  Eyes:  not assessed      ENT:  not assessed      Respiratory:  Positive for shortness of breath;dyspnea on exertion     Cardiovascular:    chest pain;Positive for contusion on chest wall due to recent fall - CP due to bruising not inner pain  Gastroenterology: not assessed      Genitourinary:  not assessed      Musculoskeletal:  not assessed      Neurologic:  not assessed      Psychiatric:  not assessed      Heme/Lymph/Imm:  not assessed      Endocrine:  not assessed        Physical Exam:  Vitals: /85 (BP Location: Left arm, Patient Position: Sitting)   Pulse 65   Ht 1.778 m (5' 10\")   Wt 74.4 kg (164 lb)   SpO2 99%   BMI 23.53 kg/m      Constitutional:  cooperative, alert and oriented, well developed, well nourished, in no acute distress        Skin:  warm and dry to the touch, no apparent skin lesions or masses noted          Head:  normocephalic, no masses or lesions        Eyes:  pupils equal and round   wears glasses    ENT:  no pallor or cyanosis, dentition good        Neck:  carotid pulses are full and equal bilaterally, JVP normal, no carotid bruit        Respiratory:  normal breath sounds, clear to auscultation, normal A-P diameter, normal symmetry, normal respiratory excursion, no use of accessory muscles         Cardiac: no murmurs, gallops or rubs detected irregularly irregular rhythm              not assessed this visit                                        GI:  abdomen soft        Extremities and Muscular Skeletal:  no deformities, clubbing, cyanosis, erythema observed;no edema         compression stockings on    Neurological:  no gross motor deficits        Psych:  Alert and Oriented x 3        CC  No referring provider defined for this encounter.              "

## 2022-09-28 NOTE — LETTER
9/28/2022    Melissa Torres PA-C  [address]  [place] [address]    RE: Yosef Murry       Dear Colleague,     I had the pleasure of seeing Yosef Murry in the [facility].  HPI and Plan: #[id number]  See dictation    Today's clinic visit entailed:  Review of the result(s) of each unique test - EKG and echo  Ordering of each unique test  Prescription drug management  No LOS data to display   Time spent doing chart review, history and exam, documentation and further activities per the note  Provider  Link to MDM Help Grid     The level of medical decision making during this visit was of moderate complexity.      Orders Placed This Encounter   Procedures     Follow-Up with Cardiology [facility]     EKG 12-lead complete w/read - Clinics (performed today)     EKG 12-lead complete w/read - Clinics (to be scheduled)       Orders Placed This Encounter   Medications     amLODIPine (NORVASC) 5 MG tablet     Sig: Take 1 tablet (5 mg) by mouth daily     Dispense:  90 tablet     Refill:  3     apixaban ANTICOAGULANT (ELIQUIS) 5 MG tablet     Sig: Take 1 tablet (5 mg) by mouth 2 times daily     Dispense:  180 tablet     Refill:  3     atorvastatin (LIPITOR) 10 MG tablet     Sig: Take 1 tablet (10 mg) by mouth every evening     Dispense:  90 tablet     Refill:  3     losartan (COZAAR) 50 MG tablet     Sig: Take 1 tablet (50 mg) by mouth daily     Dispense:  90 tablet     Refill:  3     metoprolol succinate ER (TOPROL XL) 25 MG 24 hr tablet     Sig: Take 1 tablet (25 mg) by mouth daily     Dispense:  90 tablet     Refill:  3       Medications Discontinued During This Encounter   Medication Reason     apixaban ANTICOAGULANT (ELIQUIS) 5 MG tablet Reorder     losartan (COZAAR) 50 MG tablet Reorder     amLODIPine (NORVASC) 5 MG tablet Reorder     atorvastatin (LIPITOR) 10 MG tablet Reorder     metoprolol succinate ER (TOPROL XL) 25 MG 24 hr tablet Reorder         Encounter Diagnoses   Name Primary?      Permanent atrial fibrillation (H) Yes     HTN (hypertension)      Infarction of right basal ganglia (H)      Abnormal electrocardiogram      Essential hypertension, benign        CURRENT MEDICATIONS:  Current Outpatient Medications   Medication Sig Dispense Refill     amLODIPine (NORVASC) 5 MG tablet Take 1 tablet (5 mg) by mouth daily 90 tablet 3     apixaban ANTICOAGULANT (ELIQUIS) 5 MG tablet Take 1 tablet (5 mg) by mouth 2 times daily 180 tablet 3     atorvastatin (LIPITOR) 10 MG tablet Take 1 tablet (10 mg) by mouth every evening 90 tablet 3     calcium carbonate (TUMS) 500 MG chewable tablet Take 1 chew tab by mouth nightly as needed        diphenhydrAMINE-acetaminophen (TYLENOL PM)  MG tablet Take 2 tablets by mouth nightly as needed for sleep       leuprolide acetate (LUPRON) 1 MG/0.2ML kit Inject Subcutaneous every 6 months He gets this at [facility]. Last given on 4/2019.       loratadine (CLARITIN) 10 MG tablet Take 1 tablet by mouth daily. 30 tablet 1     losartan (COZAAR) 50 MG tablet Take 1 tablet (50 mg) by mouth daily 90 tablet 3     melatonin 5 MG tablet Take 5 mg by mouth nightly as needed for sleep        metoprolol succinate ER (TOPROL XL) 25 MG 24 hr tablet Take 1 tablet (25 mg) by mouth daily 90 tablet 3     Multiple Vitamins-Minerals (PRESERVISION AREDS 2 PO) Take 1 capsule by mouth 2 times daily AREDS 2       order for DME Equipment being ordered: Walker Wheels () and Walker ()  Treatment Diagnosis: Impaired gait 1 each 0     sildenafil (VIAGRA) 100 MG tablet Take 1 tablet (100 mg) by mouth daily as needed (intercourse) 10 tablet 11     vitamin D3 (CHOLECALCIFEROL) 50 mcg (2000 units) tablet Take 1 tablet by mouth daily         ALLERGIES     Allergies   Allergen Reactions     Other [Seasonal Allergies]        PAST MEDICAL HISTORY:  Past Medical History:   Diagnosis Date     Anal fistula      Antiplatelet or antithrombotic long-term use      Arrhythmia      Atrial flutter (H)       Cerebral infarction (H)     TIA     Heartburn      Hypertension      Inguinal hernia, left      Persistent atrial fibrillation (H) 12     Prostate cancer (H)      TIA (transient ischaemic attack)            PAST SURGICAL HISTORY:  Past Surgical History:   Procedure Laterality Date     COLONOSCOPY       COLONOSCOPY N/A 2021    Procedure: Colonoscopy, With Polypectomy And Biopsy;  Surgeon: Jordin Rachel MD;  Location:  GI     ENT SURGERY      tonsllectomy     EXAM UNDER ANESTHESIA, FISTULOTOMY RECTUM, COMBINED N/A 2015    Procedure: COMBINED EXAM UNDER ANESTHESIA, FISTULOTOMY RECTUM;  Surgeon: Candice Carty MD;  Location: [facility]     GENITOURINARY SURGERY      PROSTATECTOMY     GI SURGERY      hernia repair x 2     HERNIORRHAPHY INGUINAL  2013    Procedure: HERNIORRHAPHY INGUINAL;  LEFT INGUINAL HERNIA REPAIR WITH MESH;  Surgeon: Filipe Fairchild MD;  Location: [facility]     HERNIORRHAPHY INGUINAL Right 2019    Procedure: OPEN RIGHT INGUINA HERNIA REPAIR WITH MESH;  Surgeon: Filipe Fairchild MD;  Location:  OR     ORTHOPEDIC SURGERY      WRIST SURGERY - LEFT       FAMILY HISTORY:  Family History   Problem Relation Age of Onset     Ovarian Cancer Mother      Osteoporosis Father      Unknown/Adopted Sister        SOCIAL HISTORY:  Social History     Socioeconomic History     Marital status:      Spouse name: None     Number of children: None     Years of education: None     Highest education level: None   Tobacco Use     Smoking status: Former Smoker     Years: 16.00     Types: Cigarettes     Quit date: 1968     Years since quittin.7     Smokeless tobacco: Never Used   Substance and Sexual Activity     Alcohol use: Yes     Alcohol/week: 0.8 standard drinks     Types: 1 Cans of beer per week     Comment: SOCIALLY     Drug use: No     Sexual activity: Not Currently     Partners: Female   Other Topics Concern     Caffeine Concern No     Comment: occ  "tea     Sleep Concern No     Stress Concern No     Weight Concern No     Special Diet No     Exercise Yes     Comment: walking 3 miles a day     Seat Belt Yes       Review of Systems:  Skin:  not assessed       Eyes:  not assessed      ENT:  not assessed      Respiratory:  Positive for shortness of breath;dyspnea on exertion     Cardiovascular:    chest pain;Positive for contusion on chest wall due to recent fall - CP due to bruising not inner pain  Gastroenterology: not assessed      Genitourinary:  not assessed      Musculoskeletal:  not assessed      Neurologic:  not assessed      Psychiatric:  not assessed      Heme/Lymph/Imm:  not assessed      Endocrine:  not assessed        Physical Exam:  Vitals: /85 (BP Location: Left arm, Patient Position: Sitting)   Pulse 65   Ht 1.778 m (5' 10\")   Wt 74.4 kg (164 lb)   SpO2 99%   BMI 23.53 kg/m      Constitutional:  cooperative, alert and oriented, well developed, well nourished, in no acute distress        Skin:  warm and dry to the touch, no apparent skin lesions or masses noted          Head:  normocephalic, no masses or lesions        Eyes:  pupils equal and round   wears glasses    ENT:  no pallor or cyanosis, dentition good        Neck:  carotid pulses are full and equal bilaterally, JVP normal, no carotid bruit        Respiratory:  normal breath sounds, clear to auscultation, normal A-P diameter, normal symmetry, normal respiratory excursion, no use of accessory muscles         Cardiac: no murmurs, gallops or rubs detected irregularly irregular rhythm              not assessed this visit                                        GI:  abdomen soft        Extremities and Muscular Skeletal:  no deformities, clubbing, cyanosis, erythema observed;no edema         compression stockings on    Neurological:  no gross motor deficits        Psych:  Alert and Oriented x 3        CC  No referring provider defined for this encounter.                Service Date: " "09/28/2022    HISTORY OF PRESENT ILLNESS:  Mr. Murry is a delightful, 87-year-old gentleman well known to me here at the [facility].  He is an established patient of Dr. Murphy.  His past medical history includes:  1.  Permanent AFib, essentially asymptomatic.  Treated with rate control, low-dose metoprolol and Eliquis therapy.  His CHADS-VASc score is 5.   2.  Hypertension.   3.  Basal ganglia stroke in 2019.  Evidence of additional prior strokes by MRI scan.  He was off anticoagulation at that time.  He does have some balance issues with falls 2-3 times a year.  4.  History of prostate cancer.  5.  Abnormal cardiac MRI demonstrated a subendocardial scar in the circumflex distribution in 2019.  A nuclear scan showed no ischemia.  6.  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  The patient also had mild to moderate MR.    At today's visit, Héctor is doing well.  He tries to walk on a regular basis, but states that his balance has been poor.  He also does get exertional shortness of breath with any type of incline.  He is requesting a disability parking spot at today's visit.  He states that getting a parking spot further and further away from the store, especially during inclement weather, is very challenging for him.    A 12 lead EKG shows atrial fibrillation.  Rate is at 48 beats per minute.  However, on exam, his rate was in the mid 60s.  Also, on an echocardiogram 2 days ago, his heart rate was noted at 65.    He currently denies chest pain, orthopnea, PND, syncope, presyncope or peripheral edema.  He feels like his energy level is \"okay\" for 87 years of age.    Echocardiogram was reviewed with the patient today.  This showed mild concentric left ventricular hypertrophy with a normal ejection fraction.  He had proximal septal thickening, and echo findings are not consistent with left ventricular outflow obstruction.  He had moderate RV dysfunction.  He had 1 to 2+ mitral regurgitation.  When " compared to the previous echo, the right ventricular dysfunction and MR were new.    Lab work completed on 09/05 was done in the ED after a fall in his house.  Creatinine was normal.  Potassium was 4.6.  Hemoglobin was 16.7.    He is scheduled next month to meet his new primary, Ms. Melissa PA-C, at [facility].  He will be due for his annual labs at that time.    All other review of systems and past medical history are noted below.    ASSESSMENT AND PLAN:  Mr. Murry is a delightful, 87-year-old gentleman well known to me here at the [facility].  1.  Permanent atrial fibrillation.  He is asymptomatic.  He is doing well on low-dose metoprolol.  His heart rate today is in the 60s.  He is on apixaban at 5 mg twice daily.  He has not had any further neurological issues or stroke since being on blood thinner.    We will need to continue to monitor him for falls.  Consideration for risks versus benefits will need to be considered.  He may be a candidate in the future for Watchman if the issue with falls increases.  Dr. Murphy has also recommended that he should be bridged with Lovenox since his history of previous stroke off anticoagulation for elective procedures, such as a colonoscopy.  2.  Hypertension, currently normotensive.  3.  RV dysfunction.  This is new on echo; however, his last echocardiogram was in 2019.  RV function was normal at that time.  RVSP is normal.  He does have 1 to 2+ mitral regurgitation.  I asked the patient that he continue to be mindful of his diet, watching his weight closely and to let us know if his shortness of breath increases.    I did give him an application to fill out for a parking permit.  With his history of stroke and fall issues as well as his RV dysfunction and advanced age, I think it is very reasonable.    Thank you for including us in his care.  Feel free to contact us if you have questions or concerns regarding today's assessment and plan.    Eliv Mckeon,  NP        D: 2022   T: 2022   MT: mo    Name:     OLE COSTA  MRN:      [id number]        Account:      [id number]   :      1935           Service Date: 2022     Document: [id number]    Thank you for allowing me to participate in the care of your patient.      Sincerely,     Elvi Alfaro NP, APRN [facility]  cc: No referring provider defined for this encounter.

## 2022-10-03 ENCOUNTER — OFFICE VISIT (OUTPATIENT)
Dept: FAMILY MEDICINE | Facility: CLINIC | Age: 87
End: 2022-10-03
Payer: MEDICARE

## 2022-10-03 VITALS
HEIGHT: 70 IN | WEIGHT: 165.4 LBS | HEART RATE: 62 BPM | TEMPERATURE: 97.3 F | OXYGEN SATURATION: 99 % | RESPIRATION RATE: 18 BRPM | BODY MASS INDEX: 23.68 KG/M2 | DIASTOLIC BLOOD PRESSURE: 80 MMHG | SYSTOLIC BLOOD PRESSURE: 124 MMHG

## 2022-10-03 DIAGNOSIS — C61 PROSTATE CANCER (H): ICD-10-CM

## 2022-10-03 DIAGNOSIS — Z00.00 ENCOUNTER FOR MEDICARE ANNUAL WELLNESS EXAM: Primary | ICD-10-CM

## 2022-10-03 DIAGNOSIS — I48.19 PERSISTENT ATRIAL FIBRILLATION (H): ICD-10-CM

## 2022-10-03 DIAGNOSIS — E78.5 HYPERLIPIDEMIA LDL GOAL <100: ICD-10-CM

## 2022-10-03 DIAGNOSIS — I48.3 TYPICAL ATRIAL FLUTTER (H): ICD-10-CM

## 2022-10-03 DIAGNOSIS — Z23 NEED FOR VACCINATION: ICD-10-CM

## 2022-10-03 PROBLEM — R50.9 FEVER OF UNKNOWN ORIGIN: Status: RESOLVED | Noted: 2019-07-31 | Resolved: 2022-10-03

## 2022-10-03 PROBLEM — Z13.6 CARDIOVASCULAR SCREENING; LDL GOAL LESS THAN 130: Status: RESOLVED | Noted: 2018-06-21 | Resolved: 2022-10-03

## 2022-10-03 LAB
CHOLEST SERPL-MCNC: 112 MG/DL
FASTING STATUS PATIENT QL REPORTED: YES
HDLC SERPL-MCNC: 46 MG/DL
LDLC SERPL CALC-MCNC: 46 MG/DL
NONHDLC SERPL-MCNC: 66 MG/DL
TRIGL SERPL-MCNC: 99 MG/DL

## 2022-10-03 PROCEDURE — G0439 PPPS, SUBSEQ VISIT: HCPCS | Performed by: PHYSICIAN ASSISTANT

## 2022-10-03 PROCEDURE — G0008 ADMIN INFLUENZA VIRUS VAC: HCPCS | Performed by: PHYSICIAN ASSISTANT

## 2022-10-03 PROCEDURE — 90662 IIV NO PRSV INCREASED AG IM: CPT | Performed by: PHYSICIAN ASSISTANT

## 2022-10-03 PROCEDURE — 0134A COVID-19,PF,MODERNA BIVALENT: CPT | Performed by: PHYSICIAN ASSISTANT

## 2022-10-03 PROCEDURE — 36415 COLL VENOUS BLD VENIPUNCTURE: CPT | Performed by: PHYSICIAN ASSISTANT

## 2022-10-03 PROCEDURE — 91313 COVID-19,PF,MODERNA BIVALENT: CPT | Performed by: PHYSICIAN ASSISTANT

## 2022-10-03 PROCEDURE — 80061 LIPID PANEL: CPT | Performed by: PHYSICIAN ASSISTANT

## 2022-10-03 ASSESSMENT — ENCOUNTER SYMPTOMS
HEMATOCHEZIA: 0
CHILLS: 0
HEARTBURN: 0
EYE PAIN: 0
PALPITATIONS: 0
HEADACHES: 0
DYSURIA: 0
DIARRHEA: 0
COUGH: 0
FREQUENCY: 0
ABDOMINAL PAIN: 0
WEAKNESS: 0
SHORTNESS OF BREATH: 0
MYALGIAS: 0
SORE THROAT: 0
PARESTHESIAS: 0
HEMATURIA: 0
DIZZINESS: 0
JOINT SWELLING: 0
ARTHRALGIAS: 0
CONSTIPATION: 0
NAUSEA: 0
FEVER: 0
NERVOUS/ANXIOUS: 0

## 2022-10-03 ASSESSMENT — PAIN SCALES - GENERAL: PAINLEVEL: NO PAIN (0)

## 2022-10-03 ASSESSMENT — ACTIVITIES OF DAILY LIVING (ADL): CURRENT_FUNCTION: NO ASSISTANCE NEEDED

## 2022-10-03 NOTE — PROGRESS NOTES
"SUBJECTIVE:   Héctor is a 87 year old who presents for Preventive Visit.    Patient has been advised of split billing requirements and indicates understanding: Yes  Are you in the first 12 months of your Medicare coverage?  No    Healthy Habits:     In general, how would you rate your overall health?  Good    Duration of exercise:  15-30 minutes    Do you usually eat at least 4 servings of fruit and vegetables a day, include whole grains    & fiber and avoid regularly eating high fat or \"junk\" foods?  No    Taking medications regularly:  Yes    Medication side effects:  None    Ability to successfully perform activities of daily living:  No assistance needed    Home Safety:  No safety concerns identified    Hearing Impairment:  Need to ask people to speak up or repeat themselves    In the past 6 months, have you been bothered by leaking of urine? Yes    In general, how would you rate your overall mental or emotional health?  Good      PHQ-2 Total Score: 0    Additional concerns today:  No    Do you feel safe in your environment? Yes    Have you ever done Advance Care Planning? (For example, a Health Directive, POLST, or a discussion with a medical provider or your loved ones about your wishes): Yes, advance care planning is on file.       Fall risk  Fallen 2 or more times in the past year?: Yes  Any fall with injury in the past year?: Yes    Cognitive Screening   1) Repeat 3 items (Leader, Season, Table)    2) Clock draw: }NORMAL  3) 3 item recall: Recalls 2 objects   Results: NORMAL clock, 1-2 items recalled: COGNITIVE IMPAIRMENT LESS LIKELY    Mini-CogTM Copyright MARIBEL Kwong. Licensed by the author for use in [facility]; reprinted with permission (mary@.[facility]). All rights reserved.          Reviewed and updated as needed this visit by clinical staff   Tobacco  Allergies  Meds  Problems  Med Hx  Surg Hx  Fam Hx            Reviewed and updated as needed this visit by Provider   Tobacco  Allergies  " Meds  Problems  Med Hx  Surg Hx  Fam Hx           Social History     Tobacco Use     Smoking status: Former Smoker     Years: 16.00     Types: Cigarettes     Quit date: 1968     Years since quittin.7     Smokeless tobacco: Never Used   Substance Use Topics     Alcohol use: Yes     Alcohol/week: 0.8 standard drinks     Types: 1 Cans of beer per week     Comment: SOCIALLY       Alcohol Use 10/3/2022   Prescreen: >3 drinks/day or >7 drinks/week? No   Prescreen: >3 drinks/day or >7 drinks/week? -     Current providers sharing in care for this patient include:   Patient Care Team:  Melissa Torres PA-C as PCP - General (Internal Medicine)  Henna Murphy MD as Assigned Heart and Vascular Provider  Bryan Gill MD as Assigned PCP    The following health maintenance items are reviewed in Epic and correct as of today:  Health Maintenance   Topic Date Due     MEDICARE ANNUAL WELLNESS VISIT  10/03/2023     ANNUAL REVIEW OF HM ORDERS  10/03/2023     FALL RISK ASSESSMENT  10/03/2023     ADVANCE CARE PLANNING  2026     DTAP/TDAP/TD IMMUNIZATION (2 - Td or Tdap) 2029     PHQ-2 (once per calendar year)  Completed     INFLUENZA VACCINE  Completed     Pneumococcal Vaccine: 65+ Years  Completed     ZOSTER IMMUNIZATION  Completed     COVID-19 Vaccine  Completed     IPV IMMUNIZATION  Aged Out     MENINGITIS IMMUNIZATION  Aged Out     HEPATITIS B IMMUNIZATION  Aged Out     BP Readings from Last 3 Encounters:   10/03/22 124/80   22 135/85   22 (!) 169/105    Wt Readings from Last 3 Encounters:   10/03/22 75 kg (165 lb 6.4 oz)   22 74.4 kg (164 lb)   22 73.9 kg (163 lb)          Patient Active Problem List   Diagnosis     Arrhythmia     Atrial flutter (H)     Persistent atrial fibrillation (H)     Prostate cancer (H)     Generalized weakness     Hyperlipidemia LDL goal <100     Past Surgical History:   Procedure Laterality Date     COLONOSCOPY       COLONOSCOPY N/A  2021    Procedure: Colonoscopy, With Polypectomy And Biopsy;  Surgeon: Jordin Rachel MD;  Location:  GI     ENT SURGERY      tonsllectomy     EXAM UNDER ANESTHESIA, FISTULOTOMY RECTUM, COMBINED N/A 2015    Procedure: COMBINED EXAM UNDER ANESTHESIA, FISTULOTOMY RECTUM;  Surgeon: Candice Carty MD;  Location: [facility]     GENITOURINARY SURGERY      PROSTATECTOMY     GI SURGERY      hernia repair x 2     HERNIORRHAPHY INGUINAL  2013    Procedure: HERNIORRHAPHY INGUINAL;  LEFT INGUINAL HERNIA REPAIR WITH MESH;  Surgeon: Filipe Fairchild MD;  Location: [facility]     HERNIORRHAPHY INGUINAL Right 2019    Procedure: OPEN RIGHT INGUINA HERNIA REPAIR WITH MESH;  Surgeon: Filipe Fairchild MD;  Location:  OR     ORTHOPEDIC SURGERY      WRIST SURGERY - LEFT       Social History     Tobacco Use     Smoking status: Former Smoker     Years: 16.00     Types: Cigarettes     Quit date: 1968     Years since quittin.7     Smokeless tobacco: Never Used   Substance Use Topics     Alcohol use: Yes     Alcohol/week: 0.8 standard drinks     Types: 1 Cans of beer per week     Comment: SOCIALLY     Family History   Problem Relation Age of Onset     Ovarian Cancer Mother      Osteoporosis Father      Unknown/Adopted Sister            Review of Systems   Constitutional: Negative for chills and fever.   HENT: Negative for congestion, ear pain, hearing loss and sore throat.    Eyes: Negative for pain and visual disturbance.   Respiratory: Negative for cough and shortness of breath.    Cardiovascular: Positive for chest pain. Negative for palpitations and peripheral edema.   Gastrointestinal: Negative for abdominal pain, constipation, diarrhea, heartburn, hematochezia and nausea.   Genitourinary: Positive for impotence. Negative for dysuria, frequency, genital sores, hematuria, penile discharge and urgency.   Musculoskeletal: Negative for arthralgias, joint swelling and myalgias.   Skin: Negative for  "rash.   Neurological: Negative for dizziness, weakness, headaches and paresthesias.   Psychiatric/Behavioral: Negative for mood changes. The patient is not nervous/anxious.          OBJECTIVE:   /80   Pulse 62   Temp 97.3  F (36.3  C) (Tympanic)   Resp 18   Ht 1.778 m (5' 10\")   Wt 75 kg (165 lb 6.4 oz)   SpO2 99%   BMI 23.73 kg/m   Estimated body mass index is 23.73 kg/m  as calculated from the following:    Height as of this encounter: 1.778 m (5' 10\").    Weight as of this encounter: 75 kg (165 lb 6.4 oz).  Physical Exam  GENERAL: healthy, alert and no distress  EYES: Eyes grossly normal to inspection, PERRL and conjunctivae and sclerae normal  HENT: Lt ear canal and TM normal, nose and mouth without ulcers or lesions  NECK: no adenopathy, no asymmetry, masses, or scars and thyroid normal to palpation  RESP: lungs clear to auscultation - no rales, rhonchi or wheezes  CV: irregularly irregular rate and rhythm, normal S1 S2, no S3 or S4, no murmur, click or rub, no peripheral edema and peripheral pulses strong  MS: no gross musculoskeletal defects noted, no edema  SKIN: no suspicious lesions or rashes  NEURO: Normal strength and tone, mentation intact and speech normal  PSYCH: mentation appears normal, affect normal/bright    Last Comprehensive Metabolic Panel:  Sodium   Date Value Ref Range Status   09/05/2022 136 133 - 144 mmol/L Final   09/23/2020 140 133 - 144 mmol/L Final     Potassium   Date Value Ref Range Status   09/05/2022 4.6 3.4 - 5.3 mmol/L Final   09/23/2020 4.6 3.4 - 5.3 mmol/L Final     Chloride   Date Value Ref Range Status   09/05/2022 102 94 - 109 mmol/L Final   09/23/2020 106 94 - 109 mmol/L Final     Carbon Dioxide   Date Value Ref Range Status   09/23/2020 26 20 - 32 mmol/L Final     Carbon Dioxide (CO2)   Date Value Ref Range Status   09/05/2022 30 20 - 32 mmol/L Final     Anion Gap   Date Value Ref Range Status   09/05/2022 4 3 - 14 mmol/L Final   09/23/2020 8 3 - 14 mmol/L " Final     Glucose   Date Value Ref Range Status   09/05/2022 117 (H) 70 - 99 mg/dL Final   09/23/2020 67 (L) 70 - 99 mg/dL Final     Urea Nitrogen   Date Value Ref Range Status   09/05/2022 21 7 - 30 mg/dL Final   09/23/2020 18 7 - 30 mg/dL Final     Creatinine   Date Value Ref Range Status   09/05/2022 0.90 0.66 - 1.25 mg/dL Final   09/23/2020 0.98 0.66 - 1.25 mg/dL Final     GFR Estimate   Date Value Ref Range Status   09/05/2022 84 >60 mL/min/1.73m2 Final     Comment:     Effective December 21, 2021 eGFRcr in adults is calculated using the 2021 CKD-EPI creatinine equation which includes age and gender (Mela et al., NEJM, DOI: 10.1056/HSWGix6765227)   09/23/2020 71 >60 mL/min/[1.73_m2] Final     Comment:     Non  GFR Calc  Starting 12/18/2018, serum creatinine based estimated GFR (eGFR) will be   calculated using the Chronic Kidney Disease Epidemiology Collaboration   (CKD-EPI) equation.       Calcium   Date Value Ref Range Status   09/05/2022 9.6 8.5 - 10.1 mg/dL Final   09/23/2020 9.3 8.5 - 10.1 mg/dL Final     Lab Results   Component Value Date    WBC 8.8 09/05/2022    WBC 7.0 09/23/2020     Lab Results   Component Value Date    RBC 5.53 09/05/2022    RBC 5.19 09/23/2020     Lab Results   Component Value Date    HGB 16.7 09/05/2022    HGB 16.1 09/23/2020     Lab Results   Component Value Date    HCT 51.7 09/05/2022    HCT 48.8 09/23/2020     No components found for: MCT  Lab Results   Component Value Date    MCV 94 09/05/2022    MCV 94 09/23/2020     Lab Results   Component Value Date    MCH 30.2 09/05/2022    MCH 31.0 09/23/2020     Lab Results   Component Value Date    MCHC 32.3 09/05/2022    MCHC 33.0 09/23/2020     Lab Results   Component Value Date    RDW 13.8 09/05/2022    RDW 14.2 09/23/2020     Lab Results   Component Value Date     09/05/2022     09/23/2020       ASSESSMENT / PLAN:       ICD-10-CM    1. Encounter for Medicare annual wellness exam  Z00.00    2. Typical  "atrial flutter (H)  I48.3    3. Persistent atrial fibrillation (H)  I48.19    4. Prostate cancer (H)  C61    5. Hyperlipidemia LDL goal <100  E78.5 Lipid panel reflex to direct LDL Fasting     Lipid panel reflex to direct LDL Fasting   6. Need for vaccination  Z23 INFLUENZA, QUAD, HIGH DOSE, PF, 65YR + (FLUZONE HD)     COVID-19,PF,MODERNA BIVALENT 18+Yrs     - Continue follow-up with cardiology for A Fib management as scheduled. Rate-controlled today, on anticoagulation. Lipid panel pending  - Continue follow-up with urology for prostate CA management as scheduled.    COUNSELING:  Reviewed preventive health counseling, as reflected in patient instructions       Immunizations    Vaccinated for: Influenza, COVID bivalent         Estimated body mass index is 23.73 kg/m  as calculated from the following:    Height as of this encounter: 1.778 m (5' 10\").    Weight as of this encounter: 75 kg (165 lb 6.4 oz).        He reports that he quit smoking about 53 years ago. His smoking use included cigarettes. He quit after 16.00 years of use. He has never used smokeless tobacco.      Appropriate preventive services were discussed with this patient, including applicable screening as appropriate for cardiovascular disease, diabetes, osteopenia/osteoporosis, and glaucoma.  As appropriate for age/gender, discussed screening for colorectal cancer, prostate cancer, breast cancer, and cervical cancer. Checklist reviewing preventive services available has been given to the patient.    Reviewed patients plan of care and provided an AVS. The Basic Care Plan (routine screening as documented in Health Maintenance) for Yosef meets the Care Plan requirement. This Care Plan has been established and reviewed with the Patient.    Counseling Resources:  ATP IV Guidelines  Pooled Cohorts Equation Calculator  Breast Cancer Risk Calculator  Breast Cancer: Medication to Reduce Risk  FRAX Risk Assessment  ICSI Preventive Guidelines  Dietary " Guidelines for Americans, 2010  USDA's MyPlate  ASA Prophylaxis  Lung CA Screening    Melissa Torres PA-C  [facility]    Identified Health Risks:      The patient was counseled and encouraged to consider modifying their diet and eating habits. He was provided with information on recommended healthy diet options.  The patient was provided with written information regarding signs of hearing loss.  Information on urinary incontinence and treatment options given to patient.  He is at risk for falling and has been provided with information to reduce the risk of falling at home.

## 2022-10-03 NOTE — PATIENT INSTRUCTIONS
Preventive Health Recommendations:     See your health care provider every year to    Review health changes.     Discuss preventive care.      Review your medicines if your doctor has prescribed any.      Talk with your health care provider about whether you should have a test to screen for prostate cancer (PSA).    Every 3 years, have a diabetes test (fasting glucose). If you are at risk for diabetes, you should have this test more often.    Every 5 years, have a cholesterol test. Have this test more often if you are at risk for high cholesterol or heart disease.     Every 10 years, have a colonoscopy. Or, have a yearly FIT test (stool test). These exams will check for colon cancer.    Talk to with your health care provider about screening for Abdominal Aortic Aneurysm if you have a family history of AAA or have a history of smoking.    Shots:     Get a flu shot each year.     Get a tetanus shot every 10 years.     Talk to your doctor about your pneumonia vaccines. There are now two you should receive - Pneumovax (PPSV 23) and Prevnar (PCV 13).     Talk to your pharmacist about a shingles vaccine.     Talk to your doctor about the hepatitis B vaccine.  Nutrition:     Eat at least 5 servings of fruits and vegetables each day.     Eat whole-grain bread, whole-wheat pasta and brown rice instead of white grains and rice.     Get adequate Calcium and Vitamin D.   Lifestyle    Exercise for at least 150 minutes a week (30 minutes a day, 5 days a week). This will help you control your weight and prevent disease.     Limit alcohol to one drink per day.     No smoking.     Wear sunscreen to prevent skin cancer.    See your dentist every six months for an exam and cleaning.    See your eye doctor every 1 to 2 years to screen for conditions such as glaucoma, macular degeneration, cataracts, etc.    Personalized Prevention Plan  You are due for the preventive services outlined below.  Your care team is available to assist you  in scheduling these services.  If you have already completed any of these items, please share that information with your care team to update in your medical record.  Health Maintenance Due   Topic Date Due     ANNUAL REVIEW OF HM ORDERS  Never done     COVID-19 Vaccine (1) Never done     Flu Vaccine (1) 09/01/2022     Annual Wellness Visit  09/30/2022     Patient Education   Personalized Prevention Plan  You are due for the preventive services outlined below.  Your care team is available to assist you in scheduling these services.  If you have already completed any of these items, please share that information with your care team to update in your medical record.  Health Maintenance Due   Topic Date Due     ANNUAL REVIEW OF HM ORDERS  Never done     COVID-19 Vaccine (1) Never done     Flu Vaccine (1) 09/01/2022     Preventive Health Recommendations  See your health care provider every year to    Review health changes.     Discuss preventive care.      Review your medicines if your doctor has prescribed any.    Talk with your health care provider about whether you should have a test to screen for prostate cancer (PSA).    Every 3 years, have a diabetes test (fasting glucose). If you are at risk for diabetes, you should have this test more often.    Every 5 years, have a cholesterol test. Have this test more often if you are at risk for high cholesterol or heart disease.     Every 10 years, have a colonoscopy. Or, have a yearly FIT test (stool test). These exams will check for colon cancer.    Talk to with your health care provider about screening for Abdominal Aortic Aneurysm if you have a family history of AAA or have a history of smoking.    Shots:     Get a flu shot each year.     Get a tetanus shot every 10 years.     Talk to your doctor about your pneumonia vaccines. There are now two you should receive - Pneumovax (PPSV 23) and Prevnar (PCV 13).    Talk to your pharmacist about a shingles vaccine.     Talk to  your doctor about the hepatitis B vaccine.    Nutrition:     Eat at least 5 servings of fruits and vegetables each day.     Eat whole-grain bread, whole-wheat pasta and brown rice instead of white grains and rice.     Get adequate Calcium and Vitamin D.     Lifestyle    Exercise for at least 150 minutes a week (30 minutes a day, 5 days a week). This will help you control your weight and prevent disease.     Limit alcohol to one drink per day.     No smoking.     Wear sunscreen to prevent skin cancer.     See your dentist every six months for an exam and cleaning.     See your eye doctor every 1 to 2 years to screen for conditions such as glaucoma, macular degeneration and cataracts.    Personalized Prevention Plan  You are due for the preventive services outlined below.  Your care team is available to assist you in scheduling these services.  If you have already completed any of these items, please share that information with your care team to update in your medical record.  Health Maintenance   Topic Date Due     ANNUAL REVIEW OF HM ORDERS  Never done     COVID-19 Vaccine (1) Never done     INFLUENZA VACCINE (1) 09/01/2022     MEDICARE ANNUAL WELLNESS VISIT  10/03/2023     FALL RISK ASSESSMENT  10/03/2023     ADVANCE CARE PLANNING  09/30/2026     DTAP/TDAP/TD IMMUNIZATION (2 - Td or Tdap) 11/01/2029     PHQ-2 (once per calendar year)  Completed     Pneumococcal Vaccine: 65+ Years  Completed     ZOSTER IMMUNIZATION  Completed     IPV IMMUNIZATION  Aged Out     MENINGITIS IMMUNIZATION  Aged Out     HEPATITIS B IMMUNIZATION  Aged Out       Understanding USDA MyPlate  The USDA has guidelines to help you make healthy food choices. These are called MyPlate. MyPlate shows the food groups that make up healthy meals using the image of a place setting. Before you eat, think about the healthiest choices for what to put on your plate or in your cup or bowl. To learn more about building a healthy plate, visit  www.choosemyplate.gov.    The food groups    Fruits. Any fruit or 100% fruit juice counts as part of the Fruit Group. Fruits may be fresh, canned, frozen, or dried, and may be whole, cut-up, or pureed. Make 1/2 of your plate fruits and vegetables.    Vegetables. Any vegetable or 100% vegetable juice counts as a member of the Vegetable Group. Vegetables may be fresh, frozen, canned, or dried. They can be served raw or cooked and may be whole, cut-up, or mashed. Make 1/2 of your plate fruits and vegetables.    Grains. All foods made from grains are part of the Grains Group. These include wheat, rice, oats, cornmeal, and barley. Grains are often used to make foods such as bread, pasta, oatmeal, cereal, tortillas, and grits. Grains should be no more than 1/4 of your plate. At least half of your grains should be whole grains.    Protein. This group includes meat, poultry, seafood, beans and peas, eggs, processed soy products (such as tofu), nuts (including nut butters), and seeds. Make protein choices no more than 1/4 of your plate. Meat and poultry choices should be lean or low fat.    Dairy. The Dairy Group includes all fluid milk products and foods made from milk that contain calcium, such as yogurt and cheese. (Foods that have little calcium, such as cream, butter, and cream cheese, are not part of this group.) Most dairy choices should be low-fat or fat-free.    Oils. Oils aren't a food group, but they do contain essential nutrients. However it's important to watch your intake of oils. These are fats that are liquid at room temperature. They include canola, corn, olive, soybean, vegetable, and sunflower oil. Foods that are mainly oil include mayonnaise, certain salad dressings, and soft margarines. You likely already get your daily oil allowance from the foods you eat.  Things to limit  Eating healthy also means limiting these things in your diet:       Salt (sodium). Many processed foods have a lot of sodium. To  keep sodium intake down, eat fresh vegetables, meats, poultry, and seafood when possible. Purchase low-sodium, reduced-sodium, or no-salt-added food products at the store. And don't add salt to your meals at home. Instead, season them with herbs and spices such as dill, oregano, cumin, and paprika. Or try adding flavor with lemon or lime zest and juice.    Saturated fat. Saturated fats are most often found in animal products such as beef, pork, and chicken. They are often solid at room temperature, such as butter. To reduce your saturated fat intake, choose leaner cuts of meat and poultry. And try healthier cooking methods such as grilling, broiling, roasting, or baking. For a simple lower-fat swap, use plain nonfat yogurt instead of mayonnaise when making potato salad or macaroni salad.    Added sugars. These are sugars added to foods. They are in foods such as ice cream, candy, soda, fruit drinks, sports drinks, energy drinks, cookies, pastries, jams, and syrups. Cut down on added sugars by sharing sweet treats with a family member or friend. You can also choose fruit for dessert, and drink water or other unsweetened beverages.     [employer] last reviewed this educational content on 6/1/2020 2000-2021 [employer]. All rights reserved. This information is not intended as a substitute for professional medical care. Always follow your healthcare professional's instructions.          Signs of Hearing Loss      Hearing much better with one ear can be a sign of hearing loss.   Hearing loss is a problem shared by many people. In fact, it is one of the most common health problems, particularly as people age. Most people age 65 and older have some hearing loss. By age 80, almost everyone does. Hearing loss often occurs slowly over the years. So you may not realize your hearing has gotten worse.  Have your hearing checked  Call your healthcare provider if you:    Have to strain to hear normal  conversation    Have to watch other people s faces very carefully to follow what they re saying    Need to ask people to repeat what they ve said    Often misunderstand what people are saying    Turn the volume of the television or radio up so high that others complain    Feel that people are mumbling when they re talking to you    Find that the effort to hear leaves you feeling tired and irritated    Notice, when using the phone, that you hear better with one ear than the other  [employer] last reviewed this educational content on 1/1/2020 2000-2021 [employer]. All rights reserved. This information is not intended as a substitute for professional medical care. Always follow your healthcare professional's instructions.          Urinary Incontinence (Male)    Urinary incontinence means not being able to control the release of urine from the bladder.   Causes  Common causes of urinary incontinence in men include:    Infection    Certain medicines    Aging    Poor pelvic muscle tone    Bladder spasms    Obesity    Trouble urinating and fully emptying the bladder (urinary retention)  Other things that can cause incontinence are:     Nervous system diseases    Diabetes    Sleep apnea    Urinary tract infections    Prostate surgery    Pelvic injury  Constipation and smoking have also been identified as risk factors.   Symptoms    Urge incontinence (overactive bladder). This is a sudden urge to urinate. It occurs even though there may not be much urine in the bladder. The need to urinate often during the night is common. It's due to bladder spasms.    Stress incontinence. This is urine leakage that you can't control. It can occur with sneezing, coughing, and other actions that put stress on the bladder.    Treatment  Treatment depends on what is causing the condition. Bladder infections are treated with antibiotics. Urinary retention is treated with a bladder catheter.   Home care  Follow these guidelines  when caring for yourself at home:    Don't have any foods and drinks that may irritate the bladder. This includes:  ? Chocolate  ? Alcohol  ? Caffeine  ? Carbonated drinks  ? Acidic fruits and juices    Limit fluids to 6 to 8 cups a day.    Lose weight if you are overweight. This will reduce your symptoms.    If advised, do regular pelvic muscle-strengthening exercises such as Kegel exercises.    If needed, wear absorbent pads to catch urine. Change the pads often. This is for good hygiene and to prevent skin and bladder infections.    Bathe daily for good hygiene.    If an antibiotic was prescribed to treat a bladder infection, take it until it's finished. Keep taking it even if you are feeling better. This is to make sure your infection has cleared.    If a catheter was left in place, keep bacteria from getting into the collection bag. Don't disconnect the catheter from the collection bag.    Use a leg band to secure the catheter drainage tube, so it does not pull on the catheter. Drain the collection bag when it becomes full. To do this, use the drain spout at the bottom of the bag. Don't disconnect the bag from the catheter.    Don't pull on or try to remove a catheter. The catheter must be removed by a healthcare provider.    If you smoke, stop. Ask your provider for help if you can't do this on your own.  Follow-up care  Follow up with your healthcare provider, or as advised.  When to get medical advice  Call your healthcare provider right away if any of these occur:    Fever over 100.4 F (38 C), or as directed by your provider    Bladder pain or fullness    Belly swelling, nausea, or vomiting    Back pain    Weakness, dizziness, or fainting    If a catheter was left in place, return if:  ? The catheter falls out  ? The catheter stops draining for 6 hours  ? Your urine gets cloudy or smells bad  [employer] last reviewed this educational content on 1/1/2020 2000-2021 [employer]. All rights  reserved. This information is not intended as a substitute for professional medical care. Always follow your healthcare professional's instructions.          Preventing Falls at Home  A person can fall for many reasons. Older adults may fall because reaction time slows down as we age. Your muscles and joints may get stiff, weak, or less flexible because of illness, medicines, or a physical condition.   Other health problems that make falls more likely include:     Arthritis    Dizziness or lightheadedness when you stand up (orthostatic hypotension)    History of a stroke    Dizziness    Anemia    Certain medicines taken for mental illness or to control blood pressure.    Problems with balance or gait    Bladder or urinary problems    History of falling    Changes in vision (vision impairment)    Changes in thinking skills and memory (cognitive impairment)  Injuries from a fall can include serious injuries such as broken bones, dislocated joints, internal bleeding and cuts. Injuries like these can limit your independence.   Prevention tips  To help prevent falls and fall-related injuries, follow the tips below.    Floors  To make floors safer:     Put nonskid pads under area rugs.    Remove small rugs.    Replace worn floor coverings.    Tack carpets firmly to each step on carpeted stairs. Put nonskid strips on the edges of uncarpeted stairs.    Keep floors and stairs free of clutter and cords.    Arrange furniture so there are clear pathways.    Clean up any spills right away.  Bathrooms    To make bathrooms safer:     Install grab bars in the tub or shower.    Apply nonskid strips or put a nonskid rubber mat in the tub or shower.    Sit on a bath chair to bathe.    Use bathmats with nonskid backing.  Lighting  To improve visibility in your home:      Keep a flashlight in each room. Or put a lamp next to the bed within easy reach.    Put nightlights in the bedrooms, hallways, kitchen, and bathrooms.    Make sure all  stairways have good lighting.    Take your time when going up and down stairs.    Put handrails on both sides of stairs and in walkways for more support. To prevent injury to your wrist or arm, don t use handrails to pull yourself up.    Install grab bars to pull yourself up.    Move or rearrange items that you use often. This will make them easier to find or reach.    Look at your home to find any safety hazards. Especially look at doorways, walkways, and the driveway. Remove or repair any safety problems that you find.  Other changes to make    Look around to find any safety hazards. Look closely at doorways, walkways, and the driveway. Remove or repair any safety problems that you find.    Wear shoes that fit well.    Take your time when going up and down stairs.    Put handrails on both sides of stairs and in walkways for more support. To prevent injury to your wrist or arm, don t use handrails to pull yourself up.    Install grab bars wherever needed to pull yourself up.    Arrange items that you use often. This will make them easier to find or reach.    [employer] last reviewed this educational content on 3/1/2020    4614-4145 [employer]. All rights reserved. This information is not intended as a substitute for professional medical care. Always follow your healthcare professional's instructions.

## 2022-10-03 NOTE — LETTER
October 3, 2022      Héctor Murry  [address] [place] [address]        Dear Héctor,    It was a pleasure seeing you today. We are writing to inform you of your test results.    - Your total cholesterol is normal (< 200), - LDL (bad cholesterol) is normal (<100), - HDL (good cholesterol) is normal, - Triglycerides are normal (<150)    Results for orders placed or performed in visit on 10/03/22   Lipid panel reflex to direct LDL Fasting     Status: None   Result Value Ref Range    Cholesterol 112 <200 mg/dL    Triglycerides 99 <150 mg/dL    Direct Measure HDL 46 >=40 mg/dL    LDL Cholesterol Calculated 46 <=100 mg/dL    Non HDL Cholesterol 66 <130 mg/dL    Patient Fasting > 8hrs? Yes      Thank you for choosing [facility].  We appreciate the opportunity to serve you and look forward to supporting your healthcare needs in the future.    If you have any questions or concerns, please call me or my staff at [phone].      Sincerely,        Melissa Torres PA-C

## 2023-05-03 ENCOUNTER — TELEPHONE (OUTPATIENT)
Dept: CARDIOLOGY | Facility: CLINIC | Age: 88
End: 2023-05-03
Payer: MEDICARE

## 2023-05-03 NOTE — TELEPHONE ENCOUNTER
"5/3/23 Pt called to say when he was wintering  in [place] the past few months he was in in the ER at [facility] in [place]  One evening he developed severe substernal chest pain/pressure which went on for about an hour. He was proceeding to the ER when he vomited lots of yellow acid. After this, his pain was completely gone but we was still evaluated in the ER  The MD there did a chest xray and labs and told him he had \" an enlarged heart and problems with his liver enzymes\". He was advised to find a Cardiologist and GI MD in [place]  He stated he did not do this and wanted Elvi to know what happened  Pt denies further CP or stomach issues but has has had issues with decreased energy and stamina and balance .  Faxed medical records at [facility] to request ER note, labs, chest xray and EKG.  Will update Elvi Alfaro NP of above   KHerroRN 455 pm   "

## 2023-05-08 NOTE — TELEPHONE ENCOUNTER
"5/8/23 Msg recd from Elvi Alfaro NP    I am happy to see him if he wants or has any recurrent issues.  If he is feeling fine and has not had any further episodes then he can monitor it for now.   Spoke w pt and explained recommendations .  He stated he has not had any more issues. The only problem he has been having are balance issues . He states he is very \"wobbly\". He also stated he has not followed up on recommendation from [place] ER MD to find a gastroenterologist d/t elevated Liver enzymes.  ER notes received which does not mention any cardiac complaints but that pt had diarrhea x 6 days prior to presentation  CT Abdomen and Pelvis were done showing multiple gallstones , colitis and sigmoidal diverticulosis, calcified atherosclerotic plaque throughout the abdominal aorta   Encouraged pt to follow up w PCP on balance issue and GI issues.  He will plan to see Elvi in 9/2023 as ordered before returning to [place] again in the fall.  He will call with questions/issues prior.  Records sent to HIM for scanning  KHmloRN 1130 am      "

## 2023-05-18 ENCOUNTER — TRANSFERRED RECORDS (OUTPATIENT)
Dept: HEALTH INFORMATION MANAGEMENT | Facility: CLINIC | Age: 88
End: 2023-05-18
Payer: MEDICARE

## 2023-06-09 ENCOUNTER — TELEPHONE (OUTPATIENT)
Dept: CARDIOLOGY | Facility: CLINIC | Age: 88
End: 2023-06-09
Payer: MEDICARE

## 2023-06-09 NOTE — TELEPHONE ENCOUNTER
Pt is going to be heading back to [place] sooner this year then usual and wanting to set up his OV with Elvi. Pt will come in on 6/30 at 1430. Pt will head to [place] in July. Manjeet

## 2023-06-30 ENCOUNTER — OFFICE VISIT (OUTPATIENT)
Dept: CARDIOLOGY | Facility: CLINIC | Age: 88
End: 2023-06-30
Payer: MEDICARE

## 2023-06-30 VITALS
WEIGHT: 170 LBS | HEIGHT: 70 IN | OXYGEN SATURATION: 96 % | BODY MASS INDEX: 24.34 KG/M2 | SYSTOLIC BLOOD PRESSURE: 126 MMHG | DIASTOLIC BLOOD PRESSURE: 84 MMHG | HEART RATE: 68 BPM

## 2023-06-30 DIAGNOSIS — I50.810 RVF (RIGHT VENTRICULAR FAILURE) (H): ICD-10-CM

## 2023-06-30 DIAGNOSIS — I48.21 PERMANENT ATRIAL FIBRILLATION (H): ICD-10-CM

## 2023-06-30 DIAGNOSIS — I34.0 MITRAL VALVE INSUFFICIENCY, UNSPECIFIED ETIOLOGY: ICD-10-CM

## 2023-06-30 DIAGNOSIS — E78.5 HYPERLIPIDEMIA LDL GOAL <100: Primary | ICD-10-CM

## 2023-06-30 PROCEDURE — 93000 ELECTROCARDIOGRAM COMPLETE: CPT | Performed by: NURSE PRACTITIONER

## 2023-06-30 PROCEDURE — 99214 OFFICE O/P EST MOD 30 MIN: CPT | Performed by: NURSE PRACTITIONER

## 2023-06-30 NOTE — LETTER
6/30/2023    Melissa Torres PA-C  [address]  [place] [address]    RE: Yosef Murry       Dear Colleague,     I had the pleasure of seeing Yosef Murry in the [facility].  HPI:  Mr. Murry is a delightful, 87-year-old gentleman well known to me here at the Heart Clinic.  He is an established patient of Dr. Murphy.       His past medical history includes:  1.  Permanent AFib, essentially asymptomatic.  Treated with rate control, low-dose metoprolol and Eliquis therapy.  His CHADS-VASc score is 5.      2.  Hypertension.      3.  Basal ganglia stroke in 2019.  Evidence of additional prior strokes by MRI scan.  He was off anticoagulation at that time.  He does have some balance issues with falls 2-3 times a year.     4.  History of prostate cancer.     5.  Abnormal cardiac MRI demonstrated a subendocardial scar in the circumflex distribution in 2019.  A nuclear scan showed no ischemia.     6.  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  The patient also had mild to moderate MR.    At today's visit Héctor is doing well.  Denies chest pain, shortness of breath, lightheadedness, dizziness, lower extremity edema.  He does get exertional shortness of breath which is not new.  He plans on leaving back for [place] in the next week or 2.    He did have an ED visit 3/25/2023.  I do not have all the data, but the patient was told that he should have follow-up lab work.  He states that he was having epigastric discomfort and had an emesis before presenting to the ED.  He was feeling better, but his friend insisted that he be evaluated.  He states that the majority of the test were unremarkable.    Diagnostics:  Twelve-lead EKG shows atrial fibrillation with controlled ventricular rate at 61 bpm.  Single PVC is noted.    Echocardiogram 9/26/2022 showed EF of 55 to 60%.  Moderate RV dysfunction was noted.  1-2+ MR.  The RV dysfunction and MR were new when compared to the previous  echo.    Component      Latest Ref Rng 9/5/2022  10:54 PM 10/3/2022  9:48 AM   WBC      4.0 - 11.0 10e3/uL 8.8     RBC Count      4.40 - 5.90 10e6/uL 5.53     Hemoglobin      13.3 - 17.7 g/dL 16.7     Hematocrit      40.0 - 53.0 % 51.7     MCV      78 - 100 fL 94     MCH      26.5 - 33.0 pg 30.2     MCHC      31.5 - 36.5 g/dL 32.3     RDW      10.0 - 15.0 % 13.8     Platelet Count      150 - 450 10e3/uL 275     % Neutrophils      % 71     % Lymphocytes      % 19     % Monocytes      % 8     % Eosinophils      % 1     % Basophils      % 0     % Immature Granulocytes      % 1     NRBCs per 100 WBC      <1 /100 0     Absolute Neutrophils      1.6 - 8.3 10e3/uL 6.4     Absolute Lymphocytes      0.8 - 5.3 10e3/uL 1.6     Absolute Monocytes      0.0 - 1.3 10e3/uL 0.7     Absolute Eosinophils      0.0 - 0.7 10e3/uL 0.1     Absolute Basophils      0.0 - 0.2 10e3/uL 0.0     Absolute Immature Granulocytes      <=0.4 10e3/uL 0.0     Absolute NRBCs      10e3/uL 0.0     Sodium      133 - 144 mmol/L 136     Potassium      3.4 - 5.3 mmol/L 4.6     Chloride      94 - 109 mmol/L 102     Carbon Dioxide      20 - 32 mmol/L 30     Anion Gap      3 - 14 mmol/L 4     Urea Nitrogen      7 - 30 mg/dL 21     Creatinine      0.66 - 1.25 mg/dL 0.90     Calcium      8.5 - 10.1 mg/dL 9.6     Glucose      70 - 99 mg/dL 117 (H)     GFR Estimate      >60 mL/min/1.73m2 84     Cholesterol      <200 mg/dL  112    Triglycerides      <150 mg/dL  99    HDL Cholesterol      >=40 mg/dL  46    LDL Cholesterol Calculated      <=100 mg/dL  46    Non HDL Cholesterol      <130 mg/dL  66    Patient Fasting?  Yes       Plan:     1.  Permanent atrial fibrillation.    He is asymptomatic.  He is doing well on low-dose metoprolol.  His heart rate today is in the 60s.  He is on apixaban at 5 mg twice daily.  He has not had any further neurological issues or stroke since being on blood thinner.    He does have an unsteady gait and uses a cane.  I did give him a  parking permit to help minimize risk for falls which she states has been helpful.     Dr. Murphy has also recommended that he should be bridged with Lovenox since his history of previous stroke off anticoagulation for elective procedures, such as a colonoscopy.     2.  Hypertension, currently normotensive.  On amlodipine 5 mg daily, losartan 50 mg daily, metoprolol ER 25 mg daily.  Patient would benefit from spironolactone if blood pressures start to climb.     3.  RV dysfunction.  Moderate MR  This is new on echo; however, his last echocardiogram was in 2019.  RV function was normal at that time.  RVSP is normal.  He does have 1 to 2+ mitral regurgitation.    I asked the patient that he continue to be mindful of his diet, watching his weight closely and to let us know if his shortness of breath increases.  I will repeat an echo next year.    4.  History of CVA (basal ganglia stroke) 2019  Evidence of additional strokes noted on MRI.  He was off anticoagulation at the time of the strokes.  Recommend anticoagulation indefinitely.    Lab work will be completed next week.  I have ordered a lipid ALT, CMP.  Thank you for including us in his care.    Elvi Alfaro, NP, APRN CNP        Today's clinic visit entailed:  Review of the result(s) of each unique test - EKG  Ordering of each unique test  No LOS data to display   Time spent by me doing chart review, history and exam, documentation and further activities per the note  Provider  Link to [facility] Help Grid     The level of medical decision making during this visit was of moderate complexity.      Orders Placed This Encounter   Procedures    Lipid Profile    ALT    CBC with platelets    Comprehensive metabolic panel    EKG 12-lead complete w/read - Clinics (performed today)       No orders of the defined types were placed in this encounter.      There are no discontinued medications.      Encounter Diagnoses   Name Primary?    Permanent atrial fibrillation (H)      Hyperlipidemia LDL goal <100 Yes       CURRENT MEDICATIONS:  Current Outpatient Medications   Medication Sig Dispense Refill    amLODIPine (NORVASC) 5 MG tablet Take 1 tablet (5 mg) by mouth daily 90 tablet 3    apixaban ANTICOAGULANT (ELIQUIS) 5 MG tablet Take 1 tablet (5 mg) by mouth 2 times daily 180 tablet 3    atorvastatin (LIPITOR) 10 MG tablet Take 1 tablet (10 mg) by mouth every evening 90 tablet 3    calcium carbonate (TUMS) 500 MG chewable tablet Take 1 chew tab by mouth nightly as needed       diphenhydrAMINE-acetaminophen (TYLENOL PM)  MG tablet Take 2 tablets by mouth nightly as needed for sleep      leuprolide acetate (LUPRON) 1 MG/0.2ML kit Inject Subcutaneous every 6 months He gets this at [facility]. Last given on 4/2019.      loratadine (CLARITIN) 10 MG tablet Take 1 tablet by mouth daily. 30 tablet 1    losartan (COZAAR) 50 MG tablet Take 1 tablet (50 mg) by mouth daily 90 tablet 3    melatonin 5 MG tablet Take 5 mg by mouth nightly as needed for sleep       metoprolol succinate ER (TOPROL XL) 25 MG 24 hr tablet Take 1 tablet (25 mg) by mouth daily 90 tablet 3    Multiple Vitamins-Minerals (PRESERVISION AREDS 2 PO) Take 1 capsule by mouth 2 times daily AREDS 2      order for DME Equipment being ordered: [employer] () and Walker ()  Treatment Diagnosis: Impaired gait 1 each 0    vitamin D3 (CHOLECALCIFEROL) 50 mcg (2000 units) tablet Take 1 tablet by mouth daily      sildenafil (VIAGRA) 100 MG tablet Take 1 tablet (100 mg) by mouth daily as needed (intercourse) (Patient not taking: Reported on 6/30/2023) 10 tablet 11       ALLERGIES     Allergies   Allergen Reactions    Other [Seasonal Allergies]        PAST MEDICAL HISTORY:  Past Medical History:   Diagnosis Date    Anal fistula     Antiplatelet or antithrombotic long-term use     Arrhythmia     Atrial flutter (H) 2012    Cerebral infarction (H)     TIA    Heartburn     Hypertension     Inguinal hernia, left     Persistent  atrial fibrillation (H) 12    Prostate cancer (H)     TIA (transient ischaemic attack)            PAST SURGICAL HISTORY:  Past Surgical History:   Procedure Laterality Date    COLONOSCOPY      COLONOSCOPY N/A 2021    Procedure: Colonoscopy, With Polypectomy And Biopsy;  Surgeon: Jordin Rachel MD;  Location:  GI    ENT SURGERY      tonsllectomy    EXAM UNDER ANESTHESIA, FISTULOTOMY RECTUM, COMBINED N/A 2015    Procedure: COMBINED EXAM UNDER ANESTHESIA, FISTULOTOMY RECTUM;  Surgeon: Candice Carty MD;  Location: [facility]    GENITOURINARY SURGERY      PROSTATECTOMY    GI SURGERY      hernia repair x 2    HERNIORRHAPHY INGUINAL  2013    Procedure: HERNIORRHAPHY INGUINAL;  LEFT INGUINAL HERNIA REPAIR WITH MESH;  Surgeon: Filipe Fairchild MD;  Location: [facility]    HERNIORRHAPHY INGUINAL Right 2019    Procedure: OPEN RIGHT INGUINA HERNIA REPAIR WITH MESH;  Surgeon: Filipe Fairchild MD;  Location:  OR    ORTHOPEDIC SURGERY      WRIST SURGERY - LEFT       FAMILY HISTORY:  Family History   Problem Relation Age of Onset    Ovarian Cancer Mother     Osteoporosis Father     Unknown/Adopted Sister        SOCIAL HISTORY:  Social History     Socioeconomic History    Marital status:      Spouse name: None    Number of children: None    Years of education: None    Highest education level: None   Tobacco Use    Smoking status: Former     Years: 16.00     Types: Cigarettes     Quit date: 1968     Years since quittin.5    Smokeless tobacco: Never   Substance and Sexual Activity    Alcohol use: Yes     Alcohol/week: 0.8 standard drinks of alcohol     Types: 1 Cans of beer per week     Comment: 1 beer a week    Drug use: No    Sexual activity: Not Currently     Partners: Female   Other Topics Concern    Caffeine Concern No     Comment: occ tea    Sleep Concern No    Stress Concern No    Weight Concern No    Special Diet No    Exercise Yes     Comment: walking 3 miles a day  "   Seat Belt Yes       Review of Systems:  Skin:          Eyes:         ENT:         Respiratory:  Positive for shortness of breath;cough SOB upon exertion   Cardiovascular:  Negative for;palpitations;chest pain;edema;fatigue;syncope or near-syncope Positive for;dizziness    Gastroenterology:        Genitourinary:         Musculoskeletal:         Neurologic:         Psychiatric:         Heme/Lymph/Imm:         Endocrine:  Negative        Physical Exam:  Vitals: /84   Pulse 68   Ht 1.778 m (5' 10\")   Wt 77.1 kg (170 lb)   SpO2 96%   BMI 24.39 kg/m      Constitutional:  cooperative, alert and oriented, well developed, well nourished, in no acute distress        Skin:  warm and dry to the touch, no apparent skin lesions or masses noted          Head:  normocephalic, no masses or lesions        Eyes:  pupils equal and round   wears glasses    Lymph:      ENT:  no pallor or cyanosis, dentition good        Neck:  carotid pulses are full and equal bilaterally, JVP normal, no carotid bruit        Respiratory:  normal breath sounds, clear to auscultation, normal A-P diameter, normal symmetry, normal respiratory excursion, no use of accessory muscles         Cardiac: no murmurs, gallops or rubs detected irregularly irregular rhythm              not assessed this visit                                        GI:  abdomen soft        Extremities and Muscular Skeletal:  no deformities, clubbing, cyanosis, erythema observed;no edema         compression stockings on    Neurological:  no gross motor deficits        Psych:  Alert and Oriented x 3        CC  Elvi Alfaro, APRN CNP  [address]  [place] [address]        Thank you for allowing me to participate in the care of your patient.      Sincerely,     Elvi Alfaro, NP, APRN CNP     [facility]  "

## 2023-06-30 NOTE — PATIENT INSTRUCTIONS
Call my nurse with any questions or concerns:  [phone]  *If you have concerns after hours, please call [phone], option 2 to speak with on call Cardiologist.    No changes  Lab work (fasting)

## 2023-06-30 NOTE — PROGRESS NOTES
HPI:  Mr. Murry is a delightful, 87-year-old gentleman well known to me here at the Heart Clinic.  He is an established patient of Dr. Murphy.       His past medical history includes:  1.  Permanent AFib, essentially asymptomatic.  Treated with rate control, low-dose metoprolol and Eliquis therapy.  His CHADS-VASc score is 5.      2.  Hypertension.      3.  Basal ganglia stroke in 2019.  Evidence of additional prior strokes by MRI scan.  He was off anticoagulation at that time.  He does have some balance issues with falls 2-3 times a year.     4.  History of prostate cancer.     5.  Abnormal cardiac MRI demonstrated a subendocardial scar in the circumflex distribution in 2019.  A nuclear scan showed no ischemia.     6.  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  The patient also had mild to moderate MR.    At today's visit Héctor is doing well.  Denies chest pain, shortness of breath, lightheadedness, dizziness, lower extremity edema.  He does get exertional shortness of breath which is not new.  He plans on leaving back for [place] in the next week or 2.    He did have an ED visit 3/25/2023.  I do not have all the data, but the patient was told that he should have follow-up lab work.  He states that he was having epigastric discomfort and had an emesis before presenting to the ED.  He was feeling better, but his friend insisted that he be evaluated.  He states that the majority of the test were unremarkable.    Diagnostics:  Twelve-lead EKG shows atrial fibrillation with controlled ventricular rate at 61 bpm.  Single PVC is noted.    Echocardiogram 9/26/2022 showed EF of 55 to 60%.  Moderate RV dysfunction was noted.  1-2+ MR.  The RV dysfunction and MR were new when compared to the previous echo.    Component      Latest Ref Rng 9/5/2022  10:54 PM 10/3/2022  9:48 AM   WBC      4.0 - 11.0 10e3/uL 8.8     RBC Count      4.40 - 5.90 10e6/uL 5.53     Hemoglobin      13.3 - 17.7 g/dL 16.7     Hematocrit       40.0 - 53.0 % 51.7     MCV      78 - 100 fL 94     MCH      26.5 - 33.0 pg 30.2     MCHC      31.5 - 36.5 g/dL 32.3     RDW      10.0 - 15.0 % 13.8     Platelet Count      150 - 450 10e3/uL 275     % Neutrophils      % 71     % Lymphocytes      % 19     % Monocytes      % 8     % Eosinophils      % 1     % Basophils      % 0     % Immature Granulocytes      % 1     NRBCs per 100 WBC      <1 /100 0     Absolute Neutrophils      1.6 - 8.3 10e3/uL 6.4     Absolute Lymphocytes      0.8 - 5.3 10e3/uL 1.6     Absolute Monocytes      0.0 - 1.3 10e3/uL 0.7     Absolute Eosinophils      0.0 - 0.7 10e3/uL 0.1     Absolute Basophils      0.0 - 0.2 10e3/uL 0.0     Absolute Immature Granulocytes      <=0.4 10e3/uL 0.0     Absolute NRBCs      10e3/uL 0.0     Sodium      133 - 144 mmol/L 136     Potassium      3.4 - 5.3 mmol/L 4.6     Chloride      94 - 109 mmol/L 102     Carbon Dioxide      20 - 32 mmol/L 30     Anion Gap      3 - 14 mmol/L 4     Urea Nitrogen      7 - 30 mg/dL 21     Creatinine      0.66 - 1.25 mg/dL 0.90     Calcium      8.5 - 10.1 mg/dL 9.6     Glucose      70 - 99 mg/dL 117 (H)     GFR Estimate      >60 mL/min/1.73m2 84     Cholesterol      <200 mg/dL  112    Triglycerides      <150 mg/dL  99    HDL Cholesterol      >=40 mg/dL  46    LDL Cholesterol Calculated      <=100 mg/dL  46    Non HDL Cholesterol      <130 mg/dL  66    Patient Fasting?  Yes       Plan:     1.  Permanent atrial fibrillation.    He is asymptomatic.  He is doing well on low-dose metoprolol.  His heart rate today is in the 60s.  He is on apixaban at 5 mg twice daily.  He has not had any further neurological issues or stroke since being on blood thinner.    He does have an unsteady gait and uses a cane.  I did give him a parking permit to help minimize risk for falls which she states has been helpful.     Dr. Murphy has also recommended that he should be bridged with Lovenox since his history of previous stroke off anticoagulation for  elective procedures, such as a colonoscopy.     2.  Hypertension, currently normotensive.  On amlodipine 5 mg daily, losartan 50 mg daily, metoprolol ER 25 mg daily.  Patient would benefit from spironolactone if blood pressures start to climb.     3.  RV dysfunction.  Moderate MR  This is new on echo; however, his last echocardiogram was in 2019.  RV function was normal at that time.  RVSP is normal.  He does have 1 to 2+ mitral regurgitation.    I asked the patient that he continue to be mindful of his diet, watching his weight closely and to let us know if his shortness of breath increases.  I will repeat an echo next year.    4.  History of CVA (basal ganglia stroke) 2019  Evidence of additional strokes noted on MRI.  He was off anticoagulation at the time of the strokes.  Recommend anticoagulation indefinitely.    Lab work will be completed next week.  I have ordered a lipid ALT, CMP.  Thank you for including us in his care.    Elvi Alfaro, NP, APRN CNP        Today's clinic visit entailed:  Review of the result(s) of each unique test - EKG  Ordering of each unique test  No LOS data to display   Time spent by me doing chart review, history and exam, documentation and further activities per the note  Provider  Link to [facility] Help Grid     The level of medical decision making during this visit was of moderate complexity.      Orders Placed This Encounter   Procedures     Lipid Profile     ALT     CBC with platelets     Comprehensive metabolic panel     EKG 12-lead complete w/read - Clinics (performed today)       No orders of the defined types were placed in this encounter.      There are no discontinued medications.      Encounter Diagnoses   Name Primary?     Permanent atrial fibrillation (H)      Hyperlipidemia LDL goal <100 Yes       CURRENT MEDICATIONS:  Current Outpatient Medications   Medication Sig Dispense Refill     amLODIPine (NORVASC) 5 MG tablet Take 1 tablet (5 mg) by mouth daily 90 tablet 3      apixaban ANTICOAGULANT (ELIQUIS) 5 MG tablet Take 1 tablet (5 mg) by mouth 2 times daily 180 tablet 3     atorvastatin (LIPITOR) 10 MG tablet Take 1 tablet (10 mg) by mouth every evening 90 tablet 3     calcium carbonate (TUMS) 500 MG chewable tablet Take 1 chew tab by mouth nightly as needed        diphenhydrAMINE-acetaminophen (TYLENOL PM)  MG tablet Take 2 tablets by mouth nightly as needed for sleep       leuprolide acetate (LUPRON) 1 MG/0.2ML kit Inject Subcutaneous every 6 months He gets this at [facility]. Last given on 4/2019.       loratadine (CLARITIN) 10 MG tablet Take 1 tablet by mouth daily. 30 tablet 1     losartan (COZAAR) 50 MG tablet Take 1 tablet (50 mg) by mouth daily 90 tablet 3     melatonin 5 MG tablet Take 5 mg by mouth nightly as needed for sleep        metoprolol succinate ER (TOPROL XL) 25 MG 24 hr tablet Take 1 tablet (25 mg) by mouth daily 90 tablet 3     Multiple Vitamins-Minerals (PRESERVISION AREDS 2 PO) Take 1 capsule by mouth 2 times daily AREDS 2       order for DME Equipment being ordered: [employer] () and Walker ()  Treatment Diagnosis: Impaired gait 1 each 0     vitamin D3 (CHOLECALCIFEROL) 50 mcg (2000 units) tablet Take 1 tablet by mouth daily       sildenafil (VIAGRA) 100 MG tablet Take 1 tablet (100 mg) by mouth daily as needed (intercourse) (Patient not taking: Reported on 6/30/2023) 10 tablet 11       ALLERGIES     Allergies   Allergen Reactions     Other [Seasonal Allergies]        PAST MEDICAL HISTORY:  Past Medical History:   Diagnosis Date     Anal fistula      Antiplatelet or antithrombotic long-term use      Arrhythmia      Atrial flutter (H) 2012     Cerebral infarction (H)     TIA     Heartburn      Hypertension      Inguinal hernia, left      Persistent atrial fibrillation (H) 8/21/12     Prostate cancer (H)      TIA (transient ischaemic attack)     2014       PAST SURGICAL HISTORY:  Past Surgical History:   Procedure Laterality Date      COLONOSCOPY       COLONOSCOPY N/A 2021    Procedure: Colonoscopy, With Polypectomy And Biopsy;  Surgeon: Jordin Rachel MD;  Location:  GI     ENT SURGERY      tonsllectomy     EXAM UNDER ANESTHESIA, FISTULOTOMY RECTUM, COMBINED N/A 2015    Procedure: COMBINED EXAM UNDER ANESTHESIA, FISTULOTOMY RECTUM;  Surgeon: Candice Carty MD;  Location: [facility]     GENITOURINARY SURGERY      PROSTATECTOMY     GI SURGERY      hernia repair x 2     HERNIORRHAPHY INGUINAL  2013    Procedure: HERNIORRHAPHY INGUINAL;  LEFT INGUINAL HERNIA REPAIR WITH MESH;  Surgeon: Filipe Fairchild MD;  Location: [facility]     HERNIORRHAPHY INGUINAL Right 2019    Procedure: OPEN RIGHT INGUINA HERNIA REPAIR WITH MESH;  Surgeon: Filipe Fairchild MD;  Location:  OR     ORTHOPEDIC SURGERY      WRIST SURGERY - LEFT       FAMILY HISTORY:  Family History   Problem Relation Age of Onset     Ovarian Cancer Mother      Osteoporosis Father      Unknown/Adopted Sister        SOCIAL HISTORY:  Social History     Socioeconomic History     Marital status:      Spouse name: None     Number of children: None     Years of education: None     Highest education level: None   Tobacco Use     Smoking status: Former     Years: 16.00     Types: Cigarettes     Quit date: 1968     Years since quittin.5     Smokeless tobacco: Never   Substance and Sexual Activity     Alcohol use: Yes     Alcohol/week: 0.8 standard drinks of alcohol     Types: 1 Cans of beer per week     Comment: 1 beer a week     Drug use: No     Sexual activity: Not Currently     Partners: Female   Other Topics Concern     Caffeine Concern No     Comment: occ tea     Sleep Concern No     Stress Concern No     Weight Concern No     Special Diet No     Exercise Yes     Comment: walking 3 miles a day     Seat Belt Yes       Review of Systems:  Skin:          Eyes:         ENT:         Respiratory:  Positive for shortness of breath;cough SOB upon exertion  "  Cardiovascular:  Negative for;palpitations;chest pain;edema;fatigue;syncope or near-syncope Positive for;dizziness    Gastroenterology:        Genitourinary:         Musculoskeletal:         Neurologic:         Psychiatric:         Heme/Lymph/Imm:         Endocrine:  Negative        Physical Exam:  Vitals: /84   Pulse 68   Ht 1.778 m (5' 10\")   Wt 77.1 kg (170 lb)   SpO2 96%   BMI 24.39 kg/m      Constitutional:  cooperative, alert and oriented, well developed, well nourished, in no acute distress        Skin:  warm and dry to the touch, no apparent skin lesions or masses noted          Head:  normocephalic, no masses or lesions        Eyes:  pupils equal and round   wears glasses    Lymph:      ENT:  no pallor or cyanosis, dentition good        Neck:  carotid pulses are full and equal bilaterally, JVP normal, no carotid bruit        Respiratory:  normal breath sounds, clear to auscultation, normal A-P diameter, normal symmetry, normal respiratory excursion, no use of accessory muscles         Cardiac: no murmurs, gallops or rubs detected irregularly irregular rhythm              not assessed this visit                                        GI:  abdomen soft        Extremities and Muscular Skeletal:  no deformities, clubbing, cyanosis, erythema observed;no edema         compression stockings on    Neurological:  no gross motor deficits        Psych:  Alert and Oriented x 3        CC  Elvi Alfaro, APRN CNP  [address]  [place] [id number]              "

## 2023-07-07 ENCOUNTER — LAB (OUTPATIENT)
Dept: LAB | Facility: CLINIC | Age: 88
End: 2023-07-07
Payer: MEDICARE

## 2023-07-07 DIAGNOSIS — E78.5 HYPERLIPIDEMIA LDL GOAL <100: ICD-10-CM

## 2023-07-07 DIAGNOSIS — I48.21 PERMANENT ATRIAL FIBRILLATION (H): ICD-10-CM

## 2023-07-07 LAB
ALBUMIN SERPL BCG-MCNC: 3.9 G/DL (ref 3.5–5.2)
ALP SERPL-CCNC: 108 U/L (ref 40–129)
ALT SERPL W P-5'-P-CCNC: 10 U/L (ref 0–70)
ANION GAP SERPL CALCULATED.3IONS-SCNC: 12 MMOL/L (ref 7–15)
AST SERPL W P-5'-P-CCNC: 19 U/L (ref 0–45)
BILIRUB SERPL-MCNC: 0.7 MG/DL
BUN SERPL-MCNC: 19.8 MG/DL (ref 8–23)
CALCIUM SERPL-MCNC: 9.8 MG/DL (ref 8.8–10.2)
CHLORIDE SERPL-SCNC: 105 MMOL/L (ref 98–107)
CHOLEST SERPL-MCNC: 127 MG/DL
CREAT SERPL-MCNC: 0.92 MG/DL (ref 0.67–1.17)
DEPRECATED HCO3 PLAS-SCNC: 25 MMOL/L (ref 22–29)
ERYTHROCYTE [DISTWIDTH] IN BLOOD BY AUTOMATED COUNT: 14.6 % (ref 10–15)
GFR SERPL CREATININE-BSD FRML MDRD: 81 ML/MIN/1.73M2
GLUCOSE SERPL-MCNC: 67 MG/DL (ref 70–99)
HCT VFR BLD AUTO: 52.9 % (ref 40–53)
HDLC SERPL-MCNC: 40 MG/DL
HGB BLD-MCNC: 16.8 G/DL (ref 13.3–17.7)
LDLC SERPL CALC-MCNC: 67 MG/DL
MCH RBC QN AUTO: 30.7 PG (ref 26.5–33)
MCHC RBC AUTO-ENTMCNC: 31.8 G/DL (ref 31.5–36.5)
MCV RBC AUTO: 97 FL (ref 78–100)
NONHDLC SERPL-MCNC: 87 MG/DL
PLATELET # BLD AUTO: 216 10E3/UL (ref 150–450)
POTASSIUM SERPL-SCNC: 4.3 MMOL/L (ref 3.4–5.3)
PROT SERPL-MCNC: 7.7 G/DL (ref 6.4–8.3)
RBC # BLD AUTO: 5.48 10E6/UL (ref 4.4–5.9)
SODIUM SERPL-SCNC: 142 MMOL/L (ref 136–145)
TRIGL SERPL-MCNC: 99 MG/DL
WBC # BLD AUTO: 5.9 10E3/UL (ref 4–11)

## 2023-07-07 PROCEDURE — 36415 COLL VENOUS BLD VENIPUNCTURE: CPT | Performed by: NURSE PRACTITIONER

## 2023-07-07 PROCEDURE — 80061 LIPID PANEL: CPT | Performed by: NURSE PRACTITIONER

## 2023-07-07 PROCEDURE — 85027 COMPLETE CBC AUTOMATED: CPT | Performed by: NURSE PRACTITIONER

## 2023-07-07 PROCEDURE — 80053 COMPREHEN METABOLIC PANEL: CPT | Performed by: NURSE PRACTITIONER

## 2023-09-13 ENCOUNTER — TELEPHONE (OUTPATIENT)
Dept: CARDIOLOGY | Facility: CLINIC | Age: 88
End: 2023-09-13
Payer: MEDICARE

## 2023-09-13 DIAGNOSIS — I48.21 PERMANENT ATRIAL FIBRILLATION (H): ICD-10-CM

## 2023-09-13 DIAGNOSIS — I63.9 INFARCTION OF RIGHT BASAL GANGLIA (H): ICD-10-CM

## 2023-09-13 NOTE — TELEPHONE ENCOUNTER
9/13/23 Pt called requesting a 90 day refill be sent to [facility] in [employer] [facility] . He is in [place] until Saturday and need to  before leaving.  [facility] refill guide reviewed   Dustin 9 am

## 2023-09-27 ENCOUNTER — TRANSFERRED RECORDS (OUTPATIENT)
Dept: HEALTH INFORMATION MANAGEMENT | Facility: CLINIC | Age: 88
End: 2023-09-27
Payer: MEDICARE

## 2023-11-01 DIAGNOSIS — I48.21 PERMANENT ATRIAL FIBRILLATION (H): ICD-10-CM

## 2023-11-01 DIAGNOSIS — I10 ESSENTIAL HYPERTENSION, BENIGN: ICD-10-CM

## 2023-11-01 DIAGNOSIS — I63.9 INFARCTION OF RIGHT BASAL GANGLIA (H): ICD-10-CM

## 2023-11-01 RX ORDER — AMLODIPINE BESYLATE 5 MG/1
5 TABLET ORAL DAILY
Qty: 90 TABLET | Refills: 1 | Status: SHIPPED | OUTPATIENT
Start: 2023-11-01 | End: 2024-05-06

## 2023-11-01 RX ORDER — LOSARTAN POTASSIUM 50 MG/1
50 TABLET ORAL DAILY
Qty: 90 TABLET | Refills: 1 | Status: SHIPPED | OUTPATIENT
Start: 2023-11-01 | End: 2024-05-06

## 2023-11-01 RX ORDER — METOPROLOL SUCCINATE 25 MG/1
25 TABLET, EXTENDED RELEASE ORAL DAILY
Qty: 90 TABLET | Refills: 1 | Status: SHIPPED | OUTPATIENT
Start: 2023-11-01 | End: 2024-05-06

## 2023-11-01 RX ORDER — ATORVASTATIN CALCIUM 10 MG/1
10 TABLET, FILM COATED ORAL EVERY EVENING
Qty: 90 TABLET | Refills: 1 | Status: SHIPPED | OUTPATIENT
Start: 2023-11-01 | End: 2024-05-06

## 2023-12-02 NOTE — PROVIDER NOTIFICATION
Pts  for the last half hour, was up with PT but has not come down after half hour of rest. Notified Dr. Chand, order received for PRN metoprolol.    Admitted

## 2024-02-05 DIAGNOSIS — I63.9 INFARCTION OF RIGHT BASAL GANGLIA (H): ICD-10-CM

## 2024-02-05 DIAGNOSIS — I48.21 PERMANENT ATRIAL FIBRILLATION (H): ICD-10-CM

## 2024-05-06 DIAGNOSIS — I10 ESSENTIAL HYPERTENSION, BENIGN: ICD-10-CM

## 2024-05-06 DIAGNOSIS — I63.9 INFARCTION OF RIGHT BASAL GANGLIA (H): ICD-10-CM

## 2024-05-06 DIAGNOSIS — I48.21 PERMANENT ATRIAL FIBRILLATION (H): ICD-10-CM

## 2024-05-06 RX ORDER — AMLODIPINE BESYLATE 5 MG/1
5 TABLET ORAL DAILY
Qty: 90 TABLET | Refills: 0 | Status: SHIPPED | OUTPATIENT
Start: 2024-05-06 | End: 2024-08-02

## 2024-05-06 RX ORDER — LOSARTAN POTASSIUM 50 MG/1
50 TABLET ORAL DAILY
Qty: 90 TABLET | Refills: 0 | Status: SHIPPED | OUTPATIENT
Start: 2024-05-06 | End: 2024-08-02

## 2024-05-06 RX ORDER — ATORVASTATIN CALCIUM 10 MG/1
10 TABLET, FILM COATED ORAL EVERY EVENING
Qty: 90 TABLET | Refills: 0 | Status: SHIPPED | OUTPATIENT
Start: 2024-05-06 | End: 2024-08-02

## 2024-05-06 RX ORDER — METOPROLOL SUCCINATE 25 MG/1
25 TABLET, EXTENDED RELEASE ORAL DAILY
Qty: 90 TABLET | Refills: 0 | Status: SHIPPED | OUTPATIENT
Start: 2024-05-06 | End: 2024-08-02

## 2024-05-23 ENCOUNTER — TRANSFERRED RECORDS (OUTPATIENT)
Dept: HEALTH INFORMATION MANAGEMENT | Facility: CLINIC | Age: 89
End: 2024-05-23
Payer: MEDICARE

## 2024-07-02 ENCOUNTER — HOSPITAL ENCOUNTER (OUTPATIENT)
Dept: CARDIOLOGY | Facility: CLINIC | Age: 89
Discharge: HOME | End: 2024-07-02
Attending: NURSE PRACTITIONER | Admitting: NURSE PRACTITIONER
Payer: MEDICARE

## 2024-07-02 DIAGNOSIS — I50.810 RVF (RIGHT VENTRICULAR FAILURE) (H): ICD-10-CM

## 2024-07-02 DIAGNOSIS — I34.0 MITRAL VALVE INSUFFICIENCY, UNSPECIFIED ETIOLOGY: ICD-10-CM

## 2024-07-02 DIAGNOSIS — I48.21 PERMANENT ATRIAL FIBRILLATION (H): ICD-10-CM

## 2024-07-02 LAB — LVEF ECHO: NORMAL

## 2024-07-02 PROCEDURE — 93306 TTE W/DOPPLER COMPLETE: CPT | Mod: 26 | Performed by: INTERNAL MEDICINE

## 2024-07-02 PROCEDURE — 93306 TTE W/DOPPLER COMPLETE: CPT

## 2024-08-02 ENCOUNTER — OFFICE VISIT (OUTPATIENT)
Dept: CARDIOLOGY | Facility: CLINIC | Age: 89
End: 2024-08-02
Payer: MEDICARE

## 2024-08-02 VITALS
WEIGHT: 173 LBS | HEIGHT: 70 IN | DIASTOLIC BLOOD PRESSURE: 82 MMHG | BODY MASS INDEX: 24.77 KG/M2 | HEART RATE: 60 BPM | SYSTOLIC BLOOD PRESSURE: 134 MMHG | OXYGEN SATURATION: 97 %

## 2024-08-02 DIAGNOSIS — I10 ESSENTIAL HYPERTENSION, BENIGN: ICD-10-CM

## 2024-08-02 DIAGNOSIS — I48.21 PERMANENT ATRIAL FIBRILLATION (H): Primary | ICD-10-CM

## 2024-08-02 PROCEDURE — 93000 ELECTROCARDIOGRAM COMPLETE: CPT | Performed by: NURSE PRACTITIONER

## 2024-08-02 PROCEDURE — 99213 OFFICE O/P EST LOW 20 MIN: CPT | Performed by: NURSE PRACTITIONER

## 2024-08-02 RX ORDER — LOSARTAN POTASSIUM 50 MG/1
50 TABLET ORAL DAILY
Qty: 90 TABLET | Refills: 3 | Status: SHIPPED | OUTPATIENT
Start: 2024-08-02

## 2024-08-02 RX ORDER — ATORVASTATIN CALCIUM 10 MG/1
10 TABLET, FILM COATED ORAL EVERY EVENING
Qty: 90 TABLET | Refills: 3 | Status: SHIPPED | OUTPATIENT
Start: 2024-08-02

## 2024-08-02 RX ORDER — METOPROLOL SUCCINATE 25 MG/1
25 TABLET, EXTENDED RELEASE ORAL DAILY
Qty: 90 TABLET | Refills: 3 | Status: SHIPPED | OUTPATIENT
Start: 2024-08-02

## 2024-08-02 RX ORDER — AMLODIPINE BESYLATE 5 MG/1
5 TABLET ORAL DAILY
Qty: 90 TABLET | Refills: 3 | Status: SHIPPED | OUTPATIENT
Start: 2024-08-02

## 2024-08-02 NOTE — PATIENT INSTRUCTIONS
Call my nurse with any questions or concerns:  [phone]  *If you have concerns after hours, please call [phone], option 2 to speak with on call Cardiologist.    No changes  See PCP and discuss PT for unsteady gait  Call with any concerns

## 2024-08-02 NOTE — LETTER
8/2/2024    Melissa Torres PA-C  [address]  [place] [address]    RE: Yosef Murry       Dear Colleague,     I had the pleasure of seeing Yosef Murry in the [facility].  Cardiology Clinic Progress Note  Yosef Murry MRN# [id number]   YOB: 1935 Age: 88 year old       HPI:    Yosef Murry is a delightful 88 year old patient with past medical history significant for:    Permanent AFib, essentially asymptomatic.  Treated with rate control, low-dose metoprolol and Eliquis therapy.  His CHADS-VASc score is 5.    Hypertension.    Basal ganglia stroke in 2019.  Evidence of additional prior strokes by MRI scan.  He was off anticoagulation at that time.  He does have some balance issues with falls 2-3 times a year.   History of prostate cancer.   Abnormal cardiac MRI demonstrated a subendocardial scar in the circumflex distribution in 2019.  A nuclear scan showed no ischemia.   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  The patient also had mild to moderate MR 2022.  Echocardiogram 7/2024 shows normal RV function, and no significant valvular disease.    It is my pleasure seeing Héctor today in follow-up.  He is an established patient of .  I have known him for many years.  From a cardiac standpoint he remains stable.  Denies chest pain, orthopnea, lightheadedness, dizziness, lower extremity edema.  His gait appeared unsteady.  He is ambulating with a cane.  Admits that he does feel like his gait has worsened since his stroke.  He initially did physical therapy, but states he has not done that for several years.    Follow-up echocardiogram showed an improvement of his EF.  No significant valvular abnormality was noted.  EKG shows A-fib with controlled ventricular rate at 63 bpm.  He is here today for follow-up.      Diagnotic studies:  Atrial fibrillation CVR, 63 bpm  Echocardiogram 7/2024: EF 60 to 65%.  RV function normal.  Severe biatrial  enlargement.  No significant valvular abnormality noted.  Echocardiogram 9/26/2022 showed EF of 55 to 60%. Moderate RV dysfunction was noted. 1-2+ MR. The RV dysfunction and MR were new when compared to the previous echo.           Latest Reference Range & Units 07/07/23 07:44   Sodium 136 - 145 mmol/L 142   Potassium 3.4 - 5.3 mmol/L 4.3   Chloride 98 - 107 mmol/L 105   Carbon Dioxide (CO2) 22 - 29 mmol/L 25   Urea Nitrogen 8.0 - 23.0 mg/dL 19.8   Creatinine 0.67 - 1.17 mg/dL 0.92   GFR Estimate >60 mL/min/1.73m2 81   Calcium 8.8 - 10.2 mg/dL 9.8   Anion Gap 7 - 15 mmol/L 12   Albumin 3.5 - 5.2 g/dL 3.9   Protein Total 6.4 - 8.3 g/dL 7.7   Alkaline Phosphatase 40 - 129 U/L 108   ALT 0 - 70 U/L 10   AST 0 - 45 U/L 19   Bilirubin Total <=1.2 mg/dL 0.7   Cholesterol <200 mg/dL 127         Plan:   Permanent atrial fibrillation with controlled ventricular rate.  Stable on metoprolol ER 25 mg daily.  [id number] score 5 (age, HTN, CVA) Eliquis indefinitely.  Currently at 5 mg twice daily.  Appropriate dosing for weight and kidney function.  Hypertension stable on losartan 50 mg daily, metoprolol ER 25 mg daily, amlodipine 5 mg daily  History of basal ganglia stroke in 2019.  Patient was off anticoagulation at the time of his strokes.  Again we recommend anticoagulation indefinitely.  Patient is having a Mohs procedure on his nose.  They recommended holding 2 doses, night before and morning of.  I will discuss this with  to make sure he is fine with it.  However, if he were to need a procedure such as a colonoscopy he would need Lovenox bridging.  Unsteady gait.  I discussed with him about considering physical therapy.  He will discuss it with his PCP at his annual visit this fall.    Thank you for including us in his care.  I will see him back in 1 year or sooner with any questions or concerns.  Elvi Alfaro, NP, APRN CNP      Today's clinic visit entailed:  Review of the result(s) of each unique test -  EKG  Prescription drug management  20  minutes spent by me on the date of the encounter doing chart review, review of test results, patient visit, and documentation   Provider  Link to MDM Help Grid     The level of medical decision making during this visit was of moderate complexity.      Orders Placed This Encounter   Procedures     EKG 12-lead complete w/read - Clinics (performed today)     No orders of the defined types were placed in this encounter.    There are no discontinued medications.      Encounter Diagnoses   Name Primary?     Permanent atrial fibrillation (H) Yes     Essential hypertension, benign      Infarction of right basal ganglia (H)        CURRENT MEDICATIONS:  Current Outpatient Medications   Medication Sig Dispense Refill     amLODIPine (NORVASC) 5 MG tablet Take 1 tablet (5 mg) by mouth daily 90 tablet 0     apixaban ANTICOAGULANT (ELIQUIS) 5 MG tablet Take 1 tablet (5 mg) by mouth 2 times daily 180 tablet 1     atorvastatin (LIPITOR) 10 MG tablet Take 1 tablet (10 mg) by mouth every evening 90 tablet 0     calcium carbonate (TUMS) 500 MG chewable tablet Take 1 chew tab by mouth nightly as needed        diphenhydrAMINE-acetaminophen (TYLENOL PM)  MG tablet Take 2 tablets by mouth nightly as needed for sleep       leuprolide acetate (LUPRON) 1 MG/0.2ML kit Inject Subcutaneous every 6 months He gets this at [facility]. Last given on 4/2019.       loratadine (CLARITIN) 10 MG tablet Take 1 tablet by mouth daily. 30 tablet 1     losartan (COZAAR) 50 MG tablet Take 1 tablet (50 mg) by mouth daily 90 tablet 0     melatonin 5 MG tablet Take 5 mg by mouth nightly as needed for sleep        metoprolol succinate ER (TOPROL XL) 25 MG 24 hr tablet Take 1 tablet (25 mg) by mouth daily 90 tablet 0     Multiple Vitamins-Minerals (PRESERVISION AREDS 2 PO) Take 1 capsule by mouth 2 times daily AREDS 2       order for DME Equipment being ordered: Walker Wheels () and Walker ()  Treatment  Diagnosis: Impaired gait 1 each 0     vitamin D3 (CHOLECALCIFEROL) 50 mcg (2000 units) tablet Take 1 tablet by mouth daily       sildenafil (VIAGRA) 100 MG tablet Take 1 tablet (100 mg) by mouth daily as needed (intercourse) (Patient not taking: Reported on 6/30/2023) 10 tablet 11       ALLERGIES     Allergies   Allergen Reactions     Other [Seasonal Allergies]        PAST MEDICAL HISTORY:  Past Medical History:   Diagnosis Date     Anal fistula      Antiplatelet or antithrombotic long-term use      Arrhythmia      Atrial flutter (H) 2012     Cerebral infarction (H)     TIA     Heartburn      Hypertension      Inguinal hernia, left      Persistent atrial fibrillation (H) 8/21/12     Prostate cancer (H)      TIA (transient ischaemic attack)     2014       PAST SURGICAL HISTORY:  Past Surgical History:   Procedure Laterality Date     COLONOSCOPY       COLONOSCOPY N/A 9/24/2021    Procedure: Colonoscopy, With Polypectomy And Biopsy;  Surgeon: Jordin Rachel MD;  Location:  GI     ENT SURGERY      tonsllectomy     EXAM UNDER ANESTHESIA, FISTULOTOMY RECTUM, COMBINED N/A 6/18/2015    Procedure: COMBINED EXAM UNDER ANESTHESIA, FISTULOTOMY RECTUM;  Surgeon: Candice Carty MD;  Location: [facility]     GENITOURINARY SURGERY  1999    PROSTATECTOMY     GI SURGERY      hernia repair x 2     HERNIORRHAPHY INGUINAL  7/25/2013    Procedure: HERNIORRHAPHY INGUINAL;  LEFT INGUINAL HERNIA REPAIR WITH MESH;  Surgeon: Filipe Fairchild MD;  Location: [facility]     HERNIORRHAPHY INGUINAL Right 6/13/2019    Procedure: OPEN RIGHT INGUINA HERNIA REPAIR WITH MESH;  Surgeon: Filipe Fairchild MD;  Location:  OR     ORTHOPEDIC SURGERY      WRIST SURGERY - LEFT       FAMILY HISTORY:  Family History   Problem Relation Age of Onset     Ovarian Cancer Mother      Osteoporosis Father      Unknown/Adopted Sister        SOCIAL HISTORY:  Social History     Socioeconomic History     Marital status:      Spouse name: None     Number of  children: None     Years of education: None     Highest education level: None   Tobacco Use     Smoking status: Former     Current packs/day: 0.00     Types: Cigarettes     Start date: 1952     Quit date: 1968     Years since quittin.6     Smokeless tobacco: Never   Substance and Sexual Activity     Alcohol use: Yes     Alcohol/week: 0.8 standard drinks of alcohol     Types: 1 Cans of beer per week     Comment: 1 beer a week     Drug use: No     Sexual activity: Not Currently     Partners: Female   Other Topics Concern     Caffeine Concern No     Comment: occ tea     Sleep Concern No     Stress Concern No     Weight Concern No     Special Diet No     Exercise Yes     Comment: walking 3 miles a day     Seat Belt Yes     Social Determinants of Health     Financial Resource Strain: Low Risk  (2020)    Received from [facility]    Overall Financial Resource Strain (CARDIA)      Difficulty of Paying Living Expenses: Not hard at all   Food Insecurity: No Food Insecurity (2020)    Received from [facility]    Hunger Vital Sign      Worried About Running Out of Food in the Last Year: Never true      Ran Out of Food in the Last Year: Never true   Transportation Needs: No Transportation Needs (2020)    Received from [facility]    PRAPARE - Transportation      Lack of Transportation (Medical): No      Lack of Transportation (Non-Medical): No   Physical Activity: Sufficiently Active (2020)    Received from [facility]    Exercise Vital Sign      Days of Exercise per Week: 4 days      Minutes of Exercise per Session: 60 min   Stress: No Stress Concern Present (2020)    Received from [facility]    [place] [employer] - Occupational Stress Questionnaire      Feeling of Stress : Not at all   Social Connections: Moderately Isolated (2020)    Received from [facility]    Social Connection  "and Isolation Panel [NHANES]      Frequency of Communication with Friends and Family: More than three times a week      Frequency of Social Gatherings with Friends and Family: Patient declined      Attends Gnosticism Services: More than 4 times per year      Active Member of Clubs or Organizations: No      Attends Club or Organization Meetings: Never      Marital Status:        Review of Systems:  Skin:        Eyes:       ENT:       Respiratory:       Cardiovascular:       Gastroenterology:      Genitourinary:       Musculoskeletal:       Neurologic:       Psychiatric:       Heme/Lymph/Imm:       Endocrine:         Physical Exam:    Vitals: /82   Pulse 60   Ht 1.778 m (5' 10\")   Wt 78.5 kg (173 lb)   SpO2 97%   BMI 24.82 kg/m    Constitutional: Well nourished and in no apparent distress.  Eyes: Pupils equal, round. Sclerae anicteric.   HEENT: Normocephalic, atraumatic.   Neck: Supple.  No carotid bruit or JVD   Respiratory: Breathing non-labored. Lungs clear to auscultation bilaterally. No crackles, wheezes, rhonchi, or rales.  Cardiovascular: Irregular irregular S1-S2.  No murmur, rub, or gallop.  Skin: Warm, dry. No rashes, cyanosis, or xanthelasma.  Extremities: No edema.  Neurologic: Unsteady gait, ambulates with a cane. Alert, awake, and oriented to person, place and time.  Psychiatric: Affect appropriate.        Recent Lab Results:  LIPID RESULTS:  Lab Results   Component Value Date    CHOL 127 07/07/2023    CHOL 111 09/23/2020    HDL 40 07/07/2023    HDL 37 (L) 09/23/2020    LDL 67 07/07/2023    LDL 32 09/23/2020    TRIG 99 07/07/2023    TRIG 210 (H) 09/23/2020       LIVER ENZYME RESULTS:  Lab Results   Component Value Date    AST 19 07/07/2023    AST 16 09/23/2020    ALT 10 07/07/2023    ALT 16 09/23/2020       CBC RESULTS:  Lab Results   Component Value Date    WBC 5.9 07/07/2023    WBC 7.0 09/23/2020    RBC 5.48 07/07/2023    RBC 5.19 09/23/2020    HGB 16.8 07/07/2023    HGB 16.1 " "09/23/2020    HCT 52.9 07/07/2023    HCT 48.8 09/23/2020    MCV 97 07/07/2023    MCV 94 09/23/2020    MCH 30.7 07/07/2023    MCH 31.0 09/23/2020    MCHC 31.8 07/07/2023    MCHC 33.0 09/23/2020    RDW 14.6 07/07/2023    RDW 14.2 09/23/2020     07/07/2023     09/23/2020       BMP RESULTS:  Lab Results   Component Value Date     07/07/2023     09/23/2020    POTASSIUM 4.3 07/07/2023    POTASSIUM 4.6 09/05/2022    POTASSIUM 4.6 09/23/2020    CHLORIDE 105 07/07/2023    CHLORIDE 102 09/05/2022    CHLORIDE 106 09/23/2020    CO2 25 07/07/2023    CO2 30 09/05/2022    CO2 26 09/23/2020    ANIONGAP 12 07/07/2023    ANIONGAP 4 09/05/2022    ANIONGAP 8 09/23/2020    GLC 67 (L) 07/07/2023     (H) 09/05/2022    GLC 67 (L) 09/23/2020    BUN 19.8 07/07/2023    BUN 21 09/05/2022    BUN 18 09/23/2020    CR 0.92 07/07/2023    CR 0.98 09/23/2020    GFRESTIMATED 81 07/07/2023    GFRESTIMATED 71 09/23/2020    GFRESTBLACK 82 09/23/2020    BELLO 9.8 07/07/2023    BELLO 9.3 09/23/2020        A1C RESULTS:  Lab Results   Component Value Date    A1C 5.6 07/31/2019       INR RESULTS:  No results found for: \"INR\"        CC  Elvi Alfaro APRN CNP  [address]  [place] [phone]                  Thank you for allowing me to participate in the care of your patient.      Sincerely,     Elvi Alfaro, NP, APRN CNP     [facility]  cc:   JOHNSON Yan CNP  [address]  [place] [phone]      "

## 2024-08-02 NOTE — PROGRESS NOTES
Cardiology Clinic Progress Note  Yosef Murry MRN# [id number]   YOB: 1935 Age: 88 year old       HPI:    Yosef Murry is a delightful 88 year old patient with past medical history significant for:    Permanent AFib, essentially asymptomatic.  Treated with rate control, low-dose metoprolol and Eliquis therapy.  His CHADS-VASc score is 5.    Hypertension.    Basal ganglia stroke in 2019.  Evidence of additional prior strokes by MRI scan.  He was off anticoagulation at that time.  He does have some balance issues with falls 2-3 times a year.   History of prostate cancer.   Abnormal cardiac MRI demonstrated a subendocardial scar in the circumflex distribution in 2019.  A nuclear scan showed no ischemia.   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  The patient also had mild to moderate MR 2022.  Echocardiogram 7/2024 shows normal RV function, and no significant valvular disease.    It is my pleasure seeing Héctor today in follow-up.  He is an established patient of .  I have known him for many years.  From a cardiac standpoint he remains stable.  Denies chest pain, orthopnea, lightheadedness, dizziness, lower extremity edema.  His gait appeared unsteady.  He is ambulating with a cane.  Admits that he does feel like his gait has worsened since his stroke.  He initially did physical therapy, but states he has not done that for several years.    Follow-up echocardiogram showed an improvement of his EF.  No significant valvular abnormality was noted.  EKG shows A-fib with controlled ventricular rate at 63 bpm.  He is here today for follow-up.      Diagnotic studies:  Atrial fibrillation CVR, 63 bpm  Echocardiogram 7/2024: EF 60 to 65%.  RV function normal.  Severe biatrial enlargement.  No significant valvular abnormality noted.  Echocardiogram 9/26/2022 showed EF of 55 to 60%. Moderate RV dysfunction was noted. 1-2+ MR. The RV dysfunction and MR were new when compared to the previous  echo.           Latest Reference Range & Units 07/07/23 07:44   Sodium 136 - 145 mmol/L 142   Potassium 3.4 - 5.3 mmol/L 4.3   Chloride 98 - 107 mmol/L 105   Carbon Dioxide (CO2) 22 - 29 mmol/L 25   Urea Nitrogen 8.0 - 23.0 mg/dL 19.8   Creatinine 0.67 - 1.17 mg/dL 0.92   GFR Estimate >60 mL/min/1.73m2 81   Calcium 8.8 - 10.2 mg/dL 9.8   Anion Gap 7 - 15 mmol/L 12   Albumin 3.5 - 5.2 g/dL 3.9   Protein Total 6.4 - 8.3 g/dL 7.7   Alkaline Phosphatase 40 - 129 U/L 108   ALT 0 - 70 U/L 10   AST 0 - 45 U/L 19   Bilirubin Total <=1.2 mg/dL 0.7   Cholesterol <200 mg/dL 127         Plan:   Permanent atrial fibrillation with controlled ventricular rate.  Stable on metoprolol ER 25 mg daily.  MYN1LO0-DADt score 5 (age, HTN, CVA) Eliquis indefinitely.  Currently at 5 mg twice daily.  Appropriate dosing for weight and kidney function.  Hypertension stable on losartan 50 mg daily, metoprolol ER 25 mg daily, amlodipine 5 mg daily  History of basal ganglia stroke in 2019.  Patient was off anticoagulation at the time of his strokes.  Again we recommend anticoagulation indefinitely.  Patient is having a Mohs procedure on his nose.  They recommended holding 2 doses, night before and morning of.  I did discuss it with  and he is fine with holding the 2 doses without bridging.  However, if he were to need a procedure such as a colonoscopy he would need Lovenox bridging.  Unsteady gait.  I discussed with him about considering physical therapy.  He will discuss it with his PCP at his annual visit this fall.    Thank you for including us in his care.  I will see him back in 1 year or sooner with any questions or concerns.  Elvi Alfaro NP, APRN CNP      Today's clinic visit entailed:  Review of the result(s) of each unique test - EKG  Prescription drug management  20  minutes spent by me on the date of the encounter doing chart review, review of test results, patient visit, and documentation   Provider  Link to [facility] Help Grid      The level of medical decision making during this visit was of moderate complexity.      Orders Placed This Encounter   Procedures    EKG 12-lead complete w/read - Clinics (performed today)     No orders of the defined types were placed in this encounter.    There are no discontinued medications.      Encounter Diagnoses   Name Primary?    Permanent atrial fibrillation (H) Yes    Essential hypertension, benign     Infarction of right basal ganglia (H)        CURRENT MEDICATIONS:  Current Outpatient Medications   Medication Sig Dispense Refill    amLODIPine (NORVASC) 5 MG tablet Take 1 tablet (5 mg) by mouth daily 90 tablet 0    apixaban ANTICOAGULANT (ELIQUIS) 5 MG tablet Take 1 tablet (5 mg) by mouth 2 times daily 180 tablet 1    atorvastatin (LIPITOR) 10 MG tablet Take 1 tablet (10 mg) by mouth every evening 90 tablet 0    calcium carbonate (TUMS) 500 MG chewable tablet Take 1 chew tab by mouth nightly as needed       diphenhydrAMINE-acetaminophen (TYLENOL PM)  MG tablet Take 2 tablets by mouth nightly as needed for sleep      leuprolide acetate (LUPRON) 1 MG/0.2ML kit Inject Subcutaneous every 6 months He gets this at [facility]. Last given on 4/2019.      loratadine (CLARITIN) 10 MG tablet Take 1 tablet by mouth daily. 30 tablet 1    losartan (COZAAR) 50 MG tablet Take 1 tablet (50 mg) by mouth daily 90 tablet 0    melatonin 5 MG tablet Take 5 mg by mouth nightly as needed for sleep       metoprolol succinate ER (TOPROL XL) 25 MG 24 hr tablet Take 1 tablet (25 mg) by mouth daily 90 tablet 0    Multiple Vitamins-Minerals (PRESERVISION AREDS 2 PO) Take 1 capsule by mouth 2 times daily AREDS 2      order for DME Equipment being ordered: Walker Wheels () and Walker ()  Treatment Diagnosis: Impaired gait 1 each 0    vitamin D3 (CHOLECALCIFEROL) 50 mcg (2000 units) tablet Take 1 tablet by mouth daily      sildenafil (VIAGRA) 100 MG tablet Take 1 tablet (100 mg) by mouth daily as needed  (intercourse) (Patient not taking: Reported on 6/30/2023) 10 tablet 11       ALLERGIES     Allergies   Allergen Reactions    Other [Seasonal Allergies]        PAST MEDICAL HISTORY:  Past Medical History:   Diagnosis Date    Anal fistula     Antiplatelet or antithrombotic long-term use     Arrhythmia     Atrial flutter (H) 2012    Cerebral infarction (H)     TIA    Heartburn     Hypertension     Inguinal hernia, left     Persistent atrial fibrillation (H) 8/21/12    Prostate cancer (H)     TIA (transient ischaemic attack)     2014       PAST SURGICAL HISTORY:  Past Surgical History:   Procedure Laterality Date    COLONOSCOPY      COLONOSCOPY N/A 9/24/2021    Procedure: Colonoscopy, With Polypectomy And Biopsy;  Surgeon: Jordin Rachel MD;  Location:  GI    ENT SURGERY      tonsllectomy    EXAM UNDER ANESTHESIA, FISTULOTOMY RECTUM, COMBINED N/A 6/18/2015    Procedure: COMBINED EXAM UNDER ANESTHESIA, FISTULOTOMY RECTUM;  Surgeon: Candice Carty MD;  Location: [facility]    GENITOURINARY SURGERY  1999    PROSTATECTOMY    GI SURGERY      hernia repair x 2    HERNIORRHAPHY INGUINAL  7/25/2013    Procedure: HERNIORRHAPHY INGUINAL;  LEFT INGUINAL HERNIA REPAIR WITH MESH;  Surgeon: Filipe Fairchild MD;  Location: [facility]    HERNIORRHAPHY INGUINAL Right 6/13/2019    Procedure: OPEN RIGHT INGUINA HERNIA REPAIR WITH MESH;  Surgeon: Filipe Fairchild MD;  Location:  OR    ORTHOPEDIC SURGERY      WRIST SURGERY - LEFT       FAMILY HISTORY:  Family History   Problem Relation Age of Onset    Ovarian Cancer Mother     Osteoporosis Father     Unknown/Adopted Sister        SOCIAL HISTORY:  Social History     Socioeconomic History    Marital status:      Spouse name: None    Number of children: None    Years of education: None    Highest education level: None   Tobacco Use    Smoking status: Former     Current packs/day: 0.00     Types: Cigarettes     Start date: 12/31/1952     Quit date: 12/31/1968     Years since  quittin.6    Smokeless tobacco: Never   Substance and Sexual Activity    Alcohol use: Yes     Alcohol/week: 0.8 standard drinks of alcohol     Types: 1 Cans of beer per week     Comment: 1 beer a week    Drug use: No    Sexual activity: Not Currently     Partners: Female   Other Topics Concern    Caffeine Concern No     Comment: occ tea    Sleep Concern No    Stress Concern No    Weight Concern No    Special Diet No    Exercise Yes     Comment: walking 3 miles a day    Seat Belt Yes     Social Determinants of Health     Financial Resource Strain: Low Risk  (2020)    Received from [facility]    Overall Financial Resource Strain (CARDIA)     Difficulty of Paying Living Expenses: Not hard at all   Food Insecurity: No Food Insecurity (2020)    Received from [facility]    Hunger Vital Sign     Worried About Running Out of Food in the Last Year: Never true     Ran Out of Food in the Last Year: Never true   Transportation Needs: No Transportation Needs (2020)    Received from [facility]    PRAPARE - Transportation     Lack of Transportation (Medical): No     Lack of Transportation (Non-Medical): No   Physical Activity: Sufficiently Active (2020)    Received from [facility]    Exercise Vital Sign     Days of Exercise per Week: 4 days     Minutes of Exercise per Session: 60 min   Stress: No Stress Concern Present (2020)    Received from [facility]    [employer] - Occupational Stress Questionnaire     Feeling of Stress : Not at all   Social Connections: Moderately Isolated (2020)    Received from [facility]    Social Connection and Isolation Panel [NHANES]     Frequency of Communication with Friends and Family: More than three times a week     Frequency of Social Gatherings with Friends and Family: Patient declined     Attends [employer] Services: More than 4 times per year      "Active Member of Clubs or Organizations: No     Attends Club or Organization Meetings: Never     Marital Status:        Review of Systems:  Skin:        Eyes:       ENT:       Respiratory:       Cardiovascular:       Gastroenterology:      Genitourinary:       Musculoskeletal:       Neurologic:       Psychiatric:       Heme/Lymph/Imm:       Endocrine:         Physical Exam:    Vitals: /82   Pulse 60   Ht 1.778 m (5' 10\")   Wt 78.5 kg (173 lb)   SpO2 97%   BMI 24.82 kg/m    Constitutional: Well nourished and in no apparent distress.  Eyes: Pupils equal, round. Sclerae anicteric.   HEENT: Normocephalic, atraumatic.   Neck: Supple.  No carotid bruit or JVD   Respiratory: Breathing non-labored. Lungs clear to auscultation bilaterally. No crackles, wheezes, rhonchi, or rales.  Cardiovascular: Irregular irregular S1-S2.  No murmur, rub, or gallop.  Skin: Warm, dry. No rashes, cyanosis, or xanthelasma.  Extremities: No edema.  Neurologic: Unsteady gait, ambulates with a cane. Alert, awake, and oriented to person, place and time.  Psychiatric: Affect appropriate.        Recent Lab Results:  LIPID RESULTS:  Lab Results   Component Value Date    CHOL 127 07/07/2023    CHOL 111 09/23/2020    HDL 40 07/07/2023    HDL 37 (L) 09/23/2020    LDL 67 07/07/2023    LDL 32 09/23/2020    TRIG 99 07/07/2023    TRIG 210 (H) 09/23/2020       LIVER ENZYME RESULTS:  Lab Results   Component Value Date    AST 19 07/07/2023    AST 16 09/23/2020    ALT 10 07/07/2023    ALT 16 09/23/2020       CBC RESULTS:  Lab Results   Component Value Date    WBC 5.9 07/07/2023    WBC 7.0 09/23/2020    RBC 5.48 07/07/2023    RBC 5.19 09/23/2020    HGB 16.8 07/07/2023    HGB 16.1 09/23/2020    HCT 52.9 07/07/2023    HCT 48.8 09/23/2020    MCV 97 07/07/2023    MCV 94 09/23/2020    MCH 30.7 07/07/2023    MCH 31.0 09/23/2020    MCHC 31.8 07/07/2023    MCHC 33.0 09/23/2020    RDW 14.6 07/07/2023    RDW 14.2 09/23/2020     07/07/2023    PLT " "205 09/23/2020       BMP RESULTS:  Lab Results   Component Value Date     07/07/2023     09/23/2020    POTASSIUM 4.3 07/07/2023    POTASSIUM 4.6 09/05/2022    POTASSIUM 4.6 09/23/2020    CHLORIDE 105 07/07/2023    CHLORIDE 102 09/05/2022    CHLORIDE 106 09/23/2020    CO2 25 07/07/2023    CO2 30 09/05/2022    CO2 26 09/23/2020    ANIONGAP 12 07/07/2023    ANIONGAP 4 09/05/2022    ANIONGAP 8 09/23/2020    GLC 67 (L) 07/07/2023     (H) 09/05/2022    GLC 67 (L) 09/23/2020    BUN 19.8 07/07/2023    BUN 21 09/05/2022    BUN 18 09/23/2020    CR 0.92 07/07/2023    CR 0.98 09/23/2020    GFRESTIMATED 81 07/07/2023    GFRESTIMATED 71 09/23/2020    GFRESTBLACK 82 09/23/2020    BELLO 9.8 07/07/2023    BELLO 9.3 09/23/2020        A1C RESULTS:  Lab Results   Component Value Date    A1C 5.6 07/31/2019       INR RESULTS:  No results found for: \"INR\"        JOHNSON Jensen CNP  [address]  [place]  [place] [address]                "

## 2024-09-09 DIAGNOSIS — I48.21 PERMANENT ATRIAL FIBRILLATION (H): ICD-10-CM

## 2024-09-09 DIAGNOSIS — I63.9 INFARCTION OF RIGHT BASAL GANGLIA (H): ICD-10-CM

## 2024-09-19 ENCOUNTER — TRANSFERRED RECORDS (OUTPATIENT)
Dept: HEALTH INFORMATION MANAGEMENT | Facility: CLINIC | Age: 89
End: 2024-09-19
Payer: MEDICARE

## 2024-10-10 ENCOUNTER — OFFICE VISIT (OUTPATIENT)
Dept: FAMILY MEDICINE | Facility: CLINIC | Age: 89
End: 2024-10-10
Payer: MEDICARE

## 2024-10-10 VITALS
HEIGHT: 68 IN | DIASTOLIC BLOOD PRESSURE: 88 MMHG | BODY MASS INDEX: 25.91 KG/M2 | RESPIRATION RATE: 18 BRPM | WEIGHT: 171 LBS | SYSTOLIC BLOOD PRESSURE: 130 MMHG | OXYGEN SATURATION: 98 % | TEMPERATURE: 96.8 F | HEART RATE: 55 BPM

## 2024-10-10 DIAGNOSIS — Z00.00 ENCOUNTER FOR ANNUAL WELLNESS EXAM IN MEDICARE PATIENT: ICD-10-CM

## 2024-10-10 DIAGNOSIS — C61 PROSTATE CANCER (H): ICD-10-CM

## 2024-10-10 DIAGNOSIS — I10 ESSENTIAL HYPERTENSION, BENIGN: Primary | ICD-10-CM

## 2024-10-10 DIAGNOSIS — I48.19 PERSISTENT ATRIAL FIBRILLATION (H): ICD-10-CM

## 2024-10-10 DIAGNOSIS — E78.5 HYPERLIPIDEMIA LDL GOAL <100: ICD-10-CM

## 2024-10-10 DIAGNOSIS — R26.89 BALANCE PROBLEMS: ICD-10-CM

## 2024-10-10 LAB
CHOLEST SERPL-MCNC: 106 MG/DL
ERYTHROCYTE [DISTWIDTH] IN BLOOD BY AUTOMATED COUNT: 13.8 % (ref 10–15)
FASTING STATUS PATIENT QL REPORTED: YES
HCT VFR BLD AUTO: 48.9 % (ref 40–53)
HDLC SERPL-MCNC: 36 MG/DL
HGB BLD-MCNC: 16.2 G/DL (ref 13.3–17.7)
LDLC SERPL CALC-MCNC: 44 MG/DL
MCH RBC QN AUTO: 31.2 PG (ref 26.5–33)
MCHC RBC AUTO-ENTMCNC: 33.1 G/DL (ref 31.5–36.5)
MCV RBC AUTO: 94 FL (ref 78–100)
NONHDLC SERPL-MCNC: 70 MG/DL
PLATELET # BLD AUTO: 205 10E3/UL (ref 150–450)
RBC # BLD AUTO: 5.19 10E6/UL (ref 4.4–5.9)
TRIGL SERPL-MCNC: 129 MG/DL
WBC # BLD AUTO: 5.7 10E3/UL (ref 4–11)

## 2024-10-10 PROCEDURE — 36415 COLL VENOUS BLD VENIPUNCTURE: CPT | Performed by: FAMILY MEDICINE

## 2024-10-10 PROCEDURE — 90662 IIV NO PRSV INCREASED AG IM: CPT | Performed by: FAMILY MEDICINE

## 2024-10-10 PROCEDURE — G0008 ADMIN INFLUENZA VIRUS VAC: HCPCS | Performed by: FAMILY MEDICINE

## 2024-10-10 PROCEDURE — G0439 PPPS, SUBSEQ VISIT: HCPCS | Performed by: FAMILY MEDICINE

## 2024-10-10 PROCEDURE — 85027 COMPLETE CBC AUTOMATED: CPT | Performed by: FAMILY MEDICINE

## 2024-10-10 PROCEDURE — 80061 LIPID PANEL: CPT | Performed by: FAMILY MEDICINE

## 2024-10-10 SDOH — HEALTH STABILITY: PHYSICAL HEALTH: ON AVERAGE, HOW MANY DAYS PER WEEK DO YOU ENGAGE IN MODERATE TO STRENUOUS EXERCISE (LIKE A BRISK WALK)?: 0 DAYS

## 2024-10-10 ASSESSMENT — PAIN SCALES - GENERAL: PAINLEVEL: NO PAIN (0)

## 2024-10-10 ASSESSMENT — SOCIAL DETERMINANTS OF HEALTH (SDOH): HOW OFTEN DO YOU GET TOGETHER WITH FRIENDS OR RELATIVES?: ONCE A WEEK

## 2024-10-10 NOTE — PROGRESS NOTES
"Preventive Care Visit  [facility] JUANITA Kaur MD, Family Medicine  Oct 10, 2024      Assessment & Plan     Encounter for annual wellness exam in Medicare patient    - Lipid panel reflex to direct LDL Non-fasting; Future  - CBC with Platelets; Future  - Lipid panel reflex to direct LDL Non-fasting  - CBC with Platelets    Essential hypertension, benign      Hyperlipidemia LDL goal <100    - Lipid panel reflex to direct LDL Non-fasting; Future  - Lipid panel reflex to direct LDL Non-fasting    Persistent atrial fibrillation (H)  On Eliquis    Prostate cancer (H)  Recurrent prostate cancer, sees [place], currently off mediations    Balance problems              BMI  Estimated body mass index is 26.07 kg/m  as calculated from the following:    Height as of this encounter: 1.725 m (5' 7.91\").    Weight as of this encounter: 77.6 kg (171 lb).       Counseling  Appropriate preventive services were addressed with this patient via screening, questionnaire, or discussion as appropriate for fall prevention, nutrition, physical activity, Tobacco-use cessation, social engagement, weight loss and cognition.  Checklist reviewing preventive services available has been given to the patient.  Reviewed patient's diet, addressing concerns and/or questions.   The patient was provided with written information regarding signs of hearing loss.   Information on urinary incontinence and treatment options given to patient.         Subjective   Héctor is a 89 year old, presenting for the following:  Physical (Fasting. )  Patient chronic medical conditions are stable.  Underlying history of paroxysmal atrial fibrillation currently on Eliquis.  Patient is managed by cardiology.  Has history of prostate cancer which was recurrent got radiation managed by [facility].      10/10/2024     7:15 AM   Additional Questions   Roomed by Liana LESTER       Health Care Directive  Patient has a Health Care Directive on file  Advance care " planning document is on file and is current.    HPI        10/10/2024   General Health   How would you rate your overall physical health? (!) FAIR   Feel stress (tense, anxious, or unable to sleep) Not at all            10/10/2024   Nutrition   Diet: Regular (no restrictions)            10/10/2024   Exercise   Days per week of moderate/strenous exercise 0 days      (!) EXERCISE CONCERN      10/10/2024   Social Factors   Frequency of gathering with friends or relatives Once a week   Worry food won't last until get money to buy more No   Food not last or not have enough money for food? No   Do you have housing? (Housing is defined as stable permanent housing and does not include staying ouside in a car, in a tent, in an abandoned building, in an overnight shelter, or couch-surfing.) Yes   Are you worried about losing your housing? No   Lack of transportation? No   Unable to get utilities (heat,electricity)? No            10/10/2024   Fall Risk   Fallen 2 or more times in the past year? Yes    Yes   Trouble with walking or balance? Yes    Yes   Gait Speed Test Interpretation Greater than 5.01 seconds - ABNORMAL       Multiple values from one day are sorted in reverse-chronological order           10/10/2024   Activities of Daily Living- Home Safety   Needs help with the following daily activites None of the above   Safety concerns in the home None of the above            10/10/2024   Dental   Dentist two times every year? Yes            10/10/2024   Hearing Screening   Hearing concerns? (!) I NEED TO ASK PEOPLE TO SPEAK UP OR REPEAT THEMSELVES.    (!) TROUBLE UNDERSTANDING SOFT OR WHISPERED SPEECH.       Multiple values from one day are sorted in reverse-chronological order         10/10/2024   Driving Risk Screening   Patient/family members have concerns about driving No            10/10/2024   General Alertness/Fatigue Screening   Have you been more tired than usual lately? No            10/10/2024   Urinary  Incontinence Screening   Bothered by leaking urine in past 6 months Yes            10/10/2024   TB Screening   Were you born outside of the [place]? No            Today's PHQ-2 Score:       10/10/2024     7:08 AM   PHQ-2 (  [employer])   Q1: Little interest or pleasure in doing things 0   Q2: Feeling down, depressed or hopeless 0   PHQ-2 Score 0   Q1: Little interest or pleasure in doing things Not at all   Q2: Feeling down, depressed or hopeless Not at all   PHQ-2 Score 0           10/10/2024   Substance Use   Alcohol more than 3/day or more than 7/wk Not Applicable   Do you have a current opioid prescription? No   How severe/bad is pain from 1 to 10? 0/10 (No Pain)   Do you use any other substances recreationally? No    (!) PRESCRIPTION DRUGS    (!) OTHER       Multiple values from one day are sorted in reverse-chronological order     Social History     Tobacco Use    Smoking status: Former     Current packs/day: 0.00     Types: Cigarettes     Start date: 1952     Quit date: 1968     Years since quittin.8    Smokeless tobacco: Never   Vaping Use    Vaping status: Never Used   Substance Use Topics    Alcohol use: Yes     Alcohol/week: 0.8 standard drinks of alcohol     Types: 1 Cans of beer per week     Comment: 1 beer a week    Drug use: No       Reviewed and updated as needed this visit by Provider                      Current providers sharing in care for this patient include:  Patient Care Team:  Melissa Torres PA-C as PCP - General (Internal Medicine)  Elvi Alfaro APRN CNP as Assigned Heart and Vascular Provider  Melissa Torres PA-C as Assigned PCP    The following health maintenance items are reviewed in Epic and correct as of today:  Health Maintenance   Topic Date Due    HF ACTION PLAN  Never done    RSV VACCINE (1 - 1-dose 75+ series) Never done    DTAP/TDAP/TD IMMUNIZATION (1 - Tdap) 2019    MEDICARE ANNUAL WELLNESS VISIT  10/03/2023    ANNUAL REVIEW OF HM ORDERS  10/03/2023  "   BMP  01/07/2024    ALT  07/07/2024    LIPID  07/07/2024    CBC  07/07/2024    INFLUENZA VACCINE (1) 09/01/2024    COVID-19 Vaccine (4 - 2024-25 season) 09/01/2024    FALL RISK ASSESSMENT  10/10/2025    ADVANCE CARE PLANNING  10/10/2029    TSH W/FREE T4 REFLEX  Completed    PHQ-2 (once per calendar year)  Completed    Pneumococcal Vaccine: 65+ Years  Completed    ZOSTER IMMUNIZATION  Completed    HPV IMMUNIZATION  Aged Out    MENINGITIS IMMUNIZATION  Aged Out    RSV MONOCLONAL ANTIBODY  Aged Out         Review of Systems  Constitutional, HEENT, cardiovascular, pulmonary, gi and gu systems are negative, except as otherwise noted.     Objective    Exam  /88   Pulse 55   Temp 96.8  F (36  C) (Temporal)   Resp 18   Ht 1.725 m (5' 7.91\")   Wt 77.6 kg (171 lb)   SpO2 98%   BMI 26.07 kg/m     Estimated body mass index is 26.07 kg/m  as calculated from the following:    Height as of this encounter: 1.725 m (5' 7.91\").    Weight as of this encounter: 77.6 kg (171 lb).    Physical Exam  GENERAL: alert and no distress  EYES: Eyes grossly normal to inspection, PERRL and conjunctivae and sclerae normal  HENT: ear canals and TM's normal, nose and mouth without ulcers or lesions  NECK: no adenopathy, no asymmetry, masses, or scars  RESP: lungs clear to auscultation - no rales, rhonchi or wheezes  ABDOMEN: soft, nontender, no hepatosplenomegaly, no masses and bowel sounds normal  MS: no gross musculoskeletal defects noted, no edema  SKIN: no suspicious lesions or rashes  NEURO: Balance issues.  Strength is normal  PSYCH: mentation appears normal, affect normal/bright         10/10/2024   Mini Cog   Clock Draw Score 2 Normal   3 Item Recall 2 objects recalled   Mini Cog Total Score 4                 Signed Electronically by: Mt Kaur MD    "

## 2024-10-10 NOTE — LETTER
October 11, 2024      Héctor Murry  [address] [place] [address]        Dear ,    We are writing to inform you of your test results.    -Normal red blood cell (hgb) levels, normal white blood cell count and normal platelet levels.   -Cholesterol levels (LDL,HDL, Triglycerides) are normal.  ADVISE: rechecking in 1 year.         Resulted Orders   Lipid panel reflex to direct LDL Non-fasting   Result Value Ref Range    Cholesterol 106 <200 mg/dL    Triglycerides 129 <150 mg/dL    Direct Measure HDL 36 (L) >=40 mg/dL    LDL Cholesterol Calculated 44 <100 mg/dL    Non HDL Cholesterol 70 <130 mg/dL    Patient Fasting > 8hrs? Yes     Narrative    Cholesterol  Desirable: < 200 mg/dL  Borderline High: 200 - 239 mg/dL  High: >= 240 mg/dL    Triglycerides  Normal: < 150 mg/dL  Borderline High: 150 - 199 mg/dL  High: 200-499 mg/dL  Very High: >= 500 mg/dL    Direct Measure HDL  Female: >= 50 mg/dL   Male: >= 40 mg/dL    LDL Cholesterol  Desirable: < 100 mg/dL  Above Desirable: 100 - 129 mg/dL   Borderline High: 130 - 159 mg/dL   High:  160 - 189 mg/dL   Very High: >= 190 mg/dL    Non HDL Cholesterol  Desirable: < 130 mg/dL  Above Desirable: 130 - 159 mg/dL  Borderline High: 160 - 189 mg/dL  High: 190 - 219 mg/dL  Very High: >= 220 mg/dL   CBC with Platelets   Result Value Ref Range    WBC Count 5.7 4.0 - 11.0 10e3/uL    RBC Count 5.19 4.40 - 5.90 10e6/uL    Hemoglobin 16.2 13.3 - 17.7 g/dL    Hematocrit 48.9 40.0 - 53.0 %    MCV 94 78 - 100 fL    MCH 31.2 26.5 - 33.0 pg    MCHC 33.1 31.5 - 36.5 g/dL    RDW 13.8 10.0 - 15.0 %    Platelet Count 205 150 - 450 10e3/uL       If you have any questions or concerns, please call the clinic at the number listed above.       Sincerely,      Mt Kaur MD

## 2025-01-01 ENCOUNTER — APPOINTMENT (OUTPATIENT)
Dept: INTERVENTIONAL RADIOLOGY/VASCULAR | Facility: CLINIC | Age: 89
DRG: 871 | End: 2025-01-01
Attending: RADIOLOGY
Payer: MEDICARE

## 2025-01-01 ENCOUNTER — APPOINTMENT (OUTPATIENT)
Dept: ULTRASOUND IMAGING | Facility: CLINIC | Age: 89
DRG: 871 | End: 2025-01-01
Attending: INTERNAL MEDICINE
Payer: MEDICARE

## 2025-01-01 ENCOUNTER — APPOINTMENT (OUTPATIENT)
Dept: SPEECH THERAPY | Facility: CLINIC | Age: 89
DRG: 871 | End: 2025-01-01
Attending: INTERNAL MEDICINE
Payer: MEDICARE

## 2025-01-01 ENCOUNTER — APPOINTMENT (OUTPATIENT)
Dept: SPEECH THERAPY | Facility: CLINIC | Age: 89
DRG: 871 | End: 2025-01-01
Payer: MEDICARE

## 2025-01-01 ENCOUNTER — APPOINTMENT (OUTPATIENT)
Dept: GENERAL RADIOLOGY | Facility: CLINIC | Age: 89
DRG: 871 | End: 2025-01-01
Attending: STUDENT IN AN ORGANIZED HEALTH CARE EDUCATION/TRAINING PROGRAM
Payer: MEDICARE

## 2025-01-01 ENCOUNTER — MEDICAL CORRESPONDENCE (OUTPATIENT)
Dept: HEALTH INFORMATION MANAGEMENT | Facility: CLINIC | Age: 89
End: 2025-01-01

## 2025-01-01 ENCOUNTER — APPOINTMENT (OUTPATIENT)
Dept: CT IMAGING | Facility: CLINIC | Age: 89
DRG: 871 | End: 2025-01-01
Attending: EMERGENCY MEDICINE
Payer: MEDICARE

## 2025-01-01 ENCOUNTER — APPOINTMENT (OUTPATIENT)
Dept: GENERAL RADIOLOGY | Facility: CLINIC | Age: 89
DRG: 871 | End: 2025-01-01
Attending: INTERNAL MEDICINE
Payer: MEDICARE

## 2025-01-01 PROCEDURE — 71045 X-RAY EXAM CHEST 1 VIEW: CPT | Mod: 77

## 2025-01-01 PROCEDURE — 71046 X-RAY EXAM CHEST 2 VIEWS: CPT

## 2025-01-01 PROCEDURE — 71275 CT ANGIOGRAPHY CHEST: CPT

## 2025-01-01 PROCEDURE — 32557 INSERT CATH PLEURA W/ IMAGE: CPT

## 2025-01-01 PROCEDURE — 32555 ASPIRATE PLEURA W/ IMAGING: CPT

## 2025-01-01 PROCEDURE — 71045 X-RAY EXAM CHEST 1 VIEW: CPT

## 2025-06-07 ENCOUNTER — APPOINTMENT (OUTPATIENT)
Dept: CT IMAGING | Facility: CLINIC | Age: 89
DRG: 029 | End: 2025-06-07
Attending: EMERGENCY MEDICINE
Payer: MEDICARE

## 2025-06-07 ENCOUNTER — APPOINTMENT (OUTPATIENT)
Dept: GENERAL RADIOLOGY | Facility: CLINIC | Age: 89
DRG: 029 | End: 2025-06-07
Attending: EMERGENCY MEDICINE
Payer: MEDICARE

## 2025-06-07 ENCOUNTER — HOSPITAL ENCOUNTER (INPATIENT)
Facility: CLINIC | Age: 89
DRG: 029 | End: 2025-06-07
Attending: EMERGENCY MEDICINE | Admitting: INTERNAL MEDICINE
Payer: MEDICARE

## 2025-06-07 ENCOUNTER — APPOINTMENT (OUTPATIENT)
Dept: MRI IMAGING | Facility: CLINIC | Age: 89
DRG: 029 | End: 2025-06-07
Attending: PSYCHIATRY & NEUROLOGY
Payer: MEDICARE

## 2025-06-07 ENCOUNTER — APPOINTMENT (OUTPATIENT)
Dept: MRI IMAGING | Facility: CLINIC | Age: 89
DRG: 029 | End: 2025-06-07
Attending: HOSPITALIST
Payer: MEDICARE

## 2025-06-07 DIAGNOSIS — S80.212A ABRASION OF BOTH KNEES: ICD-10-CM

## 2025-06-07 DIAGNOSIS — R74.8 ELEVATED CK: ICD-10-CM

## 2025-06-07 DIAGNOSIS — S80.211A ABRASION OF BOTH KNEES: ICD-10-CM

## 2025-06-07 DIAGNOSIS — R53.1 GENERALIZED WEAKNESS: ICD-10-CM

## 2025-06-07 DIAGNOSIS — Z71.89 OTHER SPECIFIED COUNSELING: Chronic | ICD-10-CM

## 2025-06-07 DIAGNOSIS — M48.04 THORACIC STENOSIS: Primary | ICD-10-CM
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
## 2025-06-07 LAB
ALBUMIN SERPL BCG-MCNC: 3.9 G/DL (ref 3.5–5.2)
ALBUMIN UR-MCNC: 50 MG/DL
ALP SERPL-CCNC: 108 U/L (ref 40–150)
ALT SERPL W P-5'-P-CCNC: 19 U/L (ref 0–70)
ANION GAP SERPL CALCULATED.3IONS-SCNC: 14 MMOL/L (ref 7–15)
APPEARANCE UR: ABNORMAL
AST SERPL W P-5'-P-CCNC: 67 U/L (ref 0–45)
ATRIAL RATE - MUSE: NORMAL BPM
BACTERIA #/AREA URNS HPF: ABNORMAL /HPF
BASOPHILS # BLD AUTO: 0 10E3/UL (ref 0–0.2)
BASOPHILS NFR BLD AUTO: 0 %
BILIRUB DIRECT SERPL-MCNC: 0.23 MG/DL (ref 0–0.3)
BILIRUB SERPL-MCNC: 0.7 MG/DL
BILIRUB UR QL STRIP: NEGATIVE
BUN SERPL-MCNC: 27.6 MG/DL (ref 8–23)
CALCIUM SERPL-MCNC: 9.8 MG/DL (ref 8.8–10.4)
CHLORIDE SERPL-SCNC: 102 MMOL/L (ref 98–107)
CK SERPL-CCNC: 3088 U/L (ref 39–308)
COLOR UR AUTO: YELLOW
CREAT SERPL-MCNC: 0.94 MG/DL (ref 0.67–1.17)
DIASTOLIC BLOOD PRESSURE - MUSE: NORMAL MMHG
EGFRCR SERPLBLD CKD-EPI 2021: 78 ML/MIN/1.73M2
EOSINOPHIL # BLD AUTO: 0 10E3/UL (ref 0–0.7)
EOSINOPHIL NFR BLD AUTO: 0 %
ERYTHROCYTE [DISTWIDTH] IN BLOOD BY AUTOMATED COUNT: 14 % (ref 10–15)
GLUCOSE SERPL-MCNC: 110 MG/DL (ref 70–99)
GLUCOSE UR STRIP-MCNC: NEGATIVE MG/DL
HCO3 SERPL-SCNC: 25 MMOL/L (ref 22–29)
HCT VFR BLD AUTO: 50.3 % (ref 40–53)
HCYS SERPL-SCNC: 9 UMOL/L (ref 0–15)
HGB BLD-MCNC: 16.8 G/DL (ref 13.3–17.7)
HGB UR QL STRIP: ABNORMAL
HOLD SPECIMEN: NORMAL
IMM GRANULOCYTES # BLD: 0.1 10E3/UL
IMM GRANULOCYTES NFR BLD: 1 %
INTERPRETATION ECG - MUSE: NORMAL
KETONES UR STRIP-MCNC: NEGATIVE MG/DL
LEUKOCYTE ESTERASE UR QL STRIP: ABNORMAL
LYMPHOCYTES # BLD AUTO: 0.7 10E3/UL (ref 0.8–5.3)
LYMPHOCYTES NFR BLD AUTO: 4 %
MCH RBC QN AUTO: 30.3 PG (ref 26.5–33)
MCHC RBC AUTO-ENTMCNC: 33.4 G/DL (ref 31.5–36.5)
MCV RBC AUTO: 91 FL (ref 78–100)
MONOCYTES # BLD AUTO: 0.9 10E3/UL (ref 0–1.3)
MONOCYTES NFR BLD AUTO: 5 %
MUCOUS THREADS #/AREA URNS LPF: PRESENT /LPF
NEUTROPHILS # BLD AUTO: 15.7 10E3/UL (ref 1.6–8.3)
NEUTROPHILS NFR BLD AUTO: 90 %
NITRATE UR QL: NEGATIVE
NRBC # BLD AUTO: 0 10E3/UL
NRBC BLD AUTO-RTO: 0 /100
P AXIS - MUSE: NORMAL DEGREES
PH UR STRIP: 5 [PH] (ref 5–7)
PLATELET # BLD AUTO: 277 10E3/UL (ref 150–450)
POTASSIUM SERPL-SCNC: 4.4 MMOL/L (ref 3.4–5.3)
PR INTERVAL - MUSE: NORMAL MS
PROT SERPL-MCNC: 7.4 G/DL (ref 6.4–8.3)
PSA SERPL DL<=0.01 NG/ML-MCNC: 0.45 NG/ML
QRS DURATION - MUSE: 100 MS
QT - MUSE: 436 MS
QTC - MUSE: 486 MS
R AXIS - MUSE: 3 DEGREES
RBC # BLD AUTO: 5.54 10E6/UL (ref 4.4–5.9)
RBC URINE: 14 /HPF
SARS-COV-2 RNA RESP QL NAA+PROBE: NEGATIVE
SODIUM SERPL-SCNC: 141 MMOL/L (ref 135–145)
SP GR UR STRIP: 1.03 (ref 1–1.03)
SQUAMOUS EPITHELIAL: <1 /HPF
SYSTOLIC BLOOD PRESSURE - MUSE: NORMAL MMHG
T AXIS - MUSE: 14 DEGREES
T4 FREE SERPL-MCNC: 1.03 NG/DL (ref 0.9–1.7)
TROPONIN T SERPL HS-MCNC: 38 NG/L
TROPONIN T SERPL HS-MCNC: 42 NG/L
TSH SERPL DL<=0.005 MIU/L-ACNC: 4.36 UIU/ML (ref 0.3–4.2)
UROBILINOGEN UR STRIP-MCNC: NORMAL MG/DL
VENTRICULAR RATE- MUSE: 75 BPM
VIT B12 SERPL-MCNC: 526 PG/ML (ref 232–1245)
WBC # BLD AUTO: 17.4 10E3/UL (ref 4–11)
WBC URINE: 46 /HPF

## 2025-06-07 PROCEDURE — 84590 ASSAY OF VITAMIN A: CPT | Performed by: PSYCHIATRY & NEUROLOGY

## 2025-06-07 PROCEDURE — 99223 1ST HOSP IP/OBS HIGH 75: CPT | Performed by: HOSPITALIST

## 2025-06-07 PROCEDURE — 82607 VITAMIN B-12: CPT | Performed by: PSYCHIATRY & NEUROLOGY

## 2025-06-07 PROCEDURE — 70450 CT HEAD/BRAIN W/O DYE: CPT

## 2025-06-07 PROCEDURE — 258N000003 HC RX IP 258 OP 636: Performed by: HOSPITALIST

## 2025-06-07 PROCEDURE — 255N000002 HC RX 255 OP 636: Performed by: HOSPITALIST

## 2025-06-07 PROCEDURE — 84484 ASSAY OF TROPONIN QUANT: CPT | Performed by: HOSPITALIST

## 2025-06-07 PROCEDURE — 258N000003 HC RX IP 258 OP 636: Performed by: EMERGENCY MEDICINE

## 2025-06-07 PROCEDURE — 36415 COLL VENOUS BLD VENIPUNCTURE: CPT | Performed by: PSYCHIATRY & NEUROLOGY

## 2025-06-07 PROCEDURE — 82525 ASSAY OF COPPER: CPT | Performed by: PSYCHIATRY & NEUROLOGY

## 2025-06-07 PROCEDURE — 96360 HYDRATION IV INFUSION INIT: CPT

## 2025-06-07 PROCEDURE — 84439 ASSAY OF FREE THYROXINE: CPT | Performed by: HOSPITALIST

## 2025-06-07 PROCEDURE — 84446 ASSAY OF VITAMIN E: CPT | Performed by: PSYCHIATRY & NEUROLOGY

## 2025-06-07 PROCEDURE — 84630 ASSAY OF ZINC: CPT | Performed by: PSYCHIATRY & NEUROLOGY

## 2025-06-07 PROCEDURE — 96361 HYDRATE IV INFUSION ADD-ON: CPT

## 2025-06-07 PROCEDURE — 81001 URINALYSIS AUTO W/SCOPE: CPT | Performed by: HOSPITALIST

## 2025-06-07 PROCEDURE — 93005 ELECTROCARDIOGRAM TRACING: CPT

## 2025-06-07 PROCEDURE — 72158 MRI LUMBAR SPINE W/O & W/DYE: CPT

## 2025-06-07 PROCEDURE — 87635 SARS-COV-2 COVID-19 AMP PRB: CPT | Performed by: HOSPITALIST

## 2025-06-07 PROCEDURE — 250N000013 HC RX MED GY IP 250 OP 250 PS 637: Performed by: HOSPITALIST

## 2025-06-07 PROCEDURE — 83090 ASSAY OF HOMOCYSTEINE: CPT | Performed by: PSYCHIATRY & NEUROLOGY

## 2025-06-07 PROCEDURE — 70551 MRI BRAIN STEM W/O DYE: CPT

## 2025-06-07 PROCEDURE — G0378 HOSPITAL OBSERVATION PER HR: HCPCS

## 2025-06-07 PROCEDURE — 99285 EMERGENCY DEPT VISIT HI MDM: CPT | Mod: 25

## 2025-06-07 PROCEDURE — 36415 COLL VENOUS BLD VENIPUNCTURE: CPT | Performed by: EMERGENCY MEDICINE

## 2025-06-07 PROCEDURE — 36415 COLL VENOUS BLD VENIPUNCTURE: CPT | Performed by: HOSPITALIST

## 2025-06-07 PROCEDURE — 80053 COMPREHEN METABOLIC PANEL: CPT | Performed by: EMERGENCY MEDICINE

## 2025-06-07 PROCEDURE — 71046 X-RAY EXAM CHEST 2 VIEWS: CPT

## 2025-06-07 PROCEDURE — 72125 CT NECK SPINE W/O DYE: CPT

## 2025-06-07 PROCEDURE — 82248 BILIRUBIN DIRECT: CPT | Performed by: EMERGENCY MEDICINE

## 2025-06-07 PROCEDURE — 87086 URINE CULTURE/COLONY COUNT: CPT | Performed by: HOSPITALIST

## 2025-06-07 PROCEDURE — 73564 X-RAY EXAM KNEE 4 OR MORE: CPT | Mod: 50

## 2025-06-07 PROCEDURE — 85004 AUTOMATED DIFF WBC COUNT: CPT | Performed by: EMERGENCY MEDICINE

## 2025-06-07 PROCEDURE — 82550 ASSAY OF CK (CPK): CPT | Performed by: EMERGENCY MEDICINE

## 2025-06-07 PROCEDURE — A9585 GADOBUTROL INJECTION: HCPCS | Performed by: HOSPITALIST

## 2025-06-07 PROCEDURE — 82746 ASSAY OF FOLIC ACID SERUM: CPT | Performed by: PSYCHIATRY & NEUROLOGY

## 2025-06-07 PROCEDURE — 84153 ASSAY OF PSA TOTAL: CPT | Performed by: HOSPITALIST

## 2025-06-07 PROCEDURE — 84443 ASSAY THYROID STIM HORMONE: CPT | Performed by: HOSPITALIST

## 2025-06-07 RX ORDER — ONDANSETRON 4 MG/1
4 TABLET, ORALLY DISINTEGRATING ORAL EVERY 6 HOURS PRN
Status: DISCONTINUED | OUTPATIENT
Start: 2025-06-07 | End: 2025-06-15 | Stop reason: HOSPADM

## 2025-06-07 RX ORDER — AMLODIPINE BESYLATE 5 MG/1
5 TABLET ORAL DAILY
Status: DISCONTINUED | OUTPATIENT
Start: 2025-06-07 | End: 2025-06-15 | Stop reason: HOSPADM

## 2025-06-07 RX ORDER — ACETAMINOPHEN 650 MG/1
650 SUPPOSITORY RECTAL EVERY 4 HOURS PRN
Status: DISCONTINUED | OUTPATIENT
Start: 2025-06-07 | End: 2025-06-15 | Stop reason: HOSPADM

## 2025-06-07 RX ORDER — LOSARTAN POTASSIUM 50 MG/1
50 TABLET ORAL DAILY
Status: DISCONTINUED | OUTPATIENT
Start: 2025-06-07 | End: 2025-06-15 | Stop reason: HOSPADM

## 2025-06-07 RX ORDER — METOPROLOL SUCCINATE 25 MG/1
25 TABLET, EXTENDED RELEASE ORAL DAILY
Status: DISCONTINUED | OUTPATIENT
Start: 2025-06-07 | End: 2025-06-15 | Stop reason: HOSPADM

## 2025-06-07 RX ORDER — AMOXICILLIN 250 MG
2 CAPSULE ORAL 2 TIMES DAILY PRN
Status: DISCONTINUED | OUTPATIENT
Start: 2025-06-07 | End: 2025-06-15 | Stop reason: HOSPADM

## 2025-06-07 RX ORDER — PROCHLORPERAZINE MALEATE 5 MG/1
5 TABLET ORAL EVERY 6 HOURS PRN
Status: DISCONTINUED | OUTPATIENT
Start: 2025-06-07 | End: 2025-06-15 | Stop reason: HOSPADM

## 2025-06-07 RX ORDER — ACETAMINOPHEN 325 MG/1
650 TABLET ORAL EVERY 4 HOURS PRN
Status: DISCONTINUED | OUTPATIENT
Start: 2025-06-07 | End: 2025-06-15 | Stop reason: HOSPADM

## 2025-06-07 RX ORDER — SODIUM CHLORIDE 9 MG/ML
INJECTION, SOLUTION INTRAVENOUS CONTINUOUS
Status: ACTIVE | OUTPATIENT
Start: 2025-06-07 | End: 2025-06-08

## 2025-06-07 RX ORDER — LIDOCAINE 40 MG/G
CREAM TOPICAL
Status: DISCONTINUED | OUTPATIENT
Start: 2025-06-07 | End: 2025-06-13

## 2025-06-07 RX ORDER — GADOBUTROL 604.72 MG/ML
9 INJECTION INTRAVENOUS ONCE
Status: COMPLETED | OUTPATIENT
Start: 2025-06-07 | End: 2025-06-07

## 2025-06-07 RX ORDER — ONDANSETRON 2 MG/ML
4 INJECTION INTRAMUSCULAR; INTRAVENOUS EVERY 6 HOURS PRN
Status: DISCONTINUED | OUTPATIENT
Start: 2025-06-07 | End: 2025-06-15 | Stop reason: HOSPADM

## 2025-06-07 RX ORDER — AMOXICILLIN 250 MG
1 CAPSULE ORAL 2 TIMES DAILY PRN
Status: DISCONTINUED | OUTPATIENT
Start: 2025-06-07 | End: 2025-06-15 | Stop reason: HOSPADM

## 2025-06-07 RX ORDER — POLYETHYLENE GLYCOL 3350 17 G/17G
17 POWDER, FOR SOLUTION ORAL 2 TIMES DAILY PRN
Status: DISCONTINUED | OUTPATIENT
Start: 2025-06-07 | End: 2025-06-15 | Stop reason: HOSPADM

## 2025-06-07 RX ADMIN — SODIUM CHLORIDE: 0.9 INJECTION, SOLUTION INTRAVENOUS at 13:30

## 2025-06-07 RX ADMIN — APIXABAN 5 MG: 5 TABLET, FILM COATED ORAL at 20:15

## 2025-06-07 RX ADMIN — AMLODIPINE BESYLATE 5 MG: 5 TABLET ORAL at 13:29

## 2025-06-07 RX ADMIN — ACETAMINOPHEN 650 MG: 325 TABLET, FILM COATED ORAL at 17:20

## 2025-06-07 RX ADMIN — APIXABAN 5 MG: 5 TABLET, FILM COATED ORAL at 14:06

## 2025-06-07 RX ADMIN — METOPROLOL SUCCINATE 25 MG: 25 TABLET, EXTENDED RELEASE ORAL at 13:29

## 2025-06-07 RX ADMIN — GADOBUTROL 9 ML: 604.72 INJECTION INTRAVENOUS at 19:52

## 2025-06-07 RX ADMIN — SODIUM CHLORIDE 1000 ML: 0.9 INJECTION, SOLUTION INTRAVENOUS at 08:41

## 2025-06-07 RX ADMIN — LOSARTAN POTASSIUM 50 MG: 50 TABLET, FILM COATED ORAL at 13:29

## 2025-06-07 RX ADMIN — SODIUM CHLORIDE 500 ML: 0.9 INJECTION, SOLUTION INTRAVENOUS at 07:26

## 2025-06-07 ASSESSMENT — ACTIVITIES OF DAILY LIVING (ADL)
ADLS_ACUITY_SCORE: 48
ADLS_ACUITY_SCORE: 47
ADLS_ACUITY_SCORE: 36
ADLS_ACUITY_SCORE: 36
ADLS_ACUITY_SCORE: 48
ADLS_ACUITY_SCORE: 47
ADLS_ACUITY_SCORE: 43
ADLS_ACUITY_SCORE: 48
ADLS_ACUITY_SCORE: 43
ADLS_ACUITY_SCORE: 43
ADLS_ACUITY_SCORE: 59
ADLS_ACUITY_SCORE: 48
ADLS_ACUITY_SCORE: 43
ADLS_ACUITY_SCORE: 43
ADLS_ACUITY_SCORE: 48

## 2025-06-07 ASSESSMENT — COLUMBIA-SUICIDE SEVERITY RATING SCALE - C-SSRS
6. HAVE YOU EVER DONE ANYTHING, STARTED TO DO ANYTHING, OR PREPARED TO DO ANYTHING TO END YOUR LIFE?: NO
1. IN THE PAST MONTH, HAVE YOU WISHED YOU WERE DEAD OR WISHED YOU COULD GO TO SLEEP AND NOT WAKE UP?: NO
2. HAVE YOU ACTUALLY HAD ANY THOUGHTS OF KILLING YOURSELF IN THE PAST MONTH?: NO

## 2025-06-07 NOTE — H&P
[facility]    History and Physical - Hospitalist Service       Date of Admission:  6/7/2025    Assessment & Plan      Yosef Murry is a 89 year old male admitted on 6/7/2025. Past history of CVA, A-fib/flutter, HTN, prostate cancer with recent rise in PSA who presents after being down overnight secondary to lower extremity weakness.       Acute on chronic bilateral lower extremity weakness  Hx CVA 8/2019, also reports CVA in [place] about 2 years ago  Possible Parkinsons  *etiology of weakness unclear with multiple potential factors/etiologies including prior CVA, undiagnosed Parkinsons, deconditioning, spinal disease, among others  *neuro exam shows left leg slightly weaker than right (chronic per patient) with no other focal findings on exam other than mild upper extremity rest tremor and question slight cogwheeling - admission CTA head showing only chronic CVA  *hx prostate cancer and reports new onset bladder incontinence in past 2 months, also feels lower extremity weakness has been getting worse, though denies any chronic spine pain (having mid back pain after lying on ground overnight)  *elevated BUN could suggest some hypovolemia, but reports good oral intake recently and denies overt orthostatic symptoms  *leukocytosis of 17 on arrival, but afebrile without infectious complaints, CXR clear - suspect reactive   *EKG showing A-fib with nonspecific T-wave findings but without cardiorespiratory symptoms  *suspect slight AST elevation related to rhabo; abd exam benign    - repeat PSA, obtain lumbar MRI  - consult neurology  - TSH  - covid test  - trop  - follow CBC and fever curve  - UA pending collection    ADDENDUM: covid negative, TSH slightly up but free T4 wnl, trop slightly elevated but flat - continue plan as above    Rhabdomyolysis  *admission CK 3088  -  ml/hr x24 hours  - repeat CK tomorrow    A-fib/flutter, permanent  HTN  - continue PTA amlodipine, metoprolol and  losartan once verified  - continue PTA apixaban once verified    HLD  - hold PTA atorvastatin as Obs    Hx prostate cancer s/p prostatectomy 2009, radiation therapy 2014 and androgen deprivation therapy  *see Urology clinic note 5/9/25 for details - recent rise in PSA but MRI findings not convincing for any recurrence  - follow up outpatient             Diet:  regular diet   DVT Prophylaxis: Pneumatic Compression Devices  [place]wood Catheter: Not present  Lines: None     Cardiac Monitoring: None  Code Status:  DNR/DNI per patient     Clinically Significant Risk Factors Present on Admission                # Drug Induced Coagulation Defect: home medication list includes an anticoagulant medication    # Hypertension: Home medication list includes antihypertensive(s)                      Disposition Plan     Medically Ready for Discharge: Anticipated Tomorrow           Filipe Hamlin MD  Hospitalist Service  [facility]  Securely message with [employer] (more info)  Text page via [employer] Paging/Directory     ______________________________________________________________________    Chief Complaint   Lower extremity weakness     History is obtained from the patient, chart review and discussion with ED provider     History of Present Illness   Yosef Murry is a 89 year old male who presents with bilateral lower extremity weakness.  He reports he went out to put air in his tires last evening, knelt down to fill the tires but then was too weak to get up despite having his cane for assistance.  He crawled up a ramp leading to his house door in the garage, was attempting to get upright, but fell backward.  He denies hitting his head.  He was unable to get himself up and ultimately called EMS early this morning.    He is hard to pin down in terms of how long he has been having lower extremity symptoms and over what time period symptoms have been worse.  He reports doing push-ups every day and denies any recent  issues with lower extremity weakness in terms of getting up from the floor.  However, he does report chronic lower extremity weakness following prior stroke and reports his left leg is chronically weaker than the right.  He currently reports he feels slightly weaker than his baseline.  He denies any lumbar spine pain, but notes some mid back pain after spending the night on the floor.    He reports chronically poor balance and feels this has been progressive as well as feeling his lower extremity weakness has been slightly progressive.  He reports some mild tremor and is concerned about Parkinson's disease.      He reports onset of new bladder incontinence in the past 2 months.  He reports his urologist is aware of this and has attributed it to his prior prostate cancer and radiation therapy.  He denies any bowel incontinence or saddle anesthesia.  He reports having home therapy following his initial stroke, but states he had no physical therapy following a more recent stroke when in [place].  He denies any fevers or chills, shortness of breath, cough, rash or sores, nausea, vomiting, diarrhea, abdominal pain, dysuria or any other focal infectious symptoms.  He reports eating and drinking well.  He denies any orthostatic lightheadedness.  Denies any chest pain or pressure.  His remainder of review of systems is otherwise negative.        Past Medical History    Past Medical History:   Diagnosis Date    Anal fistula     Antiplatelet or antithrombotic long-term use     Arrhythmia     Atrial flutter (H) 2012    Cerebral infarction (H)     TIA    Heartburn     Hypertension     Inguinal hernia, left     Persistent atrial fibrillation (H) 8/21/12    Prostate cancer (H)     TIA (transient ischaemic attack)     2014       Past Surgical History   Past Surgical History:   Procedure Laterality Date    COLONOSCOPY      COLONOSCOPY N/A 9/24/2021    Procedure: Colonoscopy, With Polypectomy And Biopsy;  Surgeon: Jordin Rachel  MD Arpit;  Location:  GI    ENT SURGERY      tonsllectomy    EXAM UNDER ANESTHESIA, FISTULOTOMY RECTUM, COMBINED N/A 6/18/2015    Procedure: COMBINED EXAM UNDER ANESTHESIA, FISTULOTOMY RECTUM;  Surgeon: Candice Carty MD;  Location: [facility]    GENITOURINARY SURGERY  1999    PROSTATECTOMY    GI SURGERY      hernia repair x 2    HERNIORRHAPHY INGUINAL  7/25/2013    Procedure: HERNIORRHAPHY INGUINAL;  LEFT INGUINAL HERNIA REPAIR WITH MESH;  Surgeon: Filipe Fairchild MD;  Location: [facility]    HERNIORRHAPHY INGUINAL Right 6/13/2019    Procedure: OPEN RIGHT INGUINA HERNIA REPAIR WITH MESH;  Surgeon: Filipe Fairchild MD;  Location:  OR    ORTHOPEDIC SURGERY      WRIST SURGERY - LEFT       Prior to Admission Medications   Prior to Admission Medications   Prescriptions Last Dose Informant Patient Reported? Taking?   Multiple Vitamins-Minerals (PRESERVISION AREDS 2 PO)  Self Yes No   Sig: Take 1 capsule by mouth 2 times daily AREDS 2   amLODIPine (NORVASC) 5 MG tablet   No No   Sig: Take 1 tablet (5 mg) by mouth daily   apixaban ANTICOAGULANT (ELIQUIS) 5 MG tablet   No No   Sig: Take 1 tablet (5 mg) by mouth 2 times daily.   atorvastatin (LIPITOR) 10 MG tablet   No No   Sig: Take 1 tablet (10 mg) by mouth every evening   calcium carbonate (TUMS) 500 MG chewable tablet  Self Yes No   Sig: Take 1 chew tab by mouth nightly as needed    diphenhydrAMINE-acetaminophen (TYLENOL PM)  MG tablet  Self Yes No   Sig: Take 2 tablets by mouth nightly as needed for sleep   leuprolide acetate (LUPRON) 1 MG/0.2ML kit  Self Yes No   Sig: Inject Subcutaneous every 6 months He gets this at [facility]. Last given on 4/2019.   Patient not taking: Reported on 10/10/2024   loratadine (CLARITIN) 10 MG tablet  Self Yes No   Sig: Take 1 tablet by mouth daily.   losartan (COZAAR) 50 MG tablet   No No   Sig: Take 1 tablet (50 mg) by mouth daily   melatonin 5 MG tablet   Yes No   Sig: Take 5 mg by mouth nightly as needed for sleep     metoprolol succinate ER (TOPROL XL) 25 MG 24 hr tablet   No No   Sig: Take 1 tablet (25 mg) by mouth daily   order for DME   No No   Sig: Equipment being ordered: Walker Wheels () and Walker ()  Treatment Diagnosis: Impaired gait   sildenafil (VIAGRA) 100 MG tablet   No No   Sig: Take 1 tablet (100 mg) by mouth daily as needed (intercourse)   Patient not taking: Reported on 10/10/2024   vitamin D3 (CHOLECALCIFEROL) 50 mcg (2000 units) tablet   Yes No   Sig: Take 1 tablet by mouth daily      Facility-Administered Medications: None           Physical Exam   Vital Signs: Temp: 98  F (36.7  C) Temp src: Oral BP: 134/80 Pulse: 74   Resp: 16 SpO2: 97 % O2 Device: None (Room air)    Weight: 164 lbs 0 oz    General Appearance: Well-nourished elderly male in no acute distress  Eyes: Pupils equal, round and reactive to light, sclera anicteric  HEENT: Mucous membranes mildly dry, no neck of adenopathy  Respiratory: Lungs clear to auscultation bilaterally, wheezes or crackles, no tachypnea  Cardiovascular: Irregular rhythm, normal S1/S2 without murmur  GI: Abdomen soft, nontender, nondistended, normal bowel sounds  Lymph/Hematologic: No peripheral edema  Skin: Abrasions to bilateral knees  Musculoskeletal: Extremities warm well-perfused  Neurologic: Alert and oriented x 3, cranial nerves II through XII intact, 5 of 5 strength of upper extremities, no pronator drift, 3 of 5 strength in bilateral lower extremities with left slightly weaker than the right, sensation to light touch intact, mild rest tremor of bilateral upper extremities and possible slight cogwheeling  Psychiatric: Normal affect    Medical Decision Making       75 MINUTES SPENT BY ME on the date of service doing chart review, history, exam, documentation & further activities per the note.      Data     I have personally reviewed the following data over the past 24 hrs:    17.4 (H)  \   16.8   / 277     141 102 27.6 (H) /  110 (H)   4.4 25 0.94 \      ALT: 19 AST: 67 (H) AP: 108 TBILI: 0.7   ALB: 3.9 TOT PROTEIN: 7.4 LIPASE: N/A       Imaging results reviewed over the past 24 hrs:   Recent Results (from the past 24 hours)   XR Knee Bilateral G/E 4 Views    Narrative    EXAM: XR KNEE BILATERAL G/E 4 VIEWS  LOCATION: [facility]  DATE: 6/7/2025    INDICATION: fall, pain  COMPARISON: None.      Impression    IMPRESSION:   No acute displaced fracture or subluxation. Mild tricompartmental knee osteoarthritis bilaterally. Bilateral chondrocalcinosis. No left or right knee joint effusion. Vascular calcifications.   Chest XR,  PA & LAT    Narrative    EXAM: XR CHEST 2 VIEWS  LOCATION: [facility]  DATE: 6/7/2025    INDICATION: cough  COMPARISON: Chest radiograph 7/29/2019 and CT chest 9/5/2022.      Impression    IMPRESSION: Cardiomegaly. Slightly increased interstitial prominence favored to be due to scarring. No focal consolidation, pleural effusion, or pneumothorax.   Head CT w/o contrast    Narrative    EXAM: CT HEAD W/O CONTRAST, CT CERVICAL SPINE W/O CONTRAST  LOCATION: [facility]  DATE/TIME: 6/7/2025 8:42 AM CDT    INDICATION: Headache and neck pain after trauma  COMPARISON: CTA of the head and neck dated 2 2019  TECHNIQUE:   1) Routine CT Head without IV contrast. Multiplanar reformats. Dose reduction techniques were used.  2) Routine CT Cervical Spine without IV contrast. Multiplanar reformats. Dose reduction techniques were used.    FINDINGS:  HEAD CT:   INTRACRANIAL CONTENTS: No acute intracranial hemorrhage. Chronic cerebellar hemisphere and right frontal lobe infarcts without CT evidence of acute infarct. Sequelae of moderate chronic microangiopathy. Mild to moderate cerebral volume loss without   hydrocephalus. No extra-axial fluid collections.  Patent basal cisterns.     VISUALIZED ORBITS/SINUSES/MASTOIDS: The orbits are unremarkable. The visualized paranasal sinuses and  temporal bone structures are well-aerated.     BONES/SOFT TISSUES: The calvarium and skull base are unremarkable.  Severe temporomandibular joint degenerative change.    CERVICAL SPINE CT:  VERTEBRA: The spine is imaged from the skull base through T2. Straightening of the usual cervical lordosis without significant spondylolisthesis. No evidence of acute fracture. No aggressive osseous lesion.      CANAL/FORAMINA: Multilevel degenerative changes without high-grade spinal canal stenosis, however there is moderate right neural foraminal narrowing at C4-C5 and C5-C6    PARASPINAL: No prevertebral or paravertebral soft tissue swelling.  Visualized lungs are clear. The thyroid gland is unremarkable. Mild carotid artery bifurcation calcification.      Impression    IMPRESSION:    HEAD CT:  1. 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    CERVICAL SPINE CT:  1. No acute traumatic injury of the cervical spine.    CT Cervical Spine w/o Contrast    Narrative    EXAM: CT HEAD W/O CONTRAST, CT CERVICAL SPINE W/O CONTRAST  LOCATION: [facility]  DATE/TIME: 6/7/2025 8:42 AM CDT    INDICATION: Headache and neck pain after trauma  COMPARISON: CTA of the head and neck dated 2 2019  TECHNIQUE:   1) Routine CT Head without IV contrast. Multiplanar reformats. Dose reduction techniques were used.  2) Routine CT Cervical Spine without IV contrast. Multiplanar reformats. Dose reduction techniques were used.    FINDINGS:  HEAD CT:   INTRACRANIAL CONTENTS: No acute intracranial hemorrhage. Chronic cerebellar hemisphere and right frontal lobe infarcts without CT evidence of acute infarct. Sequelae of moderate chronic microangiopathy. Mild to moderate cerebral volume loss without   hydrocephalus. No extra-axial fluid collections.  Patent basal cisterns.     VISUALIZED ORBITS/SINUSES/MASTOIDS: The orbits  are unremarkable. The visualized paranasal sinuses and temporal bone structures are well-aerated.     BONES/SOFT TISSUES: The calvarium and skull base are unremarkable.  Severe temporomandibular joint degenerative change.    CERVICAL SPINE CT:  VERTEBRA: The spine is imaged from the skull base through T2. Straightening of the usual cervical lordosis without significant spondylolisthesis. No evidence of acute fracture. No aggressive osseous lesion.      CANAL/FORAMINA: Multilevel degenerative changes without high-grade spinal canal stenosis, however there is moderate right neural foraminal narrowing at C4-C5 and C5-C6    PARASPINAL: No prevertebral or paravertebral soft tissue swelling.  Visualized lungs are clear. The thyroid gland is unremarkable. Mild carotid artery bifurcation calcification.      Impression    IMPRESSION:    HEAD CT:  1. 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    CERVICAL SPINE CT:  1. No acute traumatic injury of the cervical spine.

## 2025-06-07 NOTE — PHARMACY-ADMISSION MEDICATION HISTORY
Pharmacist Admission Medication History    Admission medication history is complete. The information provided in this note is only as accurate as the sources available at the time of the update.    Information Source(s): Patient and CareEverywhere/SureScripts via in-person    Pertinent Information:   - Patient reports that he takes tylenol PM most nights.    Changes made to PTA medication list:  Added: None  Deleted:   Melatonin  Changed: None    Allergies reviewed with patient and updates made in EHR: yes    Medication History Completed By: Jasmina Garcia [facility] 6/7/2025 11:22 AM    PTA Med List   Medication Sig Last Dose/Taking    amLODIPine (NORVASC) 5 MG tablet Take 1 tablet (5 mg) by mouth daily 6/6/2025    apixaban ANTICOAGULANT (ELIQUIS) 5 MG tablet Take 1 tablet (5 mg) by mouth 2 times daily. 6/6/2025    atorvastatin (LIPITOR) 10 MG tablet Take 1 tablet (10 mg) by mouth every evening 6/6/2025    calcium carbonate (TUMS) 500 MG chewable tablet Take 1 chew tab by mouth nightly as needed  Taking As Needed    diphenhydrAMINE-acetaminophen (TYLENOL PM)  MG tablet Take 2 tablets by mouth nightly as needed for sleep Past Week    loratadine (CLARITIN) 10 MG tablet Take 1 tablet by mouth daily. 6/6/2025    losartan (COZAAR) 50 MG tablet Take 1 tablet (50 mg) by mouth daily 6/6/2025    Multiple Vitamins-Minerals (PRESERVISION AREDS 2 PO) Take 1 capsule by mouth 2 times daily AREDS 2 6/6/2025    vitamin D3 (CHOLECALCIFEROL) 50 mcg (2000 units) tablet Take 1 tablet by mouth daily 6/6/2025

## 2025-06-07 NOTE — ED TRIAGE NOTES
Pt biba from home - pt normally ambulates with cane/walker. Pt went into garage at 2000 last night to put air in his car tire, squatted down and fell forward onto knees while attempting to get up. Pt then crawled to the wooden ramp leading to door and attempted to get up again and fell backwards. Denies hitting head, pt on eliquis. Pt then remained on wooden ramp for the rest of the night - did not call 911 because his phone had a weird message on it and was not working. Called 911 this morning.

## 2025-06-07 NOTE — ED PROVIDER NOTES
Emergency Department Note      History of Present Illness     Chief Complaint   Fall    HPI   Yosef Murry is an anticoagulated 89 year old male with a history of TIA, prostate cancer, and hyperlipidemia who presents for evaluation following a fall. Patient reports that he was kneeling to put air in his car tires last night around 2000, when he was unable to get up. He reports crawling on his knees back to the ramp that takes him inside his house hoping to get up there; however, he continued to have trouble getting onto his feet. He then fell from the top of the ramp to the middle of the ramp, but denies any head injury. He states that he couldn't get his phone to work and ended up spending the night on the ramp. He was finally able to call 911 earlier this morning when his phone started working again. He reports pain in his neck, noting that the back of his neck is sore, and pain in his back with movement. He also has some soreness in his knees and hips, left greater than right. Denies pain on side of his hips. Denies pain in abdomen or front of chest. Denies any lumps or bumps on the head. No fevers or dysuria. He was at baseline yesterday prior to the event. Patient notes that he has been getting over a bad cold, and he has some leftover coughing from that.     His friend adds that he had a similar event last year in [place], which they believed to be a TIA. He received rehabilitation for this and has returned to baseline. He lives alone but but close to family.      Independent Historian   Patient's fried and daughter assisted in providing the above history.     Review of External Notes       Past Medical History   Medical History and Problem List   Anal fistula  Diverticulitis  Impacted cerumen  Transient cerebral ischemia attacks  Sensorineural hearing loss  Tinnitus  Atrial flutter  Prostate cancer  Atrial fibrillation  Hyperlipidemia    Medications  "  Amlodipine  Apixaban  Atorvastatin  Losartan  Metoprolol succinate  Sildenafil    Surgical History   Colonoscopy  Tonsillectomy  Prostatectomy  Hernia repair    Physical Exam   Patient Vitals for the past 24 hrs:   BP Temp Temp src Pulse Resp SpO2 Height Weight   06/07/25 1415 -- -- -- -- -- -- 1.778 m (5' 10\") 81 kg (178 lb 9.2 oz)   06/07/25 1245 128/77 99.7  F (37.6  C) Oral 79 16 94 % -- --   06/07/25 1200 (!) 131/90 -- -- 82 16 95 % -- --   06/07/25 1130 (!) 140/102 -- -- 91 15 95 % -- --   06/07/25 1030 (!) 144/88 -- -- 89 15 96 % -- --   06/07/25 0930 134/80 -- -- 74 16 97 % -- --   06/07/25 0843 (!) 141/75 -- -- 71 16 96 % -- --   06/07/25 0619 (!) 146/110 98  F (36.7  C) Oral 88 18 96 % -- 74.4 kg (164 lb)     Physical Exam  Gen: well appearing, in no acute distress  Oral : Mucous membranes moist,   Nose: No rhinorhea  Ears: External near normal, without drainage  Eyes: periorbital tissues and sclera normal   Neck: C-spine mildly tender.,  Range of motion intact  Lungs: Clear bilaterally, no tachypnea or distress, speaks full sentences  CV: Regular rate, regular rhythm  Abd: soft, nontender, nondistended, no rebound/guarding  Ext: no lower extremity edema. Unable to lift legs off of bed bilaterally.  Knees with superficial abrasions bilaterally. Hips non tender bilaterally.  Skin: warm, dry, well perfused, no rashes/bruising/lesions on exposed skin  Neuro: alert, no gross motor or sensory deficits,   Psych: pleasant mood, normal affect    Diagnostics   Lab Results   Labs Ordered and Resulted from Time of ED Arrival to Time of ED Departure   BASIC METABOLIC PANEL - Abnormal       Result Value    Sodium 141      Potassium 4.4      Chloride 102      Carbon Dioxide (CO2) 25      Anion Gap 14      Urea Nitrogen 27.6 (*)     Creatinine 0.94      GFR Estimate 78      Calcium 9.8      Glucose 110 (*)    CK TOTAL - Abnormal    CK 3,088 (*)    HEPATIC FUNCTION PANEL - Abnormal    Protein Total 7.4      Albumin 3.9  "     Bilirubin Total 0.7      Alkaline Phosphatase 108      AST 67 (*)     ALT 19      Bilirubin Direct 0.23     CBC WITH PLATELETS AND DIFFERENTIAL - Abnormal    WBC Count 17.4 (*)     RBC Count 5.54      Hemoglobin 16.8      Hematocrit 50.3      MCV 91      MCH 30.3      MCHC 33.4      RDW 14.0      Platelet Count 277      % Neutrophils 90      % Lymphocytes 4      % Monocytes 5      % Eosinophils 0      % Basophils 0      % Immature Granulocytes 1      NRBCs per 100 WBC 0      Absolute Neutrophils 15.7 (*)     Absolute Lymphocytes 0.7 (*)     Absolute Monocytes 0.9      Absolute Eosinophils 0.0      Absolute Basophils 0.0      Absolute Immature Granulocytes 0.1      Absolute NRBCs 0.0     UA MACROSCOPIC WITH REFLEX TO MICRO AND CULTURE     Imaging   CT Cervical Spine w/o Contrast   Final Result   IMPRESSION:      HEAD CT:   1. 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      CERVICAL SPINE CT:   1. No acute traumatic injury of the cervical spine.       Head CT w/o contrast   Final Result   IMPRESSION:      HEAD CT:   1. 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      CERVICAL SPINE CT:   1. No acute traumatic injury of the cervical spine.       Chest XR,  PA & LAT   Final Result   IMPRESSION: Cardiomegaly. Slightly increased interstitial prominence favored to be due to scarring. No focal consolidation, pleural effusion, or pneumothorax.      XR Knee Bilateral G/E 4 Views   Final Result   IMPRESSION:    No acute displaced fracture or subluxation. Mild tricompartmental knee osteoarthritis bilaterally. Bilateral chondrocalcinosis. No left or right knee joint effusion. Vascular calcifications.      MR Lumbar Spine w/o & w Contrast    (Results Pending)     EKG   ECG taken at 0732, ECG read at  0733  Atrial fibrillation   Incomplete right bundle branch block   Nonspecific ST and T wave abnormality  QTcB>=480 msec  Abnormal ECG  No significant change as compared to prior, dated 8 /1/19.  Rate 75 bpm. [place] interval * ms. QRS duration 100 ms. QT/QTc 436/486 ms. P-R-T axes * 3 14.    Independent Interpretation   CT Head: No intracranial hemorrhage.    ED Course    Medications Administered   Medications   senna-docusate (SENOKOT-S/PERICOLACE) 8.6-50 MG per tablet 1 tablet (has no administration in time range)     Or   senna-docusate (SENOKOT-S/PERICOLACE) 8.6-50 MG per tablet 2 tablet (has no administration in time range)   ondansetron (ZOFRAN ODT) ODT tab 4 mg (has no administration in time range)     Or   ondansetron (ZOFRAN) injection 4 mg (has no administration in time range)   prochlorperazine (COMPAZINE) injection 5 mg (has no administration in time range)     Or   prochlorperazine (COMPAZINE) tablet 5 mg (has no administration in time range)   lidocaine 1 % 0.1-1 mL (has no administration in time range)   lidocaine (LMX4) cream (has no administration in time range)   sodium chloride (PF) 0.9% PF flush 3 mL (has no administration in time range)   sodium chloride (PF) 0.9% PF flush 3 mL (3 mLs Intracatheter Not Given 6/7/25 1334)   Patient is already receiving anticoagulation with heparin, enoxaparin (LOVENOX), warfarin (COUMADIN)  or other anticoagulant medication (has no administration in time range)   acetaminophen (TYLENOL) tablet 650 mg (has no administration in time range)     Or   acetaminophen (TYLENOL) Suppository 650 mg (has no administration in time range)   melatonin tablet 1 mg (has no administration in time range)   polyethylene glycol (MIRALAX) Packet 17 g (has no administration in time range)   sodium chloride 0.9 % infusion ( Intravenous $New Bag 6/7/25 1330)   amLODIPine (NORVASC) tablet 5 mg (5 mg Oral $Given 6/7/25 1329)   apixaban ANTICOAGULANT (ELIQUIS) tablet 5 mg (5 mg Oral $Given  6/7/25 1406)   losartan (COZAAR) tablet 50 mg (50 mg Oral $Given 6/7/25 1329)   metoprolol succinate ER (TOPROL XL) 24 hr tablet 25 mg (25 mg Oral $Given 6/7/25 1329)   sodium chloride 0.9% BOLUS 500 mL (0 mLs Intravenous Stopped 6/7/25 0841)   sodium chloride 0.9% BOLUS 1,000 mL (0 mLs Intravenous Stopped 6/7/25 1042)     Procedures   Procedures     Discussion of Management   Admitting Hospitalist, Dr. Hamlin, as noted.    ED Course   ED Course as of 06/07/25 1428   Sat Jun 07, 2025   0710 I obtained history and examined the patient as noted above.    1008 I rechecked and updated the patient.      1012 I spoke with Dr. Hamlin, of the hospitalist team, regarding the patient. They accepted the patient for admission to the hospital.      Additional Documentation  None    Medical Decision Making / Diagnosis   CMS Diagnoses: None    MIPS   None       MDM   Yosef Murry is a 89 year old male presents after a fall at home last night.  Laid on the ground in his garage most of the night afterwards.  Minor knee abrasions present no obvious long bone deformity.  CT imaging shows no acute trauma, patient has elevated CK providing saline in the ED to flush that.  No obvious localizing stroke symptoms on exam, given the weakness and CK elevation I think inpatient management is appropriate.  Contacted hospitalist who is in agreement.    Disposition   The patient was admitted to the hospital.     Diagnosis     ICD-10-CM    1. Fall, initial encounter  W19.XXXA       2. Generalized weakness  R53.1       3. Elevated CK  R74.8       4. Abrasion of both knees  S80.211A     S80.212A          Discharge Medications   Current Discharge Medication List        Scribe Disclosure:  Clara DIAZ, am serving as a scribe at 8:11 AM on 6/7/2025 to document services personally performed by Filipe Tse MD based on my observations and the provider's statements to me.     Scribe Disclosure:  James DIAZ, am serving  as a scribe at 8:13 AM on 6/7/2025 to document services personally performed by Filipe Tse MD based on my observations and the provider's statements to me.         Filipe Tse MD  06/07/25 9232

## 2025-06-07 NOTE — CONSULTS
Neurology Consultation    Yosef Murry MRN# [id number]   YOB: 1935 Age: 89 year old    Code Status:No CPR- Do NOT Intubate   Date of Admission: 6/7/2025  Date of Consult:06/07/2025                                                                                         Reason for consult: This neurological consultation was requested by  to assess this patient for generalized weakness, and profound weakness in both lower extremities.         Chief Complaint:   Mr. Murry is an 89-year-old patient with a past history for CVA, TIA, atrial fibs/flutter and a history for prostate cancer with concerns regarding rising PSA who indicated that he has had residual left leg weakness following his ischemic stroke but was able to ambulate with a cane, sometimes using a walker without difficulty.  He stated that on the evening prior to his admission, he had gone to his car to fix his tire and when he tried to rise from that squatted position he could not stand and he fell backwards and crawled away from the car but was unable to access his phone and laid on the ground, concrete and board, until the following morning when for some reason he was able to dial 911 and was brought to the ED where he was evaluated.  Routine labs revealed that his CK level was 3086, concern regarding rhabdo, electrolytes were normal.  A CT scan of the head revealed no acute changes, no midline shift, no evidence of intracranial hemorrhage, no extra-axial fluid collection, there were chronic cerebellar and right frontal lobe infarcts superimposed on the background of moderate, chronic microvascular ischemic changes, there was no hydrocephalus.  A CT scan of the cervical spine revealed no acute traumatic injury, no subluxation or fractures there were multilevel degenerative changes noted.         History of Present Illness:   This patient is a 89 year old male who presents who was admitted through the ED this morning  where he presented himself, brought by EMS after he had been down, laying in his garage since yesterday evening having acute difficulty with weakness in the legs.  He acknowledged that he has had low back pain, nonradiating, yet also acknowledge having cervical discomfort, this was not acute and no acute radicular symptoms.  He also indicated that he has been battling with urinary incontinence for some time.  On direct questioning he has not experienced any acute visual changes, no speech changes, reported no dizziness or vertiginous symptoms.  He reported no paresthesia of the upper or lower extremities.  His past medical history significant for the fact that he has had ischemic strokes, history for TIAs, reported having had a stroke in 2019 and reported that he also had a stroke 2 years ago while he was in [place] but had been able to ambulate with a cane, using a walker at times.  Family history was reviewed and was noncontributory.  There is no history for alcohol abuse, he is a non-smoker.               Past Medical History:     Past Medical History:   Diagnosis Date    Anal fistula     Antiplatelet or antithrombotic long-term use     Arrhythmia     Atrial flutter (H) 2012    Cerebral infarction (H)     TIA    Heartburn     Hypertension     Inguinal hernia, left     Persistent atrial fibrillation (H) 8/21/12    Prostate cancer (H)     TIA (transient ischaemic attack)     2014         Past Surgical History:     Past Surgical History:   Procedure Laterality Date    COLONOSCOPY      COLONOSCOPY N/A 9/24/2021    Procedure: Colonoscopy, With Polypectomy And Biopsy;  Surgeon: Jordin Rachel MD;  Location:  GI    ENT SURGERY      tonsllectomy    EXAM UNDER ANESTHESIA, FISTULOTOMY RECTUM, COMBINED N/A 6/18/2015    Procedure: COMBINED EXAM UNDER ANESTHESIA, FISTULOTOMY RECTUM;  Surgeon: Candice Carty MD;  Location: [facility]    GENITOURINARY SURGERY  1999    PROSTATECTOMY    GI SURGERY      hernia repair x 2     HERNIORRHAPHY INGUINAL  2013    Procedure: HERNIORRHAPHY INGUINAL;  LEFT INGUINAL HERNIA REPAIR WITH MESH;  Surgeon: Filipe Fairchild MD;  Location:  SD    HERNIORRHAPHY INGUINAL Right 2019    Procedure: OPEN RIGHT INGUINA HERNIA REPAIR WITH MESH;  Surgeon: Filipe Fairchild MD;  Location:  OR    ORTHOPEDIC SURGERY      WRIST SURGERY - LEFT          Social History:     Social History     Socioeconomic History    Marital status:      Spouse name: None    Number of children: None    Years of education: None    Highest education level: None   Tobacco Use    Smoking status: Former     Current packs/day: 0.00     Types: Cigarettes     Start date: 1952     Quit date: 1968     Years since quittin.4    Smokeless tobacco: Never   Vaping Use    Vaping status: Never Used   Substance and Sexual Activity    Alcohol use: Yes     Alcohol/week: 0.8 standard drinks of alcohol     Types: 1 Cans of beer per week     Comment: 1 beer a week    Drug use: No    Sexual activity: Not Currently     Partners: Female   Other Topics Concern    Caffeine Concern No     Comment: occ tea    Sleep Concern No    Stress Concern No    Weight Concern No    Special Diet No    Exercise Yes     Comment: walking 3 miles a day    Seat Belt Yes     Social Drivers of Health     Financial Resource Strain: Low Risk  (2025)    Financial Resource Strain     Within the past 12 months, have you or your family members you live with been unable to get utilities (heat, electricity) when it was really needed?: No   Food Insecurity: Low Risk  (2025)    Food Insecurity     Within the past 12 months, did you worry that your food would run out before you got money to buy more?: No     Within the past 12 months, did the food you bought just not last and you didn t have money to get more?: No   Transportation Needs: Low Risk  (2025)    Transportation Needs     Within the past 12 months, has lack of transportation kept  you from medical appointments, getting your medicines, non-medical meetings or appointments, work, or from getting things that you need?: No   Physical Activity: Unknown (10/10/2024)    Exercise Vital Sign     Days of Exercise per Week: 0 days   Stress: No Stress Concern Present (10/10/2024)    [employer] [place] [employer] - Occupational Stress Questionnaire     Feeling of Stress : Not at all   Social Connections: Unknown (10/10/2024)    Social Connection and Isolation Panel [NHANES]     Frequency of Social Gatherings with Friends and Family: Once a week   Interpersonal Safety: Low Risk  (6/7/2025)    Interpersonal Safety     Do you feel physically and emotionally safe where you currently live?: Yes     Within the past 12 months, have you been hit, slapped, kicked or otherwise physically hurt by someone?: No     Within the past 12 months, have you been humiliated or emotionally abused in other ways by your partner or ex-partner?: No   Housing Stability: Low Risk  (6/7/2025)    Housing Stability     Do you have housing? : Yes     Are you worried about losing your housing?: No     Patient denies smoking, no significant alcohol intake, denies illicit drugs use       Family History:     Family History   Problem Relation Age of Onset    Ovarian Cancer Mother     Osteoporosis Father     Unknown/Adopted Sister      Reviewed and not felt to be contributory.        Home Medications:     Prior to Admission Medications   Prescriptions Last Dose Informant Patient Reported? Taking?   Multiple Vitamins-Minerals (PRESERVISION AREDS 2 PO) 6/6/2025 Self Yes Yes   Sig: Take 1 capsule by mouth 2 times daily AREDS 2   amLODIPine (NORVASC) 5 MG tablet 6/6/2025 Self No Yes   Sig: Take 1 tablet (5 mg) by mouth daily   apixaban ANTICOAGULANT (ELIQUIS) 5 MG tablet 6/6/2025 Self No Yes   Sig: Take 1 tablet (5 mg) by mouth 2 times daily.   atorvastatin (LIPITOR) 10 MG tablet 6/6/2025 Self No Yes   Sig: Take 1 tablet (10 mg) by  mouth every evening   calcium carbonate (TUMS) 500 MG chewable tablet  Self Yes Yes   Sig: Take 1 chew tab by mouth nightly as needed    diphenhydrAMINE-acetaminophen (TYLENOL PM)  MG tablet Past Week Self Yes Yes   Sig: Take 2 tablets by mouth nightly as needed for sleep   leuprolide acetate (LUPRON) 1 MG/0.2ML kit  Self Yes No   Sig: Inject Subcutaneous every 6 months He gets this at [facility]. Last given on 4/2019.   Patient not taking: Reported on 10/10/2024   loratadine (CLARITIN) 10 MG tablet 6/6/2025 Self Yes Yes   Sig: Take 1 tablet by mouth daily.   losartan (COZAAR) 50 MG tablet 6/6/2025 Self No Yes   Sig: Take 1 tablet (50 mg) by mouth daily   metoprolol succinate ER (TOPROL XL) 25 MG 24 hr tablet  Self No No   Sig: Take 1 tablet (25 mg) by mouth daily   order for DME  Self No No   Sig: Equipment being ordered: Walker Wheels () and Walker ()  Treatment Diagnosis: Impaired gait   sildenafil (VIAGRA) 100 MG tablet  Self No No   Sig: Take 1 tablet (100 mg) by mouth daily as needed (intercourse)   Patient not taking: Reported on 10/10/2024   vitamin D3 (CHOLECALCIFEROL) 50 mcg (2000 units) tablet 6/6/2025 Self Yes Yes   Sig: Take 1 tablet by mouth daily      Facility-Administered Medications: None          Allergy:     Allergies   Allergen Reactions    Other [Seasonal Allergies]           Inpatient Medications:   Scheduled Meds:  Current Facility-Administered Medications   Medication Dose Route Frequency Provider Last Rate Last Admin    amLODIPine (NORVASC) tablet 5 mg  5 mg Oral Daily Filipe Hamlin MD   5 mg at 06/07/25 1329    apixaban ANTICOAGULANT (ELIQUIS) tablet 5 mg  5 mg Oral BID Filipe Hamlin MD   5 mg at 06/07/25 1406    losartan (COZAAR) tablet 50 mg  50 mg Oral Daily Filipe Hamlin MD   50 mg at 06/07/25 1329    metoprolol succinate ER (TOPROL XL) 24 hr tablet 25 mg  25 mg Oral Daily Filipe Hamlin MD   25 mg at 06/07/25 1329    sodium chloride (PF) 0.9% PF flush 3 mL  3 mL  "Intracatheter Q8H Filipe Hamlin MD         PRN Meds:   Current Facility-Administered Medications   Medication Dose Route Frequency Provider Last Rate Last Admin    acetaminophen (TYLENOL) tablet 650 mg  650 mg Oral Q4H PRN Filipe Hamlin MD        Or    acetaminophen (TYLENOL) Suppository 650 mg  650 mg Rectal Q4H PRN Filipe Hamlin MD        lidocaine (LMX4) cream   Topical Q1H PRN Filipe Hamlin MD        lidocaine 1 % 0.1-1 mL  0.1-1 mL Other Q1H PRN Filipe Hamlin MD        melatonin tablet 1 mg  1 mg Oral At Bedtime PRN Filipe Hamlin MD        ondansetron (ZOFRAN ODT) ODT tab 4 mg  4 mg Oral Q6H PRN Filipe Hamlin MD        Or    ondansetron (ZOFRAN) injection 4 mg  4 mg Intravenous Q6H PRN Filipe Hamlin MD        Patient is already receiving anticoagulation with heparin, enoxaparin (LOVENOX), warfarin (COUMADIN)  or other anticoagulant medication   Does not apply Continuous PRN Filipe Hamlin MD        polyethylene glycol (MIRALAX) Packet 17 g  17 g Oral BID PRN Filipe Hamlin MD        prochlorperazine (COMPAZINE) injection 5 mg  5 mg Intravenous Q6H PRN Filipe Hamlin MD        Or    prochlorperazine (COMPAZINE) tablet 5 mg  5 mg Oral Q6H PRN Filipe Hamlin MD        senna-docusate (SENOKOT-S/PERICOLACE) 8.6-50 MG per tablet 1 tablet  1 tablet Oral BID PRN Filipe Hamlin MD        Or    senna-docusate (SENOKOT-S/PERICOLACE) 8.6-50 MG per tablet 2 tablet  2 tablet Oral BID PRN Filipe Hamlin MD        sodium chloride (PF) 0.9% PF flush 3 mL  3 mL Intracatheter q1 min prn Filipe Hamlin MD                 Physical Exam:   Physical Exam   Vitals:  Height:5' 10\"  Weight:178 lbs 9.16 oz   Temp: 99.8  F (37.7  C) Temp src: Oral BP: 119/74 Pulse: 79   Resp: 16 SpO2: 95 % O2 Device: None (Room air)    General Appearance: No acute distress, normal body habitus was normocephalic and had no signs of acute head or neck trauma.  There was no lumbar tenderness and straight leg raising was leg negative.  Neuro " Exam:  Mental Status Exam: He was alert and cooperative, the speech was fluent, he was oriented to time place and person and able to follow simple and complex commands quite well and fund of knowledge was appropriate.  Cranial Nerves: Pupils were equal and sluggishly reactive to light, there was no ptosis although he appeared to have slight drooping lid on the left with left mouth droop, tongue was midline and he had symmetrical palatine movements.    Motor: He had a mild resting tremor which was more pronounced in the left greater than the right and mild gegenhalten rigidity was noted on the left.  He had fairly good strength in the upper extremities, proximally distally at 4+/5.  In the lower extremities, he had significant weakness in the hip flexors at 2/5 bilaterally and appear also to have significant weakness with the abductors and abductors of the thigh at 3/5, there was mild weakness of the left dorsiflexors, there was mild giveaway weakness in the right dorsiflexors, in the feet.  Reflexes: Reflexes were 2+/4 in the biceps and triceps, not elicited at brachioradialis, trace at the patella and not elicited at the ankles, plantar responses were equivocal with withdrawal.. Plantar signs normal.  Sensory: There was decreased appreciation of pinprick over the dorsum of the left foot, vibratory sensation was impaired in both great toes, there was no cortical sensory level.  Coordination: There was mild dysmetria to finger-to-nose testing on the left, heel-to-shin could not be performed due to weakness  Gait: Could not be tested due to weakness  Neck: No nuchal rigidity  Extremities: No clubbing, no cyanosis, no edema          Data:   ROUTINE IP LABS   CBC RESULTS:     Recent Labs   Lab 06/07/25 0722   WBC 17.4*   RBC 5.54   HGB 16.8   HCT 50.3        Basic Metabolic Panel:   Recent Labs   Lab Test 06/07/25  0722 07/07/23  0744 09/05/22  2254    142 136   POTASSIUM 4.4 4.3 4.6   CHLORIDE 102 105 102    CO2 25 25 30   BUN 27.6* 19.8 21   CR 0.94 0.92 0.90   * 67* 117*   BELLO 9.8 9.8 9.6     Liver panel:  Recent Labs   Lab Test 06/07/25  0722 07/07/23  0744 09/30/21  0846 09/23/20  1036 07/29/19  1952 06/10/19  0949   PROTTOTAL 7.4 7.7  --  7.4 8.0 7.3   ALBUMIN 3.9 3.9  --  3.3* 3.4 3.3*   BILITOTAL 0.7 0.7  --  0.5 1.0 0.5   ALKPHOS 108 108  --  109 100 103   AST 67* 19  --  16 13 17   ALT 19 10 25 16 14 16     Lipid Profile:  Recent Labs   Lab Test 10/10/24  0746 07/07/23  0744 10/03/22  0948 09/30/21  0846 09/23/20  1036   CHOL 106 127 112 135 111   HDL 36* 40 46 37* 37*   LDL 44 67 46 70 32   TRIG 129 99 99 142 210*     Thyroid Panel:  Recent Labs   Lab Test 06/07/25  0722 09/30/21  0846 09/23/20  1036 07/29/19  1952 06/10/19  0949   TSH 4.36* 3.10 2.75 4.68* 4.51*   T4 1.03  --   --  0.94 0.91      Vitamin B12:   Recent Labs   Lab Test 09/30/21  0846   B12 395           IMAGING:   Independent interpretation of the following studies by myself as part of today's encounter.   CT head:6/7/2 2025    CT scan of the head revealed no acute changes, no midline shift, no evidence of intracranial hemorrhage, no extra-axial fluid collection, there were chronic cerebellar and right frontal lobe infarcts superimposed on the background of moderate, chronic microvascular ischemic changes, there was no hydrocephalus.  CT scan of the cervical spine revealed no acute traumatic injury, no subluxation or fractures there were multilevel degenerative changes noted.d:   --    --  MRI brain:   --        Assessment and Plan:                                         #.  Impression:  -- Yosef Murry is an 89-year-old patient with a past history for CVA who was brought to the ED on 6/6/2025 reporting that he had been down on his garage floor for about 8 to 9 hours, after he had difficulty rising from a stooped position after fixing his car tires and had and laid there until morning when he finally was able to call [id number] and was  brought to the ED where he was evaluated with a noncontrast CT scan of the head and CT of the head and neck which did not reveal any acute changes, no evidence of intracranial hemorrhage, no extra-axial fluid collection or midline shift or evidence of acute infarct.  The CT of the cervical spine did not reveal any evidence for fracture or subluxation.  Etiological considerations    1.  He does have significant weakness in the lower extremities, bilaterally and acknowledge having low back pain and bladder incontinence.  --- MRI of the thoracic and lumbar spine is recommended to rule out a compressive lesion.  --As the weakness is confined to his legs, we will continue to monitor to see whether there is any concern regarding acute, generalized weakness which could be then suspicious for GBS.  2.  On examination he does have bradykinesia and a mild resting tremor and mild leadpipe rigidity in the left upper extremity, will need continued monitoring to see whether he has underlying extraparametal disorder, parkinsonism.      -- Recommendations:  1.  MRI of the brain to assess for acute or subacute ischemic stroke, CT scan of the head was not suspicious for hydrocephalus.  2.  MRI of the lumbar and thoracic spine.  3.  Laboratory studies, vitamin B12 level, folic acid level, zinc, copper, vitamin A, vitamin D levels and homocystine level to rule out metabolic myelopathy.      #. PT/OT/Speech      Time:  60 minutes evaluation and management.     Monica Desouza M.D.  [facility]  Office [phone]

## 2025-06-07 NOTE — PROGRESS NOTES
Pt arrived to this unit at ~ 1245. A&O x4 with intermittent confusion. Temp 99.8. Tyl x1. RA. Denied SOB or CP. Pain on back. Weakness on BLE. Up x2 with randall steady. Abrasion on bi knee and blister on coccyx area. NS infusing. Bladder scan 325 ml. Pt went to MRI.

## 2025-06-07 NOTE — ED NOTES
[facility]  ED Nurse Handoff Report    ED Chief complaint: Fall      ED Diagnosis:   Final diagnoses:   Fall, initial encounter   Generalized weakness   Elevated CK   Abrasion of both knees       Code Status: DNR / DNI    Allergies:   Allergies   Allergen Reactions    Other [Seasonal Allergies]        Patient Story: Pt fell forward onto knees while working on car at 8pm last night. Could not get up and crawled to a ramp to attempt to get up with. Pt could not get up still and fell backwards where pt remained on the ground until about 0530 this am. Denies head injury. On eliquis.  Focused Assessment:  A&Ox4. Calm and cooperative. Ambulatory at home with walker. Chronic AFIB. Denies pain.    Treatments and/or interventions provided: Labs, imaging, 1.5L bolus NS.  Patient's response to treatments and/or interventions: Tolerated    To be done/followed up on inpatient unit:  UA. Pt has not urinated yet.    Does this patient have any cognitive concerns?: N/a    Activity level - Baseline/Home:  Walker  Activity Level - Current:   Stand with assist x2    Patient's Preferred language: English   Needed?: No    Isolation: None  Infection: Not Applicable  Sepsis treatment initiated: No  Patient tested for COVID 19 prior to admission: NO  Bariatric?: No    Vital Signs:   Vitals:    06/07/25 0619 06/07/25 0843 06/07/25 0930   BP: (!) 146/110 (!) 141/75 134/80   Pulse: 88 71 74   Resp: 18 16 16   Temp: 98  F (36.7  C)     TempSrc: Oral     SpO2: 96% 96% 97%   Weight: 74.4 kg (164 lb)         Cardiac Rhythm:     Was the PSS-3 completed:   Yes  What interventions are required if any?               Family Comments: n/a  OBS brochure/video discussed/provided to patient/family: Yes              Name of person given brochure if not patient:               Relationship to patient:     For the majority of the shift this patient's behavior was Green.   Behavioral interventions performed were n/a.    ED NURSE  PHONE NUMBER: [phone]

## 2025-06-08 ENCOUNTER — APPOINTMENT (OUTPATIENT)
Dept: PHYSICAL THERAPY | Facility: CLINIC | Age: 89
DRG: 029 | End: 2025-06-08
Attending: HOSPITALIST
Payer: MEDICARE

## 2025-06-08 ENCOUNTER — APPOINTMENT (OUTPATIENT)
Dept: MRI IMAGING | Facility: CLINIC | Age: 89
DRG: 029 | End: 2025-06-08
Attending: PSYCHIATRY & NEUROLOGY
Payer: MEDICARE

## 2025-06-08 LAB
ALBUMIN SERPL BCG-MCNC: 2.8 G/DL (ref 3.5–5.2)
ALBUMIN SERPL BCG-MCNC: 3.2 G/DL (ref 3.5–5.2)
ALP SERPL-CCNC: 79 U/L (ref 40–150)
ALP SERPL-CCNC: 87 U/L (ref 40–150)
ALT SERPL W P-5'-P-CCNC: 36 U/L (ref 0–70)
ALT SERPL W P-5'-P-CCNC: 38 U/L (ref 0–70)
ANION GAP SERPL CALCULATED.3IONS-SCNC: 10 MMOL/L (ref 7–15)
ANION GAP SERPL CALCULATED.3IONS-SCNC: 9 MMOL/L (ref 7–15)
AST SERPL W P-5'-P-CCNC: 136 U/L (ref 0–45)
AST SERPL W P-5'-P-CCNC: 148 U/L (ref 0–45)
BILIRUB DIRECT SERPL-MCNC: 0.26 MG/DL (ref 0–0.3)
BILIRUB SERPL-MCNC: 0.8 MG/DL
BILIRUB SERPL-MCNC: 0.8 MG/DL
BUN SERPL-MCNC: 18 MG/DL (ref 8–23)
BUN SERPL-MCNC: 18.3 MG/DL (ref 8–23)
CALCIUM SERPL-MCNC: 8.4 MG/DL (ref 8.8–10.4)
CALCIUM SERPL-MCNC: 8.9 MG/DL (ref 8.8–10.4)
CHLORIDE SERPL-SCNC: 101 MMOL/L (ref 98–107)
CHLORIDE SERPL-SCNC: 103 MMOL/L (ref 98–107)
CK SERPL-CCNC: 2494 U/L (ref 39–308)
CK SERPL-CCNC: 3994 U/L (ref 39–308)
CK SERPL-CCNC: 4089 U/L (ref 39–308)
CREAT SERPL-MCNC: 0.85 MG/DL (ref 0.67–1.17)
CREAT SERPL-MCNC: 0.85 MG/DL (ref 0.67–1.17)
CRP SERPL-MCNC: 225.29 MG/L
EGFRCR SERPLBLD CKD-EPI 2021: 84 ML/MIN/1.73M2
EGFRCR SERPLBLD CKD-EPI 2021: 84 ML/MIN/1.73M2
ERYTHROCYTE [DISTWIDTH] IN BLOOD BY AUTOMATED COUNT: 14.8 % (ref 10–15)
ERYTHROCYTE [DISTWIDTH] IN BLOOD BY AUTOMATED COUNT: 14.9 % (ref 10–15)
ERYTHROCYTE [SEDIMENTATION RATE] IN BLOOD BY WESTERGREN METHOD: 19 MM/HR (ref 0–20)
FOLATE SERPL-MCNC: 10.7 NG/ML (ref 4.6–34.8)
GLUCOSE SERPL-MCNC: 112 MG/DL (ref 70–99)
GLUCOSE SERPL-MCNC: 124 MG/DL (ref 70–99)
HCO3 SERPL-SCNC: 23 MMOL/L (ref 22–29)
HCO3 SERPL-SCNC: 24 MMOL/L (ref 22–29)
HCT VFR BLD AUTO: 47.8 % (ref 40–53)
HCT VFR BLD AUTO: 48.7 % (ref 40–53)
HGB BLD-MCNC: 15.4 G/DL (ref 13.3–17.7)
HGB BLD-MCNC: 15.9 G/DL (ref 13.3–17.7)
MCH RBC QN AUTO: 30.4 PG (ref 26.5–33)
MCH RBC QN AUTO: 30.6 PG (ref 26.5–33)
MCHC RBC AUTO-ENTMCNC: 32.2 G/DL (ref 31.5–36.5)
MCHC RBC AUTO-ENTMCNC: 32.6 G/DL (ref 31.5–36.5)
MCV RBC AUTO: 94 FL (ref 78–100)
MCV RBC AUTO: 95 FL (ref 78–100)
PHOSPHATE SERPL-MCNC: 1.9 MG/DL (ref 2.5–4.5)
PHOSPHATE SERPL-MCNC: 2 MG/DL (ref 2.5–4.5)
PLATELET # BLD AUTO: 219 10E3/UL (ref 150–450)
PLATELET # BLD AUTO: 232 10E3/UL (ref 150–450)
POTASSIUM SERPL-SCNC: 4.4 MMOL/L (ref 3.4–5.3)
POTASSIUM SERPL-SCNC: 4.5 MMOL/L (ref 3.4–5.3)
PROT SERPL-MCNC: 6.2 G/DL (ref 6.4–8.3)
PROT SERPL-MCNC: 6.7 G/DL (ref 6.4–8.3)
RBC # BLD AUTO: 5.06 10E6/UL (ref 4.4–5.9)
RBC # BLD AUTO: 5.19 10E6/UL (ref 4.4–5.9)
SODIUM SERPL-SCNC: 135 MMOL/L (ref 135–145)
SODIUM SERPL-SCNC: 135 MMOL/L (ref 135–145)
TOTAL PROTEIN SERUM FOR ELP: 5.9 G/DL (ref 6.4–8.3)
WBC # BLD AUTO: 12 10E3/UL (ref 4–11)
WBC # BLD AUTO: 13 10E3/UL (ref 4–11)

## 2025-06-08 PROCEDURE — 85027 COMPLETE CBC AUTOMATED: CPT | Performed by: HOSPITALIST

## 2025-06-08 PROCEDURE — 84207 ASSAY OF VITAMIN B-6: CPT | Performed by: PSYCHIATRY & NEUROLOGY

## 2025-06-08 PROCEDURE — 85652 RBC SED RATE AUTOMATED: CPT | Performed by: INTERNAL MEDICINE

## 2025-06-08 PROCEDURE — 82310 ASSAY OF CALCIUM: CPT | Performed by: INTERNAL MEDICINE

## 2025-06-08 PROCEDURE — 72156 MRI NECK SPINE W/O & W/DYE: CPT

## 2025-06-08 PROCEDURE — 99233 SBSQ HOSP IP/OBS HIGH 50: CPT | Performed by: INTERNAL MEDICINE

## 2025-06-08 PROCEDURE — 97161 PT EVAL LOW COMPLEX 20 MIN: CPT | Mod: GP | Performed by: PHYSICAL THERAPIST

## 2025-06-08 PROCEDURE — 36415 COLL VENOUS BLD VENIPUNCTURE: CPT | Performed by: INTERNAL MEDICINE

## 2025-06-08 PROCEDURE — 84100 ASSAY OF PHOSPHORUS: CPT | Performed by: INTERNAL MEDICINE

## 2025-06-08 PROCEDURE — 255N000002 HC RX 255 OP 636: Performed by: PSYCHIATRY & NEUROLOGY

## 2025-06-08 PROCEDURE — 85014 HEMATOCRIT: CPT | Performed by: INTERNAL MEDICINE

## 2025-06-08 PROCEDURE — 82550 ASSAY OF CK (CPK): CPT | Performed by: HOSPITALIST

## 2025-06-08 PROCEDURE — 250N000013 HC RX MED GY IP 250 OP 250 PS 637: Performed by: HOSPITALIST

## 2025-06-08 PROCEDURE — G0378 HOSPITAL OBSERVATION PER HR: HCPCS

## 2025-06-08 PROCEDURE — 36415 COLL VENOUS BLD VENIPUNCTURE: CPT | Performed by: PSYCHIATRY & NEUROLOGY

## 2025-06-08 PROCEDURE — 84425 ASSAY OF VITAMIN B-1: CPT | Performed by: PSYCHIATRY & NEUROLOGY

## 2025-06-08 PROCEDURE — 250N000011 HC RX IP 250 OP 636: Performed by: PHYSICIAN ASSISTANT

## 2025-06-08 PROCEDURE — 86255 FLUORESCENT ANTIBODY SCREEN: CPT | Performed by: PSYCHIATRY & NEUROLOGY

## 2025-06-08 PROCEDURE — 99222 1ST HOSP IP/OBS MODERATE 55: CPT | Performed by: PHYSICIAN ASSISTANT

## 2025-06-08 PROCEDURE — 36415 COLL VENOUS BLD VENIPUNCTURE: CPT | Performed by: HOSPITALIST

## 2025-06-08 PROCEDURE — 86789 WEST NILE VIRUS ANTIBODY: CPT | Performed by: PSYCHIATRY & NEUROLOGY

## 2025-06-08 PROCEDURE — 97530 THERAPEUTIC ACTIVITIES: CPT | Mod: GP | Performed by: PHYSICAL THERAPIST

## 2025-06-08 PROCEDURE — 72157 MRI CHEST SPINE W/O & W/DYE: CPT

## 2025-06-08 PROCEDURE — 84165 PROTEIN E-PHORESIS SERUM: CPT | Mod: TC | Performed by: STUDENT IN AN ORGANIZED HEALTH CARE EDUCATION/TRAINING PROGRAM

## 2025-06-08 PROCEDURE — 86618 LYME DISEASE ANTIBODY: CPT | Performed by: PSYCHIATRY & NEUROLOGY

## 2025-06-08 PROCEDURE — 120N000001 HC R&B MED SURG/OB

## 2025-06-08 PROCEDURE — 82248 BILIRUBIN DIRECT: CPT | Performed by: HOSPITALIST

## 2025-06-08 PROCEDURE — 84165 PROTEIN E-PHORESIS SERUM: CPT | Mod: 26 | Performed by: STUDENT IN AN ORGANIZED HEALTH CARE EDUCATION/TRAINING PROGRAM

## 2025-06-08 PROCEDURE — 82550 ASSAY OF CK (CPK): CPT | Performed by: INTERNAL MEDICINE

## 2025-06-08 PROCEDURE — 80048 BASIC METABOLIC PNL TOTAL CA: CPT | Performed by: HOSPITALIST

## 2025-06-08 PROCEDURE — 84155 ASSAY OF PROTEIN SERUM: CPT | Performed by: PSYCHIATRY & NEUROLOGY

## 2025-06-08 PROCEDURE — 86140 C-REACTIVE PROTEIN: CPT | Performed by: INTERNAL MEDICINE

## 2025-06-08 PROCEDURE — A9585 GADOBUTROL INJECTION: HCPCS | Performed by: PSYCHIATRY & NEUROLOGY

## 2025-06-08 RX ORDER — GADOBUTROL 604.72 MG/ML
8 INJECTION INTRAVENOUS ONCE
Status: COMPLETED | OUTPATIENT
Start: 2025-06-08 | End: 2025-06-08

## 2025-06-08 RX ORDER — DEXAMETHASONE SODIUM PHOSPHATE 4 MG/ML
2 INJECTION, SOLUTION INTRA-ARTICULAR; INTRALESIONAL; INTRAMUSCULAR; INTRAVENOUS; SOFT TISSUE EVERY 12 HOURS
Status: DISCONTINUED | OUTPATIENT
Start: 2025-06-08 | End: 2025-06-12

## 2025-06-08 RX ORDER — SODIUM CHLORIDE 9 MG/ML
INJECTION, SOLUTION INTRAVENOUS CONTINUOUS
Status: DISCONTINUED | OUTPATIENT
Start: 2025-06-08 | End: 2025-06-10

## 2025-06-08 RX ADMIN — METOPROLOL SUCCINATE 25 MG: 25 TABLET, EXTENDED RELEASE ORAL at 10:04

## 2025-06-08 RX ADMIN — AMLODIPINE BESYLATE 5 MG: 5 TABLET ORAL at 09:56

## 2025-06-08 RX ADMIN — APIXABAN 5 MG: 5 TABLET, FILM COATED ORAL at 09:56

## 2025-06-08 RX ADMIN — DEXAMETHASONE SODIUM PHOSPHATE 2 MG: 4 INJECTION, SOLUTION INTRAMUSCULAR; INTRAVENOUS at 20:09

## 2025-06-08 RX ADMIN — LOSARTAN POTASSIUM 50 MG: 50 TABLET, FILM COATED ORAL at 10:04

## 2025-06-08 RX ADMIN — GADOBUTROL 8 ML: 604.72 INJECTION INTRAVENOUS at 14:33

## 2025-06-08 ASSESSMENT — ACTIVITIES OF DAILY LIVING (ADL)
ADLS_ACUITY_SCORE: 43
ADLS_ACUITY_SCORE: 47
ADLS_ACUITY_SCORE: 43
ADLS_ACUITY_SCORE: 47
ADLS_ACUITY_SCORE: 43
ADLS_ACUITY_SCORE: 43
ADLS_ACUITY_SCORE: 51
ADLS_ACUITY_SCORE: 43
ADLS_ACUITY_SCORE: 47
ADLS_ACUITY_SCORE: 43
ADLS_ACUITY_SCORE: 47
ADLS_ACUITY_SCORE: 47
ADLS_ACUITY_SCORE: 51
DEPENDENT_IADLS:: INDEPENDENT
ADLS_ACUITY_SCORE: 43
ADLS_ACUITY_SCORE: 47

## 2025-06-08 NOTE — CONSULTS
Care Management Initial Consult    General Information  Assessment completed with: Patient, Children, Mela Anaya  Type of CM/SW Visit: Initial Assessment    Primary Care Provider verified and updated as needed: Yes   Readmission within the last 30 days: no previous admission in last 30 days      Reason for Consult: discharge planning  Advance Care Planning: Advance Care Planning Reviewed: present on chart, verified with patient          Communication Assessment  Patient's communication style: spoken language (English or Bilingual)    Hearing Difficulty or Deaf: no   Wear Glasses or Blind: yes    Cognitive  Cognitive/Neuro/Behavioral: WDL  Level of Consciousness: alert  Arousal Level: opens eyes spontaneously  Orientation: oriented x 4  Mood/Behavior: calm, other (see comments) (intermittent confusion)  Best Language: 0 - No aphasia  Speech: clear    Living Environment:   People in home: alone     Current living Arrangements: house      Able to return to prior arrangements: yes       Family/Social Support:  Care provided by: self  Provides care for: no one  Marital Status:   Support system: Children          Description of Support System: Supportive, Involved         Current Resources:   Patient receiving home care services: No        Community Resources: None  Equipment currently used at home: cane, quad, walker, rolling  Supplies currently used at home:      Employment/Financial:  Employment Status: retired        Financial Concerns: none   Referral to Financial Worker: No       Does the patient's insurance plan have a 3 day qualifying hospital stay waiver?  No    Lifestyle & Psychosocial Needs:  Social Drivers of Health     Food Insecurity: Low Risk  (6/7/2025)    Food Insecurity     Within the past 12 months, did you worry that your food would run out before you got money to buy more?: No     Within the past 12 months, did the food you bought just not last and you didn t have money to get more?: No    Depression: Not at risk (10/10/2024)    PHQ-2     PHQ-2 Score: 0   Housing Stability: Low Risk  (6/7/2025)    Housing Stability     Do you have housing? : Yes     Are you worried about losing your housing?: No   Tobacco Use: Medium Risk (6/7/2025)    Patient History     Smoking Tobacco Use: Former     Smokeless Tobacco Use: Never     Passive Exposure: Not on file   Financial Resource Strain: Low Risk  (6/7/2025)    Financial Resource Strain     Within the past 12 months, have you or your family members you live with been unable to get utilities (heat, electricity) when it was really needed?: No   Alcohol Use: Not At Risk (6/10/2019)    AUDIT-C     Frequency of Alcohol Consumption: 2-3 times a week     Average Number of Drinks: 1 or 2     Frequency of Binge Drinking: Never   Transportation Needs: Low Risk  (6/7/2025)    Transportation Needs     Within the past 12 months, has lack of transportation kept you from medical appointments, getting your medicines, non-medical meetings or appointments, work, or from getting things that you need?: No   Physical Activity: Unknown (10/10/2024)    Exercise Vital Sign     Days of Exercise per Week: 0 days     Minutes of Exercise per Session: Not on file   Interpersonal Safety: Low Risk  (6/7/2025)    Interpersonal Safety     Do you feel physically and emotionally safe where you currently live?: Yes     Within the past 12 months, have you been hit, slapped, kicked or otherwise physically hurt by someone?: No     Within the past 12 months, have you been humiliated or emotionally abused in other ways by your partner or ex-partner?: No   Stress: No Stress Concern Present (10/10/2024)    [place] [employer] - Occupational Stress Questionnaire     Feeling of Stress : Not at all   Social Connections: Unknown (10/10/2024)    Social Connection and Isolation Panel [NHANES]     Frequency of Communication with Friends and Family: Not on file     Frequency of Social  Gatherings with Friends and Family: Once a week     Attends Hinduism Services: Not on file     Active Member of Clubs or Organizations: Not on file     Attends Club or Organization Meetings: Not on file     Marital Status: Not on file   Health Literacy: Not on file       Functional Status:  Prior to admission patient needed assistance:   Dependent ADLs:: Ambulation-cane, Ambulation-walker  Dependent IADLs:: Independent       Mental Health Status:  Mental Health Status: No Current Concerns       Chemical Dependency Status:  Chemical Dependency Status: No Current Concerns             Values/Beliefs:  Spiritual, Cultural Beliefs, Hinduism Practices, Values that affect care: no               Discussed  Partnership in Safe Discharge Planning  document with patient/family: Yes: Héctor    Additional Information:  Writer met with patient and family to introduce self an role in discharge planning. Héctor verified his address, phone , PCP, and insurance. He lives alone in a house in [place], and is still driving. PT/OT have recommended a stay at TCU due to his deconditioning. Writer explained that we ask patients/families to give 2-3 choices for facilities and we will try to find a bed for safe discharge. Wrier left list with family    Next Steps: Get TCU choices, secure bed,     Lana Ling, RN Care Coordinator  [facility]  [phone]

## 2025-06-08 NOTE — PROGRESS NOTES
[facility]  Ne[facility] [place]  Neurology Daily Note                          Interval History:   He reported today that he was having more difficulty with his lower extremities, more profound weakness, he reported no visual changes but has had ongoing cervical discomfort and lumbar pain.  More recent concerns regarding urinary incontinence.  He also reported having had upper respiratory symptoms several weeks ago, undiagnosed symptoms, he was not tested for COVID.  Brigido Murry is an 89-year-old patient with a past history for CVA, TIA, atrial fibs/flutter and a history for prostate cancer with concerns regarding rising PSA who indicated that he has had residual left leg weakness following his ischemic stroke but was able to ambulate with a cane, sometimes using a walker without difficulty.  He stated that on the evening prior to his admission, he had gone to his car to fix his tire and when he tried to rise from that squatted position he could not stand and he fell backwards and crawled away from the car but was unable to access his phone and laid on the ground, concrete and board, until the following morning when for some reason he was able to dial 911 and was brought to the ED where he was evaluated.  Routine labs revealed that his CK level was 3086, concern regarding rhabdo, electrolytes were normal.  A CT scan of the head revealed no acute changes, no midline shift, no evidence of intracranial hemorrhage, no extra-axial fluid collection, there were chronic cerebellar and right frontal lobe infarcts superimposed on the background of moderate, chronic microvascular ischemic changes, there was no hydrocephalus.  A CT scan of the cervical spine revealed no acute traumatic injury, no subluxation or fractures there were multilevel degenerative changes noted.     This patient is a 89 year old male who presents who was admitted through the ED this morning where he presented himself,  brought by EMS after he had been down, laying in his garage since yesterday evening having acute difficulty with weakness in the legs.  He acknowledged that he has had low back pain, nonradiating, yet also acknowledge having cervical discomfort, this was not acute and no acute radicular symptoms.  He also indicated that he has been battling with urinary incontinence for some time.  On direct questioning he has not experienced any acute visual changes, no speech changes, reported no dizziness or vertiginous symptoms.  He reported no paresthesia of the upper or lower extremities.  His past medical history significant for the fact that he has had ischemic strokes, history for TIAs, reported having had a stroke in 2019 and reported that he also had a stroke 2 years ago while he was in [place] but had been able to ambulate with a cane, using a walker at times.           Review of Systems:   The 10 point Review of Systems is negative other than noted in the HPI       Medications:   Scheduled Meds:  Current Facility-Administered Medications   Medication Dose Route Frequency Provider Last Rate Last Admin    amLODIPine (NORVASC) tablet 5 mg  5 mg Oral Daily Filipe Hamlin MD   5 mg at 06/08/25 0956    apixaban ANTICOAGULANT (ELIQUIS) tablet 5 mg  5 mg Oral BID Filipe Hamlin MD   5 mg at 06/08/25 0956    losartan (COZAAR) tablet 50 mg  50 mg Oral Daily Filipe Hamlin MD   50 mg at 06/08/25 1004    metoprolol succinate ER (TOPROL XL) 24 hr tablet 25 mg  25 mg Oral Daily Filipe Hamlin MD   25 mg at 06/08/25 1004    sodium chloride (PF) 0.9% PF flush 3 mL  3 mL Intracatheter Q8H Filipe Hamlin MD   3 mL at 06/08/25 1226     PRN Meds:   Current Facility-Administered Medications   Medication Dose Route Frequency Provider Last Rate Last Admin    acetaminophen (TYLENOL) tablet 650 mg  650 mg Oral Q4H PRN Filipe Hamlin MD   650 mg at 06/07/25 1720    Or    acetaminophen (TYLENOL) Suppository 650 mg  650 mg Rectal Q4H PRN Yakelin  "MD Filipe        lidocaine (LMX4) cream   Topical Q1H PRN Filipe Hamlin MD        lidocaine 1 % 0.1-1 mL  0.1-1 mL Other Q1H PRN Filipe Hamlin MD        melatonin tablet 1 mg  1 mg Oral At Bedtime PRN Filipe Hamlin MD        ondansetron (ZOFRAN ODT) ODT tab 4 mg  4 mg Oral Q6H PRN Filipe Hamlin MD        Or    ondansetron (ZOFRAN) injection 4 mg  4 mg Intravenous Q6H PRN Filipe Hamlin MD        Patient is already receiving anticoagulation with heparin, enoxaparin (LOVENOX), warfarin (COUMADIN)  or other anticoagulant medication   Does not apply Continuous PRN Filipe Hamlin MD        polyethylene glycol (MIRALAX) Packet 17 g  17 g Oral BID PRN Filipe Hamlin MD        prochlorperazine (COMPAZINE) injection 5 mg  5 mg Intravenous Q6H PRN Filipe Hamlin MD        Or    prochlorperazine (COMPAZINE) tablet 5 mg  5 mg Oral Q6H PRN Filipe Hamlin MD        senna-docusate (SENOKOT-S/PERICOLACE) 8.6-50 MG per tablet 1 tablet  1 tablet Oral BID PRN Filipe Hamlin MD        Or    senna-docusate (SENOKOT-S/PERICOLACE) 8.6-50 MG per tablet 2 tablet  2 tablet Oral BID PRN Filipe Hamlin MD        sodium chloride (PF) 0.9% PF flush 3 mL  3 mL Intracatheter q1 min prn Filipe Hamlin MD               Physical Exam:   Vitals: Blood pressure 127/66, pulse 74, temperature 98.9  F (37.2  C), temperature source Oral, resp. rate 18, height 1.778 m (5' 10\"), weight 81 kg (178 lb 9.2 oz), SpO2 93%.  General Appearance:    Was in no apparent respiratory neck was supple  Neuro:       Mental Status Exam: He was very alert and cooperative, fund of knowledge was appropriate, he follows simple and complex commands quite well and the speech was fluent.       Cranial Nerves:       The pupils were equal and reactive to light and accommodation, extraocular movements are full, there was mild drooping of the left corner of the mouth, however the rest of her cranial nerve examination was normal       Motor:   There was normal muscle bulk and tone " in all 4 extremities.  He had fairly good strength in the deltoids at 4+/5 but his grasp are bilaterally weak at 3/5.  There was dense weakness of both lower extremities in all the proximal and distal muscles with minimal movement in the dorsiflexors of the left foot.           Reflexes: 2+/4 in the biceps and triceps, not elicited at the patella or ankles and the plantars were equivocal.       Sensory: There was decreased appreciation to vibratory sensation in both great toes but there was no clear cortical sensory level detected.                         Gait: Could not be assessed due to lower extremity weakness.  Cardiovascular: Regular rate and rhythm, no m/r/g  Lungs: Clear to auscultation  Abdomen: Soft, not tender, not destended  Extremities: No clubbing, no cyanosis, no edema       Data:   ROUTINE IP LABS (Last 3results)  CBC RESULTS:     Recent Labs   Lab 06/08/25  1209 06/08/25  1123 06/07/25  0722   WBC 13.0* 12.0* 17.4*   RBC 5.19 5.06 5.54   HGB 15.9 15.4 16.8   HCT 48.7 47.8 50.3    219 277     Basic Metabolic Panel:  Recent Labs   Lab 06/08/25  1209 06/08/25  1123 06/07/25  0722    135 141   POTASSIUM 4.5 4.4 4.4   CHLORIDE 101 103 102   CO2 24 23 25   BUN 18.3 18.0 27.6*   CR 0.85 0.85 0.94   * 124* 110*   BELLO 8.9 8.4* 9.8     INR:No lab results found in last 7 days.   Lipid Profile:  Recent Labs   Lab Test 10/10/24  0746 07/07/23  0744   CHOL 106 127   HDL 36* 40   LDL 44 67   TRIG 129 99     TSH:  TSH   Date Value Ref Range Status   06/07/2025 4.36 (H) 0.30 - 4.20 uIU/mL Final   09/30/2021 3.10 0.40 - 4.00 mU/L Final   09/23/2020 2.75 0.40 - 4.00 mU/L Final   ,   Vitamin B12:   Lab Results   Component Value Date    B12 526 06/07/2025      A1C:   Lab Results   Component Value Date    A1C 5.6 07/31/2019     Brain MRI 6/7/2025  There was no evidence of acute ischemia or restricted diffusion, no discrete mass or midline shift, no acute intracranial collection, no acute hemorrhage,  no extra-axial fluid collection.  On the susceptible weighted images there were scattered foci of hemosiderin seen within the left cerebral hemisphere and the left cerebellar hemisphere, most likely related to microangiopathy.  In addition there were scattered FLAIR and T2 foci in the periventricular cortical white matter consistent with small vessel ischemic changes.  There was a focal area of encephalopathy in the anterior right frontal lobe.  There was no hydrocephalus.      MRI of the lumbar spine    Revealed multilevel degenerative changes without significant central canal stenosis, normal cauda equina and conus medullaris.    MRI of the thoracic spine, with and without contrast, 6/8/2025  There was an anterior wedge being vertebral body, with height loss at T1/T2, there was no retropulsion.    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  There was also subtle T2 hyperintense signal in the cord at that level.      Assessment and Plan:   #.    -- Yosef Murry is an 89-year-old patient with a past history for CVA and a past history for prostate cancer, with recent concerns regarding his rising PSA who was brought to the ED on 6/6/2025 reporting that he had been down on his garage floor for about 8 to 9 hours, after he had difficulty rising from a stooped position after fixing his car tires and had and laid there until morning when he finally was able to call [id number] and was brought to the ED where he was evaluated with a noncontrast CT scan of the head and CT of the head and neck which did not reveal any acute changes, no evidence of intracranial hemorrhage, no extra-axial fluid  collection or midline shift or evidence of acute infarct.  The CT of the cervical spine did not reveal any evidence for fracture or subluxation.  #.  However, his examination today reveals significant progression in the weakness in his lower extremities and he now had significant distal weakness in both upper extremities, although preserved reflexes in the biceps and triceps.  There was no significant cortical sensory level appreciated although he had significant decreased appreciation to pinprick over the the entire left leg and decreased vibratory sensation in both great toes.     -MRI of the brain did not reveal any acute intracranial pathology, no enhancing lesions, no evidence for acute or subacute infarct.  -Lumbar MRI of 6/7/2025 revealed minor multilevel degenerative changes without significant neural compression.  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  However, a cervical MRI revealed significant abnormal findings as noted below.  Impression:  #. 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  Therefore MRI of the thoracic and cervical spine was scheduled.  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  There was also subtle T2 hyperintense signal in the cord at that level.  #.  In view of these unexpected findings, lumbar puncture will be canceled.   These findings were discussed with Dr. Hill        #.Urgent neurosurgical consultation was requested.  If agreed  with neurosurgery, and review of expansile compressive lesion, would start Decadron bolus 10 mg IV.  Dr. Hill discussed these abnormal findings with the patient and the recommendations.  The abnormal findings were discussed with neurosurgery and consultation was requested.     Monica Desouza M.D.  [facility]  Office [phone]

## 2025-06-08 NOTE — PROGRESS NOTES
Pt A/OX4 with intermittent confusion, needs met this shift, Not OOB this shift, NS infusing at 125 mL/hr, mepilex on bilateral knee, pending placement plan.

## 2025-06-08 NOTE — DISCHARGE INSTRUCTIONS
Some additional resources:      Web: https://helpSocStockor.org/   Phone: 299.703.6242   [employer] Your [employer] is a nonprofit serving seniors and individuals with disabilities across [place] [facility] area. Our grocery assistance, home support and transportation services provide help with in-home tasks and chores.    _________________________________________________________________________________________    Meals on Wheels    [facility] call Phone: [phone]             Who is eligible to receive Meals on Wheels?  To anyone - both on a long-term basis and temporarily if you are recovering from surgery or illness.  How much do meals cost? Is financial assistance available? We ask for a modest contribution toward your meals, but the price is based on need. Meals may be authorized as part of [place]'s home- and community-based services Medicaid waiver program, and other subsidy programs can also help cover the cost of meal delivery service. If you have questions about funding options, call us.    Are diabetic and other diet-specific meal options available?  Meals on Wheels happily offers low-sugar, low-sodium meal options, as well as vegetarian options. Simply specify your dietary needs when you sign up for meals.  Are culturally specific meals available?  Kosher, [place] Islander/Halal meals and other culturally specific meal options are available in many areas. You can specify cultural meal preferences when you sign up for meals online or when you call us.    _________________________________________________________________________________________  [employer] Community Services   Web: https://seniorcommunity.org/services/  Phone: (430) 253-6924          Other resources:   call to reach someone who can connect you with more resources!   _________________________________________________________________________________________               "  _________________________________________________________________________________________    _________________________________________________________________________________________    If your family would like extra support, here is one great resource:   \"Care[employer]iver [employer]\" call [phone]-[facility]([phone])  Customer service hours: Monday - Saturday, 9 a.m. - 7 p.m.              Wound Care    Left coccyx wound: Daily and PRN for soiled/loose dressing  Cleanse with wound cleanser and gauze. Pat dry.  Swab intact escobar-wound skin with barrier film (Cavilon or similar product). Let dry.  Cover with a Mepilex Sacral dressing ensuring dressing conforms well to body contours. Initial and date.  ~ Same Mepilex can be used up to 5 days. RN should assess under dressing every shift.  ~ If dressing is requiring frequent changes due to soiling, apply Triad (#[id number]) paste to wound instead.      Bilateral knee wounds: Every three days and PRN for soiling  Cleanse with wound cleanser and gauze. Pat dry.  Swab intact escobar-wound skin with barrier film (Cavilon or similar product). Let dry.  Apply cut-to-fit pieces of non-adherent gauze (Adaptic) over wounds and cover with 4x4 foam border dressing. Initial and date.  "

## 2025-06-08 NOTE — PROGRESS NOTES
06/08/25 0957   Appointment Info   Signing Clinician's Name / Credentials (PT) Sammi Lechuga DPT   Living Environment   People in Home alone   Current Living Arrangements house   Home Accessibility stairs to enter home;stairs within home   Number of Stairs, Main Entrance none  (Ramp in the garage)   Stair Railings, Main Entrance none   Number of Stairs, Within Home, Primary greater than 10 stairs   Stair Railings, Within Home, Primary railing on left side (ascending)   Transportation Anticipated car, drives self   Living Environment Comments Pt receives meals on wheels.   Self-Care   Usual Activity Tolerance good   Current Activity Tolerance poor   Equipment Currently Used at Home cane, quad   Fall history within last six months yes   Number of times patient has fallen within last six months 3   Activity/Exercise/Self-Care Comment Pt owns SEC, FWW and 4WW.   General Information   Onset of Illness/Injury or Date of Surgery 06/07/25   Referring Physician Filipe Hamlin MD   Patient/Family Therapy Goals Statement (PT) Pt is aware current disch rec is TCU.   Pertinent History of Current Problem (include personal factors and/or comorbidities that impact the POC) 89 y/o male admitted after a fall and laid on the ground in his garage most of the night afterwards. Noted to have elevated CK levels. PMH including CVA, A-fib/flutter, HTN, prostate cancer with recent rise in PSA.   Existing Precautions/Restrictions fall   Cognition   Affect/Mental Status (Cognition) WFL   Orientation Status (Cognition) oriented x 3   Follows Commands (Cognition) delayed response/completion;increased processing time needed;physical/tactile prompts required;repetition of directions required;verbal cues/prompting required   Pain Assessment   Patient Currently in Pain No   Integumentary/Edema   Integumentary/Edema Comments Noted bandages on B knees.   Posture    Posture Comments Noted forward head and shoulder posture sitting EOB.    Range of Motion (ROM)   ROM Comment Limited B knee ROM d/t pain, otherwise B LEs WFL.   Strength (Manual Muscle Testing)   Strength Comments Not formally assessed, noted significant B LE weakness with mobility.   Bed Mobility   Comment, (Bed Mobility) Supine-sit, modA of 2.   Transfers   Comment, (Transfers) Sit <> stand with Jyoti Stedy and mod-maxA of 2.   Gait/Stairs (Locomotion)   Comment, (Gait/Stairs) NT.   Balance   Balance Comments Noted fair sitting and standing balance in Jyoti Stedy.   Sensory Examination   Sensory Perception Comments Pt denies numbness/tingling in B LEs and UEs.   Clinical Impression   Criteria for Skilled Therapeutic Intervention Yes, treatment indicated   PT Diagnosis (PT) Difficulty with functional mobility.   Influenced by the following impairments Pain, Generalized weakness, Decreased activity tolerance.   Functional limitations due to impairments Limited functional mobility requiring Jyoti Stedy and assist of 2.   Clinical Presentation (PT Evaluation Complexity) stable   Clinical Presentation Rationale Based on PMH, current presentation, and social support.   Clinical Decision Making (Complexity) low complexity   Planned Therapy Interventions (PT) balance training;bed mobility training;gait training;strengthening;transfer training   Risk & Benefits of therapy have been explained patient   PT Total Evaluation Time   PT Eval, Low Complexity Minutes (39370) 10   Physical Therapy Goals   PT Frequency 5x/week   PT Predicted Duration/Target Date for Goal Attainment 06/12/25   PT Goals Bed Mobility;Transfers;Gait   PT: Bed Mobility Minimal assist;Supine to/from sit   PT: Transfers Minimal assist;Sit to/from stand;Assistive device   PT: Gait Minimal assist;Rolling walker;10 feet   Interventions   Interventions Quick Adds Gait Training;Therapeutic Activity;Therapeutic Procedure   Therapeutic Activity   Therapeutic Activities: dynamic activities to improve functional performance Minutes  (79868) 30   Symptoms Noted During/After Treatment Increased pain;Fatigue   Treatment Detail/Skilled Intervention PT: Pt in supine upon arrival of therapist, noted pt requires increased time to complete tasks with repetition of cues/direction. Pt performed supine-sit with modA of 2, continuous 1-step cues for sequencing and utilizing bed railing to assist with transfer. Pt required frequent redirection as pt insisted on holding cane during bed mobility. Sit <> stand from EOB and Jyoti Stedy seat with mod-maxA of 2, continuous cues for anterior weight shift. Pt transferred from EOB to recliner with Jyoti Stedy and assist of 2. Pt sitting up in chair at end of session, chair alarm on and needs within reach. Discussed with pt current disch rec of TCU.   PT Discharge Planning   PT Plan Repeated sit <> stand with Jyoti Stedy, LE strengthening   PT Discharge Recommendation (DC Rec) Transitional Care Facility   PT Rationale for DC Rec Pt is significantly below baseline, currently unable to tolerate standing without Jyoti Stedy and mod-maxA of 2. Pt would not be safe to disch home at this time.   PT Brief overview of current status Nursing to utilize lift and assist of 2.Goals of therapy will be to address safe mobility and make recs for d/c to next level of care. Pt and RN will continue to follow all falls risk precautions as documented by RN staff while hospitalized   PT Total Distance Amb During Session (feet) 0   Physical Therapy Time and Intention   Timed Code Treatment Minutes 30   Total Session Time (sum of timed and untimed services) 40

## 2025-06-08 NOTE — PLAN OF CARE
Goal Outcome Evaluation:      Plan of Care Reviewed With: patient    Shift Summary [id number]     Admitting Diagnosis: Generalized weakness [R53.1]  Elevated CK [R74.8]  Fall, initial encounter [W19.XXXA]  Abrasion of both knees [S80.211A, S80.212A]     Mental Status: A/Ox4, intermittent confusion.   Activity/dangle: A2/randall steady, not oob this shift.   Diet: regular.   Pain: denied.   [place]wood/Voiding: incontinent.   Tele/Restraints/Iso: NA.   02/LDA: RA. R PIV:  ml/hr.   D/C Date: TBD.     Other Info: mepilex on bilateral knees. Neurology following. PT/OT consulted. Discharge pending.     Vital signs:  Temp: 99.2  F (37.3  C) Temp src: Oral BP: 115/71 Pulse: 80   Resp: 16 SpO2: 95 % O2 Device: None (Room air)

## 2025-06-08 NOTE — PROVIDER NOTIFICATION
Provider is aware of pt labs result today. Text message if pt needs CK recheck and if he needs a phosphorous replacement. Also, FYI her of pt intact blister at the coccyx.

## 2025-06-08 NOTE — PROVIDER NOTIFICATION
Provider update  IMPRESSION:  CERVICAL SPINE MRI:  1.  Multilevel cervical spondylosis without high-grade spinal canal stenosis.  2.  Severe bilateral neural foraminal stenosis at C4-5 and C5-6.  3.  No abnormal spinal cord signal.  4.  No abnormal contrast enhancement.     THORACIC SPINE MRI:  1.  1.0 x 0.7 x 1.2 cm intradural, extramedullary, T1 isointense, T2 hypointense enhancing mass on the left at T4-5 level which results in severe spinal canal stenosis and compresses and displaces the cord. Question subtle T2 hyperintense signal in the   cord at that level. This is favored to represent a meningioma.  2.  Multilevel thoracic spondylosis without high-grade spinal canal or neural foraminal stenosis in the remaining thoracic spine.  3.  Chronic appearing mild anterior wedging vertebral body height loss at T1 and T2.    Dr. Hill noted results of mass in T4-T5 as above: Severe spinal canal stenosis and compresses cord  He does have hisotry of prostate cancer   PET scan at [facility] 5/8/2025 with increase in PSA     Discussion with neurology   Consult for neurosurgery   Updated family   NPO after orders supper in event of interventions.     HE is on eliquis and took last dose this am.   Sergio      PET scan at [facility] 5/8/2025   IMPRESSION:   Focal PSMA activity in the posterior prostatectomy bed involving surgical clips and potentially anterior wall the rectum. No luisa, skeletal or distant metastatic disease.   Sergio

## 2025-06-08 NOTE — PLAN OF CARE
Shift Summary    Diagnosis: Acute on chronic bilateral lower extremity weakness  Rhabdomyolysis  Mental Status: A/OX4 but forgetful  Activity/dangle: C-lift with A-2  Diet: Regular with good appetite  Pain: C/o of knee pain with movement but refused pain meds when offered  Bowel/Bladder: Incontinent of bladder, forget when his last BM. Denies feeling constipated, +BS and passing gas.  Tele/Restraints/Iso: N/A  02/LDA: JESUS WOODRUFF-MARIEL  D/C Date: TBD, TCU placement  Other Info:    On continues IVF of  ml/hr. Has intact blister on his coccyx covered with Mepilex. CMS intact.

## 2025-06-08 NOTE — PROGRESS NOTES
[facility]    Medicine Progress Note - Hospitalist Service    Date of Admission:  6/7/2025  Interval History   Patient sitting in the chair and had just finished eating. Noted his legs are weak when trying to have him lift them off the chair. Feeling is intact in his legs by exam. He denies any concerns in his arms.   Had MRI of his head and MRI lumbar spine.   CK remains elevated.   Discussion with neurology and plan early in day for cervical MRI and thoracic MRI. Dr. Desouza noted weakness worse and felt his hands are weaker as well.   Plans to transfer to Neuro floor.   MRI later returned with thoracic mass T4-T5 with thoracic spinal stenosis and compression of spinal cord.       Assessment & Plan   Yosef Murry is a 89 year old male history of CVA, persistent afib, hyperlipidemia, prostate cancer, balance problems, who presents with bilateral lower extremity weakness.  He reports he went out to put air in his tires last evening, knelt down to fill the tires but then was too weak to get up despite having his cane for assistance.  He crawled up a ramp leading to his house door in the garage, was attempting to get upright, but fell backward.  He denied hitting his head.  He was unable to get himself up and ultimately called EMS early this morning.     He reports crawling on his knees back to the ramp that takes him inside his house. Had trouble getting to his feet. He then fell on the ramp and couldn't get his phone to work and spent night on the ramp. Called 911 this am. Reported pain in his neck, back of his neck sore and pain in his back with movement.   ER vitals /11 and HR 88 afebrile.   Unable to lift his legs off the bed bilaterally. Knees with superficial abrasions.   BMP negative. CK 3088. Liver tests with AST 67.   WBC 17,400 and hgb 16.8     HEAD CT:  1. Chronic cerebellar and right frontal lobe infarcts superimposed upon a background of moderate chronic microangiopathy  without acute intracranial abnormality.     CERVICAL SPINE CT:  1. No acute traumatic injury of the cervical spine.       Acute on chronic bilateral lower extremity weakness  Hx CVA 8/2019, also reports CVA in [place] about 2 years ago  MRI 1 X 0.7 X 1.2 cm intradural extramedullary, T1 isointense, T2 hypointense mass at the left T4-T5 level with severe spinal stenosis and compresses and displaces the cord.   Etiology of weakness unclear on admission with multiple potential factors/etiologies including prior CVA, undiagnosed Parkinsons, deconditioning, spinal disease, among others and consulted with neurology   hx prostate cancer and reports new onset bladder incontinence in past 2 months, lower extremity weakness has been getting worse, though denies any chronic spine pain (having mid back pain after lying on ground overnight)  Elevated BUN could suggest some hypovolemia, but reports good oral intake recently and denies overt orthostatic symptoms  WBC elevated on arrival but afebrile with no overt infectious complaints   6/7 MRI Brain No discrete mass lesion, hemorrhage or focal area suggestive of acute ischemia.  Diffuse age related changes along with focal encephalomalacia anterior right frontal lobe  MRI cervical spine:  Multilevel cervical spondylosis without high-grade spinal canal stenosis.  Severe bilateral neural foraminal stenosis at C4-5 and C5-6  No abnormal spinal cord signal.  No abnormal contrast enhancement.  MRI lumbar spine: IMPRESSION:Mild lumbar spondylosis without high-grade canal stenosis. Moderate left L5-S1 foraminal stenosis.  MRI thoracic spine: 1.0 x 0.7 x 1.2 cm intradural, extramedullary, T1 isointense, T2 hypointense enhancing mass on the left at T4-5 level which results in severe spinal canal stenosis and compresses and displaces the cord. Question subtle T2 hyperintense signal in the cord at that level. This is favored to represent a meningioma.Multilevel thoracic spondylosis without  high-grade spinal canal or neural foraminal stenosis in the remaining thoracic spine. Chronic appearing mild anterior wedging vertebral body height loss at T1 and T2.  Given results of MRI thoracic spine notified Neurosurgery, reviewed with neurology and updated family   Transferred to neuro floor late in day given increase weakness ?? GBS however MRI returned in the interim.     Leukocytosis   Admit WBC 17 on arrival/Afebrile   CXR clear - suspect reactive elevated WBC   covid negative   Urine culture with no component results     Abnormal Thyroid test  TSH slightly up but free T4 wnl, trop slightly elevated but flat - continue plan as above     Rhabdomyolysis  *admission CK 3088  Trend CK with repeat 4089  Continued on IVF at 125  AST up consistent with rhabdo       A-fib/flutter, permanent  HTN  continue PTA amlodipine, metoprolol and losartan once verified  Initially continued on eliquis but this was stopped for potential of procedures   EKG showing A-fib with nonspecific T-wave findings but without cardiorespiratory symptoms  Patient on eliquis prior to admit with last dose this AM     HLD  Holding statin      Hx prostate cancer s/p prostatectomy 2009, radiation therapy 2014 and androgen deprivation therapy  *see Urology clinic note 5/9/25 for details - recent rise in PSA but MRI findings not convincing for any recurrence and PET scan   Focal PSMA activity in the posterior prostatectomy bed involving surgical clips and potentially anterior wall the rectum. No luisa, skeletal or distant metastatic disease.          Diet: Regular Diet Adult    DVT Prophylaxis: Pneumatic Compression Devices  [place]wood Catheter: Not present  Lines: None     Cardiac Monitoring: None  Code Status: No CPR- Do NOT Intubate      Clinically Significant Risk Factors Present on Admission            # Hypercalcemia: corrected calcium is >10.1, will monitor as appropriate    # Hypoalbuminemia: Lowest albumin = 2.8 g/dL at 6/8/2025 11:23 AM, will  "monitor as appropriate  # Drug Induced Coagulation Defect: home medication list includes an anticoagulant medication    # Hypertension: Home medication list includes antihypertensive(s)           # Overweight: Estimated body mass index is 25.62 kg/m  as calculated from the following:    Height as of this encounter: 1.778 m (5' 10\").    Weight as of this encounter: 81 kg (178 lb 9.2 oz).              Social Drivers of Health    Tobacco Use: Medium Risk (6/7/2025)    Patient History     Smoking Tobacco Use: Former     Smokeless Tobacco Use: Never   Physical Activity: Unknown (10/10/2024)    Exercise Vital Sign     Days of Exercise per Week: 0 days   Social Connections: Unknown (10/10/2024)    Social Connection and Isolation Panel [NHANES]     Frequency of Social Gatherings with Friends and Family: Once a week          Disposition Plan     Medically Ready for Discharge: Anticipated in 2-4 Days         HIMA LEDEZMA MD  Hospitalist Service  [facility]  Securely message with [employer] (more info)  Text page via [employer] Paging/Directory   ______________________________________________________________________    Physical Exam   Vital Signs: Temp: 98.9  F (37.2  C) Temp src: Oral BP: 127/66 Pulse: 74   Resp: 18 SpO2: 93 % O2 Device: None (Room air)    Weight: 178 lbs 9.16 oz    General Appearance: Patient is awake and alert: slightly forgetful   Respiratory: Clear to auscultation bilaterally   Cardiovascular: Regular rate with no gallop or rub  GI: + BS, soft, non tender   Unable to lift his legs off the bed.. Flex/ext knees and ankles. And ankles   Almost no antigravity on rounding     Medical Decision Making       50 MINUTES SPENT BY ME on the date of service doing chart review, history, exam, documentation & further activities per the note.      Data     I have personally reviewed the following data over the past 24 hrs:    13.0 (H)  \   15.9   / 232     135 101 18.3 /  112 (H)   4.5 24 0.85 \ "     ALT: 38 AST: 148 (H) AP: 87 TBILI: 0.8   ALB: 3.2 (L) TOT PROTEIN: 6.7 LIPASE: N/A     Procal: N/A CRP: 225.29 (H) Lactic Acid: N/A         Imaging results reviewed over the past 24 hrs:   Recent Results (from the past 24 hours)   MR Brain w/o Contrast    Narrative    EXAM: MR BRAIN W/O CONTRAST  LOCATION: [facility]  DATE: 6/7/2025    INDICATION: Acute lower extremity weakness, rule out CVA  COMPARISON: 6/7/2025.  TECHNIQUE: MRI brain without contrast.    FINDINGS: On the diffusion-weighted images there is no evidence of acute ischemia or restricted diffusion. There is no discrete mass lesion or midline shift. There is no acute extra-axial fluid collection or acute intraparenchymal hemorrhage. On the   susceptibility weighted images there are scattered foci of hemosiderin seen within the left cerebral hemisphere and the left cerebellar hemisphere most likely related to microangiopathy. On the FLAIR and T2-weighted images there are scattered foci of   high signal within the periventricular and subcortical white matter consistent with diffuse small vessel ischemic disease. There is focal encephalomalacia involving the anterior right frontal lobe. The ventricular system, basal cisterns and the cortical   sulci are consistent with diffuse volume loss.    There is no evidence of cerebellar tonsillar ectopia. The corpus callosum and the sella region have appropriate configuration and signal intensity for the patient's age. The orbit regions are unremarkable. There is no significant paranasal sinus disease.   The mastoid air cells and the middle ear regions are clear.      Impression    IMPRESSION:  1.  No discrete mass lesion, hemorrhage or focal area suggestive of acute ischemia.  2.  Diffuse age related changes along with focal encephalomalacia anterior right frontal lobe.     MR Lumbar Spine w/o & w Contrast    Narrative    EXAM: MR LUMBAR SPINE W/O and W CONTRAST  LOCATION: [facility]  DATE: 6/7/2025    INDICATION:  Bilateral lower extremity weakness, recent onset urinary incontinence, hx prostate cancer   assess for lumbar pathology  COMPARISON: None.  CONTRAST: 9 mL Gadavist  TECHNIQUE: Routine Lumbar Spine MRI without and with IV contrast.    FINDINGS:   Nomenclature is based on 5 lumbar type vertebral bodies with L5-S1 defined on image 52 of series 901. Lumbar levocurvature. Minimal degenerative anterolisthesis of L5 on S1. Vertebral body heights maintained.  There are a few levels of mild marrow edema   along Schmorl's nodes. Otherwise, no suspicious marrow signal abnormality. Normal distal spinal cord and cauda equina with conus medullaris at L1. No abnormal intrathecal enhancement.     Prevertebral soft tissues are unremarkable. Dorsal paraspinal muscular atrophy. Ectasia of the abdominal aorta.    T12-L1: No significant disc herniation. No significant facet arthropathy. No significant canal or foraminal stenosis.     L1-L2: No significant disc herniation. No significant facet arthropathy. No significant canal or foraminal stenosis.    L2-L3: Small right foraminal protrusion. No significant facet arthropathy. No significant canal or foraminal stenosis.     L3-L4: Mild bulge. Mild facet arthropathy. No significant canal or foraminal stenosis.    L4-L5: Mild bulge. Mild facet arthropathy. No significant canal or foraminal stenosis.    L5-S1: Anterolisthesis with uncovering of the disc due to moderate facet arthropathy. No significant canal stenosis. Moderate left foraminal stenosis. No significant right foraminal stenosis.      Impression    IMPRESSION:  1.  Mild lumbar spondylosis without high-grade canal stenosis.  2.  Moderate left L5-S1 foraminal stenosis.   MR Thoracic Spine w/o & w Contrast    Narrative    EXAM: MR CERVICAL SPINE W/O and W CONTRAST, MR THORACIC SPINE W/O and W CONTRAST  LOCATION: [facility]  DATE:  6/8/2025    INDICATION: Progressive weakness of all four extremities and neck pain  COMPARISON: None.  CONTRAST: 8mL Gadavist  TECHNIQUE:   1) MRI Cervical Spine without and with IV contrast.  2) MRI Thoracic Spine without and with IV contrast.    FINDINGS:    CERVICAL SPINE:   Normal vertebral body heights. Mild anterolisthesis at C6-7. Modic type II changes at the opposing C6-7 endplates. No abnormal cord signal. No extraspinal abnormality.    Craniovertebral junction and C1-C2: Normal.    C2-C3: Normal disc height. No herniation. Normal facets. No spinal canal or neural foraminal stenosis.     C3-C4: Mild disc height loss and disc degeneration. Right eccentric disc osteophyte complex and bilateral uncinate spurring. Bilateral facet arthropathy. No spinal canal stenosis. Moderate bilateral neural foraminal stenosis.     C4-C5: Mild disc height loss and disc degeneration. Right eccentric disc osteophyte complex and uncinate spurring. Right greater than left facet arthropathy. Severe bilateral neural foraminal stenosis. No spinal canal stenosis.     C5-C6: Severe disc height loss and disc degeneration. Bilateral uncovertebral spurring and facet arthropathy. Severe bilateral neural foraminal stenosis. No spinal canal stenosis.     C6-C7: Mild to moderate disc height loss and disc degeneration. Bilateral uncinate spurring. Right greater than left facet arthropathy. Moderate right neural foraminal stenosis. No spinal canal or left neural foraminal stenosis.     C7-T1: Normal disc height. No herniation. Normal facets. No spinal canal or neural foraminal stenosis.    THORACIC SPINE:  Chronic appearing anterior wedging vertebral body height loss at T1 and T2. No retropulsion of the vertebral bodies. Exaggerated thoracic kyphosis. Multilevel Schmorl's node-like endplate indentations and mixed Modic type I and type II changes, most   pronounced at T8-9.    There is a 1.0 x 0.7 x 1.2 cm intradural, extramedullary, T1  isointense, T2 hypointense enhancing mass on the left at C4-5 level which results in severe spinal canal stenosis and compresses and displaces the cord. Question subtle T2 hyperintense signal   in the cord at that level.     Multilevel disc height loss, disc degeneration and facet arthropathy. No high-grade spinal canal or neural foraminal stenosis in the remaining thoracic spine.     No extraspinal abnormality.      Impression    IMPRESSION:  CERVICAL SPINE MRI:  1.  Multilevel cervical spondylosis without high-grade spinal canal stenosis.  2.  Severe bilateral neural foraminal stenosis at C4-5 and C5-6.  3.  No abnormal spinal cord signal.  4.  No abnormal contrast enhancement.    THORACIC SPINE MRI:  1.  1.0 x 0.7 x 1.2 cm intradural, extramedullary, T1 isointense, T2 hypointense enhancing mass on the left at T4-5 level which results in severe spinal canal stenosis and compresses and displaces the cord. Question subtle T2 hyperintense signal in the   cord at that level. This is favored to represent a meningioma.  2.  Multilevel thoracic spondylosis without high-grade spinal canal or neural foraminal stenosis in the remaining thoracic spine.  3.  Chronic appearing mild anterior wedging vertebral body height loss at T1 and T2.     MR Cervical Spine w/o & w Contrast    Narrative    EXAM: MR CERVICAL SPINE W/O and W CONTRAST, MR THORACIC SPINE W/O and W CONTRAST  LOCATION: [facility]  DATE: 6/8/2025    INDICATION: Progressive weakness of all four extremities and neck pain  COMPARISON: None.  CONTRAST: 8mL Gadavist  TECHNIQUE:   1) MRI Cervical Spine without and with IV contrast.  2) MRI Thoracic Spine without and with IV contrast.    FINDINGS:    CERVICAL SPINE:   Normal vertebral body heights. Mild anterolisthesis at C6-7. Modic type II changes at the opposing C6-7 endplates. No abnormal cord signal. No extraspinal abnormality.    Craniovertebral junction and C1-C2: Normal.    C2-C3: Normal  disc height. No herniation. Normal facets. No spinal canal or neural foraminal stenosis.     C3-C4: Mild disc height loss and disc degeneration. Right eccentric disc osteophyte complex and bilateral uncinate spurring. Bilateral facet arthropathy. No spinal canal stenosis. Moderate bilateral neural foraminal stenosis.     C4-C5: Mild disc height loss and disc degeneration. Right eccentric disc osteophyte complex and uncinate spurring. Right greater than left facet arthropathy. Severe bilateral neural foraminal stenosis. No spinal canal stenosis.     C5-C6: Severe disc height loss and disc degeneration. Bilateral uncovertebral spurring and facet arthropathy. Severe bilateral neural foraminal stenosis. No spinal canal stenosis.     C6-C7: Mild to moderate disc height loss and disc degeneration. Bilateral uncinate spurring. Right greater than left facet arthropathy. Moderate right neural foraminal stenosis. No spinal canal or left neural foraminal stenosis.     C7-T1: Normal disc height. No herniation. Normal facets. No spinal canal or neural foraminal stenosis.    THORACIC SPINE:  Chronic appearing anterior wedging vertebral body height loss at T1 and T2. No retropulsion of the vertebral bodies. Exaggerated thoracic kyphosis. Multilevel Schmorl's node-like endplate indentations and mixed Modic type I and type II changes, most   pronounced at T8-9.    There is a 1.0 x 0.7 x 1.2 cm intradural, extramedullary, T1 isointense, T2 hypointense enhancing mass on the left at C4-5 level which results in severe spinal canal stenosis and compresses and displaces the cord. Question subtle T2 hyperintense signal   in the cord at that level.     Multilevel disc height loss, disc degeneration and facet arthropathy. No high-grade spinal canal or neural foraminal stenosis in the remaining thoracic spine.     No extraspinal abnormality.      Impression    IMPRESSION:  CERVICAL SPINE MRI:  1.  Multilevel cervical spondylosis without  high-grade spinal canal stenosis.  2.  Severe bilateral neural foraminal stenosis at C4-5 and C5-6.  3.  No abnormal spinal cord signal.  4.  No abnormal contrast enhancement.    THORACIC SPINE MRI:  1.  1.0 x 0.7 x 1.2 cm intradural, extramedullary, T1 isointense, T2 hypointense enhancing mass on the left at T4-5 level which results in severe spinal canal stenosis and compresses and displaces the cord. Question subtle T2 hyperintense signal in the   cord at that level. This is favored to represent a meningioma.  2.  Multilevel thoracic spondylosis without high-grade spinal canal or neural foraminal stenosis in the remaining thoracic spine.  3.  Chronic appearing mild anterior wedging vertebral body height loss at T1 and T2.

## 2025-06-09 ENCOUNTER — APPOINTMENT (OUTPATIENT)
Dept: PHYSICAL THERAPY | Facility: CLINIC | Age: 89
DRG: 029 | End: 2025-06-09
Payer: MEDICARE

## 2025-06-09 ENCOUNTER — PREP FOR PROCEDURE (OUTPATIENT)
Dept: NEUROLOGY | Facility: CLINIC | Age: 89
End: 2025-06-09
Payer: MEDICARE

## 2025-06-09 DIAGNOSIS — G95.89 MYELOMALACIA (H): ICD-10-CM

## 2025-06-09 DIAGNOSIS — G95.20 COMPRESSION OF SPINAL CORD (H): ICD-10-CM

## 2025-06-09 DIAGNOSIS — D32.1 SPINAL MENINGIOMA (H): Primary | ICD-10-CM

## 2025-06-09 LAB
ALBUMIN SERPL BCG-MCNC: 2.7 G/DL (ref 3.5–5.2)
ALP SERPL-CCNC: 71 U/L (ref 40–150)
ALT SERPL W P-5'-P-CCNC: 30 U/L (ref 0–70)
ANION GAP SERPL CALCULATED.3IONS-SCNC: 10 MMOL/L (ref 7–15)
APTT PPP: 26 SECONDS (ref 22–38)
AST SERPL W P-5'-P-CCNC: 75 U/L (ref 0–45)
B BURGDOR IGG+IGM SER QL: 0.1
BACTERIA UR CULT: NORMAL
BILIRUB SERPL-MCNC: 0.7 MG/DL
BUN SERPL-MCNC: 15.9 MG/DL (ref 8–23)
CALCIUM SERPL-MCNC: 8.3 MG/DL (ref 8.8–10.4)
CHLORIDE SERPL-SCNC: 107 MMOL/L (ref 98–107)
CK SERPL-CCNC: 1456 U/L (ref 39–308)
COPPER SERPL-MCNC: 68.6 UG/DL
CREAT SERPL-MCNC: 0.7 MG/DL (ref 0.67–1.17)
EGFRCR SERPLBLD CKD-EPI 2021: 89 ML/MIN/1.73M2
ERYTHROCYTE [DISTWIDTH] IN BLOOD BY AUTOMATED COUNT: 14.6 % (ref 10–15)
GLUCOSE SERPL-MCNC: 115 MG/DL (ref 70–99)
HCO3 SERPL-SCNC: 22 MMOL/L (ref 22–29)
HCT VFR BLD AUTO: 44.4 % (ref 40–53)
HGB BLD-MCNC: 14.4 G/DL (ref 13.3–17.7)
INR PPP: 1.45 (ref 0.85–1.15)
MCH RBC QN AUTO: 30.5 PG (ref 26.5–33)
MCHC RBC AUTO-ENTMCNC: 32.4 G/DL (ref 31.5–36.5)
MCV RBC AUTO: 94 FL (ref 78–100)
PHOSPHATE SERPL-MCNC: 2.6 MG/DL (ref 2.5–4.5)
PLATELET # BLD AUTO: 179 10E3/UL (ref 150–450)
POTASSIUM SERPL-SCNC: 4.3 MMOL/L (ref 3.4–5.3)
PROT SERPL-MCNC: 5.9 G/DL (ref 6.4–8.3)
PROTHROMBIN TIME: 17.2 SECONDS (ref 11.8–14.8)
RBC # BLD AUTO: 4.72 10E6/UL (ref 4.4–5.9)
SODIUM SERPL-SCNC: 139 MMOL/L (ref 135–145)
WBC # BLD AUTO: 11.2 10E3/UL (ref 4–11)
ZINC SERPL-MCNC: 44 UG/DL

## 2025-06-09 PROCEDURE — 120N000001 HC R&B MED SURG/OB

## 2025-06-09 PROCEDURE — 250N000011 HC RX IP 250 OP 636: Performed by: PHYSICIAN ASSISTANT

## 2025-06-09 PROCEDURE — 84155 ASSAY OF PROTEIN SERUM: CPT | Performed by: INTERNAL MEDICINE

## 2025-06-09 PROCEDURE — 94150 VITAL CAPACITY TEST: CPT

## 2025-06-09 PROCEDURE — 84100 ASSAY OF PHOSPHORUS: CPT | Performed by: INTERNAL MEDICINE

## 2025-06-09 PROCEDURE — 99232 SBSQ HOSP IP/OBS MODERATE 35: CPT | Performed by: INTERNAL MEDICINE

## 2025-06-09 PROCEDURE — 258N000003 HC RX IP 258 OP 636: Performed by: INTERNAL MEDICINE

## 2025-06-09 PROCEDURE — 85610 PROTHROMBIN TIME: CPT | Performed by: PHYSICIAN ASSISTANT

## 2025-06-09 PROCEDURE — 36415 COLL VENOUS BLD VENIPUNCTURE: CPT | Performed by: INTERNAL MEDICINE

## 2025-06-09 PROCEDURE — 85730 THROMBOPLASTIN TIME PARTIAL: CPT | Performed by: PHYSICIAN ASSISTANT

## 2025-06-09 PROCEDURE — 999N000157 HC STATISTIC RCP TIME EA 10 MIN

## 2025-06-09 PROCEDURE — 250N000013 HC RX MED GY IP 250 OP 250 PS 637: Performed by: HOSPITALIST

## 2025-06-09 PROCEDURE — 36415 COLL VENOUS BLD VENIPUNCTURE: CPT | Performed by: PHYSICIAN ASSISTANT

## 2025-06-09 PROCEDURE — 97530 THERAPEUTIC ACTIVITIES: CPT | Mod: GP

## 2025-06-09 PROCEDURE — 999N000197 HC STATISTIC WOC PT EDUCATION, 0-15 MIN

## 2025-06-09 PROCEDURE — 82550 ASSAY OF CK (CPK): CPT | Performed by: INTERNAL MEDICINE

## 2025-06-09 PROCEDURE — G0463 HOSPITAL OUTPT CLINIC VISIT: HCPCS

## 2025-06-09 PROCEDURE — 85014 HEMATOCRIT: CPT | Performed by: INTERNAL MEDICINE

## 2025-06-09 RX ADMIN — DEXAMETHASONE SODIUM PHOSPHATE 2 MG: 4 INJECTION, SOLUTION INTRAMUSCULAR; INTRAVENOUS at 20:17

## 2025-06-09 RX ADMIN — METOPROLOL SUCCINATE 25 MG: 25 TABLET, EXTENDED RELEASE ORAL at 09:45

## 2025-06-09 RX ADMIN — LOSARTAN POTASSIUM 50 MG: 50 TABLET, FILM COATED ORAL at 09:45

## 2025-06-09 RX ADMIN — AMLODIPINE BESYLATE 5 MG: 5 TABLET ORAL at 09:45

## 2025-06-09 RX ADMIN — DEXAMETHASONE SODIUM PHOSPHATE 2 MG: 4 INJECTION, SOLUTION INTRAMUSCULAR; INTRAVENOUS at 08:01

## 2025-06-09 RX ADMIN — SODIUM CHLORIDE: 900 INJECTION INTRAVENOUS at 04:33

## 2025-06-09 ASSESSMENT — ACTIVITIES OF DAILY LIVING (ADL)
ADLS_ACUITY_SCORE: 51

## 2025-06-09 NOTE — CONSULTS
"Neurosurgery Consult    Assessment  88 yo male anti-coagulated on Eliquis presenting after fall with bilateral leg weakness with MRI revealing an upper  thoracic intradural extra meduallary lesion likely representing a meningioma.    Plan:  Long discussion with patient regarding surgical intervention.  Patient states he's no interested in surgery.  Decadron 2 mg every 12 hours.  Okay for general diet.  Hold Eliquis (has been discontinued)  Plan to discuss with patient and family again tomorrow to solidify plan.      HPI  This patient is a 89 year old male anti-coagulated on Eliquis with last dose today who presented to the emergency room yesterday after fall.  He states \"I took a spill in my garage\" and was unable to get himself up for several hours.  He states his legs were to weak to get himself up.   He states he don't know how long he was down before being able to call 911.    Per ER:  \"Yosef Murry is an anticoagulated 89 year old male with a history of TIA, prostate cancer, and hyperlipidemia who presents for evaluation following a fall. Patient reports that he was kneeling to put air in his car tires last night around 2000, when he was unable to get up. He reports crawling on his knees back to the ramp that takes him inside his house hoping to get up there; however, he continued to have trouble getting onto his feet. He then fell from the top of the ramp to the middle of the ramp, but denies any head injury. He states that he couldn't get his phone to work and ended up spending the night on the ramp. He was finally able to call 911 earlier this morning when his phone started working again. He reports pain in his neck, noting that the back of his neck is sore, and pain in his back with movement. He also has some soreness in his knees and hips, left greater than right. Denies pain on side of his hips. Denies pain in abdomen or front of chest. Denies any lumps or bumps on the head. No fevers or dysuria. He was " "at baseline yesterday prior to the event. Patient notes that he has been getting over a bad cold, and he has some leftover coughing from that.      His friend adds that he had a similar event last year in [place], which they believed to be a TIA. He received rehabilitation for this and has returned to baseline. He lives alone but but close to family\"    He states he has back pain that radiates to the bilateral things with movement.  Does not travel below the knee.  No pain at rest.  No paresthesia.    Medical history  Anal fistula  Diverticulitis  Impacted cerumen  Transient cerebral ischemia attacks  Sensorineural hearing loss  Tinnitus  Atrial flutter  Prostate cancer  Atrial fibrillation  Hyperlipidemia    Social history  Lives at home independently     Exam  B/P: 122/74, T: 97.8, P: 88, R: 18   Awake and alert, sitting up in bed pleasant during exam.  He has bilateral iliopsoas weakness at 2/5 bilaterally as well as quadriceps weakness bilaterally at 3-3+/5, right dorsiflexion 4/5, left 4+/5 with 4/5 strength on plantar flexion.  Intact sensation to lower extremities.      Imaging    THORACIC SPINE MRI:  1.  1.0 x 0.7 x 1.2 cm intradural, extramedullary, T1 isointense, T2 hypointense enhancing mass on the left at T4-5 level which results in severe spinal canal stenosis and compresses and displaces the cord. Question subtle T2 hyperintense signal in the   cord at that level. This is favored to represent a meningioma.  2.  Multilevel thoracic spondylosis without high-grade spinal canal or neural foraminal stenosis in the remaining thoracic spine.  3.  Chronic appearing mild anterior wedging vertebral body height loss at T1 and T2    CERVICAL SPINE MRI:  1.  Multilevel cervical spondylosis without high-grade spinal canal stenosis.  2.  Severe bilateral neural foraminal stenosis at C4-5 and C5-6.  3.  No abnormal spinal cord signal.  4.  No abnormal contrast enhancement.    LUMBAR MRI:    1.  Mild lumbar spondylosis " without high-grade canal stenosis.  2.  Moderate left L5-S1 foraminal stenosis.    BRAIN MRI:    IMPRESSION:  1.  No discrete mass lesion, hemorrhage or focal area suggestive of acute ischemia.  2.  Diffuse age related changes along with focal encephalomalacia anterior right frontal lobe.     Discussed with Dr. Benitez.    Natacha Maloney, MPAS  [facility]  [address] [id number]

## 2025-06-09 NOTE — PROGRESS NOTES
"[facility]    Neurosurgery Progress Note    Date of Service (when I saw the patient): 06/09/2025     Assessment & Plan   89 year old male anti-coagulated on Eliquis with last dose today who presented to the emergency room yesterday after fall. Imaging revealed a thoracic intradural extramedullary lesion likely representing a meningioma.     Today he was seen by Dr. Benitez who recommended surgical intervention. He is amendable to this. He was seen at bedside. He reports weakness in his legs. He states the strength has improved slightly since admission. He reports minimal pain. No radicular pain or paresthesias. Has taken Eliquis with last does yesterday.     Plan:  -To OR later this week with Dr. Benitez for T4-5 laminectomy and resection of meningioma, case request is in, timing TBD  -Hold Eliquis  -Continue decadron as ordered  -Activity as tolerated  -Pain management as needed    I have discussed the following assessment and plan Dr. Benitez who is in agreement with initial plan and will follow up with further consultation recommendations.    Marlin Red, AMINTA  [facility] Neurosurgery  [address] [place] [address]  Tel [phone]  Fax [phone]  Securely message with [employer] (more info)  Text page via [employer]     Interval History   Stable.    Physical Exam   Temp: 98  F (36.7  C) Temp src: Oral BP: 132/83 Pulse: 78   Resp: 18 SpO2: 95 % O2 Device: None (Room air)    Vitals:    06/07/25 0619 06/07/25 1415   Weight: 74.4 kg (164 lb) 81 kg (178 lb 9.2 oz)     Vital Signs with Ranges  Temp:  [97.8  F (36.6  C)-99.1  F (37.3  C)] 98  F (36.7  C)  Pulse:  [] 78  Resp:  [18] 18  BP: (120-132)/(66-85) 132/83  SpO2:  [93 %-96 %] 95 %  I/O last 3 completed shifts:  In: 380 [P.O.:380]  Out: 500 [Urine:500]     , Blood pressure 132/83, pulse 78, temperature 98  F (36.7  C), resp. rate 18, height 1.778 m (5' 10\"), weight 81 kg (178 lb 9.2 oz), " SpO2 95%.  178 lbs 9.16 oz    NEUROLOGICAL EXAMINATION:   Mental status:  Alert and Oriented x 3, speech is fluent.  Motor:    He has bilateral iliopsoas weakness at 2+/5 left and 3/5 right as well as quadriceps weakness bilaterally at 3+/5, right dorsiflexion 4+/5, left 5-/5 with 4+/5 strength on plantar flexion   Sensation:  intact    Medications   Current Facility-Administered Medications   Medication Dose Route Frequency Provider Last Rate Last Admin    Patient is already receiving anticoagulation with heparin, enoxaparin (LOVENOX), warfarin (COUMADIN)  or other anticoagulant medication   Does not apply Continuous PRN Filipe Hamlin MD        sodium chloride 0.9 % infusion   Intravenous Continuous Marjan Hill  mL/hr at 06/09/25 0433 New Bag at 06/09/25 0433     Current Facility-Administered Medications   Medication Dose Route Frequency Provider Last Rate Last Admin    amLODIPine (NORVASC) tablet 5 mg  5 mg Oral Daily Filipe Hamlin MD   5 mg at 06/09/25 0945    dexAMETHasone (DECADRON) injection 2 mg  2 mg Intravenous Q12H Natacha Maloney PA-C   2 mg at 06/09/25 0801    losartan (COZAAR) tablet 50 mg  50 mg Oral Daily Filipe Hamlin MD   50 mg at 06/09/25 0945    metoprolol succinate ER (TOPROL XL) 24 hr tablet 25 mg  25 mg Oral Daily Filipe Hamlin MD   25 mg at 06/09/25 0945    sodium chloride (PF) 0.9% PF flush 3 mL  3 mL Intracatheter Q8H Filipe Hamlin MD   3 mL at 06/08/25 1226       Data     CBC RESULTS:   Recent Labs   Lab Test 06/09/25  0752   WBC 11.2*   RBC 4.72   HGB 14.4   HCT 44.4   MCV 94   MCH 30.5   MCHC 32.4   RDW 14.6        Basic Metabolic Panel:  Lab Results   Component Value Date     06/09/2025     09/23/2020      Lab Results   Component Value Date    POTASSIUM 4.3 06/09/2025    POTASSIUM 4.6 09/05/2022    POTASSIUM 4.6 09/23/2020     Lab Results   Component Value Date    CHLORIDE 107 06/09/2025    CHLORIDE 102 09/05/2022    CHLORIDE 106 09/23/2020     Lab  "Results   Component Value Date    BELLO 8.3 06/09/2025    BELLO 9.3 09/23/2020     Lab Results   Component Value Date    CO2 22 06/09/2025    CO2 30 09/05/2022    CO2 26 09/23/2020     Lab Results   Component Value Date    BUN 15.9 06/09/2025    BUN 21 09/05/2022    BUN 18 09/23/2020     Lab Results   Component Value Date    CR 0.70 06/09/2025    CR 0.98 09/23/2020     Lab Results   Component Value Date     06/09/2025     09/05/2022    GLC 67 09/23/2020     INR:  No results found for: \"INR\"      "

## 2025-06-09 NOTE — PLAN OF CARE
"Goal Outcome Evaluation:  Transfer from ortho spine this evening. A and O x4 but forgetful, asking repetitive questions. Frustrated when told he needed to wait for neurosurgery to clear him for oral intake, \"then I won't do any of this\" regarding RN assessment. Met with hospitalist and neurosurgery provider. Diet reordered. Family updated over phone. LLE 1/5 strength, RLE 2/5 strength. Denies N/T. Up with lift. Scoring yellow on aggression screening tool. Further discussion regarding POC will continue with pt and family tomorrow, no surgical intervention scheduled at this time.                             "

## 2025-06-09 NOTE — CONSULTS
"[facility] Nurse Inpatient Assessment     Consulted for:  Sacrum    Summary:  Pt refused any assessment of his wounds and was sitting upright in bed, flat on his back.  He refused repositioning.  Per report, he has a small serous blister near the coccyx.  Pt states 'I like to keep my wounds private' and also says 'I don't think I have a wound back there.'  Pt says he might be willing to have [facility] assess tomorrow.  RN present and reviewed PIP measures with pt.  [facility] will place PIP orders for now and will plan to return tomorrow.  MD updated.     [facility] nurse follow-up plan: will reattempt tomorrow Tues 6/10    Patient History (according to provider note(s):      Yosef Murry is a 89 year old male history of CVA, persistent afib, hyperlipidemia, prostate cancer, balance problems, who presents with bilateral lower extremity weakness.  He reports he went out to put air in his tires last evening, knelt down to fill the tires but then was too weak to get up despite having his cane for assistance.  He crawled up a ramp leading to his house door in the garage, was attempting to get upright, but fell backward.  He denied hitting his head.  He was unable to get himself up and ultimately called EMS early this morning.     Areas Assessed:      Areas visualized during today's visit: Patient refused       Treatment Plan:     Pressure Injury Prevention (PIP) Plan:  -If patient is declining pressure injury prevention interventions: Explore reason why and address patient's concerns, Educate on pressure injury risk and prevention intervention(s), If patient is still declining, document \"informed refusal\" , and Ensure Care team is aware ( provider, charge nurse, etc)  -Mattress: Add ROBIN pump to mattress PRN moisture issues  -HOB: Maintain at or below 30 degrees, unless contraindicated  -Repositioning in bed: Every 1-2 hours , Left/right positioning; avoid supine, and Raise foot of bed prior to raising head " of bed, to reduce patient sliding down (shear)  -Heels: Keep elevated off mattress and Pillows under calves  -Protective Dressing: Sacral Mepilex for prevention (#447901),  especially for the agitated patient   -Chair positioning: Repositions self: patient to shift weight every 15 minutes   -Moisture Management: Perineal cleansing /protection: Follow Incontinence Protocol and Avoid brief in bed  -Under Devices: Inspect skin under all medical devices during skin inspection , Ensure tubes are stabilized without tension, and Ensure patient is not lying on medical devices or equipment when repositioned        Orders: Written    RECOMMEND PRIMARY TEAM ORDER: None, at this time  Education provided: plan of care  Discussed plan of care with: Patient and Nurse  [facility] nurse follow-up plan: will attempt assessment again Tues 6/10  Notify [facility] if wound(s) deteriorate.  Nursing to notify the Provider(s) and re-consult the [facility] Nurse if new skin concern.    DATA:     Current support surface: Standard  Standard gel mattress (Isoflex)  Containment of urine/stool: Incontinence Protocol and Suction based external urinary catheter   BMI: Body mass index is 25.62 kg/m .   Active diet order: Orders Placed This Encounter      Regular Diet Adult     Output: I/O last 3 completed shifts:  In: 380 [P.O.:380]  Out: 500 [Urine:500]     Labs:   Recent Labs   Lab 06/09/25  0752   ALBUMIN 2.7*   HGB 14.4   WBC 11.2*     Pressure injury risk assessment:   Sensory Perception: 3-->slightly limited  Moisture: 3-->occasionally moist  Activity: 2-->chairfast  Mobility: 3-->slightly limited  Nutrition: 3-->adequate  Friction and Shear: 2-->potential problem  Adrian Score: 16    Lurdes Rojas RN CWOCN  -Securely message with [employer] ([facility] [employer])  Preferred  -[facility] Office Phone: [phone] (messages checked periodically Mon-Fri 8a-4p)

## 2025-06-09 NOTE — PLAN OF CARE
Goal Outcome Evaluation:           Overall Patient Progress: no changeOverall Patient Progress: no change       Reason for Admission: Fall, thoracic mass T4-T5 with thoracic spinal stenosis and compression of spinal cord, possible meningeoma    Cognitive/Mentation: A/Ox 4, forgetful.   Neuros/CMS: LLE 1/5, RLE 1/5. Weak plantarflexion and dorsiflexion.  VS: VSS on RA. SBP <180  Tele: NSR.  /GI: Incontinent. External catheter in place. Last BM PTA.   Pulmonary: LS clear.  Pain: Denies.     Drains/Lines: L/R PIV, infusing NS @ 125 mL/hr.  Skin: Blister on coccyx, mepi covered. Abrasions to bilateral knees. Scattered bruising.  Activity: Assist x 2 with lift.  Diet: Regular with thin liquids. Takes pills whole.     Therapies recs: Pending.  Discharge: Pending.    Aggression Stoplight Tool: Green    End of shift summary: Plan to discuss next steps with neurosurg today.

## 2025-06-09 NOTE — PLAN OF CARE
"/83 (BP Location: Left arm)   Pulse 78   Temp 98  F (36.7  C)   Resp 18   Ht 1.778 m (5' 10\")   Wt 81 kg (178 lb 9.2 oz)   SpO2 95%   BMI 25.62 kg/m      Patient name: Yosef Murry    Summary: Patient here with spinal mass, very pleasant. Legs weak ranging from 1 to 3 out of 5 strength. Otherwise neurologically intact. Very pleasant, oriented. Moving with lift team assistance. Had 1 BM today on commode. Using external catheter. Plan for possible surgery Thursday.     Kal Salter, RN  [facility]    "

## 2025-06-09 NOTE — PROGRESS NOTES
Spoke to patient and his daughter (via phone) about MRI findings of thoracic meningioma causing spinal cord compression.  Overall I think his exam is quite consistent with this with proximal lower extremity weakness.  Given the amount of compression and his symptoms, I believe that resection of the meningioma is the best course of action.  We will plan on doing this later this week, likely Thursday.

## 2025-06-09 NOTE — PROGRESS NOTES
Patient achieved -35 NIF and 1640 ml on FVC. Both done with  good/fair patient effort.   RT will continue to monitor and follow.           Sheila Rutledge, RT  6/9/2025

## 2025-06-09 NOTE — PROGRESS NOTES
[facility]    Medicine Progress Note - Hospitalist Service    Date of Admission:  6/7/2025  Interval History   Patient is comfortable.  Laying in bed.  He is waiting for probable surgery now.  Was seen by neurosurgery and he is reportedly in agreement to have surgery done at this point.  Does not have a lot of movement of his legs.  Cannot lift them off the bed more than maybe 1 to 2 inches.  Does not flex and extend his toes    Assessment & Plan   Yosef Murry is a 89 year old male history of CVA, persistent afib, hyperlipidemia, prostate cancer, balance problems, who presents with bilateral lower extremity weakness.  He reports he went out to put air in his tires last evening, knelt down to fill the tires but then was too weak to get up despite having his cane for assistance.  He crawled up a ramp leading to his house door in the garage, was attempting to get upright, but fell backward.  He denied hitting his head.  He was unable to get himself up and ultimately called EMS early this morning.     He reports crawling on his knees back to the ramp that takes him inside his house. Had trouble getting to his feet. He then fell on the ramp and couldn't get his phone to work and spent night on the ramp. Called 911 this am. Reported pain in his neck, back of his neck sore and pain in his back with movement.   ER vitals /11 and HR 88 afebrile.   Unable to lift his legs off the bed bilaterally. Knees with superficial abrasions.   BMP negative. CK 3088. Liver tests with AST 67.   WBC 17,400 and hgb 16.8     HEAD CT:  1. 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     CERVICAL SPINE CT:  1. No acute traumatic injury of the cervical spine.       Acute on chronic bilateral lower extremity weakness  Hx CVA 8/2019, also reports CVA in [place] about 2 years ago  MRI 1 X 0.7 X 1.2 cm  intradural extramedullary, T1 isointense, T2 hypointense mass at the left T4-T5 level with severe spinal stenosis and compresses and displaces the cord.   Etiology of weakness unclear on admission with multiple potential factors/etiologies including prior CVA, undiagnosed Parkinsons, deconditioning, spinal disease, among others and consulted with neurology   hx prostate cancer and reports new onset bladder incontinence in past 2 months, lower extremity weakness has been getting worse, though denies any chronic spine pain (having mid back pain after lying on ground overnight)  Elevated BUN could suggest some hypovolemia, but reports good oral intake recently and denies overt orthostatic symptoms  WBC elevated on arrival but afebrile with no overt infectious complaints   6/7 MRI Brain No discrete mass lesion, hemorrhage or focal area suggestive of acute ischemia.  Diffuse age related changes along with focal encephalomalacia anterior right frontal lobe  MRI cervical spine:  Multilevel cervical spondylosis without high-grade spinal canal stenosis.  Severe bilateral neural foraminal stenosis at C4-5 and C5-6  No abnormal spinal cord signal.  No abnormal contrast enhancement.  MRI lumbar spine: IMPRESSION:Mild lumbar spondylosis without high-grade canal stenosis. Moderate left L5-S1 foraminal stenosis.  MRI thoracic spine: 1.0 x 0.7 x 1.2 cm intradural, extramedullary, T1 isointense, T2 hypointense enhancing mass on the left at T4-5 level which results in severe spinal canal stenosis and compresses and displaces the cord. Question subtle T2 hyperintense signal in the cord at that level. This is favored to represent a meningioma.Multilevel thoracic spondylosis without high-grade spinal canal or neural foraminal stenosis in the remaining thoracic spine. Chronic appearing mild anterior wedging vertebral body height loss at T1 and T2.  Given results of MRI thoracic spine notified Neurosurgery, reviewed with neurology and  updated family   6/9 patient seen by neurosurgery on the evening shift: He did not want to have surgery on his primary discussion (follow-up with neurosurgery today and patient is in agreement now to undergo surgical intervention for his leg weakness.)  Surgery currently scheduled for 6/12 defer to surgery>> holding on anticoagulation on Eliquis prior to admission  Started on Decadron 2 mg every 12 on 6/8    Leukocytosis   Admit WBC 17 on arrival/Afebrile   CXR clear - suspect reactive elevated WBC   covid negative   Urine culture with no component results   6/9 WBC 11 however patient also now on steroids>> afebrile    Abnormal Thyroid test  TSH slightly up but free T4 wnl, trop slightly elevated but flat - continue plan as above     Rhabdomyolysis  *admission CK 3088  Trend CK with repeat 4089  Continued on IVF at 125  AST up consistent with rhabdo  >> AST improved 136>>148>>75  6/9 CK continues to trend down at 1456 (3994>>4089>>2494>>1456)   Reduced IVF to 75      A-fib/flutter, permanent  HTN  continue PTA amlodipine, metoprolol and losartan   Initially continued on eliquis but this was stopped for potential of procedures   EKG showing A-fib with nonspecific T-wave findings but without cardiorespiratory symptoms  Patient on eliquis prior to admit (Last dose AM on 6/8)   6/9 Continue to hold eliquis>> may discuss with neurosurgery if lovenox ok for DVT prevention or hold with surgery     HLD  Holding statin      Hx prostate cancer s/p prostatectomy 2009, radiation therapy 2014 and androgen deprivation therapy  *see Urology clinic note 5/9/25 for details - recent rise in PSA but MRI findings not convincing for any recurrence and PET scan   Focal PSMA activity in the posterior prostatectomy bed involving surgical clips and potentially anterior wall the rectum. No luisa, skeletal or distant metastatic disease.          Diet: Regular Diet Adult    DVT Prophylaxis: Pneumatic Compression Devices: Discussion with  "neurosurgery on DVT prevention.>> likely still some eliquis   Underwood Catheter: Not present  Lines: None     Cardiac Monitoring: None  Code Status: No CPR- Do NOT Intubate      Clinically Significant Risk Factors               # Hypoalbuminemia: Lowest albumin = 2.7 g/dL at 6/9/2025  7:52 AM, will monitor as appropriate                  # Overweight: Estimated body mass index is 25.62 kg/m  as calculated from the following:    Height as of this encounter: 1.778 m (5' 10\").    Weight as of this encounter: 81 kg (178 lb 9.2 oz)., PRESENT ON ADMISSION     # Financial/Environmental Concerns: none         Social Drivers of Health    Tobacco Use: Medium Risk (6/7/2025)    Patient History     Smoking Tobacco Use: Former     Smokeless Tobacco Use: Never   Physical Activity: Unknown (10/10/2024)    Exercise Vital Sign     Days of Exercise per Week: 0 days   Social Connections: Unknown (10/10/2024)    Social Connection and Isolation Panel [NHANES]     Frequency of Social Gatherings with Friends and Family: Once a week          Disposition Plan     Medically Ready for Discharge: Anticipated in 5+ Days         HIMA LEDEZMA MD  Hospitalist Service  [facility]  Securely message with [employer] (more info)  Text page via [employer] Paging/Directory   ______________________________________________________________________    Physical Exam   Vital Signs: Temp: 98.3  F (36.8  C) Temp src: Oral BP: 124/73 Pulse: 57   Resp: 16 SpO2: 96 % O2 Device: None (Room air)    Weight: 178 lbs 9.16 oz    General Appearance: Patient is awake and alert: slightly forgetful   Respiratory: Clear to auscultation bilaterally   Cardiovascular: Regular rate with no gallop or rub  GI: + BS, soft, non tender   Unable to lift his legs off the bed.. Flex/ext knees and ankles. And ankles   Almost no antigravity on rounding     Medical Decision Making       35 MINUTES SPENT BY ME on the date of service doing chart review, history, exam, " documentation & further activities per the note.      Data     I have personally reviewed the following data over the past 24 hrs:    11.2 (H)  \   14.4   / 179     139 107 15.9 /  115 (H)   4.3 22 0.70 \     ALT: 30 AST: 75 (H) AP: 71 TBILI: 0.7   ALB: 2.7 (L) TOT PROTEIN: 5.9 (L) LIPASE: N/A       Imaging results reviewed over the past 24 hrs:   Recent Results (from the past 24 hours)   MR Thoracic Spine w/o & w Contrast    Narrative    EXAM: MR CERVICAL SPINE W/O and W CONTRAST, MR THORACIC SPINE W/O and W CONTRAST  LOCATION: [facility]  DATE: 6/8/2025    INDICATION: Progressive weakness of all four extremities and neck pain  COMPARISON: None.  CONTRAST: 8mL Gadavist  TECHNIQUE:   1) MRI Cervical Spine without and with IV contrast.  2) MRI Thoracic Spine without and with IV contrast.    FINDINGS:    CERVICAL SPINE:   Normal vertebral body heights. Mild anterolisthesis at C6-7. Modic type II changes at the opposing C6-7 endplates. No abnormal cord signal. No extraspinal abnormality.    Craniovertebral junction and C1-C2: Normal.    C2-C3: Normal disc height. No herniation. Normal facets. No spinal canal or neural foraminal stenosis.     C3-C4: Mild disc height loss and disc degeneration. Right eccentric disc osteophyte complex and bilateral uncinate spurring. Bilateral facet arthropathy. No spinal canal stenosis. Moderate bilateral neural foraminal stenosis.     C4-C5: Mild disc height loss and disc degeneration. Right eccentric disc osteophyte complex and uncinate spurring. Right greater than left facet arthropathy. Severe bilateral neural foraminal stenosis. No spinal canal stenosis.     C5-C6: Severe disc height loss and disc degeneration. Bilateral uncovertebral spurring and facet arthropathy. Severe bilateral neural foraminal stenosis. No spinal canal stenosis.     C6-C7: Mild to moderate disc height loss and disc degeneration. Bilateral uncinate spurring. Right greater than left  facet arthropathy. Moderate right neural foraminal stenosis. No spinal canal or left neural foraminal stenosis.     C7-T1: Normal disc height. No herniation. Normal facets. No spinal canal or neural foraminal stenosis.    THORACIC SPINE:  Chronic appearing anterior wedging vertebral body height loss at T1 and T2. No retropulsion of the vertebral bodies. Exaggerated thoracic kyphosis. Multilevel Schmorl's node-like endplate indentations and mixed Modic type I and type II changes, most   pronounced at T8-9.    There is a 1.0 x 0.7 x 1.2 cm intradural, extramedullary, T1 isointense, T2 hypointense enhancing mass on the left at C4-5 level which results in severe spinal canal stenosis and compresses and displaces the cord. Question subtle T2 hyperintense signal   in the cord at that level.     Multilevel disc height loss, disc degeneration and facet arthropathy. No high-grade spinal canal or neural foraminal stenosis in the remaining thoracic spine.     No extraspinal abnormality.      Impression    IMPRESSION:  CERVICAL SPINE MRI:  1.  Multilevel cervical spondylosis without high-grade spinal canal stenosis.  2.  Severe bilateral neural foraminal stenosis at C4-5 and C5-6.  3.  No abnormal spinal cord signal.  4.  No abnormal contrast enhancement.    THORACIC SPINE MRI:  1.  1.0 x 0.7 x 1.2 cm intradural, extramedullary, T1 isointense, T2 hypointense enhancing mass on the left at T4-5 level which results in severe spinal canal stenosis and compresses and displaces the cord. Question subtle T2 hyperintense signal in the   cord at that level. This is favored to represent a meningioma.  2.  Multilevel thoracic spondylosis without high-grade spinal canal or neural foraminal stenosis in the remaining thoracic spine.  3.  Chronic appearing mild anterior wedging vertebral body height loss at T1 and T2.     MR Cervical Spine w/o & w Contrast    Narrative    EXAM: MR CERVICAL SPINE W/O and W CONTRAST, MR THORACIC SPINE W/O and W  CONTRAST  LOCATION: [facility]  DATE: 6/8/2025    INDICATION: Progressive weakness of all four extremities and neck pain  COMPARISON: None.  CONTRAST: 8mL Gadavist  TECHNIQUE:   1) MRI Cervical Spine without and with IV contrast.  2) MRI Thoracic Spine without and with IV contrast.    FINDINGS:    CERVICAL SPINE:   Normal vertebral body heights. Mild anterolisthesis at C6-7. Modic type II changes at the opposing C6-7 endplates. No abnormal cord signal. No extraspinal abnormality.    Craniovertebral junction and C1-C2: Normal.    C2-C3: Normal disc height. No herniation. Normal facets. No spinal canal or neural foraminal stenosis.     C3-C4: Mild disc height loss and disc degeneration. Right eccentric disc osteophyte complex and bilateral uncinate spurring. Bilateral facet arthropathy. No spinal canal stenosis. Moderate bilateral neural foraminal stenosis.     C4-C5: Mild disc height loss and disc degeneration. Right eccentric disc osteophyte complex and uncinate spurring. Right greater than left facet arthropathy. Severe bilateral neural foraminal stenosis. No spinal canal stenosis.     C5-C6: Severe disc height loss and disc degeneration. Bilateral uncovertebral spurring and facet arthropathy. Severe bilateral neural foraminal stenosis. No spinal canal stenosis.     C6-C7: Mild to moderate disc height loss and disc degeneration. Bilateral uncinate spurring. Right greater than left facet arthropathy. Moderate right neural foraminal stenosis. No spinal canal or left neural foraminal stenosis.     C7-T1: Normal disc height. No herniation. Normal facets. No spinal canal or neural foraminal stenosis.    THORACIC SPINE:  Chronic appearing anterior wedging vertebral body height loss at T1 and T2. No retropulsion of the vertebral bodies. Exaggerated thoracic kyphosis. Multilevel Schmorl's node-like endplate indentations and mixed Modic type I and type II changes, most   pronounced at  T8-9.    There is a 1.0 x 0.7 x 1.2 cm intradural, extramedullary, T1 isointense, T2 hypointense enhancing mass on the left at C4-5 level which results in severe spinal canal stenosis and compresses and displaces the cord. Question subtle T2 hyperintense signal   in the cord at that level.     Multilevel disc height loss, disc degeneration and facet arthropathy. No high-grade spinal canal or neural foraminal stenosis in the remaining thoracic spine.     No extraspinal abnormality.      Impression    IMPRESSION:  CERVICAL SPINE MRI:  1.  Multilevel cervical spondylosis without high-grade spinal canal stenosis.  2.  Severe bilateral neural foraminal stenosis at C4-5 and C5-6.  3.  No abnormal spinal cord signal.  4.  No abnormal contrast enhancement.    THORACIC SPINE MRI:  1.  1.0 x 0.7 x 1.2 cm intradural, extramedullary, T1 isointense, T2 hypointense enhancing mass on the left at T4-5 level which results in severe spinal canal stenosis and compresses and displaces the cord. Question subtle T2 hyperintense signal in the   cord at that level. This is favored to represent a meningioma.  2.  Multilevel thoracic spondylosis without high-grade spinal canal or neural foraminal stenosis in the remaining thoracic spine.  3.  Chronic appearing mild anterior wedging vertebral body height loss at T1 and T2.

## 2025-06-10 LAB
ABO + RH BLD: NORMAL
ALBUMIN SERPL ELPH-MCNC: 2.8 G/DL (ref 3.7–5.1)
ALPHA1 GLOB SERPL ELPH-MCNC: 0.4 G/DL (ref 0.2–0.4)
ALPHA2 GLOB SERPL ELPH-MCNC: 0.8 G/DL (ref 0.5–0.9)
ANION GAP SERPL CALCULATED.3IONS-SCNC: 10 MMOL/L (ref 7–15)
APTT PPP: 21 SECONDS (ref 22–38)
B-GLOBULIN SERPL ELPH-MCNC: 0.9 G/DL (ref 0.6–1)
BLD GP AB SCN SERPL QL: NEGATIVE
BUN SERPL-MCNC: 17.7 MG/DL (ref 8–23)
CALCIUM SERPL-MCNC: 8.5 MG/DL (ref 8.8–10.4)
CHLORIDE SERPL-SCNC: 107 MMOL/L (ref 98–107)
CK SERPL-CCNC: 563 U/L (ref 39–308)
CREAT SERPL-MCNC: 0.59 MG/DL (ref 0.67–1.17)
EGFRCR SERPLBLD CKD-EPI 2021: >90 ML/MIN/1.73M2
GAMMA GLOB SERPL ELPH-MCNC: 0.9 G/DL (ref 0.7–1.6)
GLUCOSE SERPL-MCNC: 113 MG/DL (ref 70–99)
HCO3 SERPL-SCNC: 22 MMOL/L (ref 22–29)
LOCATION OF TASK: ABNORMAL
M PROTEIN SERPL ELPH-MCNC: 0.1 G/DL
POTASSIUM SERPL-SCNC: 4.2 MMOL/L (ref 3.4–5.3)
PROT PATTERN SERPL ELPH-IMP: ABNORMAL
SODIUM SERPL-SCNC: 139 MMOL/L (ref 135–145)
SPECIMEN EXP DATE BLD: NORMAL
WNV IGG SER IA-ACNC: 0.11 IV
WNV IGM SER IA-ACNC: 0.01 IV

## 2025-06-10 PROCEDURE — 120N000001 HC R&B MED SURG/OB

## 2025-06-10 PROCEDURE — G0463 HOSPITAL OUTPT CLINIC VISIT: HCPCS

## 2025-06-10 PROCEDURE — 36415 COLL VENOUS BLD VENIPUNCTURE: CPT | Performed by: NURSE PRACTITIONER

## 2025-06-10 PROCEDURE — 99232 SBSQ HOSP IP/OBS MODERATE 35: CPT | Performed by: INTERNAL MEDICINE

## 2025-06-10 PROCEDURE — 80048 BASIC METABOLIC PNL TOTAL CA: CPT | Performed by: INTERNAL MEDICINE

## 2025-06-10 PROCEDURE — 36415 COLL VENOUS BLD VENIPUNCTURE: CPT | Performed by: INTERNAL MEDICINE

## 2025-06-10 PROCEDURE — 258N000003 HC RX IP 258 OP 636: Performed by: INTERNAL MEDICINE

## 2025-06-10 PROCEDURE — 250N000013 HC RX MED GY IP 250 OP 250 PS 637: Performed by: HOSPITALIST

## 2025-06-10 PROCEDURE — 250N000011 HC RX IP 250 OP 636: Performed by: PHYSICIAN ASSISTANT

## 2025-06-10 PROCEDURE — 86901 BLOOD TYPING SEROLOGIC RH(D): CPT | Performed by: NURSE PRACTITIONER

## 2025-06-10 PROCEDURE — 85730 THROMBOPLASTIN TIME PARTIAL: CPT | Performed by: NURSE PRACTITIONER

## 2025-06-10 PROCEDURE — 82550 ASSAY OF CK (CPK): CPT | Performed by: INTERNAL MEDICINE

## 2025-06-10 RX ADMIN — AMLODIPINE BESYLATE 5 MG: 5 TABLET ORAL at 08:12

## 2025-06-10 RX ADMIN — SODIUM CHLORIDE: 900 INJECTION INTRAVENOUS at 14:04

## 2025-06-10 RX ADMIN — DEXAMETHASONE SODIUM PHOSPHATE 2 MG: 4 INJECTION, SOLUTION INTRAMUSCULAR; INTRAVENOUS at 08:11

## 2025-06-10 RX ADMIN — SODIUM CHLORIDE: 900 INJECTION INTRAVENOUS at 00:50

## 2025-06-10 RX ADMIN — DEXAMETHASONE SODIUM PHOSPHATE 2 MG: 4 INJECTION, SOLUTION INTRAMUSCULAR; INTRAVENOUS at 19:09

## 2025-06-10 RX ADMIN — Medication 1 MG: at 00:39

## 2025-06-10 RX ADMIN — METOPROLOL SUCCINATE 25 MG: 25 TABLET, EXTENDED RELEASE ORAL at 08:12

## 2025-06-10 RX ADMIN — LOSARTAN POTASSIUM 50 MG: 50 TABLET, FILM COATED ORAL at 08:12

## 2025-06-10 ASSESSMENT — ACTIVITIES OF DAILY LIVING (ADL)
ADLS_ACUITY_SCORE: 51
ADLS_ACUITY_SCORE: 50
ADLS_ACUITY_SCORE: 50
ADLS_ACUITY_SCORE: 51
ADLS_ACUITY_SCORE: 50
ADLS_ACUITY_SCORE: 51
ADLS_ACUITY_SCORE: 51
ADLS_ACUITY_SCORE: 50
ADLS_ACUITY_SCORE: 51
ADLS_ACUITY_SCORE: 51
ADLS_ACUITY_SCORE: 50
ADLS_ACUITY_SCORE: 51
ADLS_ACUITY_SCORE: 50

## 2025-06-10 NOTE — PROGRESS NOTES
[facility] Nurse Inpatient Assessment     Consulted for:  Sacrum    Summary:   Stage 2 pressure injury to left coccyx, present on admission. Intact blister with non-blanchable erythema. Recommend Mepilex dressing and frequent repositioning and off-loading. Writer ordered chair cushion. Limit time up in chair to 2 hours at a time.  Wounds to bilateral knees due to fall PTA. Could possibly evolve due to location over bony area and pressure from trauma. Recommend protective dressings with non-adherent gauze.       [facility] nurse follow-up plan: weekly    Patient History (according to provider note(s):      Yosef Murry is a 89 year old male history of CVA, persistent afib, hyperlipidemia, prostate cancer, balance problems, who presents with bilateral lower extremity weakness.  He reports he went out to put air in his tires last evening, knelt down to fill the tires but then was too weak to get up despite having his cane for assistance.  He crawled up a ramp leading to his house door in the garage, was attempting to get upright, but fell backward.  He denied hitting his head.  He was unable to get himself up and ultimately called EMS early this morning.     Areas Assessed:      Areas visualized during today's visit: Focused:, Sacrum/coccyx, Heel, Bilateral, and knee caps     Pressure Injury Location: Left Coccyx        Last photo: 6/10  Wound type: Pressure Injury and Friction     Pressure Injury Stage: 2, present on admission      Wound history/plan of care:  Noted on admission by RN. Sacral Mepilex in place. Patient with bilateral leg weakness on admission. Now improving. Patient able to stand for assessment with Ax2 and walker.     Wound base: 100% Non-blanchable, Erythema, and Blister,      Palpation of the wound bed: boggy      Drainage: none     Description of drainage: none     Measurements (length x width x depth, in cm) ~ 1.5  x 1 cm      Tunneling N/A     Undermining N/A  Periwound  skin: Intact      Color: normal and consistent with surrounding tissue      Temperature: normal   Odor: none  Pain: denies , none  Pain intervention prior to dressing change: no significant pain present  and slow and gentle cares   Treatment goal: Heal  and Protection  STATUS: initial assessment  Supplies ordered: gathered, at bedside, ordered chair cushion, supplies stored on unit, discussed with RN, and discussed with patient     My PI Risk Assessment     Sensory Perception: 3 - Slightly Limited     Moisture: 3 - Occasionally moist      Activity: 3 - Walks occasionally      Mobility: 2 - Very limited     Nutrition: 2 - Probably inadequate      Friction/Shear: 1 - Problem     TOTAL: 14     Wound location: Bilateral knees      (Feet--->head)    Last photo: 6/10  Wound due to: Trauma  Wound history/plan of care: Patient fell PTA. He was unable to stand and crawled on a cement surface until he received assistance. Mepilex in place on left  knee  Wound base: 50% Moist and Pink, 50% Maroon, Superficial scab, and Dry drainage     Palpation of the wound bed: normal      Drainage: small     Description of drainage: serosanguinous     Measurements (length x width x depth, in cm): Left ~ 1  x 1.5  x  0.1 cm; Right scattered injuries with largest ~ 1.5 x 1 x 0.1 cm    Periwound skin: Erythema- blanchable      Color: pink      Temperature: normal   Odor: none  Pain: facial expression of distress and during dressing change, intermittent and stinging  Pain interventions prior to dressing change: slow and gentle cares   Treatment goal: Heal  and Protection  STATUS: initial assessment  Supplies ordered: gathered, at bedside, supplies stored on unit, discussed with RN, and discussed with patient      Treatment Plan:     Left coccyx wound: Daily and PRN for soiled/loose dressing  Cleanse with wound cleanser and gauze. Pat dry.  Swab intact escobar-wound skin with barrier film (Cavilon or similar product). Let dry.  Cover with a Mepilex  "Sacral dressing ensuring dressing conforms well to body contours. Initial and date.  ~ Same Mepilex can be used up to 5 days. RN should assess under dressing every shift.  ~ If dressing is requiring frequent changes due to soiling, apply Triad (#242161) paste to wound instead.     Bilateral knee wounds: Every three days and PRN for soiling  Cleanse with wound cleanser and gauze. Pat dry.  Swab intact escobar-wound skin with barrier film (Cavilon or similar product). Let dry.  Apply cut-to-fit pieces of non-adherent gauze (Adaptic) over wounds and cover with 4x4 foam border dressing. Initial and date.    Pressure Injury Prevention (PIP) Plan:  -If patient is declining pressure injury prevention interventions: Explore reason why and address patient's concerns, Educate on pressure injury risk and prevention intervention(s), If patient is still declining, document \"informed refusal\" , and Ensure Care team is aware ( provider, charge nurse, etc)  -Mattress: Add ROBIN pump to mattress PRN moisture issues  -HOB: Maintain at or below 30 degrees, unless contraindicated  -Repositioning in bed: Every 1-2 hours , Left/right positioning; avoid supine, and Raise foot of bed prior to raising head of bed, to reduce patient sliding down (shear)  -Heels: Keep elevated off mattress and Pillows under calves  -Protective Dressing: Sacral Mepilex for prevention (#863506),  especially for the agitated patient   -Chair positioning: Repositions self: patient to shift weight every 15 minutes   -Moisture Management: Perineal cleansing /protection: Follow Incontinence Protocol and Avoid brief in bed  -Under Devices: Inspect skin under all medical devices during skin inspection , Ensure tubes are stabilized without tension, and Ensure patient is not lying on medical devices or equipment when repositioned    Orders: Written    RECOMMEND PRIMARY TEAM ORDER: None, at this time  Education provided: importance of repositioning, plan of care, wound " progress, Moisture management, and Off-loading pressure  Discussed plan of care with: Patient and Nurse  [facility] nurse follow-up plan: weekly  Notify WOC if wound(s) deteriorate.  Nursing to notify the Provider(s) and re-consult the [facility] Nurse if new skin concern.    DATA:     Current support surface: Standard  Standard gel mattress (Isoflex)  Containment of urine/stool: Incontinence Protocol and Suction based external urinary catheter   BMI: Body mass index is 25.62 kg/m .   Active diet order: Orders Placed This Encounter      Regular Diet Adult     Output: I/O last 3 completed shifts:  In: 240 [P.O.:240]  Out: 1400 [Urine:1400]     Labs:   Recent Labs   Lab 06/09/25  1246 06/09/25  0752   ALBUMIN  --  2.7*   HGB  --  14.4   INR 1.45*  --    WBC  --  11.2*     Pressure injury risk assessment:   Sensory Perception: 3-->slightly limited  Moisture: 3-->occasionally moist  Activity: 2-->chairfast  Mobility: 3-->slightly limited  Nutrition: 3-->adequate  Friction and Shear: 2-->potential problem  Adrian Score: 16    Radha Chino RN CWOCN  -Securely message with [employer] ([facility] [employer])  Preferred

## 2025-06-10 NOTE — PLAN OF CARE
Goal Outcome Evaluation:  Plan of Care Reviewed With: patient    Overall Patient Progress: improvingOverall Patient Progress: improving    Pt here with a fall, found a thoracic mass T4-T5 with spina stenosis and cord compression. Plan for T4 to T5 laminectomy and resection of meningioma with Dr. Benitez on 9/12. A&O x4, forgetful. VSS, SBP<180, on RA. LS clear. CMS and Neuro's intact except for BLE weakness, currently LLE 3/5, RLE 4/5, with moderate plantar.dorsi flexion. Denies pain. T&R q2hrs, up A2 with GB/W to BSC and chair. Incont of urine, purewick in place when in bed. +BS, BM today. IVF infusing. Reg diet, takes pills whole with water. WOC assessed coccyx blister, wound care order along with care orders for BLE knee abrasions, mepilex dressings CDI. CK trending down. Pt scoring green on Aggression Stop Light Tool. Discharge pending, currently recommending TCU.

## 2025-06-10 NOTE — PROGRESS NOTES
Care Management Follow Up    Length of Stay (days): 2    Expected Discharge Date: 06/16/2025     Concerns to be Addressed: discharge planning     Patient plan of care discussed at interdisciplinary rounds: Yes    Anticipated Discharge Disposition: Transitional Care, Skilled Nursing Facility  Anticipated Discharge Services: therapies    Referrals Placed by CM/SW:  not applicable yet  Private pay costs discussed: Not applicable    Additional Information:  Patient has a plan for laminectomy/meningeoma resect likely on Thursday. Care management will continue to follow.     VALENTINA Loaiza, LGSW   Social Work   [facility]

## 2025-06-10 NOTE — PROGRESS NOTES
"[facility]    Neurosurgery Progress Note    Date of Service (when I saw the patient): 06/10/2025     Assessment & Plan   89 year old male anti-coagulated on Eliquis with last dose today who presented to the emergency room yesterday after fall. Imaging revealed a thoracic intradural extramedullary lesion likely representing a meningioma.     Today he reports continued improvement in BLE strength. Continues to report bilateral thigh pain with movement. No new or worsened symptoms. Has not yet worked with therapies.     Plan:  -To OR Thursday with Dr. Benitez for T4-5 laminectomy and resection of meningioma, case request is in, preop orders in place.   -Appreciate assistance from hospitalist for medical clearance/optimization for surgical intervention  -Hold Eliquis  -Continue decadron as ordered  -Activity as tolerated  -Pain management as needed  -Type and screen    Marlin Red CNP  [facility] Neurosurgery  [address] [address]  Tel [phone]  Fax [phone]  Securely message with [employer] (more info)  Text page via [employer] Paging/Directory     Interval History   Stable.    Physical Exam   Temp: 97.8  F (36.6  C) Temp src: Oral BP: (!) 154/100 Pulse: 90   Resp: 17 SpO2: 96 % O2 Device: None (Room air)    Vitals:    06/07/25 0619 06/07/25 1415   Weight: 74.4 kg (164 lb) 81 kg (178 lb 9.2 oz)     Vital Signs with Ranges  Temp:  [97.7  F (36.5  C)-98.7  F (37.1  C)] 97.8  F (36.6  C)  Pulse:  [57-90] 90  Resp:  [16-17] 17  BP: (124-154)/() 154/100  SpO2:  [96 %-99 %] 96 %  I/O last 3 completed shifts:  In: 240 [P.O.:240]  Out: 1400 [Urine:1400]     , Blood pressure (!) 154/100, pulse 90, temperature 97.8  F (36.6  C), temperature source Oral, resp. rate 17, height 1.778 m (5' 10\"), weight 81 kg (178 lb 9.2 oz), SpO2 96%.  178 lbs 9.16 oz    NEUROLOGICAL EXAMINATION:   Mental status:  Alert and Oriented x 3, speech is fluent.  Motor:    He " has bilateral iliopsoas weakness at 4-/5 left and 4/5 right as well as quadriceps weakness bilaterally at 4/5, right dorsiflexion 5-/5, left 5-/5 with 5-/5 strength on plantar flexion   Sensation:  intact    Medications   Current Facility-Administered Medications   Medication Dose Route Frequency Provider Last Rate Last Admin    Patient is already receiving anticoagulation with heparin, enoxaparin (LOVENOX), warfarin (COUMADIN)  or other anticoagulant medication   Does not apply Continuous PRN Filipe Hamlin MD        sodium chloride 0.9 % infusion   Intravenous Continuous Marjan Hill MD 75 mL/hr at 06/10/25 0812 Rate Verify at 06/10/25 0812     Current Facility-Administered Medications   Medication Dose Route Frequency Provider Last Rate Last Admin    amLODIPine (NORVASC) tablet 5 mg  5 mg Oral Daily Filipe Hamlin MD   5 mg at 06/10/25 0812    dexAMETHasone (DECADRON) injection 2 mg  2 mg Intravenous Q12H Natacha Maloney PA-C   2 mg at 06/10/25 0811    losartan (COZAAR) tablet 50 mg  50 mg Oral Daily Filipe Hamlin MD   50 mg at 06/10/25 0812    metoprolol succinate ER (TOPROL XL) 24 hr tablet 25 mg  25 mg Oral Daily Filipe Hamlin MD   25 mg at 06/10/25 0812    sodium chloride (PF) 0.9% PF flush 3 mL  3 mL Intracatheter Q8H Filipe Hamlin MD   3 mL at 06/09/25 2022       Data     CBC RESULTS:   Recent Labs   Lab Test 06/09/25  0752   WBC 11.2*   RBC 4.72   HGB 14.4   HCT 44.4   MCV 94   MCH 30.5   MCHC 32.4   RDW 14.6        Basic Metabolic Panel:  Lab Results   Component Value Date     06/09/2025     09/23/2020      Lab Results   Component Value Date    POTASSIUM 4.3 06/09/2025    POTASSIUM 4.6 09/05/2022    POTASSIUM 4.6 09/23/2020     Lab Results   Component Value Date    CHLORIDE 107 06/09/2025    CHLORIDE 102 09/05/2022    CHLORIDE 106 09/23/2020     Lab Results   Component Value Date    BELLO 8.3 06/09/2025    BELLO 9.3 09/23/2020     Lab Results   Component Value Date    CO2 22  "06/09/2025    CO2 30 09/05/2022    CO2 26 09/23/2020     Lab Results   Component Value Date    BUN 15.9 06/09/2025    BUN 21 09/05/2022    BUN 18 09/23/2020     Lab Results   Component Value Date    CR 0.70 06/09/2025    CR 0.98 09/23/2020     Lab Results   Component Value Date     06/09/2025     09/05/2022    GLC 67 09/23/2020     INR:  No results found for: \"INR\"      "

## 2025-06-10 NOTE — PLAN OF CARE
Goal Outcome Evaluation:           Overall Patient Progress: no changeOverall Patient Progress: no change       Reason for Admission: Fall, thoracic mass T4-T5 with thoracic spinal stenosis and compression of spinal cord, possible meningeoma    Cognitive/Mentation: A/Ox 4, forgetful.   Neuros/CMS: LLE 1/5, RLE 2/5 at 2000 (6/9); LLE 3/5, RLE 4/5 at 0500 (6/10). Weak plantarflexion and dorsiflexion.  VS: VSS on RA. SBP <180  Tele: NSR.  /GI: Incontinent. External catheter in place. Last BM 06/09/25.   Pulmonary: LS clear.  Pain: Denies.     Drains/Lines: L PIV, infusing NS @ 75mL/hr. R PIV, SL.   Skin: Abrasions to bilateral knees. Scattered bruising. Blister on coccyx, mepi in place. WOC consulted.   Activity: Assist x 2 with lift.  Diet: Regular with thin liquids. Takes pills whole.     Therapies recs: Pending.  Discharge: Pending.    Aggression Stoplight Tool: Green    End of shift summary: Plan for laminectomy/meningeoma resect on Thursday (06/12).

## 2025-06-10 NOTE — PROVIDER NOTIFICATION
Discussion with neurosurgery Dr. Benitez and patient can ambulate and get up and walk up.  He does not have a restriction that he cannot get up and walk  Recommend that he does try to get up and walk to keep strength in his legs with the assistance of staff and a walker (higher fall risk)   In addition plans to check an INR tomorrow to ensure that this is improving.  Hospital team will need to rediscuss with neurosurgery  Updated his daughter Oxana who is a MD  Dr. Keshia Iniguez

## 2025-06-10 NOTE — PROGRESS NOTES
[facility]    Medicine Progress Note - Hospitalist Service    Date of Admission:  6/7/2025  Interval History   Patient has started on steroids and is actually doing much better from a strength in his legs.  He is able to lift them off the bed today which is very different from prior examination yesterday.  He feels he is getting better daily in terms of his leg movement.  He is aware and knows about plans for surgery on 6/12  Patient wants to proceed to see if he can improve the strength in his legs.  Dr. Hill had a discussion with Dr. Benitez and it is okay for the patient to ambulate and get out of bed (with assistance)   Updated his daughter as well    Assessment & Plan   Yosef Murry is a 89 year old male history of CVA, persistent afib, hyperlipidemia, prostate cancer, balance problems, who presents with bilateral lower extremity weakness.  He reports he went out to put air in his tires last evening, knelt down to fill the tires but then was too weak to get up despite having his cane for assistance.  He crawled up a ramp leading to his house door in the garage, was attempting to get upright, but fell backward.  He denied hitting his head.  He was unable to get himself up and ultimately called EMS early this morning.     He reports crawling on his knees back to the ramp that takes him inside his house. Had trouble getting to his feet. He then fell on the ramp and couldn't get his phone to work and spent night on the ramp. Called 911 this am. Reported pain in his neck, back of his neck sore and pain in his back with movement.   ER vitals /11 and HR 88 afebrile.   Unable to lift his legs off the bed bilaterally. Knees with superficial abrasions.   BMP negative. CK 3088. Liver tests with AST 67.   WBC 17,400 and hgb 16.8     HEAD CT:  1. Chronic cerebellar and right frontal lobe infarcts superimposed upon a background of moderate chronic microangiopathy without acute intracranial  abnormality.     CERVICAL SPINE CT:  1. No acute traumatic injury of the cervical spine.       Acute on chronic bilateral lower extremity weakness  Hx CVA 8/2019, also reports CVA in [place]   MRI 1 X 0.7 X 1.2 cm intradural extramedullary, T1 isointense, T2 hypointense mass at the left T4-T5 level with severe spinal stenosis and compresses and displaces the cord.   Etiology of weakness initially unclear on admission with multiple potential factors/etiologies including prior CVA, undiagnosed Parkinsons, deconditioning, spinal disease, among others and consulted with neurology   Hx prostate cancer and reports new onset bladder incontinence in past 2 months, lower extremity weakness getting worse, denied any chronic spine pain (having mid back pain after lying on ground overnight)  WBC elevated on arrival but afebrile with no overt infectious complaints   Diagnostic imaging  6/7 MRI Brain No discrete mass lesion, hemorrhage or focal area suggestive of acute ischemia.  Diffuse age related changes along with focal encephalomalacia anterior right frontal lobe  MRI cervical spine:  Multilevel cervical spondylosis without high-grade spinal canal stenosis.  Severe bilateral neural foraminal stenosis at C4-5 and C5-6  No abnormal spinal cord signal.  No abnormal contrast enhancement.  MRI lumbar spine: IMPRESSION:Mild lumbar spondylosis without high-grade canal stenosis. Moderate left L5-S1 foraminal stenosis.  6/8/2025 MRI thoracic spine: 1.0 x 0.7 x 1.2 cm intradural, extramedullary, T1 isointense, T2 hypointense enhancing mass on the left at T4-5 level which results in severe spinal canal stenosis and compresses and displaces the cord. Question subtle T2 hyperintense signal in the cord at that level. This is favored to represent a meningioma.Multilevel thoracic spondylosis without high-grade spinal canal or neural foraminal stenosis in the remaining thoracic spine. Chronic appearing mild anterior wedging vertebral body  height loss at T1 and T2.  6/8 neurosurgery: First discussion with results and patient reported he did not want to have surgery.   6/9 further discussion and he is willing to have surgery   Surgery currently scheduled for 6/12 >> holding on anticoagulation on Eliquis prior to admission  Started on Decadron 2 mg every 12 on 6/8  6/10 on examination his legs have improved quite a bit.  He is able to lift his legs off the bed.  Able to get to the chair and reportedly doing some ambulation.  Discussion with neurosurgery Dr. Benitez and al for the patient to ambulate as tolerated as long as he can move safely.  Discussion with Dr. Benitez as well regarding the surgery and imaging.>>  Patient has a history of prostate cancer but recent PET scan no uptake 5/2025 and per review by Dr. Benitez most likely consistent with the report as noted with probable meningioma.  No sign of infection.  Definitive diagnosis will be removal at surgery but highly suspicious for the meningioma    Leukocytosis   Admit WBC 17 on arrival/Afebrile   CXR clear - suspect reactive elevated WBC   covid negative  Urine culture with no component results   6/9 WBC 11 however patient also now on steroids>> afebrile    Abnormal Thyroid test  TSH slightly up but free T4 wnl, trop slightly elevated but flat - continue plan as above     Rhabdomyolysis  Admission CK 3088  Continued on IVF at 125  AST up consistent with rhabdo  >> AST improved 136>>148>>75  6/9 CK continues to trend down at 1456 (3994>>4089>>2494>>1456)   6/10 CK down to 563 and STOP IVF to avoid fluid overload       A-fib/flutter, permanent  HTN  Continue PTA amlodipine, metoprolol and losartan   ECHO 7/2024 showing EF of 60 to 65%.  Right ventricle borderline dilated.  Right ventricular systolic normal.  No effusion.  Septal thickening is noted.  Concentric remodeling present  Initially continued on eliquis but this was stopped for potential of procedures   EKG showing A-fib with nonspecific  "T-wave findings but without cardiorespiratory symptoms  Patient on eliquis prior to admit (Last dose AM on 6/8)   6/10 higher CHADS2 score at least 4 with prior CVA on anticoagulation. >>  Options limited as he is going to have surgery>> discussion with neurosurgery today as well and did not want initiate DVT prevention to avoid any residual anticoagulation affect.   Holding eliquis since AM 6/8   His daughter is aware of this risk.   Will also require holding post operative until ok with surgery   INR 1.45 on last check of 6/9 discussion with neurosurgery will order INR in a.m. 6/11 but may all be from [facility] >> further discussion after result       HLD  Holding statin        Hx prostate cancer s/p prostatectomy 2009, radiation therapy 2014 and androgen deprivation therapy  See Urology clinic note 5/9/25 for details - recent rise in PSA but MRI findings not convincing for any recurrence and PET scan   Focal PSMA activity in the posterior prostatectomy bed involving surgical clips and potentially anterior wall the rectum. No luisa, skeletal or distant metastatic disease.          Diet: Regular Diet Adult    DVT Prophylaxis: Pneumatic Compression Devices:  6/10 holding currently on anticoagulation for upcoming surgery including DVT prevention (discussion with neurosurgery 6/10)   [place] Catheter: Not present  Lines: None     Cardiac Monitoring: None  Code Status: No CPR- Do NOT Intubate      Clinically Significant Risk Factors               # Hypoalbuminemia: Lowest albumin = 2.7 g/dL at 6/9/2025  7:52 AM, will monitor as appropriate    # Coagulation Defect: INR = 1.45 (Ref range: 0.85 - 1.15) and/or PTT = 26 Seconds (Ref range: 22 - 38 Seconds), will monitor for bleeding               # Overweight: Estimated body mass index is 25.62 kg/m  as calculated from the following:    Height as of this encounter: 1.778 m (5' 10\").    Weight as of this encounter: 81 kg (178 lb 9.2 oz)., PRESENT ON ADMISSION     # " Financial/Environmental Concerns: none         Social Drivers of Health    Tobacco Use: Medium Risk (6/7/2025)    Patient History     Smoking Tobacco Use: Former     Smokeless Tobacco Use: Never   Physical Activity: Unknown (10/10/2024)    Exercise Vital Sign     Days of Exercise per Week: 0 days   Social Connections: Unknown (10/10/2024)    Social Connection and Isolation Panel [NHANES]     Frequency of Social Gatherings with Friends and Family: Once a week          Disposition Plan     Medically Ready for Discharge: Anticipated in 5+ Days         HIMA LEDEZMA MD  Hospitalist Service  [facility]  Securely message with [employer] (more info)  Text page via [employer] Paging/Directory   ______________________________________________________________________    Physical Exam   Vital Signs: Temp: 97.8  F (36.6  C) Temp src: Oral BP: (!) 154/100 Pulse: 90   Resp: 17 SpO2: 96 % O2 Device: None (Room air)    Weight: 178 lbs 9.16 oz    General Appearance: Patient is awake and alert: slightly forgetful   Respiratory: Clear to auscultation bilaterally   Cardiovascular: Regular rate with no gallop or rub  GI: + BS, soft, non tender   Patient has significant improvement in his bilateral lower extremities.  He is able to lift both of his legs off the bed.    Medical Decision Making       40 MINUTES SPENT BY ME on the date of service doing chart review, history, exam, documentation & further activities per the note.    Review of his current labs, imaging.  Discussion with Dr. Benitez regarding his imaging.  Will get INR.  Updated his daughter as well regarding current plan.      Data     I have personally reviewed the following data over the past 24 hrs:    INR:  1.45 (H) PTT:  26   D-dimer:  N/A Fibrinogen:  N/A       Imaging results reviewed over the past 24 hrs:   No results found for this or any previous visit (from the past 24 hours).

## 2025-06-11 LAB
INR PPP: 1.14 (ref 0.85–1.15)
PROTHROMBIN TIME: 14.4 SECONDS (ref 11.8–14.8)
PYRIDOXAL PHOS SERPL-SCNC: 18.1 NMOL/L

## 2025-06-11 PROCEDURE — 99232 SBSQ HOSP IP/OBS MODERATE 35: CPT | Performed by: HOSPITALIST

## 2025-06-11 PROCEDURE — 36415 COLL VENOUS BLD VENIPUNCTURE: CPT | Performed by: INTERNAL MEDICINE

## 2025-06-11 PROCEDURE — 250N000013 HC RX MED GY IP 250 OP 250 PS 637: Performed by: HOSPITALIST

## 2025-06-11 PROCEDURE — 120N000001 HC R&B MED SURG/OB

## 2025-06-11 PROCEDURE — 85610 PROTHROMBIN TIME: CPT | Performed by: INTERNAL MEDICINE

## 2025-06-11 PROCEDURE — 250N000011 HC RX IP 250 OP 636: Performed by: PHYSICIAN ASSISTANT

## 2025-06-11 RX ADMIN — DEXAMETHASONE SODIUM PHOSPHATE 2 MG: 4 INJECTION, SOLUTION INTRAMUSCULAR; INTRAVENOUS at 08:17

## 2025-06-11 RX ADMIN — AMLODIPINE BESYLATE 5 MG: 5 TABLET ORAL at 08:17

## 2025-06-11 RX ADMIN — METOPROLOL SUCCINATE 25 MG: 25 TABLET, EXTENDED RELEASE ORAL at 08:17

## 2025-06-11 RX ADMIN — LOSARTAN POTASSIUM 50 MG: 50 TABLET, FILM COATED ORAL at 08:17

## 2025-06-11 RX ADMIN — DEXAMETHASONE SODIUM PHOSPHATE 2 MG: 4 INJECTION, SOLUTION INTRAMUSCULAR; INTRAVENOUS at 19:23

## 2025-06-11 ASSESSMENT — ACTIVITIES OF DAILY LIVING (ADL)
ADLS_ACUITY_SCORE: 45
ADLS_ACUITY_SCORE: 42
ADLS_ACUITY_SCORE: 45
ADLS_ACUITY_SCORE: 41
ADLS_ACUITY_SCORE: 45
ADLS_ACUITY_SCORE: 41
ADLS_ACUITY_SCORE: 41
ADLS_ACUITY_SCORE: 45
ADLS_ACUITY_SCORE: 45
ADLS_ACUITY_SCORE: 41
ADLS_ACUITY_SCORE: 41
ADLS_ACUITY_SCORE: 45
ADLS_ACUITY_SCORE: 41
ADLS_ACUITY_SCORE: 45
ADLS_ACUITY_SCORE: 41
ADLS_ACUITY_SCORE: 45

## 2025-06-11 NOTE — PLAN OF CARE
[id number]  Reason for Admission: Spinal Mass on T4-5 with thoracic stenosis and compression; Fall     Cognitive/Mentation: A/Ox 4  Neuros/CMS: Intact ex baseline L eye drooping, BLE weakness, RLE 4/5, LLE 3/5  VS: VSS on RA. SBP <180. Blood pressure (!) 141/94, pulse 61, temperature 97.9  F (36.6  C), temperature source Oral, resp. rate 18, SpO2 97%.    Tele: N/A.  /GI: Incontinent. Ext cath in place, changed. Last BM 6/10.   Pulmonary: LS clear. Denies SOB/  Pain: Denies.      Drains/Lines: R and L PIV SL  Skin: scabbed abrasion on knees with mepilex, blister on coccyx with WOC orders daily.  Activity: Assist x 1 with GBW.  Diet: Reg with thin liquids. Takes pills whole.      Therapies recs: Pending  Discharge: Pending     Aggression Stoplight Tool: Green     End of shift summary: Fair sleep between cares. Partial sponge bath given as per pt request. Plan for laminectomy and meningioma resection on thurs.

## 2025-06-11 NOTE — PLAN OF CARE
Goal Outcome Evaluation:       Pt here with BLE weakness, recent fall, T4-T5 thoracic mass with spinal stenosis and cord compression. A&O x4. Neuros BLE weakness 3/5, forgetful. VSS. Tele N/A. reg diet, thin liquids. Takes pills whole with water. Up with A1 GB W. Denies pain. WOC saw pt, Bilateral knees mepilex in place ICD, sacrum mepilex ICD. Pt scoring green on the Aggression Stop Light Tool.  Ext. Cath. In place. Plan for Laminectomy and mass resection. Discharge pending.

## 2025-06-11 NOTE — PLAN OF CARE
Goal Outcome Evaluation:    Plan of Care Reviewed With: patient    Overall Patient Progress: improvingOverall Patient Progress: improving    Pt here with a fall, found a thoracic mass T4-T5 with spinal stenosis and cord compression. T4 to T5 laminectomy and resection of meningioma with Dr. Benitez, scheduled for tomorrow at 1545. A&O x4, forgetful. VSS, SBP<180, on RA. LS clear. CMS and Neuro's intact except for BLE weakness, currently LLE 3/5, RLE 4/5, with moderate plantar.dorsi flexion. Denies pain. T&R q2hrs, up A1-2 with GB/W. Incont of urine, external cath in place when in bed. +BS, passing flatus, no BM. Reg diet, takes pills whole with water. NPO at midnight. Coccyx blister dressing, and BLE knee abrasion dressing CDI. Pt scoring green on Aggression Stop Light Tool. Discharge pending, currently recommending TCU.

## 2025-06-11 NOTE — PROGRESS NOTES
[facility]    Medicine Progress Note - Hospitalist Service    Date of Admission:  6/7/2025    Assessment & Plan   Yosef Murry is a 89 year old male history of CVA, persistent afib, hyperlipidemia, prostate cancer, balance problems, who presents with bilateral lower extremity weakness.  He reports he went out to put air in his tires last evening, knelt down to fill the tires but then was too weak to get up despite having his cane for assistance.  He crawled up a ramp leading to his house door in the garage, was attempting to get upright, but fell backward.  He denied hitting his head.  He was unable to get himself up and ultimately called EMS early this morning. Found to have mass at T4/T5 on imaging causing cord compression and displacement. Recommended for surgical intervention-planned 6/12      Acute on chronic bilateral lower extremity weakness  Hx CVA 8/2019, also reports CVA in [place]   Hypointense mass at T4/T5 resulting in cord compression/displacement, suspected meningioma  New onset bladder incontinence in past 2 months, lower extremity weakness getting worse, denied any chronic spine pain (having mid back pain after lying on ground overnight)  *Head CT: Chronic cerebellar and right frontal lobe infarcts superimposed upon a background of moderate chronic microangiopathy without acute intracranial abnormality  *MRI Thoracic Spine: 1 X 0.7 X 1.2 cm intradural extramedullary, T1 isointense, T2 hypointense mass at the left T4-T5 level with severe spinal stenosis and compresses and displaces the cord.   *MRI Brain: No discrete mass lesion, hemorrhage or focal area suggestive of acute ischemia.  Diffuse age related changes along with focal encephalomalacia anterior right frontal lobe  *MRI cervical spine:  Multilevel cervical spondylosis without high-grade spinal canal stenosis.  Severe bilateral neural foraminal stenosis at C4-5 and C5-6  No abnormal spinal cord signal.  No abnormal  contrast enhancement.  *MRI lumbar spine: Mild lumbar spondylosis without high-grade canal stenosis. Moderate left L5-S1 foraminal stenosis.  Surgery currently scheduled for 6/12  Started on Decadron 2mg q12 on 6/8  Did have improvement in his strength with addition of decadron     Leukocytosis   Admit WBC 17 on arrival/Afebrile   CXR clear - suspect reactive elevated WBC   covid negative  Urine culture <10k mixed isela  6/9 WBC 11 however patient also now on steroids>> afebrile     Abnormal Thyroid test  TSH slightly up but free T4 wnl  Recheck in 4w     Rhabdomyolysis, resolving  Admission CK 3088 (peak 4089), CK down to 563 on 6/10 after continuous IVF, discontinued 6/10  AST up consistent with rhabdo  >> AST improved 136>>148>>75     A-fib/flutter, permanent  HTN  Continue PTA amlodipine, metoprolol and losartan   ECHO 7/2024 showing EF of 60 to 65%.  Right ventricle borderline dilated.  Right ventricular systolic normal.  No effusion.  Septal thickening is noted.  Concentric remodeling present  Initially continued on eliquis, held for procedure since 6/8  EKG showing A-fib with nonspecific T-wave findings but without cardiorespiratory symptoms  Hold eliquis until OK to resume per NS     HLD  Holding statin      Hx prostate cancer s/p prostatectomy 2009, radiation therapy 2014 and androgen deprivation therapy  See Urology clinic note 5/9/25 for details - recent rise in PSA but MRI findings not convincing for any recurrence and PET scan   Focal PSMA activity in the posterior prostatectomy bed involving surgical clips and potentially anterior wall the rectum. No luisa, skeletal or distant metastatic disease.           Diet: Regular Diet Adult  NPO for Procedure/Surgery per Anesthesia Guidelines Except for: Meds; Clear liquids before procedure/surgery: ADULT (Age GREATER than or Equal to 18 years) - Clear liquids 2 hours before procedure/surgery    DVT Prophylaxis: Pneumatic Compression Devices  Underwood Catheter: Not  "present  Lines: None     Cardiac Monitoring: None  Code Status: No CPR- Do NOT Intubate      Clinically Significant Risk Factors               # Hypoalbuminemia: Lowest albumin = 2.7 g/dL at 6/9/2025  7:52 AM, will monitor as appropriate                # Overweight: Estimated body mass index is 25.62 kg/m  as calculated from the following:    Height as of this encounter: 1.778 m (5' 10\").    Weight as of this encounter: 81 kg (178 lb 9.2 oz)., PRESENT ON ADMISSION       # Financial/Environmental Concerns: none         Social Drivers of Health    Tobacco Use: Medium Risk (6/7/2025)    Patient History     Smoking Tobacco Use: Former     Smokeless Tobacco Use: Never   Physical Activity: Unknown (10/10/2024)    Exercise Vital Sign     Days of Exercise per Week: 0 days   Social Connections: Unknown (10/10/2024)    Social Connection and Isolation Panel [NHANES]     Frequency of Social Gatherings with Friends and Family: Once a week          Disposition Plan     Medically Ready for Discharge: Anticipated in 2-4 Days  Pending post op course after surgery 6/12 afternoon           America Cee DO  Hospitalist Service  [facility]  Securely message with [employer] (more info)  Text page via [employer] Paging/Directory   ______________________________________________________________________    Interval History   Patient seen and examined. He is doing okay. Feels he has better strength in legs since he has been here (has been on decadron). He is apprehensive about surgery, but understanding. Wishes it was a minor procedure, hopeful for a good outcome. Spent some time discussing potential rehab recommendations after surgery (TCU vs home with home care). He is hopeful for recommendation for home, but has been to TCU in past after his stroke--unclear if he would be willing to pursue TCU if recommended.    Physical Exam   Vital Signs: Temp: 97.8  F (36.6  C) Temp src: Oral BP: (!) 164/101 Pulse: 69   Resp: 18 " SpO2: 96 % O2 Device: None (Room air)    Weight: 178 lbs 9.16 oz    Constitutional: Awake, alert, cooperative, no apparent distress  Respiratory: Clear to auscultation bilaterally, no crackles or wheezing  Cardiovascular: Regular rate and rhythm, normal S1 and S2, and no murmur noted  GI: Normal bowel sounds, soft, non-distended, non-tender  Skin/Integumen: No rashes, no cyanosis, no edema  Neuro:  Decreased strength 4/5 at hips and quads. Dorsi/plantar flexion 5/5 lower extremities.    Medical Decision Making       40 MINUTES SPENT BY ME on the date of service doing chart review, history, exam, documentation & further activities per the note.      Data     I have personally reviewed the following data over the past 24 hrs:    INR:  1.14 PTT:  N/A   D-dimer:  N/A Fibrinogen:  N/A       Imaging results reviewed over the past 24 hrs:   No results found for this or any previous visit (from the past 24 hours).

## 2025-06-11 NOTE — PROGRESS NOTES
Reason for Admission: Spinal Mass on T4-5 with thoracic stenosis and compression; Fall    Cognitive/Mentation: A/Ox 4  Neuros/CMS: Intact ex baseline L eye drooping, BLE weakness, RLE 4/5, LLE 3/5  VS: VSS on RA. SBP <180   Tele: N/A.  /GI: Incontinent. Ext cath in place, changed. Last BM 6/10.   Pulmonary: LS clear. Denies SOB/  Pain: Denies.     Drains/Lines: R and L PIV SL  Skin: scabbed abrasion on knees with mepilex, blister on coccyx with WOC orders daily.  Activity: Assist x 1 with GBW.  Diet: Reg with thin liquids. Takes pills whole.     Therapies recs: Pending  Discharge: Pending    Aggression Stoplight Tool: Green    End of shift summary: Fair sleep between cares. Partial sponge bath given as per pt request. Plan for laminectomy and meningioma resection on thurs.

## 2025-06-11 NOTE — PROGRESS NOTES
"[facility]    Neurosurgery Progress Note    Date of Service (when I saw the patient): 06/11/2025     Assessment & Plan   89 year old male anti-coagulated on Eliquis with last dose today who presented to the emergency room yesterday after fall. Imaging revealed a thoracic intradural extramedullary lesion likely representing a meningioma.     Today he was seen up in the chair. Reports continued improvement in BLE strength. Improvement in bilateral thigh pain as well. No new or worsened symptoms.     Discussed medical optimization with hospitalist. No additional workup needed at this time.    Plan:  -To OR tomorrow with Dr. Benitez for T4-5 laminectomy and resection of meningioma, case request is in, preop orders in place.   -NPO at midnight  -Appreciate assistance from hospitalist for medical clearance/optimization for surgical intervention  -Hold Eliquis  -Continue decadron as ordered  -Activity as tolerated  -Pain management as needed    Marlin Red, AMINTA  [facility] Neurosurgery  [address] [address]  Tel [phone]  Fax [phone]  Securely message with [employer] (more info)  Text page via [employer] Paging/Directory     Interval History   Stable.    Physical Exam   Temp: 97.6  F (36.4  C) Temp src: Oral BP: 126/87 Pulse: 57   Resp: 18 SpO2: 99 % O2 Device: None (Room air)    Vitals:    06/07/25 0619 06/07/25 1415   Weight: 74.4 kg (164 lb) 81 kg (178 lb 9.2 oz)     Vital Signs with Ranges  Temp:  [97.5  F (36.4  C)-98  F (36.7  C)] 97.6  F (36.4  C)  Pulse:  [57-89] 57  Resp:  [17-18] 18  BP: (125-161)/(78-99) 126/87  SpO2:  [95 %-99 %] 99 %  I/O last 3 completed shifts:  In: 360 [P.O.:360]  Out: 700 [Urine:700]     , Blood pressure 126/87, pulse 57, temperature 97.6  F (36.4  C), temperature source Oral, resp. rate 18, height 1.778 m (5' 10\"), weight 81 kg (178 lb 9.2 oz), SpO2 99%.  178 lbs 9.16 oz    NEUROLOGICAL EXAMINATION:   Mental status: "  Alert and Oriented x 3, speech is fluent.  Motor:    He has bilateral iliopsoas weakness at 4/5 left and 4+/5 right as well as quadriceps weakness bilaterally at 4+/5, right dorsiflexion 5/5, left 5/5 with 5/5 strength on plantar flexion   Sensation:  intact    Medications   Current Facility-Administered Medications   Medication Dose Route Frequency Provider Last Rate Last Admin    Patient is already receiving anticoagulation with heparin, enoxaparin (LOVENOX), warfarin (COUMADIN)  or other anticoagulant medication   Does not apply Continuous PRN Filipe Hamlin MD         Current Facility-Administered Medications   Medication Dose Route Frequency Provider Last Rate Last Admin    amLODIPine (NORVASC) tablet 5 mg  5 mg Oral Daily Filipe Hamlin MD   5 mg at 06/11/25 0817    dexAMETHasone (DECADRON) injection 2 mg  2 mg Intravenous Q12H Natacha Maloney PA-C   2 mg at 06/11/25 0817    losartan (COZAAR) tablet 50 mg  50 mg Oral Daily Filipe Hamlin MD   50 mg at 06/11/25 0817    metoprolol succinate ER (TOPROL XL) 24 hr tablet 25 mg  25 mg Oral Daily Filipe Hamlin MD   25 mg at 06/11/25 0817    sodium chloride (PF) 0.9% PF flush 3 mL  3 mL Intracatheter Q8H Filipe Hamlin MD   3 mL at 06/11/25 0404       Data     CBC RESULTS:   Recent Labs   Lab Test 06/09/25  0752   WBC 11.2*   RBC 4.72   HGB 14.4   HCT 44.4   MCV 94   MCH 30.5   MCHC 32.4   RDW 14.6        Basic Metabolic Panel:  Lab Results   Component Value Date     06/09/2025     09/23/2020      Lab Results   Component Value Date    POTASSIUM 4.3 06/09/2025    POTASSIUM 4.6 09/05/2022    POTASSIUM 4.6 09/23/2020     Lab Results   Component Value Date    CHLORIDE 107 06/09/2025    CHLORIDE 102 09/05/2022    CHLORIDE 106 09/23/2020     Lab Results   Component Value Date    BELLO 8.3 06/09/2025    BELLO 9.3 09/23/2020     Lab Results   Component Value Date    CO2 22 06/09/2025    CO2 30 09/05/2022    CO2 26 09/23/2020     Lab Results   Component  "Value Date    BUN 15.9 06/09/2025    BUN 21 09/05/2022    BUN 18 09/23/2020     Lab Results   Component Value Date    CR 0.70 06/09/2025    CR 0.98 09/23/2020     Lab Results   Component Value Date     06/09/2025     09/05/2022    GLC 67 09/23/2020     INR:  No results found for: \"INR\"      "

## 2025-06-12 ENCOUNTER — ANESTHESIA EVENT (OUTPATIENT)
Dept: SURGERY | Facility: CLINIC | Age: 89
End: 2025-06-12
Payer: MEDICARE

## 2025-06-12 ENCOUNTER — ANESTHESIA (OUTPATIENT)
Dept: SURGERY | Facility: CLINIC | Age: 89
End: 2025-06-12
Payer: MEDICARE

## 2025-06-12 ENCOUNTER — APPOINTMENT (OUTPATIENT)
Dept: GENERAL RADIOLOGY | Facility: CLINIC | Age: 89
DRG: 029 | End: 2025-06-12
Attending: HOSPITALIST
Payer: MEDICARE

## 2025-06-12 VITALS
BODY MASS INDEX: 25.56 KG/M2 | DIASTOLIC BLOOD PRESSURE: 80 MMHG | SYSTOLIC BLOOD PRESSURE: 149 MMHG | HEART RATE: 69 BPM | RESPIRATION RATE: 14 BRPM | HEIGHT: 70 IN | TEMPERATURE: 97.7 F | OXYGEN SATURATION: 98 % | WEIGHT: 178.57 LBS

## 2025-06-12 LAB
A-TOCOPHEROL VIT E SERPL-MCNC: 11.8 MG/L
ANION GAP SERPL CALCULATED.3IONS-SCNC: 10 MMOL/L (ref 7–15)
ANNOTATION COMMENT IMP: ABNORMAL
AST SERPL W P-5'-P-CCNC: 38 U/L (ref 0–45)
BETA+GAMMA TOCOPHEROL SERPL-MCNC: 0.2 MG/L
BUN SERPL-MCNC: 21 MG/DL (ref 8–23)
CALCIUM SERPL-MCNC: 8.8 MG/DL (ref 8.8–10.4)
CHLORIDE SERPL-SCNC: 106 MMOL/L (ref 98–107)
CREAT SERPL-MCNC: 0.65 MG/DL (ref 0.67–1.17)
EGFRCR SERPLBLD CKD-EPI 2021: >90 ML/MIN/1.73M2
ERYTHROCYTE [DISTWIDTH] IN BLOOD BY AUTOMATED COUNT: 14.4 % (ref 10–15)
GLUCOSE BLDC GLUCOMTR-MCNC: 102 MG/DL (ref 70–99)
GLUCOSE SERPL-MCNC: 92 MG/DL (ref 70–99)
HCO3 SERPL-SCNC: 25 MMOL/L (ref 22–29)
HCT VFR BLD AUTO: 43.7 % (ref 40–53)
HGB BLD-MCNC: 14.7 G/DL (ref 13.3–17.7)
MAGNESIUM SERPL-MCNC: 2.3 MG/DL (ref 1.7–2.3)
MCH RBC QN AUTO: 30.6 PG (ref 26.5–33)
MCHC RBC AUTO-ENTMCNC: 33.6 G/DL (ref 31.5–36.5)
MCV RBC AUTO: 91 FL (ref 78–100)
PHOSPHATE SERPL-MCNC: 3.2 MG/DL (ref 2.5–4.5)
PLATELET # BLD AUTO: 219 10E3/UL (ref 150–450)
POTASSIUM SERPL-SCNC: 4.5 MMOL/L (ref 3.4–5.3)
RBC # BLD AUTO: 4.8 10E6/UL (ref 4.4–5.9)
RETINYL PALMITATE SERPL-MCNC: <0.02 MG/L
SODIUM SERPL-SCNC: 141 MMOL/L (ref 135–145)
VIT A SERPL-MCNC: 0.26 MG/L
VIT B1 PYROPHOSHATE BLD-SCNC: 219 NMOL/L
WBC # BLD AUTO: 9.3 10E3/UL (ref 4–11)

## 2025-06-12 PROCEDURE — 258N000001 HC RX 258: Performed by: NEUROLOGICAL SURGERY

## 2025-06-12 PROCEDURE — 250N000009 HC RX 250: Performed by: ANESTHESIOLOGY

## 2025-06-12 PROCEDURE — 258N000003 HC RX IP 258 OP 636: Performed by: NURSE ANESTHETIST, CERTIFIED REGISTERED

## 2025-06-12 PROCEDURE — 250N000013 HC RX MED GY IP 250 OP 250 PS 637: Performed by: HOSPITALIST

## 2025-06-12 PROCEDURE — 80048 BASIC METABOLIC PNL TOTAL CA: CPT | Performed by: HOSPITALIST

## 2025-06-12 PROCEDURE — 710N000009 HC RECOVERY PHASE 1, LEVEL 1, PER MIN: Performed by: NEUROLOGICAL SURGERY

## 2025-06-12 PROCEDURE — 250N000025 HC SEVOFLURANE, PER MIN: Performed by: NEUROLOGICAL SURGERY

## 2025-06-12 PROCEDURE — 999N000141 HC STATISTIC PRE-PROCEDURE NURSING ASSESSMENT: Performed by: NEUROLOGICAL SURGERY

## 2025-06-12 PROCEDURE — 250N000011 HC RX IP 250 OP 636: Performed by: NURSE ANESTHETIST, CERTIFIED REGISTERED

## 2025-06-12 PROCEDURE — 63281 BX/EXC IDRL SPINE LESN THRC: CPT | Mod: AS | Performed by: PHYSICIAN ASSISTANT

## 2025-06-12 PROCEDURE — 69990 MICROSURGERY ADD-ON: CPT | Performed by: NEUROLOGICAL SURGERY

## 2025-06-12 PROCEDURE — 250N000013 HC RX MED GY IP 250 OP 250 PS 637: Performed by: PHYSICIAN ASSISTANT

## 2025-06-12 PROCEDURE — 88307 TISSUE EXAM BY PATHOLOGIST: CPT | Mod: TC | Performed by: NEUROLOGICAL SURGERY

## 2025-06-12 PROCEDURE — 83735 ASSAY OF MAGNESIUM: CPT | Performed by: HOSPITALIST

## 2025-06-12 PROCEDURE — 85027 COMPLETE CBC AUTOMATED: CPT | Performed by: HOSPITALIST

## 2025-06-12 PROCEDURE — P9045 ALBUMIN (HUMAN), 5%, 250 ML: HCPCS | Performed by: NURSE ANESTHETIST, CERTIFIED REGISTERED

## 2025-06-12 PROCEDURE — 120N000001 HC R&B MED SURG/OB

## 2025-06-12 PROCEDURE — 250N000011 HC RX IP 250 OP 636: Mod: JZ | Performed by: PHYSICIAN ASSISTANT

## 2025-06-12 PROCEDURE — 4A1034Z MONITORING OF CENTRAL NERVOUS ELECTRICAL ACTIVITY, PERCUTANEOUS APPROACH: ICD-10-PCS | Performed by: NEUROLOGICAL SURGERY

## 2025-06-12 PROCEDURE — 999N000179 XR SURGERY CARM FLUORO LESS THAN 5 MIN W STILLS

## 2025-06-12 PROCEDURE — 63281 BX/EXC IDRL SPINE LESN THRC: CPT | Performed by: NEUROLOGICAL SURGERY

## 2025-06-12 PROCEDURE — 360N000085 HC SURGERY LEVEL 5 W/ FLUORO, PER MIN: Performed by: NEUROLOGICAL SURGERY

## 2025-06-12 PROCEDURE — 84450 TRANSFERASE (AST) (SGOT): CPT | Performed by: HOSPITALIST

## 2025-06-12 PROCEDURE — 250N000011 HC RX IP 250 OP 636: Performed by: ANESTHESIOLOGY

## 2025-06-12 PROCEDURE — 36415 COLL VENOUS BLD VENIPUNCTURE: CPT | Performed by: HOSPITALIST

## 2025-06-12 PROCEDURE — 250N000009 HC RX 250: Performed by: NURSE ANESTHETIST, CERTIFIED REGISTERED

## 2025-06-12 PROCEDURE — 250N000011 HC RX IP 250 OP 636: Mod: JZ | Performed by: ANESTHESIOLOGY

## 2025-06-12 PROCEDURE — 370N000017 HC ANESTHESIA TECHNICAL FEE, PER MIN: Performed by: NEUROLOGICAL SURGERY

## 2025-06-12 PROCEDURE — 88307 TISSUE EXAM BY PATHOLOGIST: CPT | Mod: 26 | Performed by: STUDENT IN AN ORGANIZED HEALTH CARE EDUCATION/TRAINING PROGRAM

## 2025-06-12 PROCEDURE — 00NX0ZZ RELEASE THORACIC SPINAL CORD, OPEN APPROACH: ICD-10-PCS | Performed by: NEUROLOGICAL SURGERY

## 2025-06-12 PROCEDURE — 00BT0ZZ EXCISION OF SPINAL MENINGES, OPEN APPROACH: ICD-10-PCS | Performed by: NEUROLOGICAL SURGERY

## 2025-06-12 PROCEDURE — 250N000011 HC RX IP 250 OP 636: Performed by: PHYSICIAN ASSISTANT

## 2025-06-12 PROCEDURE — 84100 ASSAY OF PHOSPHORUS: CPT | Performed by: HOSPITALIST

## 2025-06-12 PROCEDURE — 99232 SBSQ HOSP IP/OBS MODERATE 35: CPT | Performed by: HOSPITALIST

## 2025-06-12 PROCEDURE — 250N000009 HC RX 250: Performed by: NEUROLOGICAL SURGERY

## 2025-06-12 PROCEDURE — 258N000003 HC RX IP 258 OP 636: Performed by: PHYSICIAN ASSISTANT

## 2025-06-12 PROCEDURE — 272N000001 HC OR GENERAL SUPPLY STERILE: Performed by: NEUROLOGICAL SURGERY

## 2025-06-12 RX ORDER — CEFAZOLIN SODIUM/WATER 2 G/20 ML
2 SYRINGE (ML) INTRAVENOUS SEE ADMIN INSTRUCTIONS
Status: DISCONTINUED | OUTPATIENT
Start: 2025-06-12 | End: 2025-06-12 | Stop reason: HOSPADM

## 2025-06-12 RX ORDER — NALOXONE HYDROCHLORIDE 0.4 MG/ML
0.4 INJECTION, SOLUTION INTRAMUSCULAR; INTRAVENOUS; SUBCUTANEOUS
Status: DISCONTINUED | OUTPATIENT
Start: 2025-06-12 | End: 2025-06-15 | Stop reason: HOSPADM

## 2025-06-12 RX ORDER — DEXAMETHASONE 4 MG/1
4 TABLET ORAL DAILY
Status: COMPLETED | OUTPATIENT
Start: 2025-06-14 | End: 2025-06-15

## 2025-06-12 RX ORDER — DEXAMETHASONE SODIUM PHOSPHATE 4 MG/ML
INJECTION, SOLUTION INTRA-ARTICULAR; INTRALESIONAL; INTRAMUSCULAR; INTRAVENOUS; SOFT TISSUE PRN
Status: DISCONTINUED | OUTPATIENT
Start: 2025-06-12 | End: 2025-06-12

## 2025-06-12 RX ORDER — HYDROMORPHONE HCL IN WATER/PF 6 MG/30 ML
0.2 PATIENT CONTROLLED ANALGESIA SYRINGE INTRAVENOUS
Status: DISCONTINUED | OUTPATIENT
Start: 2025-06-12 | End: 2025-06-15 | Stop reason: HOSPADM

## 2025-06-12 RX ORDER — GLYCOPYRROLATE 0.2 MG/ML
INJECTION, SOLUTION INTRAMUSCULAR; INTRAVENOUS PRN
Status: DISCONTINUED | OUTPATIENT
Start: 2025-06-12 | End: 2025-06-12

## 2025-06-12 RX ORDER — LIDOCAINE HYDROCHLORIDE 10 MG/ML
INJECTION, SOLUTION INFILTRATION; PERINEURAL
Status: COMPLETED | OUTPATIENT
Start: 2025-06-12 | End: 2025-06-12

## 2025-06-12 RX ORDER — LABETALOL HYDROCHLORIDE 5 MG/ML
5 INJECTION, SOLUTION INTRAVENOUS ONCE
Status: COMPLETED | OUTPATIENT
Start: 2025-06-12 | End: 2025-06-12

## 2025-06-12 RX ORDER — NALOXONE HYDROCHLORIDE 0.4 MG/ML
0.1 INJECTION, SOLUTION INTRAMUSCULAR; INTRAVENOUS; SUBCUTANEOUS
Status: DISCONTINUED | OUTPATIENT
Start: 2025-06-12 | End: 2025-06-12 | Stop reason: HOSPADM

## 2025-06-12 RX ORDER — METHOCARBAMOL 750 MG/1
750 TABLET, FILM COATED ORAL EVERY 6 HOURS
Status: DISCONTINUED | OUTPATIENT
Start: 2025-06-12 | End: 2025-06-15 | Stop reason: HOSPADM

## 2025-06-12 RX ORDER — DEXAMETHASONE 4 MG/1
4 TABLET ORAL EVERY 12 HOURS SCHEDULED
Status: COMPLETED | OUTPATIENT
Start: 2025-06-14 | End: 2025-06-14

## 2025-06-12 RX ORDER — HYDROXYZINE HYDROCHLORIDE 10 MG/1
10 TABLET, FILM COATED ORAL EVERY 6 HOURS PRN
Status: DISCONTINUED | OUTPATIENT
Start: 2025-06-12 | End: 2025-06-15 | Stop reason: HOSPADM

## 2025-06-12 RX ORDER — PROPOFOL 10 MG/ML
INJECTION, EMULSION INTRAVENOUS CONTINUOUS PRN
Status: DISCONTINUED | OUTPATIENT
Start: 2025-06-12 | End: 2025-06-12

## 2025-06-12 RX ORDER — SODIUM CHLORIDE, SODIUM LACTATE, POTASSIUM CHLORIDE, CALCIUM CHLORIDE 600; 310; 30; 20 MG/100ML; MG/100ML; MG/100ML; MG/100ML
INJECTION, SOLUTION INTRAVENOUS CONTINUOUS
Status: DISCONTINUED | OUTPATIENT
Start: 2025-06-12 | End: 2025-06-12 | Stop reason: HOSPADM

## 2025-06-12 RX ORDER — ONDANSETRON 2 MG/ML
4 INJECTION INTRAMUSCULAR; INTRAVENOUS EVERY 30 MIN PRN
Status: DISCONTINUED | OUTPATIENT
Start: 2025-06-12 | End: 2025-06-12 | Stop reason: HOSPADM

## 2025-06-12 RX ORDER — HYDROMORPHONE HCL IN WATER/PF 6 MG/30 ML
0.4 PATIENT CONTROLLED ANALGESIA SYRINGE INTRAVENOUS EVERY 5 MIN PRN
Status: DISCONTINUED | OUTPATIENT
Start: 2025-06-12 | End: 2025-06-12 | Stop reason: HOSPADM

## 2025-06-12 RX ORDER — NALOXONE HYDROCHLORIDE 0.4 MG/ML
0.2 INJECTION, SOLUTION INTRAMUSCULAR; INTRAVENOUS; SUBCUTANEOUS
Status: DISCONTINUED | OUTPATIENT
Start: 2025-06-12 | End: 2025-06-15 | Stop reason: HOSPADM

## 2025-06-12 RX ORDER — BISACODYL 10 MG
10 SUPPOSITORY, RECTAL RECTAL DAILY PRN
Status: DISCONTINUED | OUTPATIENT
Start: 2025-06-15 | End: 2025-06-15 | Stop reason: HOSPADM

## 2025-06-12 RX ORDER — DEXAMETHASONE 1 MG
1 TABLET ORAL DAILY
Status: DISCONTINUED | OUTPATIENT
Start: 2025-06-18 | End: 2025-06-15 | Stop reason: HOSPADM

## 2025-06-12 RX ORDER — CEFAZOLIN SODIUM/WATER 2 G/20 ML
SYRINGE (ML) INTRAVENOUS PRN
Status: DISCONTINUED | OUTPATIENT
Start: 2025-06-12 | End: 2025-06-12

## 2025-06-12 RX ORDER — OXYCODONE HYDROCHLORIDE 5 MG/1
10 TABLET ORAL EVERY 4 HOURS PRN
Status: DISCONTINUED | OUTPATIENT
Start: 2025-06-12 | End: 2025-06-15 | Stop reason: HOSPADM

## 2025-06-12 RX ORDER — HYDROMORPHONE HCL IN WATER/PF 6 MG/30 ML
0.4 PATIENT CONTROLLED ANALGESIA SYRINGE INTRAVENOUS
Status: DISCONTINUED | OUTPATIENT
Start: 2025-06-12 | End: 2025-06-15 | Stop reason: HOSPADM

## 2025-06-12 RX ORDER — OXYCODONE HYDROCHLORIDE 5 MG/1
5 TABLET ORAL EVERY 4 HOURS PRN
Status: DISCONTINUED | OUTPATIENT
Start: 2025-06-12 | End: 2025-06-15 | Stop reason: HOSPADM

## 2025-06-12 RX ORDER — LIDOCAINE 40 MG/G
CREAM TOPICAL
Status: DISCONTINUED | OUTPATIENT
Start: 2025-06-12 | End: 2025-06-15 | Stop reason: HOSPADM

## 2025-06-12 RX ORDER — SODIUM CHLORIDE 9 MG/ML
INJECTION, SOLUTION INTRAVENOUS CONTINUOUS
Status: DISCONTINUED | OUTPATIENT
Start: 2025-06-12 | End: 2025-06-14

## 2025-06-12 RX ORDER — DEXAMETHASONE 4 MG/1
4 TABLET ORAL EVERY 6 HOURS SCHEDULED
Status: COMPLETED | OUTPATIENT
Start: 2025-06-13 | End: 2025-06-13

## 2025-06-12 RX ORDER — ACETAMINOPHEN 325 MG/1
975 TABLET ORAL ONCE
Status: DISCONTINUED | OUTPATIENT
Start: 2025-06-12 | End: 2025-06-12 | Stop reason: HOSPADM

## 2025-06-12 RX ORDER — LIDOCAINE HYDROCHLORIDE 20 MG/ML
INJECTION, SOLUTION INFILTRATION; PERINEURAL PRN
Status: DISCONTINUED | OUTPATIENT
Start: 2025-06-12 | End: 2025-06-12

## 2025-06-12 RX ORDER — DEXAMETHASONE 2 MG/1
2 TABLET ORAL DAILY
Status: DISCONTINUED | OUTPATIENT
Start: 2025-06-16 | End: 2025-06-15 | Stop reason: HOSPADM

## 2025-06-12 RX ORDER — SODIUM CHLORIDE, SODIUM LACTATE, POTASSIUM CHLORIDE, CALCIUM CHLORIDE 600; 310; 30; 20 MG/100ML; MG/100ML; MG/100ML; MG/100ML
INJECTION, SOLUTION INTRAVENOUS CONTINUOUS PRN
Status: DISCONTINUED | OUTPATIENT
Start: 2025-06-12 | End: 2025-06-12

## 2025-06-12 RX ORDER — AMOXICILLIN 250 MG
1 CAPSULE ORAL 2 TIMES DAILY
Status: DISCONTINUED | OUTPATIENT
Start: 2025-06-12 | End: 2025-06-15 | Stop reason: HOSPADM

## 2025-06-12 RX ORDER — ONDANSETRON 4 MG/1
4 TABLET, ORALLY DISINTEGRATING ORAL EVERY 30 MIN PRN
Status: DISCONTINUED | OUTPATIENT
Start: 2025-06-12 | End: 2025-06-12 | Stop reason: HOSPADM

## 2025-06-12 RX ORDER — POLYETHYLENE GLYCOL 3350 17 G/17G
17 POWDER, FOR SOLUTION ORAL DAILY
Status: DISCONTINUED | OUTPATIENT
Start: 2025-06-13 | End: 2025-06-15 | Stop reason: HOSPADM

## 2025-06-12 RX ORDER — CEFAZOLIN SODIUM/WATER 2 G/20 ML
2 SYRINGE (ML) INTRAVENOUS
Status: DISCONTINUED | OUTPATIENT
Start: 2025-06-12 | End: 2025-06-12 | Stop reason: HOSPADM

## 2025-06-12 RX ORDER — FENTANYL CITRATE 50 UG/ML
25 INJECTION, SOLUTION INTRAMUSCULAR; INTRAVENOUS EVERY 5 MIN PRN
Status: DISCONTINUED | OUTPATIENT
Start: 2025-06-12 | End: 2025-06-12 | Stop reason: HOSPADM

## 2025-06-12 RX ORDER — DEXAMETHASONE SODIUM PHOSPHATE 4 MG/ML
4 INJECTION, SOLUTION INTRA-ARTICULAR; INTRALESIONAL; INTRAMUSCULAR; INTRAVENOUS; SOFT TISSUE
Status: DISCONTINUED | OUTPATIENT
Start: 2025-06-12 | End: 2025-06-12 | Stop reason: HOSPADM

## 2025-06-12 RX ORDER — ACETAMINOPHEN 325 MG/1
975 TABLET ORAL EVERY 8 HOURS
Status: DISCONTINUED | OUTPATIENT
Start: 2025-06-12 | End: 2025-06-15 | Stop reason: HOSPADM

## 2025-06-12 RX ORDER — ONDANSETRON 2 MG/ML
INJECTION INTRAMUSCULAR; INTRAVENOUS PRN
Status: DISCONTINUED | OUTPATIENT
Start: 2025-06-12 | End: 2025-06-12

## 2025-06-12 RX ORDER — FENTANYL CITRATE 50 UG/ML
50 INJECTION, SOLUTION INTRAMUSCULAR; INTRAVENOUS EVERY 5 MIN PRN
Status: DISCONTINUED | OUTPATIENT
Start: 2025-06-12 | End: 2025-06-12 | Stop reason: HOSPADM

## 2025-06-12 RX ORDER — PROPOFOL 10 MG/ML
INJECTION, EMULSION INTRAVENOUS PRN
Status: DISCONTINUED | OUTPATIENT
Start: 2025-06-12 | End: 2025-06-12

## 2025-06-12 RX ORDER — BUPIVACAINE HCL/EPINEPHRINE 0.25-.0005
VIAL (ML) INJECTION PRN
Status: DISCONTINUED | OUTPATIENT
Start: 2025-06-12 | End: 2025-06-12 | Stop reason: HOSPADM

## 2025-06-12 RX ORDER — HYDROMORPHONE HCL IN WATER/PF 6 MG/30 ML
0.2 PATIENT CONTROLLED ANALGESIA SYRINGE INTRAVENOUS EVERY 5 MIN PRN
Status: DISCONTINUED | OUTPATIENT
Start: 2025-06-12 | End: 2025-06-12 | Stop reason: HOSPADM

## 2025-06-12 RX ORDER — MAGNESIUM HYDROXIDE 1200 MG/15ML
LIQUID ORAL PRN
Status: DISCONTINUED | OUTPATIENT
Start: 2025-06-12 | End: 2025-06-12 | Stop reason: HOSPADM

## 2025-06-12 RX ADMIN — ONDANSETRON 4 MG: 2 INJECTION INTRAMUSCULAR; INTRAVENOUS at 18:34

## 2025-06-12 RX ADMIN — HYDROMORPHONE HYDROCHLORIDE 0.4 MG: 0.2 INJECTION, SOLUTION INTRAMUSCULAR; INTRAVENOUS; SUBCUTANEOUS at 23:34

## 2025-06-12 RX ADMIN — ACETAMINOPHEN 975 MG: 325 TABLET, FILM COATED ORAL at 21:36

## 2025-06-12 RX ADMIN — REMIFENTANIL HYDROCHLORIDE 0.1 MCG/KG/MIN: 1 INJECTION, POWDER, LYOPHILIZED, FOR SOLUTION INTRAVENOUS at 16:20

## 2025-06-12 RX ADMIN — FENTANYL CITRATE 25 MCG: 50 INJECTION INTRAMUSCULAR; INTRAVENOUS at 20:07

## 2025-06-12 RX ADMIN — SODIUM CHLORIDE, SODIUM LACTATE, POTASSIUM CHLORIDE, AND CALCIUM CHLORIDE: .6; .31; .03; .02 INJECTION, SOLUTION INTRAVENOUS at 16:44

## 2025-06-12 RX ADMIN — LIDOCAINE HYDROCHLORIDE 2 ML: 10 INJECTION, SOLUTION INFILTRATION; PERINEURAL at 15:45

## 2025-06-12 RX ADMIN — HYDROMORPHONE HYDROCHLORIDE 0.2 MG: 0.2 INJECTION, SOLUTION INTRAMUSCULAR; INTRAVENOUS; SUBCUTANEOUS at 20:22

## 2025-06-12 RX ADMIN — PROPOFOL 100 MCG/KG/MIN: 10 INJECTION, EMULSION INTRAVENOUS at 16:21

## 2025-06-12 RX ADMIN — AMLODIPINE BESYLATE 5 MG: 5 TABLET ORAL at 09:15

## 2025-06-12 RX ADMIN — LOSARTAN POTASSIUM 50 MG: 50 TABLET, FILM COATED ORAL at 09:16

## 2025-06-12 RX ADMIN — FENTANYL CITRATE 25 MCG: 50 INJECTION INTRAMUSCULAR; INTRAVENOUS at 20:15

## 2025-06-12 RX ADMIN — HYDROMORPHONE HYDROCHLORIDE 0.4 MG: 0.2 INJECTION, SOLUTION INTRAMUSCULAR; INTRAVENOUS; SUBCUTANEOUS at 21:22

## 2025-06-12 RX ADMIN — SODIUM CHLORIDE, SODIUM LACTATE, POTASSIUM CHLORIDE, CALCIUM CHLORIDE: 600; 310; 30; 20 INJECTION, SOLUTION INTRAVENOUS at 16:09

## 2025-06-12 RX ADMIN — DEXAMETHASONE SODIUM PHOSPHATE 2 MG: 4 INJECTION, SOLUTION INTRAMUSCULAR; INTRAVENOUS at 06:59

## 2025-06-12 RX ADMIN — HYDROMORPHONE HYDROCHLORIDE 0.2 MG: 0.2 INJECTION, SOLUTION INTRAMUSCULAR; INTRAVENOUS; SUBCUTANEOUS at 20:39

## 2025-06-12 RX ADMIN — HYDROMORPHONE HYDROCHLORIDE 0.5 MG: 1 INJECTION, SOLUTION INTRAMUSCULAR; INTRAVENOUS; SUBCUTANEOUS at 18:34

## 2025-06-12 RX ADMIN — Medication 2 G: at 16:36

## 2025-06-12 RX ADMIN — FENTANYL CITRATE 25 MCG: 50 INJECTION INTRAMUSCULAR; INTRAVENOUS at 19:58

## 2025-06-12 RX ADMIN — ALBUMIN (HUMAN): 12.5 SOLUTION INTRAVENOUS at 16:58

## 2025-06-12 RX ADMIN — Medication 200 MG: at 16:54

## 2025-06-12 RX ADMIN — ROCURONIUM BROMIDE 50 MG: 50 INJECTION, SOLUTION INTRAVENOUS at 16:21

## 2025-06-12 RX ADMIN — LABETALOL HYDROCHLORIDE 5 MG: 5 INJECTION INTRAVENOUS at 19:44

## 2025-06-12 RX ADMIN — DEXAMETHASONE SODIUM PHOSPHATE 10 MG: 4 INJECTION, SOLUTION INTRA-ARTICULAR; INTRALESIONAL; INTRAMUSCULAR; INTRAVENOUS; SOFT TISSUE at 16:33

## 2025-06-12 RX ADMIN — FENTANYL CITRATE 25 MCG: 50 INJECTION INTRAMUSCULAR; INTRAVENOUS at 19:55

## 2025-06-12 RX ADMIN — GLYCOPYRROLATE 0.2 MG: 0.2 INJECTION, SOLUTION INTRAMUSCULAR; INTRAVENOUS at 17:21

## 2025-06-12 RX ADMIN — SODIUM CHLORIDE: 900 INJECTION INTRAVENOUS at 21:24

## 2025-06-12 RX ADMIN — METHOCARBAMOL 750 MG: 750 TABLET ORAL at 21:36

## 2025-06-12 RX ADMIN — PHENYLEPHRINE HYDROCHLORIDE 0.5 MCG/KG/MIN: 10 INJECTION INTRAVENOUS at 16:44

## 2025-06-12 RX ADMIN — PHENYLEPHRINE HYDROCHLORIDE 100 MCG: 10 INJECTION INTRAVENOUS at 16:35

## 2025-06-12 RX ADMIN — METOPROLOL SUCCINATE 25 MG: 25 TABLET, EXTENDED RELEASE ORAL at 11:48

## 2025-06-12 RX ADMIN — LIDOCAINE HYDROCHLORIDE 60 MG: 20 INJECTION, SOLUTION INFILTRATION; PERINEURAL at 16:21

## 2025-06-12 RX ADMIN — PROPOFOL 200 MG: 10 INJECTION, EMULSION INTRAVENOUS at 16:21

## 2025-06-12 ASSESSMENT — ACTIVITIES OF DAILY LIVING (ADL)
ADLS_ACUITY_SCORE: 45
ADLS_ACUITY_SCORE: 43
ADLS_ACUITY_SCORE: 45
ADLS_ACUITY_SCORE: 43
ADLS_ACUITY_SCORE: 45
ADLS_ACUITY_SCORE: 43
ADLS_ACUITY_SCORE: 45

## 2025-06-12 ASSESSMENT — ENCOUNTER SYMPTOMS: DYSRHYTHMIAS: 1

## 2025-06-12 NOTE — OP NOTE
June 12, 2025    [facility]   Operative Note    Pre-operative diagnosis: Spinal meningioma (H) [D32.1]  Compression of spinal cord (H) [G95.20]  Myelomalacia (H) [G95.89]   Post-operative diagnosis Same   Procedure: Procedure(s):  Thoracic 4 to Thoracic 5 laminectomy and resection of meningioma    Surgeon(s): Surgeons and Role:     * Juan Miugel Benitez MD - Primary     * Lyla Leonard PA-C - Assisting   Estimated blood loss: 50 mL    Specimens: ID Type Source Tests Collected by Time Destination   1 : Intradural Extramedullary Spinal Tumor Tissue Spine, Lumbar SURGICAL PATHOLOGY EXAM Juan Miguel Benitez MD 6/12/2025  5:49 PM       Findings: Intradural extramedullary tumor consistent with meningioma.  Heavily calcified attachment to dura.  Good resection of tumor compressing spinal cord.         Indications: Patient is an 89-year-old male who presented with bilateral lower extremity weakness and MRI showing meningioma in the thoracic spine causing significant spinal cord compression and myelomalacia.  He was brought for the above-mentioned procedure. Please note that Lyla Leonard PA-C's assistance was needed for positioning, retraction, suctioning, and closure.     Description of procedure: Patient was brought to the operating room.  General endotracheal anesthesia was induced.  The patient was rolled into the prone position on the Alexys fourposter table with a C-Flex head rose.  Neuromonitoring was instituted.  Good baseline SSEP and MEP signals were obtained.  C-arm fluoroscopy was used to localize the appropriate levels and then the back was prepped and draped in sterile fashion.  A midline exposure was performed and then the levels again confirmed with fluoroscopy.  The microscope was sterilely draped and brought to the field.  Combination of rongeurs and the high-speed drill and Kerrison punches were used to perform the laminectomy.  The dura was exposed and good hemostasis  was achieved.  A midline durotomy was performed and the tumor was noted at the superior edge of the exposure.  Additional bone removal was performed superiorly until the area over the tumor was fully exposed.  The durotomy was expected ended superiorly the dura was tacked up with 6-0 Prolene.  The tumor had the appearance of the meningioma and was clearly compressing the spinal cord.  Using a combination of microdissectors, sono PET aspiration and bipolar cautery the tumor was debulked and then carefully peeled off the underlying spinal cord.  A heavily calcified flat attachment to the dura was noted this was left in place.  No further soft tissue tumor was noted at the end of the procedure.  Hemostasis was achieved.  The dura was closed with a running 6-0 Prolene.  No CSF leakage was noted.  The fascia was closed with 0 Vicryl.  2-0 Vicryl was used in the subcutaneous layer.  3-0 nylon was used for skin.  Neuromonitoring remained stable throughout the entire procedure.

## 2025-06-12 NOTE — PROGRESS NOTES
"[facility]    Neurosurgery Progress Note    Date of Service (when I saw the patient): 06/12/2025     Assessment & Plan   89 year old male anti-coagulated on Eliquis with last dose today who presented to the emergency room yesterday after fall. Imaging revealed a thoracic intradural extramedullary lesion likely representing a meningioma.     Today he was seen lying in bed. Reports no change in BLE strength today. Minimal pain. No new or worsened symptoms.     Plan:  -To OR today with Dr. Benitez for T4-5 laminectomy and resection of meningioma, case request is in, preop orders in place.   -NPO  -Appreciate assistance from hospitalist for medical clearance/optimization for surgical intervention  -Hold Eliquis  -Continue decadron as ordered  -Activity as tolerated  -Pain management as needed    Marlin Red, AMINTA  [facility]surgery  [address] [facility]  [address] [place] [id number]  Tel [phone]  Fax [phone]  Securely message with [employer] (more info)  Text page via [employer] Paging/Directory     Interval History   Stable.    Physical Exam   Temp: 98  F (36.7  C) Temp src: Oral BP: (!) 169/92 Pulse: 50   Resp: 16 SpO2: 98 % O2 Device: None (Room air)    Vitals:    06/07/25 0619 06/07/25 1415   Weight: 74.4 kg (164 lb) 81 kg (178 lb 9.2 oz)     Vital Signs with Ranges  Temp:  [97.3  F (36.3  C)-98  F (36.7  C)] 98  F (36.7  C)  Pulse:  [50-69] 50  Resp:  [16-18] 16  BP: (143-169)/() 169/92  SpO2:  [94 %-98 %] 98 %  No intake/output data recorded.     , Blood pressure (!) 169/92, pulse 50, temperature 98  F (36.7  C), temperature source Oral, resp. rate 16, height 1.778 m (5' 10\"), weight 81 kg (178 lb 9.2 oz), SpO2 98%.  178 lbs 9.16 oz    NEUROLOGICAL EXAMINATION:   Mental status:  Alert and Oriented x 3, speech is fluent.  Motor:    He has bilateral iliopsoas weakness at 4/5 left and 4+/5 right as well as quadriceps weakness bilaterally at 4+/5, right " dorsiflexion 5/5, left 5/5 with 5/5 strength on plantar flexion   Sensation:  intact    Medications   Current Facility-Administered Medications   Medication Dose Route Frequency Provider Last Rate Last Admin    Patient is already receiving anticoagulation with heparin, enoxaparin (LOVENOX), warfarin (COUMADIN)  or other anticoagulant medication   Does not apply Continuous PRN Filipe aHmlin MD         Current Facility-Administered Medications   Medication Dose Route Frequency Provider Last Rate Last Admin    amLODIPine (NORVASC) tablet 5 mg  5 mg Oral Daily Filipe Hamlin MD   5 mg at 06/12/25 0915    dexAMETHasone (DECADRON) injection 2 mg  2 mg Intravenous Q12H Natacha Maloney PA-C   2 mg at 06/12/25 0659    losartan (COZAAR) tablet 50 mg  50 mg Oral Daily Filipe Hamlin MD   50 mg at 06/12/25 0916    metoprolol succinate ER (TOPROL XL) 24 hr tablet 25 mg  25 mg Oral Daily Filipe Hamlin MD   25 mg at 06/11/25 0817    sodium chloride (PF) 0.9% PF flush 3 mL  3 mL Intracatheter Q8H Filipe Hamlin MD   3 mL at 06/11/25 2211       Data     CBC RESULTS:   Recent Labs   Lab Test 06/09/25  0752   WBC 11.2*   RBC 4.72   HGB 14.4   HCT 44.4   MCV 94   MCH 30.5   MCHC 32.4   RDW 14.6        Basic Metabolic Panel:  Lab Results   Component Value Date     06/09/2025     09/23/2020      Lab Results   Component Value Date    POTASSIUM 4.3 06/09/2025    POTASSIUM 4.6 09/05/2022    POTASSIUM 4.6 09/23/2020     Lab Results   Component Value Date    CHLORIDE 107 06/09/2025    CHLORIDE 102 09/05/2022    CHLORIDE 106 09/23/2020     Lab Results   Component Value Date    BELLO 8.3 06/09/2025    BELLO 9.3 09/23/2020     Lab Results   Component Value Date    CO2 22 06/09/2025    CO2 30 09/05/2022    CO2 26 09/23/2020     Lab Results   Component Value Date    BUN 15.9 06/09/2025    BUN 21 09/05/2022    BUN 18 09/23/2020     Lab Results   Component Value Date    CR 0.70 06/09/2025    CR 0.98 09/23/2020     Lab Results  "  Component Value Date     06/09/2025     09/05/2022    GLC 67 09/23/2020     INR:  No results found for: \"INR\"      "

## 2025-06-12 NOTE — PLAN OF CARE
Goal Outcome Evaluation:        Pt here with BLE weakness, recent fall, T4-T5 thoracic mass with spinal stenosis and cord compression. A&O x4. Neuros BLE weakness 3/5, forgetful. VSS. Tele N/A. reg diet, thin liquids. Takes pills whole with water. Up with A1 GB W. Denies pain. WOC saw pt, Bilateral knees mepilex in place ICD, sacrum mepilex ICD. Pt scoring green on the Aggression Stop Light Tool.  Ext. Cath. In place. Plan for Laminectomy and mass resection tomorrow at 1545, NPO after midnight tonight. Discharge possibly to TCU pending.

## 2025-06-12 NOTE — ANESTHESIA PROCEDURE NOTES
Airway       Patient location during procedure: OR       Procedure Start/Stop Times: 6/12/2025 4:24 PM  Staff -        CRNA: Ildefonso Kelly APRN CRNA       Performed By: CRNA  Consent for Airway        Urgency: elective  Indications and Patient Condition       Indications for airway management: escobar-procedural       Induction type:intravenous       Mask difficulty assessment: 1 - vent by mask    Final Airway Details       Final airway type: endotracheal airway       Successful airway: ETT - single  Endotracheal Airway Details        ETT size (mm): 8.0       Cuffed: yes       Successful intubation technique: video laryngoscopy       VL Blade Size: Gallagher 4       Adjucts: stylet       Position: Right       Measured from: gums/teeth       Secured at (cm): 23       Bite block used: Soft    Post intubation assessment        Placement verified by: capnometry, equal breath sounds and chest rise        Number of attempts at approach: 1       Number of other approaches attempted: 0       Secured with: tape       Ease of procedure: easy       Dentition: Intact and Unchanged    Medication(s) Administered   Medication Administration Time: 6/12/2025 4:24 PM

## 2025-06-12 NOTE — PROGRESS NOTES
[facility]    Medicine Progress Note - Hospitalist Service    Date of Admission:  6/7/2025    Assessment & Plan   Yosef Murry is a 89 year old male history of CVA, persistent afib, hyperlipidemia, prostate cancer, balance problems, who presents with bilateral lower extremity weakness.  He reports he went out to put air in his tires last evening, knelt down to fill the tires but then was too weak to get up despite having his cane for assistance.  He crawled up a ramp leading to his house door in the garage, was attempting to get upright, but fell backward.  He denied hitting his head.  He was unable to get himself up and ultimately called EMS early this morning. Found to have mass at T4/T5 on imaging causing cord compression and displacement. Recommended for surgical intervention-planned 6/12      Acute on chronic bilateral lower extremity weakness  Hx CVA 8/2019, also reports CVA in [place]   Hypointense mass at T4/T5 resulting in cord compression/displacement, suspected meningioma  New onset bladder incontinence in past 2 months, lower extremity weakness getting worse, denied any chronic spine pain (having mid back pain after lying on ground overnight)  *Head CT: Chronic cerebellar and right frontal lobe infarcts superimposed upon a background of moderate chronic microangiopathy without acute intracranial abnormality  *MRI Thoracic Spine: 1 X 0.7 X 1.2 cm intradural extramedullary, T1 isointense, T2 hypointense mass at the left T4-T5 level with severe spinal stenosis and compresses and displaces the cord.   *MRI Brain: No discrete mass lesion, hemorrhage or focal area suggestive of acute ischemia.  Diffuse age related changes along with focal encephalomalacia anterior right frontal lobe  *MRI cervical spine:  Multilevel cervical spondylosis without high-grade spinal canal stenosis.  Severe bilateral neural foraminal stenosis at C4-5 and C5-6  No abnormal spinal cord signal.  No abnormal  contrast enhancement.  *MRI lumbar spine: Mild lumbar spondylosis without high-grade canal stenosis. Moderate left L5-S1 foraminal stenosis.  Surgery afternoon of 6/12  Started on Decadron 2mg q12 on 6/8, dosing per NS  Did have improvement in his strength with addition of decadron     Leukocytosis, resolved  Admit WBC 17 on arrival/Afebrile   CXR clear - suspect reactive elevated WBC   covid negative  Urine culture <10k mixed isela  6/12 WBC normalized at 9/3     Abnormal Thyroid test  TSH slightly up but free T4 wnl  Recheck in 4w     Rhabdomyolysis, resolving  Admission CK 3088 (peak 4089), CK down to 563 on 6/10 after continuous IVF, discontinued 6/10  AST up consistent with rhabdo  >> AST improved 136>>148>>75     A-fib/flutter, permanent  HTN  Continue PTA amlodipine, metoprolol and losartan   ECHO 7/2024: EF 60 to 65%.  Right ventricle borderline dilated.  Right ventricular systolic normal.  No effusion.  Septal thickening is noted.  Concentric remodeling present  Initially continued on eliquis, held for procedure since 6/8  EKG showing A-fib with nonspecific T-wave findings but without cardiorespiratory symptoms  Hold eliquis until OK to resume per NS     HLD  Holding statin      Hx prostate cancer s/p prostatectomy 2009, radiation therapy 2014 and androgen deprivation therapy  See Urology clinic note 5/9/25 for details - recent rise in PSA but MRI findings not convincing for any recurrence and PET scan   Focal PSMA activity in the posterior prostatectomy bed involving surgical clips and potentially anterior wall the rectum. No luisa, skeletal or distant metastatic disease.           Diet: NPO for Procedure/Surgery per Anesthesia Guidelines Except for: Meds; Clear liquids before procedure/surgery: ADULT (Age GREATER than or Equal to 18 years) - Clear liquids 2 hours before procedure/surgery    DVT Prophylaxis: Pneumatic Compression Devices  [place]wood Catheter: Not present  Lines: None     Cardiac Monitoring:  "None  Code Status: No CPR- Do NOT Intubate      Clinically Significant Risk Factors               # Hypoalbuminemia: Lowest albumin = 2.7 g/dL at 6/9/2025  7:52 AM, will monitor as appropriate                # Overweight: Estimated body mass index is 25.62 kg/m  as calculated from the following:    Height as of this encounter: 1.778 m (5' 10\").    Weight as of this encounter: 81 kg (178 lb 9.2 oz).        # Financial/Environmental Concerns: none         Social Drivers of Health    Tobacco Use: Medium Risk (6/7/2025)    Patient History     Smoking Tobacco Use: Former     Smokeless Tobacco Use: Never   Physical Activity: Unknown (10/10/2024)    Exercise Vital Sign     Days of Exercise per Week: 0 days   Social Connections: Unknown (10/10/2024)    Social Connection and Isolation Panel [NHANES]     Frequency of Social Gatherings with Friends and Family: Once a week          Disposition Plan     Medically Ready for Discharge: Anticipated in 2-4 Days  Pending post op course after surgery           America Cee DO  Hospitalist Service  [facility]  Securely message with [employer] (more info)  Text page via [employer] Paging/Directory   ______________________________________________________________________    Interval History   Patient seen and examined. Doing okay. NPO for today and we discussed likelihood that he will miss dinner when he gets back to his room. Strength same as day prior. Working with his incentive spirometer more. Remains hopeful that his therapy eval post surgery will recommend home care but realistic that TCU may be recommended and encouraged.    Physical Exam   Vital Signs: Temp: 98.1  F (36.7  C) Temp src: Oral BP: (!) 140/99 Pulse: 60   Resp: 16 SpO2: 98 % O2 Device: None (Room air)    Weight: 178 lbs 9.16 oz    Constitutional: Awake, alert, cooperative, no apparent distress  Respiratory: Clear to auscultation bilaterally, no crackles or wheezing  Cardiovascular: Regular rate and " rhythm, normal S1 and S2, and no murmur noted  GI: Normal bowel sounds, soft, non-distended, non-tender  Skin/Integumen: No rashes, no cyanosis, no edema  Neuro:  Decreased strength 4/5 at hips and quads. Dorsi/plantar flexion 5/5 lower extremities.    Medical Decision Making       30 MINUTES SPENT BY ME on the date of service doing chart review, history, exam, documentation & further activities per the note.      Data     I have personally reviewed the following data over the past 24 hrs:    9.3  \   14.7   / 219     141 106 21.0 /  102 (H)   4.5 25 0.65 (L) \     ALT: N/A AST: 38 AP: N/A TBILI: N/A   ALB: N/A TOT PROTEIN: N/A LIPASE: N/A       Imaging results reviewed over the past 24 hrs:   No results found for this or any previous visit (from the past 24 hours).

## 2025-06-12 NOTE — ANESTHESIA PROCEDURE NOTES
Arterial Line Procedure Note    Pre-Procedure   Staff -        Anesthesiologist:  Chuy Sherman MD       Performed By: anesthesiologist       Location: pre-op       Pre-Anesthestic Checklist: patient identified, IV checked, risks and benefits discussed, informed consent and pre-op evaluation  Timeout:       Correct Patient: Yes        Correct Procedure: Yes        Correct Site: Yes        Correct Position: Yes   Line Placement:   This line was placed Pre Induction starting at 6/12/2025 3:45 PM and ending at 6/12/2025 3:45 PM  Procedure   Procedure: arterial line       Laterality: left       Insertion Site: radial.  Sterile Prep        Standard elements of sterile barrier followed       Skin prep: Chloraprep  Insertion/Injection        Technique: ultrasound guided        Medical Necessity: atherosclerosis       1. Ultrasound was used to evaluate the access site.       2. Artery evaluated via ultrasound for patency/adequacy.       3. Using real-time ultrasound the needle/catheter was observed entering the artery/vein.       4. Permanent image was captured and entered into the patient's record.       5. The visualized structures were anatomically normal.       6. There were no apparent abnormal pathologic findings.       Catheter Type/Size: 20 gauge, 1.75 in/4.5 cm quick cath (integral wire)  Narrative         Secured by: other       Tegaderm dressing used.       Complications: None apparent,    Comments:  Ultrasound used for placement particularly given atherosclerosis    Secured with adhesive spray, tegaderm, and tape

## 2025-06-12 NOTE — ANESTHESIA PREPROCEDURE EVALUATION
Anesthesia Pre-Procedure Evaluation    Patient: Yosef Murry   MRN: [id number] : 1935          Procedure : Procedure(s):  Thoracic 4 to Thoracic 5 laminectomy and resection of meningioma         Past Medical History:   Diagnosis Date    Anal fistula     Antiplatelet or antithrombotic long-term use     Arrhythmia     Atrial flutter (H)     Cerebral infarction (H)     TIA    Heartburn     Hypertension     Inguinal hernia, left     Persistent atrial fibrillation (H) 12    Prostate cancer (H)     TIA (transient ischaemic attack)           Past Surgical History:   Procedure Laterality Date    COLONOSCOPY      COLONOSCOPY N/A 2021    Procedure: Colonoscopy, With Polypectomy And Biopsy;  Surgeon: Jordin Rachel MD;  Location:  GI    ENT SURGERY      tonsllectomy    EXAM UNDER ANESTHESIA, FISTULOTOMY RECTUM, COMBINED N/A 2015    Procedure: COMBINED EXAM UNDER ANESTHESIA, FISTULOTOMY RECTUM;  Surgeon: Candice Carty MD;  Location: [facility]    GENITOURINARY SURGERY      PROSTATECTOMY    GI SURGERY      hernia repair x 2    HERNIORRHAPHY INGUINAL  2013    Procedure: HERNIORRHAPHY INGUINAL;  LEFT INGUINAL HERNIA REPAIR WITH MESH;  Surgeon: Filipe Fairchild MD;  Location: [facility]    HERNIORRHAPHY INGUINAL Right 2019    Procedure: OPEN RIGHT INGUINA HERNIA REPAIR WITH MESH;  Surgeon: Filipe Fairchild MD;  Location:  OR    ORTHOPEDIC SURGERY      WRIST SURGERY - LEFT      Allergies   Allergen Reactions    Other [Seasonal Allergies]       Social History     Tobacco Use    Smoking status: Former     Current packs/day: 0.00     Types: Cigarettes     Start date: 1952     Quit date: 1968     Years since quittin.4    Smokeless tobacco: Never   Substance Use Topics    Alcohol use: Yes     Alcohol/week: 0.8 standard drinks of alcohol     Types: 1 Cans of beer per week     Comment: 1 beer a week      Wt Readings from Last 1 Encounters:   25 81 kg (178 lb 9.2  oz)        Anesthesia Evaluation   Pt has had prior anesthetic.     No history of anesthetic complications       ROS/MED HX  ENT/Pulmonary:    (-) sleep apnea   Neurologic: Comment: Acute weakness secondary to meningioma    (+)          CVA, date: 2019 and 2023, with deficits, - lower extremity weakness.                   Cardiovascular:     (+)  hypertension- -   -  - -                        dysrhythmias, a-fib and a-flutter,        Previous cardiac testing   Echo: Date: 7/2024 Results:  Interpretation Summary     Technically difficult study. Patient refused contrast.     The visual ejection fraction is 60-65%.  The right ventricle is borderline dilated. The right ventricular systolic  function is normal.  There is severe biatrial enlargement.  The inferior vena cava was normal in size with preserved respiratory  variability.  There is no pericardial effusion.  _____________________________    Stress Test:  Date: Results:    ECG Reviewed:  Date: Results:    Cath:  Date: Results:      METS/Exercise Tolerance:     Hematologic:       Musculoskeletal: Comment: Rhabdo on admission      GI/Hepatic:    (-) GERD   Renal/Genitourinary:       Endo:       Psychiatric/Substance Use:       Infectious Disease:       Malignancy: Comment: Prostate cancer    meningioma  (+) Malignancy,     Other:              Physical Exam  Airway  Mallampati: III  TM distance: >3 FB  Neck ROM: limited  Mouth opening: >= 4 cm    Cardiovascular - normal exam   Dental   (+) Modest Abnormalities - crowns, retainers, 1 or 2 missing teeth      Pulmonary - normal exam      Neurological   Other Findings       OUTSIDE LABS:  CBC:   Lab Results   Component Value Date    WBC 9.3 06/12/2025    WBC 11.2 (H) 06/09/2025    HGB 14.7 06/12/2025    HGB 14.4 06/09/2025    HCT 43.7 06/12/2025    HCT 44.4 06/09/2025     06/12/2025     06/09/2025     BMP:   Lab Results   Component Value Date     06/12/2025     06/10/2025    POTASSIUM 4.5  "06/12/2025    POTASSIUM 4.2 06/10/2025    CHLORIDE 106 06/12/2025    CHLORIDE 107 06/10/2025    CO2 25 06/12/2025    CO2 22 06/10/2025    BUN 21.0 06/12/2025    BUN 17.7 06/10/2025    CR 0.65 (L) 06/12/2025    CR 0.59 (L) 06/10/2025     (H) 06/12/2025    GLC 92 06/12/2025     COAGS:   Lab Results   Component Value Date    PTT 21 (L) 06/10/2025    INR 1.14 06/11/2025     POC:   Lab Results   Component Value Date     (H) 08/01/2019     HEPATIC:   Lab Results   Component Value Date    ALBUMIN 2.7 (L) 06/09/2025    PROTTOTAL 5.9 (L) 06/09/2025    ALT 30 06/09/2025    AST 38 06/12/2025    ALKPHOS 71 06/09/2025    BILITOTAL 0.7 06/09/2025     OTHER:   Lab Results   Component Value Date    A1C 5.6 07/31/2019    BELLO 8.8 06/12/2025    PHOS 3.2 06/12/2025    MAG 2.3 06/12/2025    TSH 4.36 (H) 06/07/2025    T4 1.03 06/07/2025    CRP 61.9 (H) 07/29/2019    SED 19 06/08/2025       Anesthesia Plan    ASA Status:  3, emergent      NPO Status: NPO Appropriate   Anesthesia Type: General.  Airway: oral.  Induction: intravenous.  Maintenance: TIVA.   Techniques and Equipment:     - Airway:  Planned airway equipment includes video laryngoscope.     - Monitoring Plan: standard ASA monitoring     Consents            Postoperative Care         Comments:    Other Comments: 2nd PIV  elian    TIVA, prop/sarai    Discussed DNR.  Intubation ok.  No CPR or shocks.  Some meds within reason are ok.               Chuy Sherman MD    I have reviewed the pertinent notes and labs in the chart from the past 30 days and (re)examined the patient.  Any updates or changes from those notes are reflected in this note.    Clinically Significant Risk Factors               # Hypoalbuminemia: Lowest albumin = 2.7 g/dL at 6/9/2025  7:52 AM, will monitor as appropriate                # Overweight: Estimated body mass index is 25.62 kg/m  as calculated from the following:    Height as of this encounter: 1.778 m (5' 10\").    Weight as of this " encounter: 81 kg (178 lb 9.2 oz).      # Financial/Environmental Concerns: none

## 2025-06-13 ENCOUNTER — APPOINTMENT (OUTPATIENT)
Dept: OCCUPATIONAL THERAPY | Facility: CLINIC | Age: 89
DRG: 029 | End: 2025-06-13
Attending: PHYSICIAN ASSISTANT
Payer: MEDICARE

## 2025-06-13 LAB
ANION GAP SERPL CALCULATED.3IONS-SCNC: 11 MMOL/L (ref 7–15)
BASOPHILS # BLD AUTO: 0 10E3/UL (ref 0–0.2)
BASOPHILS NFR BLD AUTO: 0 %
BUN SERPL-MCNC: 19.5 MG/DL (ref 8–23)
CALCIUM SERPL-MCNC: 9 MG/DL (ref 8.8–10.4)
CHLORIDE SERPL-SCNC: 106 MMOL/L (ref 98–107)
CREAT SERPL-MCNC: 0.68 MG/DL (ref 0.67–1.17)
EGFRCR SERPLBLD CKD-EPI 2021: 89 ML/MIN/1.73M2
EOSINOPHIL # BLD AUTO: 0 10E3/UL (ref 0–0.7)
EOSINOPHIL NFR BLD AUTO: 0 %
ERYTHROCYTE [DISTWIDTH] IN BLOOD BY AUTOMATED COUNT: 14.5 % (ref 10–15)
GLUCOSE BLDC GLUCOMTR-MCNC: 126 MG/DL (ref 70–99)
GLUCOSE SERPL-MCNC: 134 MG/DL (ref 70–99)
HCO3 SERPL-SCNC: 24 MMOL/L (ref 22–29)
HCT VFR BLD AUTO: 43.4 % (ref 40–53)
HGB BLD-MCNC: 14.6 G/DL (ref 13.3–17.7)
IMM GRANULOCYTES # BLD: 0.1 10E3/UL
IMM GRANULOCYTES NFR BLD: 1 %
LYMPHOCYTES # BLD AUTO: 0.8 10E3/UL (ref 0.8–5.3)
LYMPHOCYTES NFR BLD AUTO: 6 %
MAGNESIUM SERPL-MCNC: 2.2 MG/DL (ref 1.7–2.3)
MCH RBC QN AUTO: 30.9 PG (ref 26.5–33)
MCHC RBC AUTO-ENTMCNC: 33.6 G/DL (ref 31.5–36.5)
MCV RBC AUTO: 92 FL (ref 78–100)
MONOCYTES # BLD AUTO: 1 10E3/UL (ref 0–1.3)
MONOCYTES NFR BLD AUTO: 8 %
NEUTROPHILS # BLD AUTO: 11.6 10E3/UL (ref 1.6–8.3)
NEUTROPHILS NFR BLD AUTO: 86 %
NRBC # BLD AUTO: 0 10E3/UL
NRBC BLD AUTO-RTO: 0 /100
PHOSPHATE SERPL-MCNC: 4 MG/DL (ref 2.5–4.5)
PLATELET # BLD AUTO: 216 10E3/UL (ref 150–450)
POTASSIUM SERPL-SCNC: 4.6 MMOL/L (ref 3.4–5.3)
RBC # BLD AUTO: 4.72 10E6/UL (ref 4.4–5.9)
SODIUM SERPL-SCNC: 141 MMOL/L (ref 135–145)
WBC # BLD AUTO: 13.5 10E3/UL (ref 4–11)

## 2025-06-13 PROCEDURE — 80048 BASIC METABOLIC PNL TOTAL CA: CPT | Performed by: HOSPITALIST

## 2025-06-13 PROCEDURE — 250N000013 HC RX MED GY IP 250 OP 250 PS 637: Performed by: PHYSICIAN ASSISTANT

## 2025-06-13 PROCEDURE — 83735 ASSAY OF MAGNESIUM: CPT | Performed by: HOSPITALIST

## 2025-06-13 PROCEDURE — 36415 COLL VENOUS BLD VENIPUNCTURE: CPT | Performed by: HOSPITALIST

## 2025-06-13 PROCEDURE — 99232 SBSQ HOSP IP/OBS MODERATE 35: CPT | Performed by: HOSPITALIST

## 2025-06-13 PROCEDURE — 250N000011 HC RX IP 250 OP 636: Mod: JZ | Performed by: PHYSICIAN ASSISTANT

## 2025-06-13 PROCEDURE — 250N000012 HC RX MED GY IP 250 OP 636 PS 637: Performed by: PHYSICIAN ASSISTANT

## 2025-06-13 PROCEDURE — 258N000003 HC RX IP 258 OP 636: Performed by: PHYSICIAN ASSISTANT

## 2025-06-13 PROCEDURE — 84100 ASSAY OF PHOSPHORUS: CPT | Performed by: HOSPITALIST

## 2025-06-13 PROCEDURE — 120N000001 HC R&B MED SURG/OB

## 2025-06-13 PROCEDURE — 97535 SELF CARE MNGMENT TRAINING: CPT | Mod: GO

## 2025-06-13 PROCEDURE — 97530 THERAPEUTIC ACTIVITIES: CPT | Mod: GO

## 2025-06-13 PROCEDURE — 250N000013 HC RX MED GY IP 250 OP 250 PS 637: Performed by: HOSPITALIST

## 2025-06-13 PROCEDURE — 97165 OT EVAL LOW COMPLEX 30 MIN: CPT | Mod: GO

## 2025-06-13 PROCEDURE — 85004 AUTOMATED DIFF WBC COUNT: CPT | Performed by: PHYSICIAN ASSISTANT

## 2025-06-13 RX ADMIN — SODIUM CHLORIDE: 900 INJECTION INTRAVENOUS at 21:06

## 2025-06-13 RX ADMIN — SODIUM CHLORIDE: 900 INJECTION INTRAVENOUS at 08:45

## 2025-06-13 RX ADMIN — SENNOSIDES AND DOCUSATE SODIUM 1 TABLET: 50; 8.6 TABLET ORAL at 08:47

## 2025-06-13 RX ADMIN — POLYETHYLENE GLYCOL 3350 17 G: 17 POWDER, FOR SOLUTION ORAL at 08:47

## 2025-06-13 RX ADMIN — DEXAMETHASONE 4 MG: 4 TABLET ORAL at 23:21

## 2025-06-13 RX ADMIN — METOPROLOL SUCCINATE 25 MG: 25 TABLET, EXTENDED RELEASE ORAL at 08:47

## 2025-06-13 RX ADMIN — POLYETHYLENE GLYCOL 3350 17 G: 17 POWDER, FOR SOLUTION ORAL at 09:00

## 2025-06-13 RX ADMIN — DEXAMETHASONE 4 MG: 4 TABLET ORAL at 17:43

## 2025-06-13 RX ADMIN — METHOCARBAMOL 750 MG: 750 TABLET ORAL at 21:01

## 2025-06-13 RX ADMIN — METHOCARBAMOL 750 MG: 750 TABLET ORAL at 09:00

## 2025-06-13 RX ADMIN — METHOCARBAMOL 750 MG: 750 TABLET ORAL at 04:37

## 2025-06-13 RX ADMIN — DEXAMETHASONE 4 MG: 4 TABLET ORAL at 11:30

## 2025-06-13 RX ADMIN — SENNOSIDES AND DOCUSATE SODIUM 1 TABLET: 50; 8.6 TABLET ORAL at 21:01

## 2025-06-13 RX ADMIN — HYDROMORPHONE HYDROCHLORIDE 0.2 MG: 0.2 INJECTION, SOLUTION INTRAMUSCULAR; INTRAVENOUS; SUBCUTANEOUS at 11:49

## 2025-06-13 RX ADMIN — ACETAMINOPHEN 975 MG: 325 TABLET, FILM COATED ORAL at 13:52

## 2025-06-13 RX ADMIN — DEXAMETHASONE 4 MG: 4 TABLET ORAL at 00:01

## 2025-06-13 RX ADMIN — ACETAMINOPHEN 975 MG: 325 TABLET, FILM COATED ORAL at 21:01

## 2025-06-13 RX ADMIN — AMLODIPINE BESYLATE 5 MG: 5 TABLET ORAL at 08:47

## 2025-06-13 RX ADMIN — METHOCARBAMOL 750 MG: 750 TABLET ORAL at 15:44

## 2025-06-13 RX ADMIN — LOSARTAN POTASSIUM 50 MG: 50 TABLET, FILM COATED ORAL at 08:47

## 2025-06-13 RX ADMIN — DEXAMETHASONE 4 MG: 4 TABLET ORAL at 06:37

## 2025-06-13 RX ADMIN — HYDROMORPHONE HYDROCHLORIDE 0.4 MG: 0.2 INJECTION, SOLUTION INTRAMUSCULAR; INTRAVENOUS; SUBCUTANEOUS at 03:16

## 2025-06-13 RX ADMIN — ACETAMINOPHEN 975 MG: 325 TABLET, FILM COATED ORAL at 06:37

## 2025-06-13 ASSESSMENT — ACTIVITIES OF DAILY LIVING (ADL)
ADLS_ACUITY_SCORE: 37
ADLS_ACUITY_SCORE: 45
ADLS_ACUITY_SCORE: 43
ADLS_ACUITY_SCORE: 43
ADLS_ACUITY_SCORE: 39
ADLS_ACUITY_SCORE: 45
ADLS_ACUITY_SCORE: 44
ADLS_ACUITY_SCORE: 43
ADLS_ACUITY_SCORE: 39
ADLS_ACUITY_SCORE: 43
ADLS_ACUITY_SCORE: 41
ADLS_ACUITY_SCORE: 37
ADLS_ACUITY_SCORE: 43
ADLS_ACUITY_SCORE: 45
ADLS_ACUITY_SCORE: 43
ADLS_ACUITY_SCORE: 45
ADLS_ACUITY_SCORE: 43
ADLS_ACUITY_SCORE: 45
ADLS_ACUITY_SCORE: 43
ADLS_ACUITY_SCORE: 45
ADLS_ACUITY_SCORE: 44
ADLS_ACUITY_SCORE: 45
ADLS_ACUITY_SCORE: 45

## 2025-06-13 NOTE — ANESTHESIA CARE TRANSFER NOTE
Patient: Yosef Murry    Procedure: Procedure(s):  Thoracic 4 to Thoracic 5 laminectomy and resection of meningioma       Diagnosis: Spinal meningioma (H) [D32.1]  Compression of spinal cord (H) [G95.20]  Myelomalacia (H) [G95.89]  Diagnosis Additional Information: No value filed.    Anesthesia Type:   General     Note:    Oropharynx: oropharynx clear of all foreign objects and spontaneously breathing  Level of Consciousness: drowsy  Oxygen Supplementation: face mask  Level of Supplemental Oxygen (L/min / FiO2): 6  Independent Airway: airway patency satisfactory and stable  Dentition: dentition unchanged  Vital Signs Stable: post-procedure vital signs reviewed and stable  Report to RN Given: handoff report given  Patient transferred to: PACU    Handoff Report: Identifed the Patient, Identified the Reponsible Provider, Reviewed the pertinent medical history, Discussed the surgical course, Reviewed Intra-OP anesthesia mangement and issues during anesthesia, Set expectations for post-procedure period and Allowed opportunity for questions and acknowledgement of understanding      Vitals:  Vitals Value Taken Time   BP     Temp     Pulse 97 06/12/25 19:27   Resp 14 06/12/25 19:27   SpO2 100 % 06/12/25 19:27   Vitals shown include unfiled device data.    Electronically Signed By: JOHNSON Roldan CRNA  June 12, 2025  7:28 PM

## 2025-06-13 NOTE — PLAN OF CARE
Goal Outcome Evaluation:  A and O x4. BLE strength 4/5, no N/T. Up with 1, belt, walker. Voiding and BM x1 in BR. NPO for surgery. VSS. No pain. Scoring green on aggression screening tool. Pt down to surgery this afternoon, family updated over the phone.

## 2025-06-13 NOTE — ANESTHESIA POSTPROCEDURE EVALUATION
Patient: Yosef Murry    Procedure: Procedure(s):  Thoracic 4 to Thoracic 5 laminectomy and resection of meningioma       Anesthesia Type:  General    Note:  Disposition: Inpatient   Postop Pain Control: Uneventful            Sign Out: Well controlled pain   PONV: No   Neuro/Psych: Uneventful            Sign Out: Acceptable/Baseline neuro status   Airway/Respiratory: Uneventful            Sign Out: Acceptable/Baseline resp. status   CV/Hemodynamics: Uneventful            Sign Out: Acceptable CV status   Other NRE: NONE   DID A NON-ROUTINE EVENT OCCUR? No           Last vitals:  Vitals Value Taken Time   /105 06/12/25 20:30   Temp 36.7  C (98.06  F) 06/12/25 20:45   Pulse 72 06/12/25 20:45   Resp 11 06/12/25 20:45   SpO2 98 % 06/12/25 20:45   Vitals shown include unfiled device data.    Electronically Signed By: Neville Waldron MD  June 12, 2025  8:47 PM

## 2025-06-13 NOTE — CONSULTS
Care Management has already consulted on this patient and following for discharge planning.      Sheri Scott, Care Management RN, RYAN  [facility] - FLOAT   Contact: via Kee

## 2025-06-13 NOTE — PROGRESS NOTES
06/13/25 1600   Appointment Info   Signing Clinician's Name / Credentials (OT) Molly Rowan, OTR/L   Living Environment   People in Home alone   Current Living Arrangements house   Home Accessibility stairs to enter home;stairs within home   Number of Stairs, Main Entrance   (ramp in garage)   Stair Railings, Main Entrance none   Number of Stairs, Within Home, Primary greater than 10 stairs   Stair Railings, Within Home, Primary railing on left side (ascending)   Transportation Anticipated family or friend will provide   Living Environment Comments Pt lives in house, has ramp in garage, both walk in and tub shower option, grab bar in shower, no bench.   Self-Care   Usual Activity Tolerance good   Current Activity Tolerance fair   Regular Exercise No   Equipment Currently Used at Home cane, quad;walker, rolling   Fall history within last six months yes   Number of times patient has fallen within last six months 3   Activity/Exercise/Self-Care Comment Pt typically uses SEC in the home, FWW to get up in middle of night but also owns a 4ww. Pt is IND w/ ADLs at baseline. Reports it is harder to get OFF his compression socks than it is to get on.   Instrumental Activities of Daily Living (IADL)   IADL Comments Pt typically drives, manages his own medications, receives meals on wheels for PM meal but often will just do microwave meals of freezer meals or cans of soups for lunches.   General Information   Onset of Illness/Injury or Date of Surgery 06/07/25   Referring Physician Lyla Leonard PA-C   Additional Occupational Profile Info/Pertinent History of Current Problem Yosef Murry is a 89 year old male history of CVA, persistent afib, hyperlipidemia, prostate cancer, balance problems, who presents with bilateral lower extremity weakness. He reports he went out to put air in his tires last evening, knelt down to fill the tires but then was too weak to get up despite having his cane for assistance. He crawled up a  "ramp leading to his house door in the garage, was attempting to get upright, but fell backward. He denied hitting his head. He was unable to get himself up and ultimately called EMS early this morning. Found to have mass at T4/T5 on imaging causing cord compression and displacement. Recommended for surgical intervention-planned 6/12   Existing Precautions/Restrictions fall;spinal   Cognitive Status Examination   Orientation Status orientation to person, place and time  (says \"well yeah because I can look at the board\")   Cognitive Status Comments pt is forgetful and really wants to go home but also recognizes that he is not as strong as his baseline   Visual Perception   Impact of Vision Impairment on Function (Vision) intact   Sensory   Sensory Comments reports increased back pain when leaning forward in chair   Pain Assessment   Patient Currently in Pain Yes, see Vital Sign flowsheet   Posture   Posture forward head position;kyphosis;protracted shoulders   Range of Motion Comprehensive   Comment, General Range of Motion limited UE ROM 2/2 pain w/ shoulder flexion.   Strength Comprehensive (MMT)   Comment, General Manual Muscle Testing (MMT) Assessment 4/5 in BUE, 3+/5 LLE (from prior stroke)   Coordination   Functional Limitations Decreased speed;Reach to targets impaired   Bed Mobility   Comment (Bed Mobility) did not observe this date, Ax1 per clinical judgement   Transfers   Transfer Comments Min A from chair and bench in hallway   Balance   Balance Comments CGA-Min A at times, pt wanting therapist hand on belt and \"concerned\" about falling but very motivated to return home.   Activities of Daily Living   BADL Assessment/Intervention lower body dressing;grooming;toileting   Lower Body Dressing Assessment/Training   [place] Level (Lower Body Dressing) minimum assist (75% patient effort)   Grooming Assessment/Training   [place] Level (Grooming) contact guard assist   Toileting   [place] Level " "(Toileting) minimum assist (75% patient effort)   Comment, (Toileting) commode over toilet for increased height   Clinical Impression   Criteria for Skilled Therapeutic Interventions Met (OT) Yes, treatment indicated   OT Diagnosis impaired ADL/IADL IND   Influenced by the following impairments prior stroke when in [place], L side residual deficits   OT Problem List-Impairments impacting ADL problems related to;activity tolerance impaired;balance;mobility;strength   Assessment of Occupational Performance 3-5 Performance Deficits   Planned Therapy Interventions (OT) ADL retraining;IADL retraining;neuromuscular re-education;strengthening;home program guidelines;progressive activity/exercise   Clinical Decision Making Complexity (OT) problem focused assessment/low complexity   OT Total Evaluation Time   OT Eval, Low Complexity Minutes (37169) 10   OT Goals   Therapy Frequency (OT) Daily   OT Predicted Duration/Target Date for Goal Attainment 06/20/25   OT Goals Hygiene/Grooming;Lower Body Dressing;Toilet Transfer/Toileting;Home Management;Transfers   OT: Hygiene/Grooming independent;modified independent;while standing   OT: Lower Body Dressing Independent;Modified independent   OT: Transfer Modified independent  (tub shower tr or walk in shower tr w/ threshold)   OT: Toilet Transfer/Toileting Modified independent   OT: Home Management Modified independent   Self-Care/Home Management   Self-Care/Home Mgmt/ADL, Compensatory, Meal Prep Minutes (13916) 10   Treatment Detail/Skilled Intervention OT: Pt cued to push up off of chair for tr, still pulled on walker, reinstructed to sit down and try again, much stronger stand 2nd trial when correct hand placement. CGA amb in room w/ FWW, pt saying \"you're going to have a hand on the belt right\" but then really wanting to discharge home, educated on fall risk and risk for readmission without improving strength to return home alone; Min A toilet tr w/ height elevated w/ commode over " "toilet, needing cues again for hand placement demo'ing poor carry over of safety strategies. Brushed teeth CGA while at sink, pt reports \"I sway\" while standing at sink which is accurate, does not have steady standing balance.   Therapeutic Activities   Therapeutic Activity Minutes (87561) 12   Treatment Detail/Skilled Intervention OT: Pt engaged in functional mobiltiy and pathfinding in hallway for activity tolerance and endurance to return home. 75' x2 w/ FWW and seated break in hallway, good carry over of hand placement for STS this time, improving safety. CGA throughout w/ FWW, 1 partial LOB when bumping into cord returning to room.   OT Discharge Planning   OT Plan repeated STS for hand placement training and habit building, LB dsg w/ spinal precautions   OT Discharge Recommendation (DC Rec) Transitional Care Facility;home with home care occupational therapy   OT Rationale for DC Rec Pt functioning below baseline at this time, pt very motivated to return home whoever presenting w/ deficits in balance, strength, activity tolerance and ADL IND. Pt would benefit from TCU prior to returning to home. Family strongly wants pt to d/c to TCU however if makes progress may progress to home; Currently, If home would require someone to stay with him for the first few days and HH OT/PT.   OT Brief overview of current status Goals of therapy will be to address safe mobility and make recs for d/c to next level of care. Pt and RN will continue to follow all falls risk precautions as documented by RN staff while hospitalized  (Ax1 mobility and all ADLs w/ FWW)   OT Total Distance Amb During Session (feet) 125   Total Session Time   Timed Code Treatment Minutes 22   Total Session Time (sum of timed and untimed services) 32     " No

## 2025-06-13 NOTE — PROGRESS NOTES
Care Management Follow Up    Length of Stay (days): 5    Expected Discharge Date: 06/16/2025     Concerns to be Addressed: all concerns addressed in this encounter, discharge planning     Patient plan of care discussed at interdisciplinary rounds: Yes    Anticipated Discharge Disposition: Transitional Care, Skilled Nursing Facility              Anticipated Discharge Services: None  Anticipated Discharge DME: None    Patient/family educated on Medicare website which has current facility and service quality ratings: yes  Education Provided on the Discharge Plan: Yes  Patient/Family in Agreement with the Plan: yes    Referrals Placed by CM/SW:    Private pay costs discussed: Not applicable    Discussed  Partnership in Safe Discharge Planning  document with patient/family: No     Handoff Completed: No, handoff not indicated or clinically appropriate    Additional Information:  SW met with pt at bedside to discuss discharge planning and get TCU choices. Patient stated that he does not want to go to TCU and he would rather go home, but is understanding that TCU is necessary. Pt requested SW call his daughter as she has the choices. SW called daughter Mela and she states they would like [facility]errals to [facility]. SW sent referrals.    Next Steps: secure tcu    JESSICA Blackmon

## 2025-06-13 NOTE — PLAN OF CARE
Goal Outcome Evaluation:      Plan of Care Reviewed With: patient    Overall Patient Progress: no changeOverall Patient Progress: no change     Reason for Admission: POD 1 T4-T5 laminectomy and mass resection    Cognitive/Mentation: A/Ox 4  Neuros/CMS: Intact ex BLE weakenss, 4/5, trace BLE edema.   VS: VSS on RA. SBP < 140, bp was higher than that (157/100) paged hospitalist, he said that was okay and to continue to monitor.   /GI: Continent. Underwood removed today. Voiding adequately.   Pulmonary: LS diminished.  Pain: denies.     Drains/Lines: 2 R PIVs, infusing NS @75ml/hr.  Skin: wound on coccyx, bilateral knee wounds mepis in place, pale/dry.  Activity: Assist x 1 with GBW.  Diet: reg with thin liquids. Takes pills whole.     Therapies recs: tbd  Discharge: tbd    Aggression Stoplight Tool: green

## 2025-06-13 NOTE — PROGRESS NOTES
[facility]    Medicine Progress Note - Hospitalist Service    Date of Admission:  6/7/2025    Assessment & Plan   Yosef Murry is a 89 year old male history of CVA, persistent afib, hyperlipidemia, prostate cancer, balance problems, who presents with bilateral lower extremity weakness.  He reports he went out to put air in his tires last evening, knelt down to fill the tires but then was too weak to get up despite having his cane for assistance.  He crawled up a ramp leading to his house door in the garage, was attempting to get upright, but fell backward.  He denied hitting his head.  He was unable to get himself up and ultimately called EMS early this morning. Found to have mass at T4/T5 on imaging causing cord compression and displacement. Recommended for surgical intervention-planned 6/12      Acute on chronic bilateral lower extremity weakness  Hx CVA 8/2019, also reports CVA in [place]   Hypointense mass at T4/T5 resulting in cord compression/displacement, suspected meningioma  New onset bladder incontinence in past 2 months, lower extremity weakness getting worse, denied any chronic spine pain (having mid back pain after lying on ground overnight)  *Head CT: Chronic cerebellar and right frontal lobe infarcts superimposed upon a background of moderate chronic microangiopathy without acute intracranial abnormality  *MRI Thoracic Spine: 1 X 0.7 X 1.2 cm intradural extramedullary, T1 isointense, T2 hypointense mass at the left T4-T5 level with severe spinal stenosis and compresses and displaces the cord.   *MRI Brain: No discrete mass lesion, hemorrhage or focal area suggestive of acute ischemia.  Diffuse age related changes along with focal encephalomalacia anterior right frontal lobe  *MRI cervical spine:  Multilevel cervical spondylosis without high-grade spinal canal stenosis.  Severe bilateral neural foraminal stenosis at C4-5 and C5-6  No abnormal spinal cord signal.  No abnormal  contrast enhancement.  *MRI lumbar spine: Mild lumbar spondylosis without high-grade canal stenosis. Moderate left L5-S1 foraminal stenosis.  Surgery afternoon of 6/12  Started on Decadron 2mg q12 on 6/8, dosing per NS  Did have improvement in his strength with addition of decadron     Leukocytosis, resolved  Admit WBC 17 on arrival/Afebrile   CXR clear - suspect reactive elevated WBC   covid negative  Urine culture <10k mixed isela  6/12 WBC normalized at 9/3     Abnormal Thyroid test  TSH slightly up but free T4 wnl  Recheck in 4w     Rhabdomyolysis, resolving  Admission CK 3088 (peak 4089), CK down to 563 on 6/10 after continuous IVF, discontinued 6/10  AST up consistent with rhabdo  >> AST improved 136>>148>>75     A-fib/flutter, permanent  HTN  Continue PTA amlodipine, metoprolol and losartan   ECHO 7/2024: EF 60 to 65%.  Right ventricle borderline dilated.  Right ventricular systolic normal.  No effusion.  Septal thickening is noted.  Concentric remodeling present  Initially continued on eliquis, held for procedure since 6/8  EKG showing A-fib with nonspecific T-wave findings but without cardiorespiratory symptoms  Hold eliquis until OK to resume per NS 7 days postoperative and start dvt ppx 72 hours postoperatively     HLD  Holding statin      Hx prostate cancer s/p prostatectomy 2009, radiation therapy 2014 and androgen deprivation therapy  See Urology clinic note 5/9/25 for details - recent rise in PSA but MRI findings not convincing for any recurrence and PET scan   Focal PSMA activity in the posterior prostatectomy bed involving surgical clips and potentially anterior wall the rectum. No luisa, skeletal or distant metastatic disease.           Diet: Advance Diet as Tolerated: Regular Diet Adult    DVT Prophylaxis: Pneumatic Compression Devices  Underwood Catheter: Not present  Lines: None     Cardiac Monitoring: None  Code Status: No CPR- Do NOT Intubate      Clinically Significant Risk Factors              "  # Hypoalbuminemia: Lowest albumin = 2.7 g/dL at 6/9/2025  7:52 AM, will monitor as appropriate                # Overweight: Estimated body mass index is 25.62 kg/m  as calculated from the following:    Height as of this encounter: 1.778 m (5' 10\").    Weight as of this encounter: 81 kg (178 lb 9.2 oz).        # Financial/Environmental Concerns: none         Social Drivers of Health    Tobacco Use: Medium Risk (6/12/2025)    Patient History     Smoking Tobacco Use: Former     Smokeless Tobacco Use: Never   Physical Activity: Unknown (10/10/2024)    Exercise Vital Sign     Days of Exercise per Week: 0 days   Social Connections: Unknown (10/10/2024)    Social Connection and Isolation Panel [NHANES]     Frequency of Social Gatherings with Friends and Family: Once a week          Disposition Plan     Medically Ready for Discharge: Anticipated in 2-4 Days  Pending post op course after surgery           Claude Serrano DO  Hospitalist Service  [facility]  Securely message with [employer] (more info)  Text page via [employer] Paging/Directory   ______________________________________________________________________    Interval History   Patient seen and examined. Doing well after surgery  No acute concerns  Spoke with his daughter who is a retired medical examiner (forensic pathologist)    Physical Exam   Vital Signs: Temp: 98.7  F (37.1  C) Temp src: Oral BP: (!) 157/100 Pulse: 65   Resp: 15 SpO2: 99 % O2 Device: None (Room air) Oxygen Delivery: 2 LPM  Weight: 178 lbs 9.16 oz    Constitutional: Awake, alert, cooperative, no apparent distress  Respiratory: Clear to auscultation bilaterally, no crackles or wheezing  Cardiovascular: Regular rate and rhythm, normal S1 and S2, and no murmur noted  GI: Normal bowel sounds, soft, non-distended, non-tender  Skin/Integumen: No rashes, no cyanosis, no edema  Neuro:  Decreased strength 4/5 at hips and quads. Dorsi/plantar flexion 5/5 lower " extremities.    Medical Decision Making       45 MINUTES SPENT BY ME on the date of service doing chart review, history, exam, documentation & further activities per the note.      Data     I have personally reviewed the following data over the past 24 hrs:    13.5 (H)  \   14.6   / 216     141 106 19.5 /  134 (H)   4.6 24 0.68 \       Imaging results reviewed over the past 24 hrs:   Recent Results (from the past 24 hours)   XR Surgery MARYANNE L/T 5 Min Fluoro w Stills    Narrative    This exam was marked as non-reportable because it will not be read by a   radiologist or a [facility] non-radiologist provider.

## 2025-06-13 NOTE — PROGRESS NOTES
Postop note    POD0 s/p Thoracic 4 to Thoracic 5 laminectomy and resection of meningioma with Dr. Benitez    Pre op: bilateral iliopsoas weakness at 4/5 left and 4+/5 right as well as quadriceps weakness bilaterally at 4+/5, right dorsiflexion 5/5, left 5/5 with 5/5 strength on plantar flexion     PLAN:   Activity: HOB flat  until tomorrow at 8AM. May have HOB elevated 10-15 degrees for meals. Upright as tolerated as of 0800 on 6/13    Meds:   Pain management  Decadron taper in place  Anticoagulation: eliquis may resume 1 week post operatively  DVT PPX: 72 hours post op - 6/15 7pm    Incision cares:  Monitor wound for drainage  Replace dressing as needed, do not reinforce     Dispo:   Per therapy recs    Follow ups:   2 week incision check, 6 weeks, 12 weeks with thoracic MRI- message has been sent to schedulers    Reviewed history, imaging and plans with Dr. Benitez who was in agreement    Lyla Lujan PA-C  [facility] Neurosurgery  [address] [place] [id number]  Tel [phone]  Fax [phone]  Text page via [facility] Paging/Directory

## 2025-06-13 NOTE — PROGRESS NOTES
Neurosurgery Progress Note:     POD#1 s/p T4-5 laminectomy and resection of meningioma with Dr. Benitez.    24 hours events: No events overnight.     On exam: Patient up in chair eating breakfast this morning during examination.  Some hesitancy in removing Underwood catheter.  Patient stated his pain was manageable.  Alert and oriented nonfocal exam.  Denies any numbness tingling or weakness in his extremities.  Incision: Clean dry and intact covered with island dressing.  Suture closure.     PLAN:  PT/OT-recs appreciated  - Decadron taper  - Pain management  - Can resume Eliquis postop day 7  - Okay for DVT prophylaxis 72 hours postop  - Follow-up appointments made  - Would discontinue Underwood catheter  - From a neurosurgical standpoint we would be okay with patient discharge when therapy has made the recommendations     Solo Orta DNP  [facility] Neurosurgery  [facility]  Vocera messaging strongly preferred  Please always look up on-call provider before messaging/paging            Dragon Disclosure   This was created at least in part with a voice recognition software. Mistakes/typos may be present.

## 2025-06-13 NOTE — PLAN OF CARE
Goal Outcome Evaluation:      Plan of Care Reviewed With: patient    Overall Patient Progress: no changeOverall Patient Progress: no change         Pt here POD 1 T4-T5 Laminectomy & mass resection. A&O x4. Neuros intact ex BLE weakness 4/5. VSS on 2L nasal cannula. Regular diet, thin liquids. Takes pills whole. Bedrest w/ HOB flat until 0800 today. Pain managed w/ positioning and PRN diluadid x3 tonight. Pt scoring green on the Aggression Stop Light Tool. Discharge plan pending.

## 2025-06-14 ENCOUNTER — APPOINTMENT (OUTPATIENT)
Dept: PHYSICAL THERAPY | Facility: CLINIC | Age: 89
DRG: 029 | End: 2025-06-14
Attending: PHYSICIAN ASSISTANT
Payer: MEDICARE

## 2025-06-14 LAB
ABO + RH BLD: NORMAL
BLD GP AB SCN SERPL QL: NEGATIVE
HOLD SPECIMEN: NORMAL
SPECIMEN EXP DATE BLD: NORMAL

## 2025-06-14 PROCEDURE — 97530 THERAPEUTIC ACTIVITIES: CPT | Mod: GP | Performed by: PHYSICAL THERAPIST

## 2025-06-14 PROCEDURE — 86901 BLOOD TYPING SEROLOGIC RH(D): CPT | Performed by: INTERNAL MEDICINE

## 2025-06-14 PROCEDURE — 97164 PT RE-EVAL EST PLAN CARE: CPT | Mod: GP | Performed by: PHYSICAL THERAPIST

## 2025-06-14 PROCEDURE — 250N000013 HC RX MED GY IP 250 OP 250 PS 637: Performed by: PHYSICIAN ASSISTANT

## 2025-06-14 PROCEDURE — 36415 COLL VENOUS BLD VENIPUNCTURE: CPT | Performed by: INTERNAL MEDICINE

## 2025-06-14 PROCEDURE — 120N000001 HC R&B MED SURG/OB

## 2025-06-14 PROCEDURE — 250N000012 HC RX MED GY IP 250 OP 636 PS 637: Performed by: PHYSICIAN ASSISTANT

## 2025-06-14 PROCEDURE — 250N000013 HC RX MED GY IP 250 OP 250 PS 637: Performed by: HOSPITALIST

## 2025-06-14 PROCEDURE — 99233 SBSQ HOSP IP/OBS HIGH 50: CPT | Performed by: HOSPITALIST

## 2025-06-14 PROCEDURE — 97116 GAIT TRAINING THERAPY: CPT | Mod: GP | Performed by: PHYSICAL THERAPIST

## 2025-06-14 RX ADMIN — SENNOSIDES AND DOCUSATE SODIUM 1 TABLET: 50; 8.6 TABLET ORAL at 08:42

## 2025-06-14 RX ADMIN — ACETAMINOPHEN 975 MG: 325 TABLET, FILM COATED ORAL at 06:21

## 2025-06-14 RX ADMIN — SENNOSIDES AND DOCUSATE SODIUM 1 TABLET: 50; 8.6 TABLET ORAL at 19:44

## 2025-06-14 RX ADMIN — AMLODIPINE BESYLATE 5 MG: 5 TABLET ORAL at 08:42

## 2025-06-14 RX ADMIN — METHOCARBAMOL 750 MG: 750 TABLET ORAL at 21:11

## 2025-06-14 RX ADMIN — DEXAMETHASONE 4 MG: 4 TABLET ORAL at 19:44

## 2025-06-14 RX ADMIN — ACETAMINOPHEN 975 MG: 325 TABLET, FILM COATED ORAL at 13:11

## 2025-06-14 RX ADMIN — METOPROLOL SUCCINATE 25 MG: 25 TABLET, EXTENDED RELEASE ORAL at 08:42

## 2025-06-14 RX ADMIN — METHOCARBAMOL 750 MG: 750 TABLET ORAL at 15:37

## 2025-06-14 RX ADMIN — OXYCODONE HYDROCHLORIDE 5 MG: 5 TABLET ORAL at 19:44

## 2025-06-14 RX ADMIN — DEXAMETHASONE 4 MG: 4 TABLET ORAL at 08:42

## 2025-06-14 RX ADMIN — OXYCODONE HYDROCHLORIDE 5 MG: 5 TABLET ORAL at 06:23

## 2025-06-14 RX ADMIN — METHOCARBAMOL 750 MG: 750 TABLET ORAL at 04:10

## 2025-06-14 RX ADMIN — METHOCARBAMOL 750 MG: 750 TABLET ORAL at 10:06

## 2025-06-14 RX ADMIN — LOSARTAN POTASSIUM 50 MG: 50 TABLET, FILM COATED ORAL at 08:43

## 2025-06-14 RX ADMIN — ACETAMINOPHEN 975 MG: 325 TABLET, FILM COATED ORAL at 21:11

## 2025-06-14 ASSESSMENT — ACTIVITIES OF DAILY LIVING (ADL)
ADLS_ACUITY_SCORE: 37

## 2025-06-14 NOTE — PROGRESS NOTES
"NEUROSURGERY PROGRESS NOTE    Today's date:  06/14/25        ASSESSMENT:     POD #2 s/p T4-5 laminectomy and resection of meningioma with Dr. Benitez on 6/12/25       TODAY'S PLAN:   - Decadron taper  - Pain management  - Can resume Eliquis postop day 7  - Therapies   - Routine wound cares   - Okay for DVT prophylaxis 72 hours postop (tomorrow 6/15)   - Follow-up appointments made  - From a neurosurgical standpoint we would be okay with patient discharge when therapy has made the recommendations (?tomorrow 6/15)       Tiffani Head PA-C  [facility] Neurosurgery     Please page on-call team with additional questions or concerns.   ________________________________________________________________       Subjective:   Feeling well, back is sore but tolerable. Feels strength in lower extremities is improving. Legs start to fatigue in afternoon, left leg remains slightly weaker.        Objective:   /78 (BP Location: Right arm, Patient Position: Sitting, Cuff Size: Adult Regular)   Pulse 63   Temp 97.5  F (36.4  C) (Oral)   Resp 16   Ht 1.778 m (5' 10\")   Wt 81 kg (178 lb 9.2 oz)   SpO2 97%   BMI 25.62 kg/m       Pt in chair. Appears comfortable and in no apparent distress, moving all extremities.   CN II-XII intact, alert and appropriate with conversation and following commands.   Overall strength in lower extremities appears intact -- he does have some troubles lifting left leg completely off of chair. Same with bending knee on left.   Thoracic bandage removed, incision clean, no concerns for oozing or drainage.      "

## 2025-06-14 NOTE — PROGRESS NOTES
Care Management Follow Up    Length of Stay (days): 6    Expected Discharge Date: 06/16/2025     Concerns to be Addressed: all concerns addressed in this encounter, discharge planning     Patient plan of care discussed at interdisciplinary rounds: Yes    Anticipated Discharge Disposition: Transitional Care, Skilled Nursing Facility              Anticipated Discharge Services: None  Anticipated Discharge DME: None    Patient/family educated on Medicare website which has current facility and service quality ratings: yes  Education Provided on the Discharge Plan: Yes  Patient/Family in Agreement with the Plan: yes    Referrals Placed by CM/SW:    Private pay costs discussed: private room/amenity fees $50 per day.     Discussed  Partnership in Safe Discharge Planning  document with patient/family: No     Handoff Completed: No, handoff not indicated or clinically appropriate    Additional Information:  SW received notice that pt will likely be ready for discharge.    SW called [facility] and they indicted that they need orders by 2pm. [facility] consulted with provider and planned to have pt discharge Sunday morning.     SW called pt's daughter vandana and discussed discharge plan and coordinated discharge time for 11am -12pm [facility] 6/15.  They will transport through the [facility].   [facility] discussed private or semi private room and shared private room fee of $50 per day.  Vandana indicated the semi private would work.     Next Steps: PAS, send discharge orders.     JUANCARLOS DotyW

## 2025-06-14 NOTE — PROGRESS NOTES
06/14/25 0948   Appointment Info   Signing Clinician's Name / Credentials (PT) Radha Bui PT   Rehab Comments (PT) PT re-eval after having T4-5 laminectomy 6/12/25   Living Environment   People in Home alone   Current Living Arrangements house   Home Accessibility stairs to enter home;stairs within home   Number of Stairs, Main Entrance none   Stair Railings, Main Entrance none   Number of Stairs, Within Home, Primary greater than 10 stairs   Stair Railings, Within Home, Primary railing on left side (ascending)   Transportation Anticipated family or friend will provide   Living Environment Comments lives alone in home with ramp from garage into house   Self-Care   Usual Activity Tolerance good   Current Activity Tolerance moderate   Regular Exercise No   Equipment Currently Used at Home cane, quad;walker, rolling   Fall history within last six months yes   Number of times patient has fallen within last six months 3   Activity/Exercise/Self-Care Comment pt uses SEC at baseline, walker in the night when up to bathroom.   General Information   Onset of Illness/Injury or Date of Surgery 06/07/25   Referring Physician Lyla Leonard   Patient/Family Therapy Goals Statement (PT) hopes home soon   Pertinent History of Current Problem (include personal factors and/or comorbidities that impact the POC) 88 y/o male admitted after a fall and laid on the ground in his garage most of the night afterwards. Noted to have elevated CK levels. PMH including CVA, A-fib/flutter, HTN, prostate cancer with recent rise in PSA. Found to have meningioma in spine, now s/p T4-5 laminectomy and resection of meningioma 6/12/25   Existing Precautions/Restrictions fall;spinal   Cognition   Affect/Mental Status (Cognition) WFL   Cognitive Status Comments Pt A&O but slightly confused about his surgery stating it wasn't a tumor and some difficulty noted following commands for spinal precautions/logrolling   Pain Assessment   Patient Currently in  Pain Yes, see Vital Sign flowsheet   Integumentary/Edema   Integumentary/Edema Comments thoracic incision covered with bandaging, bandages over pratima knees   Posture    Posture Forward head position;Protracted shoulders;Kyphosis   Range of Motion (ROM)   ROM Comment LE ROM WFL   Strength (Manual Muscle Testing)   Strength (Manual Muscle Testing) Deficits observed during functional mobility;Able to perform R SLR;Able to perform L SLR   Bed Mobility   Comment, (Bed Mobility) pt needs assist for bed mobility and has difficulty understanding logrolling/spinal precautions   Transfers   Comment, (Transfers) sit to stand with CGA   Gait/Stairs (Locomotion)   Comment, (Gait/Stairs) gait with wh walker and CGA 25', slow gait with decreased clearance pratima but especially noted on R   Balance   Balance Comments mildly unsteady in standing, requires walker for support   Sensory Examination   Sensory Perception patient reports no sensory changes   Coordination   Coordination Comments decreased coordination pratima heel to shin   Muscle Tone   Muscle Tone no deficits were identified   Clinical Impression   Criteria for Skilled Therapeutic Intervention Yes, treatment indicated   PT Diagnosis (PT) impaired functional mobility   Influenced by the following impairments decreased strength, genl deconditioning   Functional limitations due to impairments decreased IND with all mobility, fall risk   Clinical Presentation (PT Evaluation Complexity) stable   Clinical Presentation Rationale Based on PMH, current presentation, and social support.   Clinical Decision Making (Complexity) low complexity   Planned Therapy Interventions (PT) balance training;bed mobility training;gait training;strengthening;transfer training   Risk & Benefits of therapy have been explained patient   PT Total Evaluation Time   PT Eval, Re-eval Minutes (05562) 15   Physical Therapy Goals   PT Frequency Daily   PT Predicted Duration/Target Date for Goal Attainment 06/23/25    PT Goals Bed Mobility;Transfers;Gait   PT: Bed Mobility Modified independent;Supine to/from sit;Within precautions  (spinal precautions)   PT: Transfers Modified independent;Sit to/from stand;Bed to/from chair;Assistive device;Within precautions  (spinal precautions)   PT: Gait Supervision/stand-by assist;Rolling walker;150 feet   Interventions   Interventions Quick Adds Gait Training;Therapeutic Activity;Therapeutic Procedure   Therapeutic Activity   Therapeutic Activities: dynamic activities to improve functional performance Minutes (25638) 10   Symptoms Noted During/After Treatment Increased pain;Fatigue   Treatment Detail/Skilled Intervention Pt in recliner, awake and alert, was in bathroom earlier with RN but agreeable to PT. Sit to/from stand from recliner with cues for hand placement initially and CGA, performed 4 more times throughout session, pt with good recall for hand placement each subsequent transfer. Sit to/from supine with rail and many cues for logrolling, pt having difficulty understanding logrolling and requires mod assist for LEs sit to/from supine. Sit to stand from bed with CGA and cues. Up in recliner with alarm on and all needs in reach at end of session.   Gait Training   Gait Training Minutes (70889) 15   Symptoms Noted During/After Treatment (Gait Training) increased pain;fatigue   Treatment Detail/Skilled Intervention Gt training with 2wh walker and CGA, noted decreased clearance pratima LEs especially on L but no overt foot drop or catching toes. Forward flexed posture, pt reports fatigue with gait. Amb 75' and 85' with seated rest between.   Distance in Feet 75' and 80'   [place] Level (Gait Training) contact guard   PT Discharge Planning   PT Plan Progress gait and transfers, spinal precautions, LE exercise   PT Discharge Recommendation (DC Rec) Transitional Care Facility;home with assist;home with home care physical therapy   PT Rationale for DC Rec Pt re-eval'd for PT today post  T4-5 laminectomy and resection of meningioma 6/12/25. Much improved LE strength and mobility post surgery, currently able to amb 80' with walker and CGA, slow gait and needs assist 1 for transfers. Cont to rec TCU, pt is well below his baseline for mobility and is at risk for falling if mobilizing on his own. If he does go directly home would recommend 24 hour assist (assist 1 with all mobility) and home PT. Pt would prefer home but is agreeable to TCU and states his daughter is looking into his options.   PT Brief overview of current status CGA for gait and walker, min to mod assist for bed mobility. Goals of therapy will be to address safe mobility and make recs for d/c to next level of care. Pt and RN will cont to follow all falls risk precautions as documented by RN staff while hospitalized.   PT Total Distance Amb During Session (feet) 150   Physical Therapy Time and Intention   Timed Code Treatment Minutes 25   Total Session Time (sum of timed and untimed services) 40

## 2025-06-14 NOTE — PROGRESS NOTES
[facility]    Medicine Progress Note - Hospitalist Service    Date of Admission:  6/7/2025    Assessment & Plan   Yosef Murry is a 89 year old male history of CVA, persistent afib, hyperlipidemia, prostate cancer, balance problems, who presents with bilateral lower extremity weakness.  He reports he went out to put air in his tires last evening, knelt down to fill the tires but then was too weak to get up despite having his cane for assistance.  He crawled up a ramp leading to his house door in the garage, was attempting to get upright, but fell backward.  He denied hitting his head.  He was unable to get himself up and ultimately called EMS early this morning. Found to have mass at T4/T5 on imaging causing cord compression and displacement. Recommended for surgical intervention-planned 6/12      Acute on chronic bilateral lower extremity weakness  Hx CVA 8/2019, also reports CVA in [place]   Hypointense mass at T4/T5 resulting in cord compression/displacement, suspected meningioma  New onset bladder incontinence in past 2 months, lower extremity weakness getting worse, denied any chronic spine pain (having mid back pain after lying on ground overnight)  *Head CT: Chronic cerebellar and right frontal lobe infarcts superimposed upon a background of moderate chronic microangiopathy without acute intracranial abnormality  *MRI Thoracic Spine: 1 X 0.7 X 1.2 cm intradural extramedullary, T1 isointense, T2 hypointense mass at the left T4-T5 level with severe spinal stenosis and compresses and displaces the cord.   *MRI Brain: No discrete mass lesion, hemorrhage or focal area suggestive of acute ischemia.  Diffuse age related changes along with focal encephalomalacia anterior right frontal lobe  *MRI cervical spine:  Multilevel cervical spondylosis without high-grade spinal canal stenosis.  Severe bilateral neural foraminal stenosis at C4-5 and C5-6  No abnormal spinal cord signal.  No abnormal  contrast enhancement.  *MRI lumbar spine: Mild lumbar spondylosis without high-grade canal stenosis. Moderate left L5-S1 foraminal stenosis.  Surgery afternoon of 6/12  Started on Decadron 2mg q12 on 6/8, dosing per NS  Decadron taper per nsg and their follow-up appointments are made     Leukocytosis, resolved  Admit WBC 17 on arrival/Afebrile   CXR clear - suspect reactive elevated WBC   covid negative  Urine culture <10k mixed isela  6/12 WBC normalized at 9/3     Abnormal Thyroid test  TSH slightly up but free T4 wnl  Recheck in 4w     Rhabdomyolysis, resolving  Admission CK 3088 (peak 4089), CK down to 563 on 6/10 after continuous IVF, discontinued 6/10  AST up consistent with rhabdo  >> AST improved 136>>148>>75     A-fib/flutter, permanent  HTN  Continue PTA amlodipine, metoprolol and losartan   ECHO 7/2024: EF 60 to 65%.  Right ventricle borderline dilated.  Right ventricular systolic normal.  No effusion.  Septal thickening is noted.  Concentric remodeling present  Initially continued on eliquis, held for procedure since 6/8  EKG showing A-fib with nonspecific T-wave findings but without cardiorespiratory symptoms  Hold eliquis until OK to resume per NS 7 days postoperative and start dvt ppx 72 hours postoperatively     HLD  Holding statin      Hx prostate cancer s/p prostatectomy 2009, radiation therapy 2014 and androgen deprivation therapy  See Urology clinic note 5/9/25 for details - recent rise in PSA but MRI findings not convincing for any recurrence and PET scan   Focal PSMA activity in the posterior prostatectomy bed involving surgical clips and potentially anterior wall the rectum. No luisa, skeletal or distant metastatic disease.           Diet: Advance Diet as Tolerated: Regular Diet Adult    DVT Prophylaxis: Pneumatic Compression Devices  [place] Catheter: Not present  Lines: None     Cardiac Monitoring: None  Code Status: No CPR- Do NOT Intubate      Clinically Significant Risk Factors              "  # Hypoalbuminemia: Lowest albumin = 2.7 g/dL at 6/9/2025  7:52 AM, will monitor as appropriate                # Overweight: Estimated body mass index is 25.62 kg/m  as calculated from the following:    Height as of this encounter: 1.778 m (5' 10\").    Weight as of this encounter: 81 kg (178 lb 9.2 oz).        # Financial/Environmental Concerns: none         Social Drivers of Health    Tobacco Use: Medium Risk (6/12/2025)    Patient History     Smoking Tobacco Use: Former     Smokeless Tobacco Use: Never   Physical Activity: Unknown (10/10/2024)    Exercise Vital Sign     Days of Exercise per Week: 0 days   Social Connections: Unknown (10/10/2024)    Social Connection and Isolation Panel [NHANES]     Frequency of Social Gatherings with Friends and Family: Once a week          Disposition Plan     Medically Ready for Discharge: Anticipated in 2-4 Days  Pending post op course after surgery           Claude Serrano,   Hospitalist Service  [facility]  Securely message with [employer] (more info)  Text page via [employer] Paging/Directory   ______________________________________________________________________    Interval History   Patient seen and examined. Doing well after surgery  Walking a bit more   sent out referrls    Physical Exam   Vital Signs: Temp: 97.5  F (36.4  C) Temp src: Oral BP: 133/78 Pulse: 63   Resp: 16 SpO2: 97 % O2 Device: None (Room air)    Weight: 178 lbs 9.16 oz    Constitutional: Awake, alert, cooperative, no apparent distress  Respiratory: Clear to auscultation bilaterally, no crackles or wheezing  Cardiovascular: Regular rate and rhythm, normal S1 and S2, and no murmur noted  GI: Normal bowel sounds, soft, non-distended, non-tender  Skin/Integumen: No rashes, no cyanosis, no edema  Neuro:  Decreased strength 4/5 at hips and quads. Dorsi/plantar flexion 5/5 lower extremities.    Medical Decision Making       55 MINUTES SPENT BY ME on the date of service " doing chart review, history, exam, documentation & further activities per the note.      Data         Imaging results reviewed over the past 24 hrs:   No results found for this or any previous visit (from the past 24 hours).

## 2025-06-14 NOTE — PLAN OF CARE
Reason for Admission: POD 2 T4-T5 laminectomy and mass resection  Cognitive/Mentation: A/Ox 4  Neuros/CMS: Intact ex BLE weakenss, 4/5, trace BLE edema.   VS: VSS on RA.  /GI: Continent. External cath per patient request   Pulmonary: LS diminished.  Pain: denies.   Drains/Lines: PIV SL  Skin: wound on coccyx, bilateral knee wounds mepis in place, pale/dry. Incision CDI  Activity: Assist x 1 with GBW.  Diet: reg with thin liquids. Takes pills whole.   Therapies recs: TCU  Discharge: waiting for placement  Aggression Stoplight Tool: edgar Cage RN on 6/14/2025 at 6:12 AM

## 2025-06-14 NOTE — PLAN OF CARE
Goal Outcome Evaluation:      Plan of Care Reviewed With: patient      Reason for Admission: POD 2 T4-T5 laminectomy and mass resection.    Cognitive/Mentation: A/Ox 4  Neuros/CMS: Intact ex BLE weakenss, 4/5, trace BLE edema.   VS: VSS on RA.  /GI: Continent. Pt requests ex cath for nighttime.   Pulmonary: LS diminished.  Pain: denies.     Drains/Lines: 2 R PIVs SL.  Skin: wound on coccyx, bilateral knee wounds mepis in place, pale/dry. Incision CDI, dressing changed today.   Activity: Assist x 1 with GBW.  Diet: reg with thin liquids. Takes pills whole.     Therapies recs: TCU  Discharge: To [place] between 11am -12pm [facility] 6/15. They will transport through the [address].     Aggression Stoplight Tool: green                 No

## 2025-06-15 ENCOUNTER — APPOINTMENT (OUTPATIENT)
Dept: PHYSICAL THERAPY | Facility: CLINIC | Age: 89
DRG: 029 | End: 2025-06-15
Payer: MEDICARE

## 2025-06-15 ENCOUNTER — LAB REQUISITION (OUTPATIENT)
Dept: LAB | Facility: CLINIC | Age: 89
End: 2025-06-15

## 2025-06-15 VITALS
SYSTOLIC BLOOD PRESSURE: 165 MMHG | OXYGEN SATURATION: 96 % | WEIGHT: 178.57 LBS | DIASTOLIC BLOOD PRESSURE: 102 MMHG | HEART RATE: 85 BPM | TEMPERATURE: 97.8 F | HEIGHT: 70 IN | BODY MASS INDEX: 25.56 KG/M2 | RESPIRATION RATE: 18 BRPM

## 2025-06-15 DIAGNOSIS — Z00.01 ENCOUNTER FOR GENERAL ADULT MEDICAL EXAMINATION WITH ABNORMAL FINDINGS: ICD-10-CM

## 2025-06-15 PROCEDURE — 99239 HOSP IP/OBS DSCHRG MGMT >30: CPT | Performed by: HOSPITALIST

## 2025-06-15 PROCEDURE — 97116 GAIT TRAINING THERAPY: CPT | Mod: GP | Performed by: PHYSICAL THERAPIST

## 2025-06-15 PROCEDURE — 97530 THERAPEUTIC ACTIVITIES: CPT | Mod: GP | Performed by: PHYSICAL THERAPIST

## 2025-06-15 PROCEDURE — 250N000013 HC RX MED GY IP 250 OP 250 PS 637: Performed by: HOSPITALIST

## 2025-06-15 PROCEDURE — 97110 THERAPEUTIC EXERCISES: CPT | Mod: GP | Performed by: PHYSICAL THERAPIST

## 2025-06-15 PROCEDURE — 250N000013 HC RX MED GY IP 250 OP 250 PS 637: Performed by: PHYSICIAN ASSISTANT

## 2025-06-15 PROCEDURE — 250N000012 HC RX MED GY IP 250 OP 636 PS 637: Performed by: PHYSICIAN ASSISTANT

## 2025-06-15 PROCEDURE — 250N000011 HC RX IP 250 OP 636: Performed by: HOSPITALIST

## 2025-06-15 RX ORDER — OXYCODONE HYDROCHLORIDE 5 MG/1
5 TABLET ORAL EVERY 4 HOURS PRN
Qty: 10 TABLET | Refills: 0 | Status: SHIPPED | OUTPATIENT
Start: 2025-06-15 | End: 2025-06-19

## 2025-06-15 RX ORDER — DEXAMETHASONE 1 MG
TABLET ORAL
DISCHARGE
Start: 2025-06-15 | End: 2025-06-20

## 2025-06-15 RX ORDER — HYDROXYZINE HYDROCHLORIDE 10 MG/1
10 TABLET, FILM COATED ORAL EVERY 6 HOURS PRN
DISCHARGE
Start: 2025-06-15

## 2025-06-15 RX ORDER — METHOCARBAMOL 750 MG/1
750 TABLET, FILM COATED ORAL 4 TIMES DAILY PRN
DISCHARGE
Start: 2025-06-15

## 2025-06-15 RX ORDER — ONDANSETRON 4 MG/1
4 TABLET, ORALLY DISINTEGRATING ORAL EVERY 8 HOURS PRN
DISCHARGE
Start: 2025-06-15

## 2025-06-15 RX ORDER — ACETAMINOPHEN 325 MG/1
650 TABLET ORAL EVERY 4 HOURS PRN
DISCHARGE
Start: 2025-06-15

## 2025-06-15 RX ORDER — POLYETHYLENE GLYCOL 3350 17 G/17G
17 POWDER, FOR SOLUTION ORAL DAILY
DISCHARGE
Start: 2025-06-15

## 2025-06-15 RX ORDER — AMOXICILLIN 250 MG
1 CAPSULE ORAL 2 TIMES DAILY PRN
DISCHARGE
Start: 2025-06-15 | End: 2025-06-16

## 2025-06-15 RX ADMIN — DEXAMETHASONE 4 MG: 4 TABLET ORAL at 08:19

## 2025-06-15 RX ADMIN — METOPROLOL SUCCINATE 25 MG: 25 TABLET, EXTENDED RELEASE ORAL at 08:19

## 2025-06-15 RX ADMIN — AMLODIPINE BESYLATE 5 MG: 5 TABLET ORAL at 08:18

## 2025-06-15 RX ADMIN — LOSARTAN POTASSIUM 50 MG: 50 TABLET, FILM COATED ORAL at 08:19

## 2025-06-15 RX ADMIN — METHOCARBAMOL 750 MG: 750 TABLET ORAL at 04:16

## 2025-06-15 RX ADMIN — SENNOSIDES AND DOCUSATE SODIUM 1 TABLET: 50; 8.6 TABLET ORAL at 08:18

## 2025-06-15 RX ADMIN — METHOCARBAMOL 750 MG: 750 TABLET ORAL at 10:08

## 2025-06-15 RX ADMIN — ONDANSETRON 4 MG: 4 TABLET, ORALLY DISINTEGRATING ORAL at 08:19

## 2025-06-15 ASSESSMENT — ACTIVITIES OF DAILY LIVING (ADL)
ADLS_ACUITY_SCORE: 37

## 2025-06-15 NOTE — PROGRESS NOTES
Care Management Follow Up    Length of Stay (days): 7    Expected Discharge Date: 06/15/2025     Concerns to be Addressed: all concerns addressed in this encounter, discharge planning     Patient plan of care discussed at interdisciplinary rounds: Yes    Anticipated Discharge Disposition: Transitional Care, Skilled Nursing Facility              Anticipated Discharge Services: None  Anticipated Discharge DME: None    Patient/family educated on Medicare website which has current facility and service quality ratings: yes  Education Provided on the Discharge Plan: Yes  Patient/Family in Agreement with the Plan: yes    Referrals Placed by CM/SW:    Private pay costs discussed: Not applicable    Discussed  Partnership in Safe Discharge Planning  document with patient/family: No     Handoff Completed: No, handoff not indicated or clinically appropriate    Additional Information:  HALLIE completed PAS: [id number]    PAS-RR    D: Per DHS regulation, HALLIE completed and submitted PAS-RR to [place] [employer] Direct Connect via the Senior [employer] Line.  PAS-RR confirmation # is : [id number]    P: Further questions may be directed to Senior [employer] Line at #[phone], option #4 for PAS-RR staff.   Addendum 1034: Discharge orders sent via DOD, medications faxed.     Next Steps:    JUANCARLOS DotyW

## 2025-06-15 NOTE — PLAN OF CARE
Physical Therapy Discharge Summary    Reason for therapy discharge:    Discharged to transitional care facility.    Progress towards therapy goal(s). See goals on Care Plan in [facility] electronic health record for goal details.  Goals not met.  Barriers to achieving goals:   discharge from facility.    Therapy recommendation(s):    Continued therapy is recommended.  Rationale/Recommendations:  Pt discharged to TCU for continued skilled PT to progress gait, transfers, strength and allow return home.

## 2025-06-15 NOTE — PROVIDER NOTIFICATION
MD Notification    Notified Person: MD    Notified Person Name: Xiomara Valentin    Notification Date/Time: 2100 6/14/24    Notification Interaction: vocera    Purpose of Notification: pt requesting to sleep before discharge in morning.    Orders Received: discharge neuro and vital orders

## 2025-06-15 NOTE — DISCHARGE SUMMARY
"[facility]  Hospitalist Discharge Summary      Date of Admission:  6/7/2025  Date of Discharge:  6/15/2025  Discharging Provider: Claude Serrano DO  Discharge Service: Hospitalist Service    Discharge Diagnoses   Mass likely meningioma at L T4/T5 level with severe spinal stenosis and cord compression status post T4-5 laminectomy and resection of meningioma with Dr. Benitez on 6/12/25     Clinically Significant Risk Factors     # Overweight: Estimated body mass index is 25.62 kg/m  as calculated from the following:    Height as of this encounter: 1.778 m (5' 10\").    Weight as of this encounter: 81 kg (178 lb 9.2 oz).       Follow-ups Needed After Discharge   Follow-up Appointments       Follow Up and recommended labs and tests      Follow up with group home physician.  The following labs/tests are recommended: bmp and cbc in 1 week.            Follow-up with Neurosurgery and follow-up appointments are already made    Unresulted Labs Ordered in the Past 30 Days of this Admission       Date and Time Order Name Status Description    6/12/2025  6:11 PM Surgical Pathology Exam In process     6/8/2025  1:04 PM Paraneoplastic, Autoantibody Evaluation Cascade In process     6/8/2025 12:55 PM Oligoclonal banding CSF and Blood In process         These results will be followed up by PCP/neurosurgery    Discharge Disposition   Discharged to rehabilitation facility  Condition at discharge: Stable    Hospital Course   Yosef Murry is a 89 year old male history of CVA, persistent afib, hyperlipidemia, prostate cancer, balance problems, who presents with bilateral lower extremity weakness.  He reports he went out to put air in his tires last evening, knelt down to fill the tires but then was too weak to get up despite having his cane for assistance.  He crawled up a ramp leading to his house door in the garage, was attempting to get upright, but fell backward.  He denied hitting his head.  He was " unable to get himself up and ultimately called EMS early this morning. Found to have mass at T4/T5 on imaging causing cord compression and displacement. Recommended for surgical intervention-planned 6/12      Acute on chronic bilateral lower extremity weakness  Hx CVA 8/2019, also reports CVA in [place]   Hypointense mass at T4/T5 resulting in cord compression/displacement, suspected meningioma  s/p T4-5 laminectomy and resection of meningioma with Dr. Benitez on 6/12/25   New onset bladder incontinence in past 2 months, lower extremity weakness getting worse, denied any chronic spine pain (having mid back pain after lying on ground overnight)  *Head CT: Chronic cerebellar and right frontal lobe infarcts superimposed upon a background of moderate chronic microangiopathy without acute intracranial abnormality  *MRI Thoracic Spine: 1 X 0.7 X 1.2 cm intradural extramedullary, T1 isointense, T2 hypointense mass at the left T4-T5 level with severe spinal stenosis and compresses and displaces the cord.   *MRI Brain: No discrete mass lesion, hemorrhage or focal area suggestive of acute ischemia.  Diffuse age related changes along with focal encephalomalacia anterior right frontal lobe  *MRI cervical spine:  Multilevel cervical spondylosis without high-grade spinal canal stenosis.  Severe bilateral neural foraminal stenosis at C4-5 and C5-6  No abnormal spinal cord signal.  No abnormal contrast enhancement.  *MRI lumbar spine: Mild lumbar spondylosis without high-grade canal stenosis. Moderate left L5-S1 foraminal stenosis.  *   Complete decadron taper  Oxycodone prn  He has declined miralax and hasn't had a bowel movement in a few days, he is encouraged to be proactive here  Can restart eliquis POD 7 (6/19)  To TCU with therapies  Nsg follow-up appointments are already made     Leukocytosis, resolved  Admit WBC 17 on arrival/Afebrile   CXR clear - suspect reactive elevated WBC   covid negative  Urine culture <10k mixed  isela  6/12 WBC normalized at 9/3     Abnormal Thyroid test  TSH slightly up but free T4 wnl  Recheck in 4w     Rhabdomyolysis, resolving  Admission CK 3088 (peak 4089), CK down to 563 on 6/10 after continuous IVF, discontinued 6/10  AST up consistent with rhabdo  >> AST improved 136>>148>>75     A-fib/flutter, permanent  HTN  Continue PTA amlodipine, metoprolol and losartan   ECHO 7/2024: EF 60 to 65%.  Right ventricle borderline dilated.  Right ventricular systolic normal.  No effusion.  Septal thickening is noted.  Concentric remodeling present  Initially continued on eliquis, held for procedure since 6/8  EKG showing A-fib with nonspecific T-wave findings but without cardiorespiratory symptoms  Hold eliquis until OK to resume per NS 7 days postoperative and start dvt ppx 72 hours postoperatively     HLD  Holding statin      Hx prostate cancer s/p prostatectomy 2009, radiation therapy 2014 and androgen deprivation therapy  See Urology clinic note 5/9/25 for details - recent rise in PSA but MRI findings not convincing for any recurrence and PET scan   Focal PSMA activity in the posterior prostatectomy bed involving surgical clips and potentially anterior wall the rectum. No luisa, skeletal or distant metastatic disease.        Consultations This Hospital Stay   CARE MANAGEMENT / SOCIAL WORK IP CONSULT  PHYSICAL THERAPY ADULT IP CONSULT  NEUROLOGY IP CONSULT  WOUND OSTOMY CONTINENCE NURSE  IP CONSULT  NEUROSURGERY IP CONSULT  NEUROSURGERY IP CONSULT  OCCUPATIONAL THERAPY ADULT IP CONSULT  PHYSICAL THERAPY ADULT IP CONSULT  CARE MANAGEMENT / SOCIAL WORK IP CONSULT  PHYSICAL THERAPY ADULT IP CONSULT  OCCUPATIONAL THERAPY ADULT IP CONSULT    Code Status   No CPR- Do NOT Intubate    Time Spent on this Encounter   I, Claude Serrano DO, personally saw the patient today and spent greater than 30 minutes discharging this patient.       Claude Serrano DO  [facility] NEUROSCIENCE UNIT  [address] [place] [address]  Phone: [phone]  ______________________________________________________________________    Physical Exam   Vital Signs: Temp: 97.8  F (36.6  C) Temp src: Oral BP: (!) 165/102 Pulse: 85   Resp: 18 SpO2: 96 % O2 Device: None (Room air)    Weight: 178 lbs 9.16 oz  Constitutional: Awake, alert, cooperative, no apparent distress  Respiratory: Clear to auscultation bilaterally, no crackles or wheezing  Cardiovascular: Regular rate and rhythm, normal S1 and S2, and no murmur noted  GI: Normal bowel sounds, soft, non-distended, non-tender  Skin/Integumen: No rashes, no cyanosis, no edema  Neuro:  Decreased strength 4/5 at hips and quads. Dorsi/plantar flexion 5/5 lower extremities.       Primary Care Physician   Melissa Torres    Discharge Orders      MR Thoracic Spine w/o & w Contrast     Primary Care - Care Coordination Referral      General info for SNF    Length of Stay Estimate: Short Term Care: Estimated # of Days <30  Condition at Discharge: Improving  Level of care:skilled   Rehabilitation Potential: Good  Admission H&P remains valid and up-to-date: Yes  Recent Chemotherapy: N/A  Use Nursing Home Standing Orders: Yes     Mantoux instructions    Give two-step Mantoux (PPD) Per Facility Policy Yes     Follow Up and recommended labs and tests    Follow up with intermediate physician.  The following labs/tests are recommended: bmp and cbc in 1 week.     Reason for your hospital stay    Spinal stenosis status post surgery     Activity - Up with nursing assistance     Physical Therapy Adult Consult    Evaluate and treat as clinically indicated.    Reason:  deconditioining     Occupational Therapy Adult Consult    Evaluate and treat as clinically indicated.    Reason:  deconditioning     Diet    Follow this diet upon discharge: Current Diet:Orders Placed This Encounter      Advance Diet as Tolerated: Regular Diet Adult       Significant Results and Procedures   Most Recent 3  CBC's:  Recent Labs   Lab Test 06/13/25  0742 06/12/25  0800 06/09/25  0752   WBC 13.5* 9.3 11.2*   HGB 14.6 14.7 14.4   MCV 92 91 94    219 179     Most Recent 3 BMP's:  Recent Labs   Lab Test 06/13/25  0742 06/13/25  0741 06/12/25  1205 06/12/25  0800 06/10/25  1054     --   --  141 139   POTASSIUM 4.6  --   --  4.5 4.2   CHLORIDE 106  --   --  106 107   CO2 24  --   --  25 22   BUN 19.5  --   --  21.0 17.7   CR 0.68  --   --  0.65* 0.59*   ANIONGAP 11  --   --  10 10   BELLO 9.0  --   --  8.8 8.5*   * 126* 102* 92 113*     Most Recent 2 LFT's:  Recent Labs   Lab Test 06/12/25  0800 06/09/25  0752 06/08/25  1209   AST 38 75* 148*   ALT  --  30 38   ALKPHOS  --  71 87   BILITOTAL  --  0.7 0.8   ,   Results for orders placed or performed during the hospital encounter of 06/07/25   Head CT w/o contrast    Narrative    EXAM: CT HEAD W/O CONTRAST, CT CERVICAL SPINE W/O CONTRAST  LOCATION: [facility]  DATE/TIME: 6/7/2025 8:42 AM CDT    INDICATION: Headache and neck pain after trauma  COMPARISON: CTA of the head and neck dated 2 2019  TECHNIQUE:   1) Routine CT Head without IV contrast. Multiplanar reformats. Dose reduction techniques were used.  2) Routine CT Cervical Spine without IV contrast. Multiplanar reformats. Dose reduction techniques were used.    FINDINGS:  HEAD CT:   INTRACRANIAL CONTENTS: No acute intracranial hemorrhage. Chronic cerebellar hemisphere and right frontal lobe infarcts without CT evidence of acute infarct. Sequelae of moderate chronic microangiopathy. Mild to moderate cerebral volume loss without   hydrocephalus. No extra-axial fluid collections.  Patent basal cisterns.     VISUALIZED ORBITS/SINUSES/MASTOIDS: The orbits are unremarkable. The visualized paranasal sinuses and temporal bone structures are well-aerated.     BONES/SOFT TISSUES: The calvarium and skull base are unremarkable.  Severe temporomandibular joint degenerative change.    CERVICAL  SPINE CT:  VERTEBRA: The spine is imaged from the skull base through T2. Straightening of the usual cervical lordosis without significant spondylolisthesis. No evidence of acute fracture. No aggressive osseous lesion.      CANAL/FORAMINA: Multilevel degenerative changes without high-grade spinal canal stenosis, however there is moderate right neural foraminal narrowing at C4-C5 and C5-C6    PARASPINAL: No prevertebral or paravertebral soft tissue swelling.  Visualized lungs are clear. The thyroid gland is unremarkable. Mild carotid artery bifurcation calcification.      Impression    IMPRESSION:    HEAD CT:  1. 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    CERVICAL SPINE CT:  1. No acute traumatic injury of the cervical spine.    CT Cervical Spine w/o Contrast    Narrative    EXAM: CT HEAD W/O CONTRAST, CT CERVICAL SPINE W/O CONTRAST  LOCATION: [facility]  DATE/TIME: 6/7/2025 8:42 AM CDT    INDICATION: Headache and neck pain after trauma  COMPARISON: CTA of the head and neck dated 2 2019  TECHNIQUE:   1) Routine CT Head without IV contrast. Multiplanar reformats. Dose reduction techniques were used.  2) Routine CT Cervical Spine without IV contrast. Multiplanar reformats. Dose reduction techniques were used.    FINDINGS:  HEAD CT:   INTRACRANIAL CONTENTS: No acute intracranial hemorrhage. Chronic cerebellar hemisphere and right frontal lobe infarcts without CT evidence of acute infarct. Sequelae of moderate chronic microangiopathy. Mild to moderate cerebral volume loss without   hydrocephalus. No extra-axial fluid collections.  Patent basal cisterns.     VISUALIZED ORBITS/SINUSES/MASTOIDS: The orbits are unremarkable. The visualized paranasal sinuses and temporal bone structures are well-aerated.     BONES/SOFT TISSUES: The calvarium and skull base are unremarkable.  Severe  temporomandibular joint degenerative change.    CERVICAL SPINE CT:  VERTEBRA: The spine is imaged from the skull base through T2. Straightening of the usual cervical lordosis without significant spondylolisthesis. No evidence of acute fracture. No aggressive osseous lesion.      CANAL/FORAMINA: Multilevel degenerative changes without high-grade spinal canal stenosis, however there is moderate right neural foraminal narrowing at C4-C5 and C5-C6    PARASPINAL: No prevertebral or paravertebral soft tissue swelling.  Visualized lungs are clear. The thyroid gland is unremarkable. Mild carotid artery bifurcation calcification.      Impression    IMPRESSION:    HEAD CT:  1. 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    CERVICAL SPINE CT:  1. No acute traumatic injury of the cervical spine.    Chest XR,  PA & LAT    Narrative    EXAM: XR CHEST 2 VIEWS  LOCATION: [facility]  DATE: 6/7/2025    INDICATION: cough  COMPARISON: Chest radiograph 7/29/2019 and CT chest 9/5/2022.      Impression    IMPRESSION: Cardiomegaly. Slightly increased interstitial prominence favored to be due to scarring. No focal consolidation, pleural effusion, or pneumothorax.   XR Knee Bilateral G/E 4 Views    Narrative    EXAM: XR KNEE BILATERAL G/E 4 VIEWS  LOCATION: [facility]  DATE: 6/7/2025    INDICATION: fall, pain  COMPARISON: None.      Impression    IMPRESSION:   No acute displaced fracture or subluxation. Mild tricompartmental knee osteoarthritis bilaterally. Bilateral chondrocalcinosis. No left or right knee joint effusion. Vascular calcifications.   MR Lumbar Spine w/o & w Contrast    Narrative    EXAM: MR LUMBAR SPINE W/O and W CONTRAST  LOCATION: [facility]  DATE: 6/7/2025    INDICATION:  Bilateral lower extremity weakness, recent onset urinary  incontinence, hx prostate cancer   assess for lumbar pathology  COMPARISON: None.  CONTRAST: 9 mL Gadavist  TECHNIQUE: Routine Lumbar Spine MRI without and with IV contrast.    FINDINGS:   Nomenclature is based on 5 lumbar type vertebral bodies with L5-S1 defined on image 52 of series 901. Lumbar levocurvature. Minimal degenerative anterolisthesis of L5 on S1. Vertebral body heights maintained.  There are a few levels of mild marrow edema   along Schmorl's nodes. Otherwise, no suspicious marrow signal abnormality. Normal distal spinal cord and cauda equina with conus medullaris at L1. No abnormal intrathecal enhancement.     Prevertebral soft tissues are unremarkable. Dorsal paraspinal muscular atrophy. Ectasia of the abdominal aorta.    T12-L1: No significant disc herniation. No significant facet arthropathy. No significant canal or foraminal stenosis.     L1-L2: No significant disc herniation. No significant facet arthropathy. No significant canal or foraminal stenosis.    L2-L3: Small right foraminal protrusion. No significant facet arthropathy. No significant canal or foraminal stenosis.     L3-L4: Mild bulge. Mild facet arthropathy. No significant canal or foraminal stenosis.    L4-L5: Mild bulge. Mild facet arthropathy. No significant canal or foraminal stenosis.    L5-S1: Anterolisthesis with uncovering of the disc due to moderate facet arthropathy. No significant canal stenosis. Moderate left foraminal stenosis. No significant right foraminal stenosis.      Impression    IMPRESSION:  1.  Mild lumbar spondylosis without high-grade canal stenosis.  2.  Moderate left L5-S1 foraminal stenosis.   MR Brain w/o Contrast    Narrative    EXAM: MR BRAIN W/O CONTRAST  LOCATION: [facility]  DATE: 6/7/2025    INDICATION: Acute lower extremity weakness, rule out CVA  COMPARISON: 6/7/2025.  TECHNIQUE: MRI brain without contrast.    FINDINGS: On the diffusion-weighted images there is no evidence of  acute ischemia or restricted diffusion. There is no discrete mass lesion or midline shift. There is no acute extra-axial fluid collection or acute intraparenchymal hemorrhage. On the   susceptibility weighted images there are scattered foci of hemosiderin seen within the left cerebral hemisphere and the left cerebellar hemisphere most likely related to microangiopathy. On the FLAIR and T2-weighted images there are scattered foci of   high signal within the periventricular and subcortical white matter consistent with diffuse small vessel ischemic disease. There is focal encephalomalacia involving the anterior right frontal lobe. The ventricular system, basal cisterns and the cortical   sulci are consistent with diffuse volume loss.    There is no evidence of cerebellar tonsillar ectopia. The corpus callosum and the sella region have appropriate configuration and signal intensity for the patient's age. The orbit regions are unremarkable. There is no significant paranasal sinus disease.   The mastoid air cells and the middle ear regions are clear.      Impression    IMPRESSION:  1.  No discrete mass lesion, hemorrhage or focal area suggestive of acute ischemia.  2.  Diffuse age related changes along with focal encephalomalacia anterior right frontal lobe.     MR Thoracic Spine w/o & w Contrast    Narrative    EXAM: MR CERVICAL SPINE W/O and W CONTRAST, MR THORACIC SPINE W/O and W CONTRAST  LOCATION: [facility]  DATE: 6/8/2025    INDICATION: Progressive weakness of all four extremities and neck pain  COMPARISON: None.  CONTRAST: 8mL Gadavist  TECHNIQUE:   1) MRI Cervical Spine without and with IV contrast.  2) MRI Thoracic Spine without and with IV contrast.    FINDINGS:    CERVICAL SPINE:   Normal vertebral body heights. Mild anterolisthesis at C6-7. Modic type II changes at the opposing C6-7 endplates. No abnormal cord signal. No extraspinal abnormality.    Craniovertebral junction and C1-C2:  Normal.    C2-C3: Normal disc height. No herniation. Normal facets. No spinal canal or neural foraminal stenosis.     C3-C4: Mild disc height loss and disc degeneration. Right eccentric disc osteophyte complex and bilateral uncinate spurring. Bilateral facet arthropathy. No spinal canal stenosis. Moderate bilateral neural foraminal stenosis.     C4-C5: Mild disc height loss and disc degeneration. Right eccentric disc osteophyte complex and uncinate spurring. Right greater than left facet arthropathy. Severe bilateral neural foraminal stenosis. No spinal canal stenosis.     C5-C6: Severe disc height loss and disc degeneration. Bilateral uncovertebral spurring and facet arthropathy. Severe bilateral neural foraminal stenosis. No spinal canal stenosis.     C6-C7: Mild to moderate disc height loss and disc degeneration. Bilateral uncinate spurring. Right greater than left facet arthropathy. Moderate right neural foraminal stenosis. No spinal canal or left neural foraminal stenosis.     C7-T1: Normal disc height. No herniation. Normal facets. No spinal canal or neural foraminal stenosis.    THORACIC SPINE:  Chronic appearing anterior wedging vertebral body height loss at T1 and T2. No retropulsion of the vertebral bodies. Exaggerated thoracic kyphosis. Multilevel Schmorl's node-like endplate indentations and mixed Modic type I and type II changes, most   pronounced at T8-9.    There is a 1.0 x 0.7 x 1.2 cm intradural, extramedullary, T1 isointense, T2 hypointense enhancing mass on the left at C4-5 level which results in severe spinal canal stenosis and compresses and displaces the cord. Question subtle T2 hyperintense signal   in the cord at that level.     Multilevel disc height loss, disc degeneration and facet arthropathy. No high-grade spinal canal or neural foraminal stenosis in the remaining thoracic spine.     No extraspinal abnormality.      Impression    IMPRESSION:  CERVICAL SPINE MRI:  1.  Multilevel cervical  spondylosis without high-grade spinal canal stenosis.  2.  Severe bilateral neural foraminal stenosis at C4-5 and C5-6.  3.  No abnormal spinal cord signal.  4.  No abnormal contrast enhancement.    THORACIC SPINE MRI:  1.  1.0 x 0.7 x 1.2 cm intradural, extramedullary, T1 isointense, T2 hypointense enhancing mass on the left at T4-5 level which results in severe spinal canal stenosis and compresses and displaces the cord. Question subtle T2 hyperintense signal in the   cord at that level. This is favored to represent a meningioma.  2.  Multilevel thoracic spondylosis without high-grade spinal canal or neural foraminal stenosis in the remaining thoracic spine.  3.  Chronic appearing mild anterior wedging vertebral body height loss at T1 and T2.     MR Cervical Spine w/o & w Contrast    Narrative    EXAM: MR CERVICAL SPINE W/O and W CONTRAST, MR THORACIC SPINE W/O and W CONTRAST  LOCATION: [facility]  DATE: 6/8/2025    INDICATION: Progressive weakness of all four extremities and neck pain  COMPARISON: None.  CONTRAST: 8mL Gadavist  TECHNIQUE:   1) MRI Cervical Spine without and with IV contrast.  2) MRI Thoracic Spine without and with IV contrast.    FINDINGS:    CERVICAL SPINE:   Normal vertebral body heights. Mild anterolisthesis at C6-7. Modic type II changes at the opposing C6-7 endplates. No abnormal cord signal. No extraspinal abnormality.    Craniovertebral junction and C1-C2: Normal.    C2-C3: Normal disc height. No herniation. Normal facets. No spinal canal or neural foraminal stenosis.     C3-C4: Mild disc height loss and disc degeneration. Right eccentric disc osteophyte complex and bilateral uncinate spurring. Bilateral facet arthropathy. No spinal canal stenosis. Moderate bilateral neural foraminal stenosis.     C4-C5: Mild disc height loss and disc degeneration. Right eccentric disc osteophyte complex and uncinate spurring. Right greater than left facet arthropathy. Severe  bilateral neural foraminal stenosis. No spinal canal stenosis.     C5-C6: Severe disc height loss and disc degeneration. Bilateral uncovertebral spurring and facet arthropathy. Severe bilateral neural foraminal stenosis. No spinal canal stenosis.     C6-C7: Mild to moderate disc height loss and disc degeneration. Bilateral uncinate spurring. Right greater than left facet arthropathy. Moderate right neural foraminal stenosis. No spinal canal or left neural foraminal stenosis.     C7-T1: Normal disc height. No herniation. Normal facets. No spinal canal or neural foraminal stenosis.    THORACIC SPINE:  Chronic appearing anterior wedging vertebral body height loss at T1 and T2. No retropulsion of the vertebral bodies. Exaggerated thoracic kyphosis. Multilevel Schmorl's node-like endplate indentations and mixed Modic type I and type II changes, most   pronounced at T8-9.    There is a 1.0 x 0.7 x 1.2 cm intradural, extramedullary, T1 isointense, T2 hypointense enhancing mass on the left at C4-5 level which results in severe spinal canal stenosis and compresses and displaces the cord. Question subtle T2 hyperintense signal   in the cord at that level.     Multilevel disc height loss, disc degeneration and facet arthropathy. No high-grade spinal canal or neural foraminal stenosis in the remaining thoracic spine.     No extraspinal abnormality.      Impression    IMPRESSION:  CERVICAL SPINE MRI:  1.  Multilevel cervical spondylosis without high-grade spinal canal stenosis.  2.  Severe bilateral neural foraminal stenosis at C4-5 and C5-6.  3.  No abnormal spinal cord signal.  4.  No abnormal contrast enhancement.    THORACIC SPINE MRI:  1.  1.0 x 0.7 x 1.2 cm intradural, extramedullary, T1 isointense, T2 hypointense enhancing mass on the left at T4-5 level which results in severe spinal canal stenosis and compresses and displaces the cord. Question subtle T2 hyperintense signal in the   cord at that level. This is favored to  represent a meningioma.  2.  Multilevel thoracic spondylosis without high-grade spinal canal or neural foraminal stenosis in the remaining thoracic spine.  3.  Chronic appearing mild anterior wedging vertebral body height loss at T1 and T2.     XR Surgery MARYANNE L/T 5 Min Fluoro w Stills    Narrative    This exam was marked as non-reportable because it will not be read by a   radiologist or a [place] non-radiologist provider.             Discharge Medications   Current Discharge Medication List        START taking these medications    Details   acetaminophen (TYLENOL) 325 MG tablet Take 2 tablets (650 mg) by mouth every 4 hours as needed for mild pain or other (and adjunct with moderate or severe pain or per patient request).    Associated Diagnoses: Thoracic stenosis      dexAMETHasone (DECADRON) 1 MG tablet Take 4 tablets (4 mg) by mouth daily for 1 day, THEN 2 tablets (2 mg) daily for 2 days, THEN 1 tablet (1 mg) daily for 2 days.    Associated Diagnoses: Thoracic stenosis      hydrOXYzine HCl (ATARAX) 10 MG tablet Take 1 tablet (10 mg) by mouth every 6 hours as needed for other (adjuvant pain).    Associated Diagnoses: Thoracic stenosis      methocarbamol (ROBAXIN) 750 MG tablet Take 1 tablet (750 mg) by mouth 4 times daily as needed for muscle spasms.    Associated Diagnoses: Thoracic stenosis      ondansetron (ZOFRAN ODT) 4 MG ODT tab Take 1 tablet (4 mg) by mouth every 8 hours as needed for nausea.    Associated Diagnoses: Thoracic stenosis      oxyCODONE (ROXICODONE) 5 MG tablet Take 1 tablet (5 mg) by mouth every 4 hours as needed for moderate pain.  Qty: 10 tablet, Refills: 0    Associated Diagnoses: Thoracic stenosis      polyethylene glycol (MIRALAX) 17 GM/Dose powder Take 17 g by mouth daily.    Associated Diagnoses: Thoracic stenosis           CONTINUE these medications which have NOT CHANGED    Details   amLODIPine (NORVASC) 5 MG tablet Take 1 tablet (5 mg) by mouth daily  Qty: 90 tablet, Refills: 3     Associated Diagnoses: Essential hypertension, benign      atorvastatin (LIPITOR) 10 MG tablet Take 1 tablet (10 mg) by mouth every evening  Qty: 90 tablet, Refills: 3      calcium carbonate (TUMS) 500 MG chewable tablet Take 1 chew tab by mouth nightly as needed       loratadine (CLARITIN) 10 MG tablet Take 1 tablet by mouth daily.  Qty: 30 tablet, Refills: 1      losartan (COZAAR) 50 MG tablet Take 1 tablet (50 mg) by mouth daily  Qty: 90 tablet, Refills: 3    Associated Diagnoses: Permanent atrial fibrillation (H); Essential hypertension, benign      Multiple Vitamins-Minerals (PRESERVISION AREDS 2 PO) Take 1 capsule by mouth 2 times daily AREDS 2      vitamin D3 (CHOLECALCIFEROL) 50 mcg (2000 units) tablet Take 1 tablet by mouth daily      metoprolol succinate ER (TOPROL XL) 25 MG 24 hr tablet Take 1 tablet (25 mg) by mouth daily  Qty: 90 tablet, Refills: 3    Associated Diagnoses: Permanent atrial fibrillation (H)      order for DME Equipment being ordered: Walker Wheels () and Walker ()  Treatment Diagnosis: Impaired gait  Qty: 1 each, Refills: 0    Associated Diagnoses: Generalized weakness           STOP taking these medications       apixaban ANTICOAGULANT (ELIQUIS) 5 MG tablet Comments:   Reason for Stopping:         diphenhydrAMINE-acetaminophen (TYLENOL PM)  MG tablet Comments:   Reason for Stopping:         leuprolide acetate (LUPRON) 1 MG/0.2ML kit Comments:   Reason for Stopping:         sildenafil (VIAGRA) 100 MG tablet Comments:   Reason for Stopping:             Allergies   Allergies   Allergen Reactions    Other [Seasonal Allergies]

## 2025-06-15 NOTE — PLAN OF CARE
Reason for Admission: POD 3 T4-T5 laminectomy and mass resection.    Cognitive/Mentation: A/Ox 4  Neuros/CMS: Intact ex BLE weakenss, 4/5, trace BLE edema.   VS: VSS on RA.  /GI: Continent. Pt requests ex cath for nighttime.   Pulmonary: LS diminished.  Pain: PRN oxy given at bedtime for discomfort.  Drains/Lines: 2 R PIVs SL.  Skin: wound on coccyx, bilateral knee wounds mepis in place, pale/dry. Incision CDI, dressing changed today.   Activity: Assist x 1 with GBW.  Diet: reg with thin liquids. Takes pills whole.   Therapies recs: TCU  Discharge: [facility] @11am transfer through [facility]  Aggression Stoplight Tool: edgar Cage RN on 6/15/2025 at 6:01 AM

## 2025-06-15 NOTE — PLAN OF CARE
"Occupational Therapy Discharge Summary    Reason for therapy discharge:    Discharged to transitional care facility.    Progress towards therapy goal(s). See goals on Care Plan in [facility] electronic health record for goal details.  Goals partially met.  Barriers to achieving goals:   discharge from facility.    Therapy recommendation(s):    Pt functioning below baseline at this time, pt very motivated to return home whoever presenting w/ deficits in balance, strength, activity tolerance and ADL IND. Pt would benefit from TCU prior to returning to home. Family strongly wants pt to d/c to TCU however if makes progress may progress to home; Currently, If home would require someone to stay with him for the first few days and HH OT/PT.    **Writing therapist did not work directly with this patient on this date. Discharge note written based on previous treating OT's discharge recommendation. Please see the \"OT Discharge Planning\" portion (as well as the \"OT Cognitive Screens/Assessments\" portion if applicable) of the patient's Discharge Summary Page for further information and details regarding discharge planning/Occupational Therapy's recommendations.**               "

## 2025-06-15 NOTE — PROGRESS NOTES
Care Management Discharge Note    Discharge Date: 06/15/2025       Discharge Disposition: Transitional Care, Skilled Nursing Facility    Discharge Services: None    Discharge DME: None    Discharge Transportation: family or friend will provide    Private pay costs discussed: Not applicable    Does the patient's insurance plan have a 3 day qualifying hospital stay waiver?  No    PAS Confirmation Code: [id number]  Patient/family educated on Medicare website which has current facility and service quality ratings: yes    Education Provided on the Discharge Plan: Yes  Persons Notified of Discharge Plans: Yes  Patient/Family in Agreement with the Plan: yes    Handoff Referral Completed: No, handoff not indicated or clinically appropriate    Additional Information:  Yosef is Discharging to TCU:   [facility]  [address] [place] [address]  Ph: [phone]  Fax: [phone]    Family is transporting via Wheelchair through [facility]  between 11-12 today 6/15/2025.     Linda Rivero, BSW

## 2025-06-15 NOTE — PROGRESS NOTES
Reason for Admission: POD 3 T4-T5 laminectomy and mass resection.    Cognitive/Mentation: A/Ox 4  Neuros/CMS: Intact ex BLE weakenss, 4/5, trace BLE edema.   VS: VSS on RA.  /GI: Continent. Pt requests ex cath for nighttime.   Pulmonary: LS diminished.  Pain: denies prns, pain at about a 1 from scale to 1-10.    Skin: wound on coccyx, bilateral knee wounds mepis in place, pale/dry. Incision CDI.  Activity: Assist x 1 with GBW.  Diet: reg with thin liquids. Takes pills whole.     Therapies recs: TCU  Discharge: 5/15/2025 to [place].     Aggression Stoplight Tool: edgar Del Roi RN on 6/15/2025 at 10:42 AM

## 2025-06-16 ENCOUNTER — DOCUMENTATION ONLY (OUTPATIENT)
Dept: GERIATRICS | Facility: CLINIC | Age: 89
End: 2025-06-16
Payer: MEDICARE

## 2025-06-16 ENCOUNTER — PATIENT OUTREACH (OUTPATIENT)
Dept: CARE COORDINATION | Facility: CLINIC | Age: 89
End: 2025-06-16

## 2025-06-16 ENCOUNTER — TRANSITIONAL CARE UNIT VISIT (OUTPATIENT)
Dept: GERIATRICS | Facility: CLINIC | Age: 89
End: 2025-06-16
Payer: MEDICARE

## 2025-06-16 VITALS
HEIGHT: 70 IN | OXYGEN SATURATION: 92 % | TEMPERATURE: 97.6 F | DIASTOLIC BLOOD PRESSURE: 86 MMHG | SYSTOLIC BLOOD PRESSURE: 166 MMHG | HEART RATE: 65 BPM | BODY MASS INDEX: 25.22 KG/M2 | WEIGHT: 176.2 LBS | RESPIRATION RATE: 18 BRPM

## 2025-06-16 DIAGNOSIS — R79.89 ELEVATED TSH: ICD-10-CM

## 2025-06-16 DIAGNOSIS — K59.01 SLOW TRANSIT CONSTIPATION: ICD-10-CM

## 2025-06-16 DIAGNOSIS — M48.04 SPINAL STENOSIS OF THORACIC REGION: Primary | ICD-10-CM

## 2025-06-16 DIAGNOSIS — E78.5 DYSLIPIDEMIA: ICD-10-CM

## 2025-06-16 DIAGNOSIS — R53.81 PHYSICAL DECONDITIONING: ICD-10-CM

## 2025-06-16 DIAGNOSIS — I48.19 PERSISTENT ATRIAL FIBRILLATION (H): ICD-10-CM

## 2025-06-16 DIAGNOSIS — Z71.89 ADVANCED DIRECTIVES, COUNSELING/DISCUSSION: ICD-10-CM

## 2025-06-16 DIAGNOSIS — I50.810 RVF (RIGHT VENTRICULAR FAILURE) (H): ICD-10-CM

## 2025-06-16 DIAGNOSIS — D32.9 MENINGIOMA (H): ICD-10-CM

## 2025-06-16 DIAGNOSIS — I11.0 HYPERTENSIVE HEART DISEASE WITH HEART FAILURE (H): ICD-10-CM

## 2025-06-16 DIAGNOSIS — D72.829 LEUKOCYTOSIS, UNSPECIFIED TYPE: ICD-10-CM

## 2025-06-16 DIAGNOSIS — Z98.890 S/P LAMINECTOMY: ICD-10-CM

## 2025-06-16 LAB
PATH REPORT.COMMENTS IMP SPEC: ABNORMAL
PATH REPORT.COMMENTS IMP SPEC: YES
PATH REPORT.FINAL DX SPEC: ABNORMAL
PATH REPORT.GROSS SPEC: ABNORMAL
PATH REPORT.MICROSCOPIC SPEC OTHER STN: ABNORMAL
PATH REPORT.RELEVANT HX SPEC: ABNORMAL
PHOTO IMAGE: ABNORMAL

## 2025-06-16 PROCEDURE — P9604 ONE-WAY ALLOW PRORATED TRIP: HCPCS | Performed by: NURSE PRACTITIONER

## 2025-06-16 PROCEDURE — 36415 COLL VENOUS BLD VENIPUNCTURE: CPT | Performed by: NURSE PRACTITIONER

## 2025-06-16 PROCEDURE — 86481 TB AG RESPONSE T-CELL SUSP: CPT | Performed by: NURSE PRACTITIONER

## 2025-06-16 PROCEDURE — 99309 SBSQ NF CARE MODERATE MDM 30: CPT | Performed by: NURSE PRACTITIONER

## 2025-06-16 NOTE — PROGRESS NOTES
[facility] GERIATRICS    PRIMARY CARE PROVIDER AND CLINIC:  Melissa Torres PA-C, [address] [place] [address]  / [place] [address]  Chief Complaint   Patient presents with    Hospital F/U      [facility] Medical Record Number:  [id number]  Place of Service where encounter took place:  [facility] (TCU) [[id number]]    Yosef Murry  is a 89 year old  (1935), admitted to the above facility from  [facility]. Hospital stay 6/7/25 through 6/15/25.  HPI:    89 year old male PMHx CVA, persistent afib, hyperlipidemia, prostate cancer, balance problems hospitalized with bilateral lower extremity weakness, knelt down and was not able to get up, fell while trying to get up. Imaging showed chronic cerebellar and right frontal lobe infarcts, hypointense mass at left T4-T5 level with severe spinal stenosis and displacement of cord. He underwent T4-5 laminectomy and resection of meningioma with Dr. Benitez on 6/12/25 . Restart Eliquis 6/19. WBC 17 on admission, resolved. Elevated TSH: recheck 4 weeks. Rhabdomyolysis improved. A fib, HTN: on amlodipine, metoprolol and losartan. EF 60-65% on 7/2024. To TCU for rehab.     Patient is seen for initial TCU visit, history obtained from patient, staff and extensive review of the chart.  Asks to be DNR/DNI. Pain minimal, controlled adequately. Denies dizziness, chest pain, dyspnea, bladder issues. Reports constipated, no stool in over 3 days. BP elevated, range 148-182/ and sats 94% room air. Walked 25 ft with FWW today.      CODE STATUS/ADVANCE DIRECTIVES DISCUSSION:  No CPR- Do NOT Intubate  DNR/DNI  ALLERGIES:   Allergies   Allergen Reactions    Other [Seasonal Allergies]       PAST MEDICAL HISTORY:   Past Medical History:   Diagnosis Date    Anal fistula     Antiplatelet or antithrombotic long-term use     Arrhythmia     Atrial flutter (H) 2012    Cerebral infarction (H)     TIA    Heartburn     Hypertension     Inguinal hernia, left      Persistent atrial fibrillation (H) 8/21/12    Prostate cancer (H)     TIA (transient ischaemic attack)     2014      PAST SURGICAL HISTORY:   has a past surgical history that includes orthopedic surgery; colonoscopy; Exam under anesthesia, fistulotomy rectum, combined (N/A, 6/18/2015); ENT surgery; Abdomen surgery (1999); Herniorrhaphy inguinal (7/25/2013); Herniorrhaphy inguinal (Right, 6/13/2019); GI surgery; Colonoscopy (N/A, 9/24/2021); and Laminectomy, excise tumor thoracic, combined (N/A, 6/12/2025).  FAMILY HISTORY: family history includes Osteoporosis in his father; Ovarian Cancer in his mother; Unknown/Adopted in his sister.  SOCIAL HISTORY:   reports that he quit smoking about 56 years ago. His smoking use included cigarettes. He started smoking about 72 years ago. He has never used smokeless tobacco. He reports current alcohol use of about 0.8 standard drinks of alcohol per week. He reports that he does not use drugs.  Patient's living condition: lives alone    Post Discharge Medication Reconciliation Status:   MED REC REQUIRED  Post Medication Reconciliation Status: discharge medications reconciled and changed, per note/orders       Current Outpatient Medications   Medication Sig Dispense Refill    acetaminophen (TYLENOL) 325 MG tablet Take 2 tablets (650 mg) by mouth every 4 hours as needed for mild pain or other (and adjunct with moderate or severe pain or per patient request).      amLODIPine (NORVASC) 5 MG tablet Take 1 tablet (5 mg) by mouth daily (Patient taking differently: Take 10 mg by mouth daily.) 90 tablet 3    atorvastatin (LIPITOR) 10 MG tablet Take 1 tablet (10 mg) by mouth every evening 90 tablet 3    calcium carbonate (TUMS) 500 MG chewable tablet Take 1 chew tab by mouth nightly as needed       dexAMETHasone (DECADRON) 1 MG tablet Take 4 tablets (4 mg) by mouth daily for 1 day, THEN 2 tablets (2 mg) daily for 2 days, THEN 1 tablet (1 mg) daily for 2 days.      hydrOXYzine HCl (ATARAX)  "10 MG tablet Take 1 tablet (10 mg) by mouth every 6 hours as needed for other (adjuvant pain).      loratadine (CLARITIN) 10 MG tablet Take 1 tablet by mouth daily. 30 tablet 1    losartan (COZAAR) 50 MG tablet Take 1 tablet (50 mg) by mouth daily 90 tablet 3    methocarbamol (ROBAXIN) 750 MG tablet Take 1 tablet (750 mg) by mouth 4 times daily as needed for muscle spasms.      metoprolol succinate ER (TOPROL XL) 25 MG 24 hr tablet Take 1 tablet (25 mg) by mouth daily 90 tablet 3    Multiple Vitamins-Minerals (PRESERVISION AREDS 2 PO) Take 1 capsule by mouth 2 times daily AREDS 2      ondansetron (ZOFRAN ODT) 4 MG ODT tab Take 1 tablet (4 mg) by mouth every 8 hours as needed for nausea.      oxyCODONE (ROXICODONE) 5 MG tablet Take 1 tablet (5 mg) by mouth every 4 hours as needed for moderate pain. 10 tablet 0    polyethylene glycol (MIRALAX) 17 GM/Dose powder Take 17 g by mouth daily.      vitamin D3 (CHOLECALCIFEROL) 50 mcg (2000 units) tablet Take 1 tablet by mouth daily      order for DME Equipment being ordered: Walker Wheels () and Walker ()  Treatment Diagnosis: Impaired gait 1 each 0     No current facility-administered medications for this visit.       ROS:  10 point ROS of systems including Constitutional, Eyes, Respiratory, Cardiovascular, Gastroenterology, Genitourinary, Integumentary, Musculoskeletal, Psychiatric were all negative except for pertinent positives noted in my HPI.    Vitals:  BP (!) 166/86   Pulse 65   Temp 97.6  F (36.4  C)   Resp 18   Ht 1.778 m (5' 10\")   Wt 79.9 kg (176 lb 3.2 oz)   SpO2 92%   BMI 25.28 kg/m    Exam:  GENERAL APPEARANCE:  Alert, in no distress, pleasant, cooperative, oriented x 4  EYES:  EOM, lids, pupils and irises normal, sclera clear and conjunctiva normal, no discharge or mattering on lids or lashes noted  ENT:  Mouth normal, moist mucous membranes, nose normal without drainage or crusting, external ears without lesions, hearing acuity intact  NECK: " supple, symmetrical, trachea midline  RESP:  respiratory effort normal, no chest wall tenderness, no respiratory distress, Lung sounds clear, patient is on room air  CV:  Auscultation of heart done, rate and rhythm controlled and irregularly irregular, no murmur, no rub or gallop. Edema +1 pitting bilateral lower extremities  ABDOMEN:  normal bowel sounds, soft, nontender, no palpable masses.  M/S:   Gait and station walks with assist , no tenderness or swelling of the joints; able to move all extremities, digits normal  SKIN:  Inspection and palpation of skin and subcutaneous tissue: skin on extremities warm, dry and intact without rashes  NEURO: cranial nerves 2-12 grossly intact, no facial asymmetry, no speech deficits and able to follow directions, moves all extremities symmetrically  PSYCH:  insight and judgement and memory intact, affect and mood normal     Lab/Diagnostic data:  Most Recent 3 CBC's:  Recent Labs   Lab Test 06/18/25  0529 06/13/25  0742 06/12/25  0800   WBC 10.9 13.5* 9.3   HGB 14.4 14.6 14.7   MCV 96 92 91    216 219     Most Recent 3 BMP's:  Recent Labs   Lab Test 06/13/25  0742 06/13/25  0741 06/12/25  1205 06/12/25  0800 06/10/25  1054     --   --  141 139   POTASSIUM 4.6  --   --  4.5 4.2   CHLORIDE 106  --   --  106 107   CO2 24  --   --  25 22   BUN 19.5  --   --  21.0 17.7   CR 0.68  --   --  0.65* 0.59*   ANIONGAP 11  --   --  10 10   BELLO 9.0  --   --  8.8 8.5*   * 126* 102* 92 113*     Most Recent 2 LFT's:  Recent Labs   Lab Test 06/12/25  0800 06/09/25  0752 06/08/25  1209   AST 38 75* 148*   ALT  --  30 38   ALKPHOS  --  71 87   BILITOTAL  --  0.7 0.8     Most Recent TSH and T4:  Recent Labs   Lab Test 06/07/25  0722   TSH 4.36*   T4 1.03     Most Recent Hemoglobin A1c:  Recent Labs   Lab Test 07/31/19  2106   A1C 5.6       ASSESSMENT/PLAN:    Spinal stenosis of thoracic region  Meningioma (H)  S/P laminectomy  Physical deconditioning  Acute issue, s/p surgery.  Continue tylenol 650 mg every 4 hrs PRN, complete decadron taper. Continue atarax 10 mg QID PRN, robaxin 750 mg QID PRN, oxycodone 5 mg every 4 hrs PRN. Therapies eval and treat. Follow-up surgeon as planned.     Leukocytosis, unspecified type  Note WBC 13.5  on 6/13. Patient completing decadron course. Check CBC on 6/18.     Elevated TSH  Noted at hospital. Repeat thyroid function test in 4 weeks.    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 Due to HTN increase Norvasc to 10 mg daily. Resume Eliquis 5 mg BID on 6/19. Continue losartan 50 mg daily, Toprol XL 25 mg daily. Monitor vs, review ranges next visit.     Dyslipidemia  Continue PTA Lipitor 10 mg daily.     Slow transit constipation  Acute. Due to constipation give MOM now and repeat in AM if no stool. Continue Miralax 17 gm daily, monitor bowel function.    Advanced directives, counseling/discussion  Asks to be DNR/DNI    Orders:  DNR/DNI  Increase Norvasc to 10 mg PO daily diagnosis HTN  MOM 30 ml PO now, repeat in AM if no stool  CBC on 6/18 diagnosis leukocytosis  Resume Eliquis 5 mg PO BID on 6/19 per surgeon's orders    Electronically signed by:  JOHNSON Bradley CNP

## 2025-06-16 NOTE — PROGRESS NOTES
Clinic Care Coordination Contact  Care Coordination Transition Communication    Clinical Data: Patient was hospitalized at [facility] from 6/7/25 to 6/15/25 with diagnosis of:   Acute on chronic bilateral lower extremity weakness  Hx CVA 8/2019, also reports CVA in [place]   Hypointense mass at T4/T5 resulting in cord compression/displacement, suspected meningioma  s/p T4-5 laminectomy and resection of meningioma with Dr. Benitez on 6/12/25     Assessment: Patient has transitioned to TCU/ARU for short term rehabilitation:    [facility]: [facility] ([facility]) 6/15/25  Transition Communication:  Notified facility of Primary Care- Care Coordination support via TCU hand-in e-mail.    Plan: Care Coordinator will await notification from facility staff informing of patient's discharge plans/needs. Care Coordinator will review chart and outreach to facility staff every 4 weeks and as needed.     Danita Cole RN Clinic Care Coordinator  [facility] Clin[facility]: [place], [place] (on-site Wednesdays), [facility] (on-site Thursdays) & [facility].  [email or web address]  Phone: [phone]

## 2025-06-17 ENCOUNTER — DOCUMENTATION ONLY (OUTPATIENT)
Dept: OTHER | Facility: CLINIC | Age: 89
End: 2025-06-17
Payer: MEDICARE

## 2025-06-17 ENCOUNTER — LAB REQUISITION (OUTPATIENT)
Dept: LAB | Facility: CLINIC | Age: 89
End: 2025-06-17

## 2025-06-17 DIAGNOSIS — Z00.01 ENCOUNTER FOR GENERAL ADULT MEDICAL EXAMINATION WITH ABNORMAL FINDINGS: ICD-10-CM

## 2025-06-17 LAB
AMPHIPHYSIN IGG SER QL IA: NEGATIVE
ANNOTATION COMMENT IMP: NORMAL
CV2 AB SERPL QL IF: NEGATIVE
GAMMA INTERFERON BACKGROUND BLD IA-ACNC: 0.02 IU/ML
GLIAL NUC TYPE 1 AB SER QL IF: NEGATIVE
HU1 AB SER QL: NEGATIVE
HU2 AB SER QL IF: NEGATIVE
HU3 AB SER QL: NEGATIVE
M TB IFN-G BLD-IMP: NEGATIVE
M TB IFN-G CD4+ BCKGRND COR BLD-ACNC: 9.98 IU/ML
MITOGEN IGNF BCKGRD COR BLD-ACNC: 0 IU/ML
MITOGEN IGNF BCKGRD COR BLD-ACNC: 0.01 IU/ML
PARANEOPLASTIC AB SER-IMP: NORMAL
PCA-1 AB SER QL IF: NEGATIVE
PCA-2 AB SER QL IF: NEGATIVE
PCA-TR AB SER QL IF: NEGATIVE
QUANTIFERON MITOGEN: 10 IU/ML
QUANTIFERON NIL TUBE: 0.02 IU/ML
QUANTIFERON TB1 TUBE: 0.03 IU/ML
QUANTIFERON TB2 TUBE: 0.02

## 2025-06-18 PROBLEM — I11.0 HYPERTENSIVE HEART DISEASE WITH HEART FAILURE (H): Status: ACTIVE | Noted: 2025-06-18

## 2025-06-18 LAB
ERYTHROCYTE [DISTWIDTH] IN BLOOD BY AUTOMATED COUNT: 15 % (ref 10–15)
HCT VFR BLD AUTO: 45.9 % (ref 40–53)
HGB BLD-MCNC: 14.4 G/DL (ref 13.3–17.7)
MCH RBC QN AUTO: 30.1 PG (ref 26.5–33)
MCHC RBC AUTO-ENTMCNC: 31.4 G/DL (ref 31.5–36.5)
MCV RBC AUTO: 96 FL (ref 78–100)
PLATELET # BLD AUTO: 182 10E3/UL (ref 150–450)
RBC # BLD AUTO: 4.79 10E6/UL (ref 4.4–5.9)
WBC # BLD AUTO: 10.9 10E3/UL (ref 4–11)

## 2025-06-18 PROCEDURE — 85027 COMPLETE CBC AUTOMATED: CPT | Performed by: NURSE PRACTITIONER

## 2025-06-19 ENCOUNTER — TRANSITIONAL CARE UNIT VISIT (OUTPATIENT)
Dept: GERIATRICS | Facility: CLINIC | Age: 89
End: 2025-06-19
Payer: MEDICARE

## 2025-06-19 VITALS
TEMPERATURE: 97.3 F | HEART RATE: 63 BPM | WEIGHT: 176.6 LBS | DIASTOLIC BLOOD PRESSURE: 78 MMHG | OXYGEN SATURATION: 94 % | SYSTOLIC BLOOD PRESSURE: 120 MMHG | RESPIRATION RATE: 16 BRPM | BODY MASS INDEX: 25.34 KG/M2

## 2025-06-19 DIAGNOSIS — M48.04 SPINAL STENOSIS OF THORACIC REGION: Primary | ICD-10-CM

## 2025-06-19 DIAGNOSIS — R79.89 ELEVATED TSH: ICD-10-CM

## 2025-06-19 DIAGNOSIS — Z98.890 S/P LAMINECTOMY: ICD-10-CM

## 2025-06-19 DIAGNOSIS — I48.19 PERSISTENT ATRIAL FIBRILLATION (H): ICD-10-CM

## 2025-06-19 DIAGNOSIS — K59.01 SLOW TRANSIT CONSTIPATION: ICD-10-CM

## 2025-06-19 DIAGNOSIS — M48.04 THORACIC STENOSIS: ICD-10-CM

## 2025-06-19 DIAGNOSIS — D72.829 LEUKOCYTOSIS, UNSPECIFIED TYPE: ICD-10-CM

## 2025-06-19 DIAGNOSIS — I50.810 RVF (RIGHT VENTRICULAR FAILURE) (H): ICD-10-CM

## 2025-06-19 DIAGNOSIS — I11.0 HYPERTENSIVE HEART DISEASE WITH HEART FAILURE (H): ICD-10-CM

## 2025-06-19 DIAGNOSIS — D32.9 MENINGIOMA (H): ICD-10-CM

## 2025-06-19 DIAGNOSIS — E78.5 DYSLIPIDEMIA: ICD-10-CM

## 2025-06-19 DIAGNOSIS — R53.81 PHYSICAL DECONDITIONING: ICD-10-CM

## 2025-06-19 PROCEDURE — 99309 SBSQ NF CARE MODERATE MDM 30: CPT | Performed by: NURSE PRACTITIONER

## 2025-06-19 RX ORDER — OXYCODONE HYDROCHLORIDE 5 MG/1
5 TABLET ORAL 2 TIMES DAILY
Qty: 30 TABLET | Refills: 0 | Status: SHIPPED | OUTPATIENT
Start: 2025-06-19

## 2025-06-19 NOTE — PROGRESS NOTES
[facility] GERIATRICS    Chief Complaint   Patient presents with    RECHECK     HPI:  Yosef Murry is a 89 year old  (1935), who is being seen today for an episodic care visit at: [facility] (TC) [[id number]]. Today's concern is: ***    Allergies, and PMH/PSH reviewed in [facility] today.  REVIEW OF SYSTEMS:  {uxmdxa40:883873}    Objective:   /78   Pulse 63   Temp 97.3  F (36.3  C)   Resp 16   Wt 80.1 kg (176 lb 9.6 oz)   SpO2 94%   BMI 25.34 kg/m    {Nursing home physical exam :138990}    Most Recent 3 CBC's:  Recent Labs   Lab Test 06/18/25  0529 06/13/25  0742 06/12/25  0800   WBC 10.9 13.5* 9.3   HGB 14.4 14.6 14.7   MCV 96 92 91    216 219     Most Recent 3 BMP's:  Recent Labs   Lab Test 06/13/25  0742 06/13/25  0741 06/12/25  1205 06/12/25  0800 06/10/25  1054     --   --  141 139   POTASSIUM 4.6  --   --  4.5 4.2   CHLORIDE 106  --   --  106 107   CO2 24  --   --  25 22   BUN 19.5  --   --  21.0 17.7   CR 0.68  --   --  0.65* 0.59*   ANIONGAP 11  --   --  10 10   BELLO 9.0  --   --  8.8 8.5*   * 126* 102* 92 113*       Assessment/Plan:    Spinal stenosis of thoracic region  Meningioma (H)  S/P laminectomy  Physical deconditioning  Acute issue, s/p surgery. Continue tylenol 650 mg every 4 hrs PRN, complete decadron taper. Continue atarax 10 mg QID PRN, robaxin 750 mg QID PRN, oxycodone 5 mg every 4 hrs PRN. Therapies eval and treat. Follow-up surgeon as planned.     Leukocytosis, unspecified type  Note WBC 13.5  on 6/13. Patient completing decadron course. Check CBC on 6/18.     Elevated TSH  Noted at hospital. Repeat thyroid function test in 4 weeks.    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 Due to HTN increase Norvasc to 10 mg daily. Resume Eliquis 5 mg BID on 6/19. Continue losartan 50 mg daily, Toprol XL 25 mg daily. Monitor vs, review ranges next visit.      Dyslipidemia  Continue PTA Lipitor 10 mg daily.     Slow transit constipation  Acute. Due to constipation give MOM now and repeat in AM if no stool. Continue Miralax 17 gm daily, monitor bowel function.    MED REC REQUIRED{TIP  Click the link below to document or use med rec list, use list to pull in response :408286}  Post Medication Reconciliation Status: {MED REC LIST:167949}      Orders:  MOM 30 ml PO now and daily PRN constipation  Vaseline to rectum QID PRN pain, keep at bedside and self administer  Change oxycodone to 5 mg BID and QID PO PRN pain    Electronically signed by: Dalila Lockwood, JOHNSON CNP ***       daily, Toprol XL 25 mg daily. Monitor vs, review ranges next visit.     Dyslipidemia  Continue PTA Lipitor 10 mg daily.     Slow transit constipation  Acute. Due to constipation give MOM now and repeat in AM if no stool. Continue Miralax 17 gm daily, monitor bowel function.    MED REC REQUIRED  Post Medication Reconciliation Status: discharge medications reconciled and changed, per note/orders    Orders:  MOM 30 ml PO now and daily PRN constipation  Vaseline to rectum QID PRN pain, keep at bedside and self administer  Change oxycodone to 5 mg BID and QID PO PRN pain    Electronically signed by: JOHNSON Bradley CNP

## 2025-06-24 ENCOUNTER — HOSPITAL ENCOUNTER (INPATIENT)
Facility: CLINIC | Age: 89
DRG: 092 | End: 2025-06-24
Attending: EMERGENCY MEDICINE | Admitting: EMERGENCY MEDICINE
Payer: MEDICARE

## 2025-06-24 ENCOUNTER — APPOINTMENT (OUTPATIENT)
Dept: ULTRASOUND IMAGING | Facility: CLINIC | Age: 89
DRG: 092 | End: 2025-06-24
Attending: HOSPITALIST
Payer: MEDICARE

## 2025-06-24 ENCOUNTER — APPOINTMENT (OUTPATIENT)
Dept: MRI IMAGING | Facility: CLINIC | Age: 89
DRG: 092 | End: 2025-06-24
Attending: PHYSICIAN ASSISTANT
Payer: MEDICARE

## 2025-06-24 ENCOUNTER — APPOINTMENT (OUTPATIENT)
Dept: CT IMAGING | Facility: CLINIC | Age: 89
DRG: 092 | End: 2025-06-24
Attending: EMERGENCY MEDICINE
Payer: MEDICARE

## 2025-06-24 ENCOUNTER — MEDICAL CORRESPONDENCE (OUTPATIENT)
Dept: HEALTH INFORMATION MANAGEMENT | Facility: CLINIC | Age: 89
End: 2025-06-24

## 2025-06-24 DIAGNOSIS — B99.9 CONFUSION ASSOCIATED WITH INFECTION: ICD-10-CM

## 2025-06-24 DIAGNOSIS — R41.0 CONFUSION ASSOCIATED WITH INFECTION: ICD-10-CM

## 2025-06-24 DIAGNOSIS — M48.04 THORACIC STENOSIS: ICD-10-CM

## 2025-06-24 DIAGNOSIS — Z71.89 OTHER SPECIFIED COUNSELING: Chronic | ICD-10-CM

## 2025-06-24 DIAGNOSIS — K80.20 CALCULUS OF GALLBLADDER WITHOUT CHOLECYSTITIS WITHOUT OBSTRUCTION: ICD-10-CM

## 2025-06-24 PROBLEM — N10 PYELONEPHRITIS, ACUTE: Status: ACTIVE | Noted: 2025-06-24

## 2025-06-24 LAB
ALBUMIN SERPL BCG-MCNC: 3 G/DL (ref 3.5–5.2)
ALBUMIN UR-MCNC: 20 MG/DL
ALP SERPL-CCNC: 86 U/L (ref 40–150)
ALT SERPL W P-5'-P-CCNC: 10 U/L (ref 0–70)
AMMONIA PLAS-SCNC: <10 UMOL/L (ref 16–60)
ANION GAP SERPL CALCULATED.3IONS-SCNC: 10 MMOL/L (ref 7–15)
APPEARANCE UR: ABNORMAL
AST SERPL W P-5'-P-CCNC: 16 U/L (ref 0–45)
BASOPHILS # BLD AUTO: 0 10E3/UL (ref 0–0.2)
BASOPHILS NFR BLD AUTO: 0 %
BILIRUB SERPL-MCNC: 1 MG/DL
BILIRUB UR QL STRIP: NEGATIVE
BUN SERPL-MCNC: 19.9 MG/DL (ref 8–23)
CALCIUM SERPL-MCNC: 9 MG/DL (ref 8.8–10.4)
CHLORIDE SERPL-SCNC: 97 MMOL/L (ref 98–107)
COLOR UR AUTO: ABNORMAL
CREAT SERPL-MCNC: 0.84 MG/DL (ref 0.67–1.17)
CRP SERPL-MCNC: 225.29 MG/L
EGFRCR SERPLBLD CKD-EPI 2021: 84 ML/MIN/1.73M2
EOSINOPHIL # BLD AUTO: 0 10E3/UL (ref 0–0.7)
EOSINOPHIL NFR BLD AUTO: 0 %
ERYTHROCYTE [DISTWIDTH] IN BLOOD BY AUTOMATED COUNT: 15 % (ref 10–15)
FLUAV RNA SPEC QL NAA+PROBE: NEGATIVE
FLUBV RNA RESP QL NAA+PROBE: NEGATIVE
GLUCOSE SERPL-MCNC: 101 MG/DL (ref 70–99)
GLUCOSE UR STRIP-MCNC: NEGATIVE MG/DL
HCO3 SERPL-SCNC: 28 MMOL/L (ref 22–29)
HCT VFR BLD AUTO: 43.9 % (ref 40–53)
HGB BLD-MCNC: 14.6 G/DL (ref 13.3–17.7)
HGB UR QL STRIP: ABNORMAL
HOLD SPECIMEN: NORMAL
IMM GRANULOCYTES # BLD: 0.1 10E3/UL
IMM GRANULOCYTES NFR BLD: 1 %
KETONES UR STRIP-MCNC: NEGATIVE MG/DL
LACTATE SERPL-SCNC: 1.7 MMOL/L (ref 0.7–2)
LEUKOCYTE ESTERASE UR QL STRIP: NEGATIVE
LIPASE SERPL-CCNC: 22 U/L (ref 13–60)
LYMPHOCYTES # BLD AUTO: 0.8 10E3/UL (ref 0.8–5.3)
LYMPHOCYTES NFR BLD AUTO: 6 %
MCH RBC QN AUTO: 30.9 PG (ref 26.5–33)
MCHC RBC AUTO-ENTMCNC: 33.3 G/DL (ref 31.5–36.5)
MCV RBC AUTO: 93 FL (ref 78–100)
MONOCYTES # BLD AUTO: 1.1 10E3/UL (ref 0–1.3)
MONOCYTES NFR BLD AUTO: 8 %
NEUTROPHILS # BLD AUTO: 12.3 10E3/UL (ref 1.6–8.3)
NEUTROPHILS NFR BLD AUTO: 86 %
NITRATE UR QL: NEGATIVE
NRBC # BLD AUTO: 0 10E3/UL
NRBC BLD AUTO-RTO: 0 /100
PH UR STRIP: 6.5 [PH] (ref 5–7)
PLATELET # BLD AUTO: 344 10E3/UL (ref 150–450)
POTASSIUM SERPL-SCNC: 5 MMOL/L (ref 3.4–5.3)
PROCALCITONIN SERPL IA-MCNC: 0.14 NG/ML
PROT SERPL-MCNC: 6.5 G/DL (ref 6.4–8.3)
RBC # BLD AUTO: 4.73 10E6/UL (ref 4.4–5.9)
RBC URINE: >182 /HPF
RSV RNA SPEC NAA+PROBE: NEGATIVE
SARS-COV-2 RNA RESP QL NAA+PROBE: NEGATIVE
SODIUM SERPL-SCNC: 135 MMOL/L (ref 135–145)
SP GR UR STRIP: 1.02 (ref 1–1.03)
UROBILINOGEN UR STRIP-MCNC: 2 MG/DL
WBC # BLD AUTO: 14.3 10E3/UL (ref 4–11)
WBC URINE: 0 /HPF

## 2025-06-24 PROCEDURE — 250N000011 HC RX IP 250 OP 636: Performed by: EMERGENCY MEDICINE

## 2025-06-24 PROCEDURE — 250N000013 HC RX MED GY IP 250 OP 250 PS 637: Performed by: HOSPITALIST

## 2025-06-24 PROCEDURE — A9585 GADOBUTROL INJECTION: HCPCS | Performed by: PHYSICIAN ASSISTANT

## 2025-06-24 PROCEDURE — 87637 SARSCOV2&INF A&B&RSV AMP PRB: CPT | Performed by: EMERGENCY MEDICINE

## 2025-06-24 PROCEDURE — 81001 URINALYSIS AUTO W/SCOPE: CPT | Performed by: EMERGENCY MEDICINE

## 2025-06-24 PROCEDURE — 76705 ECHO EXAM OF ABDOMEN: CPT

## 2025-06-24 PROCEDURE — 250N000011 HC RX IP 250 OP 636: Performed by: HOSPITALIST

## 2025-06-24 PROCEDURE — 36415 COLL VENOUS BLD VENIPUNCTURE: CPT | Performed by: EMERGENCY MEDICINE

## 2025-06-24 PROCEDURE — 99291 CRITICAL CARE FIRST HOUR: CPT | Performed by: HOSPITALIST

## 2025-06-24 PROCEDURE — 84145 PROCALCITONIN (PCT): CPT | Performed by: EMERGENCY MEDICINE

## 2025-06-24 PROCEDURE — 250N000009 HC RX 250: Performed by: EMERGENCY MEDICINE

## 2025-06-24 PROCEDURE — 96360 HYDRATION IV INFUSION INIT: CPT | Mod: 59

## 2025-06-24 PROCEDURE — 258N000003 HC RX IP 258 OP 636: Performed by: HOSPITALIST

## 2025-06-24 PROCEDURE — 85004 AUTOMATED DIFF WBC COUNT: CPT | Performed by: EMERGENCY MEDICINE

## 2025-06-24 PROCEDURE — 87040 BLOOD CULTURE FOR BACTERIA: CPT | Performed by: EMERGENCY MEDICINE

## 2025-06-24 PROCEDURE — 82140 ASSAY OF AMMONIA: CPT | Performed by: EMERGENCY MEDICINE

## 2025-06-24 PROCEDURE — 255N000002 HC RX 255 OP 636: Performed by: PHYSICIAN ASSISTANT

## 2025-06-24 PROCEDURE — 99285 EMERGENCY DEPT VISIT HI MDM: CPT | Mod: 25

## 2025-06-24 PROCEDURE — 83605 ASSAY OF LACTIC ACID: CPT | Performed by: EMERGENCY MEDICINE

## 2025-06-24 PROCEDURE — 120N000013 HC R&B IMCU

## 2025-06-24 PROCEDURE — 250N000011 HC RX IP 250 OP 636: Mod: JZ | Performed by: EMERGENCY MEDICINE

## 2025-06-24 PROCEDURE — 96361 HYDRATE IV INFUSION ADD-ON: CPT

## 2025-06-24 PROCEDURE — 83690 ASSAY OF LIPASE: CPT | Performed by: EMERGENCY MEDICINE

## 2025-06-24 PROCEDURE — 80053 COMPREHEN METABOLIC PANEL: CPT | Performed by: EMERGENCY MEDICINE

## 2025-06-24 PROCEDURE — 74177 CT ABD & PELVIS W/CONTRAST: CPT

## 2025-06-24 PROCEDURE — 70450 CT HEAD/BRAIN W/O DYE: CPT

## 2025-06-24 PROCEDURE — 72157 MRI CHEST SPINE W/O & W/DYE: CPT

## 2025-06-24 PROCEDURE — 99222 1ST HOSP IP/OBS MODERATE 55: CPT | Performed by: PHYSICIAN ASSISTANT

## 2025-06-24 PROCEDURE — 258N000003 HC RX IP 258 OP 636: Performed by: EMERGENCY MEDICINE

## 2025-06-24 PROCEDURE — 86140 C-REACTIVE PROTEIN: CPT | Performed by: EMERGENCY MEDICINE

## 2025-06-24 RX ORDER — HYDROMORPHONE HYDROCHLORIDE 1 MG/ML
0.3 INJECTION, SOLUTION INTRAMUSCULAR; INTRAVENOUS; SUBCUTANEOUS
Status: DISCONTINUED | OUTPATIENT
Start: 2025-06-24 | End: 2025-06-25

## 2025-06-24 RX ORDER — NALOXONE HYDROCHLORIDE 0.4 MG/ML
0.4 INJECTION, SOLUTION INTRAMUSCULAR; INTRAVENOUS; SUBCUTANEOUS
Status: DISCONTINUED | OUTPATIENT
Start: 2025-06-24 | End: 2025-06-27 | Stop reason: HOSPADM

## 2025-06-24 RX ORDER — CEFTRIAXONE 2 G/1
2 INJECTION, POWDER, FOR SOLUTION INTRAMUSCULAR; INTRAVENOUS ONCE
Status: COMPLETED | OUTPATIENT
Start: 2025-06-24 | End: 2025-06-24

## 2025-06-24 RX ORDER — LIDOCAINE HYDROCHLORIDE 20 MG/ML
10 JELLY TOPICAL ONCE
Status: COMPLETED | OUTPATIENT
Start: 2025-06-24 | End: 2025-06-24

## 2025-06-24 RX ORDER — NALOXONE HYDROCHLORIDE 0.4 MG/ML
0.2 INJECTION, SOLUTION INTRAMUSCULAR; INTRAVENOUS; SUBCUTANEOUS
Status: DISCONTINUED | OUTPATIENT
Start: 2025-06-24 | End: 2025-06-27 | Stop reason: HOSPADM

## 2025-06-24 RX ORDER — IOPAMIDOL 755 MG/ML
89 INJECTION, SOLUTION INTRAVASCULAR ONCE
Status: COMPLETED | OUTPATIENT
Start: 2025-06-24 | End: 2025-06-24

## 2025-06-24 RX ORDER — CEFTRIAXONE 2 G/1
2 INJECTION, POWDER, FOR SOLUTION INTRAMUSCULAR; INTRAVENOUS EVERY 24 HOURS
Status: DISCONTINUED | OUTPATIENT
Start: 2025-06-25 | End: 2025-06-26

## 2025-06-24 RX ORDER — ACETAMINOPHEN 325 MG/1
975 TABLET ORAL EVERY 8 HOURS
Status: DISCONTINUED | OUTPATIENT
Start: 2025-06-24 | End: 2025-06-27 | Stop reason: HOSPADM

## 2025-06-24 RX ORDER — SODIUM CHLORIDE 9 MG/ML
INJECTION, SOLUTION INTRAVENOUS CONTINUOUS
Status: DISCONTINUED | OUTPATIENT
Start: 2025-06-24 | End: 2025-06-26

## 2025-06-24 RX ORDER — ONDANSETRON 2 MG/ML
4 INJECTION INTRAMUSCULAR; INTRAVENOUS EVERY 30 MIN PRN
Status: DISCONTINUED | OUTPATIENT
Start: 2025-06-24 | End: 2025-06-27 | Stop reason: HOSPADM

## 2025-06-24 RX ORDER — SODIUM CHLORIDE 9 MG/ML
INJECTION, SOLUTION INTRAVENOUS CONTINUOUS
Status: DISCONTINUED | OUTPATIENT
Start: 2025-06-24 | End: 2025-06-24

## 2025-06-24 RX ORDER — GADOBUTROL 604.72 MG/ML
7 INJECTION INTRAVENOUS ONCE
Status: COMPLETED | OUTPATIENT
Start: 2025-06-24 | End: 2025-06-24

## 2025-06-24 RX ORDER — HYDROMORPHONE HYDROCHLORIDE 1 MG/ML
0.5 INJECTION, SOLUTION INTRAMUSCULAR; INTRAVENOUS; SUBCUTANEOUS EVERY 30 MIN PRN
Refills: 0 | Status: DISCONTINUED | OUTPATIENT
Start: 2025-06-24 | End: 2025-06-24

## 2025-06-24 RX ORDER — ACETAMINOPHEN 325 MG/1
975 TABLET ORAL ONCE
Status: COMPLETED | OUTPATIENT
Start: 2025-06-24 | End: 2025-06-24

## 2025-06-24 RX ADMIN — LIDOCAINE HYDROCHLORIDE 10 ML: 20 JELLY TOPICAL at 14:25

## 2025-06-24 RX ADMIN — SODIUM CHLORIDE 67 ML: 9 INJECTION, SOLUTION INTRAVENOUS at 12:57

## 2025-06-24 RX ADMIN — HYDROMORPHONE HYDROCHLORIDE 0.5 MG: 1 INJECTION, SOLUTION INTRAMUSCULAR; INTRAVENOUS; SUBCUTANEOUS at 14:26

## 2025-06-24 RX ADMIN — HYDROMORPHONE HYDROCHLORIDE 0.5 MG: 1 INJECTION, SOLUTION INTRAMUSCULAR; INTRAVENOUS; SUBCUTANEOUS at 19:39

## 2025-06-24 RX ADMIN — HYDROMORPHONE HYDROCHLORIDE 0.3 MG: 1 INJECTION, SOLUTION INTRAMUSCULAR; INTRAVENOUS; SUBCUTANEOUS at 23:07

## 2025-06-24 RX ADMIN — VANCOMYCIN HYDROCHLORIDE 1750 MG: 10 INJECTION, POWDER, LYOPHILIZED, FOR SOLUTION INTRAVENOUS at 17:35

## 2025-06-24 RX ADMIN — CEFTRIAXONE SODIUM 2 G: 2 INJECTION, POWDER, FOR SOLUTION INTRAMUSCULAR; INTRAVENOUS at 14:25

## 2025-06-24 RX ADMIN — SODIUM CHLORIDE 1000 ML: 0.9 INJECTION, SOLUTION INTRAVENOUS at 11:55

## 2025-06-24 RX ADMIN — SODIUM CHLORIDE: 0.9 INJECTION, SOLUTION INTRAVENOUS at 17:35

## 2025-06-24 RX ADMIN — GADOBUTROL 7 ML: 604.72 INJECTION INTRAVENOUS at 20:58

## 2025-06-24 RX ADMIN — IOPAMIDOL 89 ML: 755 INJECTION, SOLUTION INTRAVENOUS at 12:56

## 2025-06-24 RX ADMIN — ACETAMINOPHEN 975 MG: 325 TABLET, FILM COATED ORAL at 19:39

## 2025-06-24 ASSESSMENT — ACTIVITIES OF DAILY LIVING (ADL)
ADLS_ACUITY_SCORE: 55

## 2025-06-24 NOTE — ED PROVIDER NOTES
Emergency Department Note      History of Present Illness     Chief Complaint   Tremors and Altered Mental Status (/)      HPI   Yosef Murry is a 89 year old male on Eliquis with a history of persistent atrial fibrillation, TIA, and prostate cancer presenting to the ED for evaluation of tremors and altered mental status. The patient's daughter and son-in-law report that the he has been experiencing some increased confusion, and today, had an episode of rigors. He was relatively at his baseline two days ago, but seemed more confused yesterday. He had a T4-T5 laminectomy and resection of spinal meningioma 12 days ago after he was found down in his garage about 2.5 weeks ago. He had a fall several days ago while attempting to get out of bed on his own at his rehab facility with no visible injuries. He was ambulating with a walker yesterday, though not very well according to his PT. The patient complains of back pain and abdominal pain on evaluation. His son-in-law adds that he has been incontinent, though this is not new. No nausea, vomiting, or diarrhea. No history of UTI.     Independent Historian   Daughter and Son-in-law as detailed above.    Review of External Notes   Clinic notes    Past Medical History     Medical History and Problem List   Anal fistula  Antiplatelet or antithrombotic long-term use  Arrhythmia  Atrial flutter   Cerebral infarction   Heartburn  Hypertension  Inguinal hernia, left  Persistent atrial fibrillation   Prostate cancer   TIA (transient ischaemic attack)    Medications   Norvasc  Lipitor  Decadron  Atarax  Cozaar  Robaxin   Toprol XL  Eliquis    Surgical History   Tonsillectomy  Prostatectomy  Fistulotomy rectum  Inguinal herniorrhaphy x2  Thoracic laminectomy  Left wrist surgery     Physical Exam     Patient Vitals for the past 24 hrs:   BP Temp Temp src Pulse Resp SpO2 Height Weight   06/24/25 1743 115/70 -- -- 100 18 93 % -- --   06/24/25 1612 -- -- -- -- -- -- 1.829 m (6') 80.1  kg (176 lb 9.4 oz)   06/24/25 1529 128/71 -- -- -- -- -- -- --   06/24/25 1500 94/71 -- -- 103 17 94 % -- --   06/24/25 1027 117/72 97  F (36.1  C) Temporal 103 20 (!) 91 % -- --     Physical Exam  GENERAL: well developed, pleasant  HEAD: atraumatic  EYES: pupils reactive, extraocular muscles intact, conjunctivae normal  ENT:  mucus membranes moist  NECK:  trachea midline, normal range of motion  RESPIRATORY: no tachypnea, breath sounds clear to auscultation   CVS: normal S1/S2, no murmurs, intact distal pulses  ABDOMEN: soft, nontender, nondistention  MUSCULOSKELETAL: no deformities  SKIN: warm and dry, no acute rashes or ulceration. Incision along the upper back with a dressing. Removing the dressing, well healed sutures in place without surrounding erythema.   NEURO: GCS 15, cranial nerves intact, alert and oriented x3  PSYCH:  Confused.        Diagnostics     Lab Results   Labs Ordered and Resulted from Time of ED Arrival to Time of ED Departure   COMPREHENSIVE METABOLIC PANEL - Abnormal       Result Value    Sodium 135      Potassium 5.0      Carbon Dioxide (CO2) 28      Anion Gap 10      Urea Nitrogen 19.9      Creatinine 0.84      GFR Estimate 84      Calcium 9.0      Chloride 97 (*)     Glucose 101 (*)     Alkaline Phosphatase 86      AST 16      ALT 10      Protein Total 6.5      Albumin 3.0 (*)     Bilirubin Total 1.0     AMMONIA - Abnormal    Ammonia <10 (*)    CRP INFLAMMATION - Abnormal    CRP Inflammation 225.29 (*)    ROUTINE UA WITH MICROSCOPIC REFLEX TO CULTURE - Abnormal    Color Urine Orange (*)     Appearance Urine Slightly Cloudy (*)     Glucose Urine Negative      Bilirubin Urine Negative      Ketones Urine Negative      Specific Gravity Urine 1.024      Blood Urine Large (*)     pH Urine 6.5      Protein Albumin Urine 20 (*)     Urobilinogen Urine 2.0 (*)     Nitrite Urine Negative      Leukocyte Esterase Urine Negative      RBC Urine >182 (*)     WBC Urine 0     CBC WITH PLATELETS AND  DIFFERENTIAL - Abnormal    WBC Count 14.3 (*)     RBC Count 4.73      Hemoglobin 14.6      Hematocrit 43.9      MCV 93      MCH 30.9      MCHC 33.3      RDW 15.0      Platelet Count 344      % Neutrophils 86      % Lymphocytes 6      % Monocytes 8      % Eosinophils 0      % Basophils 0      % Immature Granulocytes 1      NRBCs per 100 WBC 0      Absolute Neutrophils 12.3 (*)     Absolute Lymphocytes 0.8      Absolute Monocytes 1.1      Absolute Eosinophils 0.0      Absolute Basophils 0.0      Absolute Immature Granulocytes 0.1      Absolute NRBCs 0.0     LIPASE - Normal    Lipase 22     LACTIC ACID WHOLE BLOOD WITH 1X REPEAT IN 2 HR WHEN >2 - Normal    Lactic Acid, Initial 1.7     INFLUENZA A/B, RSV AND SARS-COV2 PCR - Normal    Influenza A PCR Negative      Influenza B PCR Negative      RSV PCR Negative      SARS CoV2 PCR Negative     PROCALCITONIN - Normal    Procalcitonin 0.14     BLOOD CULTURE   BLOOD CULTURE     Imaging   US Abdomen Limited   Final Result   IMPRESSION:      1.  Distended gallbladder with small gallstones. No evidence of acute cholecystitis.      2.  No biliary ductal dilation.      CT Chest/Abdomen/Pelvis w Contrast   Final Result   IMPRESSION:   1.  Posterior laminectomy at T4 and T5. Small fluid at the operative site is noted and may be simple postoperative fluid. No peripheral rim enhancement can be seen. Infected fluid cannot be entirely excluded based on imaging alone. If there are    progressive symptoms, repeat imaging at this level could be performed for comparison.   2.  Cholelithiasis and distended gallbladder. Correlate clinically with any gallbladder symptoms.   3.  Mild wall thickening of the left renal pelvis. Correlate with any evidence of urinary tract infection.   4.  Nonobstructing stone at the right kidney.   5.  Stable cardiomegaly.   6.  Interval enlargement of the thoracic and abdominal aorta at several levels as compared to 9/5/2022. Aortic arch is now 5.2 cm, previously  4.1 cm. The descending thoracic aorta is now 3.8 cm, previously 3.5 cm. Abdominal aorta is now 3.4 cm,    previously 3 cm. Recommend vascular consultation for management.      CT Head w/o Contrast   Final Result   IMPRESSION:   1.  No acute intracranial hemorrhage, mass effect, or a definite acute infarct.   2.  Similar chronic intracranial changes, as described.      MR Thoracic Spine w/o & w Contrast    (Results Pending)   Report per radiology.     Independent Interpretation   CT Head: No intracranial hemorrhage.    ED Course      Medications Administered   Medications   HYDROmorphone (PF) (DILAUDID) injection 0.5 mg (0.5 mg Intravenous $Given 6/24/25 1426)   ondansetron (ZOFRAN) injection 4 mg (has no administration in time range)   sodium chloride 0.9 % infusion ( Intravenous $New Bag 6/24/25 1735)   vancomycin (VANCOCIN) 1,750 mg in 0.9% NaCl 500 mL intermittent infusion (1,750 mg Intravenous $Given 6/24/25 1735)   sodium chloride 0.9% BOLUS 1,000 mL (0 mLs Intravenous Stopped 6/24/25 1344)   acetaminophen (TYLENOL) tablet 975 mg (975 mg Oral Not Given 6/24/25 1344)   iopamidol (ISOVUE-370) solution 89 mL (89 mLs Intravenous $Given 6/24/25 1256)   sodium chloride 0.9 % bag for CT scan flush (67 mLs Intravenous $Given 6/24/25 1257)   lidocaine (XYLOCAINE) 2 % external gel 10 mL (10 mLs Urethral $Given 6/24/25 1425)   cefTRIAXone (ROCEPHIN) 2 g vial to attach to  ml bag for ADULTS or NS 50 ml bag for PEDS (0 g Intravenous Stopped 6/24/25 1519)     Procedures   Procedures     Discussion of Management   See ED course.     ED Course   ED Course as of 06/24/25 1758 Tue Jun 24, 2025   1041 I obtained history and examined the patient as noted above.    1453 I spoke with Dr. Chapin, hospitalist, regarding the patient's history and presentation in the emergency department today. Dr. Chapin accepted the patient for admission.       1518 I spoke with Eda Guzmán PA-C, neurosurgery, regarding the patient's history  and presentation in the emergency department today.      Additional Documentation  None    Medical Decision Making / Diagnosis     CMS Diagnoses: The patient has signs of sepsis   Sepsis ED evaluation   The patient has signs of sepsis as evidenced by:  1. Presence of 2 SIRS criteria, suspected infection, AND  2. Organ dysfunction: Sepsis work up in progress. Will continue to monitor for signs of organ dysfunction    Sepsis Care Initiation: Starting at 1415 PM  on 06/24/25, until specified. Prior to this documentation, sepsis, severe sepsis, or septic shock was NOT thought to be a significant cause of illness. This order represents the first time infection was seriously considered to be affecting the patient.    Lactic Acid Results:  Recent Labs   Lab Test 06/24/25  1145   LACT 1.7       3 Hour Bundle 6 Hour Bundle (Reassessment)   Blood Cultures before IV Antibiotics: Yes  Antibiotics given: see below  Prehospital fluid volume (mL):                     Total fluids given (ED +Pre-hospital):  The patient responded to a lesser volume of IV fluids. The initial volume ordered was 1000 mL.    Repeat Lactic Acid Level: Ordered by reflex for 2 hours after initial lactic acid collection.  Vasopressors: MAP>65 after initial IVF bolus, will continue to monitor fluid status and vital signs.  Repeat perfusion exam: I attest to having performed a repeat sepsis exam and assessment of perfusion at 1645 PM.   BMI Readings from Last 1 Encounters:   06/24/25 23.95 kg/m        Anti-infectives (From admission through now)      Start     Dose/Rate Route Frequency Ordered Stop    06/24/25 1650  vancomycin (VANCOCIN) 1,750 mg in 0.9% NaCl 500 mL intermittent infusion         1,750 mg  over 2 Hours Intravenous ONCE 06/24/25 1647      06/24/25 1415  cefTRIAXone (ROCEPHIN) 2 g vial to attach to  ml bag for ADULTS or NS 50 ml bag for PEDS         2 g  over 30 Minutes Intravenous ONCE 06/24/25 1412 06/24/25 1519                MIPS    None             [facility]   Yosef Murry is a 89 year old male presents from care facility for confusion and what appears to be rigors or at least is shaking.  Patient has had recent surgery with resection of likely meningioma at T4-T5 status post laminectomy on 6/12/2025.  Consider differential in occluding UTI, pneumonia, post infectious complication or discitis, meningitis, pain with shaking and inability to communicate this, intracranial hemorrhage, stroke amongst a whole host of different infectious etiology such as cellulitis viral etc.  Patient has a nonfocal exam.  Wound on the back does not look to be infected.  Main complaint is low back pain but then also complains of some abdominal pain.  CT chest abdomen pelvis shows distended gallbladder and possible pyelonephritis and incidental aneurysms that have gotten worse.  No evidence of pneumonia.  There is postop fluid collection that would be expected after surgery but no rim enhancement but infection is not completely excluded.  White count is slightly elevated.  CRP is elevated but exactly the same degree of elevation as it was in the past.  Urine shows hematuria but no gross signs of UTI.  Blood cultures are pending.  Patient initially was hesitant to take anything for pain but is confused so have been able to convince him to take the pain medication.  Unclear as to the source of infection.  Procalcitonin is added on to see if this truly is an infection as his lactic acid is normal and his white count is normal and his this episode of agitation and shaking truly an infection.  Procalcitonin is low.  Think further infectious workup still needs to be obtained.  Patient is anticoagulated so is not a candidate to do an LP in the ED.  Patient did have a head CT given reports of fall and is negative for bleed or stroke.  Spoke with the hospitalist regarding admission as well as neurosurgery for consultation given the recent surgery.    Disposition   The patient  was admitted to the hospital.     Diagnosis     ICD-10-CM    1. Confusion associated with infection  B99.9     R41.0       2. Calculus of gallbladder without cholecystitis without obstruction  K80.20          Discharge Medications   New Prescriptions    No medications on file     Scribe Disclosure:  I, Judith Rivera, am serving as a scribe at 11:05 AM on 6/24/2025 to document services personally performed by Charles Guerrero MD based on my observations and the provider's statements to me.        Charles Guerrero MD  06/24/25 0703

## 2025-06-24 NOTE — ED NOTES
[facility]  ED Nurse Handoff Report    ED Chief complaint: Tremors and Altered Mental Status (/)      ED Diagnosis:   Final diagnoses:   Pyelonephritis, acute   Confusion associated with infection   Calculus of gallbladder without cholecystitis without obstruction       Code Status: TBD    Allergies:   Allergies   Allergen Reactions    Other [Seasonal Allergies]        Patient Story: Pt from [facility], family reports increased weakness and AMS, was more confused yesterday. While in ED, was answering orientation questions approp, but does have some repetitive answering and not able to localize pain. Recent hx back sx 12/d ago per family, also has reports increased upper back pain. Initially refused pain meds when offered.     Focused Assessment:  Pt needs assistance, weak, and has pain to touch when moving pt - especially on back. Pt also has tremors in bilateral UE. Straight cath was ordered, initial was unsuccessful. Bladder scan showed >400cc, received order for urojet and was able to straight cath w/coude. Pt tolerated procedure well.     Treatments and/or interventions provided: Pain meds, straight cath, labs  Patient's response to treatments and/or interventions: tolerated     To be done/followed up on inpatient unit:  Continue to monitor bladder retention, pt received abx for UTI, but addt. Meds may be ordered    Does this patient have any cognitive concerns?: Short term memory loss, Forgetful, and Disoriented to time    Activity level - Baseline/Home:  Total Care, not able to stand at this time  Activity Level - Current:   Total Care    Patient's Preferred language: English   Needed?: No    Isolation: None  Infection: Not Applicable  COVID r/o and special precautions  Sepsis treatment initiated: Yes   Per the ED Provider, Time Zero for severe sepsis or septic shock is:      3 Hour Severe Sepsis Bundle Completion:  1. Initial Lactic Acid Result:   Recent Labs   Lab Test  06/24/25  1145   LACT 1.7     2. Blood Cultures before Antibiotics: Yes  3. Broad Spectrum Antibiotics Administered:     Anti-infectives (From now, onward)      None          4. 1L NS 0.9 ml of IV fluids have been given so far      6 Hour Severe Sepsis Bundle Completion:    1. Repeat Lactic Acid Level: no  2. Patient currently on Vasopressors =  No  Patient tested for COVID 19 prior to admission: NO  Bariatric?: No    Vital Signs:   Vitals:    06/24/25 1027   BP: 117/72   Pulse: 103   Resp: 20   Temp: 97  F (36.1  C)   TempSrc: Temporal   SpO2: (!) 91%       Cardiac Rhythm:Cardiac Rhythm: Atrial fibrillation    Was the PSS-3 completed:   Yes  What interventions are required if any?               Family Comments: Family bedside  OBS brochure/video discussed/provided to patient/family:No              Name of person given brochure if not patient:               Relationship to patient:     For the majority of the shift this patient's behavior was Green.   Behavioral interventions performed were n/a.    ED NURSE PHONE NUMBER: #[phone]

## 2025-06-24 NOTE — CONSULTS
Neurosurgery Consult    HPI    Mr. Murry is an 89-year-old male who was 12 days status post T4-5 thoracic laminectomy and resection of meningioma, pathology revealed grade 1 WHO.    Patient presented to the ER today with confusion and rigors.  CRP highly elevated to 225.29, CBC elevated at 14.3.  Patient is afebrile.    He reports back pain in the upper thoracic region.  He denies upper extremity or lower extremity pain.    Patient was recently started on Eliquis a few days ago, this is being held by the hospitalist.    Thoracic CT scan in the ER demonstrated likely postsurgical fluid collection at the surgical site T4-5.    Patient reports bilateral knee pain in the middle of his knees.  States he is unable to lift up his legs due to pain.    Medical history  CVA, persistent A-fib/flutter, hyperlipidemia, hypertension, DNR/DNI prostate cancer, balance issue      B/P: 128/71, T: 97, P: 103, R: 17       Exam    Alert, no acute distress, lying in bed  Bilateral upper extremities with 5-5 strength    Difficult to assess lower extremity strength as patient not willing to move his legs when asked to do so, indicates is too painful.    Patient was able to wiggle his toes and weakly dorsiflex and plantarflex bilaterally in the lower extremities.    Thoracic incision clean dry and intact sutures present.  No erythema or drainage.    Negative ankle clonus  Reflexes absent patella and ankle    With painful stimuli to the feet he reports feeling pain in his knees.  Sensation not clearly localizing well to soft touch.    Imaging    CT chest abdomen pelvis demonstrated    IMPRESSION:  1.  Posterior laminectomy at T4 and T5. Small fluid at the operative site is noted and may be simple postoperative fluid. No peripheral rim enhancement can be seen. Infected fluid cannot be entirely excluded based on imaging alone. If there are   progressive symptoms, repeat imaging at this level could be performed for comparison.  2.   Cholelithiasis and distended gallbladder. Correlate clinically with any gallbladder symptoms.  3.  Mild wall thickening of the left renal pelvis. Correlate with any evidence of urinary tract infection.  4.  Nonobstructing stone at the right kidney.  5.  Stable cardiomegaly.  6.  Interval enlargement of the thoracic and abdominal aorta at several levels as compared to 9/5/2022. Aortic arch is now 5.2 cm, previously 4.1 cm. The descending thoracic aorta is now 3.8 cm, previously 3.5 cm. Abdominal aorta is now 3.4 cm,   previously 3 cm. Recommend vascular consultation for management.    Assessment    Status post T4-5 laminectomy for resection of meningioma.    Elevated CRP, confusion, rigors    Fluid collection at the surgical site, likely postoperative, no rim enhancement, although infection cannot be ruled out.    Knee pain    Plan:      Agree with holding his Eliquis at this time, may need lumbar puncture.    Recommend thoracic MRI with and without contrast, as patient is having difficulty with moving his lower extremities, this may be secondary to pain in his knees, however he is not a reliable historian at this time.    Defer antibiotics to hospitalist/infectious disease if deemed necessary.    Neurosurgery will follow    Reviewed with Dr. Zaragoza

## 2025-06-24 NOTE — H&P
[facility]    History and Physical - Hospitalist Service       Date of Admission:  6/24/2025    Assessment & Plan        Yosef Murry is a 89 year old male who who has medical history of CVA, persistent A-fib/flutter, hyperlipidemia, hypertension, DNR/DNI prostate cancer, balance issue who was recently admitted to [facility] from 6/7 to 6/15 where he presented for balance issues and bilateral lower extremity weakness and he was found to have mass at T4/T5 resulting in cord compression/displacement and status postT4-5 laminectomy and resection of meningioma with Dr. Benitez on 6/12/25 and is currently on anticoagulation with Eliquis presented to the ED after he was brought by family members with chief complaint of confusion and rigors and admitted on 6/24/2020      Encephalopathy likely infectious   status postT4-5 laminectomy and resection of meningioma with Dr. Benitez on 6/12/25  Long-term anticoagulation with Eliquis    - Of note patient had recent hospital stay at [facility] from 6/7 to 6/15 and was discharged to [facility] and has recently started the Eliquis few days back and has been having improvement at TCU but for the past 2 days has been confused and has been having rigors  -Comprehensive metabolic panel on admission unremarkable, lactic acid normal at 1.7 with lipase of 22 but CRP is elevated at 2-5.29 and ammonia is normal at less than 10 and LFTs are normal and his WBC count is elevated at 14.3 with neutrophilic predominant  - UA was done which does not show infection and negative nitrate, negative leukocyte esterase and 0 WBC  - COVID-19, influenza and RSV is negative  - Blood cultures done  - CT scan of the head does not show any evidence of intracranial hemorrhage, mass effect or infarct and chronic changes  - CT abdomen pelvis showed posterior laminectomy at T4 and T5 it is more fluid at operative site and no peripheral derangement enhancement, cholelithiasis with distended  gallbladder, mild wall thickening of the left renal pelvis, nonobstructing stone at the right kidney, stable cardiomegaly  - Ultrasound gallbladder showed distended gallbladder with small gallstones with no evidence of acute cholecystitis with no gallbladder wall thickening or pericholecystic fluid and no biliary dilatation and CBD measures at 8 mm which is normal  -On exam patient knows about himself and follows some command but is significantly confused and I also looked at his incision site and pictures taken in the media tab and does not look infected and no fluid is oozing out  - I have discussed his case in detail with the infectious disease team and neurosurgery has been consulted and unfortunately we cannot do lumbar puncture to rule out meningitis and CT scan on my interpretation does not seem that small amount of fluid is infected as there is no ring enhancement of the fluid and neurosurgery has been consulted  -Continue to follow culture data  -Aspiration precaution  -Keep him n.p.o. for now  -Given his altered mental status pain control will be tricky and I have ordered scheduled Tylenol and as needed IV Dilaudid has been ordered for now avoid any long-acting including oxycodone but if pain control becomes an issue then it can be ordered  - Will continue with ceftriaxone 2 g daily and will add vancomycin  - We will start him on IV fluid  - IMC status    History of paroxysmal atrial fibrillation status flutter  -PTA regimen includes metoprolol succinate 25 mg daily along with Eliquis 5 twice daily  - Patient has been recently started on Eliquis few days back and we will hold onto Eliquis in case we need to do lumbar puncture if his mental status does not improve and we do not have any alternative working diagnosis and continue to hold metoprolol for now given his aspiration risk      Hypertension.   Hyperlipidemia.    -PTA regimen includes: Amlodipine 10 mg daily, atorvastatin 10 mg, Eliquis 5 twice  "daily, losartan 50 mg daily, metoprolol succinate 25 mg daily  - we will hold all his PTA medication given his underlying encephalopathy and continue to monitor his blood pressure closely    Hx prostate cancer s/p prostatectomy 2009, radiation therapy 2014 and androgen deprivation therapy  -See Urology clinic note 5/9/25 for details - recent rise in PSA but MRI findings not convincing for any recurrence and PET scan     CT scan finding of interval enlargement of the thoracic and abdominal aorta  - His abdominal aortic arch is now 5.2 as compared to 4.1 in the past and descending thoracic aorta has increased to 3.8 and abdominal aorta is 3.4 cm  - Will need to follow as outpatient with vascular surgery      Family communication  -I have discussed plan of care in detail with patient's family members including daughter and son-in-law and they have been updated on the results of the imaging and are in agreement with plan of care-            Diet:  NPO for now   DVT Prophylaxis: Pneumatic Compression Devices  [place] Catheter: Not present  Lines: None     Cardiac Monitoring: None  Code Status:  dnr/dni     Clinically Significant Risk Factors Present on Admission          # Hypochloremia: Lowest Cl = 97 mmol/L in last 2 days, will monitor as appropriate      # Hypoalbuminemia: Lowest albumin = 3 g/dL at 6/24/2025 11:45 AM, will monitor as appropriate     # Hypertension: Home medication list includes antihypertensive(s)           # Overweight: Estimated body mass index is 25.34 kg/m  as calculated from the following:    Height as of an earlier encounter on 6/24/25: 1.778 m (5' 10\").    Weight as of an earlier encounter on 6/24/25: 80.1 kg (176 lb 9.6 oz).       # Financial/Environmental Concerns:           Disposition Plan     Medically Ready for Discharge: Anticipated in 2-4 Days, workup for encephalopathy           Harmony Chapin MD  Hospitalist Service  [facility]  Securely message with Kee " (more info)  Text page via [facility] Paging/Directory     This note was completed in part using dictation via the Dragon voice recognition software. Some word and grammatical errors may occur and must be interpreted in the appropriate clinical context. If there are any questions pertaining to this issue, please contact me for further clarification.        Patient is critically ill and prognosis is guarded    I am on admitting service and his care in the morning will be taken over by one of my clinics from hospital medicine team  ______________________________________________________________________    Chief Complaint     Confusion     History is obtained from the patient, family and ED physician    History of Present Illness     Yosef Murry is a 89 year old male who who has medical history of CVA, persistent A-fib/flutter, hyperlipidemia, hypertension, DNR/DNI prostate cancer, balance issue who was recently admitted to [facility] from 6/7 to 6/15 where he presented for balance issues and bilateral lower extremity weakness and he was found to have mass at T4/T5 resulting in cord compression/displacement and status postT4-5 laminectomy and resection of meningioma with Dr. Benitez on 6/12/25 and is currently on anticoagulation with Eliquis presented to the ED after he was brought by family members with chief complaint of confusion and rigors and came from [place].    As per discussion with the family members admitting physician patient was experiencing some increased confusion and rigors at was at baseline about 2 days back and yesterday he was ambulating with walker but not very well and on presentation patient complained of abdominal pain and back pain but denied any fever or chills.  He does not have any nausea, vomiting or diarrhea and no prior history of UTI and at baseline    ED course and lab work  - On presentation to the ED patient was found to have sodium of 135, potassium of 5, chloride 97, bicarb of 28,  BUN of 19.9 with creatinine of 0.84 and LFTs normal, ammonia less than 10 and CRP elevated at 225.9, lipase of 22, lactic acid of 1.7 with glucose 201.       WBC count of 14.3 with hemoglobin of 14.6 and platelet count of 344.  He tested negative for COVID-19, influenza and RSV. Blood cultures done in the ED      CT scan of the abdomen and pelvis showed posterior laminectomy at T4 and T5 and small fluid at the operative site and can be postoperative fluid and no peripheral, cholelithiasis and distended gallbladder.,  Mild wall thickening of the left renal pelvis, nonobstructive stone at the right kidney, stable cardiomegaly and interval enlargement of the thoracic and abdominal aorta at several levels and the aortic arch is now 5.1 cm, descending thoracic aorta is 3.8 cm and abdominal aorta is 3.4    CT scan of the head did not show any acute process    In the ER patient was given Tylenol along with 1 L IV fluid bolus and 2 g of ceftriaxone and as per ED most likely patient has pyelonephritis though urine sample has not been collected.  I was called by the ED physician that I requested that case be discussed with neurosurgery given his recent surgical intervention and concern for presence of small amount of fluid and see if LP can be done.  The ED physician did tell me that LP cannot be done on the patient as he is currently on Eliquis      I saw the patient in ED room 22 and his son-in-law who is one of infectious disease physician and his daughter who is a pathologist and a retired  was present and they were the one who gave me history and mentioned that patient is not himself.  They did mention that patient at baseline does converse but has been declining but does move with walker and this is a new change.  The son-in-law did mention to me that patient has no prior history of UTIs but has been having some urinary incontinence which has been worsening and has prior history of prostate cancer.  The patient  does respond and recognize the family but did not give much information.        Past Medical History    Past Medical History:   Diagnosis Date    Anal fistula     Antiplatelet or antithrombotic long-term use     Arrhythmia     Atrial flutter (H) 2012    Cerebral infarction (H)     TIA    Heartburn     Hypertension     Inguinal hernia, left     Persistent atrial fibrillation (H) 8/21/12    Prostate cancer (H)     TIA (transient ischaemic attack)     2014       Past Surgical History   Past Surgical History:   Procedure Laterality Date    COLONOSCOPY      COLONOSCOPY N/A 9/24/2021    Procedure: Colonoscopy, With Polypectomy And Biopsy;  Surgeon: Jordin Rachel MD;  Location:  GI    ENT SURGERY      tonsllectomy    EXAM UNDER ANESTHESIA, FISTULOTOMY RECTUM, COMBINED N/A 6/18/2015    Procedure: COMBINED EXAM UNDER ANESTHESIA, FISTULOTOMY RECTUM;  Surgeon: Candice Carty MD;  Location: [facility]    GENITOURINARY SURGERY  1999    PROSTATECTOMY    GI SURGERY      hernia repair x 2    HERNIORRHAPHY INGUINAL  7/25/2013    Procedure: HERNIORRHAPHY INGUINAL;  LEFT INGUINAL HERNIA REPAIR WITH MESH;  Surgeon: Filipe Fairchild MD;  Location: [facility]    HERNIORRHAPHY INGUINAL Right 6/13/2019    Procedure: OPEN RIGHT INGUINA HERNIA REPAIR WITH MESH;  Surgeon: Filipe Fairchild MD;  Location:  OR    LAMINECTOMY, EXCISE TUMOR THORACIC, COMBINED N/A 6/12/2025    Procedure: Thoracic 4 to Thoracic 5 laminectomy and resection of meningioma;  Surgeon: Juan Miguel Benitez MD;  Location:  OR    ORTHOPEDIC SURGERY      WRIST SURGERY - LEFT       Prior to Admission Medications   Prior to Admission Medications   Prescriptions Last Dose Informant Patient Reported? Taking?   Multiple Vitamins-Minerals (PRESERVISION AREDS 2 PO)  Self Yes No   Sig: Take 1 capsule by mouth 2 times daily AREDS 2   acetaminophen (TYLENOL) 325 MG tablet   No No   Sig: Take 2 tablets (650 mg) by mouth every 4 hours as needed for mild pain or other (and  adjunct with moderate or severe pain or per patient request).   amLODIPine (NORVASC) 5 MG tablet  Self No No   Sig: Take 1 tablet (5 mg) by mouth daily   Patient taking differently: Take 10 mg by mouth daily.   atorvastatin (LIPITOR) 10 MG tablet  Self No No   Sig: Take 1 tablet (10 mg) by mouth every evening   calcium carbonate (TUMS) 500 MG chewable tablet  Self Yes No   Sig: Take 1 chew tab by mouth nightly as needed    dexAMETHasone (DECADRON) 1 MG tablet   No No   Sig: Take 4 tablets (4 mg) by mouth daily for 1 day, THEN 2 tablets (2 mg) daily for 2 days, THEN 1 tablet (1 mg) daily for 2 days.   hydrOXYzine HCl (ATARAX) 10 MG tablet   No No   Sig: Take 1 tablet (10 mg) by mouth every 6 hours as needed for other (adjuvant pain).   loratadine (CLARITIN) 10 MG tablet  Self Yes No   Sig: Take 1 tablet by mouth daily.   losartan (COZAAR) 50 MG tablet  Self No No   Sig: Take 1 tablet (50 mg) by mouth daily   magnesium hydroxide (MILK OF MAGNESIA) 400 MG/5ML suspension   Yes No   Sig: Take 30 mLs by mouth daily as needed for constipation or heartburn.   methocarbamol (ROBAXIN) 750 MG tablet   No No   Sig: Take 1 tablet (750 mg) by mouth 4 times daily as needed for muscle spasms.   metoprolol succinate ER (TOPROL XL) 25 MG 24 hr tablet  Self No No   Sig: Take 1 tablet (25 mg) by mouth daily   ondansetron (ZOFRAN ODT) 4 MG ODT tab   No No   Sig: Take 1 tablet (4 mg) by mouth every 8 hours as needed for nausea.   order for DME  Self No No   Sig: Equipment being ordered: Walker Wheels () and Walker ()  Treatment Diagnosis: Impaired gait   oxyCODONE (ROXICODONE) 5 MG tablet   No No   Sig: Take 1 tablet (5 mg) by mouth 2 times daily. And QID PRN   polyethylene glycol (MIRALAX) 17 GM/Dose powder   No No   Sig: Take 17 g by mouth daily.   vitamin D3 (CHOLECALCIFEROL) 50 mcg (2000 units) tablet  Self Yes No   Sig: Take 1 tablet by mouth daily      Facility-Administered Medications: None           Physical Exam    Vital Signs: Temp: 97  F (36.1  C) Temp src: Temporal BP: 117/72 Pulse: 103   Resp: 20 SpO2: (!) 91 % O2 Device: None (Room air)    Weight: 0 lbs 0 oz        General: AO x 1 to self  HEENT: Head is atraumatic, normocephalic.  Pupils are equal, round and reactive to light.  No scleral icterus. Oral mucosa is moist   Neck: Neck is supple  Respiratory: Lungs are clear to auscultation bilaterally with no wheeze or crackles   Cardiovascular: Regular rate , S1 and S2 normal with no murmer or rubs or gallops  Abdomen:   soft , non tender , non distended and bowel sound present   Skin: I did look at his skin incision at the site of the recent surgery over the spine and sutures are in place with no evidence of any local signs of infection and there was no discharge  Neurologic: Not able to assess the neurological status clearly because of underlying mental status changes  Musculoskeletal: Seen moving upper and lower extremity  Psychiatric: Not able to assess mental status proper    Medical Decision Making         Time spent in care of patient is 80 minutes and I reviewed labs including CBC, comprehensive metabolic panel, CT scan of the chest abdomen pelvis, CT scan of the head, UA and also reviewed the results of the ultrasound of the abdomen and discussed plan of care in detail with patient, nursing staff, ID team and also with patient's daughter and son-in-law.    Data     I have personally reviewed the following data over the past 24 hrs:    14.3 (H)  \   14.6   / 344     135 97 (L) 19.9 /  101 (H)   5.0 28 0.84 \     ALT: 10 AST: 16 AP: 86 TBILI: 1.0   ALB: 3.0 (L) TOT PROTEIN: 6.5 LIPASE: 22     Procal: N/A CRP: 225.29 (H) Lactic Acid: 1.7         Imaging results reviewed over the past 24 hrs:   Recent Results (from the past 24 hours)   CT Chest/Abdomen/Pelvis w Contrast    Narrative    EXAM: CT CHEST/ABDOMEN/PELVIS W CONTRAST  LOCATION: [facility]  DATE: 6/24/2025    INDICATION: Confusion,  rigors, recent spine surgery T4-T5, evaluate infection.  COMPARISON: CT chest 9/5/2022, CT chest, abdomen and pelvis 8/2/2019.  TECHNIQUE: CT scan of the chest, abdomen, and pelvis was performed following injection of IV contrast. Multiplanar reformats were obtained. Dose reduction techniques were used.   CONTRAST: 89 mL Isovue 370.    FINDINGS:   LUNGS AND PLEURA: No effusion or pneumothorax. Bibasilar atelectasis versus scarring appears similar to prior. No new airspace disease identified. Stable 3 mm right upper lobe nodule image 57 of series 4.    MEDIASTINUM/AXILLAE: No adenopathy. No acute mediastinal abnormality identified. Multichamber cardiomegaly appears stable. There is diffuse calcified atherosclerosis of the thoracic aorta. Enlargement of the aortic arch that is now 5.2 cm, previously 4.1   cm, series 3 image 26. There is new peripheral atherosclerotic thrombus noted. Mild enlargement of the descending thoracic aorta that is now 3.8 cm, previously 3.5 cm, image 62.    CORONARY ARTERY CALCIFICATION: Severe.    HEPATOBILIARY: Distended gallbladder with cholelithiasis. Liver appears unremarkable.    PANCREAS: Normal.    SPLEEN: Normal.    ADRENAL GLANDS: Normal.    KIDNEYS/BLADDER: Some mild wall prominence of the left renal pelvis series 3 image 160. No acute renal abnormality. No obstructing stone. Nonobstructing stone lower right kidney is 4 mm, image 173. Left lower renal cysts noted without specific imaging   follow-up recommended. Bladder is unremarkable.    BOWEL: Moderate stool. No small bowel obstruction. No evidence for appendicitis. No free fluid or free air. Colonic diverticulosis without diverticulitis.    LYMPH NODES: Normal.    VASCULATURE: Scattered vascular calcifications. Infrarenal abdominal aorta is 3.4 cm, previously 3 cm series 3 image 171.    PELVIC ORGANS: No acute pelvic abnormality identified. No pelvic fluid collection.    MUSCULOSKELETAL: Posterior laminectomy at T4 and T5.  Small fluid at the operative site may just be postoperative in nature and is approximately 1.6 cm. No visible peripheral rim enhancement is identified. Diffuse degenerative changes of the spine. No   aggressive bone lesion.      Impression    IMPRESSION:  1.  Posterior laminectomy at T4 and T5. Small fluid at the operative site is noted and may be simple postoperative fluid. No peripheral rim enhancement can be seen. Infected fluid cannot be entirely excluded based on imaging alone. If there are   progressive symptoms, repeat imaging at this level could be performed for comparison.  2.  Cholelithiasis and distended gallbladder. Correlate clinically with any gallbladder symptoms.  3.  Mild wall thickening of the left renal pelvis. Correlate with any evidence of urinary tract infection.  4.  Nonobstructing stone at the right kidney.  5.  Stable cardiomegaly.  6.  Interval enlargement of the thoracic and abdominal aorta at several levels as compared to 9/5/2022. Aortic arch is now 5.2 cm, previously 4.1 cm. The descending thoracic aorta is now 3.8 cm, previously 3.5 cm. Abdominal aorta is now 3.4 cm,   previously 3 cm. Recommend vascular consultation for management.   CT Head w/o Contrast    Narrative    EXAM: CT HEAD W/O CONTRAST  LOCATION: [facility]  DATE: 6/24/2025    INDICATION: Confusion, weakness, left-sided neglect, and fall. Evaluate for stroke, bleed.  COMPARISON: CT dated 6/7/2025  TECHNIQUE: Routine CT Head without IV contrast. Multiplanar reformats. Dose reduction techniques were used.    FINDINGS:  INTRACRANIAL CONTENTS: No acute intracranial hemorrhage, extra-axial fluid collection, or mass effect. Redemonstrated small chronic cortical/subcortical right frontal lobe infarcts, the larger more anterior of which involving the right middle frontal   gyrus. Small chronic infarcts involving the right basal ganglia and adjacent right frontal lobe periventricular white matter, bilateral  parietal periventricular white matter, as well as both cerebellar hemispheres, as before. Mild-moderate underlying   presumed chronic microvascular ischemic change. No evidence of an acute transcortical confluent infarct, although CT assessment is limited by the aforementioned chronic ischemic changes. Unchanged mild-moderate generalized cerebral parenchymal volume   loss. No hydrocephalus. Moderate calcified intracranial atherosclerosis.    VISUALIZED ORBITS/SINUSES/MASTOIDS: No acute intraorbital finding. No evidence of significant paranasal sinus or mastoid mucosal disease.     BONES/SOFT TISSUES: No acute calvarial injury or significant scalp hematoma.      Impression    IMPRESSION:  1.  No acute intracranial hemorrhage, mass effect, or a definite acute infarct.  2.  Similar chronic intracranial changes, as described.

## 2025-06-24 NOTE — ED NOTES
RECEIVING UNIT ED HANDOFF REVIEW    ED Nurse Handoff Report was reviewed by: Oneyda Barrett RN on June 24, 2025 at 4:34 PM

## 2025-06-24 NOTE — PHARMACY-ADMISSION MEDICATION HISTORY
Pharmacy Intern Admission Medication History    Admission medication history is complete. The information provided in this note is only as accurate as the sources available at the time of the update.    Information Source(s): Facility (U/NH/) medication list/MAR and CareEverywhere/SureScripts via N/A    Pertinent Information: Confirmed patient medication list with U MAR ([facility]). Dexamethasone finished on 6/20/24. Patient also resumed Eliquis after surgery on 6/19.     Changes made to PTA medication list:  Added: None  Deleted: None  Changed: None    Allergies reviewed with patient and updates made in EHR: yes    Medication History Completed By: Aileen Lobo 6/24/2025 3:51 PM    PTA Med List   Medication Sig Last Dose/Taking    acetaminophen (TYLENOL) 325 MG tablet Take 2 tablets (650 mg) by mouth every 4 hours as needed for mild pain or other (and adjunct with moderate or severe pain or per patient request). 6/19/2025    amLODIPine (NORVASC) 5 MG tablet Take 1 tablet (5 mg) by mouth daily (Patient taking differently: Take 10 mg by mouth daily.) 6/24/2025    apixaban ANTICOAGULANT (ELIQUIS) 5 MG tablet Take 5 mg by mouth 2 times daily. 6/24/2025    atorvastatin (LIPITOR) 10 MG tablet Take 1 tablet (10 mg) by mouth every evening 6/23/2025    calcium carbonate (TUMS) 500 MG chewable tablet Take 1 chew tab by mouth nightly as needed  6/19/2025    hydrOXYzine HCl (ATARAX) 10 MG tablet Take 1 tablet (10 mg) by mouth every 6 hours as needed for other (adjuvant pain). 6/17/2025    loratadine (CLARITIN) 10 MG tablet Take 1 tablet by mouth daily. 6/24/2025    losartan (COZAAR) 50 MG tablet Take 1 tablet (50 mg) by mouth daily 6/24/2025    magnesium hydroxide (MILK OF MAGNESIA) 400 MG/5ML suspension Take 30 mLs by mouth daily as needed for constipation or heartburn. 6/19/2025    methocarbamol (ROBAXIN) 750 MG tablet Take 1 tablet (750 mg) by mouth 4 times daily as needed for muscle spasms. 6/19/2025    metoprolol  succinate ER (TOPROL XL) 25 MG 24 hr tablet Take 1 tablet (25 mg) by mouth daily 6/24/2025    Multiple Vitamins-Minerals (PRESERVISION AREDS 2 PO) Take 1 capsule by mouth 2 times daily AREDS 2 6/24/2025    ondansetron (ZOFRAN ODT) 4 MG ODT tab Take 1 tablet (4 mg) by mouth every 8 hours as needed for nausea. Taking As Needed    oxyCODONE (ROXICODONE) 5 MG tablet Take 1 tablet (5 mg) by mouth 2 times daily. And QID PRN 6/24/2025    polyethylene glycol (MIRALAX) 17 GM/Dose powder Take 17 g by mouth daily. 6/24/2025    vitamin D3 (CHOLECALCIFEROL) 50 mcg (2000 units) tablet Take 1 tablet by mouth daily 6/24/2025

## 2025-06-24 NOTE — ED TRIAGE NOTES
Pt has been confused for about 2 days and started to have tremors   Pt is post op a spinal surgery 12 days ago  Pt is [place]

## 2025-06-24 NOTE — ED NOTES
Repositioned patient onto R side, due to back pain. Pain with any movement, patient prefers to lay flat. Supported back with pillow, patient comfortable for time being.

## 2025-06-25 ENCOUNTER — APPOINTMENT (OUTPATIENT)
Dept: SPEECH THERAPY | Facility: CLINIC | Age: 89
DRG: 092 | End: 2025-06-25
Attending: HOSPITALIST
Payer: MEDICARE

## 2025-06-25 ENCOUNTER — APPOINTMENT (OUTPATIENT)
Dept: GENERAL RADIOLOGY | Facility: CLINIC | Age: 89
DRG: 092 | End: 2025-06-25
Attending: STUDENT IN AN ORGANIZED HEALTH CARE EDUCATION/TRAINING PROGRAM
Payer: MEDICARE

## 2025-06-25 LAB
ANION GAP SERPL CALCULATED.3IONS-SCNC: 10 MMOL/L (ref 7–15)
BUN SERPL-MCNC: 15.2 MG/DL (ref 8–23)
C PNEUM DNA SPEC QL NAA+PROBE: NOT DETECTED
CALCIUM SERPL-MCNC: 8.7 MG/DL (ref 8.8–10.4)
CHLORIDE SERPL-SCNC: 104 MMOL/L (ref 98–107)
CREAT SERPL-MCNC: 0.61 MG/DL (ref 0.67–1.17)
EGFRCR SERPLBLD CKD-EPI 2021: >90 ML/MIN/1.73M2
ERYTHROCYTE [DISTWIDTH] IN BLOOD BY AUTOMATED COUNT: 15 % (ref 10–15)
FLUAV H1 2009 PAND RNA SPEC QL NAA+PROBE: NOT DETECTED
FLUAV H1 RNA SPEC QL NAA+PROBE: NOT DETECTED
FLUAV H3 RNA SPEC QL NAA+PROBE: NOT DETECTED
FLUAV RNA SPEC QL NAA+PROBE: NOT DETECTED
FLUBV RNA SPEC QL NAA+PROBE: NOT DETECTED
GLUCOSE SERPL-MCNC: 89 MG/DL (ref 70–99)
HADV DNA SPEC QL NAA+PROBE: NOT DETECTED
HCO3 SERPL-SCNC: 24 MMOL/L (ref 22–29)
HCOV PNL SPEC NAA+PROBE: NOT DETECTED
HCT VFR BLD AUTO: 41.4 % (ref 40–53)
HGB BLD-MCNC: 13.7 G/DL (ref 13.3–17.7)
HMPV RNA SPEC QL NAA+PROBE: NOT DETECTED
HPIV1 RNA SPEC QL NAA+PROBE: NOT DETECTED
HPIV2 RNA SPEC QL NAA+PROBE: NOT DETECTED
HPIV3 RNA SPEC QL NAA+PROBE: NOT DETECTED
HPIV4 RNA SPEC QL NAA+PROBE: NOT DETECTED
M PNEUMO DNA SPEC QL NAA+PROBE: NOT DETECTED
MCH RBC QN AUTO: 31 PG (ref 26.5–33)
MCHC RBC AUTO-ENTMCNC: 33.1 G/DL (ref 31.5–36.5)
MCV RBC AUTO: 94 FL (ref 78–100)
MRSA DNA SPEC QL NAA+PROBE: NEGATIVE
PLATELET # BLD AUTO: 270 10E3/UL (ref 150–450)
POTASSIUM SERPL-SCNC: 3.9 MMOL/L (ref 3.4–5.3)
RBC # BLD AUTO: 4.42 10E6/UL (ref 4.4–5.9)
RSV RNA SPEC QL NAA+PROBE: NOT DETECTED
RSV RNA SPEC QL NAA+PROBE: NOT DETECTED
RV+EV RNA SPEC QL NAA+PROBE: NOT DETECTED
SA TARGET DNA: NEGATIVE
SODIUM SERPL-SCNC: 138 MMOL/L (ref 135–145)
WBC # BLD AUTO: 10.2 10E3/UL (ref 4–11)

## 2025-06-25 PROCEDURE — 250N000011 HC RX IP 250 OP 636: Performed by: STUDENT IN AN ORGANIZED HEALTH CARE EDUCATION/TRAINING PROGRAM

## 2025-06-25 PROCEDURE — 250N000013 HC RX MED GY IP 250 OP 250 PS 637: Performed by: HOSPITALIST

## 2025-06-25 PROCEDURE — 92526 ORAL FUNCTION THERAPY: CPT | Mod: GN | Performed by: SPEECH-LANGUAGE PATHOLOGIST

## 2025-06-25 PROCEDURE — 258N000003 HC RX IP 258 OP 636: Performed by: HOSPITALIST

## 2025-06-25 PROCEDURE — 73610 X-RAY EXAM OF ANKLE: CPT | Mod: LT

## 2025-06-25 PROCEDURE — 99222 1ST HOSP IP/OBS MODERATE 55: CPT | Performed by: SPECIALIST

## 2025-06-25 PROCEDURE — 85018 HEMOGLOBIN: CPT | Performed by: HOSPITALIST

## 2025-06-25 PROCEDURE — 87581 M.PNEUMON DNA AMP PROBE: CPT | Performed by: SPECIALIST

## 2025-06-25 PROCEDURE — 250N000013 HC RX MED GY IP 250 OP 250 PS 637: Performed by: STUDENT IN AN ORGANIZED HEALTH CARE EDUCATION/TRAINING PROGRAM

## 2025-06-25 PROCEDURE — 36415 COLL VENOUS BLD VENIPUNCTURE: CPT | Performed by: HOSPITALIST

## 2025-06-25 PROCEDURE — 99233 SBSQ HOSP IP/OBS HIGH 50: CPT | Performed by: STUDENT IN AN ORGANIZED HEALTH CARE EDUCATION/TRAINING PROGRAM

## 2025-06-25 PROCEDURE — 87641 MR-STAPH DNA AMP PROBE: CPT | Performed by: HOSPITALIST

## 2025-06-25 PROCEDURE — 92610 EVALUATE SWALLOWING FUNCTION: CPT | Mod: GN | Performed by: SPEECH-LANGUAGE PATHOLOGIST

## 2025-06-25 PROCEDURE — 80048 BASIC METABOLIC PNL TOTAL CA: CPT | Performed by: HOSPITALIST

## 2025-06-25 PROCEDURE — 120N000001 HC R&B MED SURG/OB

## 2025-06-25 PROCEDURE — 250N000011 HC RX IP 250 OP 636: Performed by: HOSPITALIST

## 2025-06-25 RX ORDER — CALCIUM CARBONATE 500 MG/1
1000 TABLET, CHEWABLE ORAL 4 TIMES DAILY PRN
Status: DISCONTINUED | OUTPATIENT
Start: 2025-06-25 | End: 2025-06-27 | Stop reason: HOSPADM

## 2025-06-25 RX ORDER — ACETAMINOPHEN 650 MG/1
650 SUPPOSITORY RECTAL EVERY 4 HOURS PRN
Status: DISCONTINUED | OUTPATIENT
Start: 2025-06-25 | End: 2025-06-27 | Stop reason: HOSPADM

## 2025-06-25 RX ORDER — METOPROLOL SUCCINATE 25 MG/1
25 TABLET, EXTENDED RELEASE ORAL DAILY
Status: DISCONTINUED | OUTPATIENT
Start: 2025-06-25 | End: 2025-06-27 | Stop reason: HOSPADM

## 2025-06-25 RX ORDER — LOSARTAN POTASSIUM 50 MG/1
50 TABLET ORAL DAILY
Status: DISCONTINUED | OUTPATIENT
Start: 2025-06-25 | End: 2025-06-27 | Stop reason: HOSPADM

## 2025-06-25 RX ORDER — AMLODIPINE BESYLATE 5 MG/1
5 TABLET ORAL DAILY
Status: DISCONTINUED | OUTPATIENT
Start: 2025-06-25 | End: 2025-06-27 | Stop reason: HOSPADM

## 2025-06-25 RX ORDER — ACETAMINOPHEN 325 MG/1
650 TABLET ORAL EVERY 4 HOURS PRN
Status: DISCONTINUED | OUTPATIENT
Start: 2025-06-25 | End: 2025-06-27 | Stop reason: HOSPADM

## 2025-06-25 RX ORDER — ONDANSETRON 2 MG/ML
4 INJECTION INTRAMUSCULAR; INTRAVENOUS EVERY 6 HOURS PRN
Status: DISCONTINUED | OUTPATIENT
Start: 2025-06-25 | End: 2025-06-27 | Stop reason: HOSPADM

## 2025-06-25 RX ORDER — ENOXAPARIN SODIUM 100 MG/ML
1 INJECTION SUBCUTANEOUS 2 TIMES DAILY
Status: DISCONTINUED | OUTPATIENT
Start: 2025-06-25 | End: 2025-06-26

## 2025-06-25 RX ORDER — LORATADINE 10 MG/1
10 TABLET ORAL DAILY
Status: DISCONTINUED | OUTPATIENT
Start: 2025-06-26 | End: 2025-06-27 | Stop reason: HOSPADM

## 2025-06-25 RX ORDER — LIDOCAINE 40 MG/G
CREAM TOPICAL
Status: DISCONTINUED | OUTPATIENT
Start: 2025-06-25 | End: 2025-06-27 | Stop reason: HOSPADM

## 2025-06-25 RX ORDER — AMOXICILLIN 250 MG
1 CAPSULE ORAL 2 TIMES DAILY PRN
Status: DISCONTINUED | OUTPATIENT
Start: 2025-06-25 | End: 2025-06-27 | Stop reason: HOSPADM

## 2025-06-25 RX ORDER — ATORVASTATIN CALCIUM 10 MG/1
10 TABLET, FILM COATED ORAL EVERY EVENING
Status: DISCONTINUED | OUTPATIENT
Start: 2025-06-25 | End: 2025-06-27 | Stop reason: HOSPADM

## 2025-06-25 RX ORDER — AMOXICILLIN 250 MG
2 CAPSULE ORAL 2 TIMES DAILY PRN
Status: DISCONTINUED | OUTPATIENT
Start: 2025-06-25 | End: 2025-06-27 | Stop reason: HOSPADM

## 2025-06-25 RX ORDER — OXYCODONE HYDROCHLORIDE 5 MG/1
5 TABLET ORAL EVERY 4 HOURS PRN
Refills: 0 | Status: DISCONTINUED | OUTPATIENT
Start: 2025-06-25 | End: 2025-06-27 | Stop reason: HOSPADM

## 2025-06-25 RX ORDER — ONDANSETRON 4 MG/1
4 TABLET, ORALLY DISINTEGRATING ORAL EVERY 6 HOURS PRN
Status: DISCONTINUED | OUTPATIENT
Start: 2025-06-25 | End: 2025-06-27 | Stop reason: HOSPADM

## 2025-06-25 RX ADMIN — ACETAMINOPHEN 975 MG: 325 TABLET, FILM COATED ORAL at 02:27

## 2025-06-25 RX ADMIN — ACETAMINOPHEN 975 MG: 325 TABLET, FILM COATED ORAL at 18:08

## 2025-06-25 RX ADMIN — ATORVASTATIN CALCIUM 10 MG: 10 TABLET, FILM COATED ORAL at 21:08

## 2025-06-25 RX ADMIN — ENOXAPARIN SODIUM 80 MG: 80 INJECTION SUBCUTANEOUS at 18:08

## 2025-06-25 RX ADMIN — CEFTRIAXONE SODIUM 2 G: 2 INJECTION, POWDER, FOR SOLUTION INTRAMUSCULAR; INTRAVENOUS at 14:13

## 2025-06-25 RX ADMIN — METOPROLOL SUCCINATE 25 MG: 25 TABLET, EXTENDED RELEASE ORAL at 14:13

## 2025-06-25 RX ADMIN — OXYCODONE HYDROCHLORIDE 5 MG: 5 TABLET ORAL at 21:08

## 2025-06-25 RX ADMIN — ACETAMINOPHEN 975 MG: 325 TABLET, FILM COATED ORAL at 10:35

## 2025-06-25 RX ADMIN — SODIUM CHLORIDE: 0.9 INJECTION, SOLUTION INTRAVENOUS at 08:43

## 2025-06-25 RX ADMIN — OXYCODONE HYDROCHLORIDE 5 MG: 5 TABLET ORAL at 17:12

## 2025-06-25 RX ADMIN — HYDROMORPHONE HYDROCHLORIDE 0.3 MG: 1 INJECTION, SOLUTION INTRAMUSCULAR; INTRAVENOUS; SUBCUTANEOUS at 08:20

## 2025-06-25 ASSESSMENT — ACTIVITIES OF DAILY LIVING (ADL)
ADLS_ACUITY_SCORE: 62
ADLS_ACUITY_SCORE: 59
ADLS_ACUITY_SCORE: 62
ADLS_ACUITY_SCORE: 55
ADLS_ACUITY_SCORE: 62
ADLS_ACUITY_SCORE: 55

## 2025-06-25 NOTE — PROGRESS NOTES
Neurosurgery progress note    Thoracic MRI demonstrated    IMPRESSION:  1.  Interval T4-T5 laminectomy with significantly improved compression of the thoracic cord. Small focus of increased cord signal at this level likely represents chronic myelomalacia.  2.  Small fluid collection at the laminectomy site resulting in mild narrowing of the thecal sac.    Today patient states he is feeling better.  Denies pain in his legs.  Reports some back pain.  Appears less confused than yesterday.    Exam    Alert, no acute distress, sitting in bed  Right lower extremity with 5-5 strength throughout  Left lower extremity with 4-5 strength with hip flexion, 5/5 strength with ankle dorsiflexion and plantarflexion  Incision clean dry and intact    Assessment    Status post T4-5 laminectomy for resection of meningioma.  Thoracic MRI negative for obvious infectious source, resection of prior meningioma, with decompression of the spinal cord.     Elevated CRP, confusion, rigors on admission      Plan    Infectious workup by infectious disease and medicine.  Activity as tolerated from neurosurgery perspective.    Clinical picture, and thoracic MRI not suggestive of surgical site infection.    Suture removal tomorrow postoperative day 14 if still inpatient.

## 2025-06-25 NOTE — PROGRESS NOTES
RECEIVING UNIT ED HANDOFF REVIEW    ED Nurse Handoff Report was reviewed by: Yvonne Post RN on June 24, 2025 at 10:41 PM

## 2025-06-25 NOTE — PLAN OF CARE
Goal Outcome Evaluation:    Patient up with assist of 2 and randall mills. Tolerating regular diet, voiding in the BR. Pain managed with PO meds this afternoon.

## 2025-06-25 NOTE — PROGRESS NOTES
SW: Writer assisting floor . Attempted to meet with patient for consult, but he was going down to X-ray. Message sent to Chanel in admissions auth Gail. Patient does NOT have a bed hold.     VALENTINA Savage, [facility]  Social Work- Inpatient Care Coordination  [facility]

## 2025-06-25 NOTE — PROGRESS NOTES
CLINICAL SWALLOW EVALUATION       06/25/25 1265   Appointment Info   Signing Clinician's Name / Credentials (SLP) Kati Lancaster M.A., CCC-SLP, [facility]   General Information   Onset of Illness/Injury or Date of Surgery 06/24/25   Referring Physician Dr. House   Patient/Family Therapy Goal Statement (SLP) Patient would like Cheerios and an english muffin   Pertinent History of Current Problem Patient admitted with pyelonephritis, recent t4-5 meningioma resection with concern for infection.  His PMH is significant for CVA, atrial fibrillation, HTN, and large Zenker's diverticulum and partial Schatzki's ring noted on Upper GI endo in 2019.   Type of Evaluation   Type of Evaluation Swallow Evaluation   Oral Motor   Oral Musculature generally intact   Oral Motor Deficits Observed Cough/Swallow/Gag Reflex (Oral Motor) (Group);Dentition (Oral Motor) (Group);Facial Sensation (Group)   Dentition (Oral Motor)   Dentition (Oral Motor) adequate dentition   Facial Symmetry (Oral Motor)   Facial Symmetry (Oral Motor) WNL  (Minimal right sided asymmetry)   Vocal Quality/Secretion Management (Oral Motor)   Vocal Quality (Oral Motor) WNL   General Swallowing Observations   Current Diet/Method of Nutritional Intake (General Swallowing Observations, NIS) NPO   Respiratory Support room air   Past History of Dysphagia Zenker's diverticulum (large) noted in 2019, patient tolerating regular diet with compensations after hospitalization in 2019   Swallowing Evaluation Clinical swallow evaluation   Clinical Swallow Evaluation   Feeding Assistance set up only required   Clinical Swallow Evaluation Textures Trialed thin liquids;pureed;solid foods   Clinical Swallow Eval: Thin Liquid Texture Trial   Mode of Presentation, Thin Liquids cup;straw;self-fed;fed by clinician   Volume of Liquid or Food Presented 10 oz   Oral Phase of Swallow WFL   Pharyngeal Phase of Swallow intact   Diagnostic Statement Throat clear x1 trial, suspect increased  aspiration risk associated with known large Zenker's diverticulum across the past 6 years but compensating well   Clinical Swallow Evaluation: Puree Solid Texture Trial   Mode of Presentation, Puree spoon;self-fed   Volume of Puree Presented 4 oz   Oral Phase, Puree WFL   Pharyngeal Phase, Puree intact   Diagnostic Statement No overt Sx of aspiration   Clinical Swallow Evaluation: Solid Food Texture Trial   Mode of Presentation self-fed   Volume Presented 4 crackers   Oral Phase WFL   Pharyngeal Phase impaired;throat clearing   Diagnostic Statement Suspect increased aspiration risk associated with known large Zenker's diverticulum across the past 6 years but compensating well   Esophageal Phase of Swallow   Patient reports or presents with symptoms of esophageal dysphagia Yes   Esophageal comments Large Zenker's diverticulum known without treatment   Swallowing Recommendations   Diet Consistency Recommendations thin liquids (level 0);regular diet   Supervision Level for Intake distant supervision needed   Mode of Delivery Recommendations slow rate of intake   Swallowing Maneuver Recommendations throat clear-swallow;alternate food and liquid intake;extra swallow   Monitoring/Assistance Required (Eating/Swallowing) stop eating activities when fatigue is present   Recommended Feeding/Eating Techniques (Swallow Eval) maintain upright sitting position for eating;maintain upright posture during/after eating for 30 minutes;set-up and prepare tray;provide 6 smaller meals throughout day   Medication Administration Recommendations, Swallowing (SLP) As preferred  per patient, recommend liquid wash and upright positioning 1 hour after any intake   Clinical Impression   Criteria for Skilled Therapeutic Interventions Met (SLP Eval) Yes, treatment indicated   SLP Diagnosis Mild pharyngeal dysphagia   Risks & Benefits of therapy have been explained evaluation/treatment results reviewed   SLP Total Evaluation Time   Eval:  oral/pharyngeal swallow function, clinical swallow Minutes (08751) 25   SLP Goals   Therapy Frequency (SLP Eval) 3 times/week   SLP Predicted Duration/Target Date for Goal Attainment 07/02/25   SLP Goals Swallow   SLP: Safely tolerate diet without signs/symptoms of aspiration Regular diet;Thin liquids;With use of compensatory swallow strategies;With use of swallow precautions;Independently   Interventions   Interventions Quick Adds Swallowing Dysfunction   Swallowing Intervention   Treatment of Swallowing Dysfunction &/or Oral Function for Feeding Minutes (04552) 10   Symptoms Noted During/After Treatment Fatigue   Treatment Detail/Skilled Intervention Patient trained re: impact of  Zenker's diverticulum on pharyngeal function and aspiration risks associated with regurgitation. He verbalized comprehension of training for alternating textures and remaining upright during and 1 hour after any intake.   SLP Discharge Planning   SLP Plan Meal follow up, Zenker's compensations   SLP Discharge Recommendation Transitional Care Facility   SLP Rationale for DC Rec Recommend SLP follow up to ensure safe compensation strategies, may meet swallowing goals by discharge.   SLP Brief overview of current status  Clinical swallow evaluation and treatment: recommend resume regular diet with thin liquids with regurgitation precautions with known large (untreated) Zenker's diverticulum.  Eat when alert, upright during and 1 hour after meals, slow rate and alternated textures.   SLP Time and Intention   Total Session Time (sum of timed and untimed services) 35

## 2025-06-25 NOTE — CONSULTS
[facility]    Infectious Disease Consultation     Date of Admission:  6/24/2025  Date of Consult (When I saw the patient): 06/25/25    Assessment:  89 YM with history of CVA, A-fib/flutter, hyperlipidemia, hypertension, prostate cancer, and recent hospitalization from 6/7 - 6/15 for bilateral lower extremity weakness which was felt to be related to a meningioma at T4/T5 resulting in cord compression, and he underwent resection of meningioma on 6/12/25. He has been admitted with acute onset of chills, rigors and confusion, without obvious focus of infection so far, and appears to be at baseline today.      There is some fluid collection at the surgical site but doubt this is related to infection. Additionally he has enlargement of thoracic and abdominal aortic aneurysm, though no concern for mycotic aneurysm at this time in the absence of bacteremia . He was additionally noted to have a distended gall bladder but no cholecystitis noted on US imaging.    -Chills and confusion which have resolved  -Leukocytosis has resolved  - T4/T5 meningioma resulting in cord compression, s/p laminectomy and resection of meningioma on 6/12  -Enlargement of the thoracic and abdominal aorta   -Chronic medical conditions - CVA, A-fib/flutter, hyperlipidemia, hypertension, prostate cancer    Recommendations:  MRI shows small fluid collection at the laminectomy site which may be post operative fluid, await further Neurosurgical evaluation.  Check respiratory viral PCR panel  Follow blood cxs  Discontinue Vancomycin, maintain on Ceftriaxone for now  ID will continue to follow      Yandy Guy MD    Reason for Consult   Reason for consult: I was asked to evaluate this patient for sepsis.    Primary Care Physician   Melissa Torres    Chief Complaint   Confusion and chills    History is obtained from the patient and medical records    History of Present Illness   Yosef Murry is a 89 year old male with history of  CVA, A-fib/flutter, hyperlipidemia, hypertension, prostate cancer, and recent hospitalization from 6/7 - 6/15 for bilateral lower extremity weakness which was felt to be related to a meningioma at T4/T5 resulting in cord compression, and he underwent resection of meningioma on 6/12/25. He has been admitted with acute onset of chills, rigors and confusion, without obvious focus of infection so far, and appears to be at baseline today.  There is some fluid collection at the surgical site which is apparent on MRI    Patient has remained afebrile since hospital stay. Per family, he developed acute onset of chills and rigors and became acutely confused yesterday resulting in current hospitalization. He had leukocytosis of 14.3K and CRP was 225. Procalcitonin and lactate are negative. WBC has normalized today. CT head was negative, cT chest abdomen pelvis showed  Cholelithiasis and distended gallbladder , post operative fluid collection at surgical site, and enlargement of thoracic and abdominal aortic aneurysm    Blood cxs are with no growth so far. Patient was initiated on Ceftriaxone and vancomycin and ID has been asked to assist with further management.    Past Medical History   I have reviewed this patient's medical history and updated it with pertinent information if needed.   Past Medical History:   Diagnosis Date    Anal fistula     Antiplatelet or antithrombotic long-term use     Arrhythmia     Atrial flutter (H) 2012    Cerebral infarction (H)     TIA    Heartburn     Hypertension     Inguinal hernia, left     Persistent atrial fibrillation (H) 8/21/12    Prostate cancer (H)     TIA (transient ischaemic attack)     2014       Past Surgical History   I have reviewed this patient's surgical history and updated it with pertinent information if needed.  Past Surgical History:   Procedure Laterality Date    COLONOSCOPY      COLONOSCOPY N/A 9/24/2021    Procedure: Colonoscopy, With Polypectomy And Biopsy;  Surgeon:  Jordin Rachel MD;  Location:  GI    ENT SURGERY      tonsllectomy    EXAM UNDER ANESTHESIA, FISTULOTOMY RECTUM, COMBINED N/A 6/18/2015    Procedure: COMBINED EXAM UNDER ANESTHESIA, FISTULOTOMY RECTUM;  Surgeon: Candice Carty MD;  Location: [facility]    GENITOURINARY SURGERY  1999    PROSTATECTOMY    GI SURGERY      hernia repair x 2    HERNIORRHAPHY INGUINAL  7/25/2013    Procedure: HERNIORRHAPHY INGUINAL;  LEFT INGUINAL HERNIA REPAIR WITH MESH;  Surgeon: Filipe Fairchild MD;  Location: [facility]    HERNIORRHAPHY INGUINAL Right 6/13/2019    Procedure: OPEN RIGHT INGUINA HERNIA REPAIR WITH MESH;  Surgeon: Filipe Fairchild MD;  Location:  OR    LAMINECTOMY, EXCISE TUMOR THORACIC, COMBINED N/A 6/12/2025    Procedure: Thoracic 4 to Thoracic 5 laminectomy and resection of meningioma;  Surgeon: Juan Miguel Benitez MD;  Location:  OR    ORTHOPEDIC SURGERY      WRIST SURGERY - LEFT       Prior to Admission Medications   Prior to Admission Medications   Prescriptions Last Dose Informant Patient Reported? Taking?   Multiple Vitamins-Minerals (PRESERVISION AREDS 2 PO) 6/24/2025 Self Yes Yes   Sig: Take 1 capsule by mouth 2 times daily AREDS 2   acetaminophen (TYLENOL) 325 MG tablet 6/19/2025  No Yes   Sig: Take 2 tablets (650 mg) by mouth every 4 hours as needed for mild pain or other (and adjunct with moderate or severe pain or per patient request).   amLODIPine (NORVASC) 5 MG tablet 6/24/2025 Self No Yes   Sig: Take 1 tablet (5 mg) by mouth daily   Patient taking differently: Take 10 mg by mouth daily.   apixaban ANTICOAGULANT (ELIQUIS) 5 MG tablet 6/24/2025  Yes Yes   Sig: Take 5 mg by mouth 2 times daily.   atorvastatin (LIPITOR) 10 MG tablet 6/23/2025 Self No Yes   Sig: Take 1 tablet (10 mg) by mouth every evening   calcium carbonate (TUMS) 500 MG chewable tablet 6/19/2025 Self Yes Yes   Sig: Take 1 chew tab by mouth nightly as needed    dexAMETHasone (DECADRON) 1 MG tablet 6/20/2025  No No   Sig: Take 4  tablets (4 mg) by mouth daily for 1 day, THEN 2 tablets (2 mg) daily for 2 days, THEN 1 tablet (1 mg) daily for 2 days.   hydrOXYzine HCl (ATARAX) 10 MG tablet 6/17/2025  No Yes   Sig: Take 1 tablet (10 mg) by mouth every 6 hours as needed for other (adjuvant pain).   loratadine (CLARITIN) 10 MG tablet 6/24/2025 Self Yes Yes   Sig: Take 1 tablet by mouth daily.   losartan (COZAAR) 50 MG tablet 6/24/2025 Self No Yes   Sig: Take 1 tablet (50 mg) by mouth daily   magnesium hydroxide (MILK OF MAGNESIA) 400 MG/5ML suspension 6/19/2025  Yes Yes   Sig: Take 30 mLs by mouth daily as needed for constipation or heartburn.   methocarbamol (ROBAXIN) 750 MG tablet 6/19/2025  No Yes   Sig: Take 1 tablet (750 mg) by mouth 4 times daily as needed for muscle spasms.   metoprolol succinate ER (TOPROL XL) 25 MG 24 hr tablet 6/24/2025 Self No Yes   Sig: Take 1 tablet (25 mg) by mouth daily   ondansetron (ZOFRAN ODT) 4 MG ODT tab   No Yes   Sig: Take 1 tablet (4 mg) by mouth every 8 hours as needed for nausea.   oxyCODONE (ROXICODONE) 5 MG tablet 6/24/2025  No Yes   Sig: Take 1 tablet (5 mg) by mouth 2 times daily. And QID PRN   polyethylene glycol (MIRALAX) 17 GM/Dose powder 6/24/2025  No Yes   Sig: Take 17 g by mouth daily.   vitamin D3 (CHOLECALCIFEROL) 50 mcg (2000 units) tablet 6/24/2025 Self Yes Yes   Sig: Take 1 tablet by mouth daily      Facility-Administered Medications: None     Allergies   Allergies   Allergen Reactions    Other [Seasonal Allergies]        Immunization History   Immunization History   Administered Date(s) Administered    COVID-19 Bivalent 18+ (Moderna) 10/03/2022    COVID-19 Monovalent 18+ (Moderna) 03/08/2021, 04/05/2021    COVID-19 Monovalent Booster 18+ (Moderna) 11/02/2021, 04/18/2022    Flu 65+ (Fluad) 09/15/2012    [id number] Novel Flu P-free 12/16/2009    Influenza (High Dose) Trivalent,PF (Fluzone) 09/19/2013, 09/22/2014, 09/30/2015, 09/27/2016, 10/05/2017, 09/06/2018, 09/19/2019, 10/10/2024    Influenza  Vaccine 65+ (Fluzone HD) 09/23/2020, 09/14/2021, 10/03/2022    Influenza, Split Virus, Trivalent, Pf (Fluzone\Fluarix) 09/18/2012    Pneumo Conj 13-V (2010&after) 09/27/2016    Pneumococcal 23 valent 11/01/2011    TD,PF 7+ (Tenivac) 11/01/2019    Zoster recombinant adjuvanted (Shingrix) 09/06/2018, 11/08/2018       Social History   I have reviewed this patient's social history and updated it with pertinent information if needed. Yosef Murry  reports that he quit smoking about 56 years ago. His smoking use included cigarettes. He started smoking about 72 years ago. He has never used smokeless tobacco. He reports current alcohol use of about 0.8 standard drinks of alcohol per week. He reports that he does not use drugs.    Family History   I have reviewed this patient's family history and updated it with pertinent information if needed.   Family History   Problem Relation Age of Onset    Ovarian Cancer Mother     Osteoporosis Father     Unknown/Adopted Sister        Review of Systems   The 10 point Review of Systems is as per HPI    Physical Exam   Temp: 97.7  F (36.5  C) Temp src: Oral BP: 129/78 Pulse: 84   Resp: 17 SpO2: 97 % O2 Device: None (Room air) Oxygen Delivery: 1 LPM  Vital Signs with Ranges  Temp:  [97.7  F (36.5  C)-97.8  F (36.6  C)] 97.7  F (36.5  C)  Pulse:  [] 84  Resp:  [9-18] 17  BP: ()/(62-78) 129/78  SpO2:  [91 %-100 %] 97 %  176 lbs 9.42 oz  Body mass index is 23.95 kg/m .    GENERAL APPEARANCE:  awake  EYES: Eyes grossly normal to inspection  NECK: no adenopathy  RESP: lungs clear   CV: S1S2, irregular rhythm  ABDOMEN: soft, nontender  MS: LE edema  SKIN: no suspicious lesions or rashes    Data   All laboratory data reviewed  Component      Latest Ref Rng 6/25/2025  8:31 AM   Sodium      135 - 145 mmol/L 138    Potassium      3.4 - 5.3 mmol/L 3.9    Chloride      98 - 107 mmol/L 104    Carbon Dioxide (CO2)      22 - 29 mmol/L 24    Anion Gap      7 - 15 mmol/L 10    Urea Nitrogen       8.0 - 23.0 mg/dL 15.2    Creatinine      0.67 - 1.17 mg/dL 0.61 (L)    GFR Estimate      >60 mL/min/1.73m2 >90    Calcium      8.8 - 10.4 mg/dL 8.7 (L)    Glucose      70 - 99 mg/dL 89    WBC      4.0 - 11.0 10e3/uL 10.2    RBC Count      4.40 - 5.90 10e6/uL 4.42    Hemoglobin      13.3 - 17.7 g/dL 13.7    Hematocrit      40.0 - 53.0 % 41.4    MCV      78 - 100 fL 94    MCH      26.5 - 33.0 pg 31.0    MCHC      31.5 - 36.5 g/dL 33.1    RDW      10.0 - 15.0 % 15.0    Platelet Count      150 - 450 10e3/uL 270       Microbiology  Collected Updated Procedure Result Status    06/25/2025 0843 06/25/2025 0855 MRSA MSSA PCR, Nasal Swab [[id number]]   Swab from Nares, Bilateral    In process Component Value   No component results          06/24/2025 1200 06/25/2025 0303 Blood Culture Peripheral blood (BC) Arm, Right [[id number]]   Peripheral blood (BC) from Arm, Right    Preliminary result Component Value   Culture No growth after 12 hours P             06/24/2025 1200 06/25/2025 0303 Blood Culture Peripheral blood (BC) Arm, Right [[id number]]   Peripheral blood (BC) from Arm, Right    Preliminary result Component Value   Culture No growth after 12 hours P             06/24/2025 1146 06/24/2025 1241 Influenza A/B, RSV and SARS-CoV2 PCR (COVID-19) Nose [[id number]]    Swab from Nose    Final result Component Value   Influenza A PCR Negative   Influenza B PCR Negative   RSV PCR Negative   SARS CoV2 PCR Negative        Imaging  EXAM: MR THORACIC SPINE W/O and W CONTRAST  LOCATION: [facility]  DATE: 6/24/2025     INDICATION: S p post thoracic laminectomy, ?leg weakness  COMPARISON:  MR thoracic spine June 8, 2025.  CONTRAST: 7mL Gadavist  TECHNIQUE: Routine Thoracic Spine MRI without and with IV contrast.     FINDINGS:      Interval T4-T5 laminectomy with moderate 8 x 11 x 34 mm. (AP by TV by CC) fluid collection at the laminectomy site. Fluid collection results in mild narrowing of the  thecal sac. Compression on the thoracic cord is significantly improved compared to   prior. Small focus of increased signal in the left thoracic cord at level T4-T5. Evaluation of the cord on prior is limited due to the degree of mass effect, this is likely chronic myelomalacia. No additional cord signal abnormality. No abnormal   intrathecal enhancement. Small right pleural effusion with associated atelectasis. Remainder of findings are unchanged compared to prior.                                                                      IMPRESSION:  1.  Interval T4-T5 laminectomy with significantly improved compression of the thoracic cord. Small focus of increased cord signal at this level likely represents chronic myelomalacia.  2.  Small fluid collection at the laminectomy site resulting in mild narrowing of the thecal sac.       EXAM: US ABDOMEN LIMITED  LOCATION: [facility]  DATE: 6/24/2025     INDICATION: Sepsis. Distended gallbladder.  COMPARISON: CT chest abdomen and pelvis 6/24/2025.  TECHNIQUE: Limited abdominal ultrasound.     FINDINGS:     GALLBLADDER: Distended and contains multiple small gallstones in the fundus. No gallbladder wall thickening or pericholecystic fluid. Sonographic Desouza's sign is negative.     BILE DUCTS: No biliary dilatation. The common duct measures 8 mm, which is within normal limits for the patient's age.     LIVER: Normal parenchyma with smooth contour. No focal mass. The main portal vein is patent with flow in the normal direction.     RIGHT KIDNEY: No hydronephrosis.     PANCREAS: The visualized portions are normal.     No ascites.                                                                      IMPRESSION:     1.  Distended gallbladder with small gallstones. No evidence of acute cholecystitis.     2.  No biliary ductal dilation.      EXAM: CT CHEST/ABDOMEN/PELVIS W CONTRAST  LOCATION: [facility]  DATE: 6/24/2025      INDICATION: Confusion, rigors, recent spine surgery T4-T5, evaluate infection.  COMPARISON: CT chest 9/5/2022, CT chest, abdomen and pelvis 8/2/2019.  TECHNIQUE: CT scan of the chest, abdomen, and pelvis was performed following injection of IV contrast. Multiplanar reformats were obtained. Dose reduction techniques were used.   CONTRAST: 89 mL Isovue 370.     FINDINGS:   LUNGS AND PLEURA: No effusion or pneumothorax. Bibasilar atelectasis versus scarring appears similar to prior. No new airspace disease identified. Stable 3 mm right upper lobe nodule image 57 of series 4.     MEDIASTINUM/AXILLAE: No adenopathy. No acute mediastinal abnormality identified. Multichamber cardiomegaly appears stable. There is diffuse calcified atherosclerosis of the thoracic aorta. Enlargement of the aortic arch that is now 5.2 cm, previously 4.1   cm, series 3 image 26. There is new peripheral atherosclerotic thrombus noted. Mild enlargement of the descending thoracic aorta that is now 3.8 cm, previously 3.5 cm, image 62.     CORONARY ARTERY CALCIFICATION: Severe.     HEPATOBILIARY: Distended gallbladder with cholelithiasis. Liver appears unremarkable.     PANCREAS: Normal.     SPLEEN: Normal.     ADRENAL GLANDS: Normal.     KIDNEYS/BLADDER: Some mild wall prominence of the left renal pelvis series 3 image 160. No acute renal abnormality. No obstructing stone. Nonobstructing stone lower right kidney is 4 mm, image 173. Left lower renal cysts noted without specific imaging   follow-up recommended. Bladder is unremarkable.     BOWEL: Moderate stool. No small bowel obstruction. No evidence for appendicitis. No free fluid or free air. Colonic diverticulosis without diverticulitis.     LYMPH NODES: Normal.     VASCULATURE: Scattered vascular calcifications. Infrarenal abdominal aorta is 3.4 cm, previously 3 cm series 3 image 171.     PELVIC ORGANS: No acute pelvic abnormality identified. No pelvic fluid collection.     MUSCULOSKELETAL:  Posterior laminectomy at T4 and T5. Small fluid at the operative site may just be postoperative in nature and is approximately 1.6 cm. No visible peripheral rim enhancement is identified. Diffuse degenerative changes of the spine. No   aggressive bone lesion.                                                                      IMPRESSION:  1.  Posterior laminectomy at T4 and T5. Small fluid at the operative site is noted and may be simple postoperative fluid. No peripheral rim enhancement can be seen. Infected fluid cannot be entirely excluded based on imaging alone. If there are   progressive symptoms, repeat imaging at this level could be performed for comparison.  2.  Cholelithiasis and distended gallbladder. Correlate clinically with any gallbladder symptoms.  3.  Mild wall thickening of the left renal pelvis. Correlate with any evidence of urinary tract infection.  4.  Nonobstructing stone at the right kidney.  5.  Stable cardiomegaly.  6.  Interval enlargement of the thoracic and abdominal aorta at several levels as compared to 9/5/2022. Aortic arch is now 5.2 cm, previously 4.1 cm. The descending thoracic aorta is now 3.8 cm, previously 3.5 cm. Abdominal aorta is now 3.4 cm,   previously 3 cm. Recommend vascular consultation for management.

## 2025-06-25 NOTE — PHARMACY-VANCOMYCIN DOSING SERVICE
Pharmacy Vancomycin Initial Note  Date of Service 2025  Patient's  1935  89 year old, male    Indication: Sepsis    Current estimated CrCl = Estimated Creatinine Clearance: 68.9 mL/min (based on SCr of 0.84 mg/dL).    Creatinine for last 3 days  2025: 11:45 AM Creatinine 0.84 mg/dL    Recent Vancomycin Level(s) for last 3 days  No results found for requested labs within last 3 days.      Vancomycin IV Administrations (past 72 hours)                     vancomycin (VANCOCIN) 1,750 mg in 0.9% NaCl 500 mL intermittent infusion (mg) 1,750 mg Given 25 1735                    Nephrotoxins and other renal medications (From now, onward)      Start     Dose/Rate Route Frequency Ordered Stop    25 1700  vancomycin (VANCOCIN) 1,500 mg in 0.9% NaCl 250 mL intermittent infusion         1,500 mg  over 90 Minutes Intravenous EVERY 24 HOURS 25 0019              Contrast Orders - past 72 hours (72h ago, onward)      Start     Dose/Rate Route Frequency Stop    25 2045  gadobutrol (GADAVIST) injection 7 mL         7 mL Intravenous ONCE 25 1235  iopamidol (ISOVUE-370) solution 89 mL         89 mL Intravenous ONCE 25 1256            [employer] Prediction of Planned Initial Vancomycin Regimen  Loading dose: N/A  Regimen: 1500 mg IV every 24 hours.  Start time: 17:35 on 2025  Exposure target: AUC24 (range) 400-600 mg/L.hr   AUC24,ss: 523 mg/L.hr  Probability of AUC24 > 400: 76 %  Ctrough,ss: 15.1 mg/L  Probability of Ctrough,ss > 20: 29 %  Probability of nephrotoxicity (Lodise STANLEY ): 10 %        Plan:  Start vancomycin  1500 mg IV q24h after load of 1750 mg.   Vancomycin monitoring method: AUC  Vancomycin therapeutic monitoring goal: 400-600 mg*h/L  Pharmacy will check vancomycin levels as appropriate in 1-3 Days.    Serum creatinine levels will be ordered daily for the first week of therapy and at least twice weekly for subsequent weeks.      Rico  Aliheyder, [facility]

## 2025-06-25 NOTE — PLAN OF CARE
Goal Outcome Evaluation:    Shift Summary [id number]    Admitting Diagnosis: Pyelonephritis, acute [N10]  Confusion associated with infection [B99.9, R41.0]  Calculus of gallbladder without cholecystitis without obstruction [K80.20]   Vitals stable  Pain mild, tylenol  A&Ox3; disoriented to time  Voiding adequately, up to BR  Mobility Up x2 GBW, unsteady   Tele N/A  CMS intactm +2 edema on lower extremities  Lung Sounds clear on RA   GI WNL  Dressing right upper back covered with ABD CDI    Orders Placed This Encounter      NPO for Medical/Clinical Reasons Except for: Meds       Nsg, ID following

## 2025-06-25 NOTE — PROGRESS NOTES
"[facility]    Medicine Progress Note - Hospitalist Service    Date of Admission:  6/24/2025    Assessment & Plan   Yosef Murry is a 89 year old male who who has medical history of CVA, persistent A-fib/flutter, hyperlipidemia, hypertension, DNR/DNI prostate cancer, balance issue who was recently admitted to [facility] from 6/7 to 6/15 where he presented for balance issues and bilateral lower extremity weakness and he was found to have mass at T4/T5 resulting in cord compression/displacement and status postT4-5 laminectomy and resection of meningioma with Dr. Benitez on 6/12/25 and is currently on anticoagulation with Eliquis presented to the ED after he was brought by family members with chief complaint of confusion and rigors and admitted on 6/24/2020     Acute metabolic or toxic Encephalopathy-improving  S/p T4-5 laminectomy and resection of meningioma with Dr. Benitez on 6/12/25  Patient had recent hospital stay at [facility] from 6/7 to 6/15 and was discharged to [place] and had been having improvement at TCU. However the past 2 days he was notably confused with \"rigors\". Within the ED, leukocytosis to 14, CRP quite elevated at 225, procal 0.15.  - UA negative for infection despite wall thickening of R renal pelvis  - CT abdomen pelvis showed posterior laminectomy at T4 and T5  with new fluid collection at operative site without peripheral derangement enhancement. Follow up MRI again with new fluid collection that is not overly infected appearing. Neurosurgery following    - no obvious etiology of infection. Patient is lethargic but not confused today  - ID consulted appreciate their comanagement. Will continue to work up for other causes of infection. Remains on IV rocephin  - neurosurgery consulted and following  - will reduce pain medications and follow for improvement in mentation  - check XR ankle for L heel/ankle pain  - follow blood cultures    History of paroxysmal atrial " fibrillation status flutter  -PTA regimen includes metoprolol succinate 25 mg daily along with Eliquis 5 twice daily    - will hold eliquis for possible future procedure and start lovenox q12h for now  - resume metoprolol     Hypertension  Hyperlipidemia   -PTA regimen includes: Amlodipine 10 mg daily, atorvastatin 10 mg, Eliquis 5 twice daily, losartan 50 mg daily, metoprolol succinate 25 mg daily  - will hold losartan and amlodipine for sepsis picture     Hx prostate cancer s/p prostatectomy 2009, radiation therapy 2014 and androgen deprivation therapy  -See Urology clinic note 5/9/25 for details - recent rise in PSA but MRI findings not convincing for any recurrence and PET scan  - CT head negative      CT scan finding of interval enlargement of the thoracic and abdominal aorta from 2022  New peripheral atherosclerotic thrombus of aorta  - His abdominal aortic arch is now 5.2 as compared to 4.1 in the past and descending thoracic aorta has increased to 3.8 and abdominal aorta is 3.4 cm  - Will need to follow as outpatient with vascular surgery  - continue atorvastatin and anticoagulation           Diet: Combination Diet Regular Diet; Thin Liquids (level 0) (Eat when fully alert and upright during and 1 hour after meals, reflux precautions)  Room Service    DVT Prophylaxis: Enoxaparin (Lovenox) SQ  Underwood Catheter: Not present  Lines: None     Cardiac Monitoring: None  Code Status: No CPR- Do NOT Intubate      Clinically Significant Risk Factors Present on Admission          # Hypochloremia: Lowest Cl = 97 mmol/L in last 2 days, will monitor as appropriate      # Hypoalbuminemia: Lowest albumin = 3 g/dL at 6/24/2025 11:45 AM, will monitor as appropriate  # Drug Induced Coagulation Defect: home medication list includes an anticoagulant medication    # Hypertension: Home medication list includes antihypertensive(s)               # Financial/Environmental Concerns:           Social Drivers of Health    Tobacco Use:  Medium Risk (6/21/2025)    Patient History     Smoking Tobacco Use: Former     Smokeless Tobacco Use: Never   Physical Activity: Unknown (10/10/2024)    Exercise Vital Sign     Days of Exercise per Week: 0 days   Social Connections: Unknown (10/10/2024)    Social Connection and Isolation Panel [NHANES]     Frequency of Social Gatherings with Friends and Family: Once a week          Disposition Plan     Medically Ready for Discharge: Anticipated in 2-4 Days             Christen House DO  Hospitalist Service  [facility]  Securely message with [employer] (more info)  Text page via [employer] Paging/Directory   ______________________________________________________________________    Interval History   Assumed care today. Reviewed admission notes and jeff notes. Patient up in chair. He was sleeping but did wake up and have normal conversation with me. He was Aox4. He told me his back was hurting a bit more than on discharge. He also have R ankle pain that has been there for a few days. No other pain. No dysuria. No cough. No rashes.     Physical Exam   Vital Signs: Temp: 97.7  F (36.5  C) Temp src: Oral BP: 129/78 Pulse: 84   Resp: 17 SpO2: 97 % O2 Device: None (Room air) Oxygen Delivery: 1 LPM  Weight: 176 lbs 9.42 oz    Constitutional: Awake, alert, cooperative, no apparent distress,   Respiratory: Clear to auscultation bilaterally, no crackles or wheezing  Cardiovascular: Regular rate and rhythm, normal S1 and S2, and no murmur noted  GI: Normal bowel sounds, soft, non-distended, non-tender  Skin/Integumen: No rashes, no cyanosis, no edema  Other:  No joint swelling to L ankle, back incision is clean and dry without signs of infection    Medical Decision Making       55 MINUTES SPENT BY ME on the date of service doing chart review, history, exam, documentation & further activities per the note.      Data     I have personally reviewed the following data over the past 24 hrs:    10.2  \   13.7   /  270     138 104 15.2 /  89   3.9 24 0.61 (L) \     Procal: N/A CRP: N/A Lactic Acid: N/A         Imaging results reviewed over the past 24 hrs:   Recent Results (from the past 24 hours)   US Abdomen Limited    Narrative    EXAM: US ABDOMEN LIMITED  LOCATION: [facility]  DATE: 6/24/2025    INDICATION: Sepsis. Distended gallbladder.  COMPARISON: CT chest abdomen and pelvis 6/24/2025.  TECHNIQUE: Limited abdominal ultrasound.    FINDINGS:    GALLBLADDER: Distended and contains multiple small gallstones in the fundus. No gallbladder wall thickening or pericholecystic fluid. Sonographic Desouza's sign is negative.    BILE DUCTS: No biliary dilatation. The common duct measures 8 mm, which is within normal limits for the patient's age.    LIVER: Normal parenchyma with smooth contour. No focal mass. The main portal vein is patent with flow in the normal direction.    RIGHT KIDNEY: No hydronephrosis.    PANCREAS: The visualized portions are normal.    No ascites.      Impression    IMPRESSION:    1.  Distended gallbladder with small gallstones. No evidence of acute cholecystitis.    2.  No biliary ductal dilation.   MR Thoracic Spine w/o & w Contrast    Narrative    EXAM: MR THORACIC SPINE W/O and W CONTRAST  LOCATION: [facility]  DATE: 6/24/2025    INDICATION: S p post thoracic laminectomy, ?leg weakness  COMPARISON:  MR thoracic spine June 8, 2025.  CONTRAST: 7mL Gadavist  TECHNIQUE: Routine Thoracic Spine MRI without and with IV contrast.    FINDINGS:     Interval T4-T5 laminectomy with moderate 8 x 11 x 34 mm. (AP by TV by CC) fluid collection at the laminectomy site. Fluid collection results in mild narrowing of the thecal sac. Compression on the thoracic cord is significantly improved compared to   prior. Small focus of increased signal in the left thoracic cord at level T4-T5. Evaluation of the cord on prior is limited due to the degree of mass effect, this is likely  chronic myelomalacia. No additional cord signal abnormality. No abnormal   intrathecal enhancement. Small right pleural effusion with associated atelectasis. Remainder of findings are unchanged compared to prior.      Impression    IMPRESSION:  1.  Interval T4-T5 laminectomy with significantly improved compression of the thoracic cord. Small focus of increased cord signal at this level likely represents chronic myelomalacia.  2.  Small fluid collection at the laminectomy site resulting in mild narrowing of the thecal sac.

## 2025-06-26 ENCOUNTER — APPOINTMENT (OUTPATIENT)
Dept: PHYSICAL THERAPY | Facility: CLINIC | Age: 89
DRG: 092 | End: 2025-06-26
Attending: INTERNAL MEDICINE
Payer: MEDICARE

## 2025-06-26 VITALS
BODY MASS INDEX: 23.92 KG/M2 | DIASTOLIC BLOOD PRESSURE: 73 MMHG | RESPIRATION RATE: 16 BRPM | HEART RATE: 77 BPM | SYSTOLIC BLOOD PRESSURE: 123 MMHG | OXYGEN SATURATION: 94 % | TEMPERATURE: 98.8 F | WEIGHT: 176.59 LBS | HEIGHT: 72 IN

## 2025-06-26 LAB
ANION GAP SERPL CALCULATED.3IONS-SCNC: 7 MMOL/L (ref 7–15)
BACTERIA SPEC CULT: NORMAL
BACTERIA SPEC CULT: NORMAL
BASOPHILS # BLD AUTO: 0 10E3/UL (ref 0–0.2)
BASOPHILS NFR BLD AUTO: 0 %
BUN SERPL-MCNC: 14 MG/DL (ref 8–23)
CALCIUM SERPL-MCNC: 8.4 MG/DL (ref 8.8–10.4)
CHLORIDE SERPL-SCNC: 104 MMOL/L (ref 98–107)
CREAT SERPL-MCNC: 0.6 MG/DL (ref 0.67–1.17)
EGFRCR SERPLBLD CKD-EPI 2021: >90 ML/MIN/1.73M2
EOSINOPHIL # BLD AUTO: 0.1 10E3/UL (ref 0–0.7)
EOSINOPHIL NFR BLD AUTO: 1 %
ERYTHROCYTE [DISTWIDTH] IN BLOOD BY AUTOMATED COUNT: 14.9 % (ref 10–15)
GLUCOSE SERPL-MCNC: 98 MG/DL (ref 70–99)
HCO3 SERPL-SCNC: 26 MMOL/L (ref 22–29)
HCT VFR BLD AUTO: 41.6 % (ref 40–53)
HGB BLD-MCNC: 13.2 G/DL (ref 13.3–17.7)
IMM GRANULOCYTES # BLD: 0 10E3/UL
IMM GRANULOCYTES NFR BLD: 0 %
LYMPHOCYTES # BLD AUTO: 0.8 10E3/UL (ref 0.8–5.3)
LYMPHOCYTES NFR BLD AUTO: 8 %
MCH RBC QN AUTO: 30.2 PG (ref 26.5–33)
MCHC RBC AUTO-ENTMCNC: 31.7 G/DL (ref 31.5–36.5)
MCV RBC AUTO: 95 FL (ref 78–100)
MONOCYTES # BLD AUTO: 0.6 10E3/UL (ref 0–1.3)
MONOCYTES NFR BLD AUTO: 7 %
NEUTROPHILS # BLD AUTO: 7.9 10E3/UL (ref 1.6–8.3)
NEUTROPHILS NFR BLD AUTO: 84 %
NRBC # BLD AUTO: 0 10E3/UL
NRBC BLD AUTO-RTO: 0 /100
PLATELET # BLD AUTO: 272 10E3/UL (ref 150–450)
POTASSIUM SERPL-SCNC: 4.3 MMOL/L (ref 3.4–5.3)
RBC # BLD AUTO: 4.37 10E6/UL (ref 4.4–5.9)
SODIUM SERPL-SCNC: 137 MMOL/L (ref 135–145)
WBC # BLD AUTO: 9.4 10E3/UL (ref 4–11)

## 2025-06-26 PROCEDURE — 250N000013 HC RX MED GY IP 250 OP 250 PS 637: Performed by: STUDENT IN AN ORGANIZED HEALTH CARE EDUCATION/TRAINING PROGRAM

## 2025-06-26 PROCEDURE — 97161 PT EVAL LOW COMPLEX 20 MIN: CPT | Mod: GP | Performed by: PHYSICAL THERAPIST

## 2025-06-26 PROCEDURE — 99233 SBSQ HOSP IP/OBS HIGH 50: CPT | Performed by: INTERNAL MEDICINE

## 2025-06-26 PROCEDURE — 120N000001 HC R&B MED SURG/OB

## 2025-06-26 PROCEDURE — 99232 SBSQ HOSP IP/OBS MODERATE 35: CPT | Performed by: SPECIALIST

## 2025-06-26 PROCEDURE — 250N000013 HC RX MED GY IP 250 OP 250 PS 637: Performed by: HOSPITALIST

## 2025-06-26 PROCEDURE — 80048 BASIC METABOLIC PNL TOTAL CA: CPT | Performed by: STUDENT IN AN ORGANIZED HEALTH CARE EDUCATION/TRAINING PROGRAM

## 2025-06-26 PROCEDURE — 250N000011 HC RX IP 250 OP 636: Performed by: STUDENT IN AN ORGANIZED HEALTH CARE EDUCATION/TRAINING PROGRAM

## 2025-06-26 PROCEDURE — 258N000003 HC RX IP 258 OP 636: Performed by: HOSPITALIST

## 2025-06-26 PROCEDURE — 97530 THERAPEUTIC ACTIVITIES: CPT | Mod: GP | Performed by: PHYSICAL THERAPIST

## 2025-06-26 PROCEDURE — 250N000011 HC RX IP 250 OP 636: Performed by: HOSPITALIST

## 2025-06-26 PROCEDURE — 36415 COLL VENOUS BLD VENIPUNCTURE: CPT | Performed by: STUDENT IN AN ORGANIZED HEALTH CARE EDUCATION/TRAINING PROGRAM

## 2025-06-26 PROCEDURE — 250N000013 HC RX MED GY IP 250 OP 250 PS 637: Performed by: INTERNAL MEDICINE

## 2025-06-26 PROCEDURE — 85025 COMPLETE CBC W/AUTO DIFF WBC: CPT | Performed by: STUDENT IN AN ORGANIZED HEALTH CARE EDUCATION/TRAINING PROGRAM

## 2025-06-26 RX ADMIN — APIXABAN 5 MG: 5 TABLET, FILM COATED ORAL at 20:35

## 2025-06-26 RX ADMIN — SODIUM CHLORIDE: 0.9 INJECTION, SOLUTION INTRAVENOUS at 01:37

## 2025-06-26 RX ADMIN — OXYCODONE HYDROCHLORIDE 5 MG: 5 TABLET ORAL at 20:35

## 2025-06-26 RX ADMIN — OXYCODONE HYDROCHLORIDE 5 MG: 5 TABLET ORAL at 01:37

## 2025-06-26 RX ADMIN — METOPROLOL SUCCINATE 25 MG: 25 TABLET, EXTENDED RELEASE ORAL at 09:49

## 2025-06-26 RX ADMIN — OXYCODONE HYDROCHLORIDE 5 MG: 5 TABLET ORAL at 13:43

## 2025-06-26 RX ADMIN — ATORVASTATIN CALCIUM 10 MG: 10 TABLET, FILM COATED ORAL at 20:35

## 2025-06-26 RX ADMIN — ENOXAPARIN SODIUM 80 MG: 80 INJECTION SUBCUTANEOUS at 06:05

## 2025-06-26 RX ADMIN — LORATADINE 10 MG: 10 TABLET ORAL at 09:49

## 2025-06-26 RX ADMIN — ACETAMINOPHEN 975 MG: 325 TABLET, FILM COATED ORAL at 17:57

## 2025-06-26 RX ADMIN — CEFTRIAXONE SODIUM 2 G: 2 INJECTION, POWDER, FOR SOLUTION INTRAMUSCULAR; INTRAVENOUS at 13:29

## 2025-06-26 RX ADMIN — ACETAMINOPHEN 975 MG: 325 TABLET, FILM COATED ORAL at 09:49

## 2025-06-26 RX ADMIN — ACETAMINOPHEN 975 MG: 325 TABLET, FILM COATED ORAL at 01:37

## 2025-06-26 ASSESSMENT — ACTIVITIES OF DAILY LIVING (ADL)
ADLS_ACUITY_SCORE: 65
ADLS_ACUITY_SCORE: 63
ADLS_ACUITY_SCORE: 62
ADLS_ACUITY_SCORE: 65
ADLS_ACUITY_SCORE: 65
DEPENDENT_IADLS:: INDEPENDENT
ADLS_ACUITY_SCORE: 62
ADLS_ACUITY_SCORE: 63
ADLS_ACUITY_SCORE: 62
ADLS_ACUITY_SCORE: 65
ADLS_ACUITY_SCORE: 62
ADLS_ACUITY_SCORE: 63
ADLS_ACUITY_SCORE: 62
ADLS_ACUITY_SCORE: 63
ADLS_ACUITY_SCORE: 65
ADLS_ACUITY_SCORE: 62
ADLS_ACUITY_SCORE: 63
ADLS_ACUITY_SCORE: 65
ADLS_ACUITY_SCORE: 63
ADLS_ACUITY_SCORE: 65
ADLS_ACUITY_SCORE: 62

## 2025-06-26 NOTE — PLAN OF CARE
Goal Outcome Evaluation:    A&Ox2 - disoriented to time and situation. Up Ax1-2 sarasteady. PRN oxy given for pain.

## 2025-06-26 NOTE — PLAN OF CARE
Goal Outcome Evaluation:    Patient up with assist of 1 and randall mills. Tolerating diet, voiding in BR. Pain managed with PO meds.

## 2025-06-26 NOTE — PROGRESS NOTES
"[facility]    Medicine Progress Note - Hospitalist Service    Date of Admission:  6/24/2025    Assessment & Plan     Yosef Murry is a 89 year old male who who has medical history of CVA, persistent A-fib/flutter, hyperlipidemia, hypertension, DNR/DNI prostate cancer, balance issue who was recently admitted to [facility] from 6/7 to 6/15 where he presented for balance issues and bilateral lower extremity weakness and he was found to have mass at T4/T5 resulting in cord compression/displacement and status postT4-5 laminectomy and resection of meningioma with Dr. Benitez on 6/12/25 and is currently on anticoagulation with Eliquis presented to the ED after he was brought by family members with chief complaint of confusion and rigors and admitted on 6/24/2025.       Acute metabolic or Toxic Encephalopathy-resolved  S/p T4-5 laminectomy and resection of meningioma with Dr. Benitez on 6/12/25  Leukocytosis, resolved    Patient had recent hospital stay at [facility] from 6/7 to 6/15 and was discharged to [place] and had been having improvement at TCU. However the past 2 days he was notably confused with \"rigors\". Within the ED, leukocytosis to 14, CRP quite elevated at 225, procal 0.15.  - UA negative for infection despite wall thickening of R renal pelvis  - CT abdomen pelvis showed posterior laminectomy at T4 and T5  with new fluid collection at operative site without peripheral derangement enhancement. Follow up MRI again with new fluid collection that is not overly infected appearing.     Neurosurgery following (appreciated); thoracic MRI reviewed - no clear infectious source   No clear need for any surgical procedure   Sutures to be removed later today     ID consulted - has remained on IV rocephin                         No clear infection has yet been identified                         Awaiting rounds/recommendations    6/26/25 - appears to be close to his baseline mentation                "  Tolerating 5mg of po prn oxycodone without clear change in mentation     History of paroxysmal atrial fibrillation status flutter    -PTA regimen includes metoprolol succinate 25 mg daily along with Eliquis 5 twice daily     Eliquis  has been on hold and  lovenox q12h     6/26/25 - if no need for additional surgical intervention - can restart DOAC  This evening and discontinue subcutaneous lovenox    Continues on Toprol XL as PTA with holding parameters     Hypertension  Hyperlipidemia   -PTA regimen includes: Amlodipine 10 mg daily, atorvastatin 10 mg, Eliquis 5 twice daily, losartan 50 mg daily, metoprolol succinate 25 mg daily    6/26/25 - SBP tending to be still on the softer side                   Continue to hold PTA losartan, amlodipine                   Continue Toprol XL with holding parameters       Hx prostate cancer s/p prostatectomy 2009, radiation therapy 2014 and androgen deprivation therapy  -See Urology clinic note 5/9/25 for details - recent rise in PSA but MRI findings not convincing for any recurrence and PET scan  - CT head negative      CT scan finding of interval enlargement of the thoracic and abdominal aorta from 2022  New peripheral atherosclerotic thrombus of aorta  - His abdominal aortic arch is now 5.2 as compared to 4.1 in the past and descending thoracic aorta has increased to 3.8 and abdominal aorta is 3.4 cm  - Will need to follow as outpatient with vascular surgery  - continue atorvastatin and anticoagulation       PPE Used:  Mask, gloves          Diet: Combination Diet Regular Diet; Thin Liquids (level 0) (Eat when fully alert and upright during and 1 hour after meals, reflux precautions)  Room Service    DVT Prophylaxis: DOAC  Underwood Catheter: Not present  Lines: None     Cardiac Monitoring: None  Code Status: No CPR- Do NOT Intubate      Clinically Significant Risk Factors          # Hypochloremia: Lowest Cl = 97 mmol/L in last 2 days, will monitor as appropriate      #  "Hypoalbuminemia: Lowest albumin = 3 g/dL at 6/24/2025 11:45 AM, will monitor as appropriate                    # Financial/Environmental Concerns:           Disposition Plan     Medically Ready for Discharge: Anticipated Tomorrow    Likely will be able to discharge back to [place] [facility] 6/27/25 pending ID recommendations           Octavia Hartman, DO  Hospitalist Service  [facility]  Securely message with [employer] (more info)  Text page via [employer] Paging/Directory   ______________________________________________________________________    Interval History     Feeling well.  Pain in his back controlled with current oxycodone dose but \"doesn't last too long\".      No HA, CP, SOB, F/C or N/V.  Appetite good.      Tells me that he thought that he was in [place] at [place] and so they sent him to be evaluated.  Notes that he just returned from 6 months in [place] in April - has a condo down there.     Physical Exam   Vital Signs: Temp: 98.1  F (36.7  C) Temp src: Oral BP: 119/73 Pulse: 80   Resp: 14 SpO2: 96 % O2 Device: None (Room air)    Weight: 176 lbs 9.42 oz    GEN:  Alert, awake, elderly male who appears comfortable, no overt respiratory distress.  HEENT:  Normocephalic/atraumatic, no scleral icterus, no nasal discharge  CV:  Regular rate and rhythm.  S1 + S2 noted  LUNGS:  Clear to auscultation ant bilaterally; diminished air exchange a bilateral bases to ausc without rales/rhonchi/wheezing/retractions.  Symmetric chest rise on inhalation noted.  ABD:  + bowel sounds, soft, non-tender to light exam throughout  EXT:  No significant pretibial edema bilaterally.  No cyanosis.   SKIN:  Dry to touch, no exanthems noted in the visualized areas.    Medical Decision Making       50 MINUTES SPENT BY ME on the date of service doing chart review, history, exam, documentation & further activities per the note.      Data   Medications   Current Facility-Administered Medications   Medication Dose " Route Frequency Provider Last Rate Last Admin    sodium chloride 0.9 % infusion   Intravenous Continuous Harmony Chapin MD 75 mL/hr at 06/26/25 0137 New Bag at 06/26/25 0137     Current Facility-Administered Medications   Medication Dose Route Frequency Provider Last Rate Last Admin    acetaminophen (TYLENOL) tablet 975 mg  975 mg Oral Q8H Harmony Chapin MD   975 mg at 06/26/25 0137    [Held by provider] amLODIPine (NORVASC) tablet 5 mg  5 mg Oral Daily Christen House DO        atorvastatin (LIPITOR) tablet 10 mg  10 mg Oral QPM Christen House DO   10 mg at 06/25/25 2108    cefTRIAXone (ROCEPHIN) 2 g vial to attach to  ml bag for ADULTS or NS 50 ml bag for PEDS  2 g Intravenous Q24H Harmony Chapin MD   2 g at 06/25/25 1413    enoxaparin ANTICOAGULANT (LOVENOX) injection 80 mg  1 mg/kg Subcutaneous BID Christen House DO   80 mg at 06/26/25 0605    loratadine (CLARITIN) tablet 10 mg  10 mg Oral Daily Christen House DO        [Held by provider] losartan (COZAAR) tablet 50 mg  50 mg Oral Daily Christen House DO        metoprolol succinate ER (TOPROL XL) 24 hr tablet 25 mg  25 mg Oral Daily Christen House DO   25 mg at 06/25/25 1413    sodium chloride (PF) 0.9% PF flush 3 mL  3 mL Intracatheter Q8H [facility] Harmony Chapin MD         Labs and Imaging results below reviewed today.  Recent Labs   Lab 06/26/25  0847 06/25/25  0831 06/24/25  1145   WBC 9.4 10.2 14.3*   HGB 13.2* 13.7 14.6   HCT 41.6 41.4 43.9   MCV 95 94 93    270 344     Recent Labs   Lab 06/26/25  0847 06/25/25  0831 06/24/25  1145    138 135   POTASSIUM 4.3 3.9 5.0   CHLORIDE 104 104 97*   CO2 26 24 28   ANIONGAP 7 10 10   GLC 98 89 101*   BUN 14.0 15.2 19.9   CR 0.60* 0.61* 0.84   GFRESTIMATED >90 >90 84   BELLO 8.4* 8.7* 9.0     7-Day Micro Results       Collected Updated Procedure Result Status      06/25/2025 1205 06/25/2025 1727 Respiratory Panel PCR [[id number]]    Swab from Nasopharyngeal    Final result  Component Value   Adenovirus Not Detected   Coronavirus Not Detected   This test detects Coronavirus 229E, HKU1, NL63 and OC43 but does not distinguish between them. It does not detect MERS ( Respiratory Syndrome), SARS (Severe Acute Respiratory Syndrome) or 2019-nCoV (Novel 2019) Coronavirus.   Human Metapneumovirus Not Detected   Human Rhin/Enterovirus Not Detected   Influenza A Not Detected   Influenza A, H1 Not Detected   Influenza A 2009 H1N1 Not Detected   Influenza A, H3 Not Detected   Influenza B Not Detected   Parainfluenza Virus 1 Not Detected   Parainfluenza Virus 2 Not Detected   Parainfluenza Virus 3 Not Detected   Parainfluenza Virus 4 Not Detected   Respiratory Syncytial Virus A Not Detected   Respiratory Syncytial Virus B Not Detected   Chlamydia Pneumoniae Not Detected   Mycoplasma Pneumoniae Not Detected            06/25/2025 0843 06/25/2025 1213 MRSA MSSA PCR, Nasal Swab [[id number]]    Swab from Nares, Bilateral    Final result Component Value   MRSA Target DNA Negative   SA Target DNA Negative            06/24/2025 1200 06/25/2025 1501 Blood Culture Peripheral blood (BC) Arm, Right [[id number]]   Peripheral blood (BC) from Arm, Right    Preliminary result Component Value   Culture No growth after 1 day  [P]                06/24/2025 1200 06/25/2025 1501 Blood Culture Peripheral blood (BC) Arm, Right [[id number]]   Peripheral blood (BC) from Arm, Right    Preliminary result Component Value   Culture No growth after 1 day  [P]                06/24/2025 1146 06/24/2025 1241 Influenza A/B, RSV and SARS-CoV2 PCR (COVID-19) Nose [[id number]]    Swab from Nose    Final result Component Value   Influenza A PCR Negative   Influenza B PCR Negative   RSV PCR Negative   SARS CoV2 PCR Negative   NEGATIVE: SARS-CoV-2 (COVID-19) RNA not detected, presumed negative.                  Recent Labs   Lab 06/24/25  1145   LACT 1.7       Recent Results (from the past 24 hours)   XR Ankle Left  G/E 3 Views    Narrative    EXAM: XR ANKLE LEFT G/E 3 VIEWS  LOCATION: [facility]  DATE: 6/25/2025    INDICATION: ankle pain  COMPARISON: None.      Impression    IMPRESSION: The left ankle is negative for fracture or dislocation. There is mild degenerative change. Plantar calcaneal spurring.

## 2025-06-26 NOTE — PROGRESS NOTES
"   06/26/25 0900   Appointment Info   Signing Clinician's Name / Credentials (PT) Jeff Booth DPT   Rehab Comments (PT) Spinal precautions       Present no   Living Environment   People in Home alone   Current Living Arrangements house   Home Accessibility stairs within home   Number of Stairs, Within Home, Primary greater than 10 stairs   Stair Railings, Within Home, Primary railing on left side (ascending)   Transportation Anticipated family or friend will provide   Living Environment Comments Pt admitted from TCU without a bed hold. Pt reporting he lives alone in a house at baseline.   Self-Care   Usual Activity Tolerance good   Current Activity Tolerance fair   Regular Exercise No   Equipment Currently Used at Home cane, quad;walker, rolling   Fall history within last six months yes   Number of times patient has fallen within last six months 3   Activity/Exercise/Self-Care Comment Pt reports being IND at baseline with all ADLs. Pt ambulates w/ a FWW at baseline.   General Information   Onset of Illness/Injury or Date of Surgery 06/26/25   Referring Physician Octavia Hartman, DO   Patient/Family Therapy Goals Statement (PT) \"To get better\"   Pertinent History of Current Problem (include personal factors and/or comorbidities that impact the POC) Per Chart: Yosef Murry is a 89 year old male who who has medical history of CVA, persistent A-fib/flutter, hyperlipidemia, hypertension, DNR/DNI prostate cancer, balance issue who was recently admitted to [facility] from 6/7 to 6/15 where he presented for balance issues and bilateral lower extremity weakness and he was found to have mass at T4/T5 resulting in cord compression/displacement and status postT4-5 laminectomy and resection of meningioma with Dr. Benitez on 6/12/25 and is currently on anticoagulation with Eliquis presented to the ED after he was brought by family members with chief complaint of confusion and rigors and " admitted on 6/24/2020   Existing Precautions/Restrictions fall;spinal   Weight-Bearing Status - LLE full weight-bearing   Weight-Bearing Status - RLE full weight-bearing   Cognition   Orientation Status (Cognition) oriented x 3   Pain Assessment   Patient Currently in Pain Yes, see Vital Sign flowsheet  (Reports ongoing back pain)   Posture    Posture Forward head position;Protracted shoulders;Kyphosis   Range of Motion (ROM)   Range of Motion ROM deficits secondary to pain;ROM deficits secondary to weakness   ROM Comment LLE ROM limited due to weakness. Unable to move LLE when asked.   Strength (Manual Muscle Testing)   Strength (Manual Muscle Testing) Deficits observed during functional mobility;Able to perform R SLR;Able to perform L SLR   Strength Comments RLE Hip Flexion: 3/5; LLE Hip Flexion: 2/5   Bed Mobility   Comment, (Bed Mobility) Supine>sit w/ mod A x 1   Transfers   Comment, (Transfers) Sit>stand w/ SS and mod A x 1   Gait/Stairs (Locomotion)   Comment, (Gait/Stairs) Unable to initiate   Balance   Balance Comments Adequate static sitting balance; pt unsteady with OOB activity   Sensory Examination   Sensory Perception patient reports no sensory changes   Clinical Impression   Criteria for Skilled Therapeutic Intervention Yes, treatment indicated   PT Diagnosis (PT) Impaired gait   Influenced by the following impairments Decreased activity tolerance; decreased balanced; decreased strength   Functional limitations due to impairments Impaired functional mobility   Clinical Presentation (PT Evaluation Complexity) stable   Clinical Presentation Rationale Clinical judgement   Clinical Decision Making (Complexity) low complexity   Planned Therapy Interventions (PT) balance training;bed mobility training;gait training;strengthening;transfer training   Risk & Benefits of therapy have been explained evaluation/treatment results reviewed;care plan/treatment goals reviewed;risks/benefits reviewed;current/potential  barriers reviewed;participants voiced agreement with care plan;participants included;patient   PT Total Evaluation Time   PT Eval, Low Complexity Minutes (03425) 10   Physical Therapy Goals   PT Frequency 5x/week   PT Predicted Duration/Target Date for Goal Attainment 07/02/25   PT Goals Bed Mobility;Transfers;Gait   PT: Bed Mobility Modified independent;Supine to/from sit;Within precautions   PT: Transfers Modified independent;Sit to/from stand;Bed to/from chair;Assistive device;Within precautions   PT: Gait Supervision/stand-by assist;Rolling walker;150 feet   Therapeutic Activity   Therapeutic Activities: dynamic activities to improve functional performance Minutes (28181) 25   Symptoms Noted During/After Treatment Fatigue;Increased pain   Treatment Detail/Skilled Intervention Greetd pt supine in bed, agreed to PT. VSS on RA throughout session. PT educated pt on spinal precautions as pt unable to recall. Pt performed supine>sit w/ mod A x 1, needing assist to safely lift BLEs off of bed and trunk control. Pt unable to volitionally move LLE against gravity at this time. Pt able to scoot self to EOB with increased difficulty. Pt able to sit at EOB unsupported without LOB. SS brought in, pt able to place BLEs onto footplate. Pt performed sit>stand x 3 w/ SS and mod A x 1 needing assist to stand fully upright. Pt transferred to bathroom via SS. Pt requiring assist for hygiene. Pt then transferred to chair via SS. Pt ended session sitting in chair, with all needs met and call light within reach.   PT Discharge Planning   PT Plan Bed mobility; sit>stand w/ SS vs FWW; pre-gait exercises   PT Discharge Recommendation (DC Rec) Transitional Care Facility   PT Rationale for DC Rec Pt is below baseline. Pt currently requiring heavy assist with all functional mobility. Pt presents with deficits in activity tolerance, balance, and strength. Due to these deficits, pt is a high falls risk and currently unable to ambulate. Pt  would benefit from continued skilled PT services via TCU to address deficits and improve IND with safety and functional mobility.   PT Brief overview of current status Goals of therapy will be to address safe mobility and make recs for d/c to next level of care. Pt and RN will continue to follow all falls risk precautions as documented by RN staff while hospitalized.  A x 2 w/ SS   PT Total Distance Amb During Session (feet) 0   Physical Therapy Time and Intention   Timed Code Treatment Minutes 25   Total Session Time (sum of timed and untimed services) 35

## 2025-06-26 NOTE — CONSULTS
Care Management Initial Consult    General Information  Assessment completed with: Patient, Children, Héctor, daughter JONATHAN Sam  Type of CM/SW Visit: Initial Assessment    Primary Care Provider verified and updated as needed: Yes   Readmission within the last 30 days: previous discharge plan unsuccessful   Return Category: Post-op/Post-procedure complication  Reason for Consult: discharge planning  Advance Care Planning: Advance Care Planning Reviewed: verified with patient, present on chart          Communication Assessment  Patient's communication style: spoken language (English or Bilingual)             Cognitive  Cognitive/Neuro/Behavioral: WDL  Level of Consciousness: alert  Arousal Level: opens eyes spontaneously  Orientation: disoriented to, time, situation  Mood/Behavior: anxious, withdrawn, cooperative  Best Language: 0 - No aphasia  Speech: clear, logical    Living Environment:   People in home: alone     Current living Arrangements: house      Able to return to prior arrangements: yes       Family/Social Support:  Care provided by: self  Provides care for: no one  Marital Status:   Support system: Children          Description of Support System: Supportive, Involved         Current Resources:   Patient receiving home care services: No        Community Resources: None  Equipment currently used at home: cane, quad, walker, rolling  Supplies currently used at home:      Employment/Financial:  Employment Status: retired        Financial Concerns: none   Referral to Financial Worker: No       Does the patient's insurance plan have a 3 day qualifying hospital stay waiver?  No    Lifestyle & Psychosocial Needs:  Social Drivers of Health     Food Insecurity: Low Risk  (6/7/2025)    Food Insecurity     Within the past 12 months, did you worry that your food would run out before you got money to buy more?: No     Within the past 12 months, did the food you bought just not last and you didn t have money  to get more?: No   Depression: Not at risk (10/10/2024)    PHQ-2     PHQ-2 Score: 0   Housing Stability: Low Risk  (6/7/2025)    Housing Stability     Do you have housing? : Yes     Are you worried about losing your housing?: No   Tobacco Use: Medium Risk (6/21/2025)    Patient History     Smoking Tobacco Use: Former     Smokeless Tobacco Use: Never     Passive Exposure: Not on file   Financial Resource Strain: Low Risk  (6/7/2025)    Financial Resource Strain     Within the past 12 months, have you or your family members you live with been unable to get utilities (heat, electricity) when it was really needed?: No   Alcohol Use: Not At Risk (6/10/2019)    AUDIT-C     Frequency of Alcohol Consumption: 2-3 times a week     Average Number of Drinks: 1 or 2     Frequency of Binge Drinking: Never   Transportation Needs: Low Risk  (6/7/2025)    Transportation Needs     Within the past 12 months, has lack of transportation kept you from medical appointments, getting your medicines, non-medical meetings or appointments, work, or from getting things that you need?: No   Physical Activity: Unknown (10/10/2024)    Exercise Vital Sign     Days of Exercise per Week: 0 days     Minutes of Exercise per Session: Not on file   Interpersonal Safety: Low Risk  (6/7/2025)    Interpersonal Safety     Do you feel physically and emotionally safe where you currently live?: Yes     Within the past 12 months, have you been hit, slapped, kicked or otherwise physically hurt by someone?: No     Within the past 12 months, have you been humiliated or emotionally abused in other ways by your partner or ex-partner?: No   Stress: No Stress Concern Present (10/10/2024)    [employer] [employer] - Occupational Stress Questionnaire     Feeling of Stress : Not at all   Social Connections: Unknown (10/10/2024)    Social Connection and Isolation Panel [NHANES]     Frequency of Communication with Friends and Family: Not on file      Frequency of Social Gatherings with Friends and Family: Once a week     Attends Religion Services: Not on file     Active Member of Clubs or Organizations: Not on file     Attends Club or Organization Meetings: Not on file     Marital Status: Not on file   Health Literacy: Not on file       Functional Status:  Prior to admission patient needed assistance:   Dependent ADLs:: Ambulation-cane, Ambulation-walker  Dependent IADLs:: Independent       Mental Health Status:  Mental Health Status: No Current Concerns       Chemical Dependency Status:  Chemical Dependency Status: No Current Concerns             Values/Beliefs:  Spiritual, Cultural Beliefs, Religion Practices, Values that affect care: no               Discussed  Partnership in Safe Discharge Planning  document with patient/family: Yes: Héctor    Additional Information:  Writer met with patient and family to introduce self and role in discharge planning. Héctor verified his address, phone, PCP, and insurance. At his functional baseline, he lives independently in his own house and occasionally uses a walker or cane. He recently had a spine surgery and was at TCU, when he had fevers/rigors and altered mental status. Plan is for him to most likely discharge again to a TCU. They did Not hold his bed at [facility], family requested the same facilities be sent referrals.     Next Steps: secure TCU bed    Lana Ling RN Care Coordinator  [facility]  [phone]

## 2025-06-26 NOTE — PROGRESS NOTES
Neurosurgery progress note    Patient states he is feeling better today.  Better able to move his lower extremities.  Appears to be in minimal pain.    Exam    Alert, no acute distress, sitting in bed  Right lower extremity with 5-5 strength throughout  Left lower extremity with 4-5 strength with hip flexion, 5/5 strength with ankle dorsiflexion and plantarflexion  Incision clean dry and intact    Assessment    Status post T4-5 laminectomy for resection of meningioma.  Thoracic MRI negative for obvious infectious source, resection of prior meningioma, with decompression of the spinal cord.     Elevated CRP, confusion, rigors on admission, now improved       Plan    Infectious workup by infectious disease and medicine.  Activity as tolerated from neurosurgery perspective.    Clinical picture, and thoracic MRI not suggestive of surgical site infection.    Suture removal today postoperative day 14

## 2025-06-26 NOTE — DISCHARGE INSTRUCTIONS
Some additional resources:      Web: [email or web address].org/   Phone: 351.329.2606   [employer] is a nonprofit serving seniors and individuals with disabilities across [place] [facility] area. Our grocery assistance, home support and transportation services provide help with in-home tasks and chores.    _________________________________________________________________________________________    Meals on Wheels    [facility] call Phone: [phone]             Who is eligible to receive Meals on Wheels?  To anyone - both on a long-term basis and temporarily if you are recovering from surgery or illness.  How much do meals cost? Is financial assistance available? We ask for a modest contribution toward your meals, but the price is based on need. Meals may be authorized as part of [place]'s home- and community-based services Medicaid waiver program, and other subsidy programs can also help cover the cost of meal delivery service. If you have questions about funding options, call us.    Are diabetic and other diet-specific meal options available?  Meals on Wheels happily offers low-sugar, low-sodium meal options, as well as vegetarian options. Simply specify your dietary needs when you sign up for meals.  Are culturally specific meals available?  Kosher, Bruneian/Halal meals and other culturally specific meal options are available in many areas. You can specify cultural meal preferences when you sign up for meals online or when you call us.    _________________________________________________________________________________________  [employer]   Web: https://seniorcommunity.org/services/  Phone: (477) 780-5447          Other resources:   call to reach someone who can connect you with more resources!   _________________________________________________________________________________________                 _________________________________________________________________________________________

## 2025-06-27 ENCOUNTER — APPOINTMENT (OUTPATIENT)
Dept: OCCUPATIONAL THERAPY | Facility: CLINIC | Age: 89
DRG: 092 | End: 2025-06-27
Payer: MEDICARE

## 2025-06-27 VITALS
HEIGHT: 72 IN | HEART RATE: 96 BPM | WEIGHT: 176.59 LBS | DIASTOLIC BLOOD PRESSURE: 75 MMHG | OXYGEN SATURATION: 97 % | TEMPERATURE: 98.2 F | RESPIRATION RATE: 16 BRPM | BODY MASS INDEX: 23.92 KG/M2 | SYSTOLIC BLOOD PRESSURE: 125 MMHG

## 2025-06-27 LAB
CREAT SERPL-MCNC: 0.62 MG/DL (ref 0.67–1.17)
CRP SERPL-MCNC: 185.32 MG/L
EGFRCR SERPLBLD CKD-EPI 2021: >90 ML/MIN/1.73M2
HOLD SPECIMEN: NORMAL

## 2025-06-27 PROCEDURE — 250N000013 HC RX MED GY IP 250 OP 250 PS 637: Performed by: STUDENT IN AN ORGANIZED HEALTH CARE EDUCATION/TRAINING PROGRAM

## 2025-06-27 PROCEDURE — 250N000013 HC RX MED GY IP 250 OP 250 PS 637: Performed by: HOSPITALIST

## 2025-06-27 PROCEDURE — 97165 OT EVAL LOW COMPLEX 30 MIN: CPT | Mod: GO

## 2025-06-27 PROCEDURE — 86140 C-REACTIVE PROTEIN: CPT

## 2025-06-27 PROCEDURE — 250N000013 HC RX MED GY IP 250 OP 250 PS 637: Performed by: INTERNAL MEDICINE

## 2025-06-27 PROCEDURE — 99239 HOSP IP/OBS DSCHRG MGMT >30: CPT | Performed by: STUDENT IN AN ORGANIZED HEALTH CARE EDUCATION/TRAINING PROGRAM

## 2025-06-27 PROCEDURE — 82565 ASSAY OF CREATININE: CPT | Performed by: HOSPITALIST

## 2025-06-27 PROCEDURE — 97530 THERAPEUTIC ACTIVITIES: CPT | Mod: GO

## 2025-06-27 PROCEDURE — 36415 COLL VENOUS BLD VENIPUNCTURE: CPT | Performed by: HOSPITALIST

## 2025-06-27 PROCEDURE — 99232 SBSQ HOSP IP/OBS MODERATE 35: CPT | Performed by: SPECIALIST

## 2025-06-27 RX ORDER — OXYCODONE HYDROCHLORIDE 5 MG/1
5 TABLET ORAL 2 TIMES DAILY
Qty: 10 TABLET | Refills: 0 | Status: SHIPPED | OUTPATIENT
Start: 2025-06-27 | End: 2025-06-30

## 2025-06-27 RX ADMIN — APIXABAN 5 MG: 5 TABLET, FILM COATED ORAL at 09:50

## 2025-06-27 RX ADMIN — ACETAMINOPHEN 975 MG: 325 TABLET, FILM COATED ORAL at 09:50

## 2025-06-27 RX ADMIN — LORATADINE 10 MG: 10 TABLET ORAL at 09:50

## 2025-06-27 RX ADMIN — ACETAMINOPHEN 975 MG: 325 TABLET, FILM COATED ORAL at 01:46

## 2025-06-27 RX ADMIN — METOPROLOL SUCCINATE 25 MG: 25 TABLET, EXTENDED RELEASE ORAL at 09:50

## 2025-06-27 RX ADMIN — OXYCODONE HYDROCHLORIDE 5 MG: 5 TABLET ORAL at 02:09

## 2025-06-27 ASSESSMENT — ACTIVITIES OF DAILY LIVING (ADL)
ADLS_ACUITY_SCORE: 66
ADLS_ACUITY_SCORE: 63
ADLS_ACUITY_SCORE: 63
ADLS_ACUITY_SCORE: 65
ADLS_ACUITY_SCORE: 63
ADLS_ACUITY_SCORE: 66
ADLS_ACUITY_SCORE: 65
ADLS_ACUITY_SCORE: 63
ADLS_ACUITY_SCORE: 63
ADLS_ACUITY_SCORE: 65
ADLS_ACUITY_SCORE: 66

## 2025-06-27 NOTE — PROGRESS NOTES
Orders Placed This Encounter   Procedures    Home Sleep Study     Standing Status:   Future     Expected Date:   3/12/2025     Expiration Date:   3/12/2026     Location For Sleep Study:   [facility] Location:   [facility]    Sleep Study with PAP Titration     Standing Status:   Future     Expected Date:   3/12/2025     Expiration Date:   3/12/2026     Sleep Study Titration Type:   CPAP     Location For Sleep Study:   [facility] Location:   [facility]         Care Management Discharge Note    Discharge Date: 06/27/2025       Discharge Disposition: Transitional Care, Skilled Nursing Facility    Discharge Services: None    Discharge DME: None    Discharge Transportation: family or friend will provide    Private pay costs discussed: private room/amenity fees    Does the patient's insurance plan have a 3 day qualifying hospital stay waiver?  No    PAS Confirmation Code:    Patient/family educated on Medicare website which has current facility and service quality ratings: yes    Education Provided on the Discharge Plan: Yes  Persons Notified of Discharge Plans: Patient's daughter, TCU, and [facility] staff  Patient/Family in Agreement with the Plan: yes    Handoff Referral Completed: No, handoff not indicated or clinically appropriate    Additional Information:  Per DOD, Nainar reached out to both [facility] and [facility] and was informed they would not have beds available. Nainar reached out to Juan liaison for [facility] and [facility]. Nainar was informed [facility] has both a private and shared room available. [facility] has a shared room available.       Nainar reached out to the patient's daughter, Zaynab ([phone]) and informed them of the above information. Zaynab stated they would like to take the private room at [facility]. Writer stated they will reach out to the provider to plan for a day when the patient is medically ready. Zaynab stated they will transport the patient, as per last time.     Nainar reached out to [facility] who stated they can plan for admission at 1300    Writer reached out to the provider who stated the patient is medically ready.  received discharge order and sent it to [facility] via DOD.      contacted Zaynab in return who was in agreement/ consented to the discharge plan for today at 1300.     ordered scripts to be signed.  sent signed scripts to [facility] fax # [phone]    Patient will discharge from [facility] to [facility]CU with Family:   daughter transporting  at 1300.  Please refer to  progress notes for further details.No further care management intervention anticipated at this time.  Please re-consult if further needs arise.  Care management signing off.        MADONNA Capps  [facility]  Social Work

## 2025-06-27 NOTE — DISCHARGE SUMMARY
[facility]  Hospitalist Discharge Summary      Date of Admission:  6/24/2025  Date of Discharge:  6/27/2025  1:24 PM  Discharging Provider: Aura Alcantar MD  Discharge Service: Hospitalist Service    Discharge Diagnoses   Acute metabolic or Toxic Encephalopathy-resolved  S/p T4-5 laminectomy and resection of meningioma with Dr. Benitez on 6/12/25  Leukocytosis, resolved  History of paroxysmal atrial fibrillation status flutter   Hypertension  Hyperlipidemia   Hx prostate cancer s/p prostatectomy 2009, radiation therapy 2014 and androgen deprivation therapy   CT scan finding of interval enlargement of the thoracic and abdominal aorta from 2022  New peripheral atherosclerotic thrombus of aorta    Clinically Significant Risk Factors          Follow-ups Needed After Discharge   Follow-up Appointments       Follow Up and recommended labs and tests      Follow up with Nursing home physician.  No follow up labs or test are needed.              Discharge Disposition   Discharged to short-term care facility  Condition at discharge: Stable    Hospital Course   Yosef Murry is a 89 year old male who has medical history of CVA, persistent A-fib/flutter, hyperlipidemia, hypertension, DNR/DNI prostate cancer, balance issue who was recently admitted to [facility] from 6/7 to 6/15 where he presented for balance issues and bilateral lower extremity weakness and he was found to have mass at T4/T5 resulting in cord compression/displacement and status postT4-5 laminectomy and resection of meningioma with Dr. Benitez on 6/12/25 and is currently on anticoagulation with Eliquis. He now presented to the ED with chief complaint of confusion and rigors and admitted on 6/24/2025.       Acute metabolic or Toxic Encephalopathy-resolved  S/p T4-5 laminectomy and resection of meningioma with Dr. Benitez on 6/12/25  Leukocytosis, resolved  Patient had recent hospital stay at [facility] from 6/7 to 6/15 and was  "discharged to [place] and had been having improvement at TCU. However the past 2 days he was notably confused with \"rigors\". Within the ED, leukocytosis to 14, CRP quite elevated at 225, procal 0.15.  * UA negative for infection despite wall thickening of R renal pelvis  * CT abdomen pelvis showed posterior laminectomy at T4 and T5  with new fluid collection at operative site without peripheral derangement enhancement. Follow up MRI again with new fluid collection that is not overly infected appearing.   *Neurosurgery following (appreciated); thoracic MRI reviewed - no clear infectious source. No clear need for any surgical procedure  * All cultures negative to day.   * ID consulted   * Treated with IV ceftriaxone initially but with no clear source of infection. Ultimately antibiotics were discontinued and he remained stable and asymptomatic.    History of paroxysmal atrial fibrillation status flutter   - Continue PTA regimen metoprolol succinate 25 mg daily along with Eliquis 5 twice daily     Hypertension  Hyperlipidemia   -PTA regimen: Amlodipine 10 mg daily, atorvastatin 10 mg, Eliquis 5 twice daily, losartan 50 mg daily, metoprolol succinate 25 mg daily  -Losartan and amlodipine held since admission. At the time of discharge, SBP within normal limits   - Recommend continuing amlodipine and metoprolol, but stop Losartan   - Continue Atorvastatin and Eliquis.      Hx prostate cancer s/p prostatectomy 2009, radiation therapy 2014 and androgen deprivation therapy  -See Urology clinic note 5/9/25 for details - recent rise in PSA but MRI findings not convincing for any recurrence and PET scan     CT scan finding of interval enlargement of the thoracic and abdominal aorta from 2022  New peripheral atherosclerotic thrombus of aorta  - His abdominal aortic arch is now 5.2 as compared to 4.1 in the past and descending thoracic aorta has increased to 3.8 and abdominal aorta is 3.4 cm  - Will need outpatient follow-up with " PCP.   - continue atorvastatin and anticoagulation       Consultations This Hospital Stay   PHARMACY TO DOSE VANCO  NEUROSURGERY IP CONSULT  INFECTIOUS DISEASES IP CONSULT  SPEECH LANGUAGE PATH ADULT IP CONSULT  CARE MANAGEMENT / SOCIAL WORK IP CONSULT  PHYSICAL THERAPY ADULT IP CONSULT  OCCUPATIONAL THERAPY ADULT IP CONSULT  CARE MANAGEMENT / SOCIAL WORK IP CONSULT  PHYSICAL THERAPY ADULT IP CONSULT  OCCUPATIONAL THERAPY ADULT IP CONSULT    Code Status   No CPR- Do NOT Intubate    Time Spent on this Encounter   I, Aura Alcantar MD, personally saw the patient today and spent greater than 30 minutes discharging this patient.       Aura Alcantar MD  [facility]THOP[facility]INE  [address] [address]  Phone: [phone]  Fax: [phone]  ______________________________________________________________________    Physical Exam   Vital Signs: Temp: 98.2  F (36.8  C) Temp src: Oral BP: 125/75 Pulse: 96   Resp: 16 SpO2: 97 % O2 Device: None (Room air)    Weight: 176 lbs 9.42 oz  Constitutional: Awake, alert, cooperative, no apparent distress.  Eyes: Conjunctiva and pupils examined and normal.  HEENT: Moist mucous membranes, normal dentition.  Respiratory: Clear to auscultation bilaterally, no crackles or wheezing.  Cardiovascular: Regular rate and rhythm, normal S1 and S2, and no murmur noted.  GI: Soft, non-distended, non-tender, normal bowel sounds.  Skin: No rashes, no cyanosis, no edema.  Musculoskeletal: No joint swelling, erythema or tenderness.  Neurologic: Cranial nerves 2-12 intact, normal strength and sensation.  Psychiatric: Alert, oriented to person, place and time, no obvious anxiety or depression.         Primary Care Physician   Melissa Torres    Discharge Orders      Primary Care - Care Coordination Referral      General info for SNF    Length of Stay Estimate: Short Term Care: Estimated # of Days <30  Condition at Discharge: Improving  Level of  care:skilled   Rehabilitation Potential: Good  Admission H&P remains valid and up-to-date: Yes  Recent Chemotherapy: N/A  Use Nursing Home Standing Orders: Yes     Reason for your hospital stay    You were hospitalized for evaluation of altered mental status following a recent spinal procedure. You were thoroughly evaluated for infection and no source was found.     Activity - Up ad kameron     Follow Up and recommended labs and tests    Follow up with Nursing home physician.  No follow up labs or test are needed.     Physical Therapy Adult Consult    Evaluate and treat as clinically indicated.    Reason: Strength and mobility     Occupational Therapy Adult Consult    Evaluate and treat as clinically indicated.    Reason:  Strength and mobility     Fall precautions     Diet    Follow this diet upon discharge: Current Diet:Orders Placed This Encounter      Room Service      Combination Diet Regular Diet; Thin Liquids (level 0) (Eat when fully alert and upright during and 1 hour after meals, reflux precautions)       Significant Results and Procedures   Most Recent 3 CBC's:  Recent Labs   Lab Test 06/26/25  0847 06/25/25  0831 06/24/25  1145   WBC 9.4 10.2 14.3*   HGB 13.2* 13.7 14.6   MCV 95 94 93    270 344     Most Recent 3 BMP's:  Recent Labs   Lab Test 06/27/25  0943 06/26/25  0847 06/25/25  0831 06/24/25  1145   NA  --  137 138 135   POTASSIUM  --  4.3 3.9 5.0   CHLORIDE  --  104 104 97*   CO2  --  26 24 28   BUN  --  14.0 15.2 19.9   CR 0.62* 0.60* 0.61* 0.84   ANIONGAP  --  7 10 10   BELLO  --  8.4* 8.7* 9.0   GLC  --  98 89 101*   ,   Results for orders placed or performed during the hospital encounter of 06/24/25   CT Chest/Abdomen/Pelvis w Contrast    Narrative    EXAM: CT CHEST/ABDOMEN/PELVIS W CONTRAST  LOCATION: [facility]  DATE: 6/24/2025    INDICATION: Confusion, rigors, recent spine surgery T4-T5, evaluate infection.  COMPARISON: CT chest 9/5/2022, CT chest, abdomen and  pelvis 8/2/2019.  TECHNIQUE: CT scan of the chest, abdomen, and pelvis was performed following injection of IV contrast. Multiplanar reformats were obtained. Dose reduction techniques were used.   CONTRAST: 89 mL Isovue 370.    FINDINGS:   LUNGS AND PLEURA: No effusion or pneumothorax. Bibasilar atelectasis versus scarring appears similar to prior. No new airspace disease identified. Stable 3 mm right upper lobe nodule image 57 of series 4.    MEDIASTINUM/AXILLAE: No adenopathy. No acute mediastinal abnormality identified. Multichamber cardiomegaly appears stable. There is diffuse calcified atherosclerosis of the thoracic aorta. Enlargement of the aortic arch that is now 5.2 cm, previously 4.1   cm, series 3 image 26. There is new peripheral atherosclerotic thrombus noted. Mild enlargement of the descending thoracic aorta that is now 3.8 cm, previously 3.5 cm, image 62.    CORONARY ARTERY CALCIFICATION: Severe.    HEPATOBILIARY: Distended gallbladder with cholelithiasis. Liver appears unremarkable.    PANCREAS: Normal.    SPLEEN: Normal.    ADRENAL GLANDS: Normal.    KIDNEYS/BLADDER: Some mild wall prominence of the left renal pelvis series 3 image 160. No acute renal abnormality. No obstructing stone. Nonobstructing stone lower right kidney is 4 mm, image 173. Left lower renal cysts noted without specific imaging   follow-up recommended. Bladder is unremarkable.    BOWEL: Moderate stool. No small bowel obstruction. No evidence for appendicitis. No free fluid or free air. Colonic diverticulosis without diverticulitis.    LYMPH NODES: Normal.    VASCULATURE: Scattered vascular calcifications. Infrarenal abdominal aorta is 3.4 cm, previously 3 cm series 3 image 171.    PELVIC ORGANS: No acute pelvic abnormality identified. No pelvic fluid collection.    MUSCULOSKELETAL: Posterior laminectomy at T4 and T5. Small fluid at the operative site may just be postoperative in nature and is approximately 1.6 cm. No visible  peripheral rim enhancement is identified. Diffuse degenerative changes of the spine. No   aggressive bone lesion.      Impression    IMPRESSION:  1.  Posterior laminectomy at T4 and T5. Small fluid at the operative site is noted and may be simple postoperative fluid. No peripheral rim enhancement can be seen. Infected fluid cannot be entirely excluded based on imaging alone. If there are   progressive symptoms, repeat imaging at this level could be performed for comparison.  2.  Cholelithiasis and distended gallbladder. Correlate clinically with any gallbladder symptoms.  3.  Mild wall thickening of the left renal pelvis. Correlate with any evidence of urinary tract infection.  4.  Nonobstructing stone at the right kidney.  5.  Stable cardiomegaly.  6.  Interval enlargement of the thoracic and abdominal aorta at several levels as compared to 9/5/2022. Aortic arch is now 5.2 cm, previously 4.1 cm. The descending thoracic aorta is now 3.8 cm, previously 3.5 cm. Abdominal aorta is now 3.4 cm,   previously 3 cm. Recommend vascular consultation for management.   CT Head w/o Contrast    Narrative    EXAM: CT HEAD W/O CONTRAST  LOCATION: [facility]  DATE: 6/24/2025    INDICATION: Confusion, weakness, left-sided neglect, and fall. Evaluate for stroke, bleed.  COMPARISON: CT dated 6/7/2025  TECHNIQUE: Routine CT Head without IV contrast. Multiplanar reformats. Dose reduction techniques were used.    FINDINGS:  INTRACRANIAL CONTENTS: No acute intracranial hemorrhage, extra-axial fluid collection, or mass effect. Redemonstrated small chronic cortical/subcortical right frontal lobe infarcts, the larger more anterior of which involving the right middle frontal   gyrus. Small chronic infarcts involving the right basal ganglia and adjacent right frontal lobe periventricular white matter, bilateral parietal periventricular white matter, as well as both cerebellar hemispheres, as before. Mild-moderate  underlying   presumed chronic microvascular ischemic change. No evidence of an acute transcortical confluent infarct, although CT assessment is limited by the aforementioned chronic ischemic changes. Unchanged mild-moderate generalized cerebral parenchymal volume   loss. No hydrocephalus. Moderate calcified intracranial atherosclerosis.    VISUALIZED ORBITS/SINUSES/MASTOIDS: No acute intraorbital finding. No evidence of significant paranasal sinus or mastoid mucosal disease.     BONES/SOFT TISSUES: No acute calvarial injury or significant scalp hematoma.      Impression    IMPRESSION:  1.  No acute intracranial hemorrhage, mass effect, or a definite acute infarct.  2.  Similar chronic intracranial changes, as described.   US Abdomen Limited    Narrative    EXAM: US ABDOMEN LIMITED  LOCATION: [facility]  DATE: 6/24/2025    INDICATION: Sepsis. Distended gallbladder.  COMPARISON: CT chest abdomen and pelvis 6/24/2025.  TECHNIQUE: Limited abdominal ultrasound.    FINDINGS:    GALLBLADDER: Distended and contains multiple small gallstones in the fundus. No gallbladder wall thickening or pericholecystic fluid. Sonographic Desouza's sign is negative.    BILE DUCTS: No biliary dilatation. The common duct measures 8 mm, which is within normal limits for the patient's age.    LIVER: Normal parenchyma with smooth contour. No focal mass. The main portal vein is patent with flow in the normal direction.    RIGHT KIDNEY: No hydronephrosis.    PANCREAS: The visualized portions are normal.    No ascites.      Impression    IMPRESSION:    1.  Distended gallbladder with small gallstones. No evidence of acute cholecystitis.    2.  No biliary ductal dilation.   MR Thoracic Spine w/o & w Contrast    Narrative    EXAM: MR THORACIC SPINE W/O and W CONTRAST  LOCATION: [facility]  DATE: 6/24/2025    INDICATION: S p post thoracic laminectomy, ?leg weakness  COMPARISON:  MR thoracic spine Afshan  8, 2025.  CONTRAST: 7mL Gadavist  TECHNIQUE: Routine Thoracic Spine MRI without and with IV contrast.    FINDINGS:     Interval T4-T5 laminectomy with moderate 8 x 11 x 34 mm. (AP by TV by CC) fluid collection at the laminectomy site. Fluid collection results in mild narrowing of the thecal sac. Compression on the thoracic cord is significantly improved compared to   prior. Small focus of increased signal in the left thoracic cord at level T4-T5. Evaluation of the cord on prior is limited due to the degree of mass effect, this is likely chronic myelomalacia. No additional cord signal abnormality. No abnormal   intrathecal enhancement. Small right pleural effusion with associated atelectasis. Remainder of findings are unchanged compared to prior.      Impression    IMPRESSION:  1.  Interval T4-T5 laminectomy with significantly improved compression of the thoracic cord. Small focus of increased cord signal at this level likely represents chronic myelomalacia.  2.  Small fluid collection at the laminectomy site resulting in mild narrowing of the thecal sac.     XR Ankle Left G/E 3 Views    Narrative    EXAM: XR ANKLE LEFT G/E 3 VIEWS  LOCATION: [facility]  DATE: 6/25/2025    INDICATION: ankle pain  COMPARISON: None.      Impression    IMPRESSION: The left ankle is negative for fracture or dislocation. There is mild degenerative change. Plantar calcaneal spurring.       Discharge Medications      Review of your medicines        CONTINUE these medicines which have NOT CHANGED        Dose / Directions   acetaminophen 325 MG tablet  Commonly known as: TYLENOL  Used for: Thoracic stenosis      Dose: 650 mg  Take 2 tablets (650 mg) by mouth every 4 hours as needed for mild pain or other (and adjunct with moderate or severe pain or per patient request).  Refills: 0     amLODIPine 5 MG tablet  Commonly known as: NORVASC  Used for: Essential hypertension, benign      Dose: 5 mg  Take 1 tablet (5 mg) by  mouth daily  Quantity: 90 tablet  Refills: 3     apixaban ANTICOAGULANT 5 MG tablet  Commonly known as: ELIQUIS      Dose: 5 mg  Take 5 mg by mouth 2 times daily.  Refills: 0     atorvastatin 10 MG tablet  Commonly known as: LIPITOR      Dose: 10 mg  Take 1 tablet (10 mg) by mouth every evening  Quantity: 90 tablet  Refills: 3     calcium carbonate 500 MG chewable tablet  Commonly known as: TUMS      Dose: 1 chew tab  Take 1 chew tab by mouth nightly as needed  Refills: 0     dexAMETHasone 1 MG tablet  Commonly known as: DECADRON  Used for: Thoracic stenosis      Start taking on: Afshan 15, 2025  Take 4 tablets (4 mg) by mouth daily for 1 day, THEN 2 tablets (2 mg) daily for 2 days, THEN 1 tablet (1 mg) daily for 2 days.  Refills: 0     hydrOXYzine HCl 10 MG tablet  Commonly known as: ATARAX  Used for: Thoracic stenosis      Dose: 10 mg  Take 1 tablet (10 mg) by mouth every 6 hours as needed for other (adjuvant pain).  Refills: 0     loratadine 10 MG tablet  Commonly known as: CLARITIN      Dose: 10 mg  Take 1 tablet by mouth daily.  Quantity: 30 tablet  Refills: 1     magnesium hydroxide 400 MG/5ML suspension  Commonly known as: MILK OF MAGNESIA      Dose: 30 mL  Take 30 mLs by mouth daily as needed for constipation or heartburn.  Refills: 0     methocarbamol 750 MG tablet  Commonly known as: ROBAXIN  Used for: Thoracic stenosis      Dose: 750 mg  Take 1 tablet (750 mg) by mouth 4 times daily as needed for muscle spasms.  Refills: 0     metoprolol succinate ER 25 MG 24 hr tablet  Commonly known as: Toprol XL  Used for: Permanent atrial fibrillation (H)      Dose: 25 mg  Take 1 tablet (25 mg) by mouth daily  Quantity: 90 tablet  Refills: 3     ondansetron 4 MG ODT tab  Commonly known as: ZOFRAN ODT  Used for: Thoracic stenosis      Dose: 4 mg  Take 1 tablet (4 mg) by mouth every 8 hours as needed for nausea.  Refills: 0     oxyCODONE 5 MG tablet  Commonly known as: ROXICODONE  Used for: Thoracic stenosis      Dose: 5  mg  Take 1 tablet (5 mg) by mouth 2 times daily. And QID PRN  Quantity: 10 tablet  Refills: 0     polyethylene glycol 17 GM/Dose powder  Commonly known as: MIRALAX  Used for: Thoracic stenosis      Dose: 17 g  Take 17 g by mouth daily.  Refills: 0     PRESERVISION AREDS 2 PO      Dose: 1 capsule  Take 1 capsule by mouth 2 times daily AREDS 2  Refills: 0     vitamin D3 50 mcg (2000 units) tablet  Commonly known as: CHOLECALCIFEROL      Dose: 1 tablet  Take 1 tablet by mouth daily  Refills: 0            STOP taking      losartan 50 MG tablet  Commonly known as: COZAAR                  Where to get your medicines        Some of these will need a paper prescription and others can be bought over the counter. Ask your nurse if you have questions.    Bring a paper prescription for each of these medications  oxyCODONE 5 MG tablet       Allergies   Allergies   Allergen Reactions    Other [Seasonal Allergies]

## 2025-06-27 NOTE — PROGRESS NOTES
06/27/25 0800   Appointment Info   Signing Clinician's Name / Credentials (OT) Gail Laurent, OTD, OTR/L   Rehab Comments (OT) Spinal Precautions   Living Environment   People in Home alone   Current Living Arrangements house   Home Accessibility stairs within home   Number of Stairs, Within Home, Primary greater than 10 stairs   Stair Railings, Within Home, Primary railing on left side (ascending)   Transportation Anticipated family or friend will provide   Living Environment Comments Pt admitted from TCU without a bed hold. Pt reporting he lives alone in a house at baseline. Pt reports walk in shower, tall toilet.   Self-Care   Usual Activity Tolerance good   Current Activity Tolerance fair   Regular Exercise No   Equipment Currently Used at Home cane, quad;walker, rolling   Fall history within last six months yes   Number of times patient has fallen within last six months 3   Activity/Exercise/Self-Care Comment Pt reports being IND at baseline with all ADLs. Pt ambulates w/ a FWW at baseline although SPC in room.   Instrumental Activities of Daily Living (IADL)   IADL Comments Meals on Wheels prior to TCU, otherwise was ind with all other IADLs   General Information   Onset of Illness/Injury or Date of Surgery 06/24/25   Referring Physician Octavia Hartman, DO   Patient/Family Therapy Goal Statement (OT) To get stronger/to go home   Additional Occupational Profile Info/Pertinent History of Current Problem Per chart:   Existing Precautions/Restrictions fall;spinal   Limitations/Impairments safety/cognitive   Cognitive Status Examination   Orientation Status orientation to person, place and time   Cognitive Status Comments (S)  Repeated cues throughout session at times; Completed SLUMS in June 2025 and scored 18/30.   Visual Perception   Visual Impairment/Limitations corrective lenses full-time   Sensory   Sensory Quick Adds sensation intact   Pain Assessment   Patient Currently in Pain No   Range of  Motion Comprehensive   Comment, General Range of Motion BUEs WFL   Strength Comprehensive (MMT)   Comment, General Manual Muscle Testing (MMT) Assessment [facility] WFL, Not formally assessed d/t spinal precautions   Coordination   Upper Extremity Coordination No deficits were identified   Bed Mobility   Bed Mobility supine-sit   Comment (Bed Mobility) Min-Mod A   Transfers   Transfers sit-stand transfer;bed-chair transfer   Transfer Skill: Bed to Chair/Chair to Bed   Transfer Comments Min A   Sit-Stand Transfer   Sit/Stand Transfer Comments Min A   Balance   Balance Comments Unsteadiness noted with use of FWW, narrow gait, shuffling/short steps   Activities of Daily Living   BADL Assessment/Intervention lower body dressing;toileting   Lower Body Dressing Assessment/Training   Comment, (Lower Body Dressing) Min A per clinical judgement d/t precautions, balance   Toileting   Comment, (Toileting) Min A per clinical judgement d/t precautions, balance   Clinical Impression   Criteria for Skilled Therapeutic Interventions Met (OT) Yes, treatment indicated   OT Diagnosis Decreased ind with I/ADLs   OT Problem List-Impairments impacting ADL problems related to;activity tolerance impaired;balance;cognition;mobility;strength;post-surgical precautions   Assessment of Occupational Performance 3-5 Performance Deficits   Identified Performance Deficits dressing, bathing, toileting, grooming, IADLs   Planned Therapy Interventions (OT) ADL retraining;IADL retraining;cognition;transfer training   Clinical Decision Making Complexity (OT) problem focused assessment/low complexity   Risk & Benefits of therapy have been explained evaluation/treatment results reviewed;care plan/treatment goals reviewed;risks/benefits reviewed;current/potential barriers reviewed;participants voiced agreement with care plan;patient;participants included   OT Total Evaluation Time   OT Eval, Low Complexity Minutes (24027) 10   OT Goals   Therapy Frequency (OT) 5  times/week   OT Predicted Duration/Target Date for Goal Attainment 07/18/25   OT Goals Hygiene/Grooming;Upper Body Dressing;Lower Body Dressing;Transfers;Toilet Transfer/Toileting   OT: Hygiene/Grooming modified independent;while standing;within precautions  (FWW)   OT: Upper Body Dressing Modified independent;within precautions   OT: Lower Body Dressing Modified independent;within precautions  (AE, FWW)   OT: Transfer Modified independent  (FWW, walk in shower with lip)   OT: Toilet Transfer/Toileting Modified independent;cleaning and garment management;toilet transfer;using adaptive equipment;within precautions  (FWW)   Therapeutic Activities   Therapeutic Activity Minutes (46602) 16   Symptoms noted during/after treatment fatigue   Treatment Detail/Skilled Intervention Pt greeted supine in bed, breakfast tray out of reach for pt, pt agreeable to OT session. Pt able to recall 2 of 3 spinal prec - pt edu in all spinal prec. Pt edu in logroll technique. Pt completes supine > sitting EOB with Min-Mod A, HOB raised, cues for hand placement and BLE placement, assist at trunk to come into upright. Once sitting up at EOB, pt SBA for sitting balance for safety. Pt stands with FWW from EOB with Min A and VCs and hand over hand placement for safety, posterior lean noted, cued for technique. Pt ambulates to chair to simulate functional BSC transfer with Min A and FWW, cues for technique and safety, cues for alignment to chair. Pt sits in chair with Min A for controlled sit, cues for hand placement. Pt edu in plan to introduce AE in coming sessions for dressing to maintain spinal prec. At end of session, pt seated upright in chair, set up A for meal. with needs met, items/call light in reach, alarm set, RN updated.   OT Discharge Planning   OT Plan standing kira for ADL, progress to sink for gh (chair close by/chair follow), introduce AE for LB dressing, toileting   OT Discharge Recommendation (DC Rec) Transitional Care  Facility   OT Rationale for DC Rec Pt functioning below baseline d/t precautions, balance, cog, mobility, activity kira, impacting safety/independence with I/ADLs. Pt resides alone baseline, recent hospital admit in June 2025 with d/c to TCU, pt readmitted in same month. Prior to previous admit pt ind most IADLs and all ADLs. Currently, pt completes mobility and self cares with Min A x FWW. Pt scored 18/30 on SLUMS in June 2025. Recommend return to skilled TCU for increased I/ADL independence. Pt would likely benefit from higher level of care in least restrictive living environment such as NGOC, post-TCU. OT will continue to follow.   OT Brief overview of current status Goals of therapy will be to address safe mobility and make recs for d/c to next level of care. Pt and RN will continue to follow all falls risk precautions as documented by RN staff while hospitalized  (Min A FWW up to chair)   OT Total Distance Amb During Session (feet) 5   Total Session Time   Timed Code Treatment Minutes 16   Total Session Time (sum of timed and untimed services) 26

## 2025-06-27 NOTE — PLAN OF CARE
Goal Outcome Evaluation:    A&Ox2 - to self and place. Up Ax1 sarasteady. Voiding adequately. PRN oxy given for pain.

## 2025-06-27 NOTE — PLAN OF CARE
Physical Therapy Discharge Summary    Reason for therapy discharge:    Discharged to transitional care facility.    Progress towards therapy goal(s). See goals on Care Plan in [facility] electronic health record for goal details.  Goals partially met.  Barriers to achieving goals:   discharge from facility.    Therapy recommendation(s):    Continued therapy is recommended.  Rationale/Recommendations:  Pt is below baseline. Pt currently requiring heavy assist with all functional mobility. Pt presents with deficits in activity tolerance, balance, and strength. Due to these deficits, pt is a high falls risk and currently unable to ambulate. Pt would benefit from continued skilled PT services via TCU to address deficits and improve IND with safety and functional mobility..

## 2025-06-27 NOTE — PROGRESS NOTES
[facility]    Infectious Disease Progress Note    Date of Service (when I saw the patient): 06/26/2025       Assessment:  89 YM with history of CVA, A-fib/flutter, hyperlipidemia, hypertension, prostate cancer, and recent hospitalization from 6/7 - 6/15 for bilateral lower extremity weakness  related to a meningioma at T4/T5 resulting in cord compression, requiring resection of meningioma on 6/12/25. He has been admitted with acute onset of chills, rigors and confusion. Work up remains negative for infectious etiology, and it is unclear what precipitated this episode.       There is no apparent concern for surgical site infection, UA is stable, blood cxs remain negative and there is no intra abdominal focus of infection.     -Chills and confusion which have resolved  -Leukocytosis has resolved  - T4/T5 meningioma resulting in cord compression, s/p laminectomy and resection of meningioma on 6/12  -Enlargement of the thoracic and abdominal aorta   -Chronic medical conditions - CVA, A-fib/flutter, hyperlipidemia, hypertension, prostate cancer     Recommendations:  Respiratory viral panel, UA, blood cxs and Ct abdomen pelvis all remain negative  Discontinue Ceftriaxone  Monitor clinically    Can likely discharge by tomorrow if remains stable  Yandy Guy MD    Interval History   Feels much better, has remained afebrile, leukocytosis has resolved. Feels he is at baseline and has no focal complaints today.    Physical Exam   Temp: 98.9  F (37.2  C) Temp src: Oral BP: 123/69 Pulse: 84   Resp: 16 SpO2: 92 % O2 Device: None (Room air)    Vitals:    06/24/25 1612   Weight: 80.1 kg (176 lb 9.4 oz)     Vital Signs with Ranges  Temp:  [97.9  F (36.6  C)-98.9  F (37.2  C)] 98.9  F (37.2  C)  Pulse:  [80-87] 84  Resp:  [14-16] 16  BP: (118-123)/(69-78) 123/69  SpO2:  [92 %-97 %] 92 %    Constitutional: Awake, alert, cooperative, no apparent distress  Lungs: Clear   Abdomen: Normal bowel sounds, soft,  non-distended, non-tender  Skin: No rash      Other:    Medications   Current Facility-Administered Medications   Medication Dose Route Frequency Provider Last Rate Last Admin     Current Facility-Administered Medications   Medication Dose Route Frequency Provider Last Rate Last Admin    acetaminophen (TYLENOL) tablet 975 mg  975 mg Oral Q8H Harmony Chapin MD   975 mg at 06/26/25 1757    [Held by provider] amLODIPine (NORVASC) tablet 5 mg  5 mg Oral Daily Christen House DO        apixaban ANTICOAGULANT (ELIQUIS) tablet 5 mg  5 mg Oral BID Octavia Hartman,         atorvastatin (LIPITOR) tablet 10 mg  10 mg Oral QPM Christen House DO   10 mg at 06/25/25 2108    loratadine (CLARITIN) tablet 10 mg  10 mg Oral Daily Christen House DO   10 mg at 06/26/25 0949    [Held by provider] losartan (COZAAR) tablet 50 mg  50 mg Oral Daily Crhisten House DO        metoprolol succinate ER (TOPROL XL) 24 hr tablet 25 mg  25 mg Oral Daily Christen House, DO   25 mg at 06/26/25 0949    sodium chloride (PF) 0.9% PF flush 3 mL  3 mL Intracatheter Q8H [facility] Harmony Chapin MD   3 mL at 06/26/25 1329       Data   All microbiology laboratory data reviewed.  Recent Labs   Lab Test 06/26/25  0847 06/25/25  0831 06/24/25  1145   WBC 9.4 10.2 14.3*   HGB 13.2* 13.7 14.6   HCT 41.6 41.4 43.9   MCV 95 94 93    270 344     Recent Labs   Lab Test 06/26/25  0847 06/25/25  0831 06/24/25  1145   CR 0.60* 0.61* 0.84     Recent Labs   Lab Test 06/08/25  1209   SED 19     Component      Latest Ref [facility] 6/25/2025  12:05 PM   Adenovirus      Not Detected  Not Detected    Coronavirus      Not Detected  Not Detected    Human Metapneumovirus      Not Detected  Not Detected    Human Rhin/Enterovirus      Not Detected  Not Detected    Influenza A      Not Detected  Not Detected    Influenza A, H1      Not Detected  Not Detected    Influenza A 2009 H1N1      Not Detected  Not Detected    Influenza A, H3      Not Detected  Not  Detected    Influenza B      Not Detected  Not Detected    Parainfluenza Virus 1      Not Detected  Not Detected    Parainfluenza Virus 2      Not Detected  Not Detected    Parainfluenza Virus 3      Not Detected  Not Detected    Parainfluenza Virus 4      Not Detected  Not Detected    Respiratory Syncytial Virus A      Not Detected  Not Detected    Respiratory Syncytial Virus B      Not Detected  Not Detected    Chlamydia pneumoniae      Not Detected  Not Detected    Mycoplasma pneumoniae      Not Detected  Not Detected      Microbiology  06/24/2025 1200 06/26/2025 1501 Blood Culture Peripheral blood (BC) Arm, Right [[id number]]   Peripheral blood (BC) from Arm, Right    Preliminary result Component Value   Culture No growth after 2 days P             06/24/2025 1200 06/26/2025 1501 Blood Culture Peripheral blood (BC) Arm, Right [[id number]]   Peripheral blood (BC) from Arm, Right    Preliminary result Component Value   Culture No growth after 2 days P

## 2025-06-27 NOTE — PROGRESS NOTES
[facility]  Neurosurgery Progress Note  Date of Admission: 6/24/2025   Date of Service: 06/27/2025    Assessment and Plan:  Mr. Murry is an 89-year-old male who was 12 days status post T4-5 thoracic laminectomy and resection of meningioma, pathology revealed grade 1 WHO. Patient presented to the ER on 6/24 with confusion and rigors with CRP highly elevated to 225.29, CBC elevated at 14.3. MRI T spine demonstrated likely postsurgical fluid collection at the surgical site T4-5. ID consulted. Pt's symptoms and labs improved. Clinical picture, and thoracic MRI not suggestive of surgical site infection. Sutures removed postoperative day 14 (6/26).     - Activity: Up ad kameron. with assistance  - Imaging: None indicated  - Incision: Routine wound cares.    -Continue to trend infection markers.  CRP trending down  - Abx: Per ID  - Neuro checks: As ordered  - Okay for regular diet   - Incentive spirometer   - PT/OT  - DVT ppx: SCDs, apixaban  - Appreciate assistance from other teams with medical management.   - Dispo: Pending adequate pain control and therapy recommendations  - Follow up: As ordered    Plans discussed with Dr. Benitez who was in agreement with plans.     Kathy Stinson PA-C   [facility] Neurosurgery  Clinic phone number: [phone]  Securely message or page via Epic Secure Chat, @Pay, or [employer]oke     - - - - -     Subjective: Pt endorses mild back pain.  Denies worsening weakness numbness tingliness in extremities.  Says his left lower extremity weakness is improving he is moving around better.     Objective:   Imaging:   Results for orders placed or performed during the hospital encounter of 06/24/25   CT Chest/Abdomen/Pelvis w Contrast    Impression    IMPRESSION:  1.  Posterior laminectomy at T4 and T5. Small fluid at the operative site is noted and may be simple postoperative fluid. No peripheral rim enhancement can be seen. Infected fluid cannot be entirely excluded based on  imaging alone. If there are   progressive symptoms, repeat imaging at this level could be performed for comparison.  2.  Cholelithiasis and distended gallbladder. Correlate clinically with any gallbladder symptoms.  3.  Mild wall thickening of the left renal pelvis. Correlate with any evidence of urinary tract infection.  4.  Nonobstructing stone at the right kidney.  5.  Stable cardiomegaly.  6.  Interval enlargement of the thoracic and abdominal aorta at several levels as compared to 9/5/2022. Aortic arch is now 5.2 cm, previously 4.1 cm. The descending thoracic aorta is now 3.8 cm, previously 3.5 cm. Abdominal aorta is now 3.4 cm,   previously 3 cm. Recommend vascular consultation for management.   CT Head w/o Contrast    Impression    IMPRESSION:  1.  No acute intracranial hemorrhage, mass effect, or a definite acute infarct.  2.  Similar chronic intracranial changes, as described.   US Abdomen Limited    Impression    IMPRESSION:    1.  Distended gallbladder with small gallstones. No evidence of acute cholecystitis.    2.  No biliary ductal dilation.   MR Thoracic Spine w/o & w Contrast    Impression    IMPRESSION:  1.  Interval T4-T5 laminectomy with significantly improved compression of the thoracic cord. Small focus of increased cord signal at this level likely represents chronic myelomalacia.  2.  Small fluid collection at the laminectomy site resulting in mild narrowing of the thecal sac.     XR Ankle Left G/E 3 Views    Impression    IMPRESSION: The left ankle is negative for fracture or dislocation. There is mild degenerative change. Plantar calcaneal spurring.     Labs:   - Hgb: 13.2  - Plts: 272  - WBC: 9.4  - CRP: 185 (225)    Vitals:    06/24/25 1612   Weight: 80.1 kg (176 lb 9.4 oz)     Vital Signs with Ranges  Temp:  [98.8  F (37.1  C)-98.9  F (37.2  C)] 98.8  F (37.1  C)  Pulse:  [77-84] 77  Resp:  [16] 16  BP: (123)/(69-73) 123/73  SpO2:  [92 %-94 %] 94 %  I/O last 3 completed shifts:  In: 1080  [P.O.:1080]  Out: -     Physical Exam:   General: NAD, sitting up in chair  HENNT: atraumatic, normocephalic.   Mental status:  AOx4, speech is fluent, following commands  Cranial nerves:  II-XII grossly intact.   Motor:  Moves all extremities independently.   Strength:   Hip flexion                Right: 5/5  Left:  3/5  Knee extension         Right:  5/5  Left:  3/5  Knee flexion              Right:  5/5  Left:  3/5  Gastroc Soleus (PF) Right:  5/5  Left:  3/5  Tibialis Ant (DF)        Right:  5/5  Left:  3/5  Sensation: grossly Intact to light touch   Incision: Clean, dry, intact. No swelling, redness, warmth, or drainage.  Minimal tenderness with palpation

## 2025-06-27 NOTE — PROGRESS NOTES
[facility]    Infectious Disease Progress Note    Date of Service (when I saw the patient): 06/27/2025     Assessment:  89 YM with history of CVA, A-fib/flutter, hyperlipidemia, hypertension, prostate cancer, and recent hospitalization from 6/7 - 6/15 for bilateral lower extremity weakness  related to a meningioma at T4/T5 resulting in cord compression, requiring resection of meningioma on 6/12/25. Patient was admitted with acute onset of chills, rigors and confusion. Work up remains negative for infectious etiology, and it is unclear what precipitated this episode.       There is no apparent concern for surgical site infection, UA is stable, blood cxs remain negative and there is no intra abdominal focus of infection. Viral studies are also negative.     -Chills and confusion which have resolved  -Leukocytosis has resolved  - T4/T5 meningioma resulting in cord compression, s/p laminectomy and resection of meningioma on 6/12  -Enlargement of the thoracic and abdominal aorta   -Chronic medical conditions - CVA, A-fib/flutter, hyperlipidemia, hypertension, prostate cancer     Recommendations:  Maintain off antibiotics  Can discharge from the ID stand point once medically stable  ID will sign off, please call us back as needed        Yandy Guy MD    Interval History   Remains afebrile and feels well  All work up for infection has remained negative      Physical Exam   Temp: 98.2  F (36.8  C) Temp src: Oral BP: 130/81 Pulse: 100   Resp: 16 SpO2: 97 % O2 Device: None (Room air)    Vitals:    06/24/25 1612   Weight: 80.1 kg (176 lb 9.4 oz)     Vital Signs with Ranges  Temp:  [98.2  F (36.8  C)-98.9  F (37.2  C)] 98.2  F (36.8  C)  Pulse:  [] 100  Resp:  [16] 16  BP: (123-130)/(69-81) 130/81  SpO2:  [92 %-97 %] 97 %    Constitutional: Awake, alert, cooperative, no apparent distress  Lungs: Clear to auscultation bilaterally, no crackles or wheezing  Cardiovascular: Regular rate and rhythm,  normal S1 and S2, and no murmur noted  Abdomen: Normal bowel sounds, soft, non-distended, non-tender  Skin: No rashes, no cyanosis, no edema  Other:    Medications   Current Facility-Administered Medications   Medication Dose Route Frequency Provider Last Rate Last Admin     Current Facility-Administered Medications   Medication Dose Route Frequency Provider Last Rate Last Admin    acetaminophen (TYLENOL) tablet 975 mg  975 mg Oral Q8H Harmony Chapin MD   975 mg at 06/27/25 0146    [Held by provider] amLODIPine (NORVASC) tablet 5 mg  5 mg Oral Daily Christen House E, DO        apixaban ANTICOAGULANT (ELIQUIS) tablet 5 mg  5 mg Oral BID Octavia Hartman DO   5 mg at 06/26/25 2035    atorvastatin (LIPITOR) tablet 10 mg  10 mg Oral QPM Christen House, DO   10 mg at 06/26/25 2035    loratadine (CLARITIN) tablet 10 mg  10 mg Oral Daily Christen House, DO   10 mg at 06/26/25 0949    [Held by provider] losartan (COZAAR) tablet 50 mg  50 mg Oral Daily Christen House, DO        metoprolol succinate ER (TOPROL XL) 24 hr tablet 25 mg  25 mg Oral Daily Christen House, DO   25 mg at 06/26/25 0949    sodium chloride (PF) 0.9% PF flush 3 mL  3 mL Intracatheter Q8H [facility] Harmony Chapin MD   3 mL at 06/26/25 1329       Data   All microbiology laboratory data reviewed.  Recent Labs   Lab Test 06/26/25  0847 06/25/25  0831 06/24/25  1145   WBC 9.4 10.2 14.3*   HGB 13.2* 13.7 14.6   HCT 41.6 41.4 43.9   MCV 95 94 93    270 344     Recent Labs   Lab Test 06/26/25  0847 06/25/25  0831 06/24/25  1145   CR 0.60* 0.61* 0.84     Recent Labs   Lab Test 06/08/25  1209   SED 19

## 2025-06-27 NOTE — PLAN OF CARE
Speech Language Therapy Discharge Summary    Reason for therapy discharge:    Discharged to transitional care facility.    Progress towards therapy goal(s). See goals on Care Plan in [facility] electronic health record for goal details.  Goals partially met.  Barriers to achieving goals:   discharge from facility.    Therapy recommendation(s):    No further therapy is recommended. Recommend continue regular diet with thin liquids with regurgitation precautions with known large (untreated) Zenker's diverticulum. Eat when alert, upright during and 1 hour after meals, slow rate and alternated textures.

## 2025-06-29 ENCOUNTER — DOCUMENTATION ONLY (OUTPATIENT)
Dept: GERIATRICS | Facility: CLINIC | Age: 89
End: 2025-06-29
Payer: MEDICARE

## 2025-06-29 LAB
BACTERIA SPEC CULT: NO GROWTH
BACTERIA SPEC CULT: NO GROWTH

## 2025-06-30 ENCOUNTER — TRANSITIONAL CARE UNIT VISIT (OUTPATIENT)
Dept: GERIATRICS | Facility: CLINIC | Age: 89
End: 2025-06-30
Payer: MEDICARE

## 2025-06-30 VITALS
SYSTOLIC BLOOD PRESSURE: 116 MMHG | DIASTOLIC BLOOD PRESSURE: 80 MMHG | BODY MASS INDEX: 25.14 KG/M2 | WEIGHT: 175.6 LBS | HEIGHT: 70 IN | HEART RATE: 89 BPM | TEMPERATURE: 97.8 F | OXYGEN SATURATION: 97 % | RESPIRATION RATE: 16 BRPM

## 2025-06-30 DIAGNOSIS — M48.04 THORACIC STENOSIS: ICD-10-CM

## 2025-06-30 DIAGNOSIS — I48.0 PAROXYSMAL A-FIB (H): ICD-10-CM

## 2025-06-30 DIAGNOSIS — Z74.09 IMPAIRED MOBILITY AND ACTIVITIES OF DAILY LIVING: ICD-10-CM

## 2025-06-30 DIAGNOSIS — I11.0 HYPERTENSIVE HEART DISEASE WITH HEART FAILURE (H): ICD-10-CM

## 2025-06-30 DIAGNOSIS — Z98.890 S/P LAMINECTOMY: ICD-10-CM

## 2025-06-30 DIAGNOSIS — E78.5 HYPERLIPIDEMIA LDL GOAL <100: ICD-10-CM

## 2025-06-30 DIAGNOSIS — G92.8 TOXIC METABOLIC ENCEPHALOPATHY: Primary | ICD-10-CM

## 2025-06-30 DIAGNOSIS — I50.810 RVF (RIGHT VENTRICULAR FAILURE) (H): ICD-10-CM

## 2025-06-30 DIAGNOSIS — R53.81 PHYSICAL DECONDITIONING: ICD-10-CM

## 2025-06-30 DIAGNOSIS — D32.9 MENINGIOMA (H): ICD-10-CM

## 2025-06-30 DIAGNOSIS — Z78.9 IMPAIRED MOBILITY AND ACTIVITIES OF DAILY LIVING: ICD-10-CM

## 2025-06-30 DIAGNOSIS — M48.04 SPINAL STENOSIS OF THORACIC REGION: ICD-10-CM

## 2025-06-30 DIAGNOSIS — I48.19 PERSISTENT ATRIAL FIBRILLATION (H): ICD-10-CM

## 2025-06-30 PROCEDURE — 99310 SBSQ NF CARE HIGH MDM 45: CPT | Performed by: PHYSICIAN ASSISTANT

## 2025-06-30 RX ORDER — OXYCODONE HYDROCHLORIDE 5 MG/1
TABLET ORAL
Qty: 30 TABLET | Refills: 0 | Status: SHIPPED | OUTPATIENT
Start: 2025-06-30

## 2025-06-30 NOTE — PLAN OF CARE
Occupational Therapy Discharge Summary    Reason for therapy discharge:    Discharged to transitional care facility.    Progress towards therapy goal(s). See goals on Care Plan in [facility] electronic health record for goal details.  Goals partially met.  Barriers to achieving goals:   discharge from facility.    Therapy recommendation(s):    Continued therapy is recommended.  Rationale/Recommendations:  Pt functioning below baseline d/t precautions, balance, cog, mobility, activity kira, impacting safety/independence with I/ADLs. Pt resides alone baseline, recent hospital admit in June 2025 with d/c to TCU, pt readmitted in same month. Prior to previous admit pt ind most IADLs and all ADLs. Currently, pt completes mobility and self cares with Min A x FWW. Pt scored 18/30 on SLUMS in June 2025. Recommend return to skilled TCU for increased I/ADL independence. Pt would likely benefit from higher level of care in least restrictive living environment such as NGOC, post-TCU. OT will continue to follow.

## 2025-06-30 NOTE — PROGRESS NOTES
[facility] GERIATRICS    PRIMARY CARE PROVIDER AND CLINIC:  Melissa Torres PA-C, [address] [place] [address]  / JUANITA MARIE [id number]  Chief Complaint   Patient presents with    Hospital F/U      [place] Medical Record Number:  [id number]  Place of Service where encounter took place:  [facility] (TCU) [[id number]]      HPI:    89yo male with recent admission from 6/7 - 6/15 with diagnosis of new thoracic meningioma with cord compression s/p laminectomy, resection admitted at [facility] from 6/24 - 6/27, 2025 after presenting from TCU with confusion, shaking. Found to have leukocytosis and elevated inflammatory markers. CT imaging with small fluid collection around surgical site. Received empiric IV abx. Seen by neurosurgery, no convincing evidence for infection or need for surgery. Also seen by ID and antibiotics stopped. Confusion improved, pt referred back to TCU for ongoing rehab and med management.    Pt is seen as initial visit. Sitting up in recliner at bedside, feels well. Pain is tolerable, denies numbness/tingling down legs. No constipation. Denies cp, sob. Hoping to discharge home soon. Lives with wife in [place]. DNR/DNI.    CODE STATUS/ADVANCE DIRECTIVES DISCUSSION:  Prior  DNR / DNI  ALLERGIES:   Allergies   Allergen Reactions    Other [Seasonal Allergies]       PAST MEDICAL HISTORY:   Past Medical History:   Diagnosis Date    Anal fistula     Antiplatelet or antithrombotic long-term use     Arrhythmia     Atrial flutter (H) 2012    Cerebral infarction (H)     TIA    Heartburn     Hypertension     Inguinal hernia, left     Persistent atrial fibrillation (H) 8/21/12    Prostate cancer (H)     TIA (transient ischaemic attack)     2014      PAST SURGICAL HISTORY:   has a past surgical history that includes orthopedic surgery; colonoscopy; Exam under anesthesia, fistulotomy rectum, combined (N/A, 6/18/2015); ENT surgery; Abdomen surgery (1999); Herniorrhaphy inguinal (7/25/2013);  Herniorrhaphy inguinal (Right, 6/13/2019); GI surgery; Colonoscopy (N/A, 9/24/2021); and Laminectomy, excise tumor thoracic, combined (N/A, 6/12/2025).  FAMILY HISTORY: family history includes Osteoporosis in his father; Ovarian Cancer in his mother; Unknown/Adopted in his sister.  SOCIAL HISTORY:   reports that he quit smoking about 56 years ago. His smoking use included cigarettes. He started smoking about 72 years ago. He has never used smokeless tobacco. He reports current alcohol use of about 0.8 standard drinks of alcohol per week. He reports that he does not use drugs.  Patient's living condition: lives alone    Post Discharge Medication Reconciliation Status:   MED REC REQUIRED  Post Medication Reconciliation Status: discharge medications reconciled, continue medications without change       Current Outpatient Medications   Medication Sig Dispense Refill    acetaminophen (TYLENOL) 325 MG tablet Take 2 tablets (650 mg) by mouth every 4 hours as needed for mild pain or other (and adjunct with moderate or severe pain or per patient request).      amLODIPine (NORVASC) 5 MG tablet Take 1 tablet (5 mg) by mouth daily 90 tablet 3    apixaban ANTICOAGULANT (ELIQUIS) 5 MG tablet Take 5 mg by mouth 2 times daily.      atorvastatin (LIPITOR) 10 MG tablet Take 1 tablet (10 mg) by mouth every evening 90 tablet 3    calcium carbonate (TUMS) 500 MG chewable tablet Take 1 chew tab by mouth nightly as needed       hydrOXYzine HCl (ATARAX) 10 MG tablet Take 1 tablet (10 mg) by mouth every 6 hours as needed for other (adjuvant pain).      loratadine (CLARITIN) 10 MG tablet Take 1 tablet by mouth daily. 30 tablet 1    magnesium hydroxide (MILK OF MAGNESIA) 400 MG/5ML suspension Take 30 mLs by mouth daily as needed for constipation or heartburn.      methocarbamol (ROBAXIN) 750 MG tablet Take 1 tablet (750 mg) by mouth 4 times daily as needed for muscle spasms.      metoprolol succinate ER (TOPROL XL) 25 MG 24 hr tablet Take 1  "tablet (25 mg) by mouth daily 90 tablet 3    Multiple Vitamins-Minerals (PRESERVISION AREDS 2 PO) Take 1 capsule by mouth 2 times daily AREDS 2      ondansetron (ZOFRAN ODT) 4 MG ODT tab Take 1 tablet (4 mg) by mouth every 8 hours as needed for nausea.      oxyCODONE (ROXICODONE) 5 MG tablet Take 1 tablet (5 mg) by mouth 2 times daily. May also take 1 tablet (5 mg) 4 times daily as needed for severe pain. And QID PRN. 30 tablet 0    polyethylene glycol (MIRALAX) 17 GM/Dose powder Take 17 g by mouth daily.      vitamin D3 (CHOLECALCIFEROL) 50 mcg (2000 units) tablet Take 1 tablet by mouth daily       No current facility-administered medications for this visit.       ROS:  4 point ROS including Respiratory, CV, GI and , other than that noted in the HPI,  is negative    Vitals:  /80   Pulse 89   Temp 97.8  F (36.6  C)   Resp 16   Ht 1.778 m (5' 10\")   Wt 79.7 kg (175 lb 9.6 oz)   SpO2 97%   BMI 25.20 kg/m    Exam:  GEN: well-developed, well-nourished, appears comfortable  HEENT: NCAT, EOM intact bilaterally, sclera clear, conjunctiva normal, nose & mouth patent, mucous membranes moist  CHEST: lungs CTA bilaterally, no increased work of breathing, no wheeze, crackles, rhonchi  HEART: RRR, S1 & S2  ABD: soft, nontender, nondistended, no guarding or rigidity, +BS in all 4 quadrants  MSK: AROM bilateral UE/LE, pedal & radial pulses 2+ bilaterally  NEURO: awake, alert. CN II-XII grossly intact. Sensation grossly intact to light touch.   SKIN: warm & dry without rash, no pedal edema    Lab/Diagnostic data:  Recent labs in [facility] reviewed by me today.  and Most Recent 3 CBC's:  Recent Labs   Lab Test 06/26/25  0847 06/25/25  0831 06/24/25  1145   WBC 9.4 10.2 14.3*   HGB 13.2* 13.7 14.6   MCV 95 94 93    270 344     Most Recent 3 BMP's:  Recent Labs   Lab Test 06/27/25  0943 06/26/25  0847 06/25/25  0831 06/24/25  1145   NA  --  137 138 135   POTASSIUM  --  4.3 3.9 5.0   CHLORIDE  --  104 104 97*   CO2  -- "  26 24 28   BUN  --  14.0 15.2 19.9   CR 0.62* 0.60* 0.61* 0.84   ANIONGAP  --  7 10 10   BELLO  --  8.4* 8.7* 9.0   GLC  --  98 89 101*     Most Recent 2 LFT's:  Recent Labs   Lab Test 06/24/25  1145 06/12/25  0800 06/09/25  0752   AST 16 38 75*   ALT 10  --  30   ALKPHOS 86  --  71   BILITOTAL 1.0  --  0.7     7-Day Micro Results       Collected Updated Procedure Result Status      06/25/2025 1205 06/25/2025 1727 Respiratory Panel PCR [[id number]]    Swab from Nasopharyngeal    Final result Component Value   Adenovirus Not Detected   Coronavirus Not Detected   This test detects Coronavirus 229E, HKU1, NL63 and OC43 but does not distinguish between them. It does not detect MERS ( Respiratory Syndrome), SARS (Severe Acute Respiratory Syndrome) or 2019-nCoV (Novel 2019) Coronavirus.   Human Metapneumovirus Not Detected   Human Rhin/Enterovirus Not Detected   Influenza A Not Detected   Influenza A, H1 Not Detected   Influenza A 2009 H1N1 Not Detected   Influenza A, H3 Not Detected   Influenza B Not Detected   Parainfluenza Virus 1 Not Detected   Parainfluenza Virus 2 Not Detected   Parainfluenza Virus 3 Not Detected   Parainfluenza Virus 4 Not Detected   Respiratory Syncytial Virus A Not Detected   Respiratory Syncytial Virus B Not Detected   Chlamydia Pneumoniae Not Detected   Mycoplasma Pneumoniae Not Detected            06/25/2025 0843 06/25/2025 1213 MRSA MSSA PCR, Nasal Swab [[id number]]    Swab from Nares, Bilateral    Final result Component Value   MRSA Target DNA Negative   SA Target DNA Negative            06/24/2025 1200 06/29/2025 1501 Blood Culture Peripheral blood (BC) Arm, Right [[id number]]   Peripheral blood (BC) from Arm, Right    Final result Component Value   Culture No Growth               06/24/2025 1200 06/29/2025 1501 Blood Culture Peripheral blood (BC) Arm, Right [[id number]]   Peripheral blood (BC) from Arm, Right    Final result Component Value   Culture No Growth                06/24/2025 1146 06/24/2025 1241 Influenza A/B, RSV and SARS-CoV2 PCR (COVID-19) Nose [[id number]]    Swab from Nose    Final result Component Value   Influenza A PCR Negative   Influenza B PCR Negative   RSV PCR Negative   SARS CoV2 PCR Negative   NEGATIVE: SARS-CoV-2 (COVID-19) RNA not detected, presumed negative.                  Most Recent TSH and T4:  Recent Labs   Lab Test 06/07/25  0722   TSH 4.36*   T4 1.03     Most Recent Hemoglobin A1c:  Recent Labs   Lab Test 07/31/19  2106   A1C 5.6     Most Recent Urinalysis:  Recent Labs   Lab Test 06/24/25  1447 06/07/25 2024 09/23/20  1035   COLOR Orange*   < > Yellow   APPEARANCE Slightly Cloudy*   < > Clear   URINEGLC Negative   < > Negative   URINEBILI Negative   < > Negative   URINEKETONE Negative   < > Negative   SG 1.024   < > 1.025   UBLD Large*   < > Trace*   URINEPH 6.5   < > 5.5   PROTEIN 20*   < > Negative   UROBILINOGEN  --   --  0.2   NITRITE Negative   < > Negative   LEUKEST Negative   < > Negative   RBCU >182*   < > O - 2   WBCU 0   < > 0 - 5    < > = values in this interval not displayed.     Most Recent ESR & CRP:  Recent Labs   Lab Test 06/27/25  0943 06/24/25  1145 06/08/25  1209 07/29/19  1952   SED  --   --  19 11   CRP  --   --   --  61.9*   CRPI 185.32*   < > 225.29*  --     < > = values in this interval not displayed.       ASSESSMENT/PLAN:    Encephalopathy  T4-5 meningioma s/p laminectomy and resection 6/12/25  Presented with confusion, CRP elevated and WBC 14k without additional s/s infection. CT with small fluid at operative site, possibly simple postop fluid. No peripheral rim enhancement. Seen by neurosurgery, no clear infectious source, no clear need for surgical procedure. Treated with IV ceftriaxone initially, seen by ID and Abx discontinued as there was no clear source of infection. Confusion improved.  -Monitor neuro state  -Monitor fever curve    Paroxysmal afib  Chronic, stable.  -Continue metoprolol 25mg/d  -Continue AC  with apixaban    HTN/HLD  BP meds adjusted while inpatient dt normotension. Losartan discontinued at hospital discharge.  -Continues on amlodipine 10mg/d, metoprolol 25mg/d  -Continues on statin, apixaban    Hx prostate CA s/p prostatectomy 2009, radiation tx 2014 and androgen deprivation therapy  Follows with Urology as outpatient. Recent rise in PSA, MRI not convincing for recurrence.  -Follow with Urology    Thoracic, abdominal aorta enlargement  CT A/P with findings of Interval enlargement of the thoracic and abdominal aorta at several levels as compared to 9/5/2022. Aortic arch is now 5.2 cm, previously 4.1 cm. The descending thoracic aorta is now 3.8 cm, previously 3.5 cm. Abdominal aorta is now 3.4 cm, previously 3 cm.    Total time spent with patient visit at the skilled nursing facility was 45 min including patient visit and review of past records.     Electronically signed by:  Danial Castano PA-C

## 2025-07-01 ENCOUNTER — LAB REQUISITION (OUTPATIENT)
Dept: LAB | Facility: CLINIC | Age: 89
End: 2025-07-01

## 2025-07-01 DIAGNOSIS — Z98.890 OTHER SPECIFIED POSTPROCEDURAL STATES: ICD-10-CM

## 2025-07-02 LAB
ALBUMIN SERPL BCG-MCNC: 2.8 G/DL (ref 3.5–5.2)
ALP SERPL-CCNC: 82 U/L (ref 40–150)
ALT SERPL W P-5'-P-CCNC: 16 U/L (ref 0–70)
ANION GAP SERPL CALCULATED.3IONS-SCNC: 10 MMOL/L (ref 7–15)
AST SERPL W P-5'-P-CCNC: 16 U/L (ref 0–45)
BILIRUB SERPL-MCNC: 0.4 MG/DL
BUN SERPL-MCNC: 10.1 MG/DL (ref 8–23)
CALCIUM SERPL-MCNC: 8.9 MG/DL (ref 8.8–10.4)
CHLORIDE SERPL-SCNC: 103 MMOL/L (ref 98–107)
CREAT SERPL-MCNC: 0.69 MG/DL (ref 0.67–1.17)
EGFRCR SERPLBLD CKD-EPI 2021: 89 ML/MIN/1.73M2
ERYTHROCYTE [DISTWIDTH] IN BLOOD BY AUTOMATED COUNT: 14.6 % (ref 10–15)
GLUCOSE SERPL-MCNC: 122 MG/DL (ref 70–99)
HCO3 SERPL-SCNC: 27 MMOL/L (ref 22–29)
HCT VFR BLD AUTO: 39.6 % (ref 40–53)
HGB BLD-MCNC: 12.5 G/DL (ref 13.3–17.7)
MCH RBC QN AUTO: 30.2 PG (ref 26.5–33)
MCHC RBC AUTO-ENTMCNC: 31.6 G/DL (ref 31.5–36.5)
MCV RBC AUTO: 96 FL (ref 78–100)
PLATELET # BLD AUTO: 360 10E3/UL (ref 150–450)
POTASSIUM SERPL-SCNC: 4.1 MMOL/L (ref 3.4–5.3)
PROT SERPL-MCNC: 6 G/DL (ref 6.4–8.3)
RBC # BLD AUTO: 4.14 10E6/UL (ref 4.4–5.9)
SODIUM SERPL-SCNC: 140 MMOL/L (ref 135–145)
WBC # BLD AUTO: 5.8 10E3/UL (ref 4–11)

## 2025-07-02 PROCEDURE — 80053 COMPREHEN METABOLIC PANEL: CPT | Performed by: PHYSICIAN ASSISTANT

## 2025-07-02 PROCEDURE — P9604 ONE-WAY ALLOW PRORATED TRIP: HCPCS | Performed by: PHYSICIAN ASSISTANT

## 2025-07-02 PROCEDURE — 85027 COMPLETE CBC AUTOMATED: CPT | Performed by: PHYSICIAN ASSISTANT

## 2025-07-02 PROCEDURE — 36415 COLL VENOUS BLD VENIPUNCTURE: CPT | Performed by: PHYSICIAN ASSISTANT

## 2025-07-03 ENCOUNTER — TRANSITIONAL CARE UNIT VISIT (OUTPATIENT)
Dept: GERIATRICS | Facility: CLINIC | Age: 89
End: 2025-07-03
Payer: MEDICARE

## 2025-07-03 VITALS
TEMPERATURE: 97.6 F | DIASTOLIC BLOOD PRESSURE: 68 MMHG | BODY MASS INDEX: 23.78 KG/M2 | OXYGEN SATURATION: 93 % | WEIGHT: 175.6 LBS | HEART RATE: 62 BPM | HEIGHT: 72 IN | RESPIRATION RATE: 18 BRPM | SYSTOLIC BLOOD PRESSURE: 114 MMHG

## 2025-07-03 DIAGNOSIS — D32.9 MENINGIOMA (H): Primary | ICD-10-CM

## 2025-07-03 DIAGNOSIS — Z74.09 IMPAIRED MOBILITY AND ACTIVITIES OF DAILY LIVING: ICD-10-CM

## 2025-07-03 DIAGNOSIS — K59.01 SLOW TRANSIT CONSTIPATION: ICD-10-CM

## 2025-07-03 DIAGNOSIS — R53.81 PHYSICAL DECONDITIONING: ICD-10-CM

## 2025-07-03 DIAGNOSIS — I11.0 HYPERTENSIVE HEART DISEASE WITH HEART FAILURE (H): ICD-10-CM

## 2025-07-03 DIAGNOSIS — Z98.890 S/P LAMINECTOMY: ICD-10-CM

## 2025-07-03 DIAGNOSIS — G92.8 TOXIC METABOLIC ENCEPHALOPATHY: ICD-10-CM

## 2025-07-03 DIAGNOSIS — I48.19 PERSISTENT ATRIAL FIBRILLATION (H): ICD-10-CM

## 2025-07-03 DIAGNOSIS — M48.04 THORACIC STENOSIS: ICD-10-CM

## 2025-07-03 DIAGNOSIS — I48.0 PAROXYSMAL A-FIB (H): ICD-10-CM

## 2025-07-03 DIAGNOSIS — Z78.9 IMPAIRED MOBILITY AND ACTIVITIES OF DAILY LIVING: ICD-10-CM

## 2025-07-03 DIAGNOSIS — E78.5 HYPERLIPIDEMIA LDL GOAL <100: ICD-10-CM

## 2025-07-03 PROCEDURE — 99309 SBSQ NF CARE MODERATE MDM 30: CPT | Performed by: PHYSICIAN ASSISTANT

## 2025-07-03 NOTE — LETTER
7/3/2025      Yosef Murry  [address] [place] [address]        No notes on file      Sincerely,        Danial Castano PA-C    Electronically signed

## 2025-07-03 NOTE — PROGRESS NOTES
[facility] GERIATRICS    Chief Complaint   Patient presents with    Nursing Home Acute     HPI:  Yosef Murry is a 89 year old  (1935), who is being seen today for an episodic care visit at: [facility] (TC) [[id number]].     Summary: ***    Today, pt is seen in follow-up. ***    On review of facility records, BPs ranging ***, HRs ***, BG values ***, weight ***.    Allergies, and PMH/PSH reviewed in [facility] today.  REVIEW OF SYSTEMS:  4 point ROS including Respiratory, CV, GI and , other than that noted in the HPI,  is negative    Objective:   /68   Pulse 62   Temp 97.6  F (36.4  C)   Resp 18   Ht 1.829 m (6')   Wt 79.7 kg (175 lb 9.6 oz)   SpO2 93%   BMI 23.82 kg/m    ***    {fgslab:429197}    Assessment/Plan:    ***    MED REC REQUIRED{TIP  Click the link below to document or use med rec list, use list to pull in response :[id number]}  Post Medication Reconciliation Status: {MED REC LIST:495907}      Electronically signed by: Jacqueline Mccoy        Sensation grossly intact to light touch.   SKIN: warm & dry without rash, no pedal edema    Recent labs in [facility] reviewed by me today.  and Most Recent 3 CBC's:  Recent Labs   Lab Test 07/02/25  0940 06/26/25  0847 06/25/25  0831   WBC 5.8 9.4 10.2   HGB 12.5* 13.2* 13.7   MCV 96 95 94    272 270     Most Recent 3 BMP's:  Recent Labs   Lab Test 07/02/25  0940 06/27/25  0943 06/26/25  0847 06/25/25  0831     --  137 138   POTASSIUM 4.1  --  4.3 3.9   CHLORIDE 103  --  104 104   CO2 27  --  26 24   BUN 10.1  --  14.0 15.2   CR 0.69 0.62* 0.60* 0.61*   ANIONGAP 10  --  7 10   BELLO 8.9  --  8.4* 8.7*   *  --  98 89       Assessment/Plan:    Encephalopathy  T4-5 meningioma s/p laminectomy and resection 6/12/25  Presented with confusion, CRP elevated and WBC 14k without additional s/s infection. CT with small fluid at operative site, possibly simple postop fluid. No peripheral rim enhancement. Seen by neurosurgery, no clear infectious source, no clear need for surgical procedure. Treated with IV ceftriaxone initially, seen by ID and Abx discontinued as there was no clear source of infection. Confusion improved.  -Monitor neuro state  -Monitor fever curve    Constipation  -Change miralax to daily scheduled  -Add SennaS to 2 tab BID     Paroxysmal afib  Chronic, stable.  -Continue metoprolol 25mg/d  -Continue AC with apixaban     HTN/HLD  BP meds adjusted while inpatient dt normotension. Losartan discontinued at hospital discharge.  -Continues on amlodipine 10mg/d, metoprolol 25mg/d  -Continues on statin, apixaban     Hx prostate CA s/p prostatectomy 2009, radiation tx 2014 and androgen deprivation therapy  Follows with Urology as outpatient. Recent rise in PSA, MRI not convincing for recurrence.  -Follow with Urology     Thoracic, abdominal aorta enlargement  CT A/P with findings of Interval enlargement of the thoracic and abdominal aorta at several levels as compared to 9/5/2022. Aortic arch is now 5.2  cm, previously 4.1 cm. The descending thoracic aorta is now 3.8 cm, previously 3.5 cm. Abdominal aorta is now 3.4 cm, previously 3 cm.    MED REC REQUIRED  Post Medication Reconciliation Status: medication reconcilation previously completed during another office visit      Electronically signed by: Danial Castano PA-C

## 2025-07-09 ENCOUNTER — TRANSITIONAL CARE UNIT VISIT (OUTPATIENT)
Dept: GERIATRICS | Facility: CLINIC | Age: 89
End: 2025-07-09
Payer: MEDICARE

## 2025-07-09 ENCOUNTER — HOSPITAL ENCOUNTER (INPATIENT)
Facility: CLINIC | Age: 89
DRG: 871 | End: 2025-07-09
Attending: EMERGENCY MEDICINE | Admitting: INTERNAL MEDICINE
Payer: MEDICARE

## 2025-07-09 VITALS
DIASTOLIC BLOOD PRESSURE: 75 MMHG | WEIGHT: 175.6 LBS | HEIGHT: 70 IN | RESPIRATION RATE: 18 BRPM | HEART RATE: 93 BPM | TEMPERATURE: 98.3 F | OXYGEN SATURATION: 95 % | BODY MASS INDEX: 25.14 KG/M2 | SYSTOLIC BLOOD PRESSURE: 120 MMHG

## 2025-07-09 DIAGNOSIS — E78.5 HYPERLIPIDEMIA LDL GOAL <100: ICD-10-CM

## 2025-07-09 DIAGNOSIS — Z74.09 IMPAIRED MOBILITY AND ACTIVITIES OF DAILY LIVING: ICD-10-CM

## 2025-07-09 DIAGNOSIS — Z78.9 IMPAIRED MOBILITY AND ACTIVITIES OF DAILY LIVING: ICD-10-CM

## 2025-07-09 DIAGNOSIS — Z98.890 S/P LAMINECTOMY: ICD-10-CM

## 2025-07-09 DIAGNOSIS — Z79.01 LONG TERM (CURRENT) USE OF ANTICOAGULANTS: ICD-10-CM

## 2025-07-09 DIAGNOSIS — R07.9 CHEST PAIN, UNSPECIFIED TYPE: ICD-10-CM

## 2025-07-09 DIAGNOSIS — D32.9 MENINGIOMA (H): ICD-10-CM

## 2025-07-09 DIAGNOSIS — M48.04 THORACIC STENOSIS: Primary | ICD-10-CM

## 2025-07-09 DIAGNOSIS — J90 PLEURAL EFFUSION ON LEFT: ICD-10-CM

## 2025-07-09 DIAGNOSIS — M48.04 SPINAL STENOSIS OF THORACIC REGION: ICD-10-CM

## 2025-07-09 DIAGNOSIS — J18.9 PNEUMONIA OF LEFT LUNG DUE TO INFECTIOUS ORGANISM, UNSPECIFIED PART OF LUNG: ICD-10-CM

## 2025-07-09 DIAGNOSIS — I48.19 PERSISTENT ATRIAL FIBRILLATION (H): ICD-10-CM

## 2025-07-09 DIAGNOSIS — R09.02 HYPOXIA: ICD-10-CM

## 2025-07-09 DIAGNOSIS — I48.0 PAROXYSMAL A-FIB (H): ICD-10-CM

## 2025-07-09 DIAGNOSIS — I11.0 HYPERTENSIVE HEART DISEASE WITH HEART FAILURE (H): ICD-10-CM

## 2025-07-09 DIAGNOSIS — Z71.89 OTHER SPECIFIED COUNSELING: Chronic | ICD-10-CM

## 2025-07-09 DIAGNOSIS — K59.01 SLOW TRANSIT CONSTIPATION: ICD-10-CM

## 2025-07-09 DIAGNOSIS — R53.81 PHYSICAL DECONDITIONING: ICD-10-CM

## 2025-07-09 DIAGNOSIS — G92.8 TOXIC METABOLIC ENCEPHALOPATHY: ICD-10-CM

## 2025-07-09 DIAGNOSIS — I48.91 ATRIAL FIBRILLATION WITH RVR (H): ICD-10-CM

## 2025-07-09 LAB
ALBUMIN SERPL BCG-MCNC: 2.9 G/DL (ref 3.5–5.2)
ALP SERPL-CCNC: 80 U/L (ref 40–150)
ALT SERPL W P-5'-P-CCNC: 14 U/L (ref 0–70)
ANION GAP SERPL CALCULATED.3IONS-SCNC: 14 MMOL/L (ref 7–15)
AST SERPL W P-5'-P-CCNC: 28 U/L (ref 0–45)
ATRIAL RATE - MUSE: NORMAL BPM
BASE EXCESS BLDV CALC-SCNC: 1 MMOL/L (ref -3–3)
BASOPHILS # BLD AUTO: 0 10E3/UL (ref 0–0.2)
BASOPHILS NFR BLD AUTO: 0 %
BILIRUB SERPL-MCNC: 0.6 MG/DL
BUN SERPL-MCNC: 17.7 MG/DL (ref 8–23)
CALCIUM SERPL-MCNC: 9.3 MG/DL (ref 8.8–10.4)
CHLORIDE SERPL-SCNC: 98 MMOL/L (ref 98–107)
CREAT SERPL-MCNC: 0.72 MG/DL (ref 0.67–1.17)
DIASTOLIC BLOOD PRESSURE - MUSE: NORMAL MMHG
EGFRCR SERPLBLD CKD-EPI 2021: 88 ML/MIN/1.73M2
EOSINOPHIL # BLD AUTO: 0 10E3/UL (ref 0–0.7)
EOSINOPHIL NFR BLD AUTO: 0 %
ERYTHROCYTE [DISTWIDTH] IN BLOOD BY AUTOMATED COUNT: 14.7 % (ref 10–15)
GLUCOSE SERPL-MCNC: 124 MG/DL (ref 70–99)
HCO3 BLDV-SCNC: 25 MMOL/L (ref 21–28)
HCO3 SERPL-SCNC: 25 MMOL/L (ref 22–29)
HCT VFR BLD AUTO: 39.9 % (ref 40–53)
HGB BLD-MCNC: 12.9 G/DL (ref 13.3–17.7)
HOLD SPECIMEN: NORMAL
IMM GRANULOCYTES # BLD: 0 10E3/UL
IMM GRANULOCYTES NFR BLD: 0 %
INTERPRETATION ECG - MUSE: NORMAL
LACTATE BLD-SCNC: 1 MMOL/L (ref 0.7–2)
LYMPHOCYTES # BLD AUTO: 2 10E3/UL (ref 0.8–5.3)
LYMPHOCYTES NFR BLD AUTO: 22 %
MCH RBC QN AUTO: 30.1 PG (ref 26.5–33)
MCHC RBC AUTO-ENTMCNC: 32.3 G/DL (ref 31.5–36.5)
MCV RBC AUTO: 93 FL (ref 78–100)
MONOCYTES # BLD AUTO: 0.7 10E3/UL (ref 0–1.3)
MONOCYTES NFR BLD AUTO: 8 %
NEUTROPHILS # BLD AUTO: 6.4 10E3/UL (ref 1.6–8.3)
NEUTROPHILS NFR BLD AUTO: 70 %
NRBC # BLD AUTO: 0 10E3/UL
NRBC BLD AUTO-RTO: 0 /100
P AXIS - MUSE: NORMAL DEGREES
PCO2 BLDV: 36 MM HG (ref 40–50)
PH BLDV: 7.45 [PH] (ref 7.32–7.43)
PLATELET # BLD AUTO: 605 10E3/UL (ref 150–450)
PO2 BLDV: 30 MM HG (ref 25–47)
POTASSIUM SERPL-SCNC: 3.8 MMOL/L (ref 3.4–5.3)
PR INTERVAL - MUSE: NORMAL MS
PROT SERPL-MCNC: 6.6 G/DL (ref 6.4–8.3)
QRS DURATION - MUSE: 92 MS
QT - MUSE: 302 MS
QTC - MUSE: 437 MS
R AXIS - MUSE: 2 DEGREES
RBC # BLD AUTO: 4.29 10E6/UL (ref 4.4–5.9)
SAO2 % BLDV: 61 % (ref 70–75)
SODIUM SERPL-SCNC: 137 MMOL/L (ref 135–145)
SYSTOLIC BLOOD PRESSURE - MUSE: NORMAL MMHG
T AXIS - MUSE: 243 DEGREES
TROPONIN T SERPL HS-MCNC: 40 NG/L
TROPONIN T SERPL HS-MCNC: 45 NG/L
VENTRICULAR RATE- MUSE: 126 BPM
WBC # BLD AUTO: 9.1 10E3/UL (ref 4–11)

## 2025-07-09 PROCEDURE — 99285 EMERGENCY DEPT VISIT HI MDM: CPT | Mod: 25

## 2025-07-09 PROCEDURE — 250N000011 HC RX IP 250 OP 636: Performed by: EMERGENCY MEDICINE

## 2025-07-09 PROCEDURE — 99223 1ST HOSP IP/OBS HIGH 75: CPT | Mod: AI | Performed by: INTERNAL MEDICINE

## 2025-07-09 PROCEDURE — 82247 BILIRUBIN TOTAL: CPT | Performed by: EMERGENCY MEDICINE

## 2025-07-09 PROCEDURE — 96360 HYDRATION IV INFUSION INIT: CPT | Mod: 59

## 2025-07-09 PROCEDURE — 96361 HYDRATE IV INFUSION ADD-ON: CPT

## 2025-07-09 PROCEDURE — 83880 ASSAY OF NATRIURETIC PEPTIDE: CPT | Performed by: EMERGENCY MEDICINE

## 2025-07-09 PROCEDURE — 85025 COMPLETE CBC W/AUTO DIFF WBC: CPT | Performed by: EMERGENCY MEDICINE

## 2025-07-09 PROCEDURE — 36415 COLL VENOUS BLD VENIPUNCTURE: CPT | Performed by: EMERGENCY MEDICINE

## 2025-07-09 PROCEDURE — 93005 ELECTROCARDIOGRAM TRACING: CPT

## 2025-07-09 PROCEDURE — 84484 ASSAY OF TROPONIN QUANT: CPT | Performed by: EMERGENCY MEDICINE

## 2025-07-09 PROCEDURE — 83605 ASSAY OF LACTIC ACID: CPT

## 2025-07-09 PROCEDURE — 250N000009 HC RX 250: Performed by: EMERGENCY MEDICINE

## 2025-07-09 PROCEDURE — 99309 SBSQ NF CARE MODERATE MDM 30: CPT | Performed by: PHYSICIAN ASSISTANT

## 2025-07-09 PROCEDURE — 258N000003 HC RX IP 258 OP 636: Performed by: EMERGENCY MEDICINE

## 2025-07-09 RX ORDER — SENNOSIDES 8.6 MG/1
2 TABLET ORAL 2 TIMES DAILY
COMMUNITY

## 2025-07-09 RX ORDER — PIPERACILLIN SODIUM, TAZOBACTAM SODIUM 4; .5 G/20ML; G/20ML
4.5 INJECTION, POWDER, LYOPHILIZED, FOR SOLUTION INTRAVENOUS ONCE
Status: COMPLETED | OUTPATIENT
Start: 2025-07-09 | End: 2025-07-10

## 2025-07-09 RX ORDER — IOPAMIDOL 755 MG/ML
66 INJECTION, SOLUTION INTRAVASCULAR ONCE
Status: COMPLETED | OUTPATIENT
Start: 2025-07-09 | End: 2025-07-09

## 2025-07-09 RX ADMIN — IOPAMIDOL 66 ML: 755 INJECTION, SOLUTION INTRAVENOUS at 23:13

## 2025-07-09 RX ADMIN — SODIUM CHLORIDE 91 ML: 9 INJECTION, SOLUTION INTRAVENOUS at 23:13

## 2025-07-09 RX ADMIN — SODIUM CHLORIDE 500 ML: 9 INJECTION, SOLUTION INTRAVENOUS at 21:00

## 2025-07-09 RX ADMIN — SODIUM CHLORIDE 500 ML: 0.9 INJECTION, SOLUTION INTRAVENOUS at 20:51

## 2025-07-09 ASSESSMENT — COLUMBIA-SUICIDE SEVERITY RATING SCALE - C-SSRS
1. IN THE PAST MONTH, HAVE YOU WISHED YOU WERE DEAD OR WISHED YOU COULD GO TO SLEEP AND NOT WAKE UP?: NO
6. HAVE YOU EVER DONE ANYTHING, STARTED TO DO ANYTHING, OR PREPARED TO DO ANYTHING TO END YOUR LIFE?: NO
2. HAVE YOU ACTUALLY HAD ANY THOUGHTS OF KILLING YOURSELF IN THE PAST MONTH?: NO

## 2025-07-09 ASSESSMENT — ACTIVITIES OF DAILY LIVING (ADL)
ADLS_ACUITY_SCORE: 58

## 2025-07-09 NOTE — PROGRESS NOTES
"[facility] GERIATRICS    Chief Complaint   Patient presents with    RECHECK     HPI:  Yosef Murry is a 89 year old  (1935), who is being seen today for an episodic care visit at: [facility] (TCU) [[id number]].     Summary: 88yo male with recent admission from 6/7 - 6/15 with diagnosis of new thoracic meningioma with cord compression s/p laminectomy, resection admitted at [facility] from 6/24 - 6/27, 2025 after presenting from TCU with confusion, shaking. Found to have leukocytosis and elevated inflammatory markers. CT imaging with small fluid collection around surgical site. Received empiric IV abx. Seen by neurosurgery, no convincing evidence for infection or need for surgery. Also seen by ID and antibiotics stopped. Confusion improved, pt referred back to TCU for ongoing rehab and med management.     Today, pt is seen in follow-up. Sitting at edge of bed, just finishing with a therapy session. Feels well, offers no complaints today. Constipation has resolved. Pain is controlled. Working towards discharge mid next week. Continues to require assist of one for ambulation, ADLs.    On review of facility records, BPs ranging 111-120, HRs , weight 175.6#.    Allergies, and PMH/PSH reviewed in [facility] today.  REVIEW OF SYSTEMS:  4 point ROS including Respiratory, CV, GI and , other than that noted in the HPI,  is negative    Objective:   /75   Pulse 93   Temp 98.3  F (36.8  C)   Resp 18   Ht 1.778 m (5' 10\")   Wt 79.7 kg (175 lb 9.6 oz)   SpO2 95%   BMI 25.20 kg/m    GEN: well-developed, well-nourished, appears comfortable  HEENT: NCAT, EOM intact bilaterally, sclera clear, conjunctiva normal, nose & mouth patent, mucous membranes moist  CHEST: lungs CTA bilaterally, no increased work of breathing, no wheeze, crackles, rhonchi  HEART: RRR, S1 & S2  ABD: soft, nontender, nondistended, no guarding or rigidity, +BS in all 4 quadrants  MSK: AROM bilateral UE/LE, pedal & radial " pulses 2+ bilaterally  NEURO: awake, alert. CN II-XII grossly intact. Sensation grossly intact to light touch.   SKIN: warm & dry without rash, no pedal edema    Recent labs in [facility] reviewed by me today.     Assessment/Plan:    Encephalopathy  T4-5 meningioma s/p laminectomy and resection 6/12/25  Presented with confusion, CRP elevated and WBC 14k without additional s/s infection. CT with small fluid at operative site, possibly simple postop fluid. No peripheral rim enhancement. Seen by neurosurgery, no clear infectious source, no clear need for surgical procedure. Treated with IV ceftriaxone initially, seen by ID and Abx discontinued as there was no clear source of infection. Confusion improved, pt appears at neurologic baseline.  -Pain control with PRN tylenol, hydroxyzine, robaxin, oxycodone  -PT/OT  -SW for discharge planning  -Monitor neuro state  -Monitor fever curve  -Follow-up with neurosurgery as scheduled     Constipation, resolved  -Continues on bowel regimen     Paroxysmal afib  Chronic, stable.  -Continue metoprolol 25mg/d  -Continue AC with apixaban     HTN/HLD  BP meds adjusted while inpatient dt normotension. Losartan discontinued at hospital discharge.  -Continues on amlodipine 10mg/d, metoprolol 25mg/d  -Continues on statin, apixaban     Hx prostate CA s/p prostatectomy 2009, radiation tx 2014 and androgen deprivation therapy  Follows with Urology as outpatient. Recent rise in PSA, MRI not convincing for recurrence.  -Follow with Urology     Thoracic, abdominal aorta enlargement  CT A/P with findings of Interval enlargement of the thoracic and abdominal aorta at several levels as compared to 9/5/2022. Aortic arch is now 5.2 cm, previously 4.1 cm. The descending thoracic aorta is now 3.8 cm, previously 3.5 cm. Abdominal aorta is now 3.4 cm, previously 3 cm.    MED REC REQUIRED  Post Medication Reconciliation Status: medication reconcilation previously completed during another office  visit      Electronically signed by: Danial Castano PA-C

## 2025-07-10 VITALS
HEART RATE: 105 BPM | SYSTOLIC BLOOD PRESSURE: 120 MMHG | RESPIRATION RATE: 24 BRPM | DIASTOLIC BLOOD PRESSURE: 66 MMHG | BODY MASS INDEX: 23.51 KG/M2 | OXYGEN SATURATION: 93 % | HEIGHT: 70 IN | WEIGHT: 164.24 LBS | TEMPERATURE: 98.5 F

## 2025-07-10 PROBLEM — I48.91 ATRIAL FIBRILLATION WITH RVR (H): Status: ACTIVE | Noted: 2025-01-01

## 2025-07-10 PROBLEM — Z79.01 LONG TERM (CURRENT) USE OF ANTICOAGULANTS: Status: ACTIVE | Noted: 2025-07-10

## 2025-07-10 PROBLEM — J18.9 PNEUMONIA OF LEFT LUNG DUE TO INFECTIOUS ORGANISM, UNSPECIFIED PART OF LUNG: Status: ACTIVE | Noted: 2025-01-01

## 2025-07-10 PROBLEM — R09.02 HYPOXIA: Status: ACTIVE | Noted: 2025-01-01

## 2025-07-10 PROBLEM — R07.9 CHEST PAIN, UNSPECIFIED TYPE: Status: ACTIVE | Noted: 2025-07-10

## 2025-07-10 LAB
% LINING CELLS, BODY FLUID: 1 %
ANION GAP SERPL CALCULATED.3IONS-SCNC: 12 MMOL/L (ref 7–15)
APPEARANCE FLD: ABNORMAL
BACTERIA SPEC CULT: NORMAL
BACTERIA SPEC CULT: NORMAL
BASE EXCESS BLDV CALC-SCNC: 3.8 MMOL/L (ref -3–3)
BUN SERPL-MCNC: 18 MG/DL (ref 8–23)
CALCIUM SERPL-MCNC: 8.6 MG/DL (ref 8.8–10.4)
CHLORIDE SERPL-SCNC: 103 MMOL/L (ref 98–107)
COLOR FLD: ABNORMAL
CREAT SERPL-MCNC: 0.71 MG/DL (ref 0.67–1.17)
EGFRCR SERPLBLD CKD-EPI 2021: 88 ML/MIN/1.73M2
ERYTHROCYTE [DISTWIDTH] IN BLOOD BY AUTOMATED COUNT: 15 % (ref 10–15)
GLUCOSE BODY FLUID SOURCE: NORMAL
GLUCOSE FLD-MCNC: <2 MG/DL
GLUCOSE SERPL-MCNC: 119 MG/DL (ref 70–99)
GRAM STAIN RESULT: ABNORMAL
HCO3 BLDV-SCNC: 30 MMOL/L (ref 21–28)
HCO3 SERPL-SCNC: 23 MMOL/L (ref 22–29)
HCT VFR BLD AUTO: 37.4 % (ref 40–53)
HGB BLD-MCNC: 12.1 G/DL (ref 13.3–17.7)
LD BODY BODY FLUID SOURCE: NORMAL
LDH FLD L TO P-CCNC: 948 U/L
LDH SERPL L TO P-CCNC: 155 U/L (ref 0–250)
LYMPHOCYTES NFR FLD MANUAL: 1 %
MCH RBC QN AUTO: 30 PG (ref 26.5–33)
MCHC RBC AUTO-ENTMCNC: 32.4 G/DL (ref 31.5–36.5)
MCV RBC AUTO: 93 FL (ref 78–100)
MONOS+MACROS NFR FLD MANUAL: 2 %
MRSA DNA SPEC QL NAA+PROBE: NEGATIVE
NEUTS BAND NFR FLD MANUAL: 96 %
NT-PROBNP SERPL-MCNC: 1306 PG/ML (ref 0–852)
O2/TOTAL GAS SETTING VFR VENT: 1 %
OXYHGB MFR BLDV: 23 % (ref 70–75)
PCO2 BLDV: 48 MM HG (ref 40–50)
PH BLDV: 7.4 [PH] (ref 7.32–7.43)
PH BODY FLUID SOURCE: NORMAL
PH FLD: 7.5 PH
PLATELET # BLD AUTO: 457 10E3/UL (ref 150–450)
PO2 BLDV: 19 MM HG (ref 25–47)
POTASSIUM SERPL-SCNC: 3.5 MMOL/L (ref 3.4–5.3)
PROT FLD-MCNC: 3.4 G/DL
PROT SERPL-MCNC: 5.3 G/DL (ref 6.4–8.3)
PROTEIN BODY FLUID SOURCE: NORMAL
RBC # BLD AUTO: 4.04 10E6/UL (ref 4.4–5.9)
SA TARGET DNA: NEGATIVE
SAO2 % BLDV: 23.1 % (ref 70–75)
SODIUM SERPL-SCNC: 138 MMOL/L (ref 135–145)
SPECIMEN SOURCE FLD: ABNORMAL
WBC # BLD AUTO: 12.6 10E3/UL (ref 4–11)
WBC # FLD AUTO: ABNORMAL /UL

## 2025-07-10 PROCEDURE — 36415 COLL VENOUS BLD VENIPUNCTURE: CPT | Performed by: EMERGENCY MEDICINE

## 2025-07-10 PROCEDURE — 210N000002 HC R&B HEART CARE

## 2025-07-10 PROCEDURE — 250N000011 HC RX IP 250 OP 636: Performed by: INTERNAL MEDICINE

## 2025-07-10 PROCEDURE — 250N000009 HC RX 250: Performed by: STUDENT IN AN ORGANIZED HEALTH CARE EDUCATION/TRAINING PROGRAM

## 2025-07-10 PROCEDURE — 87641 MR-STAPH DNA AMP PROBE: CPT | Performed by: INTERNAL MEDICINE

## 2025-07-10 PROCEDURE — 250N000011 HC RX IP 250 OP 636: Performed by: EMERGENCY MEDICINE

## 2025-07-10 PROCEDURE — 82435 ASSAY OF BLOOD CHLORIDE: CPT | Performed by: INTERNAL MEDICINE

## 2025-07-10 PROCEDURE — 87070 CULTURE OTHR SPECIMN AEROBIC: CPT | Performed by: INTERNAL MEDICINE

## 2025-07-10 PROCEDURE — 250N000013 HC RX MED GY IP 250 OP 250 PS 637: Performed by: INTERNAL MEDICINE

## 2025-07-10 PROCEDURE — 83986 ASSAY PH BODY FLUID NOS: CPT | Performed by: INTERNAL MEDICINE

## 2025-07-10 PROCEDURE — 82945 GLUCOSE OTHER FLUID: CPT | Performed by: INTERNAL MEDICINE

## 2025-07-10 PROCEDURE — 83615 LACTATE (LD) (LDH) ENZYME: CPT | Performed by: INTERNAL MEDICINE

## 2025-07-10 PROCEDURE — 82805 BLOOD GASES W/O2 SATURATION: CPT | Performed by: INTERNAL MEDICINE

## 2025-07-10 PROCEDURE — 84157 ASSAY OF PROTEIN OTHER: CPT | Performed by: INTERNAL MEDICINE

## 2025-07-10 PROCEDURE — 0W9B3ZZ DRAINAGE OF LEFT PLEURAL CAVITY, PERCUTANEOUS APPROACH: ICD-10-PCS | Performed by: STUDENT IN AN ORGANIZED HEALTH CARE EDUCATION/TRAINING PROGRAM

## 2025-07-10 PROCEDURE — 36415 COLL VENOUS BLD VENIPUNCTURE: CPT | Performed by: INTERNAL MEDICINE

## 2025-07-10 PROCEDURE — 250N000013 HC RX MED GY IP 250 OP 250 PS 637: Performed by: EMERGENCY MEDICINE

## 2025-07-10 PROCEDURE — 87040 BLOOD CULTURE FOR BACTERIA: CPT | Performed by: EMERGENCY MEDICINE

## 2025-07-10 PROCEDURE — 99233 SBSQ HOSP IP/OBS HIGH 50: CPT | Performed by: INTERNAL MEDICINE

## 2025-07-10 PROCEDURE — 258N000003 HC RX IP 258 OP 636: Performed by: INTERNAL MEDICINE

## 2025-07-10 PROCEDURE — 85014 HEMATOCRIT: CPT | Performed by: INTERNAL MEDICINE

## 2025-07-10 PROCEDURE — 88305 TISSUE EXAM BY PATHOLOGIST: CPT | Mod: TC | Performed by: INTERNAL MEDICINE

## 2025-07-10 PROCEDURE — 89050 BODY FLUID CELL COUNT: CPT | Performed by: INTERNAL MEDICINE

## 2025-07-10 PROCEDURE — 84155 ASSAY OF PROTEIN SERUM: CPT | Performed by: INTERNAL MEDICINE

## 2025-07-10 RX ORDER — LORATADINE 10 MG/1
10 TABLET ORAL DAILY
Status: DISCONTINUED | OUTPATIENT
Start: 2025-07-10 | End: 2025-07-14 | Stop reason: HOSPADM

## 2025-07-10 RX ORDER — CEFEPIME HYDROCHLORIDE 2 G/1
2 INJECTION, POWDER, FOR SOLUTION INTRAVENOUS EVERY 12 HOURS
Status: DISCONTINUED | OUTPATIENT
Start: 2025-07-10 | End: 2025-07-12

## 2025-07-10 RX ORDER — NALOXONE HYDROCHLORIDE 0.4 MG/ML
0.2 INJECTION, SOLUTION INTRAMUSCULAR; INTRAVENOUS; SUBCUTANEOUS
Status: DISCONTINUED | OUTPATIENT
Start: 2025-07-10 | End: 2025-07-14 | Stop reason: HOSPADM

## 2025-07-10 RX ORDER — OXYCODONE HYDROCHLORIDE 5 MG/1
5 TABLET ORAL 2 TIMES DAILY
Refills: 0 | Status: DISCONTINUED | OUTPATIENT
Start: 2025-07-10 | End: 2025-07-14 | Stop reason: HOSPADM

## 2025-07-10 RX ORDER — METRONIDAZOLE 500 MG/100ML
500 INJECTION, SOLUTION INTRAVENOUS EVERY 8 HOURS
Status: DISCONTINUED | OUTPATIENT
Start: 2025-07-10 | End: 2025-07-11

## 2025-07-10 RX ORDER — OXYCODONE HYDROCHLORIDE 5 MG/1
5 TABLET ORAL EVERY 4 HOURS PRN
Refills: 0 | Status: DISCONTINUED | OUTPATIENT
Start: 2025-07-10 | End: 2025-07-14 | Stop reason: HOSPADM

## 2025-07-10 RX ORDER — NALOXONE HYDROCHLORIDE 0.4 MG/ML
0.4 INJECTION, SOLUTION INTRAMUSCULAR; INTRAVENOUS; SUBCUTANEOUS
Status: DISCONTINUED | OUTPATIENT
Start: 2025-07-10 | End: 2025-07-14 | Stop reason: HOSPADM

## 2025-07-10 RX ORDER — ACETAMINOPHEN 650 MG/1
650 SUPPOSITORY RECTAL EVERY 4 HOURS PRN
Status: DISCONTINUED | OUTPATIENT
Start: 2025-07-10 | End: 2025-07-14 | Stop reason: HOSPADM

## 2025-07-10 RX ORDER — POLYETHYLENE GLYCOL 3350 17 G/17G
17 POWDER, FOR SOLUTION ORAL 2 TIMES DAILY PRN
Status: DISCONTINUED | OUTPATIENT
Start: 2025-07-10 | End: 2025-07-14 | Stop reason: HOSPADM

## 2025-07-10 RX ORDER — METOPROLOL SUCCINATE 25 MG/1
25 TABLET, EXTENDED RELEASE ORAL DAILY
Status: DISCONTINUED | OUTPATIENT
Start: 2025-07-10 | End: 2025-07-14 | Stop reason: HOSPADM

## 2025-07-10 RX ORDER — PROCHLORPERAZINE MALEATE 5 MG/1
5 TABLET ORAL EVERY 6 HOURS PRN
Status: DISCONTINUED | OUTPATIENT
Start: 2025-07-10 | End: 2025-07-14 | Stop reason: HOSPADM

## 2025-07-10 RX ORDER — METHOCARBAMOL 750 MG/1
750 TABLET, FILM COATED ORAL 4 TIMES DAILY PRN
Status: DISCONTINUED | OUTPATIENT
Start: 2025-07-10 | End: 2025-07-14 | Stop reason: HOSPADM

## 2025-07-10 RX ORDER — ACETAMINOPHEN 500 MG
1000 TABLET ORAL ONCE
Status: COMPLETED | OUTPATIENT
Start: 2025-07-10 | End: 2025-07-10

## 2025-07-10 RX ORDER — ACETAMINOPHEN 325 MG/1
650 TABLET ORAL EVERY 4 HOURS PRN
Status: DISCONTINUED | OUTPATIENT
Start: 2025-07-10 | End: 2025-07-14 | Stop reason: HOSPADM

## 2025-07-10 RX ORDER — HYDRALAZINE HYDROCHLORIDE 10 MG/1
10 TABLET, FILM COATED ORAL EVERY 4 HOURS PRN
Status: DISCONTINUED | OUTPATIENT
Start: 2025-07-10 | End: 2025-07-14 | Stop reason: HOSPADM

## 2025-07-10 RX ORDER — LIDOCAINE HYDROCHLORIDE 10 MG/ML
10 INJECTION, SOLUTION EPIDURAL; INFILTRATION; INTRACAUDAL; PERINEURAL ONCE
Status: COMPLETED | OUTPATIENT
Start: 2025-07-10 | End: 2025-07-10

## 2025-07-10 RX ORDER — AMOXICILLIN 250 MG
2 CAPSULE ORAL 2 TIMES DAILY PRN
Status: DISCONTINUED | OUTPATIENT
Start: 2025-07-10 | End: 2025-07-14 | Stop reason: HOSPADM

## 2025-07-10 RX ORDER — POLYETHYLENE GLYCOL 3350 17 G/17G
17 POWDER, FOR SOLUTION ORAL DAILY
Status: DISCONTINUED | OUTPATIENT
Start: 2025-07-10 | End: 2025-07-14 | Stop reason: HOSPADM

## 2025-07-10 RX ORDER — HYDROMORPHONE HCL IN WATER/PF 6 MG/30 ML
0.2 PATIENT CONTROLLED ANALGESIA SYRINGE INTRAVENOUS
Refills: 0 | Status: DISCONTINUED | OUTPATIENT
Start: 2025-07-10 | End: 2025-07-14 | Stop reason: HOSPADM

## 2025-07-10 RX ORDER — ONDANSETRON 4 MG/1
4 TABLET, ORALLY DISINTEGRATING ORAL EVERY 6 HOURS PRN
Status: DISCONTINUED | OUTPATIENT
Start: 2025-07-10 | End: 2025-07-14 | Stop reason: HOSPADM

## 2025-07-10 RX ORDER — ONDANSETRON 2 MG/ML
4 INJECTION INTRAMUSCULAR; INTRAVENOUS EVERY 6 HOURS PRN
Status: DISCONTINUED | OUTPATIENT
Start: 2025-07-10 | End: 2025-07-14 | Stop reason: HOSPADM

## 2025-07-10 RX ORDER — AMLODIPINE BESYLATE 5 MG/1
5 TABLET ORAL DAILY
Status: DISCONTINUED | OUTPATIENT
Start: 2025-07-10 | End: 2025-07-14 | Stop reason: HOSPADM

## 2025-07-10 RX ORDER — ATORVASTATIN CALCIUM 10 MG/1
10 TABLET, FILM COATED ORAL EVERY EVENING
Status: DISCONTINUED | OUTPATIENT
Start: 2025-07-10 | End: 2025-07-14 | Stop reason: HOSPADM

## 2025-07-10 RX ORDER — HYDROMORPHONE HCL IN WATER/PF 6 MG/30 ML
0.4 PATIENT CONTROLLED ANALGESIA SYRINGE INTRAVENOUS
Refills: 0 | Status: DISCONTINUED | OUTPATIENT
Start: 2025-07-10 | End: 2025-07-14 | Stop reason: HOSPADM

## 2025-07-10 RX ORDER — HYDRALAZINE HYDROCHLORIDE 20 MG/ML
10 INJECTION INTRAMUSCULAR; INTRAVENOUS EVERY 4 HOURS PRN
Status: DISCONTINUED | OUTPATIENT
Start: 2025-07-10 | End: 2025-07-14 | Stop reason: HOSPADM

## 2025-07-10 RX ORDER — SENNOSIDES 8.6 MG
2 TABLET ORAL 2 TIMES DAILY
Status: DISCONTINUED | OUTPATIENT
Start: 2025-07-10 | End: 2025-07-14 | Stop reason: HOSPADM

## 2025-07-10 RX ORDER — BISACODYL 10 MG
10 SUPPOSITORY, RECTAL RECTAL DAILY PRN
Status: DISCONTINUED | OUTPATIENT
Start: 2025-07-10 | End: 2025-07-14 | Stop reason: HOSPADM

## 2025-07-10 RX ORDER — AMOXICILLIN 250 MG
1 CAPSULE ORAL 2 TIMES DAILY PRN
Status: DISCONTINUED | OUTPATIENT
Start: 2025-07-10 | End: 2025-07-14 | Stop reason: HOSPADM

## 2025-07-10 RX ORDER — LIDOCAINE 40 MG/G
CREAM TOPICAL
Status: DISCONTINUED | OUTPATIENT
Start: 2025-07-10 | End: 2025-07-14 | Stop reason: HOSPADM

## 2025-07-10 RX ORDER — SODIUM CHLORIDE, SODIUM LACTATE, POTASSIUM CHLORIDE, CALCIUM CHLORIDE 600; 310; 30; 20 MG/100ML; MG/100ML; MG/100ML; MG/100ML
INJECTION, SOLUTION INTRAVENOUS CONTINUOUS
Status: DISCONTINUED | OUTPATIENT
Start: 2025-07-10 | End: 2025-07-10

## 2025-07-10 RX ADMIN — METOPROLOL SUCCINATE 25 MG: 25 TABLET, EXTENDED RELEASE ORAL at 18:33

## 2025-07-10 RX ADMIN — METRONIDAZOLE 500 MG: 500 INJECTION, SOLUTION INTRAVENOUS at 08:42

## 2025-07-10 RX ADMIN — APIXABAN 5 MG: 5 TABLET, FILM COATED ORAL at 21:33

## 2025-07-10 RX ADMIN — ATORVASTATIN CALCIUM 10 MG: 10 TABLET, FILM COATED ORAL at 21:33

## 2025-07-10 RX ADMIN — CEFEPIME 2 G: 2 INJECTION, POWDER, FOR SOLUTION INTRAVENOUS at 18:33

## 2025-07-10 RX ADMIN — VANCOMYCIN HYDROCHLORIDE 1750 MG: 10 INJECTION, POWDER, LYOPHILIZED, FOR SOLUTION INTRAVENOUS at 05:21

## 2025-07-10 RX ADMIN — PIPERACILLIN AND TAZOBACTAM 4.5 G: 4; .5 INJECTION, POWDER, FOR SOLUTION INTRAVENOUS at 00:27

## 2025-07-10 RX ADMIN — SODIUM CHLORIDE, SODIUM LACTATE, POTASSIUM CHLORIDE, AND CALCIUM CHLORIDE: .6; .31; .03; .02 INJECTION, SOLUTION INTRAVENOUS at 05:26

## 2025-07-10 RX ADMIN — CEFEPIME 2 G: 2 INJECTION, POWDER, FOR SOLUTION INTRAVENOUS at 08:46

## 2025-07-10 RX ADMIN — METRONIDAZOLE 500 MG: 500 INJECTION, SOLUTION INTRAVENOUS at 16:39

## 2025-07-10 RX ADMIN — SENNOSIDES 2 TABLET: 8.6 TABLET, FILM COATED ORAL at 21:42

## 2025-07-10 RX ADMIN — OXYCODONE HYDROCHLORIDE 2.5 MG: 5 TABLET ORAL at 04:50

## 2025-07-10 RX ADMIN — ACETAMINOPHEN 1000 MG: 500 TABLET, FILM COATED ORAL at 01:26

## 2025-07-10 RX ADMIN — METRONIDAZOLE 500 MG: 500 INJECTION, SOLUTION INTRAVENOUS at 21:36

## 2025-07-10 RX ADMIN — LIDOCAINE HYDROCHLORIDE ANHYDROUS 10 ML: 10 INJECTION, SOLUTION INFILTRATION at 13:28

## 2025-07-10 ASSESSMENT — ACTIVITIES OF DAILY LIVING (ADL)
ADLS_ACUITY_SCORE: 61
ADLS_ACUITY_SCORE: 58
ADLS_ACUITY_SCORE: 61
ADLS_ACUITY_SCORE: 61
ADLS_ACUITY_SCORE: 58
ADLS_ACUITY_SCORE: 61
ADLS_ACUITY_SCORE: 58
ADLS_ACUITY_SCORE: 61
ADLS_ACUITY_SCORE: 58

## 2025-07-10 NOTE — ED TRIAGE NOTES
Pt was brought in by Elin Brumfield from [place] for CP. EMS noted runs of SVT as pulling into the ambulance garage. Pt took 3 NTG and 324 ASA prior to arrival and still having CP      Triage Assessment (Adult)       Row Name 07/09/25 2045          Respiratory WDL    Respiratory WDL WDL        Cardiac WDL    Cardiac WDL chest pain        Cognitive/Neuro/Behavioral WDL    Cognitive/Neuro/Behavioral WDL WDL

## 2025-07-10 NOTE — PROGRESS NOTES
[facility]    Hospitalist Progress Note    Date of Admission: 7/9/2025    Assessment & Plan   Yosef Murry is a 89 year old male with PMHx of afib on chronic anticoagulation with DOAC, hx of CVA, hypertension, hyperlipidemia and recent hospitalization from 6/24/25-6/27/25 for management of a possible infection post T4-5 laminectomy (done 6/12). Discharged to TCU. He was readmitted on 7/9/2025 for management of shortness of breath due to a large left pleural effusion and suspected HCAP.     Sepsis secondary dt LLL HCAP, with large left pleural effusion   Acute hypoxic respiratory failure dt above  *Presented to ED for evaluation of left-sided chest pain, shortness of breath  *Tmax in .2, in afib with RVR with HRs 120s, BPs 90-110s systolic, needing 3L NC to maintain sats >90. Labs notable for WBC 9.1, lactate normal, proBNP 1300, trops elevated but trend flat (40-45). CT chest was neg for PE, showed a large left pleural effusion with associated consolidation/atelectasis. Was started on treatment with broad spectrum abx (cefepime, Flagyl and Vanco).   *MRSA swab neg so Vanco dc'd 7/10  -- cont cefepime, Flagyl (7/10-present) while awaiting culture data  -- IR consulted, recommended thoracentesis over chest tube placement; diagnostic/therapeutic thoracentesis ordered  -- pending thoracentesis results, may need to consider echo  -- wean O2 as able    1635 Addendum:  400ml pleural fluid removed with thoracentesis. Fluid studies appear consistent with an exudative effusion with neutrophilic predominance, pH 7.5. Cont current antibiotics while awaiting culture data.    Atrial fibrillation with RVR  *HRs 120s on presentation. RVR likely dt sepsis/hypoxia.   -- conts on oral metoprolol, prn Lopressor available   -- holding DOAC dt plans for thoracentesis -- resume post-procedure    Hypertension  History of CVA  Hyperlipidemia  *Conts on statin, metoprolol; holding amlodipine dt presentation  with sepsis    T4-T5 meningioma s/p laminectomy and resection 6/12/25 with post-op fluid collection  *Underwent initial laminectomy/resection on 6/12/25. Then hospitalized 6/24-6/27 for management of suspected postop infection. Initially managed with IV ceftriaxone that stay but no clear convincing evidence of infection found on workup that stay so abx ultimately stopped. Discharged to TCU.   *Chronic and stable on prns for pain, bowels.   -- hold sched oxycodone (5mg BID) dt sedation this AM    FEN: cont NS @100ml/h, lytes stable, NPO for thoracentesis today -- okay for regular diet thereafter  DVT Prophylaxis: PCDs, resume DOAC post-thoracentesis  Code Status: No CPR- Do NOT Intubate    Disposition: Anticipate discharge back to TCU in 2-3d pending response to treatment, ability to wean O2.     Medically Ready for Discharge: Anticipated in 2-4 Days      Marifer Mata, DO    Clinically Significant Risk Factors Present on Admission               # Hypoalbuminemia: Lowest albumin = 2.9 g/dL at 7/9/2025  8:51 PM, will monitor as appropriate  # Drug Induced Coagulation Defect: home medication list includes an anticoagulant medication                  # Financial/Environmental Concerns:               Medical Decision Making       Please see A&P for additional details of medical decision making.           Interval History   Chart reviewed. Seen this morning. Groggy after 2.5mg of oxycodone this morning but waking more as med effect wears off. VBG did not demonstrate CO2 retention. Denies complaints this morning, no cp/sob/cough, abd pain/n/v. Still needing 3L NC.    -Data reviewed today: I reviewed all new labs and imaging results over the last 24 hours. I personally reviewed no images or EKG's today.    Physical Exam   Temp: 97.9  F (36.6  C) Temp src: Oral BP: 121/73 Pulse: 97   Resp: 24 SpO2: 97 % O2 Device: Nasal cannula Oxygen Delivery: 3 LPM  Vitals:    07/10/25 0350   Weight: 74.5 kg (164 lb 3.9 oz)      Vital Signs with Ranges  Temp:  [97.9  F (36.6  C)-102.2  F (39  C)] 97.9  F (36.6  C)  Pulse:  [] 97  Resp:  [14-36] 24  BP: ()/(56-96) 121/73  SpO2:  [90 %-97 %] 97 %  I/O last 3 completed shifts:  In: 1000 [IV Piggyback:1000]  Out: 0     Constitutional: Appears tired but easily wakes to name, answering questions appropriately, NAD  Respiratory: Decreased air exchange in left lung, no wheeze, no work of breathing  Cardiovascular: HR irregular and tachycardic, no MGR, +bilateral LE edema to mid shins  GI: S, NT, ND, +BS  Skin/Integumen: warm/dry  Other:      Medications   Current Facility-Administered Medications   Medication Dose Route Frequency Provider Last Rate Last Admin    lactated ringers infusion   Intravenous Continuous Vicente Ruiz  mL/hr at 07/10/25 0851 Rate Verify at 07/10/25 0851     Current Facility-Administered Medications   Medication Dose Route Frequency Provider Last Rate Last Admin    [Held by provider] amLODIPine (NORVASC) tablet 5 mg  5 mg Oral Daily Vicente Ruiz MD        [Held by provider] apixaban ANTICOAGULANT (ELIQUIS) tablet 5 mg  5 mg Oral BID Vicente Ruiz MD        atorvastatin (LIPITOR) tablet 10 mg  10 mg Oral QPM Vicente Ruiz MD        ceFEPIme (MAXIPIME) 2 g vial to attach to  mL bag for ADULTS or NS 50 mL bag for PEDS  2 g Intravenous Q12H Vicente Ruiz MD   2 g at 07/10/25 0846    loratadine (CLARITIN) tablet 10 mg  10 mg Oral Daily Vicente Ruiz MD        metoprolol succinate ER (TOPROL XL) 24 hr tablet 25 mg  25 mg Oral Daily Vicente Ruiz MD        metroNIDAZOLE (FLAGYL) infusion 500 mg  500 mg Intravenous Q8H Vicente Ruiz MD   500 mg at 07/10/25 0842    oxyCODONE (ROXICODONE) tablet 5 mg  5 mg Oral BID Vicente Ruiz MD        polyethylene glycol (MIRALAX) Packet 17 g  17 g Oral Daily Vicente Ruiz MD        sennosides (SENOKOT) tablet 2 tablet  2 tablet  Oral BID Vicente Ruiz MD        sodium chloride (PF) 0.9% PF flush 3 mL  3 mL Intracatheter Q8H Vicente Ruiz MD   3 mL at 07/10/25 0527    vancomycin (VANCOCIN) 1,750 mg in 0.9% NaCl 500 mL intermittent infusion  1,750 mg Intravenous Q24H Vicente Ruiz MD   1,750 mg at 07/10/25 0521       Data   Recent Labs   Lab 07/10/25  0613 07/09/25  2051   WBC 12.6* 9.1   HGB 12.1* 12.9*   MCV 93 93   * 605*    137   POTASSIUM 3.5 3.8   CHLORIDE 103 98   CO2 23 25   BUN 18.0 17.7   CR 0.71 0.72   ANIONGAP 12 14   BELLO 8.6* 9.3   * 124*   ALBUMIN  --  2.9*   PROTTOTAL 5.3* 6.6   BILITOTAL  --  0.6   ALKPHOS  --  80   ALT  --  14   AST  --  28       Recent Results (from the past 24 hours)   CT Chest Pulmonary Embolism w Contrast    Narrative    EXAM: CT CHEST PULMONARY EMBOLISM W CONTRAST  LOCATION: [facility]  DATE: 7/9/2025    INDICATION: Bed ridden at TCU, pleuritic left chest pain, hypoxic.  COMPARISON: CT from 6/24/2025.  TECHNIQUE: CT chest pulmonary angiogram during arterial phase injection of IV contrast. Multiplanar reformats and MIP reconstructions were performed. Dose reduction techniques were used.   CONTRAST: 66mL Isovue 370    FINDINGS:  ANGIOGRAM CHEST: Pulmonary arteries are normal caliber and negative for pulmonary emboli. Ectatic thoracic aorta measuring 5.3 cm in the distal arch, stable compared to recent CT. The aorta is not adequately opacified to exclude a dissection, however. No   CT evidence of right heart strain.    LUNGS AND PLEURA: Right basilar atelectasis. Left lower lobe atelectasis or consolidation. Large left pleural effusion including loculated fluid along the major fissure. No pneumothorax.    MEDIASTINUM/AXILLAE: Mild cardiomegaly. No pericardial effusion. Multivessel coronary artery disease.    CORONARY ARTERY CALCIFICATION: Severe.    UPPER ABDOMEN: No acute findings.    MUSCULOSKELETAL: Osteopenia with accentuated  thoracic kyphosis.      Impression    IMPRESSION:  1.  Negative for pulmonary.    2.  Atelectasis or consolidation of the left lower lobe with large left pleural effusion.    3.  Severe coronary artery disease.    4.  Ectatic thoracic aorta.

## 2025-07-10 NOTE — PROGRESS NOTES
Date & Time: 07/10/25, [id number]  Admission Date: 7/9/2025   Admission Diagnosis: Long term (current) use of anticoagulants [Z79.01]  Hypoxia [R09.02]  Pleural effusion on left [J90]  Atrial fibrillation with RVR (H) [I48.91]  Pneumonia of left lung due to infectious organism, unspecified part of lung [J18.9]  Chest pain, unspecified type [R07.9]  Hx: Afib on Eliquis, CVA, prostate cancer, T4-T5 meninigioma s/p laminectomy and resection (6/12/25)    Orientation: Oriented to self and place, very forgetful  VS/O2: VSS on 3L NC  Pain: Pain is not well-controlled. Medication(s) being used: acetaminophen and oxycodone    Tele/Cardiac: Afib RVR; Denies CP.  Respiratory: Bilateral lower lobes LS diminished, tachypneic, c/o pain on inspiration; Reports dyspnea at rest.  : Incontinent, external catheter in place.  GI: WDL  Skin/Wounds/Incisions: Coccyx blanchable redness, mepilex in place  IV/Lines/Drains: 1 PIV infusing LR @ 100mL/hr; 1 PIV infusing intermittent abx    Pills: Crushed in applesauce.  Diet: NPO for Procedure/Surgery per Anesthesia Guidelines Except for: Meds; Clear liquids before procedure/surgery: ADULT (Age GREATER than or Equal to 18 years) - Clear liquids 2 hours before procedure/surgery    Activity Level: In bed this shift    Plan: Continue abx, IR consulted for thoracentesis/possible chest tube  Significant Information: N/A

## 2025-07-10 NOTE — PROGRESS NOTES
Admission/Transfer from: ED  2 RN skin assessment completed. Yes with Georgina Johnson RN  Significant findings include: Blanchable redness to coccyx, BLE scattered scabs  WOC Nurse Consult Ordered? No

## 2025-07-10 NOTE — CONSULTS
IR consult request for L thoracentesis vs a chest tube placement.     Reviewed imaging and chart with IR Dr Downs who recommends a thoracentesis.     Please enter the order as US thoracentesis with diagnostic labs if wanted.     IR will complete the consult. Messaged Dr Mata today.     Total time: 15 minutes     Thanks, Cynthia [facility] Interventional Radiology CNP ([phone]) (phone [phone])

## 2025-07-10 NOTE — PROGRESS NOTES
RECEIVING UNIT ED HANDOFF REVIEW    ED Nurse Handoff Report was reviewed by: German Wells RN on July 10, 2025 at 1:01 AM

## 2025-07-10 NOTE — PHARMACY-ADMISSION MEDICATION HISTORY
Pharmacist Admission Medication History    Admission medication history is complete. The information provided in this note is only as accurate as the sources available at the time of the update.    Information Source(s): Facility ([facility]/) medication list/[facility] on [place]    Pertinent Information:     Changes made to PTA medication list:  Added: Senna/Docusate  Deleted: None  Changed: None    Allergies reviewed with patient and updates made in EHR: no    Medication History Completed By: Kal Nelson ROS 7/9/2025 9:56 PM    PTA Med List   Medication Sig Last Dose/Taking    acetaminophen (TYLENOL) 325 MG tablet Take 2 tablets (650 mg) by mouth every 4 hours as needed for mild pain or other (and adjunct with moderate or severe pain or per patient request). Taking As Needed    amLODIPine (NORVASC) 5 MG tablet Take 1 tablet (5 mg) by mouth daily 7/9/2025 Morning    apixaban ANTICOAGULANT (ELIQUIS) 5 MG tablet Take 5 mg by mouth 2 times daily. 7/9/2025 Morning    atorvastatin (LIPITOR) 10 MG tablet Take 1 tablet (10 mg) by mouth every evening 7/8/2025 Bedtime    calcium carbonate (TUMS) 500 MG chewable tablet Take 1 chew tab by mouth nightly as needed  Taking As Needed    hydrOXYzine HCl (ATARAX) 10 MG tablet Take 1 tablet (10 mg) by mouth every 6 hours as needed for other (adjuvant pain). Taking As Needed    loratadine (CLARITIN) 10 MG tablet Take 1 tablet by mouth daily. 7/9/2025 Morning    magnesium hydroxide (MILK OF MAGNESIA) 400 MG/5ML suspension Take 30 mLs by mouth daily as needed for constipation or heartburn. Taking As Needed    methocarbamol (ROBAXIN) 750 MG tablet Take 1 tablet (750 mg) by mouth 4 times daily as needed for muscle spasms. Taking As Needed    metoprolol succinate ER (TOPROL XL) 25 MG 24 hr tablet Take 1 tablet (25 mg) by mouth daily 7/9/2025 Morning    Multiple Vitamins-Minerals (PRESERVISION AREDS 2 PO) Take 1 capsule by mouth 2 times daily AREDS 2 7/9/2025 Morning    ondansetron  (ZOFRAN ODT) 4 MG ODT tab Take 1 tablet (4 mg) by mouth every 8 hours as needed for nausea. Taking As Needed    oxyCODONE (ROXICODONE) 5 MG tablet Take 1 tablet (5 mg) by mouth 2 times daily. May also take 1 tablet (5 mg) 4 times daily as needed for severe pain. And QID PRN. Taking As Needed    polyethylene glycol (MIRALAX) 17 GM/Dose powder Take 17 g by mouth daily. 7/9/2025 Morning    senna (SENOKOT) 8.6 MG tablet Take 2 tablets by mouth 2 times daily. 7/9/2025 Morning    vitamin D3 (CHOLECALCIFEROL) 50 mcg (2000 units) tablet Take 1 tablet by mouth daily 7/9/2025 Morning

## 2025-07-10 NOTE — H&P
[facility]    History and Physical - Hospitalist Service       Date of Admission:  7/9/2025    Assessment & Plan   Yosef Murry is a 89 year-old male with past medical history significant for atrial fibrillation on DOAC, recent admission for possible infection post-laminectomy ultimately ruled out who presents with chest pain and shortness of breath. Admitted on 7/9/2025.     Sepsis secondary to healthcare associated pneumonia with large left pleural effusion   Acute hypoxic respiratory failure   *Presents with left-sided chest pain, shortness of breath  *Initially afebrile, subsequently Tmax 102.2F in ED. . WBC normal.   *CT PE protocol negative for PE, large left pleural effusion with associated consolidation or atelectasis  *Recently admitted for possible post-operative infection following laminectomy, received IV ceftriaxone but ultimately discontinued. Currently in TCU.   - IR consulted for thoracentesis and chest tube evaluation   - Cefepime IV, metronidazole IV, vancomycin IV  - IVF with LR  - MRSA swab  - Blood cultures in process  - Oxygen as needed, wean as tolerated    Atrial fibrillation with RVR  Initial HRs in 120s, improving in ED.  - Cardiac monitoring   - Hold apixaban pending IR  - Continue prior to admission metoprolol   - Metoprolol IV PRN    Hypertension  - Hold prior to admission amlodipine in setting of sepsis  - Continue prior to admission metoprolol as above    History of CVA  Hyperlipidemia  - Continue prior to admission atorvastatin    T4-T5 meningioma s/p laminectomy and resection 6/12/25 with post-op fluid collection  - Continue scheduled and PRN oxycodone, methocarbamol PRN   - Continue scheduled bowel regimen while on opioids       Diet: NPO for Procedure/Surgery per Anesthesia Guidelines Except for: Meds; Clear liquids before procedure/surgery: ADULT (Age GREATER than or Equal to 18 years) - Clear liquids 2 hours before procedure/surgery   DVT  Prophylaxis: Pneumatic Compression Devices, resume DOAC post-procedure  Underwood Catheter: Not present  Code Status: No CPR- Do NOT Intubate - Discussed with patient and consistent with recent code status    Disposition Plan   Admit to inpatient.      Medically Ready for Discharge: Anticipated in 2-4 Days       Entered: Vicente Ruiz MD 07/09/2025, 11:51 PM     The patient's care was discussed with the ED provider, patient and bedside RN    Clinically Significant Risk Factors Present on Admission               # Hypoalbuminemia: Lowest albumin = 2.9 g/dL at 7/9/2025  8:51 PM, will monitor as appropriate    # Drug Induced Coagulation Defect: home medication list includes an anticoagulant medication                    # Financial/Environmental Concerns:                Vicente Ruiz MD  [facility]    ______________________________________________________________________    Chief Complaint   Chest pain, shortness of breath     History of Present Illness   Yosef Murry is a 89 year old male who presents with the above chief complaint.    History is obtained from the patient, discussion with ED provider and review of medical record. The patient was seen at  his TCU the morning of presentation and was doing well without complaints per notes. Later in the day he developed sharp left-sided chest pain worse with deep breaths. He reports some mild associated shortness of breath. Denies any cough.     In the Emergency Department, the patient was seen by Dr. Flores, with whom I discussed the patient's presenting symptoms and emergency department course.  Initial vital signs were a temperature of 98.7F, , /94, RR 28, SpO2 91% on room air. Pertinent work-up as noted in A&P. Hospitalists were contacted for admission to the hospital.     Past Medical History    I have reviewed this patient's medical history and updated it with pertinent information if needed.   Past Medical History:    Diagnosis Date    Anal fistula     Antiplatelet or antithrombotic long-term use     Arrhythmia     Atrial flutter (H)     Cerebral infarction (H)     TIA    Heartburn     Hypertension     Inguinal hernia, left     Persistent atrial fibrillation (H) 12    Prostate cancer (H)     TIA (transient ischaemic attack)            Past Surgical History   I have reviewed this patient's surgical history and updated it with pertinent information if needed.  Past Surgical History:   Procedure Laterality Date    COLONOSCOPY      COLONOSCOPY N/A 2021    Procedure: Colonoscopy, With Polypectomy And Biopsy;  Surgeon: Jordin Rachel MD;  Location:  GI    ENT SURGERY      tonsllectomy    EXAM UNDER ANESTHESIA, FISTULOTOMY RECTUM, COMBINED N/A 2015    Procedure: COMBINED EXAM UNDER ANESTHESIA, FISTULOTOMY RECTUM;  Surgeon: Candice Carty MD;  Location: [facility]    GENITOURINARY SURGERY      PROSTATECTOMY    GI SURGERY      hernia repair x 2    HERNIORRHAPHY INGUINAL  2013    Procedure: HERNIORRHAPHY INGUINAL;  LEFT INGUINAL HERNIA REPAIR WITH MESH;  Surgeon: Filipe Fairchild MD;  Location: [facility]    HERNIORRHAPHY INGUINAL Right 2019    Procedure: OPEN RIGHT INGUINA HERNIA REPAIR WITH MESH;  Surgeon: Filipe Fairchild MD;  Location:  OR    LAMINECTOMY, EXCISE TUMOR THORACIC, COMBINED N/A 2025    Procedure: Thoracic 4 to Thoracic 5 laminectomy and resection of meningioma;  Surgeon: Juan Miguel Benitez MD;  Location:  OR    ORTHOPEDIC SURGERY      WRIST SURGERY - LEFT         Social History   I have reviewed this patient's social history and updated it with pertinent information if needed.  Social History     Tobacco Use    Smoking status: Former     Current packs/day: 0.00     Types: Cigarettes     Start date: 1952     Quit date: 1968     Years since quittin.5    Smokeless tobacco: Never   Vaping Use    Vaping status: Never Used   Substance Use Topics     Alcohol use: Yes     Alcohol/week: 0.8 standard drinks of alcohol     Types: 1 Cans of beer per week     Comment: 1 beer a week    Drug use: No        Family History   I have reviewed this patient's family history and updated it with pertinent information if needed.   Family History   Problem Relation Age of Onset    Ovarian Cancer Mother     Osteoporosis Father     Unknown/Adopted Sister         Prior to Admission Medications     Prior to Admission Medications   Prescriptions Last Dose Informant Patient Reported? Taking?   Multiple Vitamins-Minerals (PRESERVISION AREDS 2 PO) 7/9/2025 Morning Nursing Home Yes Yes   Sig: Take 1 capsule by mouth 2 times daily AREDS 2   acetaminophen (TYLENOL) 325 MG tablet  Nursing Home No Yes   Sig: Take 2 tablets (650 mg) by mouth every 4 hours as needed for mild pain or other (and adjunct with moderate or severe pain or per patient request).   amLODIPine (NORVASC) 5 MG tablet 7/9/2025 Morning Nursing Home No Yes   Sig: Take 1 tablet (5 mg) by mouth daily   apixaban ANTICOAGULANT (ELIQUIS) 5 MG tablet 7/9/2025 Morning Nursing Home Yes Yes   Sig: Take 5 mg by mouth 2 times daily.   atorvastatin (LIPITOR) 10 MG tablet 7/8/2025 Bedtime Nursing Home No Yes   Sig: Take 1 tablet (10 mg) by mouth every evening   calcium carbonate (TUMS) 500 MG chewable tablet  Nursing Home Yes Yes   Sig: Take 1 chew tab by mouth nightly as needed    hydrOXYzine HCl (ATARAX) 10 MG tablet  Nursing Home No Yes   Sig: Take 1 tablet (10 mg) by mouth every 6 hours as needed for other (adjuvant pain).   loratadine (CLARITIN) 10 MG tablet 7/9/2025 Morning Nursing Home Yes Yes   Sig: Take 1 tablet by mouth daily.   magnesium hydroxide (MILK OF MAGNESIA) 400 MG/5ML suspension  Nursing Home Yes Yes   Sig: Take 30 mLs by mouth daily as needed for constipation or heartburn.   methocarbamol (ROBAXIN) 750 MG tablet  Nursing Home No Yes   Sig: Take 1 tablet (750 mg) by mouth 4 times daily as needed for muscle spasms.    metoprolol succinate ER (TOPROL XL) 25 MG 24 hr tablet 7/9/2025 Morning Nursing Home No Yes   Sig: Take 1 tablet (25 mg) by mouth daily   ondansetron (ZOFRAN ODT) 4 MG ODT tab  Nursing Home No Yes   Sig: Take 1 tablet (4 mg) by mouth every 8 hours as needed for nausea.   oxyCODONE (ROXICODONE) 5 MG tablet  Nursing Home No Yes   Sig: Take 1 tablet (5 mg) by mouth 2 times daily. May also take 1 tablet (5 mg) 4 times daily as needed for severe pain. And QID PRN.   polyethylene glycol (MIRALAX) 17 GM/Dose powder 7/9/2025 Morning Nursing Home No Yes   Sig: Take 17 g by mouth daily.   senna (SENOKOT) 8.6 MG tablet 7/9/2025 Morning Nursing Home Yes Yes   Sig: Take 2 tablets by mouth 2 times daily.   vitamin D3 (CHOLECALCIFEROL) 50 mcg (2000 units) tablet 7/9/2025 Morning Nursing Home Yes Yes   Sig: Take 1 tablet by mouth daily      Facility-Administered Medications: None     Allergies   Allergies   Allergen Reactions    Other [Seasonal Allergies]        Physical Exam   Vital Signs: Temp: 97.9  F (36.6  C) Temp src: Oral BP: 99/65 Pulse: 104   Resp: (!) 36 SpO2: 97 % O2 Device: Nasal cannula Oxygen Delivery: 3 LPM  Weight: 164 lbs 3.88 oz    Constitutional: NAD  Respiratory: Diminished air movement left base, right lung clear, normal effort on nasal cannula   Cardiovascular: Irregularly irregular with elevated rate, no m/r/g. Bilateral lower extremity edema to lower 1/3 of shin.  GI: Soft, non-tender, non-distended. No rebound tenderness or guarding.    Skin: Warm, mildly diaphoretic   Neurologic: Alert. Responding to questions appropriately. Following commands. Oriented to person and place, off on date.   Psychiatric: Normal affect, appropriate      Data   Data reviewed today: I reviewed all medications, new labs and imaging results over the last 24 hours. I personally reviewed the EKG tracing showing atrial fibrillation with RVR.    Recent Labs   Lab 07/09/25 2051   WBC 9.1   HGB 12.9*   MCV 93   *       POTASSIUM 3.8   CHLORIDE 98   CO2 25   BUN 17.7   CR 0.72   ANIONGAP 14   BELLO 9.3   *   ALBUMIN 2.9*   PROTTOTAL 6.6   BILITOTAL 0.6   ALKPHOS 80   ALT 14   AST 28       Recent Results (from the past 24 hours)   CT Chest Pulmonary Embolism w Contrast    Narrative    EXAM: CT CHEST PULMONARY EMBOLISM W CONTRAST  LOCATION: [facility]  DATE: 7/9/2025    INDICATION: Bed ridden at TCU, pleuritic left chest pain, hypoxic.  COMPARISON: CT from 6/24/2025.  TECHNIQUE: CT chest pulmonary angiogram during arterial phase injection of IV contrast. Multiplanar reformats and MIP reconstructions were performed. Dose reduction techniques were used.   CONTRAST: 66mL Isovue 370    FINDINGS:  ANGIOGRAM CHEST: Pulmonary arteries are normal caliber and negative for pulmonary emboli. Ectatic thoracic aorta measuring 5.3 cm in the distal arch, stable compared to recent CT. The aorta is not adequately opacified to exclude a dissection, however. No   CT evidence of right heart strain.    LUNGS AND PLEURA: Right basilar atelectasis. Left lower lobe atelectasis or consolidation. Large left pleural effusion including loculated fluid along the major fissure. No pneumothorax.    MEDIASTINUM/AXILLAE: Mild cardiomegaly. No pericardial effusion. Multivessel coronary artery disease.    CORONARY ARTERY CALCIFICATION: Severe.    UPPER ABDOMEN: No acute findings.    MUSCULOSKELETAL: Osteopenia with accentuated thoracic kyphosis.      Impression    IMPRESSION:  1.  Negative for pulmonary.    2.  Atelectasis or consolidation of the left lower lobe with large left pleural effusion.    3.  Severe coronary artery disease.    4.  Ectatic thoracic aorta.

## 2025-07-10 NOTE — ED PROVIDER NOTES
Emergency Department Note      History of Present Illness     Chief Complaint   Irregular Heart Beat and Chest Pain      HPI   Yosef Murry is a 89 year old male, anticoagulated (Eliquis), with a history of atrial fibrillation, cerebral infarction, and prostate cancer, presenting to the emergency department for evaluation of chest pain. The patient reports experiencing chest pain which began today. The patient was given 3 nitroglycerins and 324 milligrams of Aspirin today which provided no relief. The patient's daughter and son in law, who is an infectious disease specialist, report the patient is currently at [facility] transitional care unit after recently having a resection of a meningioma and a T4-T5 laminectomy, approximately 5 weeks ago. They deny the patient has any prior cardiac history besides atrial fibrillation. They note the patient has been on room air at [facility]. They deny any the patient has been experiencing any increase in his lower extremity swelling. They note that the patient's appetite and fluid intake has been decreased. The patient denies any recent falls. At [facility], the patient's heart rate was in the 130's-140's.          Independent Historian   The patient's daughter and son in law as detailed above in the HPI.     Review of External Notes   I reviewed the discharge summary from 6/27/2025    [facility]  Hospitalist Discharge Summary       Date of Admission:  6/24/2025  Date of Discharge:  6/27/2025  1:24 PM  Discharging Provider: Aura Alcantar MD  Discharge Service: Hospitalist Service     Discharge Diagnoses  Acute metabolic or Toxic Encephalopathy-resolved  S/p T4-5 laminectomy and resection of meningioma with Dr. Benitez on 6/12/25  Leukocytosis, resolved  History of paroxysmal atrial fibrillation status flutter   Hypertension  Hyperlipidemia   Hx prostate cancer s/p prostatectomy 2009, radiation therapy 2014 and androgen deprivation therapy   CT scan  finding of interval enlargement of the thoracic and abdominal aorta from 2022  New peripheral atherosclerotic thrombus of aorta       Past Medical History     Medical History and Problem List   Atrial fibrillation  Atrial flutter  Cerebral infarction  Hypertension  Inguinal hernia  Prostate cancer  TIA  Heart failure   Hyperlipidemia       Medications   Norvasc  Eliquis   Lipitor   Atarax  Claritin  Robaxin   Zofran   Roxicodone   Vitamin D3    Surgical History   Colonoscopy   Tonsillectomy   Fistulotomy rectum  Prostatectomy   Hernia repair   Herniorrhaphy inguinal  Laminectomy excise tumor thoracic   Orthopedic surgery     Physical Exam     Patient Vitals for the past 24 hrs:   BP Temp Pulse Resp SpO2   07/09/25 2237 -- -- 111 14 (!) 90 %   07/09/25 2200 105/66 -- 105 14 94 %   07/09/25 2130 117/67 -- 120 25 93 %   07/09/25 2100 (!) 137/96 -- 116 26 92 %   07/09/25 2052 (!) 135/94 98.7  F (37.1  C) (!) 124 28 (!) 91 %     Physical Exam  Nursing note and vitals reviewed.    Constitutional:  Appears comfortable.  Pale appearing. Generally weak.  HENT:                Nose normal.  No discharge.      Oral mucosa is moist.  Eyes:    Conjunctivae are normal without injection.  Pupils are equal.  Cardiovascular:  Irregular rhythm and rapid rate.     Normal heart sounds and peripheral pulses 2+ and equal.    Pulmonary:  Patient splints a little bit when he tries to take a deep breath, states it hurts on the left side.  I thought he had some rhonchi on the left side, no wheezing.  He is not tachypneic.  GI:    Soft. No distension and no mass. No tenderness.   Musculoskeletal:  Normal range of motion. No extremity deformity.     Some mild left anterolateral chest wall tenderness not fully reproducing his symptoms. 2+ edema in the bilateral legs with support stockings.  Neurological:   Alert and oriented. No focal weakness.  Skin:    Skin is warm and dry. No rash noted.   Psychiatric:   Behavior is normal. Appropriate mood  and affect.     Judgment and thought content normal.       Diagnostics     Lab Results   Labs Ordered and Resulted from Time of ED Arrival to Time of ED Departure   COMPREHENSIVE METABOLIC PANEL - Abnormal       Result Value    Sodium 137      Potassium 3.8      Carbon Dioxide (CO2) 25      Anion Gap 14      Urea Nitrogen 17.7      Creatinine 0.72      GFR Estimate 88      Calcium 9.3      Chloride 98      Glucose 124 (*)     Alkaline Phosphatase 80      AST 28      ALT 14      Protein Total 6.6      Albumin 2.9 (*)     Bilirubin Total 0.6     TROPONIN T, HIGH SENSITIVITY - Abnormal    Troponin T, High Sensitivity 40 (*)    CBC WITH PLATELETS AND DIFFERENTIAL - Abnormal    WBC Count 9.1      RBC Count 4.29 (*)     Hemoglobin 12.9 (*)     Hematocrit 39.9 (*)     MCV 93      MCH 30.1      MCHC 32.3      RDW 14.7      Platelet Count 605 (*)     % Neutrophils 70      % Lymphocytes 22      % Monocytes 8      % Eosinophils 0      % Basophils 0      % Immature Granulocytes 0      NRBCs per 100 WBC 0      Absolute Neutrophils 6.4      Absolute Lymphocytes 2.0      Absolute Monocytes 0.7      Absolute Eosinophils 0.0      Absolute Basophils 0.0      Absolute Immature Granulocytes 0.0      Absolute NRBCs 0.0     TROPONIN T, HIGH SENSITIVITY - Abnormal    Troponin T, High Sensitivity 45 (*)    ISTAT GASES LACTATE VENOUS POCT - Abnormal    Lactic Acid POCT 1.0      Bicarbonate Venous POCT 25      O2 Sat, Venous POCT 61 (*)     pCO2 Venous POCT 36 (*)     pH Venous POCT 7.45 (*)     pO2 Venous POCT 30      Base Excess/Deficit (+/-) POCT 1.0     NT-PROBNP   BLOOD CULTURE   BLOOD CULTURE       Imaging   CT Chest Pulmonary Embolism w Contrast    (Results Pending)       EKG   ECG results from 07/09/25   EKG 12-lead, tracing only     Value    Systolic Blood Pressure     Diastolic Blood Pressure     Ventricular Rate 126    Atrial Rate     ID Interval     QRS Duration 92        QTc 437    P Axis     R AXIS 2    T Axis 243     Interpretation ECG      Atrial fibrillation with rapid ventricular response with premature ventricular or aberrantly conducted complexes  Nonspecific ST and T wave abnormality  Abnormal ECG  When compared with ECG of 07-Jun-2025 07:32,  Vent. rate has increased by  51 bpm  Incomplete right bundle branch block is no longer Present  Read at 2125 by Rosalie Flores MD           Independent Interpretation   None    ED Course      Medications Administered   Medications   sodium chloride 0.9 % bag for CT scan flush (91 mLs As instructed $Given 7/9/25 2313)   piperacillin-tazobactam (ZOSYN) 4.5 g vial to attach to  mL bag (has no administration in time range)   sodium chloride 0.9% BOLUS 500 mL (0 mLs Intravenous Stopped 7/9/25 2225)   sodium chloride 0.9% BOLUS 500 mL (0 mLs Intravenous Stopped 7/9/25 2225)   iopamidol (ISOVUE-370) solution 66 mL (66 mLs Intravenous $Given 7/9/25 2313)       Procedures   Procedures     Discussion of Management   Admitting Hospitalist, Dr Ruiz    ED Course   ED Course as of 07/10/25 0004   Wed Jul 09, 2025   2124 I obtained history and examined the patient as noted above.    2211 I reevaluated the patient, and spoke to him and his family about his laboratory results.        Additional Documentation  None    Medical Decision Making / Diagnosis     CMS Diagnoses: IV Antibiotics given and/or elevated Lactate of 1 and no sepsis note found - Delete this reminder and enter the sepsis note or '.edcms' before signing chart.>>>None    MIPS   CT for PE was ordered because the patient is high risk for pulmonary embolism due to immobility.      LUCIA Murry is a 89 year old male who [facility] where he is recovering from removal of a meningioma from his lower back when he was complaining of left-sided pleuritic chest pain over the last day or so.  EMS thought he was really tachycardic but in looking at the rhythm strips, a lot of it was artifact.  However he is in  chronic A-fib and he is tachycardic with a pulse rate around 120.  He does not have any substernal pain.  He was noted to be hypoxic with sats 86 to 91% on room air and so he was placed on 2 to 3 L of oxygen.  He has no underlying lung disease.  His EKG did not show any acute ischemic change.  Labs were obtained and his comprehensive panel was normal, his initial troponin is 40 and repeat is 45, lactic acid normal at 1.0.  His pH is 7.45 and pCO2 was 36.  CBC showed a normal white count of 9.1 hemoglobin of 12.9 and platelets were increased at 605.  Blood cultures were obtained and I did get a CT of the chest looking for possible pulmonary embolus with his high risk due to immobility despite the fact that he is on blood thinners for his A-fib.  The patient was given a bolus of IV fluids and he still remained mildly tachycardic with a pulse rate in the low 100s.  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  On my reading of the CT scan it looks like he likely has pneumonia in the left lung.  Because he has been adequately she is to for the past month or so, I am going to start him on Zosyn for healthcare associated pneumonia.  The official CT report is still pending and I will have my partner Dr. Muse keep an eye out for this.  The patient will be admitted to Dr. Ruiz.  The patient is on 2 L of oxygen.    Disposition   The patient was admitted to the hospital under the care of Dr. Ruiz.         Diagnosis     ICD-10-CM    1. Chest pain, unspecified type  R07.9     Pleuritic left      2. Hypoxia  R09.02       3. Pneumonia of left lung due to infectious organism, unspecified part of lung  J18.9       4. Long term (current) use of anticoagulants  Z79.01       5. Atrial fibrillation with RVR (H)  I48.91       6. Pleural effusion on left  J90            Discharge Medications   New Prescriptions    No medications on file         Scribe Disclosure:  Kal DIAZ am  serving as a scribe at 9:57 PM on 7/9/2025 to document services personally performed by Rosalie Flores MD based on my observations and the provider's statements to me.        Rosalie Flores MD  07/10/25 0004       Rosalie Flores MD  07/10/25 0004

## 2025-07-10 NOTE — PROVIDER NOTIFICATION
MD Notification    Notified Person: MD    Notified Person Name: Dr Mata    Notification Date/Time: 7/10/25 0900    Notification Interaction: vocera    Purpose of Notification: pt is lethargic this am. Was able to tell me his name and  but falls alseep then takes repeated touch to awaken him. Had 2.5 Oxycodone this am at 5:00. Night RN documented resp rate 36. Now is 24.     Orders Received: VBG    Comments:

## 2025-07-10 NOTE — ED NOTES
[facility]  ED Nurse Handoff Report    ED Chief complaint: Irregular Heart Beat and Chest Pain      ED Diagnosis:   Final diagnoses:   None       Code Status: TBD    Allergies:   Allergies   Allergen Reactions    Other [Seasonal Allergies]        Patient Story: chest pain  Focused Assessment:  pt was brought over via EMS from [facility] for chest pain that is localized to left chest wall when pushing on it. Pt had 3 NTG and 324 ASA. Pt will have CT in ED and plan for admission as of 9:43 PM      Treatments and/or interventions provided: see MAR  Patient's response to treatments and/or interventions: good    To be done/followed up on inpatient unit:  monitor    Does this patient have any cognitive concerns?: TBD    Activity level - Baseline/Home:  Independent  Activity Level - Current:   Stand with Assist    Patient's Preferred language: English   Needed?: No    Isolation: None  Infection: Not Applicable  Sepsis treatment initiated: No  Patient tested for COVID 19 prior to admission: NO  Bariatric?: No    Vital Signs:   Vitals:    07/09/25 2052 07/09/25 2100 07/09/25 2130   BP: (!) 135/94 (!) 137/96 117/67   Pulse: (!) 124 116 120   Resp: 28 26 25   Temp: 98.7  F (37.1  C)     SpO2: (!) 91% 92% 93%       Cardiac Rhythm:     Was the PSS-3 completed:   Yes  What interventions are required if any?               Family Comments: daughter and son-in-law at bedside  OBS brochure/video discussed/provided to patient/family: No                  For the majority of the shift this patient's behavior was Green.   Behavioral interventions performed were NA.    ED NURSE PHONE NUMBER: [phone]

## 2025-07-10 NOTE — ED NOTES
Bed: ST01  Expected date:   Expected time:   Means of arrival:   Comments:  Elin 1 89M chest pain- to triage

## 2025-07-11 LAB
ERYTHROCYTE [DISTWIDTH] IN BLOOD BY AUTOMATED COUNT: 15.1 % (ref 10–15)
HCT VFR BLD AUTO: 37.1 % (ref 40–53)
HGB BLD-MCNC: 11.9 G/DL (ref 13.3–17.7)
MCH RBC QN AUTO: 30.1 PG (ref 26.5–33)
MCHC RBC AUTO-ENTMCNC: 32.1 G/DL (ref 31.5–36.5)
MCV RBC AUTO: 94 FL (ref 78–100)
PLATELET # BLD AUTO: 452 10E3/UL (ref 150–450)
POTASSIUM SERPL-SCNC: 3.5 MMOL/L (ref 3.4–5.3)
RBC # BLD AUTO: 3.96 10E6/UL (ref 4.4–5.9)
WBC # BLD AUTO: 12.2 10E3/UL (ref 4–11)

## 2025-07-11 PROCEDURE — 92610 EVALUATE SWALLOWING FUNCTION: CPT | Mod: GN

## 2025-07-11 PROCEDURE — 84132 ASSAY OF SERUM POTASSIUM: CPT | Performed by: INTERNAL MEDICINE

## 2025-07-11 PROCEDURE — 250N000011 HC RX IP 250 OP 636: Performed by: INTERNAL MEDICINE

## 2025-07-11 PROCEDURE — 92526 ORAL FUNCTION THERAPY: CPT | Mod: GN

## 2025-07-11 PROCEDURE — 250N000013 HC RX MED GY IP 250 OP 250 PS 637: Performed by: INTERNAL MEDICINE

## 2025-07-11 PROCEDURE — 120N000001 HC R&B MED SURG/OB

## 2025-07-11 PROCEDURE — 36415 COLL VENOUS BLD VENIPUNCTURE: CPT | Performed by: INTERNAL MEDICINE

## 2025-07-11 PROCEDURE — 99232 SBSQ HOSP IP/OBS MODERATE 35: CPT | Performed by: INTERNAL MEDICINE

## 2025-07-11 PROCEDURE — 85014 HEMATOCRIT: CPT | Performed by: INTERNAL MEDICINE

## 2025-07-11 RX ADMIN — CEFEPIME 2 G: 2 INJECTION, POWDER, FOR SOLUTION INTRAVENOUS at 17:35

## 2025-07-11 RX ADMIN — SENNOSIDES 2 TABLET: 8.6 TABLET, FILM COATED ORAL at 20:58

## 2025-07-11 RX ADMIN — METRONIDAZOLE 500 MG: 500 INJECTION, SOLUTION INTRAVENOUS at 05:23

## 2025-07-11 RX ADMIN — CEFEPIME 2 G: 2 INJECTION, POWDER, FOR SOLUTION INTRAVENOUS at 06:51

## 2025-07-11 RX ADMIN — METOPROLOL SUCCINATE 25 MG: 25 TABLET, EXTENDED RELEASE ORAL at 10:37

## 2025-07-11 RX ADMIN — APIXABAN 5 MG: 5 TABLET, FILM COATED ORAL at 10:37

## 2025-07-11 RX ADMIN — LORATADINE 10 MG: 10 TABLET ORAL at 10:37

## 2025-07-11 RX ADMIN — ACETAMINOPHEN 650 MG: 325 TABLET ORAL at 10:37

## 2025-07-11 RX ADMIN — ATORVASTATIN CALCIUM 10 MG: 10 TABLET, FILM COATED ORAL at 20:58

## 2025-07-11 RX ADMIN — SENNOSIDES 2 TABLET: 8.6 TABLET, FILM COATED ORAL at 10:38

## 2025-07-11 RX ADMIN — APIXABAN 5 MG: 5 TABLET, FILM COATED ORAL at 20:58

## 2025-07-11 ASSESSMENT — ACTIVITIES OF DAILY LIVING (ADL)
ADLS_ACUITY_SCORE: 61
ADLS_ACUITY_SCORE: 70
ADLS_ACUITY_SCORE: 61
ADLS_ACUITY_SCORE: 73
ADLS_ACUITY_SCORE: 74
ADLS_ACUITY_SCORE: 73
ADLS_ACUITY_SCORE: 61
ADLS_ACUITY_SCORE: 73
ADLS_ACUITY_SCORE: 61
ADLS_ACUITY_SCORE: 73
ADLS_ACUITY_SCORE: 63
ADLS_ACUITY_SCORE: 73
ADLS_ACUITY_SCORE: 73
ADLS_ACUITY_SCORE: 61
ADLS_ACUITY_SCORE: 63
ADLS_ACUITY_SCORE: 73
ADLS_ACUITY_SCORE: 70
DEPENDENT_IADLS:: INDEPENDENT
ADLS_ACUITY_SCORE: 63
ADLS_ACUITY_SCORE: 74

## 2025-07-11 NOTE — PROGRESS NOTES
[facility]    Hospitalist Progress Note    Date of Admission: 7/9/2025    Assessment & Plan   Yosef Murry is a 89 year old male with PMHx of afib on chronic anticoagulation with DOAC, hx of CVA, hypertension, hyperlipidemia and recent hospitalization from 6/24/25-6/27/25 for management of a possible infection post T4-5 laminectomy (done 6/12). Discharged to TCU. He was readmitted on 7/9/2025 for management of shortness of breath due to a large left pleural effusion and suspected HCAP.     Sepsis secondary dt LLL HCAP  Large left exudative parapneumonic effusion, s/p thoracentesis on 7/10  Acute hypoxic respiratory failure dt above  *Presented to ED for evaluation of left-sided chest pain, shortness of breath  *Tmax in .2, in afib with RVR with HRs 120s, BPs 90-110s systolic, needing 3L NC to maintain sats >90. Labs notable for WBC 9.1, lactate normal, proBNP 1300, trops elevated but trend flat (40-45). CT chest was neg for PE, showed a large left pleural effusion with associated consolidation/atelectasis. Was started on treatment with broad spectrum abx (cefepime, Flagyl and Vanco).   *MRSA swab neg so Vanco dc'd 7/10. Flagyl dc'd 7/11 in light of pleural fluid gm stain findings.   *Underwent thoracentesis on 7/11 with removal of 400ml pleural fluid. Fluid studies appear consistent with an exudative effusion with neutrophilic predominance, pH 7.5.   -- await pleural fluid culture -- initial gm stain showed GPR/GNRs  -- cont cefepime (7/10-present)  -- wean O2 as able -- presently on 2L NC  -- SLP consulted dt some observed coughing with po intake    Atrial fibrillation with RVR  *HRs 120s on presentation. RVR likely dt sepsis/hypoxia.   -- conts on oral metoprolol, prn Lopressor available   -- DOAC held for thoracentesis, has since been resumed    Hypertension  History of CVA  Hyperlipidemia  *Conts on statin, metoprolol; holding amlodipine dt presentation with sepsis    T4-T5  meningioma s/p laminectomy and resection 6/12/25 with post-op fluid collection  *Underwent initial laminectomy/resection on 6/12/25. Then hospitalized 6/24-6/27 for management of suspected postop infection. Initially managed with IV ceftriaxone that stay but no clear convincing evidence of infection found on workup that stay so abx ultimately stopped. Discharged to TCU.   *Chronic and stable on prns for pain, bowels.   -- hold sched oxycodone (5mg BID) dt sedation noted on 7/10  -- has prn oxycodone available    FEN: off IVFs, lytes stable, regular diet  DVT Prophylaxis: PCDs, DOAC  Code Status: No CPR- Do NOT Intubate    Disposition: Anticipate discharge back to TCU in 2-3d pending culture results, response to treatment, ability to wean O2.     Medically Ready for Discharge: Anticipated in 2-4 Days      Marifer Mata, DO    Clinically Significant Risk Factors               # Hypoalbuminemia: Lowest albumin = 2.9 g/dL at 7/9/2025  8:51 PM, will monitor as appropriate                      # Financial/Environmental Concerns:               Medical Decision Making       Please see A&P for additional details of medical decision making.           Interval History   Chart reviewed. Seen this morning. More alert today. Denies specific complaints. No cp/sob/cough, breathing non-labored. Remains on 2L NC. No abd pain/n/v.     -Data reviewed today: I reviewed all new labs and imaging results over the last 24 hours. I personally reviewed no images or EKG's today.    Physical Exam   Temp: 97.9  F (36.6  C) Temp src: Axillary BP: 124/83 Pulse: 107   Resp: 24 SpO2: 94 % O2 Device: Nasal cannula Oxygen Delivery: 2 LPM  Vitals:    07/10/25 0350 07/11/25 0512   Weight: 74.5 kg (164 lb 3.9 oz) 74.2 kg (163 lb 9.3 oz)     Vital Signs with Ranges  Temp:  [97.9  F (36.6  C)-98.5  F (36.9  C)] 97.9  F (36.6  C)  Pulse:  [102-116] 107  Resp:  [24] 24  BP: (108-131)/(66-83) 124/83  SpO2:  [89 %-95 %] 94 %  I/O last 3 completed  shifts:  In: 1893.34 [P.O.:520; I.V.:923.34; IV Piggyback:450]  Out: 875 [Urine:875]    Constitutional: Elderly appearing male, alert and answering questions appropriately    Respiratory: Decreased air exchange in left lung, no wheeze, no work of breathing  Cardiovascular: HR irregular, no MGR, +bilateral LE edema to mid shins (family reports this is chronic)  GI: S, NT, ND, +BS  Skin/Integumen: warm/dry  Other:      Medications   Current Facility-Administered Medications   Medication Dose Route Frequency Provider Last Rate Last Admin     Current Facility-Administered Medications   Medication Dose Route Frequency Provider Last Rate Last Admin    [Held by provider] amLODIPine (NORVASC) tablet 5 mg  5 mg Oral Daily Vicente Ruiz MD        apixaban ANTICOAGULANT (ELIQUIS) tablet 5 mg  5 mg Oral BID Marifer Mata DO   5 mg at 07/11/25 1037    atorvastatin (LIPITOR) tablet 10 mg  10 mg Oral QPM Vicente Ruiz MD   10 mg at 07/10/25 2133    ceFEPIme (MAXIPIME) 2 g vial to attach to  mL bag for ADULTS or NS 50 mL bag for PEDS  2 g Intravenous Q12H Vicente Ruiz MD   2 g at 07/11/25 0651    loratadine (CLARITIN) tablet 10 mg  10 mg Oral Daily Vicente Ruiz MD   10 mg at 07/11/25 1037    metoprolol succinate ER (TOPROL XL) 24 hr tablet 25 mg  25 mg Oral Daily Vicente Ruiz MD   25 mg at 07/11/25 1037    metroNIDAZOLE (FLAGYL) infusion 500 mg  500 mg Intravenous Q8H Vicente Ruiz MD   500 mg at 07/11/25 0523    [Held by provider] oxyCODONE (ROXICODONE) tablet 5 mg  5 mg Oral BID Vicente Ruiz MD        polyethylene glycol (MIRALAX) Packet 17 g  17 g Oral Daily Vicente Ruiz MD        sennosides (SENOKOT) tablet 2 tablet  2 tablet Oral BID Vicente Ruiz MD   2 tablet at 07/11/25 1038    sodium chloride (PF) 0.9% PF flush 3 mL  3 mL Intracatheter Q8H Vicente Ruiz MD   3 mL at 07/11/25 0528       Data   Recent Labs   Lab  07/11/25  0629 07/10/25  0613 07/09/25 2051   WBC 12.2* 12.6* 9.1   HGB 11.9* 12.1* 12.9*   MCV 94 93 93   * 457* 605*   NA  --  138 137   POTASSIUM 3.5 3.5 3.8   CHLORIDE  --  103 98   CO2  --  23 25   BUN  --  18.0 17.7   CR  --  0.71 0.72   ANIONGAP  --  12 14   BELLO  --  8.6* 9.3   GLC  --  119* 124*   ALBUMIN  --   --  2.9*   PROTTOTAL  --  5.3* 6.6   BILITOTAL  --   --  0.6   ALKPHOS  --   --  80   ALT  --   --  14   AST  --   --  28       Recent Results (from the past 24 hours)   US Thoracentesis    Narrative    EXAM:   1. LEFT THORACENTESIS  2. ULTRASOUND GUIDANCE  LOCATION: [facility]  DATE: 7/10/2025    INDICATION: Pleural effusion.    PROCEDURE: Preliminary ultrasound demonstrated a septated pleural effusion. Informed consent obtained. Time out performed. The chest was prepped and draped in sterile fashion. 10 mL of 1 % lidocaine was infused into the local soft tissues. Under direct   ultrasound guidance, a 5 Upper sorbian catheter system was placed into the pleural effusion.     0.4 liters of clear dionicio fluid were removed and sent to lab, if requested.    Patient tolerated procedure well.    Ultrasound imaging was obtained and placed in the patient's permanent medical record.      Impression    IMPRESSION:  Status post left ultrasound-guided thoracentesis of a septated pleural effusion..    Reference CPT Code: 59400        2

## 2025-07-11 NOTE — PLAN OF CARE
Goal Outcome Evaluation:      Plan of Care Reviewed With: child          Outcome Evaluation: Follow for therapy recs

## 2025-07-11 NOTE — PLAN OF CARE
Goal Outcome Evaluation:      Plan of Care Reviewed With: patient, child      A+Ox2, forgetful. Was lethargic and sleeping most of the day. Woke up around 1830. Flushed afebrile. Was able to drink water and eat sherbert. Voiding per ext cath. No BM. VSS on 1.5 L, RA 89%. Afib RVR 's. Denies pain at rest but does have pain with turning on Left side/back.

## 2025-07-11 NOTE — PROGRESS NOTES
Date & Time: 07/10/25, 2361-5570  Admission Date: 7/9/2025   Admission Diagnosis: Long term (current) use of anticoagulants [Z79.01]  Hypoxia [R09.02]  Pleural effusion on left [J90]  Atrial fibrillation with RVR (H) [I48.91]  Pneumonia of left lung due to infectious organism, unspecified part of lung [J18.9]  Chest pain, unspecified type [R07.9]  Hx: Afib on Eliquis, CVA, prostate cancer, T4-T5 meninigioma s/p laminectomy and resection (6/12/25)     Orientation: Oriented to self and place; forgetful  VS/O2: VSS ex tachycardia and tachypnea on 2L oxymask when asleep (2L NC when awake)  Pain: Pain is controlled without medication.     Tele/Cardiac: Afib RVR (100s-110s); Denies CP.  Respiratory: Bilateral lower lobes LS diminished, tachypneic; Denies dyspnea at rest.  : Incontinent, external catheter in place.  GI: WDL  Skin/Wounds/Incisions: Coccyx blanchable redness, mepilex in place  IV/Lines/Drains: 2 PIV SL w/ int abx     Pills: Crushed in applesauce.  Diet: Regular diet  Activity Level: In bed this shift     Plan: Continue abx, wean O2 as able  Significant Information: Swallows water without difficulty

## 2025-07-11 NOTE — CONSULTS
Care Management Initial Consult    General Information  Assessment completed with: FamilyMela  Type of CM/SW Visit: Initial Assessment    Primary Care Provider verified and updated as needed: Yes   Readmission within the last 30 days: current reason for admission unrelated to previous admission   Return Category: Progression of disease  Reason for Consult: discharge planning  Advance Care Planning:            Communication Assessment  Patient's communication style: spoken language (English or Bilingual)             Cognitive  Cognitive/Neuro/Behavioral: .WDL except, orientation, mood/behavior  Level of Consciousness: confused  Arousal Level: opens eyes spontaneously  Orientation: disoriented to, place, time, situation  Mood/Behavior: uncooperative          Living Environment:   People in home: alone     Current living Arrangements: house      Able to return to prior arrangements: yes       Family/Social Support:  Care provided by: self  Provides care for: no one  Marital Status:   Support system: Children          Description of Support System: Supportive, Involved    Support Assessment: Adequate family and caregiver support, Adequate social supports    Current Resources:   Patient receiving home care services: No        Community Resources: None  Equipment currently used at home:    Supplies currently used at home:      Employment/Financial:  Employment Status:          Financial Concerns:             Does the patient's insurance plan have a 3 day qualifying hospital stay waiver?  No    Lifestyle & Psychosocial Needs:  Social Drivers of Health     Food Insecurity: Low Risk  (7/10/2025)    Food Insecurity     Within the past 12 months, did you worry that your food would run out before you got money to buy more?: No     Within the past 12 months, did the food you bought just not last and you didn t have money to get more?: No   Depression: Not at risk (10/10/2024)    PHQ-2     PHQ-2 Score: 0   Housing  Stability: Low Risk  (7/10/2025)    Housing Stability     Do you have housing? : Yes     Are you worried about losing your housing?: No   Tobacco Use: Medium Risk (6/21/2025)    Patient History     Smoking Tobacco Use: Former     Smokeless Tobacco Use: Never     Passive Exposure: Not on file   Financial Resource Strain: Low Risk  (7/10/2025)    Financial Resource Strain     Within the past 12 months, have you or your family members you live with been unable to get utilities (heat, electricity) when it was really needed?: No   Alcohol Use: Not At Risk (6/10/2019)    AUDIT-C     Frequency of Alcohol Consumption: 2-3 times a week     Average Number of Drinks: 1 or 2     Frequency of Binge Drinking: Never   Transportation Needs: Low Risk  (7/10/2025)    Transportation Needs     Within the past 12 months, has lack of transportation kept you from medical appointments, getting your medicines, non-medical meetings or appointments, work, or from getting things that you need?: No   Physical Activity: Unknown (10/10/2024)    Exercise Vital Sign     Days of Exercise per Week: 0 days     Minutes of Exercise per Session: Not on file   Interpersonal Safety: Low Risk  (7/10/2025)    Interpersonal Safety     Do you feel physically and emotionally safe where you currently live?: Yes     Within the past 12 months, have you been hit, slapped, kicked or otherwise physically hurt by someone?: No     Within the past 12 months, have you been humiliated or emotionally abused in other ways by your partner or ex-partner?: No   Stress: No Stress Concern Present (10/10/2024)    [place] [employer] - Occupational Stress Questionnaire     Feeling of Stress : Not at all   Social Connections: Unknown (10/10/2024)    Social Connection and Isolation Panel [NHANES]     Frequency of Communication with Friends and Family: Not on file     Frequency of Social Gatherings with Friends and Family: Once a week     Attends Jehovah's witness  Services: Not on file     Active Member of Clubs or Organizations: Not on file     Attends Club or Organization Meetings: Not on file     Marital Status: Not on file   Health Literacy: Not on file       Functional Status:  Prior to admission patient needed assistance:   Dependent ADLs:: Independent  Dependent IADLs:: Independent  Assesssment of Functional Status: At functional baseline    Mental Health Status:          Chemical Dependency Status:                Values/Beliefs:  Spiritual, Cultural Beliefs, Mormonism Practices, Values that affect care:                 Discussed  Partnership in Safe Discharge Planning  document with patient/family: No    Additional Information:  Per Care Management consult for discharge planning, Per H&P, Yosef Murry is a 89 year-old male who presents with chest pain and shortness of breath. Admitted on 7/10/2025.    Per chart review patient is orient to self and place and forgetful. SW called patient's daughter (Mela) to introduce self and role. Pt's coming from [facility] and daughter wouldn't mind for the patient to go back there since they had positive experience. Pt does not have his bed held.    Per chart reviewed patient was at [facility] on 6/24.     Patient lives alone in a house locate in [place]. Pt was independent with all his ADLs/ADLs and daily care. Facesheet info has been confirmed with the daughter and all up to date including, pt's address, PCP, emergency contact, and insurance.    Care Management team will continue to follow and help with discharge planning.      Next Steps: Follow for therapy JESSICA Perez

## 2025-07-11 NOTE — PROGRESS NOTES
"Clinical Swallow Evaluation (CSE):     07/11/25 1081   Appointment Info   Signing Clinician's Name / Credentials (SLP) Anjana South MS CCC_SLP   General Information   Onset of Illness/Injury or Date of Surgery 07/09/25   Referring Physician Dr. Mata   Patient/Family Therapy Goal Statement (SLP) Pt did not state   Pertinent History of Current Problem   Per hospitalist: \"Yosef Murry is a 89 year old male with PMHx of afib on chronic anticoagulation with DOAC, hx of CVA, hypertension, hyperlipidemia and recent hospitalization from 6/24/25-6/27/25 for management of a possible infection post T4-5 laminectomy (done 6/12). Discharged to TCU. He was readmitted on 7/9/2025 for management of shortness of breath due to a large left pleural effusion and suspected HCAP. \"     SLP consulted for swallow evaluation, concern for dysphagia.     Hx large Zenker's diverticulum and partial Schatzki's ring noted on Upper GI endo in 2019.     Today, pt reports coughing only with ice cold thin water and occassional regurgitation symptoms.     Clinical swallow evaluation during recent admission, on 6/25/25 [facility] recs for: \"Regular diet with thin liquids with regurgitation precautions with known large (untreated) Zenker's diverticulum.  Eat when alert, upright during and 1 hour after meals, slow rate and alternated textures.\"     General Observations Pt alert, pleasant, upright in chair with lunch meal. Reports reduced appetite.   Type of Evaluation   Type of Evaluation Swallow Evaluation   Oral Motor   Oral Musculature generally intact   General Swallowing Observations   Current Diet/Method of Nutritional Intake (General Swallowing Observations, NIS) regular diet;thin liquids (level 0)   Respiratory Support nasal cannula  (2L)   Swallowing Evaluation Clinical swallow evaluation   Clinical Swallow Evaluation   Clinical Swallow Evaluation Textures Trialed thin liquids;pureed;solid foods   Clinical Swallow Eval: Thin Liquid Texture Trial "   Mode of Presentation, Thin Liquids cup;straw;self-fed   Volume of Liquid or Food Presented 4 oz   Oral Phase of Swallow premature pharyngeal entry   Pharyngeal Phase of Swallow throat clearing   Diagnostic Statement throat clear x1 with straw, no other overt clinical signs/sx aspiration noted   Clinical Swallow Evaluation: Puree Solid Texture Trial   Mode of Presentation, Puree spoon;self-fed   Volume of Puree Presented bites of mashed potatoes   Oral Phase, Puree WFL   Pharyngeal Phase, Puree intact   Diagnostic Statement no overt clinical signs/sx aspiration noted   Clinical Swallow Evaluation: Solid Food Texture Trial   Mode of Presentation self-fed   Volume Presented chicken, fresh fruit (grapes, cantelope, pinapple)   Oral Phase WFL   Pharyngeal Phase intact   Diagnostic Statement no overt clinical signs/sx aspiration noted   Esophageal Phase of Swallow   Patient reports or presents with symptoms of esophageal dysphagia Yes   Esophageal comments Large Zenker's diverticulum and schatski's ring on 2019 EGD, without treatment   Swallowing Recommendations   Diet Consistency Recommendations thin liquids (level 0);regular diet   Supervision Level for Intake close supervision needed   Medication Administration Recommendations, Swallowing (SLP) As preferred per patient, recommend liquid wash and upright positioning 1 hour after any intake   General Therapy Interventions   Planned Therapy Interventions Dysphagia Treatment   Clinical Impression   Criteria for Skilled Therapeutic Interventions Met (SLP Eval) Yes, treatment indicated   SLP Diagnosis Suspected pharyngeal/pharyngoesophageal dysphagia   Risks & Benefits of therapy have been explained evaluation/treatment results reviewed;care plan/treatment goals reviewed;risks/benefits reviewed;current/potential barriers reviewed;participants voiced agreement with care plan;participants included;patient   Clinical Impression Comments   Clinical swallow evaluation completed  with thin liquids, puree solids, regular solids. Pt currently presents wtih suspected pharyngeal/pharyngoesophageal dysphagia with known hx of Zenker's diverticulum and Schatski's ring per 2019 EGD. WFL mastication, oral clearance and notable laryngeal elevation to palpation. Throat clear x1 with straw drinking, no other overt clinical signs/sx aspiration noted across intake. Pt denies globus sensation today or regurgitation, but does endorse regurgitation symptoms occasionally. Educated pt on his hx, recommended strategies to reduce aspiration risk and SLP POC.     SLP Total Evaluation Time   Eval: oral/pharyngeal swallow function, clinical swallow Minutes (65405) 81   SLP Goals   Therapy Frequency (SLP Eval) 4 times/week   SLP Predicted Duration/Target Date for Goal Attainment 07/25/25   SLP Goals Swallow   SLP: Safely tolerate diet without signs/symptoms of aspiration Regular diet;Thin liquids;With use of compensatory swallow strategies;With use of swallow precautions;Independently   Interventions   Interventions Quick Adds Swallowing Dysfunction   Swallowing Intervention   Treatment of Swallowing Dysfunction &/or Oral Function for Feeding Minutes (94496) 10   Symptoms Noted During/After Treatment None   Treatment Detail/Skilled Intervention Min verbal cues for the following strategies: single sips, no straws, alternation of solids/liquids. Verbal education re: upright positioning/ remaining upright 1 hour after as well given esophageal deficits, reduce regurgitation/retrograde movement of retention.    SLP Discharge Planning   SLP Plan Meal follow up, Zenker's compensations, ?potential VFSS monday to assess strategy effectiveness / PO safety if still admitted/pt agreeable   SLP Discharge Recommendation Transitional Care Facility   SLP Rationale for DC Rec Recommend SLP follow up to ensure safe compensation strategies, may meet swallowing goals by discharge.   SLP Brief overview of current status  Recommend  continue regular diet with thin liquids (avoid straws) with regurgitation precautions as pt with known hx large (untreated) Zenker's diverticulum: eat when alert, upright during and 1 hour after meals, slow rate and alternated textures.   SLP Time and Intention   Total Session Time (sum of timed and untimed services) 20

## 2025-07-12 LAB
ANION GAP SERPL CALCULATED.3IONS-SCNC: 14 MMOL/L (ref 7–15)
BASE EXCESS BLDV CALC-SCNC: 2.8 MMOL/L (ref -3–3)
BUN SERPL-MCNC: 29.3 MG/DL (ref 8–23)
CALCIUM SERPL-MCNC: 9.7 MG/DL (ref 8.8–10.4)
CHLORIDE SERPL-SCNC: 102 MMOL/L (ref 98–107)
CREAT SERPL-MCNC: 0.67 MG/DL (ref 0.67–1.17)
EGFRCR SERPLBLD CKD-EPI 2021: 90 ML/MIN/1.73M2
ERYTHROCYTE [DISTWIDTH] IN BLOOD BY AUTOMATED COUNT: 15.5 % (ref 10–15)
GLUCOSE SERPL-MCNC: 151 MG/DL (ref 70–99)
HCO3 BLDV-SCNC: 25 MMOL/L (ref 21–28)
HCO3 SERPL-SCNC: 23 MMOL/L (ref 22–29)
HCT VFR BLD AUTO: 38.7 % (ref 40–53)
HGB BLD-MCNC: 13.3 G/DL (ref 13.3–17.7)
LACTATE SERPL-SCNC: 2.2 MMOL/L (ref 0.7–2)
LACTATE SERPL-SCNC: 2.6 MMOL/L (ref 0.7–2)
MCH RBC QN AUTO: 30.4 PG (ref 26.5–33)
MCHC RBC AUTO-ENTMCNC: 34.4 G/DL (ref 31.5–36.5)
MCV RBC AUTO: 88 FL (ref 78–100)
O2/TOTAL GAS SETTING VFR VENT: 93 %
OXYHGB MFR BLDV: 87 % (ref 70–75)
PCO2 BLDV: 32 MM HG (ref 40–50)
PH BLDV: 7.51 [PH] (ref 7.32–7.43)
PLATELET # BLD AUTO: 476 10E3/UL (ref 150–450)
PO2 BLDV: 54 MM HG (ref 25–47)
POTASSIUM SERPL-SCNC: 3.9 MMOL/L (ref 3.4–5.3)
POTASSIUM SERPL-SCNC: 4.2 MMOL/L (ref 3.4–5.3)
RBC # BLD AUTO: 4.38 10E6/UL (ref 4.4–5.9)
SAO2 % BLDV: 89.5 % (ref 70–75)
SODIUM SERPL-SCNC: 139 MMOL/L (ref 135–145)
WBC # BLD AUTO: 21.2 10E3/UL (ref 4–11)

## 2025-07-12 PROCEDURE — 250N000013 HC RX MED GY IP 250 OP 250 PS 637: Performed by: INTERNAL MEDICINE

## 2025-07-12 PROCEDURE — 85014 HEMATOCRIT: CPT | Performed by: STUDENT IN AN ORGANIZED HEALTH CARE EDUCATION/TRAINING PROGRAM

## 2025-07-12 PROCEDURE — 250N000011 HC RX IP 250 OP 636: Performed by: INTERNAL MEDICINE

## 2025-07-12 PROCEDURE — 83605 ASSAY OF LACTIC ACID: CPT | Performed by: STUDENT IN AN ORGANIZED HEALTH CARE EDUCATION/TRAINING PROGRAM

## 2025-07-12 PROCEDURE — 120N000001 HC R&B MED SURG/OB

## 2025-07-12 PROCEDURE — 84132 ASSAY OF SERUM POTASSIUM: CPT | Performed by: INTERNAL MEDICINE

## 2025-07-12 PROCEDURE — 36415 COLL VENOUS BLD VENIPUNCTURE: CPT | Performed by: INTERNAL MEDICINE

## 2025-07-12 PROCEDURE — 99233 SBSQ HOSP IP/OBS HIGH 50: CPT | Performed by: STUDENT IN AN ORGANIZED HEALTH CARE EDUCATION/TRAINING PROGRAM

## 2025-07-12 PROCEDURE — 36415 COLL VENOUS BLD VENIPUNCTURE: CPT | Performed by: STUDENT IN AN ORGANIZED HEALTH CARE EDUCATION/TRAINING PROGRAM

## 2025-07-12 PROCEDURE — 82565 ASSAY OF CREATININE: CPT | Performed by: STUDENT IN AN ORGANIZED HEALTH CARE EDUCATION/TRAINING PROGRAM

## 2025-07-12 PROCEDURE — 82805 BLOOD GASES W/O2 SATURATION: CPT | Performed by: STUDENT IN AN ORGANIZED HEALTH CARE EDUCATION/TRAINING PROGRAM

## 2025-07-12 PROCEDURE — 99222 1ST HOSP IP/OBS MODERATE 55: CPT | Performed by: INTERNAL MEDICINE

## 2025-07-12 PROCEDURE — 258N000003 HC RX IP 258 OP 636: Performed by: STUDENT IN AN ORGANIZED HEALTH CARE EDUCATION/TRAINING PROGRAM

## 2025-07-12 RX ORDER — METOPROLOL TARTRATE 1 MG/ML
2.5 INJECTION, SOLUTION INTRAVENOUS EVERY 4 HOURS PRN
Status: DISCONTINUED | OUTPATIENT
Start: 2025-07-12 | End: 2025-07-14 | Stop reason: HOSPADM

## 2025-07-12 RX ORDER — PIPERACILLIN SODIUM, TAZOBACTAM SODIUM 3; .375 G/15ML; G/15ML
3.38 INJECTION, POWDER, LYOPHILIZED, FOR SOLUTION INTRAVENOUS EVERY 6 HOURS
Status: DISCONTINUED | OUTPATIENT
Start: 2025-07-12 | End: 2025-07-14 | Stop reason: HOSPADM

## 2025-07-12 RX ADMIN — POLYETHYLENE GLYCOL 3350 17 G: 17 POWDER, FOR SOLUTION ORAL at 08:21

## 2025-07-12 RX ADMIN — SENNOSIDES 2 TABLET: 8.6 TABLET, FILM COATED ORAL at 21:01

## 2025-07-12 RX ADMIN — CEFEPIME 2 G: 2 INJECTION, POWDER, FOR SOLUTION INTRAVENOUS at 06:29

## 2025-07-12 RX ADMIN — METOPROLOL SUCCINATE 25 MG: 25 TABLET, EXTENDED RELEASE ORAL at 08:21

## 2025-07-12 RX ADMIN — ACETAMINOPHEN 650 MG: 325 TABLET ORAL at 16:13

## 2025-07-12 RX ADMIN — APIXABAN 5 MG: 5 TABLET, FILM COATED ORAL at 08:21

## 2025-07-12 RX ADMIN — OXYCODONE HYDROCHLORIDE 2.5 MG: 5 TABLET ORAL at 06:45

## 2025-07-12 RX ADMIN — ATORVASTATIN CALCIUM 10 MG: 10 TABLET, FILM COATED ORAL at 21:01

## 2025-07-12 RX ADMIN — SODIUM CHLORIDE, SODIUM LACTATE, POTASSIUM CHLORIDE, AND CALCIUM CHLORIDE 500 ML: .6; .31; .03; .02 INJECTION, SOLUTION INTRAVENOUS at 17:47

## 2025-07-12 RX ADMIN — LORATADINE 10 MG: 10 TABLET ORAL at 08:21

## 2025-07-12 RX ADMIN — SENNOSIDES 2 TABLET: 8.6 TABLET, FILM COATED ORAL at 08:21

## 2025-07-12 RX ADMIN — PIPERACILLIN AND TAZOBACTAM 3.38 G: 3; .375 INJECTION, POWDER, FOR SOLUTION INTRAVENOUS at 13:23

## 2025-07-12 RX ADMIN — PIPERACILLIN AND TAZOBACTAM 3.38 G: 3; .375 INJECTION, POWDER, FOR SOLUTION INTRAVENOUS at 19:38

## 2025-07-12 ASSESSMENT — ACTIVITIES OF DAILY LIVING (ADL)
ADLS_ACUITY_SCORE: 74
ADLS_ACUITY_SCORE: 70
ADLS_ACUITY_SCORE: 74
ADLS_ACUITY_SCORE: 74
ADLS_ACUITY_SCORE: 70
ADLS_ACUITY_SCORE: 74
ADLS_ACUITY_SCORE: 70
ADLS_ACUITY_SCORE: 74
ADLS_ACUITY_SCORE: 70
ADLS_ACUITY_SCORE: 74

## 2025-07-12 NOTE — PROVIDER NOTIFICATION
Updated hospitalist with elevated temp of 101.7, weak and lethargic this afternoon. New labs ordered.

## 2025-07-12 NOTE — PROGRESS NOTES
MD Notification    Notified Person: MD    Notified Person Name: Vicente Ruiz    Notification Date/Time: 7/12/25 0003    Notification Interaction: vocera    Purpose of Notification: pt on Tele Afib RVR, notified d/t HR> 120, HR ranging but reached 122    Orders Received: No, per MD - does not need to be treated if HR is not sustaining >120, continue to monitor    Comments:

## 2025-07-12 NOTE — PLAN OF CARE
Goal Outcome Evaluation:  Care plan note:      Recent Vitals:  Temp: 98.8  F (37.1  C) Temp src: Oral BP: 123/79 Pulse: 91   Resp: 24 SpO2: 95 % O2 Device: Nasal cannula      Orientation/Neuro: Alert, Forgetful, Disoriented to, Place , Situation  Pain: The patient is not having any pain.   Tele: Atrial fibrillation - rapid   IV medications: IV antibiotics   Mobility: Assist of 2 and Walker   Skin: Bruises: scattered   Resp: RAMÍREZ, attempted to wean o2 but dropping below 88% on RA  GI: WDL and no complaints  : incontinence and Using male external catheter     Diet: Tolerating diet:  Poor,   and Needs encouragement to eat  Orders Placed This Encounter      Regular Diet Adult      Safety/Concerns:  Fall Risk and Adrian Risk  Aggression Color: Green    Plan: continue IV abx, wait on cultures   Continue to monitor.      Cassandra Araya RN

## 2025-07-12 NOTE — PLAN OF CARE
Date/Time 7/12, 1804-7372    Trauma/Ortho/Medical (Choose one)  Medical    Diagnosis: ARF, L pleural effusion  Mental Status: A&OX2  Activity/dangle Up with 2 Jyoti stedy, but lift from bed to chair  Diet: regular  Pain: oxycodone  Underwood/Voiding: incontinent of bladder, ext cath  Tele/Restraints/Iso: Tele A-Fib with RVR  02/LDA: 2L NC, SL with intermittent antibiotic  D/C Date:TBD  Other Info: febrile 101.7, more lethargic this afternoon. MD updated. Labs ordered. Sating 92-93% on 2LNC.  NPO after MN for chest tube placement. Up on chair for meals, fair appetite. Bolus for elevated lactic acid 2.6, recheck at 2100.

## 2025-07-12 NOTE — PLAN OF CARE
Date/Time 7/11/25 2103-1343    Diagnosis: Long term use of anticoagulants, hypoxia, pleural effusion on L, AFib w/ RVR, Pneumonia of L lung d/t infectious organism, unspecified part of lung  Mental Status: A&Ox2, oriented to self and place, forgetful  Activity/dangle: Ast x2, randall steady  Diet:Regular  Pain: PRN oxycodone  Underwood/Voiding: Ext Cath  Tele/Restraints/Iso: Afib, RVR (100s-occasional 120s), Hospitalist notified  02/LDA: 3L O2 NC, 2 PIV SL  D/C Date: Pending  Other Info:

## 2025-07-12 NOTE — CONSULTS
[facility]    Infectious Disease Consultation     Date of Admission:  7/9/2025  Date of Consult (When I saw the patient): 07/12/25    Assessment & Plan   Yosef Murry is a 89 year old male who was admitted on 7/9/2025.     Impression:  89 yo male with PMHx of afib on chronic anticoagulation with DOAC, hx of CVA, hypertension, hyperlipidemia   Recent hospitalization from 6/24/25-6/27/25  He was readmitted on 7/9/2025 for management of shortness of breath due to a large left pleural effusion   S/p thoracocentesis    GS with GNR and GPB   On cefepime     Recommendations   Switch to zosyn   Consult with CT surgery? Pulm? IR for chest tube placement given positive GS   Follow full culture information      Maryjane Iglesias MD    Reason for Consult   Reason for consult: I was asked to evaluate this patient for empyema.    Primary Care Physician   Melissa Torres    Chief Complaint   Shortness of breath     History is obtained from the patient and medical records    History of Present Illness   Yosef Murry is a 89 year old male who presents with shortness ofbreath     Past Medical History   I have reviewed this patient's medical history and updated it with pertinent information if needed.   Past Medical History:   Diagnosis Date    Anal fistula     Antiplatelet or antithrombotic long-term use     Arrhythmia     Atrial flutter (H) 2012    Cerebral infarction (H)     TIA    Heartburn     Hypertension     Inguinal hernia, left     Persistent atrial fibrillation (H) 8/21/12    Prostate cancer (H)     TIA (transient ischaemic attack)     2014       Past Surgical History   I have reviewed this patient's surgical history and updated it with pertinent information if needed.  Past Surgical History:   Procedure Laterality Date    COLONOSCOPY      COLONOSCOPY N/A 9/24/2021    Procedure: Colonoscopy, With Polypectomy And Biopsy;  Surgeon: Jordin Rachel MD;  Location:  GI    ENT SURGERY       tonsllectomy    EXAM UNDER ANESTHESIA, FISTULOTOMY RECTUM, COMBINED N/A 6/18/2015    Procedure: COMBINED EXAM UNDER ANESTHESIA, FISTULOTOMY RECTUM;  Surgeon: Candice Carty MD;  Location: [facility]    GENITOURINARY SURGERY  1999    PROSTATECTOMY    GI SURGERY      hernia repair x 2    HERNIORRHAPHY INGUINAL  7/25/2013    Procedure: HERNIORRHAPHY INGUINAL;  LEFT INGUINAL HERNIA REPAIR WITH MESH;  Surgeon: Filipe Fairchild MD;  Location: [facility]    HERNIORRHAPHY INGUINAL Right 6/13/2019    Procedure: OPEN RIGHT INGUINA HERNIA REPAIR WITH MESH;  Surgeon: Filipe Fairchild MD;  Location:  OR    LAMINECTOMY, EXCISE TUMOR THORACIC, COMBINED N/A 6/12/2025    Procedure: Thoracic 4 to Thoracic 5 laminectomy and resection of meningioma;  Surgeon: Juan Miguel Benitez MD;  Location:  OR    ORTHOPEDIC SURGERY      WRIST SURGERY - LEFT       Prior to Admission Medications   Prior to Admission Medications   Prescriptions Last Dose Informant Patient Reported? Taking?   Multiple Vitamins-Minerals (PRESERVISION AREDS 2 PO) 7/9/2025 Morning Nursing Home Yes Yes   Sig: Take 1 capsule by mouth 2 times daily AREDS 2   acetaminophen (TYLENOL) 325 MG tablet  Nursing Home No Yes   Sig: Take 2 tablets (650 mg) by mouth every 4 hours as needed for mild pain or other (and adjunct with moderate or severe pain or per patient request).   amLODIPine (NORVASC) 5 MG tablet 7/9/2025 Morning Nursing Home No Yes   Sig: Take 1 tablet (5 mg) by mouth daily   apixaban ANTICOAGULANT (ELIQUIS) 5 MG tablet 7/9/2025 Morning Nursing Home Yes Yes   Sig: Take 5 mg by mouth 2 times daily.   atorvastatin (LIPITOR) 10 MG tablet 7/8/2025 Bedtime Nursing Home No Yes   Sig: Take 1 tablet (10 mg) by mouth every evening   calcium carbonate (TUMS) 500 MG chewable tablet  Nursing Home Yes Yes   Sig: Take 1 chew tab by mouth nightly as needed    hydrOXYzine HCl (ATARAX) 10 MG tablet  Nursing Home No Yes   Sig: Take 1 tablet (10 mg) by mouth every 6 hours as needed  for other (adjuvant pain).   loratadine (CLARITIN) 10 MG tablet 7/9/2025 Morning Nursing Home Yes Yes   Sig: Take 1 tablet by mouth daily.   magnesium hydroxide (MILK OF MAGNESIA) 400 MG/5ML suspension  Nursing Home Yes Yes   Sig: Take 30 mLs by mouth daily as needed for constipation or heartburn.   methocarbamol (ROBAXIN) 750 MG tablet  Nursing Home No Yes   Sig: Take 1 tablet (750 mg) by mouth 4 times daily as needed for muscle spasms.   metoprolol succinate ER (TOPROL XL) 25 MG 24 hr tablet 7/9/2025 Morning Nursing Home No Yes   Sig: Take 1 tablet (25 mg) by mouth daily   ondansetron (ZOFRAN ODT) 4 MG ODT tab  Nursing Home No Yes   Sig: Take 1 tablet (4 mg) by mouth every 8 hours as needed for nausea.   oxyCODONE (ROXICODONE) 5 MG tablet  Nursing Home No Yes   Sig: Take 1 tablet (5 mg) by mouth 2 times daily. May also take 1 tablet (5 mg) 4 times daily as needed for severe pain. And QID PRN.   polyethylene glycol (MIRALAX) 17 GM/Dose powder 7/9/2025 Morning Nursing Home No Yes   Sig: Take 17 g by mouth daily.   senna (SENOKOT) 8.6 MG tablet 7/9/2025 [facility] Yes Yes   Sig: Take 2 tablets by mouth 2 times daily.   vitamin D3 (CHOLECALCIFEROL) 50 mcg (2000 units) tablet 7/9/2025 [facility] Yes Yes   Sig: Take 1 tablet by mouth daily      Facility-Administered Medications: None     Allergies   Allergies   Allergen Reactions    Other [Seasonal Allergies]        Immunization History   Immunization History   Administered Date(s) Administered    COVID-19 Bivalent 18+ (Moderna) 10/03/2022    COVID-19 Monovalent 18+ (Moderna) 03/08/2021, 04/05/2021    COVID-19 Monovalent Booster 18+ (Moderna) 11/02/2021, 04/18/2022    Flu 65+ (Fluad) 09/15/2012    [id number] Novel Flu P-free 12/16/2009    Influenza (High Dose) Trivalent,PF (Fluzone) 09/19/2013, 09/22/2014, 09/30/2015, 09/27/2016, 10/05/2017, 09/06/2018, 09/19/2019, 10/10/2024    Influenza Vaccine 65+ (Fluzone HD) 09/23/2020, 09/14/2021, 10/03/2022     "Influenza, Split Virus, Trivalent, Pf (Fluzone\Fluarix) 09/18/2012    Pneumo Conj 13-V (2010&after) 09/27/2016    Pneumococcal 23 valent 11/01/2011    TD,PF 7+ (Tenivac) 11/01/2019    Zoster recombinant adjuvanted (Shingrix) 09/06/2018, 11/08/2018       Social History   I have reviewed this patient's social history and updated it with pertinent information if needed. Yosef Murry  reports that he quit smoking about 56 years ago. His smoking use included cigarettes. He started smoking about 72 years ago. He has never used smokeless tobacco. He reports current alcohol use of about 0.8 standard drinks of alcohol per week. He reports that he does not use drugs.    Family History   I have reviewed this patient's family history and updated it with pertinent information if needed.   Family History   Problem Relation Age of Onset    Ovarian Cancer Mother     Osteoporosis Father     Unknown/Adopted Sister        Review of Systems   The 10 point Review of Systems is negative    Physical Exam   Temp: 99.8  F (37.7  C) Temp src: Oral BP: 121/84 Pulse: 113   Resp: 16 SpO2: 93 % O2 Device: Nasal cannula Oxygen Delivery: 2 LPM  Vital Signs with Ranges  Temp:  [98.6  F (37  C)-99.8  F (37.7  C)] 99.8  F (37.7  C)  Pulse:  [] 113  Resp:  [16-26] 16  BP: (121-140)/(79-95) 121/84  FiO2 (%):  [2 %] 2 %  SpO2:  [93 %-99 %] 93 %  170 lbs 13.7 oz  Body mass index is 24.52 kg/m .    GENERAL APPEARANCE:  awake  EYES: Eyes grossly normal to inspection  NECK: no adenopathy  RESP: lungs clear   CV: regular rates and rhythm  LYMPHATICS: normal ant/post cervical and supraclavicular nodes  ABDOMEN: soft, nontender  MS: extremities normal  SKIN: no suspicious lesions or rashes        Data   All laboratory and imaging data in the past 24 hours reviewed  No results for input(s): \"CULT\" in the last 168 hours.  Recent Labs   Lab Test 07/30/19  2229 07/30/19  1500   CULT No growth  No growth No growth          All cultures:  Recent Labs   Lab " 07/10/25  1332 07/10/25  0011 07/10/25  0005   CULTURE No growth, less than 1 day No growth after 2 days No growth after 2 days      Blood culture:  Results for orders placed or performed during the hospital encounter of 07/09/25   Blood Culture Peripheral blood (BC) Hand, Left    Collection Time: 07/10/25 12:11 AM    Specimen: Hand, Left; Peripheral blood (BC)   Result Value Ref Range    Culture No growth after 2 days    Blood Culture Peripheral blood (BC) Hand, Right    Collection Time: 07/10/25 12:05 AM    Specimen: Hand, Right; Peripheral blood (BC)   Result Value Ref Range    Culture No growth after 2 days    Results for orders placed or performed during the hospital encounter of 06/24/25   Blood Culture Peripheral blood (BC) Arm, Right    Collection Time: 06/24/25 12:00 PM    Specimen: Arm, Right; Peripheral blood (BC)   Result Value Ref Range    Culture No Growth    Blood Culture Peripheral blood (BC) Arm, Right    Collection Time: 06/24/25 12:00 PM    Specimen: Arm, Right; Peripheral blood (BC)   Result Value Ref Range    Culture No Growth    Results for orders placed or performed during the hospital encounter of 07/29/19   Blood culture    Collection Time: 07/30/19 10:29 PM    Specimen: Blood    Left Arm   Result Value Ref Range    Specimen Description Blood Left Arm     Special Requests Aerobic and anaerobic bottles received     Culture Micro No growth    Blood culture    Collection Time: 07/30/19 10:29 PM    Specimen: Blood    Right Arm   Result Value Ref Range    Specimen Description Blood Right Arm     Special Requests Aerobic and anaerobic bottles received     Culture Micro No growth       Urine culture:  Results for orders placed or performed during the hospital encounter of 06/07/25   Urine Culture    Collection Time: 06/07/25  8:24 PM    Specimen: Urine, Clean Catch   Result Value Ref Range    Culture <10,000 CFU/mL Mixture of Urogenital Denise

## 2025-07-12 NOTE — PROGRESS NOTES
[facility]    Medicine Progress Note - Hospitalist Service    Date of Admission:  7/9/2025    Assessment & Plan     Yosef Murry is a 89 year old male with PMHx of afib on chronic anticoagulation with DOAC, hx of CVA, hypertension, hyperlipidemia and recent hospitalization from 6/24/25-6/27/25 for management of a possible infection post T4-5 laminectomy (done 6/12). Discharged to TCU. He was readmitted on 7/9/2025 for management of shortness of breath due to a large left pleural effusion and suspected HCAP.      Sepsis secondary dt LLL HCAP  Large left exudative parapneumonic effusion, s/p thoracentesis on 7/10  Acute hypoxic respiratory failure dt above  *Presented to ED for evaluation of left-sided chest pain, shortness of breath  *Tmax in .2, in afib with RVR with HRs 120s, BPs 90-110s systolic, needing 3L NC to maintain sats >90. Labs notable for WBC 9.1, lactate normal, proBNP 1300, trops elevated but trend flat (40-45). CT chest was neg for PE, showed a large left pleural effusion with associated consolidation/atelectasis. Was started on treatment with broad spectrum abx (cefepime, Flagyl and Vanco).   *MRSA swab neg so Vanco dc'd 7/10. Flagyl dc'd 7/11 in light of pleural fluid gm stain findings.   *Underwent thoracentesis on 7/10 with removal of 400ml pleural fluid. Described as clear, dionicio color. Fluid studies appear consistent with an exudative effusion with neutrophilic predominance, pH 7.5.   *Thoracentesis gram stain polymicrobial, speciated to lactobacillus   *Patient with tachypnea on 7/12. On 2-3 lpm nasal cannula. Reports to be feeling unwell  *2 view chest XR 7/12 showing persistent large pleural effusion  -- Consulted ID, recommendations appreciated    - Switched from Cefepime (7/10-7/12) to Zosyn on 7/12   -- Consulted pulmonology, appreciate review of CT chest imaging and recommendations for chest tube management and possibility of lytics if effusion is  loculated  -- Consulted IR- Discussed with Dr. Downs, plan to hold evening Eliquis dose, will tentatively put on schedule for 7/13 in the AM for chest tube placement     -- Wean O2 as able -- presently on 2-3 lpm nasal cannula   -- SLP consulted dt some observed coughing with po intake  -- Updated patient's family regarding the plan      Atrial fibrillation with RVR  *HRs 120s on presentation. RVR likely dt sepsis/hypoxia.   -- conts on oral metoprolol, prn Lopressor available   -- DOAC held after AM dose on 7/12      Hypertension  History of CVA  Hyperlipidemia  *Conts on statin, metoprolol; holding amlodipine dt presentation with sepsis     T4-T5 meningioma s/p laminectomy and resection 6/12/25 with post-op fluid collection  *Underwent initial laminectomy/resection on 6/12/25. Then hospitalized 6/24-6/27 for management of suspected postop infection. Initially managed with IV ceftriaxone that stay but no clear convincing evidence of infection found on workup that stay so abx ultimately stopped. Discharged to TCU.   *Chronic and stable on prns for pain, bowels.   -- hold sched oxycodone (5mg BID) dt sedation noted on 7/10  -- has prn oxycodone available          Diet: Regular Diet Adult  Room Service  NPO for Medical/Clinical Reasons Except for: Meds, Ice Chips    DVT Prophylaxis: Pneumatic Compression Devices  [place] Catheter: Not present  Lines: None     Cardiac Monitoring: ACTIVE order. Indication: Tachyarrhythmias, acute (48 hours)  Code Status: No CPR- Do NOT Intubate      Clinically Significant Risk Factors               # Hypoalbuminemia: Lowest albumin = 2.9 g/dL at 7/9/2025  8:51 PM, will monitor as appropriate                    # Financial/Environmental Concerns:           Social Drivers of Health    Tobacco Use: Medium Risk (6/21/2025)    Patient History     Smoking Tobacco Use: Former     Smokeless Tobacco Use: Never   Physical Activity: Unknown (10/10/2024)    Exercise Vital Sign     Days of Exercise  per Week: 0 days   Social Connections: Unknown (10/10/2024)    Social Connection and Isolation Panel [NHANES]     Frequency of Social Gatherings with Friends and Family: Once a week          Disposition Plan     Medically Ready for Discharge: Anticipated in 2-4 Days         Lino Hernández DO  Hospitalist Service  [facility]  Securely message with [employer] (more info)  Text page via [email or web address] Paging/Directory   ______________________________________________________________________    Interval History     - No acute events overnight  - The patient reports to be doing poor  - Feels short of breath   - Endorses Cough  - Discussed potential need for chest tube     Physical Exam   Vital Signs: Temp: 99.8  F (37.7  C) Temp src: Oral BP: 121/84 Pulse: 113   Resp: 16 SpO2: 93 % O2 Device: Nasal cannula Oxygen Delivery: 2 LPM  Weight: 170 lbs 13.7 oz    Constitutional: Appears fatigued    Eyes: Lids and lashes normal, pupils equal    ENT: Normocephalic, without obvious abnormality, atraumatic   Respiratory: Almost no breath sounds audible on the left side, right lung clear to auscultation.   Cardiovascular: Irregularly irregular, no murmurs, rubs, or gallops  GI: Abdomen soft, bowel sounds present, non tender to palpation  Skin: normal skin color, texture, turgor  Musculoskeletal: No deformity   Neurologic: Awake, alert, oriented to name, place and time   Neuropsychiatric: General: normal, calm, and normal eye contact    Medical Decision Making       45 MINUTES SPENT BY ME on the date of service doing chart review, history, exam, documentation & further activities per the note.      Data   ------------------------- PAST 24 HR DATA REVIEWED -----------------------------------------------    I have personally reviewed the following data over the past 24 hrs:    N/A  \   N/A   / N/A     N/A N/A N/A /  N/A   3.9 N/A N/A \       Imaging results reviewed over the past 24 hrs:   Recent Results (from the past  24 hours)   XR Chest 2 Views    Narrative    EXAM: XR CHEST 2 VIEWS  LOCATION: [facility]  DATE: 7/12/2025    INDICATION: large pleural effusion s p thoracentesis  COMPARISON: CT of the chest 7/9/2025      Impression    IMPRESSION:     Continued silhouetting of the left ventricular heart border and left hemidiaphragm due to adjacent pleural fluid and/or atelectasis. A small sliding-type hiatal hernia and prior CT also likely contributes to opacity in the medial left base. There are   aortic atheromatous calcifications and unchanged tortuosity.    There is continued findings of somewhat organized pleural fluid in the left apex and lateral pleural space. Only mild improvement in left lung aeration status post thoracentesis.    Unchanged inflation of the right lung. There are no new right lung opacities.

## 2025-07-12 NOTE — PROGRESS NOTES
Brief Provider Note    Paged patient is febrile and weaker/lethargic this afternoon. HR and BP appear stable. Suspected to be related to infection and pleural effusion. Current plan is for IR chest tube placement in the AM. ID, IR, and pulmonology have been consulted.     Plan:  - PRN tylenol for fever  - Stat repeat cbc, bmp, lactic acid, blood gas 7/12  - Current plan is to hold PM DOAC, and pursue IR chest tube in the AM with pulmonology managing chest tube and consideration of lytics  - NPO at midnight  - May need to consider more emergent aspiration of pleural effusion if concern for acute decompensation  - Discussed plan with house provider in case of acute changes overnight  - If worsening mental status or breathing would consider HFNC versus CPAP/BiPAP (depending on blood gas)  - Addendum- lactic acid 2.6. VBG without obvious CO2 retention. Electing to give small bolus 500 ml LR over 2 hours and repeat lactic acid at 2100. Will ask house provider to follow up on results. BMP and CBC remain pending    Lino Hernández DO

## 2025-07-12 NOTE — PLAN OF CARE
Goal Outcome Evaluation:  -~Care plan-end of shift note: 19-22:45   -~Orientation/Mentation:Disoriented to time & situations-reoriented   -~VS: VS ex tachy HR low 100s  -~LS/Pulm:Ls diminished   -~Tele/Cardiac:Afib w/ RVR  -~GI:WDL  -~:incontinent, external catheter in place   -~Pain: denies   -~Mobility:up w/2A/GB/W  -~Skin:scattered bruises   -~Diet:regular  -~Lines/IVs:PIV SL   -~Safety/Concern:fall/puja risk  -~Aggression color:green  -~Plan/Shift summary/Goals: RAMÍREZ, denies SOB at rest. Denies CP. Transferring to ortho floor. Gave report to receiving RN.   Left CSC  in wheel chair with all his belongings.

## 2025-07-13 LAB
ANION GAP SERPL CALCULATED.3IONS-SCNC: 11 MMOL/L (ref 7–15)
BASOPHILS # BLD AUTO: 0.1 10E3/UL (ref 0–0.2)
BASOPHILS NFR BLD AUTO: 0 %
BUN SERPL-MCNC: 29.4 MG/DL (ref 8–23)
CALCIUM SERPL-MCNC: 10.1 MG/DL (ref 8.8–10.4)
CHLORIDE SERPL-SCNC: 106 MMOL/L (ref 98–107)
CREAT SERPL-MCNC: 0.6 MG/DL (ref 0.67–1.17)
EGFRCR SERPLBLD CKD-EPI 2021: >90 ML/MIN/1.73M2
EOSINOPHIL # BLD AUTO: 0.1 10E3/UL (ref 0–0.7)
EOSINOPHIL NFR BLD AUTO: 0 %
ERYTHROCYTE [DISTWIDTH] IN BLOOD BY AUTOMATED COUNT: 15.5 % (ref 10–15)
GLUCOSE SERPL-MCNC: 135 MG/DL (ref 70–99)
HCO3 SERPL-SCNC: 24 MMOL/L (ref 22–29)
HCT VFR BLD AUTO: 39.5 % (ref 40–53)
HGB BLD-MCNC: 13.1 G/DL (ref 13.3–17.7)
IMM GRANULOCYTES # BLD: 0.2 10E3/UL
IMM GRANULOCYTES NFR BLD: 1 %
LACTATE SERPL-SCNC: 2 MMOL/L (ref 0.7–2)
LYMPHOCYTES # BLD AUTO: 0.8 10E3/UL (ref 0.8–5.3)
LYMPHOCYTES NFR BLD AUTO: 4 %
MCH RBC QN AUTO: 30 PG (ref 26.5–33)
MCHC RBC AUTO-ENTMCNC: 33.2 G/DL (ref 31.5–36.5)
MCV RBC AUTO: 90 FL (ref 78–100)
MONOCYTES # BLD AUTO: 1.5 10E3/UL (ref 0–1.3)
MONOCYTES NFR BLD AUTO: 8 %
NEUTROPHILS # BLD AUTO: 16.7 10E3/UL (ref 1.6–8.3)
NEUTROPHILS NFR BLD AUTO: 86 %
NRBC # BLD AUTO: 0 10E3/UL
NRBC BLD AUTO-RTO: 0 /100
PLATELET # BLD AUTO: 437 10E3/UL (ref 150–450)
POTASSIUM SERPL-SCNC: 4.2 MMOL/L (ref 3.4–5.3)
RBC # BLD AUTO: 4.37 10E6/UL (ref 4.4–5.9)
SODIUM SERPL-SCNC: 141 MMOL/L (ref 135–145)
WBC # BLD AUTO: 19.3 10E3/UL (ref 4–11)

## 2025-07-13 PROCEDURE — 83605 ASSAY OF LACTIC ACID: CPT | Performed by: STUDENT IN AN ORGANIZED HEALTH CARE EDUCATION/TRAINING PROGRAM

## 2025-07-13 PROCEDURE — 99232 SBSQ HOSP IP/OBS MODERATE 35: CPT | Performed by: INTERNAL MEDICINE

## 2025-07-13 PROCEDURE — C1769 GUIDE WIRE: HCPCS

## 2025-07-13 PROCEDURE — 3E0L317 INTRODUCTION OF OTHER THROMBOLYTIC INTO PLEURAL CAVITY, PERCUTANEOUS APPROACH: ICD-10-PCS | Performed by: RADIOLOGY

## 2025-07-13 PROCEDURE — 80048 BASIC METABOLIC PNL TOTAL CA: CPT | Performed by: STUDENT IN AN ORGANIZED HEALTH CARE EDUCATION/TRAINING PROGRAM

## 2025-07-13 PROCEDURE — 99233 SBSQ HOSP IP/OBS HIGH 50: CPT | Performed by: STUDENT IN AN ORGANIZED HEALTH CARE EDUCATION/TRAINING PROGRAM

## 2025-07-13 PROCEDURE — 250N000011 HC RX IP 250 OP 636: Mod: JZ | Performed by: RADIOLOGY

## 2025-07-13 PROCEDURE — 250N000013 HC RX MED GY IP 250 OP 250 PS 637: Performed by: INTERNAL MEDICINE

## 2025-07-13 PROCEDURE — 99222 1ST HOSP IP/OBS MODERATE 55: CPT | Mod: 24 | Performed by: STUDENT IN AN ORGANIZED HEALTH CARE EDUCATION/TRAINING PROGRAM

## 2025-07-13 PROCEDURE — 85004 AUTOMATED DIFF WBC COUNT: CPT | Performed by: STUDENT IN AN ORGANIZED HEALTH CARE EDUCATION/TRAINING PROGRAM

## 2025-07-13 PROCEDURE — 5A09357 ASSISTANCE WITH RESPIRATORY VENTILATION, LESS THAN 24 CONSECUTIVE HOURS, CONTINUOUS POSITIVE AIRWAY PRESSURE: ICD-10-PCS | Performed by: STUDENT IN AN ORGANIZED HEALTH CARE EDUCATION/TRAINING PROGRAM

## 2025-07-13 PROCEDURE — 272N000500 HC NEEDLE CR2

## 2025-07-13 PROCEDURE — C1729 CATH, DRAINAGE: HCPCS

## 2025-07-13 PROCEDURE — 250N000011 HC RX IP 250 OP 636: Performed by: INTERNAL MEDICINE

## 2025-07-13 PROCEDURE — 120N000013 HC R&B IMCU

## 2025-07-13 PROCEDURE — 0W9B30Z DRAINAGE OF LEFT PLEURAL CAVITY WITH DRAINAGE DEVICE, PERCUTANEOUS APPROACH: ICD-10-PCS | Performed by: RADIOLOGY

## 2025-07-13 PROCEDURE — 250N000009 HC RX 250: Performed by: RADIOLOGY

## 2025-07-13 PROCEDURE — 36415 COLL VENOUS BLD VENIPUNCTURE: CPT | Performed by: STUDENT IN AN ORGANIZED HEALTH CARE EDUCATION/TRAINING PROGRAM

## 2025-07-13 RX ORDER — NALOXONE HYDROCHLORIDE 0.4 MG/ML
0.2 INJECTION, SOLUTION INTRAMUSCULAR; INTRAVENOUS; SUBCUTANEOUS
Status: DISCONTINUED | OUTPATIENT
Start: 2025-07-13 | End: 2025-07-13 | Stop reason: HOSPADM

## 2025-07-13 RX ORDER — LIDOCAINE 40 MG/G
CREAM TOPICAL
Status: DISCONTINUED | OUTPATIENT
Start: 2025-07-13 | End: 2025-07-14 | Stop reason: HOSPADM

## 2025-07-13 RX ORDER — ONDANSETRON 2 MG/ML
4 INJECTION INTRAMUSCULAR; INTRAVENOUS
Status: DISCONTINUED | OUTPATIENT
Start: 2025-07-13 | End: 2025-07-13 | Stop reason: HOSPADM

## 2025-07-13 RX ORDER — NALOXONE HYDROCHLORIDE 0.4 MG/ML
0.4 INJECTION, SOLUTION INTRAMUSCULAR; INTRAVENOUS; SUBCUTANEOUS
Status: DISCONTINUED | OUTPATIENT
Start: 2025-07-13 | End: 2025-07-13 | Stop reason: HOSPADM

## 2025-07-13 RX ORDER — FENTANYL CITRATE 50 UG/ML
25-50 INJECTION, SOLUTION INTRAMUSCULAR; INTRAVENOUS EVERY 5 MIN PRN
Refills: 0 | Status: DISCONTINUED | OUTPATIENT
Start: 2025-07-13 | End: 2025-07-13 | Stop reason: HOSPADM

## 2025-07-13 RX ADMIN — LIDOCAINE HYDROCHLORIDE 16 ML: 10 INJECTION, SOLUTION INFILTRATION; PERINEURAL at 12:55

## 2025-07-13 RX ADMIN — PIPERACILLIN AND TAZOBACTAM 3.38 G: 3; .375 INJECTION, POWDER, FOR SOLUTION INTRAVENOUS at 14:26

## 2025-07-13 RX ADMIN — ALTEPLASE 2 MG: 2.2 INJECTION, POWDER, LYOPHILIZED, FOR SOLUTION INTRAVENOUS at 12:54

## 2025-07-13 RX ADMIN — PIPERACILLIN AND TAZOBACTAM 3.38 G: 3; .375 INJECTION, POWDER, FOR SOLUTION INTRAVENOUS at 01:38

## 2025-07-13 RX ADMIN — PIPERACILLIN AND TAZOBACTAM 3.38 G: 3; .375 INJECTION, POWDER, FOR SOLUTION INTRAVENOUS at 21:06

## 2025-07-13 RX ADMIN — PIPERACILLIN AND TAZOBACTAM 3.38 G: 3; .375 INJECTION, POWDER, FOR SOLUTION INTRAVENOUS at 07:42

## 2025-07-13 RX ADMIN — FENTANYL CITRATE 25 MCG: 50 INJECTION, SOLUTION INTRAMUSCULAR; INTRAVENOUS at 12:48

## 2025-07-13 RX ADMIN — METOPROLOL SUCCINATE 25 MG: 25 TABLET, EXTENDED RELEASE ORAL at 08:15

## 2025-07-13 ASSESSMENT — ACTIVITIES OF DAILY LIVING (ADL)
ADLS_ACUITY_SCORE: 78
ADLS_ACUITY_SCORE: 78
ADLS_ACUITY_SCORE: 74
ADLS_ACUITY_SCORE: 74
ADLS_ACUITY_SCORE: 79
ADLS_ACUITY_SCORE: 74
ADLS_ACUITY_SCORE: 74
ADLS_ACUITY_SCORE: 79
ADLS_ACUITY_SCORE: 78
ADLS_ACUITY_SCORE: 79
ADLS_ACUITY_SCORE: 74
ADLS_ACUITY_SCORE: 78
ADLS_ACUITY_SCORE: 79
ADLS_ACUITY_SCORE: 78
ADLS_ACUITY_SCORE: 78
ADLS_ACUITY_SCORE: 79
ADLS_ACUITY_SCORE: 78

## 2025-07-13 NOTE — IR NOTE
12 [place] left pleural pigtail placed.2mg tpa injected in cavity.   Plan:  Tube to pleurevac suction.  Tpa flushes per pulmonology

## 2025-07-13 NOTE — PROGRESS NOTES
[facility]    Medicine Progress Note - Hospitalist Service    Date of Admission:  7/9/2025    Assessment & Plan     Yosef Murry is a 89 year old male with PMHx of afib on chronic anticoagulation with DOAC, hx of CVA, hypertension, hyperlipidemia and recent hospitalization from 6/24/25-6/27/25 for management of a possible infection post T4-5 laminectomy (done 6/12). Discharged to TCU. He was readmitted on 7/9/2025 for management of shortness of breath due to a large left pleural effusion and suspected HCAP.      Sepsis secondary dt LLL HCAP  Large left exudative parapneumonic effusion, s/p thoracentesis on 7/10  Acute hypoxic respiratory failure dt above  Lactic acidosis, resolved  Leukocytosis   Fever  *Presented to ED for evaluation of left-sided chest pain, shortness of breath  *Tmax in .2, in afib with RVR with HRs 120s, BPs 90-110s systolic, needing 3L NC to maintain sats >90. Labs notable for WBC 9.1, lactate normal, proBNP 1300, trops elevated but trend flat (40-45). CT chest was neg for PE, showed a large left pleural effusion with associated consolidation/atelectasis. Was started on treatment with broad spectrum abx (cefepime, Flagyl and Vanco).   *MRSA swab neg so Vanco dc'd 7/10. Flagyl dc'd 7/11 in light of pleural fluid gm stain findings.   *Underwent thoracentesis on 7/10 with removal of 400ml pleural fluid. Described as clear, dionicio color. Fluid studies appear consistent with an exudative effusion with neutrophilic predominance, pH 7.5.   *Thoracentesis gram stain polymicrobial with gram positive bacilli, gram negative bacilli, speciated to lactobacillus   *Patient with tachypnea on 7/12. On 2-3 lpm nasal cannula. Reports to be feeling unwell  *2 view chest XR 7/12 showing persistent large pleural effusion on left  *Lactic acid elevated to 2.6 on 7/12, received 500 ml Lr. Lactic acid trended overnight - 2.6 - 2.2 - 2.0.  *Patient had fever PM 7/12, WBC increased to  21.2 on 7/12 from 12.2 on 7/11.  -- Consulted ID, recommendations appreciated    - Switched from Cefepime (7/10-7/12) to Zosyn on 7/12   -- Consulted pulmonology, recommendations appreciated   -- Consulted IR- Discussed with Dr. Downs, plan for chest tube placement AM 7/13  -- Wean O2 as able -- presently on 2-3 lpm nasal cannula   -- SLP consulted dt some observed coughing with po intake  -- NPO at [facility] 7/13 for possible chest tube, resume regular diet after chest tube placement   -- Discussed with Dr. Gomez, will plan to administer intrapleural TPA after chest tube placement  -- Plan for daily chest XR after chest tube placement   -- Blood cultures from 7/10 x2 with no growth to date  -- Continue to follow bacterial culture from 7/10     Atrial fibrillation with RVR  *HRs 120s on presentation. RVR likely dt sepsis/hypoxia.   -- conts on oral metoprolol, prn Lopressor available   -- DOAC held after AM dose on 7/12, will discuss with IR and Pulm on when to resume DOAC in setting of chest tube and tentative plan for intrapleural lytics     Hypertension  History of CVA  Hyperlipidemia  *Conts on statin, metoprolol; holding amlodipine dt presentation with sepsis     T4-T5 meningioma s/p laminectomy and resection 6/12/25 with post-op fluid collection  *Underwent initial laminectomy/resection on 6/12/25. Then hospitalized 6/24-6/27 for management of suspected postop infection. Initially managed with IV ceftriaxone that stay but no clear convincing evidence of infection found on workup that stay so abx ultimately stopped. Discharged to TCU.   *Chronic and stable on prns for pain, bowels.   -- hold sched oxycodone (5mg BID) dt sedation noted on 7/10  -- has prn oxycodone available    Hypertension  - PTA amlodipine currently on hold           Diet: Room Service  NPO for Medical/Clinical Reasons Except for: Meds, Ice Chips    DVT Prophylaxis: Pneumatic Compression Devices  Underwood Catheter: Not present  Lines: None      Cardiac Monitoring: ACTIVE order. Indication: Tachyarrhythmias, acute (48 hours)  Code Status: No CPR- Do NOT Intubate      Clinically Significant Risk Factors               # Hypoalbuminemia: Lowest albumin = 2.9 g/dL at 7/9/2025  8:51 PM, will monitor as appropriate                    # Financial/Environmental Concerns:           Social Drivers of Health    Tobacco Use: Medium Risk (6/21/2025)    Patient History     Smoking Tobacco Use: Former     Smokeless Tobacco Use: Never   Physical Activity: Unknown (10/10/2024)    Exercise Vital Sign     Days of Exercise per Week: 0 days   Social Connections: Unknown (10/10/2024)    Social Connection and Isolation Panel [NHANES]     Frequency of Social Gatherings with Friends and Family: Once a week          Disposition Plan     Medically Ready for Discharge: Anticipated in 2-4 Days         Lino Hernández DO  Hospitalist Service  [facility]  Securely message with [employer] (more info)  Text page via [employer] Paging/Directory   ______________________________________________________________________    Interval History     - Patient febrile and more fatigued PM 7/12  - This AM the patient reports to be doing fair  - He is somnolent at times   - He wakes up to answer my questions, then falls back asleep   - Subjective somewhat limited due to the condition of the patient     Physical Exam   Vital Signs: Temp: 98  F (36.7  C) Temp src: Axillary BP: (!) 149/88 Pulse: 112   Resp: 18 SpO2: 95 % O2 Device: Nasal cannula Oxygen Delivery: 2 LPM  Weight: 170 lbs 13.7 oz    Constitutional: Appears fatigued, wakes up, answers questions, then falls back asleep   Eyes: Lids and lashes normal, pupils equal    ENT: Normocephalic, without obvious abnormality, atraumatic   Respiratory: Decreased breath sounds on the left side, right lung clear to auscultation.   Cardiovascular: Irregularly irregular, tachycardic, no murmurs, rubs, or gallops, 1-2+ bilateral lower  extremity pitting edema.   GI: Abdomen soft, bowel sounds present, non tender to palpation  Skin: normal skin color, texture, turgor  Musculoskeletal: No deformity   Neurologic: Awake, alert, oriented to name, place and time   Neuropsychiatric: General: normal, calm, and normal eye contact    Medical Decision Making       48 MINUTES SPENT BY ME on the date of service doing chart review, history, exam, documentation & further activities per the note.      Data   ------------------------- PAST 24 HR DATA REVIEWED -----------------------------------------------    I have personally reviewed the following data over the past 24 hrs:    19.3 (H)  \   13.1 (L)   / 437     141 106 29.4 (H) /  135 (H)   4.2 24 0.60 (L) \     Procal: N/A CRP: N/A Lactic Acid: 2.0         Imaging results reviewed over the past 24 hrs:   No results found for this or any previous visit (from the past 24 hours).

## 2025-07-13 NOTE — CONSULTS
"[facility] Pulmonary Consult Note    Date of Service: 07/13/25    Assessment and Recommendations:  89M Afib, prior CVA, HTN, HLD w/ SOB, large L pleural effusion, and suspected HCAP. Thoracentesis w/ exudative effusion now growing Lactobacillus. This is consistent with empyema. Plan for chest tube today.     - IR to place chest tube  - once chest tube in place, will start lytic therapy  - ABx per ID    Pulmonary will continue to follow.     Chief Complaint   Patient presents with    Irregular Heart Beat    Chest Pain       Summary:  89M Afib, prior CVA, HTN, HLD w/ SOB, large L pleural effusion, and suspected HCAP. CTPE w/o PE, there was large L pleural effusion w/ loculation. Thoracentesis was done w/ 400cc removed, Cx w/ Lactobacillus growing. Currently on 2L. He is on pip-tazo. ID involved, as well. He reports some improvement since hospitalization. Continues to have SOB. Not ambulating around the unit. Unsure if thoracentesis improved his breathing. No chest pain or tightness. No wheezing. No cough. Does have orthopnea. No PND. Currently afebrile. Tm yesterday 101.7.    10 point review of systems negative, aside from that mentioned above    BP (!) 149/88 (BP Location: Right arm)   Pulse 112   Temp 99.2  F (37.3  C) (Axillary)   Resp 18   Ht 1.778 m (5' 10\")   Wt 77.5 kg (170 lb 13.7 oz)   SpO2 95%   BMI 24.52 kg/m    Gen: NAD  HEENT: anicteric, OP clear  Card: RRR  Pulm: diminished L base, otherwise clear   Abd: soft, NTND  MSK: no LE edema, no acute joint abnormalities  Skin: no obvious rash  Psych: normal affect  Neuro: answering questions appropriately     Labs: personally reviewed    Imaging: personally reviewed    Past Medical History:   Diagnosis Date    Anal fistula     Antiplatelet or antithrombotic long-term use     Arrhythmia     Atrial flutter (H) 2012    Cerebral infarction (H)     TIA    Heartburn     Hypertension     Inguinal hernia, left     Persistent atrial fibrillation (H) " 12    Prostate cancer (H)     TIA (transient ischaemic attack)            Past Surgical History:   Procedure Laterality Date    COLONOSCOPY      COLONOSCOPY N/A 2021    Procedure: Colonoscopy, With Polypectomy And Biopsy;  Surgeon: Jordin Rachel MD;  Location:  GI    ENT SURGERY      tonsllectomy    EXAM UNDER ANESTHESIA, FISTULOTOMY RECTUM, COMBINED N/A 2015    Procedure: COMBINED EXAM UNDER ANESTHESIA, FISTULOTOMY RECTUM;  Surgeon: Candice Carty MD;  Location: [facility]    GENITOURINARY SURGERY      PROSTATECTOMY    GI SURGERY      hernia repair x 2    HERNIORRHAPHY INGUINAL  2013    Procedure: HERNIORRHAPHY INGUINAL;  LEFT INGUINAL HERNIA REPAIR WITH MESH;  Surgeon: Filipe Fairchild MD;  Location: [facility]    HERNIORRHAPHY INGUINAL Right 2019    Procedure: OPEN RIGHT INGUINA HERNIA REPAIR WITH MESH;  Surgeon: Filipe Fairchild MD;  Location:  OR    LAMINECTOMY, EXCISE TUMOR THORACIC, COMBINED N/A 2025    Procedure: Thoracic 4 to Thoracic 5 laminectomy and resection of meningioma;  Surgeon: Juan Miguel Benitez MD;  Location:  OR    ORTHOPEDIC SURGERY      WRIST SURGERY - LEFT       Family History   Problem Relation Age of Onset    Ovarian Cancer Mother     Osteoporosis Father     Unknown/Adopted Sister        Social History     Socioeconomic History    Marital status:      Spouse name: Not on file    Number of children: Not on file    Years of education: Not on file    Highest education level: Not on file   Occupational History    Not on file   Tobacco Use    Smoking status: Former     Current packs/day: 0.00     Types: Cigarettes     Start date: 1952     Quit date: 1968     Years since quittin.5    Smokeless tobacco: Never   Vaping Use    Vaping status: Never Used   Substance and Sexual Activity    Alcohol use: Yes     Alcohol/week: 0.8 standard drinks of alcohol     Types: 1 Cans of beer per week     Comment: 1 beer a week    Drug  use: No    Sexual activity: Not Currently     Partners: Female   Other Topics Concern    Parent/sibling w/ CABG, MI or angioplasty before 65F 55M? Not Asked     Service Not Asked    Blood Transfusions Not Asked    Caffeine Concern No     Comment: occ tea    Occupational Exposure Not Asked    Hobby Hazards Not Asked    Sleep Concern No    Stress Concern No    Weight Concern No    Special Diet No    Back Care Not Asked    Exercise Yes     Comment: walking 3 miles a day    Bike Helmet Not Asked    Seat Belt Yes    Self-Exams Not Asked   Social History Narrative    Not on file     Social Drivers of Health     Financial Resource Strain: Low Risk  (7/10/2025)    Financial Resource Strain     Within the past 12 months, have you or your family members you live with been unable to get utilities (heat, electricity) when it was really needed?: No   Food Insecurity: Low Risk  (7/10/2025)    Food Insecurity     Within the past 12 months, did you worry that your food would run out before you got money to buy more?: No     Within the past 12 months, did the food you bought just not last and you didn t have money to get more?: No   Transportation Needs: Low Risk  (7/10/2025)    Transportation Needs     Within the past 12 months, has lack of transportation kept you from medical appointments, getting your medicines, non-medical meetings or appointments, work, or from getting things that you need?: No   Physical Activity: Unknown (10/10/2024)    Exercise Vital Sign     Days of Exercise per Week: 0 days     Minutes of Exercise per Session: Not on file   Stress: No Stress Concern Present (10/10/2024)    [place] [employer] - Occupational Stress Questionnaire     Feeling of Stress : Not at all   Social Connections: Unknown (10/10/2024)    Social Connection and Isolation Panel [NHANES]     Frequency of Communication with Friends and Family: Not on file     Frequency of Social Gatherings with Friends and  Family: Once a week     Attends Faith Services: Not on file     Active Member of Clubs or Organizations: Not on file     Attends Club or Organization Meetings: Not on file     Marital Status: Not on file   Interpersonal Safety: Low Risk  (7/10/2025)    Interpersonal Safety     Do you feel physically and emotionally safe where you currently live?: Yes     Within the past 12 months, have you been hit, slapped, kicked or otherwise physically hurt by someone?: No     Within the past 12 months, have you been humiliated or emotionally abused in other ways by your partner or ex-partner?: No   Housing Stability: Low Risk  (7/10/2025)    Housing Stability     Do you have housing? : Yes     Are you worried about losing your housing?: No       Lino Clemons MD  Pulmonary and Critical Care Medicine  [facility]

## 2025-07-13 NOTE — IR NOTE
Interventional Radiology Intra-procedural Nursing Note    Patient Name: Yosef uMrry  Medical Record Number: [id number]  Today's Date: July 13, 2025    Procedure: Left non-tunneled chest tube placement  Start time: 1246  End time: 1256  Report provided to: MAGDALENA Luo  Patient depart time and location: 1305 to Room 2303    Note: Patient entered Interventional Radiology Suite number 1 via cart. Patient awake, alert and oriented. Assisted onto procedural table in supine position. Prepped and draped.  Dr. Downs in room. Time out and procedure started.   Procedure well tolerated by patient without complications. Procedure end with debrief by Dr. Downs.  Sutures, gauze, mepilex and tegaderm dressing applied to left interventional procedure access site, dressing is c/d/I.    Dr. Downs instilled 2 mg alteplase into chest tube at 1255.  Per Dr. Downs, keep chest tube clamped for 20 minutes, until 1315, then unclamp and follow chest tube management per pulmonary.          Administered medication totals:  Lidocaine 1% 16 mL Intradermal  Fentanyl 25 mcg IVP  Alteplase 2 mg intracatheter-chest tube

## 2025-07-13 NOTE — PLAN OF CARE
Date/Time 7/13, 9850-8342    Trauma/Ortho/Medical (Choose one)  Medical    Diagnosis: ARF, L pleural effusion, left chest tube   Mental Status: A&OX2  Activity/dangle Up with 2 Jyoti stedy  Diet: regular  Pain: oxycodone  Underwood/Voiding: incontinent of bladder  Tele/Restraints/Iso: Tele A-Fib with RVR  02/LDA: 8L oxymizer, SL with intermittent antibiotic  D/C Date:TBD  Other Info:  Sating 92-93% on 8L Oxymizer, RR 22-28. Md updated. Chest tube placed today, arrived 1315 from IR. Lethargic. Hospitalist and pulmonology following.

## 2025-07-13 NOTE — PROVIDER NOTIFICATION
Paged hospitalist regarding RR 22-28, 93% AT 7-8L on oxymask. Lethargic and has only 10 ml in chest tube since 1315.

## 2025-07-13 NOTE — PROGRESS NOTES
[facility]    Infectious Disease Progress Note    Date of Service (when I saw the patient): 07/13/2025     Assessment & Plan   Yosef Murry is a 89 year old male who was admitted on 7/9/2025.     Impression:  89 yo male with PMHx of afib on chronic anticoagulation with DOAC, hx of CVA, hypertension, hyperlipidemia   Recent hospitalization from 6/24/25-6/27/25  He was readmitted on 7/9/2025 for management of shortness of breath due to a large left pleural effusion   S/p thoracocentesis   GS with GNR and GPB, lactobacillus, pending further ID   On cefepime initially switched to zosyn      Recommendations   Continue on zosyn   Follow up on the full culture information   S/p chest tube placement today       Maryjane Iglesias MD    Interval History   Tolerating antibiotics ok   No new rashes or issues with antibiotics   Labs reviewed   No changes to past medical, social or family history   Drowsy after the chest tube placement       Physical Exam   Temp: 98  F (36.7  C) Temp src: Axillary BP: (!) 149/88 Pulse: 112   Resp: 18 SpO2: 95 % O2 Device: Nasal cannula Oxygen Delivery: 2 LPM  Vitals:    07/10/25 0350 07/11/25 0512 07/12/25 0700   Weight: 74.5 kg (164 lb 3.9 oz) 74.2 kg (163 lb 9.3 oz) 77.5 kg (170 lb 13.7 oz)     Vital Signs with Ranges  Temp:  [98  F (36.7  C)-101.7  F (38.7  C)] 98  F (36.7  C)  Pulse:  [104-112] 112  Resp:  [16-18] 18  BP: (115-149)/(83-88) 149/88  SpO2:  [92 %-95 %] 95 %    Constitutional: drowsy   Lungs: Bilateral diminished, now has chest tube placed   Cardiovascular: Regular rate and rhythm, normal S1 and S2, and no murmur noted  Abdomen: Normal bowel sounds, soft, non-distended, non-tender  Skin: No rashes, no cyanosis, no edema  Other:    Medications   Current Facility-Administered Medications   Medication Dose Route Frequency Provider Last Rate Last Admin     Current Facility-Administered Medications   Medication Dose Route Frequency Provider Last Rate Last Admin     alteplase (ACTIVASE) 10 mg, dornase estela (PULMOZYME) 5 mg in sodium chloride 0.9 % 50 mL for chest tube instillation in syringe  50 mL Chest Tube BID Lino Clemons MD        [Held by provider] amLODIPine (NORVASC) tablet 5 mg  5 mg Oral Daily Vicente Ruiz MD        [Held by provider] apixaban ANTICOAGULANT (ELIQUIS) tablet 5 mg  5 mg Oral BID Marifer Mata DO   5 mg at 07/12/25 0821    atorvastatin (LIPITOR) tablet 10 mg  10 mg Oral QPM Vicente Ruiz MD   10 mg at 07/12/25 2101    loratadine (CLARITIN) tablet 10 mg  10 mg Oral Daily Vicente Ruiz MD   10 mg at 07/12/25 0821    metoprolol succinate ER (TOPROL XL) 24 hr tablet 25 mg  25 mg Oral Daily Vicente Ruiz MD   25 mg at 07/13/25 0815    [Held by provider] oxyCODONE (ROXICODONE) tablet 5 mg  5 mg Oral BID Vicente Ruiz MD        piperacillin-tazobactam (ZOSYN) 3.375 g vial to attach to  mL bag  3.375 g Intravenous Q6H Maryjane Iglesias MD   3.375 g at 07/13/25 0742    polyethylene glycol (MIRALAX) Packet 17 g  17 g Oral Daily Vicente Ruiz MD   17 g at 07/12/25 0821    sennosides (SENOKOT) tablet 2 tablet  2 tablet Oral BID Vicente Ruiz MD   2 tablet at 07/12/25 2101    sodium chloride (PF) 0.9% PF flush 3 mL  3 mL Intracatheter Q8H Vicente Ruiz MD   3 mL at 07/12/25 2106       Data   All microbiology laboratory data reviewed.  Recent Labs   Lab Test 07/13/25  0806 07/12/25 1715 07/11/25  0629   WBC 19.3* 21.2* 12.2*   HGB 13.1* 13.3 11.9*   HCT 39.5* 38.7* 37.1*   MCV 90 88 94    476* 452*     Recent Labs   Lab Test 07/13/25  0806 07/12/25  1715 07/10/25  0613   CR 0.60* 0.67 0.71     Recent Labs   Lab Test 06/08/25  1209   SED 19     Recent Labs   Lab Test 07/30/19  2229 07/30/19  1500   CULT No growth  No growth No growth       All cultures:  Recent Labs   Lab 07/10/25  1332 07/10/25  0011 07/10/25  0005   CULTURE Culture in progress  1+  Lactobacillus species* No growth after 3 days No growth after 3 days      Blood culture:  Results for orders placed or performed during the hospital encounter of 07/09/25   Blood Culture Peripheral blood (BC) Hand, Left    Collection Time: 07/10/25 12:11 AM    Specimen: Hand, Left; Peripheral blood (BC)   Result Value Ref Range    Culture No growth after 3 days    Blood Culture Peripheral blood (BC) Hand, Right    Collection Time: 07/10/25 12:05 AM    Specimen: Hand, Right; Peripheral blood (BC)   Result Value Ref Range    Culture No growth after 3 days    Results for orders placed or performed during the hospital encounter of 06/24/25   Blood Culture Peripheral blood (BC) Arm, Right    Collection Time: 06/24/25 12:00 PM    Specimen: Arm, Right; Peripheral blood (BC)   Result Value Ref Range    Culture No Growth    Blood Culture Peripheral blood (BC) Arm, Right    Collection Time: 06/24/25 12:00 PM    Specimen: Arm, Right; Peripheral blood (BC)   Result Value Ref Range    Culture No Growth    Results for orders placed or performed during the hospital encounter of 07/29/19   Blood culture    Collection Time: 07/30/19 10:29 PM    Specimen: Blood    Left Arm   Result Value Ref Range    Specimen Description Blood Left Arm     Special Requests Aerobic and anaerobic bottles received     Culture Micro No growth    Blood culture    Collection Time: 07/30/19 10:29 PM    Specimen: Blood    Right Arm   Result Value Ref Range    Specimen Description Blood Right Arm     Special Requests Aerobic and anaerobic bottles received     Culture Micro No growth       Urine culture:  Results for orders placed or performed during the hospital encounter of 06/07/25   Urine Culture    Collection Time: 06/07/25  8:24 PM    Specimen: Urine, Clean Catch   Result Value Ref Range    Culture <10,000 CFU/mL Mixture of Urogenital Denise

## 2025-07-13 NOTE — PROGRESS NOTES
MD Notification    Notified Person: MD    Notified Person Name: Xiomara Valentin    Notification Date/Time: 7/12/25 2237    Notification Interaction: Kee    Purpose of Notification: lab for lactic acid came back 2.2 @ 2110, from 2.6 @ 1715    Orders Received: No    Comments:

## 2025-07-13 NOTE — IR NOTE
Procedure Note for IR Procedure Dictation  Procedure Non tunneled Chest tube Placement  Conscious sedation: 0 mg IVP Versed, 25 mcg IVP fentanyl  Sedation time: 0 minutes  Fluoro time: 0.3 minutes  Total Fluoro Dose: 3.91 mGy (Air Kerma)  Contrast: 0 ml 0  Local anesthetic: 16 ml 1% lidocaine

## 2025-07-13 NOTE — PROGRESS NOTES
Brief Provider Note     Discussed with nursing. Given lethargy and O2 requirement in setting of suspected empyema, it would be warranted to transfer patient to intermediate care for closer monitoring over night while attempting TPA dwell in new chest tube.     Plan:  - Transfer to IMC  - IMC orders to be released upon arrival to unit  - Continue TPA plan as previously recommended by pulmonology     Lino Hernández, DO

## 2025-07-13 NOTE — PLAN OF CARE
Date/Time 7/12/25 1900-0730     Diagnosis: ARF, L pleural effusion  Mental Status: A&Ox2, oriented to self and place, forgetful  Activity/dangle: Ast x2, randall steady  Diet: NPO   Underwood/Voiding: Ext Cath  Tele/Restraints/Iso: Afib, RVR (high 90s-110s during shift)  02/LDA: 2L O2 NC, 2 PIV SL  D/C Date: Pending  Other Info: CHG wipes done, linens changed.

## 2025-07-14 VITALS
WEIGHT: 170.86 LBS | BODY MASS INDEX: 24.46 KG/M2 | HEIGHT: 70 IN | RESPIRATION RATE: 20 BRPM | SYSTOLIC BLOOD PRESSURE: 117 MMHG | TEMPERATURE: 99 F | HEART RATE: 102 BPM | DIASTOLIC BLOOD PRESSURE: 98 MMHG | OXYGEN SATURATION: 90 %

## 2025-07-14 LAB
ALLEN'S TEST: YES
ANION GAP SERPL CALCULATED.3IONS-SCNC: 12 MMOL/L (ref 7–15)
ATRIAL RATE - MUSE: 220 BPM
BASE EXCESS BLDA CALC-SCNC: 4.7 MMOL/L (ref -3–3)
BASE EXCESS BLDV CALC-SCNC: 4.1 MMOL/L (ref -3–3)
BASE EXCESS BLDV CALC-SCNC: 5 MMOL/L (ref -3–3)
BASOPHILS # BLD AUTO: 0 10E3/UL (ref 0–0.2)
BASOPHILS NFR BLD AUTO: 0 %
BUN SERPL-MCNC: 35.4 MG/DL (ref 8–23)
CALCIUM SERPL-MCNC: 9.9 MG/DL (ref 8.8–10.4)
CHLORIDE SERPL-SCNC: 109 MMOL/L (ref 98–107)
CREAT SERPL-MCNC: 0.62 MG/DL (ref 0.67–1.17)
CRP SERPL-MCNC: 257.83 MG/L
DIASTOLIC BLOOD PRESSURE - MUSE: NORMAL MMHG
EGFRCR SERPLBLD CKD-EPI 2021: >90 ML/MIN/1.73M2
EOSINOPHIL # BLD AUTO: 0 10E3/UL (ref 0–0.7)
EOSINOPHIL NFR BLD AUTO: 0 %
ERYTHROCYTE [DISTWIDTH] IN BLOOD BY AUTOMATED COUNT: 15.4 % (ref 10–15)
GLUCOSE BLDC GLUCOMTR-MCNC: 119 MG/DL (ref 70–99)
GLUCOSE BLDC GLUCOMTR-MCNC: 122 MG/DL (ref 70–99)
GLUCOSE SERPL-MCNC: 121 MG/DL (ref 70–99)
HCO3 BLD-SCNC: 29 MMOL/L (ref 21–28)
HCO3 BLDV-SCNC: 28 MMOL/L (ref 21–28)
HCO3 BLDV-SCNC: 29 MMOL/L (ref 21–28)
HCO3 SERPL-SCNC: 24 MMOL/L (ref 22–29)
HCT VFR BLD AUTO: 41.8 % (ref 40–53)
HGB BLD-MCNC: 13.2 G/DL (ref 13.3–17.7)
HGB BLD-MCNC: 13.4 G/DL (ref 13.3–17.7)
IMM GRANULOCYTES # BLD: 0.1 10E3/UL
IMM GRANULOCYTES NFR BLD: 1 %
INTERPRETATION ECG - MUSE: NORMAL
LACTATE SERPL-SCNC: 2.3 MMOL/L (ref 0.7–2)
LACTATE SERPL-SCNC: 2.4 MMOL/L (ref 0.7–2)
LYMPHOCYTES # BLD AUTO: 0.8 10E3/UL (ref 0.8–5.3)
LYMPHOCYTES NFR BLD AUTO: 7 %
MCH RBC QN AUTO: 29.7 PG (ref 26.5–33)
MCHC RBC AUTO-ENTMCNC: 31.6 G/DL (ref 31.5–36.5)
MCV RBC AUTO: 94 FL (ref 78–100)
MCV RBC AUTO: 95 FL (ref 78–100)
MONOCYTES # BLD AUTO: 1.1 10E3/UL (ref 0–1.3)
MONOCYTES NFR BLD AUTO: 10 %
NEUTROPHILS # BLD AUTO: 9 10E3/UL (ref 1.6–8.3)
NEUTROPHILS NFR BLD AUTO: 82 %
NRBC # BLD AUTO: 0 10E3/UL
NRBC BLD AUTO-RTO: 0 /100
NT-PROBNP SERPL-MCNC: 1442 PG/ML (ref 0–852)
O2/TOTAL GAS SETTING VFR VENT: 10 %
O2/TOTAL GAS SETTING VFR VENT: 40 %
O2/TOTAL GAS SETTING VFR VENT: 50 %
OXYHGB MFR BLDA: 91 % (ref 92–100)
OXYHGB MFR BLDV: 84 % (ref 70–75)
OXYHGB MFR BLDV: 91 % (ref 70–75)
P AXIS - MUSE: NORMAL DEGREES
PATH REPORT.COMMENTS IMP SPEC: NORMAL
PATH REPORT.FINAL DX SPEC: NORMAL
PATH REPORT.GROSS SPEC: NORMAL
PATH REPORT.MICROSCOPIC SPEC OTHER STN: NORMAL
PATH REPORT.RELEVANT HX SPEC: NORMAL
PCO2 BLD: 38 MM HG (ref 35–45)
PCO2 BLDV: 37 MM HG (ref 40–50)
PCO2 BLDV: 44 MM HG (ref 40–50)
PH BLD: 7.48 [PH] (ref 7.35–7.45)
PH BLDV: 7.43 [PH] (ref 7.32–7.43)
PH BLDV: 7.5 [PH] (ref 7.32–7.43)
PLATELET # BLD AUTO: 413 10E3/UL (ref 150–450)
PO2 BLD: 59 MM HG (ref 80–105)
PO2 BLDV: 50 MM HG (ref 25–47)
PO2 BLDV: 58 MM HG (ref 25–47)
POTASSIUM SERPL-SCNC: 4.3 MMOL/L (ref 3.4–5.3)
PR INTERVAL - MUSE: NORMAL MS
PROCALCITONIN SERPL IA-MCNC: 1.35 NG/ML
QRS DURATION - MUSE: 96 MS
QT - MUSE: 346 MS
QTC - MUSE: 446 MS
R AXIS - MUSE: -16 DEGREES
RBC # BLD AUTO: 4.45 10E6/UL (ref 4.4–5.9)
SAO2 % BLDA: 92 % (ref 95–96)
SAO2 % BLDV: 85.3 % (ref 70–75)
SAO2 % BLDV: 92.1 % (ref 70–75)
SODIUM SERPL-SCNC: 145 MMOL/L (ref 135–145)
SYSTOLIC BLOOD PRESSURE - MUSE: NORMAL MMHG
T AXIS - MUSE: 67 DEGREES
VENTRICULAR RATE- MUSE: 100 BPM
WBC # BLD AUTO: 11 10E3/UL (ref 4–11)

## 2025-07-14 PROCEDURE — 82805 BLOOD GASES W/O2 SATURATION: CPT | Performed by: STUDENT IN AN ORGANIZED HEALTH CARE EDUCATION/TRAINING PROGRAM

## 2025-07-14 PROCEDURE — 80048 BASIC METABOLIC PNL TOTAL CA: CPT | Performed by: STUDENT IN AN ORGANIZED HEALTH CARE EDUCATION/TRAINING PROGRAM

## 2025-07-14 PROCEDURE — 272N000098

## 2025-07-14 PROCEDURE — 85018 HEMOGLOBIN: CPT | Performed by: INTERNAL MEDICINE

## 2025-07-14 PROCEDURE — 93005 ELECTROCARDIOGRAM TRACING: CPT

## 2025-07-14 PROCEDURE — 94640 AIRWAY INHALATION TREATMENT: CPT | Mod: 76

## 2025-07-14 PROCEDURE — 999N000157 HC STATISTIC RCP TIME EA 10 MIN

## 2025-07-14 PROCEDURE — 99233 SBSQ HOSP IP/OBS HIGH 50: CPT | Mod: 24 | Performed by: INTERNAL MEDICINE

## 2025-07-14 PROCEDURE — 99233 SBSQ HOSP IP/OBS HIGH 50: CPT | Performed by: STUDENT IN AN ORGANIZED HEALTH CARE EDUCATION/TRAINING PROGRAM

## 2025-07-14 PROCEDURE — 36415 COLL VENOUS BLD VENIPUNCTURE: CPT | Performed by: STUDENT IN AN ORGANIZED HEALTH CARE EDUCATION/TRAINING PROGRAM

## 2025-07-14 PROCEDURE — 86140 C-REACTIVE PROTEIN: CPT | Performed by: INTERNAL MEDICINE

## 2025-07-14 PROCEDURE — 250N000009 HC RX 250: Performed by: INTERNAL MEDICINE

## 2025-07-14 PROCEDURE — 88305 TISSUE EXAM BY PATHOLOGIST: CPT | Mod: 26 | Performed by: PATHOLOGY

## 2025-07-14 PROCEDURE — 83605 ASSAY OF LACTIC ACID: CPT | Performed by: STUDENT IN AN ORGANIZED HEALTH CARE EDUCATION/TRAINING PROGRAM

## 2025-07-14 PROCEDURE — 92526 ORAL FUNCTION THERAPY: CPT | Mod: GN

## 2025-07-14 PROCEDURE — 250N000009 HC RX 250: Performed by: STUDENT IN AN ORGANIZED HEALTH CARE EDUCATION/TRAINING PROGRAM

## 2025-07-14 PROCEDURE — 94660 CPAP INITIATION&MGMT: CPT

## 2025-07-14 PROCEDURE — 250N000011 HC RX IP 250 OP 636: Performed by: INTERNAL MEDICINE

## 2025-07-14 PROCEDURE — 84145 PROCALCITONIN (PCT): CPT | Performed by: INTERNAL MEDICINE

## 2025-07-14 PROCEDURE — 250N000013 HC RX MED GY IP 250 OP 250 PS 637: Performed by: INTERNAL MEDICINE

## 2025-07-14 PROCEDURE — 82805 BLOOD GASES W/O2 SATURATION: CPT | Performed by: INTERNAL MEDICINE

## 2025-07-14 PROCEDURE — 88112 CYTOPATH CELL ENHANCE TECH: CPT | Mod: 26 | Performed by: PATHOLOGY

## 2025-07-14 PROCEDURE — 250N000011 HC RX IP 250 OP 636: Mod: JZ | Performed by: STUDENT IN AN ORGANIZED HEALTH CARE EDUCATION/TRAINING PROGRAM

## 2025-07-14 PROCEDURE — 99238 HOSP IP/OBS DSCHRG MGMT 30/<: CPT

## 2025-07-14 PROCEDURE — 99232 SBSQ HOSP IP/OBS MODERATE 35: CPT | Performed by: PHYSICIAN ASSISTANT

## 2025-07-14 PROCEDURE — 258N000003 HC RX IP 258 OP 636: Mod: JZ | Performed by: STUDENT IN AN ORGANIZED HEALTH CARE EDUCATION/TRAINING PROGRAM

## 2025-07-14 PROCEDURE — 85018 HEMOGLOBIN: CPT | Performed by: STUDENT IN AN ORGANIZED HEALTH CARE EDUCATION/TRAINING PROGRAM

## 2025-07-14 PROCEDURE — 83880 ASSAY OF NATRIURETIC PEPTIDE: CPT | Performed by: INTERNAL MEDICINE

## 2025-07-14 PROCEDURE — 94640 AIRWAY INHALATION TREATMENT: CPT

## 2025-07-14 RX ORDER — CARBOXYMETHYLCELLULOSE SODIUM 5 MG/ML
1 SOLUTION/ DROPS OPHTHALMIC
Status: DISCONTINUED | OUTPATIENT
Start: 2025-07-14 | End: 2025-07-14 | Stop reason: HOSPADM

## 2025-07-14 RX ORDER — IPRATROPIUM BROMIDE AND ALBUTEROL SULFATE 2.5; .5 MG/3ML; MG/3ML
3 SOLUTION RESPIRATORY (INHALATION)
Status: DISCONTINUED | OUTPATIENT
Start: 2025-07-14 | End: 2025-07-14 | Stop reason: HOSPADM

## 2025-07-14 RX ORDER — ACETYLCYSTEINE 200 MG/ML
2 SOLUTION ORAL; RESPIRATORY (INHALATION) EVERY 4 HOURS
Status: DISCONTINUED | OUTPATIENT
Start: 2025-07-14 | End: 2025-07-14 | Stop reason: HOSPADM

## 2025-07-14 RX ORDER — IPRATROPIUM BROMIDE AND ALBUTEROL SULFATE 2.5; .5 MG/3ML; MG/3ML
3 SOLUTION RESPIRATORY (INHALATION) EVERY 4 HOURS PRN
Status: DISCONTINUED | OUTPATIENT
Start: 2025-07-14 | End: 2025-07-14 | Stop reason: HOSPADM

## 2025-07-14 RX ORDER — SODIUM CHLORIDE, SODIUM LACTATE, POTASSIUM CHLORIDE, CALCIUM CHLORIDE 600; 310; 30; 20 MG/100ML; MG/100ML; MG/100ML; MG/100ML
INJECTION, SOLUTION INTRAVENOUS CONTINUOUS
Status: ACTIVE | OUTPATIENT
Start: 2025-07-14 | End: 2025-07-14

## 2025-07-14 RX ADMIN — DORNASE ALFA 50 ML: 1 SOLUTION RESPIRATORY (INHALATION) at 08:58

## 2025-07-14 RX ADMIN — ACETAMINOPHEN 650 MG: 650 SUPPOSITORY RECTAL at 14:24

## 2025-07-14 RX ADMIN — PIPERACILLIN AND TAZOBACTAM 3.38 G: 3; .375 INJECTION, POWDER, FOR SOLUTION INTRAVENOUS at 14:17

## 2025-07-14 RX ADMIN — PIPERACILLIN AND TAZOBACTAM 3.38 G: 3; .375 INJECTION, POWDER, FOR SOLUTION INTRAVENOUS at 08:10

## 2025-07-14 RX ADMIN — PIPERACILLIN AND TAZOBACTAM 3.38 G: 3; .375 INJECTION, POWDER, FOR SOLUTION INTRAVENOUS at 02:37

## 2025-07-14 RX ADMIN — IPRATROPIUM BROMIDE AND ALBUTEROL SULFATE 3 ML: .5; 3 SOLUTION RESPIRATORY (INHALATION) at 15:11

## 2025-07-14 RX ADMIN — ACETYLCYSTEINE 2 ML: 200 SOLUTION ORAL; RESPIRATORY (INHALATION) at 10:30

## 2025-07-14 RX ADMIN — IPRATROPIUM BROMIDE AND ALBUTEROL SULFATE 3 ML: .5; 3 SOLUTION RESPIRATORY (INHALATION) at 10:30

## 2025-07-14 RX ADMIN — ACETYLCYSTEINE 2 ML: 200 SOLUTION ORAL; RESPIRATORY (INHALATION) at 15:10

## 2025-07-14 ASSESSMENT — ACTIVITIES OF DAILY LIVING (ADL)
ADLS_ACUITY_SCORE: 74
ADLS_ACUITY_SCORE: 75
ADLS_ACUITY_SCORE: 79
ADLS_ACUITY_SCORE: 75
ADLS_ACUITY_SCORE: 79
ADLS_ACUITY_SCORE: 79
ADLS_ACUITY_SCORE: 74
ADLS_ACUITY_SCORE: 79
ADLS_ACUITY_SCORE: 75
ADLS_ACUITY_SCORE: 79
ADLS_ACUITY_SCORE: 75
ADLS_ACUITY_SCORE: 79
ADLS_ACUITY_SCORE: 79
ADLS_ACUITY_SCORE: 74
ADLS_ACUITY_SCORE: 74
ADLS_ACUITY_SCORE: 75
ADLS_ACUITY_SCORE: 79
ADLS_ACUITY_SCORE: 79
ADLS_ACUITY_SCORE: 75

## 2025-07-14 NOTE — PROGRESS NOTES
[facility]    Medicine Progress Note - Hospitalist Service    Date of Admission:  7/9/2025    Assessment & Plan     Yosef Murry is a 89 year old male with PMHx of afib on chronic anticoagulation with DOAC, hx of CVA, hypertension, hyperlipidemia and recent hospitalization from 6/24/25-6/27/25 for management of a possible infection post T4-5 laminectomy (done 6/12). Discharged to TCU. He was readmitted on 7/9/2025 for management of shortness of breath due to a large left pleural effusion/probable empyema and suspected HCAP.      Sepsis secondary dt LLL HCAP  Large left exudative parapneumonic effusion, s/p thoracentesis on 7/10  IR pigtail chest tube placement 7/13  Suspected aspiration pneumonitis   Acute hypoxic respiratory failure secondary to above  Lactic acidosis   Leukocytosis, improving   Fever  *Presented to ED for evaluation of left-sided chest pain, shortness of breath  *Tmax in .2, in afib with RVR with HRs 120s, BPs 90-110s systolic, needing 3L NC to maintain sats >90. Labs notable for WBC 9.1, lactate normal, proBNP 1300, trops elevated but trend flat (40-45). CT chest was neg for PE, showed a large left pleural effusion with associated consolidation/atelectasis. Was started on treatment with broad spectrum abx (cefepime, Flagyl and Vanco).   *MRSA swab neg so Vanco dc'd 7/10. Flagyl dc'd 7/11 in light of pleural fluid gm stain findings.   *Underwent thoracentesis on 7/10 with removal of 400ml pleural fluid. Described as clear, dionicio color. Fluid studies appear consistent with an exudative effusion with neutrophilic predominance, pH 7.5.   *Thoracentesis gram stain polymicrobial with gram positive bacilli, gram negative bacilli, speciated to lactobacillus   *Patient with tachypnea on 7/12. On 2-3 lpm nasal cannula. Reports to be feeling unwell  *2 view chest XR 7/12 showing persistent large pleural effusion on left  *Lactic acid elevated to 2.6 on 7/12, received 500 ml  Lr. Lactic acid trended overnight - 2.6 - 2.2 - 2.0.  *Patient had fever PM 7/12, WBC increased to 21.2 on 7/12 from 12.2 on 7/11.  *IR chest tube placed 7/13 and started intrapleural lytics  *~1 liter of rapid output PM 7/13, output slowed down overnight. WBC down to 11.0 from 19.3 on 7/14.   *Patient had increased work of breathing and hypoxia overnight 7/13-7/14, placed on Bi-PAP. Chest XR showing slightly improve aeration of left lung apex. Has wet breath sounds concerning for possible aspiration. Received small dose of fentanyl during IR chest tube procedure. No nausea or vomiting. Lactic acid increased to 2.4 AM 7/14.   *Updated family AM 7/14 on change in status. Confirmed the patient is DNR/DNI    -- Consulted ID, recommendations appreciated    - Switched from Cefepime (7/10-7/12) to Zosyn (7/12 to present)  -- Consulted pulmonology, recommendations appreciated    - Continue intrapleural TPA as recommended by pulmonology  - Appreciated management of chest tube     - Reached out to Dr. Morales AM 7/14 regarding possible need for pulmonary toilet/nebulizers  -- NPO including meds while on Bi-PAP, wean as able   -- Obtaining TTE 7/14  -- Consulted IR- pigtail chest tube placed 7/13  -- SLP consulted dt some observed coughing with po intake  -- Plan for daily chest XR after chest tube placement   -- Blood cultures from 7/10 x2 with no growth to date  -- Continue to follow bacterial culture from 7/10     Atrial fibrillation with RVR  *HRs 120s on presentation. RVR likely dt sepsis/hypoxia.   -- conts on oral metoprolol, prn Lopressor available   -- DOAC held starting PM 7/12, continue to hold while getting intrapleural TPA and resume when ok from pulmonology perspective      Hypertension  History of CVA  Hyperlipidemia  - PTA statin, metoprolol; holding amlodipine dt presentation with sepsis     T4-T5 meningioma s/p laminectomy and resection 6/12/25 with post-op fluid collection  *Underwent initial  laminectomy/resection on 6/12/25. Then hospitalized 6/24-6/27 for management of suspected postop infection. Initially managed with IV ceftriaxone that stay but no clear convincing evidence of infection found on workup that stay so abx ultimately stopped. Discharged to TCU.   *Chronic and stable on prns for pain, bowels.   -- hold sched oxycodone (5mg BID) dt sedation noted on 7/10    Hypertension  - PTA amlodipine currently on hold           Diet: Room Service  NPO for Medical/Clinical Reasons Except for: No Exceptions    DVT Prophylaxis: Pneumatic Compression Devices  [place] Catheter: Not present  Lines: None     Cardiac Monitoring: ACTIVE order. Indication: Tachyarrhythmias, acute (48 hours)  Code Status: No CPR- Do NOT Intubate      Clinically Significant Risk Factors          # Hyperchloremia: Highest Cl = 109 mmol/L in last 2 days, will monitor as appropriate          # Hypoalbuminemia: Lowest albumin = 2.9 g/dL at 7/9/2025  8:51 PM, will monitor as appropriate         # Acute Hypoxic Respiratory Failure: Based on blood gas results. Continue supplemental oxygen as needed             # Financial/Environmental Concerns:           Social Drivers of Health    Tobacco Use: Medium Risk (7/13/2025)    Patient History     Smoking Tobacco Use: Former     Smokeless Tobacco Use: Never   Physical Activity: Unknown (10/10/2024)    Exercise Vital Sign     Days of Exercise per Week: 0 days   Social Connections: Unknown (10/10/2024)    Social Connection and Isolation Panel [NHANES]     Frequency of Social Gatherings with Friends and Family: Once a week          Disposition Plan     Medically Ready for Discharge: Anticipated in 2-4 Days         Lino Hernández DO  Hospitalist Service  [facility]  Securely message with [employer] (more info)  Text page via "[employer] Paging/Directory   ______________________________________________________________________    Interval History     - Overnight patient [facility] [place] [facility]   - He was placed on Bi-PAP overnight due to increased work of breathing and hypoxia   - Chest XR overnight showed slight improved aeration of the left lung apex but still persistent large left pleural effusion  - Patient is lethargic  - Answering yes or no questions  - Feels the same as yesterday  - Denies nausea   - Subjective somewhat limited due to the condition of the patient     Physical Exam   Vital Signs: Temp: 98.6  F (37  C) Temp src: Axillary BP: 103/77 Pulse: 111   Resp: 20 SpO2: 96 % O2 Device: Oxymask Oxygen Delivery: 6 LPM  Weight: 170 lbs 13.7 oz    Constitutional: Appears fatigued, on bi-pap, answers yes/no questions   Eyes: Lids and lashes normal, pupils equal    ENT: Normocephalic, without obvious abnormality, atraumatic   Respiratory: Improved breath sounds left superior left lung anteriorly, persistent decreased breath sounds left lung bases, right lungs relatively clear. Wet audible breathing, no obvious wheezes  Cardiovascular: Irregularly irregular, tachycardic, no murmurs, rubs, or gallops, 1-2+ bilateral lower extremity pitting edema, L>R, previously reported as chronic   GI: Abdomen soft, bowel sounds present, non tender to palpation  Skin: normal skin color, texture, turgor  Musculoskeletal: No deformity   Neurologic: Somnolent, opens eyes briefly, answering yes or no questions  Neuropsychiatric: General: normal, calm, and normal eye contact    Medical Decision Making       52 MINUTES SPENT BY ME on the date of service doing chart review, history, exam, documentation & further activities per the note.  Messaged pulmonology AM 7/14. Updated family AM 7/14    Data   ------------------------- PAST 24 HR DATA REVIEWED -----------------------------------------------    I have personally reviewed the following data over the past 24 hrs:    11.0  \   13.2 (L)   / 413     145 109 (H) 35.4 (H) /  121 (H)   4.3 24 0.62 (L) \     Procal: N/A CRP: N/A Lactic Acid: 2.4  (H)         Imaging results reviewed over the past 24 hrs:   Recent Results (from the past 24 hours)   IR Chest Tube Place Non Tunneled Left    Narrative    IR CHEST TUBE PLACEMENT NON-TUNNELLED LEFT  7/13/2025 12:57 PM CDT     HISTORY:  89-year-old male with shortness of breath and suspected empyema in the left pleural space.    COMPARISON: Chest CT dated 7/9/2025    DESCRIPTION OF PROCEDURE: After obtaining informed consent, the patient was placed in a supine position on the fluoroscopy table. The inferolateral left thorax was prepped and draped in the usual sterile manner. 1% lidocaine was injected for local   anesthesia. Under ultrasound guidance, access into the left pleural space was obtained using a 5 Nauruan Yueh needle. An image was saved for documentation. A wire was curled in the pleural space using fluoroscopy. Over the wire, a 12 Nauruan nonlocking   pigtail catheter was placed. A fluoroscopic image was saved to document catheter position. The catheter was sutured to the patient's skin and hooked to a Pleur-evac suction apparatus. Turbid yellow fluid was aspirated out. 2 mg TPA was injected into the   collection cavity to aid in drainage.    MEDICATIONS: 25 mcg fentanyl    Fluoroscopy time: 0.3 minutes    Total fluoroscopy dose: 3.91 mGy air kerma    Local anesthetic: 16 mL      Impression    IMPRESSION: Left-sided chest tube placed as above.    XR Chest Port 1 View    Narrative    EXAM: XR CHEST PORT 1 VIEW  LOCATION: [facility]  DATE: 7/14/2025    INDICATION: increased hypoxia  COMPARISON: 07/12/2025      Impression    IMPRESSION: Cardiac silhouette obscured. Small caliber left pleural drainage catheter. Slight improved aeration left apex. Persistent atelectasis and/or infiltrate left lung and left pleural effusion. Right upper lobe infiltrate.

## 2025-07-14 NOTE — PROGRESS NOTES
[facility]    Pulmonary, Allergy, Critical Care and Sleep Medicine    Follow-up Note  July 14, 2025         Assessment and Plan:   89M Afib, prior CVA, HTN, HLD w/ SOB, large L pleural effusion, and suspected HCAP. Thoracentesis w/ exudative effusion now growing Lactobacillus.     #Left-sided empyema.  Pleural fluid culture positive for lactobacillus.  #Left-sided pneumonia.  # Atrial fibrillation, on anticoagulation.    - Continue antibiotics for underlying pneumonia.  ID following.  - Resume intrapleural lytic therapy for underlying empyema.  - Can continue to hold oral anticoagulation in the setting of increased risk for bleeding while undergoing intrapleural tPA/DNase.  - Continue supplemental oxygen through facemask with goal O2 sats above 90%.  - Check echo, BNP.  - Will hold off on maintenance fluids for now.    Pulmonary will continue to follow.    PURNIMA Martinez   of Medicine  [facility]  Pulmonary, Allergy, Critical Care and Sleep Medicine  Pager - [phone]           Interval History:     - Chest tube placed yesterday by IR.  - First dose of tPA given in the morning with sanguinous output of around 1 L.  - Later in the day, had increasing oxygen requirement up to 8 L.  Dose of tPA was held.  -In the morning, is on 6 LPM through facemask.  - No further output noted during the night.  - CXR repeated in the morning shows slightly improved left-sided pleural effusion.  - Patient pneumonia continues to be somnolent, does answer questions.           Review of Systems:   C: negative for fever, chills, change in weight  INTEGUMENTARY/SKIN: no rash or obvious new lesions  ENT/MOUTH: no sore throat, new sinus pain or nasal drainage  RESP: see interval history  CV: negative for chest pain, palpitations or peripheral edema  GI: no nausea, vomiting, change in stools  MUSCULOSKELETAL: no myalgias, arthralgias          Medications:     Current  Facility-Administered Medications   Medication Dose Route Frequency Provider Last Rate Last Admin    alteplase (ACTIVASE) 10 mg, dornase estela (PULMOZYME) 5 mg in sodium chloride 0.9 % 50 mL for chest tube instillation in syringe  50 mL Chest Tube BID Lino Clemons MD        [Held by provider] amLODIPine (NORVASC) tablet 5 mg  5 mg Oral Daily Vicente Ruiz MD        [Held by provider] apixaban ANTICOAGULANT (ELIQUIS) tablet 5 mg  5 mg Oral BID Marifer Mata DO   5 mg at 07/12/25 0821    atorvastatin (LIPITOR) tablet 10 mg  10 mg Oral QPM Vicente Ruiz MD   10 mg at 07/12/25 2101    loratadine (CLARITIN) tablet 10 mg  10 mg Oral Daily Vicente Ruiz MD   10 mg at 07/12/25 0821    metoprolol succinate ER (TOPROL XL) 24 hr tablet 25 mg  25 mg Oral Daily Vicente Ruiz MD   25 mg at 07/13/25 0815    [Held by provider] oxyCODONE (ROXICODONE) tablet 5 mg  5 mg Oral BID Vicente Ruiz MD        piperacillin-tazobactam (ZOSYN) 3.375 g vial to attach to  mL bag  3.375 g Intravenous Q6H Maryjane Iglesias MD   3.375 g at 07/14/25 0810    polyethylene glycol (MIRALAX) Packet 17 g  17 g Oral Daily Vicente Ruiz MD   17 g at 07/12/25 0821    sennosides (SENOKOT) tablet 2 tablet  2 tablet Oral BID Vicente Ruiz MD   2 tablet at 07/12/25 2101    sodium chloride (PF) 0.9% PF flush 3 mL  3 mL Intracatheter Q8H Lino Devi DO   3 mL at 07/13/25 2132    sodium chloride (PF) 0.9% PF flush 3 mL  3 mL Intracatheter Q8H Vicente Ruiz MD   3 mL at 07/14/25 0810     Current Facility-Administered Medications   Medication Dose Route Frequency Provider Last Rate Last Admin    acetaminophen (TYLENOL) tablet 650 mg  650 mg Oral Q4H PRN Vicente Ruiz MD   650 mg at 07/12/25 1613    Or    acetaminophen (TYLENOL) Suppository 650 mg  650 mg Rectal Q4H PRN Vicente Ruiz MD        benzocaine-menthol (CHLORASEPTIC) 6-10 MG lozenge 1  lozenge  1 lozenge Buccal Q1H PRN Vicente Ruiz MD        bisacodyl (DULCOLAX) suppository 10 mg  10 mg Rectal Daily PRN Vicente Ruiz MD        carboxymethylcellulose PF (REFRESH PLUS) 0.5 % ophthalmic solution 1 drop  1 drop Both Eyes Q1H PRN Dangelo Naylor MD        hydrALAZINE (APRESOLINE) tablet 10 mg  10 mg Oral Q4H PRN Vicente Ruiz MD        Or    hydrALAZINE (APRESOLINE) injection 10 mg  10 mg Intravenous Q4H PRN Vicente Ruiz MD        HYDROmorphone (DILAUDID) injection 0.2 mg  0.2 mg Intravenous Q2H PRN Vicente Ruiz MD        HYDROmorphone (DILAUDID) injection 0.4 mg  0.4 mg Intravenous Q2H PRN Vicente Ruiz MD        lidocaine (LMX4) cream   Topical Q1H PRN Lino Hernández DO        lidocaine (LMX4) cream   Topical Q1H PRN Vicente Ruiz MD        lidocaine 1 % 0.1-1 mL  0.1-1 mL Other Q1H PRN Lino Hernández,         lidocaine 1 % 0.1-1 mL  0.1-1 mL Other Q1H PRVicente Dow MD        melatonin tablet 1 mg  1 mg Oral At Bedtime PRN Vicente Ruiz MD        methocarbamol (ROBAXIN) tablet 750 mg  750 mg Oral 4x Daily PRN Vicente Ruiz MD        metoprolol (LOPRESSOR) injection 2.5 mg  2.5 mg Intravenous Q4H PRN Lino Hernández DO        naloxone (NARCAN) injection 0.2 mg  0.2 mg Intravenous Q2 Min PRN Vicente Ruiz MD        Or    naloxone (NARCAN) injection 0.4 mg  0.4 mg Intravenous Q2 Min PRVicente Dow MD        Or    naloxone (NARCAN) injection 0.2 mg  0.2 mg Intramuscular Q2 Min PRVicente Dow MD        Or    naloxone (NARCAN) injection 0.4 mg  0.4 mg Intramuscular Q2 Min PRVicente Dow MD        No lozenges or gum should be given while patient on BIPAP/AVAPS/AVAPS AE   Does not apply Continuous PRN Dangelo Naylor MD        ondansetron (ZOFRAN ODT) ODT tab 4 mg  4 mg Oral Q6H PRN Vicente Ruiz MD        Or    ondansetron (ZOFRAN) injection  4 mg  4 mg Intravenous Q6H PRN Vicente Ruiz MD        oxyCODONE (ROXICODONE) tablet 5 mg  5 mg Oral Q4H PRN Vicente Ruiz MD        oxyCODONE IR (ROXICODONE) half-tab 2.5 mg  2.5 mg Oral Q4H PRN Vicente Ruiz MD   2.5 mg at 07/12/25 0645    Patient may continue current oral medications   Does not apply Continuous PRN Dangelo Naylor MD        polyethylene glycol (MIRALAX) Packet 17 g  17 g Oral BID PRN Vicente Ruiz MD        prochlorperazine (COMPAZINE) injection 5 mg  5 mg Intravenous Q6H PRN Vicente Ruiz MD        Or    prochlorperazine (COMPAZINE) tablet 5 mg  5 mg Oral Q6H PRN Vicente Ruiz MD        senna-docusate (SENOKOT-S/PERICOLACE) 8.6-50 MG per tablet 1 tablet  1 tablet Oral BID PRVicente Dow MD        Or    senna-docusate (SENOKOT-S/PERICOLACE) 8.6-50 MG per tablet 2 tablet  2 tablet Oral BID PRVicente Dow MD        sodium chloride (PF) 0.9% PF flush 3 mL  3 mL Intracatheter q1 min prLino Em DO        sodium chloride (PF) 0.9% PF flush 3 mL  3 mL Intracatheter q1 min prVicente Dow MD   3 mL at 07/13/25 0139            Physical Exam:   Temp:  [98.4  F (36.9  C)-98.8  F (37.1  C)] 98.6  F (37  C)  Pulse:  [] 111  Resp:  [20-28] 20  BP: (100-157)/() 103/77  FiO2 (%):  [40 %] 40 %  SpO2:  [87 %-98 %] 96 %    Intake/Output Summary (Last 24 hours) at 7/14/2025 0879  Last data filed at 7/14/2025 0400  Gross per 24 hour   Intake 1400 ml   Output 1440 ml   Net -40 ml     Constitutional:   Awake, alert and in no apparent distress     Eyes:   nonicteric     ENT:    oral mucosa moist without lesions     Neck:   Supple without supraclavicular or cervical lymphadenopathy     Lungs:   Good air flow.  No crackles. No rhonchi.  No wheezes.     Cardiovascular:   Normal S1 and S2.  RRR.  No murmur, gallop or rub.     Abdomen:   NABS, soft, nontender, nondistended.  No HSM.     Musculoskeletal:   No  edema     Neurologic:   Alert and conversant.     Skin:   Warm, dry.  No rash on limited exam.             Data:   All laboratory and imaging data reviewed.    CMP  Recent Labs   Lab 07/14/25  0610 07/14/25  0222 07/13/25  0806 07/12/25 1715 07/12/25  0943 07/11/25  0629 07/10/25  0613 07/09/25  2051     --  141 139  --   --  138 137   POTASSIUM 4.3  --  4.2 4.2 3.9   < > 3.5 3.8   CHLORIDE 109*  --  106 102  --   --  103 98   CO2 24  --  24 23  --   --  23 25   ANIONGAP 12  --  11 14  --   --  12 14   * 122* 135* 151*  --   --  119* 124*   BUN 35.4*  --  29.4* 29.3*  --   --  18.0 17.7   CR 0.62*  --  0.60* 0.67  --   --  0.71 0.72   GFRESTIMATED >90  --  >90 90  --   --  88 88   BELLO 9.9  --  10.1 9.7  --   --  8.6* 9.3   PROTTOTAL  --   --   --   --   --   --  5.3* 6.6   ALBUMIN  --   --   --   --   --   --   --  2.9*   BILITOTAL  --   --   --   --   --   --   --  0.6   ALKPHOS  --   --   --   --   --   --   --  80   AST  --   --   --   --   --   --   --  28   ALT  --   --   --   --   --   --   --  14    < > = values in this interval not displayed.     CBC  Recent Labs   Lab 07/14/25  0610 07/14/25  0154 07/13/25 0806 07/12/25 1715 07/11/25 0629   WBC 11.0  --  19.3* 21.2* 12.2*   RBC 4.45  --  4.37* 4.38* 3.96*   HGB 13.2* 13.4 13.1* 13.3 11.9*   HCT 41.8  --  39.5* 38.7* 37.1*   MCV 94 95 90 88 94   MCH 29.7  --  30.0 30.4 30.1   MCHC 31.6  --  33.2 34.4 32.1   RDW 15.4*  --  15.5* 15.5* 15.1*     --  437 476* 452*     INRNo lab results found in last 7 days.  Arterial Blood Gas  Recent Labs   Lab 07/14/25  0610 07/14/25  0531 07/14/25  0154 07/12/25  1715   PH  --  7.48*  --   --    PCO2  --  38  --   --    PO2  --  59*  --   --    HCO3  --  29*  --   --    O2PER 40 50 10 93     Urine Studies    Recent Labs   Lab Test 06/24/25  1447 06/07/25  2024 09/23/20  1035 07/29/19  2203 06/10/19  0949 06/21/18  0940   URINEPH 6.5 5.0 5.5 5.5 7.0 7.0   NITRITE Negative Negative Negative Negative  "Negative Negative   LEUKEST Negative Trace* Negative Negative Negative Negative   WBCU 0 46* 0 - 5 1  --  0 - 5     CMV viral loads  No lab results found.  No results found for: \"CMQNT\", \"CMVQ\"  EBV viral loads   No lab results found.  No results found for: \"EBVDN\", \"EBRES\", \"EBVSP\", \"EBVPC\", \"EBVPCR\"  Respiratory Virus Testing    No results found for: \"RS\", \"FLUAG\"  Sputum Culture last 3 months:  Specimen Description   Date Value Ref Range Status   07/30/2019 Blood Right Arm  Final   07/30/2019 Blood Left Arm  Final   07/30/2019 Wrist Aspirate  Final   07/30/2019 Wrist Aspirate  Final    Culture Micro   Date Value Ref Range Status   07/30/2019 No growth  Final   07/30/2019 No growth  Final   07/30/2019 No growth  Final        Attestation:    I personally spent 52 minutes on the date of the encounter doing chart review, history and exam, documentation and further activities per the note.     PURNIMA Martinez   of Medicine  [facility]  Pulmonary, Allergy, Critical Care and Sleep Medicine  Pager - [phone]      "

## 2025-07-14 NOTE — PLAN OF CARE
"[id number]  Goal Outcome Evaluation:      Plan of Care Reviewed With: patient    Overall Patient Progress: no changeOverall Patient Progress: no change    Patient Name: Ashlee  MRN: [id number]  Date of Admission: 7/9/2025  Reason for Admission: SOB; pleural effusion.   Level of Care: [facility]    Vitals:   BP Readings from Last 1 Encounters:   07/14/25 125/76     Pulse Readings from Last 1 Encounters:   07/14/25 102     Wt Readings from Last 1 Encounters:   07/12/25 77.5 kg (170 lb 13.7 oz)     Ht Readings from Last 1 Encounters:   07/10/25 1.778 m (5' 10\")     Estimated body mass index is 24.52 kg/m  as calculated from the following:    Height as of this encounter: 1.778 m (5' 10\").    Weight as of this encounter: 77.5 kg (170 lb 13.7 oz).  Temp Readings from Last 1 Encounters:   07/14/25 98.4  F (36.9  C) (Axillary)       Pain: Pain goal 0 Pain Rating 0 Effective pain medication/regimen 0    CV Surgery Patient: No    Assessment    Resp: Ls dim; oxy mask 8-15L; pt had increased 02 needs; now placed on Bipap 40% fi02. To help with increased work of breathing.   Telemetry: Afib AVR/CVR. 110s.  Neuro: APRIL pt not followng commands. Only responds to speech. Moving all extremities. Weak hand squeeze at times.   GI/: incontinent of B/B. External cath in place. No BM overnight. NPO on bipap  Skin/Wounds: scattered ecchymosis /scabs. Blanchable redness on buttocks.   Lines/Drains: Pleural pigtail CT -20 suction; pt dumped high output x1 at 2100 MD aware. Slowed down from midnight. Alteplase held.Intermittent air leak noted. PIV x2 SL with intermittent ABX.  Activity: noob this shift. Ax2 lfit. Q2/ T/R.  Sleep: sleeping bet cares  Abnormal Labs: see chrts.  Consults: pulmonology; IR.   Aggression Stop Light: Green          Patient Care Plan:  continue to monitor.   "

## 2025-07-14 NOTE — PROVIDER NOTIFICATION
Brief update:    Increased O2 needs since transfer to [facility], now on 15 L facemask from prior 8 L facemask with oxygen saturations in the low 90s  Significant sanguinous chest tube output earlier tonight    Stat portable chest x-ray  Obtain hemoglobin now  Release stat EKG.  Known severe coronary artery disease by imaging.  Obtain VBG now    Page when results available    Dangelo Naylor MD  1:20 AM      Possible RUL infiltrate, on zosyn.    Aeration actually appears improved.    Suspect some aspiration vs mucous plug    O2 sats improving now, weaning back to 8L as prior.    Trial of BIPAP for expansion and work of breathing, attempt removal in AM or if not tolerated.    Dangelo Naylor MD  3:54 AM

## 2025-07-14 NOTE — PROGRESS NOTES
Patient trialed off BiPAP at 0740 as morning VBG more alkalotic. Currently tolerating 6 LPM oxymask with SpO2: 95-96% RR: 24/min. Will continue to monitor.    Cortes Clemons, RT on 7/14/2025 at 7:45 AM

## 2025-07-14 NOTE — PLAN OF CARE
Pt arrived to unit around 1900. VSS on 8 LPM oxymask. Lethargic but arouses to voice. Inconsistent with command following. APRIL orientation. External cath applied. L CT to -20 suction. Dumped 140 ml serosang output on arrival to floor.

## 2025-07-14 NOTE — PROVIDER NOTIFICATION
"MD Notification    Notified Person: MD    Notified Person Name: DR. Valentin     Notification Date/Time: 7/13/25. 2125    Notification Interaction: [employer] messaging.    Purpose of Notification:  pt had dumped output of 875ml serosanguinous drainage within 2 hrs. He had gotten 2mg tpa  in IR today. Do you want us to give scheduled alteplase  dose tonight .    Orders Received: per MD \"hold for now'     Comments:   "

## 2025-07-14 NOTE — PROGRESS NOTES
Patient transferred to [facility], report give to receiving RN. Updated daughter with floor and room number.    (2) cough or sneeze

## 2025-07-14 NOTE — PLAN OF CARE
[facility] [place]    Orientations: orientation difficult to assess. Will arose occasionally to voice and open eyes. Incontinent with following commands.  Vitals/ Pain: VSS on 6-10Loxymask- transitioned back to BIPAP 60% this afternoon for work of breathing and increased O2 needs. Low grade temp of 99.9, rectal tylenol given x1.   Tele: Afib   Resp: Lungs diminished with crackles coarse. Small amount of secretions noted when off BIPAP- suctioned. Nebs & vest therapy started   Lines/Drains: 2 PIV SL. L CT to water seal- received 1 round of lyrics today. 0 output.   Skin/Wounds: scattered bruising and scabs. Blanchable redness on coccyx.   GI/: external cath in place w/ adequate output. -BM  Labs: Lactic 2.3- trending down  Ambulation/Assist: Ax2 turn & repo   Sleep Quality: good- slept most of shift   Plan: xray completed showing worsening pleural effusion on the left. Pulmonology, ID & hospitalist following. Pending progress may consider CC in future.          low HR alarm triggered. Arrived to pts room with heart rate of 40s. Called RRT pt passed away 1842.

## 2025-07-14 NOTE — PROGRESS NOTES
[facility]    Infectious Disease Progress Note    Date of Service (when I saw the patient): 07/14/2025     Assessment & Plan   Yosef Murry is a 89 year old male who was admitted on 7/9/2025.     Impression:  89 yo male with PMHx of afib (on chronic anticoagulation with DOAC), hx of CVA, hypertension, hyperlipidemia   Recent hospitalization from 6/24/25-6/27/25 for meningioma resection 6/12/25  He was readmitted on 7/9/2025 for management of shortness of breath due to a large left pleural effusion. Now with empyema, likely secondary to aspiration. S/p chest tube placement 7/13. Cultures of pleural fluid with lactobacillus.    On cefepime initially, switched to zosyn      Recommendations:  Continue on zosyn   Follow up on the full culture information   Chest tube management per Pulmonary. CXR this afternoon with increased pleural effusion.     Patient and plan discussed with Dr. Guy.     Jyoti Means PA-C    Interval History   Tolerating antibiotics ok   No new rashes or issues with antibiotics   Labs reviewed   No changes to past medical, social or family history   Appears exhausted, with respiratory effort for breathing. Not feeling well. Denies pain.       Physical Exam   Temp: 99  F (37.2  C) Temp src: Axillary BP: (!) 117/98 Pulse: 102   Resp: 20 SpO2: (!) 90 % O2 Device: BiPAP/CPAP Oxygen Delivery: 11 LPM  Vitals:    07/10/25 0350 07/11/25 0512 07/12/25 0700   Weight: 74.5 kg (164 lb 3.9 oz) 74.2 kg (163 lb 9.3 oz) 77.5 kg (170 lb 13.7 oz)     Vital Signs with Ranges  Temp:  [98.3  F (36.8  C)-99  F (37.2  C)] 99  F (37.2  C)  Pulse:  [] 102  Resp:  [20-28] 20  BP: (103-157)/() 117/98  FiO2 (%):  [40 %-60 %] 60 %  SpO2:  [87 %-98 %] 90 %    Constitutional: drowsy   Lungs: Bilateral diminished, now has chest tube placed. Appears exhausted with increased respiratory effort.   Cardiovascular: Regular rate and rhythm, normal S1 and S2, and no murmur noted  Abdomen: Normal  bowel sounds, soft, non-distended, non-tender  Skin: No rashes, no cyanosis, no edema  Other:    Medications   Current Facility-Administered Medications   Medication Dose Route Frequency Provider Last Rate Last Admin    No lozenges or gum should be given while patient on BIPAP/AVAPS/AVAPS AE   Does not apply Continuous PRN Naylor, Dangelo Brenner MD        Patient may continue current oral medications   Does not apply Continuous PRN Naylor, Dangelo Brenner MD         Current Facility-Administered Medications   Medication Dose Route Frequency Provider Last Rate Last Admin    acetylcysteine (MUCOMYST) 20 % nebulizer solution 2 mL  2 mL Nebulization Q4H Jung Morales MD   2 mL at 07/14/25 1510    alteplase (ACTIVASE) 10 mg, dornase estela (PULMOZYME) 5 mg in sodium chloride 0.9 % 50 mL for chest tube instillation in syringe  50 mL Chest Tube BID Lino Clemons MD   50 mL at 07/14/25 0858    [Held by provider] amLODIPine (NORVASC) tablet 5 mg  5 mg Oral Daily Vicente Ruiz MD        [Held by provider] apixaban ANTICOAGULANT (ELIQUIS) tablet 5 mg  5 mg Oral BID Marifer Mata,    5 mg at 07/12/25 0821    atorvastatin (LIPITOR) tablet 10 mg  10 mg Oral QPM Vicente Ruiz MD   10 mg at 07/12/25 2101    ipratropium - albuterol 0.5 mg/2.5 mg (3mg)/3 mL (DUONEB) neb solution 3 mL  3 mL Nebulization 4x daily Jung Morales MD   3 mL at 07/14/25 1511    loratadine (CLARITIN) tablet 10 mg  10 mg Oral Daily Vicente Ruiz MD   10 mg at 07/12/25 0821    metoprolol succinate ER (TOPROL XL) 24 hr tablet 25 mg  25 mg Oral Daily Vicente Ruiz MD   25 mg at 07/13/25 0815    [Held by provider] oxyCODONE (ROXICODONE) tablet 5 mg  5 mg Oral BID Vicente Ruiz MD        piperacillin-tazobactam (ZOSYN) 3.375 g vial to attach to  mL bag  3.375 g Intravenous Q6H Maryjane Iglesias MD   3.375 g at 07/14/25 1417    polyethylene glycol (MIRALAX) Packet 17 g  17 g Oral Daily Vicente Ruiz  MD Lino   17 g at 07/12/25 0821    sennosides (SENOKOT) tablet 2 tablet  2 tablet Oral BID Vicente Ruiz MD   2 tablet at 07/12/25 2101    sodium chloride (PF) 0.9% PF flush 3 mL  3 mL Intracatheter Q8H HECTOR Lino Hernández DO   3 mL at 07/14/25 1417    sodium chloride (PF) 0.9% PF flush 3 mL  3 mL Intracatheter Q8H Vicente Ruiz MD   3 mL at 07/14/25 1417       Data   All microbiology laboratory data reviewed.  Recent Labs   Lab Test 07/14/25  0610 07/14/25  0154 07/13/25  0806 07/12/25  1715   WBC 11.0  --  19.3* 21.2*   HGB 13.2* 13.4 13.1* 13.3   HCT 41.8  --  39.5* 38.7*   MCV 94 95 90 88     --  437 476*     Recent Labs   Lab Test 07/14/25  0610 07/13/25  0806 07/12/25  1715   CR 0.62* 0.60* 0.67          07/10/2025 1332 07/10/2025 1455 Cell count with differential fluid [[id number]]    (Abnormal)   Pleural fluid from Pleural Cavity, Left    Final result Component Value   No component results          07/10/2025 1332 07/14/2025 1406 Pleural fluid Aerobic Bacterial Culture Routine With Gram Stain [[id number]]     (Abnormal)   Pleural fluid from Pleural Cavity, Left    Preliminary result Component Value   Culture Culture in progress P    1+ Lactobacillus species Abnormal  P    Identification obtained by MALDI-TOF mass spectrometry research use only database. Test characteristics determined and verified by the [facility].    1+ Lactobacillus species Abnormal  P    Identification obtained by MALDI-TOF mass spectrometry research use only database. Test characteristics determined and verified by the [facility].   Gram Stain Result 1+ Gram positive bacilli Abnormal  P    1+ Gram negative bacilli Abnormal  P    4+ WBC seen Abnormal  P       Susceptibility     Lactobacillus species     KILO     Ampicillin 0.19 ug/mL Susceptible     Clindamycin 0.25 ug/mL Susceptible     Gentamicin 0.75 ug/mL No interpretation available      "Levofloxacin 1.5 ug/mL No interpretation available     Penicillin 0.094 ug/mL Susceptible     Vancomycin >256 ug/mL Resistant              Susceptibility Comments    Lactobacillus species   Antibiotics listed as \"No Interpretation\" have no regulatory guidelines for susceptibility/resistance available.         07/10/2025 1332 07/10/2025 1424 Glucose fluid [[id number]]    Pleural fluid from Pleural Cavity, Left    Final result Component Value Units   Glucose Fluid Source Pleural Cavity, Left    Glucose fluid <2 mg/dL          07/10/2025 1332 07/10/2025 1411 Lactate dehydrogenase fluid [[id number]]    Pleural fluid from Pleural Cavity, Left    Final result Component Value Units   LD Fluid Source Pleural Cavity    Lactate dehydrogenase fluid 948 U/L          07/10/2025 1332 07/10/2025 1411 Protein fluid [[id number]]    Pleural fluid from Pleural Cavity, Left    Final result Component Value Units   Protein Fluid Source Pleural Cavity, Left    Protein Total Fluid 3.4 g/dL          07/10/2025 1332 07/10/2025 1351 pH fluid [[id number]]    Pleural fluid from Pleural Cavity, Left    Final result Component Value Units   pH Fluid Source Pleural Cavity, Left    pH Fluid 7.5 pH          07/10/2025 1332 07/11/2025 0738 Cytology, non-gynecologic [[id number]]   Pleural fluid from Pleural Cavity, Left    In process Component Value   No component results          07/10/2025 1332 07/10/2025 1455 Cell Count Body Fluid [[id number]]    (Abnormal)   Pleural fluid from Pleural Cavity, Left    Final result Component Value Units   Color Orange Abnormal     Clarity Cloudy Abnormal     Cell Count Fluid Source Pleural Cavity, Left    Total Nucleated Cells 23,022 /uL          07/10/2025 1332 07/10/2025 1455 Differential Body Fluid [[id number]]    Pleural fluid from Pleural Cavity, Left    Final result Component Value Units   % Neutrophils 96 %   % Lymphocytes 1 %   % Monocyte/Macrophages 2 %   % Lining Cells 1 %          07/10/2025 " 0456 07/10/2025 1101 MRSA MSSA PCR, Nasal Swab [[id number]]    Swab from Nares, Bilateral    Final result Component Value   MRSA Target DNA Negative   SA Target DNA Negative          07/10/2025 0011 07/14/2025 0231 Blood Culture Peripheral blood (BC) Hand, Left [[id number]]   Peripheral blood (BC) from Hand, Left    Preliminary result Component Value   Culture No growth after 4 days P             07/10/2025 0005 07/14/2025 0231 Blood Culture Peripheral blood (BC) Hand, Right [[id number]]   Peripheral blood (BC) from Hand, Right    Preliminary result Component Value   Culture No growth after 4 days P                EXAM: XR CHEST PORT 1 VIEW  LOCATION: [facility]  DATE: 7/14/2025     INDICATION: Increased work of breathing.  COMPARISON: Chest x-rays, most recently 7/14/2025. CT dated 7/9/2025.     FINDINGS: EKG wires overlie the chest. Pigtail chest tube projects over the left lung base. The cardiac silhouette is obscured by left lung opacities. Increased left lung opacities with near complete opacification of the left hemithorax. The right lung   is clear. No right effusion. No pneumothorax bilaterally. Diffuse osteopenia.                                                                      IMPRESSION: Increased left lung opacities with near-complete opacification of the left hemithorax. This likely represents enlargement of known pleural effusion. Correlate for chest tube function.

## 2025-07-14 NOTE — DEATH PRONOUNCEMENT
MD DEATH PRONOUNCEMENT    Called to pronounce Yosef Murry dead.    Physical Exam: Unresponsive to noxious stimuli, Spontaneous respirations absent, Breath sounds absent, Carotid pulse absent, Heart sounds absent, Pupillary light reflex absent, and Corneal blink reflex absent    Patient was pronounced dead at 1842 PM, 2025.    Preliminary Cause of Death: Acute hypoxic respiratory failure 2/2 HCAP    Active Problems:    Long term (current) use of anticoagulants    Hypoxia    Atrial fibrillation with RVR (H)    Pneumonia of left lung due to infectious organism, unspecified part of lung    Chest pain, unspecified type       Infectious disease present?: YES    Communicable disease present? (examples: HIV, chicken pox, TB, Ebola, CJD) :  NO    Multi-drug resistant organism present? (example: MRSA): NO    Please consider an autopsy if any of the following exist:  NO Unexpected or unexplained death during or following any dental, medical, or surgical diagnostic treatment procedures.   NO Death of mother at or up to seven days after delivery.     NO All  and pediatric deaths.     NO Death where the cause is sufficiently obscure to delay completion of the death certificate.   NO Deaths in which autopsy would confirm a suspected illness/condition that would affect surviving family members or recipients of transplanted organs.     The following deaths must be reported to the 's Office:  NO A death that may be due entirely or in part to any factors other than natural disease (recent surgery, recent trauma, suspected abuse/neglect).   NO A death that may be an accident, suicide, or homicide.     NO Any sudden, unexpected death in which there is no prior history of significant heart disease or any other condition associated with sudden death.   NO A death under suspicious, unusual, or unexpected circumstances.    NO Any death which is apparently due to natural causes but in which the  does not  have a personal physician familiar with the patient s medical history, social, or environmental situation or the circumstances of the terminal event.   NO Any death apparently due to Sudden Infant Death Syndrome.     NO Deaths that occur during, in association with, or as consequences of a diagnostic, therapeutic, or anesthetic procedure.   NO Any death in which a fracture of a major bone has occurred within the past (6) six months.   NO A death of persons note seen by their physician within 120 days of demise.     NO Any death in which the  was an inmate of a public institution or was in the custody of Law Enforcement personnel.   NO  All unexpected deaths of children   NO Solid organ donors   NO Unidentified bodies   NO Deaths of persons whose bodies are to be cremated or otherwise disposed of so that the bodies will later be unavailable for examination;   NO Deaths unattended by a physician outside of a licensed healthcare facility or licensed residential hospice program   NO Deaths occurring within 24 hours of arrival to a health care facility if death is unexpected.    NO Deaths associated with the decedent s employment.   NO Deaths attributed to acts of terrorism.   NO Any death in which there is uncertainty as to whether it is a medical examiner s care should be discussed with the medical investigator.        Body disposition: Autopsy was discussed with family member:  Daughter by phone.  Permission for autopsy was declined.    Body released to the morgue.

## 2025-07-14 NOTE — PROGRESS NOTES
Care Management Follow Up    Length of Stay (days): 4    Expected Discharge Date: 07/15/2025     Concerns to be Addressed: discharge planning     Patient plan of care discussed at interdisciplinary rounds: Yes    Anticipated Discharge Disposition:  TBD likely back to TCU  Anticipated Discharge Services:    Anticipated Discharge DME:      Patient/family educated on Medicare website which has current facility and service quality ratings:    Education Provided on the Discharge Plan:    Patient/Family in Agreement with the Plan:      Referrals Placed by CM/SW:  Yes - [facility]  Private pay costs discussed: Not applicable    Discussed  Partnership in Safe Discharge Planning  document with patient/family: No     Handoff Completed: No, handoff not indicated or clinically appropriate    Additional Information:  SW reviewed pt status, No recommendations from therapies yet. [facility]. SW sent referral back to [facility] to see if they will be able to accept him back as per initial consult it is noted they would be ok with going back to [facility] as they had a good experience.     Next Steps: Follow for discharge planning.     Linda Rivero, BSW

## 2025-07-14 NOTE — PROGRESS NOTES
Respiratory care note: Patient RR increased to 36 and SpO2 91 on Oxymask @ 11 LPM. Nebulizer given. Held Vest therapy for CXR. NT sx'd, per orders, for moderate amount of thick, tan secretions. Placed patient back on CPAP 12 cm H2O but RR 28 - 30 and tidal volumes low so changed back to BiPAP. Skin integrity is good. Barrier in place. RT to continue follow.

## 2025-07-14 NOTE — PROVIDER NOTIFICATION
MD Notification    Notified Person: MD    Notified Person Name:      Notification Date/Time: 0112AM    Notification Interaction: [facility] messaging.     Purpose of Notification:Paged Dr. Naylor regarding pt increased 02 needs.     Orders Received:  yes; see provider brief update  note.     Comments:

## 2025-07-14 NOTE — PROGRESS NOTES
Brief Provider Note:    Updated patients family on increased work of breathing PM 7/14 while on face mask. Will place back on CPAP. Discussed with pulmonology. There was concern for trapped lung on AM portable chest XR 7/14. This could be confirmed with a chest CT but not likely change treatment options for the patient. He would not be a VATS candidate.     Repeat chest  XR PM 7/14 showed worsening pleural effusion on the left. Discussed with pulmonology. Chest tube is currently on water seal due to possible trapped lung but could do a trial of placing back on suction to see if this makes breathing more comfortable. The patient's family plans to come in to see him. If his breathing were to worsen and he continues to be uncomfortable, they may consider comfort focused measures.    Lino Hernández, DO

## 2025-07-15 LAB
BACTERIA PLR CULT: ABNORMAL
BACTERIA SPEC CULT: NO GROWTH
BACTERIA SPEC CULT: NO GROWTH
GRAM STAIN RESULT: ABNORMAL

## 2025-07-15 NOTE — PROGRESS NOTES
Patient pronounced dead on 2025 at 1842. The death paper work and documentation and record of death completed. Family already notified,including  home arrangements per previous shift nurse, Priti ANDERSON report. Pt was DNR/DNI, no desire of autopsy, and pt is not a candidate for organ donation. Chest tube, IV lines removed and appropriate postmortem care provided. Body transported to the [facility] by hospital security with pt`s belonging together with the body.

## 2025-07-18 LAB
BACTERIA PLR CULT: ABNORMAL
BACTERIA PLR CULT: ABNORMAL
GRAM STAIN RESULT: ABNORMAL

## 2025-07-21 NOTE — DISCHARGE SUMMARY
[facility]  Hospitalist Discharge Summary      Date of Admission:  2025  Date of Discharge:  2025  9:30 PM  Discharging Provider: Lino Hernández DO  Discharge Service: Hospitalist Service    Discharge Diagnoses     Sepsis secondary dt LLL HCAP  Large left exudative parapneumonic effusion, s/p thoracentesis on 7/10  IR pigtail chest tube placement   Suspected aspiration pneumonitis   Acute hypoxic respiratory failure secondary to above  Lactic acidosis   Leukocytosis, improving   Fever  Atrial fibrillation with RVR  Hypertension  History of CVA  Hyperlipidemia  T4-T5 meningioma s/p laminectomy and resection 25 with post-op fluid collection  Hypertension    Clinically Significant Risk Factors          Follow-ups Needed After Discharge       Unresulted Labs Ordered in the Past 30 Days of this Admission       Date and Time Order Name Status Description    2025 12:55 PM Oligoclonal banding CSF and Blood In process           Discharge Disposition   Discharged to [facility] at discharge:     Hospital Course     Yosef Murry is a 89 year old male with PMHx of afib on chronic anticoagulation with DOAC, hx of CVA, hypertension, hyperlipidemia and recent hospitalization from 25-25 for management of a possible infection post T4-5 laminectomy (done ). Discharged to TCU. He was readmitted on 2025 for management of shortness of breath due to a large left pleural effusion/probable empyema and suspected HCAP. Patient passed away 25. Hospital course by problem below.      Sepsis secondary dt LLL HCAP  Large left exudative parapneumonic effusion, s/p thoracentesis on 7/10  IR pigtail chest tube placement   Suspected aspiration pneumonitis   Acute hypoxic respiratory failure secondary to above  Lactic acidosis   Leukocytosis, improving   Fever  *Presented to ED for evaluation of left-sided chest pain, shortness of breath  *Tmax in .2, in  afib with RVR with HRs 120s, BPs 90-110s systolic, needing 3L NC to maintain sats >90. Labs notable for WBC 9.1, lactate normal, proBNP 1300, trops elevated but trend flat (40-45). CT chest was neg for PE, showed a large left pleural effusion with associated consolidation/atelectasis. Was started on treatment with broad spectrum abx (cefepime, Flagyl and Vanco).   *MRSA swab neg so Vanco dc'd 7/10. Flagyl dc'd 7/11 in light of pleural fluid gm stain findings.   *Underwent thoracentesis on 7/10 with removal of 400ml pleural fluid. Described as clear, dinoicio color. Fluid studies appear consistent with an exudative effusion with neutrophilic predominance, pH 7.5.   *Thoracentesis gram stain polymicrobial with gram positive bacilli, gram negative bacilli, speciated to lactobacillus. No recent EGD but there could be concern for esophageal leak contributing to effusion  *Patient with tachypnea on 7/12. On 2-3 lpm nasal cannula. Reports to be feeling unwell  *2 view chest XR 7/12 showing persistent large pleural effusion on left  *Lactic acid elevated to 2.6 on 7/12, received 500 ml Lr. Lactic acid trended overnight - 2.6 - 2.2 - 2.0.  *Patient had fever PM 7/12, WBC increased to 21.2 on 7/12 from 12.2 on 7/11.  *IR chest tube placed 7/13 and started intrapleural lytics  *~1 liter of rapid output PM 7/13, output slowed down overnight. WBC down to 11.0 from 19.3 on 7/14.   *Patient had increased work of breathing and hypoxia overnight 7/13-7/14, placed on Bi-PAP. Chest XR showing slightly improve aeration of left lung apex. Has wet breath sounds concerning for possible aspiration. Received small dose of fentanyl during IR chest tube procedure. No nausea or vomiting. Lactic acid increased to 2.4 AM 7/14.   *Patient had worsening respiratory status 7/14. Chest XR showed possible hydropneumothorax and/or trapped lung despite significant chest tube output of ~1 liter.  Also concern for worsening aspiration. Pulmonology added  pulmonary toilet measures.  Placed back on CPAP for comfort. Respiratory status continued to worsen despite this. Discussed with family and confirmed DNR/DNI status. Patient suffered cardiac arrest PM 7/14 likely secondary to hypoxia.     -- Consulted ID, recommendations appreciated               - Switched from Cefepime (7/10-7/12) to Zosyn (7/12 to present)  -- Consulted pulmonology, recommendations appreciated               - Received intrapleural TPA as recommended by pulmonology              - Reached out to Dr. Morales AM 7/14 regarding possible need for pulmonary toilet/nebulizers  -- Consulted IR- pigtail chest tube placed 7/13     Atrial fibrillation with RVR  *HRs 120s on presentation. RVR likely dt sepsis/hypoxia.   -- conts on oral metoprolol, prn Lopressor available   -- DOAC held starting PM 7/12 prior to chest tube placement    Hypertension  History of CVA  Hyperlipidemia  - PTA statin, metoprolol; holding amlodipine dt presentation with sepsis     T4-T5 meningioma s/p laminectomy and resection 6/12/25 with post-op fluid collection  *Underwent initial laminectomy/resection on 6/12/25. Then hospitalized 6/24-6/27 for management of suspected postop infection. Initially managed with IV ceftriaxone that stay but no clear convincing evidence of infection found on workup that stay so abx ultimately stopped. Discharged to TCU.   *Chronic and stable on prns for pain, bowels.   -- hold sched oxycodone (5mg BID) dt sedation noted on 7/10     Hypertension  - PTA amlodipine currently on hold      Consultations This Hospital Stay   INTERVENTIONAL RADIOLOGY ADULT/PEDS IP CONSULT  PHARMACY TO DOSE VANCO  CARE MANAGEMENT / SOCIAL WORK IP CONSULT  CARE MANAGEMENT / SOCIAL WORK IP CONSULT  SPEECH LANGUAGE PATH ADULT IP CONSULT  INFECTIOUS DISEASES IP CONSULT  PULMONARY IP CONSULT  INTERVENTIONAL RADIOLOGY ADULT/PEDS IP CONSULT    Code Status   Prior    Time Spent on this Encounter   ILino DO, personally  saw the patient today and spent greater than 30 minutes discharging this patient.       Lino Hernández DO  [place] [address] [place] [address]  Phone: [phone]  ______________________________________________________________________    Physical Exam   Vital Signs:                    Weight: 170 lbs 13.7 oz    Did not examine at time of discharge    Primary Care Physician   Melissa Torres    Discharge Orders       Significant Results and Procedures   Most Recent 3 CBC's:  Recent Labs   Lab Test 07/14/25  0610 07/14/25  0154 07/13/25  0806 07/12/25  1715   WBC 11.0  --  19.3* 21.2*   HGB 13.2* 13.4 13.1* 13.3   MCV 94 95 90 88     --  437 476*     Most Recent 3 BMP's:  Recent Labs   Lab Test 07/14/25  1138 07/14/25  0610 07/14/25  0222 07/13/25  0806 07/12/25  1715   NA  --  145  --  141 139   POTASSIUM  --  4.3  --  4.2 4.2   CHLORIDE  --  109*  --  106 102   CO2  --  24  --  24 23   BUN  --  35.4*  --  29.4* 29.3*   CR  --  0.62*  --  0.60* 0.67   ANIONGAP  --  12  --  11 14   BELLO  --  9.9  --  10.1 9.7   * 121* 122* 135* 151*     Most Recent 2 LFT's:  Recent Labs   Lab Test 07/09/25 2051 07/02/25  0940   AST 28 16   ALT 14 16   ALKPHOS 80 82   BILITOTAL 0.6 0.4     Most Recent 3 INR's:  Recent Labs   Lab Test 06/11/25  0735 06/09/25  1246   INR 1.14 1.45*   ,   Results for orders placed or performed during the hospital encounter of 07/09/25   CT Chest Pulmonary Embolism w Contrast    Narrative    EXAM: CT CHEST PULMONARY EMBOLISM W CONTRAST  LOCATION: [facility]  DATE: 7/9/2025    INDICATION: Bed ridden at TCU, pleuritic left chest pain, hypoxic.  COMPARISON: CT from 6/24/2025.  TECHNIQUE: CT chest pulmonary angiogram during arterial phase injection of IV contrast. Multiplanar reformats and MIP reconstructions were performed. Dose reduction techniques were used.   CONTRAST: 66mL Isovue 370    FINDINGS:  ANGIOGRAM  CHEST: Pulmonary arteries are normal caliber and negative for pulmonary emboli. Ectatic thoracic aorta measuring 5.3 cm in the distal arch, stable compared to recent CT. The aorta is not adequately opacified to exclude a dissection, however. No   CT evidence of right heart strain.    LUNGS AND PLEURA: Right basilar atelectasis. Left lower lobe atelectasis or consolidation. Large left pleural effusion including loculated fluid along the major fissure. No pneumothorax.    MEDIASTINUM/AXILLAE: Mild cardiomegaly. No pericardial effusion. Multivessel coronary artery disease.    CORONARY ARTERY CALCIFICATION: Severe.    UPPER ABDOMEN: No acute findings.    MUSCULOSKELETAL: Osteopenia with accentuated thoracic kyphosis.      Impression    IMPRESSION:  1.  Negative for pulmonary.    2.  Atelectasis or consolidation of the left lower lobe with large left pleural effusion.    3.  Severe coronary artery disease.    4.  Ectatic thoracic aorta.   US Thoracentesis    Narrative    EXAM:   1. LEFT THORACENTESIS  2. ULTRASOUND GUIDANCE  LOCATION: [facility]  DATE: 7/10/2025    INDICATION: Pleural effusion.    PROCEDURE: Preliminary ultrasound demonstrated a septated pleural effusion. Informed consent obtained. Time out performed. The chest was prepped and draped in sterile fashion. 10 mL of 1 % lidocaine was infused into the local soft tissues. Under direct   ultrasound guidance, a 5 Pashto catheter system was placed into the pleural effusion.     0.4 liters of clear dionicio fluid were removed and sent to lab, if requested.    Patient tolerated procedure well.    Ultrasound imaging was obtained and placed in the patient's permanent medical record.      Impression    IMPRESSION:  Status post left ultrasound-guided thoracentesis of a septated pleural effusion..    Reference CPT Code: 36622   XR Chest 2 Views    Narrative    EXAM: XR CHEST 2 VIEWS  LOCATION: [facility]  DATE:  7/12/2025    INDICATION: large pleural effusion s p thoracentesis  COMPARISON: CT of the chest 7/9/2025      Impression    IMPRESSION:     Continued silhouetting of the left ventricular heart border and left hemidiaphragm due to adjacent pleural fluid and/or atelectasis. A small sliding-type hiatal hernia and prior CT also likely contributes to opacity in the medial left base. There are   aortic atheromatous calcifications and unchanged tortuosity.    There is continued findings of somewhat organized pleural fluid in the left apex and lateral pleural space. Only mild improvement in left lung aeration status post thoracentesis.    Unchanged inflation of the right lung. There are no new right lung opacities.   IR Chest Tube Place Non Tunneled Left    Narrative    IR CHEST TUBE PLACEMENT NON-TUNNELLED LEFT  7/13/2025 12:57 PM CDT     HISTORY:  89-year-old male with shortness of breath and suspected empyema in the left pleural space.    COMPARISON: Chest CT dated 7/9/2025    DESCRIPTION OF PROCEDURE: After obtaining informed consent, the patient was placed in a supine position on the fluoroscopy table. The inferolateral left thorax was prepped and draped in the usual sterile manner. 1% lidocaine was injected for local   anesthesia. Under ultrasound guidance, access into the left pleural space was obtained using a 5 British Yueh needle. An image was saved for documentation. A wire was curled in the pleural space using fluoroscopy. Over the wire, a 12 British nonlocking   pigtail catheter was placed. A fluoroscopic image was saved to document catheter position. The catheter was sutured to the patient's skin and hooked to a Pleur-evac suction apparatus. Turbid yellow fluid was aspirated out. 2 mg TPA was injected into the   collection cavity to aid in drainage.    MEDICATIONS: 25 mcg fentanyl    Fluoroscopy time: 0.3 minutes    Total fluoroscopy dose: 3.91 mGy air kerma    Local anesthetic: 16 mL      Impression     IMPRESSION: Left-sided chest tube placed as above.    XR Chest Port 1 View    Narrative    EXAM: XR CHEST PORT 1 VIEW  LOCATION: [facility]  DATE: 7/14/2025    INDICATION: increased hypoxia  COMPARISON: 07/12/2025      Impression    IMPRESSION: Cardiac silhouette obscured. Small caliber left pleural drainage catheter. Slight improved aeration left apex. Persistent atelectasis and/or infiltrate left lung and left pleural effusion. Right upper lobe infiltrate.   XR Chest Port 1 View    Narrative    EXAM: XR CHEST PORT 1 VIEW  LOCATION: [facility]  DATE: 7/14/2025    INDICATION: Empyema, status post chest tube.  COMPARISON: 07/14/2025      Impression    IMPRESSION: Cardiomegaly is obscured by opacification of much of the left hemithorax. Left basilar chest tube is present. Left-sided fluid collection has decreased since the previous study. Gas along the superolateral aspect of the left apex may   represent a hydropneumothorax. CT could better evaluate if indicated. Mild interstitial infiltrate in the right upper lung is unchanged. No effusion or pneumothorax on the right. Degenerative changes are noted through the spine.   XR Chest Port 1 View    Narrative    EXAM: XR CHEST PORT 1 VIEW  LOCATION: [facility]  DATE: 7/14/2025    INDICATION: Increased work of breathing.  COMPARISON: Chest x-rays, most recently 7/14/2025. CT dated 7/9/2025.    FINDINGS: EKG wires overlie the chest. Pigtail chest tube projects over the left lung base. The cardiac silhouette is obscured by left lung opacities. Increased left lung opacities with near complete opacification of the left hemithorax. The right lung   is clear. No right effusion. No pneumothorax bilaterally. Diffuse osteopenia.      Impression    IMPRESSION: Increased left lung opacities with near-complete opacification of the left hemithorax. This likely represents enlargement of known pleural effusion.  Correlate for chest tube function.   Echocardiogram Complete *Canceled*    Narrative    The following orders were created for panel order Echocardiogram Complete.  Procedure                               Abnormality         Status                     ---------                               -----------         ------                       Please view results for these tests on the individual orders.   Echocardiogram Complete *Canceled*    Narrative    The following orders were created for panel order Echocardiogram Complete.  Procedure                               Abnormality         Status                     ---------                               -----------         ------                       Please view results for these tests on the individual orders.       Discharge Medications      Review of your medicines        UNREVIEWED medicines. Ask your doctor about these medicines        Dose / Directions   acetaminophen 325 MG tablet  Commonly known as: TYLENOL  Used for: Thoracic stenosis      Dose: 650 mg  Take 2 tablets (650 mg) by mouth every 4 hours as needed for mild pain or other (and adjunct with moderate or severe pain or per patient request).  Refills: 0     amLODIPine 5 MG tablet  Commonly known as: NORVASC  Used for: Essential hypertension, benign      Dose: 5 mg  Take 1 tablet (5 mg) by mouth daily  Quantity: 90 tablet  Refills: 3     apixaban ANTICOAGULANT 5 MG tablet  Commonly known as: ELIQUIS      Dose: 5 mg  Take 5 mg by mouth 2 times daily.  Refills: 0     atorvastatin 10 MG tablet  Commonly known as: LIPITOR      Dose: 10 mg  Take 1 tablet (10 mg) by mouth every evening  Quantity: 90 tablet  Refills: 3     calcium carbonate 500 MG chewable tablet  Commonly known as: TUMS      Dose: 1 chew tab  Take 1 chew tab by mouth nightly as needed  Refills: 0     hydrOXYzine HCl 10 MG tablet  Commonly known as: ATARAX  Used for: Thoracic stenosis      Dose: 10 mg  Take 1 tablet (10 mg) by mouth every 6  hours as needed for other (adjuvant pain).  Refills: 0     loratadine 10 MG tablet  Commonly known as: CLARITIN      Dose: 10 mg  Take 1 tablet by mouth daily.  Quantity: 30 tablet  Refills: 1     magnesium hydroxide 400 MG/5ML suspension  Commonly known as: MILK OF MAGNESIA      Dose: 30 mL  Take 30 mLs by mouth daily as needed for constipation or heartburn.  Refills: 0     methocarbamol 750 MG tablet  Commonly known as: ROBAXIN  Used for: Thoracic stenosis      Dose: 750 mg  Take 1 tablet (750 mg) by mouth 4 times daily as needed for muscle spasms.  Refills: 0     metoprolol succinate ER 25 MG 24 hr tablet  Commonly known as: Toprol XL  Used for: Permanent atrial fibrillation (H)      Dose: 25 mg  Take 1 tablet (25 mg) by mouth daily  Quantity: 90 tablet  Refills: 3     ondansetron 4 MG ODT tab  Commonly known as: ZOFRAN ODT  Used for: Thoracic stenosis      Dose: 4 mg  Take 1 tablet (4 mg) by mouth every 8 hours as needed for nausea.  Refills: 0     oxyCODONE 5 MG tablet  Commonly known as: ROXICODONE  Used for: Thoracic stenosis      Take 1 tablet (5 mg) by mouth 2 times daily. May also take 1 tablet (5 mg) 4 times daily as needed for severe pain. And QID PRN.  Quantity: 20 tablet  Refills: 0     polyethylene glycol 17 GM/Dose powder  Commonly known as: MIRALAX  Used for: Thoracic stenosis      Dose: 17 g  Take 17 g by mouth daily.  Refills: 0     PRESERVISION AREDS 2 PO      Dose: 1 capsule  Take 1 capsule by mouth 2 times daily AREDS 2  Refills: 0     senna 8.6 MG tablet  Commonly known as: SENOKOT      Dose: 2 tablet  Take 2 tablets by mouth 2 times daily.  Refills: 0     vitamin D3 50 mcg (2000 units) tablet  Commonly known as: CHOLECALCIFEROL      Dose: 1 tablet  Take 1 tablet by mouth daily  Refills: 0            Allergies   Allergies   Allergen Reactions    Other [Seasonal Allergies]

## (undated) DEVICE — SOL WATER IRRIG 1000ML BOTTLE 2F7114

## (undated) DEVICE — GLOVE PROTEXIS BLUE W/NEU-THERA 8.5  2D73EB85

## (undated) DEVICE — SU VICRYL 0 CT-1 CR 8X18" J740D

## (undated) DEVICE — DRAIN PENROSE 0.50"X18" LATEX FREE GR203

## (undated) DEVICE — SU VICRYL 2-0 CT-2 CR 8X18" J726D

## (undated) DEVICE — IOM STANDARD SUPPLY FEE

## (undated) DEVICE — PREP CHLORAPREP 26ML TINTED HI-LITE ORANGE 930815

## (undated) DEVICE — LINEN TOWEL PACK X6 WHITE 5487

## (undated) DEVICE — SPONGE SURGIFOAM 100 1974

## (undated) DEVICE — SU PROLENE 6-0 BV-1DA 18" 8709H

## (undated) DEVICE — BLADE CLIPPER 4412A

## (undated) DEVICE — LINEN TOWEL PACK X5 5464

## (undated) DEVICE — TOOL DISSECT MIDAS MR8 14CM MATCH HEAD 3MM MR8-14MH30

## (undated) DEVICE — DRAPE LAP W/ARMBOARD 29410

## (undated) DEVICE — TUBING SET ASPIRATION W/EXT SONOPET  LF 5450-850-003

## (undated) DEVICE — SPONGE COTTONOID 1/2X1/2" 80-1400

## (undated) DEVICE — ESU GROUND PAD ADULT W/CORD E7507

## (undated) DEVICE — SOL NACL 0.9% IRRIG 1000ML BOTTLE 2F7124

## (undated) DEVICE — SU VICRYL 2-0 CT-2 27" J333H

## (undated) DEVICE — PIN SKULL MAYFIELD ADULT TITANIUM 3/PK A1120

## (undated) DEVICE — TIP ASPIRATOR SONOPET IQ MICRO 5500-25S-309

## (undated) DEVICE — SU VICRYL 3-0 TIE 12X18" J904T

## (undated) DEVICE — SU VICRYL 4-0 PS-2 18" UND J496H

## (undated) DEVICE — CATH TRAY FOLEY SURESTEP 16FR WDRAIN BAG STLK LATEX A300316A

## (undated) DEVICE — WIPES FOLEY CARE SURESTEP PROVON DFC100

## (undated) DEVICE — SYR 30ML LL W/O NDL 302832

## (undated) DEVICE — SPONGE COTTONOID 1X3" 80-1408

## (undated) DEVICE — SU VICRYL 3-0 CT-1 36" J338H

## (undated) DEVICE — DRAPE C-ARM 60X42" 1013

## (undated) DEVICE — SPONGE COTTONOID 1X1" 80-1403

## (undated) DEVICE — PREP CHLORAPREP 26ML TINTED ORANGE  260815

## (undated) DEVICE — SU ETHILON 3-0 FS-1 18" 669H

## (undated) DEVICE — DRAPE STERI TOWEL SM 1000

## (undated) DEVICE — DRSG STERI STRIP 1/2X4" R1547

## (undated) DEVICE — Device

## (undated) DEVICE — NDL BLUNT 17GA 1.5" 8881202330

## (undated) DEVICE — SU PDS II 2-0 CT-2 27"  Z333H

## (undated) DEVICE — GLOVE GAMMEX DERMAPRENE ULTRA SZ 8.5 LF 8517

## (undated) DEVICE — BAG DECANTER STERILE WHITE DYNJDEC09

## (undated) DEVICE — SU ETHILON 3-0 PS-1 18" 1663G

## (undated) DEVICE — PACK UNIVERSAL SPLIT 29131

## (undated) DEVICE — PACK LARGE SPINE SNE15LSFSE

## (undated) DEVICE — SU MONOCRYL 4-0 PS-2 18" UND Y496G

## (undated) DEVICE — KIT TURNOVER FAIRVIEW SOUTHDALE HALF SP3890

## (undated) DEVICE — IOM CASE FLAT FEE

## (undated) DEVICE — SU VICRYL 2-0 SH 27" J317H

## (undated) DEVICE — DRAPE MAYO STAND 23X54 8337

## (undated) DEVICE — SPONGE COTTONOID 1/2X3" 80-1407

## (undated) DEVICE — PACK MINOR SBA15MIFSE

## (undated) DEVICE — SU NOVAFIL 2-0 T-19 30" 4459-51

## (undated) RX ORDER — FLUMAZENIL 0.1 MG/ML
INJECTION, SOLUTION INTRAVENOUS
Status: DISPENSED
Start: 2019-08-02

## (undated) RX ORDER — FENTANYL CITRATE 0.05 MG/ML
INJECTION, SOLUTION INTRAMUSCULAR; INTRAVENOUS
Status: DISPENSED
Start: 2025-06-12

## (undated) RX ORDER — PROPOFOL 10 MG/ML
INJECTION, EMULSION INTRAVENOUS
Status: DISPENSED
Start: 2025-06-12

## (undated) RX ORDER — LIDOCAINE HYDROCHLORIDE 40 MG/ML
SOLUTION TOPICAL
Status: DISPENSED
Start: 2019-08-02

## (undated) RX ORDER — PROPOFOL 10 MG/ML
INJECTION, EMULSION INTRAVENOUS
Status: DISPENSED
Start: 2019-06-13

## (undated) RX ORDER — NALOXONE HYDROCHLORIDE 0.4 MG/ML
INJECTION, SOLUTION INTRAMUSCULAR; INTRAVENOUS; SUBCUTANEOUS
Status: DISPENSED
Start: 2019-08-02

## (undated) RX ORDER — LIDOCAINE HYDROCHLORIDE 10 MG/ML
INJECTION, SOLUTION EPIDURAL; INFILTRATION; INTRACAUDAL; PERINEURAL
Status: DISPENSED
Start: 2019-07-30

## (undated) RX ORDER — HYDROMORPHONE HCL IN WATER/PF 6 MG/30 ML
PATIENT CONTROLLED ANALGESIA SYRINGE INTRAVENOUS
Status: DISPENSED
Start: 2025-06-12

## (undated) RX ORDER — HYDROMORPHONE HYDROCHLORIDE 1 MG/ML
INJECTION, SOLUTION INTRAMUSCULAR; INTRAVENOUS; SUBCUTANEOUS
Status: DISPENSED
Start: 2025-06-12

## (undated) RX ORDER — DEXAMETHASONE SODIUM PHOSPHATE 4 MG/ML
INJECTION, SOLUTION INTRA-ARTICULAR; INTRALESIONAL; INTRAMUSCULAR; INTRAVENOUS; SOFT TISSUE
Status: DISPENSED
Start: 2025-06-12

## (undated) RX ORDER — GLYCOPYRROLATE 0.2 MG/ML
INJECTION, SOLUTION INTRAMUSCULAR; INTRAVENOUS
Status: DISPENSED
Start: 2025-06-12

## (undated) RX ORDER — REGADENOSON 0.08 MG/ML
INJECTION, SOLUTION INTRAVENOUS
Status: DISPENSED
Start: 2019-08-27

## (undated) RX ORDER — FENTANYL CITRATE 50 UG/ML
INJECTION, SOLUTION INTRAMUSCULAR; INTRAVENOUS
Status: DISPENSED
Start: 2019-08-02

## (undated) RX ORDER — LABETALOL HYDROCHLORIDE 5 MG/ML
INJECTION, SOLUTION INTRAVENOUS
Status: DISPENSED
Start: 2025-06-12

## (undated) RX ORDER — REMIFENTANIL HYDROCHLORIDE 1 MG/ML
INJECTION, POWDER, LYOPHILIZED, FOR SOLUTION INTRAVENOUS
Status: DISPENSED
Start: 2025-06-12

## (undated) RX ORDER — DEXAMETHASONE SODIUM PHOSPHATE 4 MG/ML
INJECTION, SOLUTION INTRA-ARTICULAR; INTRALESIONAL; INTRAMUSCULAR; INTRAVENOUS; SOFT TISSUE
Status: DISPENSED
Start: 2019-06-13

## (undated) RX ORDER — FENTANYL CITRATE 50 UG/ML
INJECTION, SOLUTION INTRAMUSCULAR; INTRAVENOUS
Status: DISPENSED
Start: 2019-06-13

## (undated) RX ORDER — ONDANSETRON 2 MG/ML
INJECTION INTRAMUSCULAR; INTRAVENOUS
Status: DISPENSED
Start: 2019-06-13

## (undated) RX ORDER — CEFAZOLIN SODIUM 2 G/100ML
INJECTION, SOLUTION INTRAVENOUS
Status: DISPENSED
Start: 2019-06-13

## (undated) RX ORDER — BUPIVACAINE HYDROCHLORIDE AND EPINEPHRINE 2.5; 5 MG/ML; UG/ML
INJECTION, SOLUTION EPIDURAL; INFILTRATION; INTRACAUDAL; PERINEURAL
Status: DISPENSED
Start: 2025-06-12

## (undated) RX ORDER — GLYCOPYRROLATE 0.2 MG/ML
INJECTION, SOLUTION INTRAMUSCULAR; INTRAVENOUS
Status: DISPENSED
Start: 2019-08-02

## (undated) RX ORDER — LIDOCAINE HYDROCHLORIDE 20 MG/ML
INJECTION, SOLUTION EPIDURAL; INFILTRATION; INTRACAUDAL; PERINEURAL
Status: DISPENSED
Start: 2019-06-13

## (undated) RX ORDER — FENTANYL CITRATE 50 UG/ML
INJECTION, SOLUTION INTRAMUSCULAR; INTRAVENOUS
Status: DISPENSED
Start: 2025-06-12

## (undated) RX ORDER — FENTANYL CITRATE 50 UG/ML
INJECTION, SOLUTION INTRAMUSCULAR; INTRAVENOUS
Status: DISPENSED
Start: 2025-07-13

## (undated) RX ORDER — ONDANSETRON 2 MG/ML
INJECTION INTRAMUSCULAR; INTRAVENOUS
Status: DISPENSED
Start: 2025-06-12